# Patient Record
Sex: MALE | Race: BLACK OR AFRICAN AMERICAN | Employment: OTHER | URBAN - METROPOLITAN AREA
[De-identification: names, ages, dates, MRNs, and addresses within clinical notes are randomized per-mention and may not be internally consistent; named-entity substitution may affect disease eponyms.]

---

## 2017-01-05 ENCOUNTER — APPOINTMENT (OUTPATIENT)
Dept: GENERAL RADIOLOGY | Age: 72
DRG: 853 | End: 2017-01-05
Attending: INTERNAL MEDICINE
Payer: MEDICARE

## 2017-01-05 ENCOUNTER — HOSPITAL ENCOUNTER (INPATIENT)
Age: 72
LOS: 28 days | Discharge: NURSING, REHAB OR LONG-TERM CARE | DRG: 853 | End: 2017-02-02
Attending: EMERGENCY MEDICINE | Admitting: INTERNAL MEDICINE
Payer: MEDICARE

## 2017-01-05 DIAGNOSIS — L08.9 DIABETIC FOOT INFECTION (HCC): Primary | ICD-10-CM

## 2017-01-05 DIAGNOSIS — E11.628 DIABETIC FOOT INFECTION (HCC): Primary | ICD-10-CM

## 2017-01-05 DIAGNOSIS — A41.9 SEPSIS, DUE TO UNSPECIFIED ORGANISM: ICD-10-CM

## 2017-01-05 PROBLEM — E11.621 DIABETIC ULCER OF RIGHT FOOT (HCC): Status: ACTIVE | Noted: 2017-01-05

## 2017-01-05 PROBLEM — L97.519 DIABETIC ULCER OF RIGHT FOOT (HCC): Status: ACTIVE | Noted: 2017-01-05

## 2017-01-05 LAB
ALBUMIN SERPL BCP-MCNC: 2.1 G/DL (ref 3.2–4.6)
ALBUMIN/GLOB SERPL: 0.4 {RATIO} (ref 1.2–3.5)
ALP SERPL-CCNC: 100 U/L (ref 50–136)
ALT SERPL-CCNC: 16 U/L (ref 12–65)
ANION GAP BLD CALC-SCNC: 11 MMOL/L (ref 7–16)
AST SERPL W P-5'-P-CCNC: 24 U/L (ref 15–37)
ATRIAL RATE: 98 BPM
BASOPHILS # BLD AUTO: 0 K/UL (ref 0–0.2)
BASOPHILS # BLD: 0 % (ref 0–2)
BILIRUB SERPL-MCNC: 0.5 MG/DL (ref 0.2–1.1)
BUN SERPL-MCNC: 29 MG/DL (ref 8–23)
CALCIUM SERPL-MCNC: 8.1 MG/DL (ref 8.3–10.4)
CALCULATED P AXIS, ECG09: 62 DEGREES
CALCULATED R AXIS, ECG10: 46 DEGREES
CALCULATED T AXIS, ECG11: -74 DEGREES
CHLORIDE SERPL-SCNC: 105 MMOL/L (ref 98–107)
CO2 SERPL-SCNC: 24 MMOL/L (ref 21–32)
CREAT SERPL-MCNC: 2.99 MG/DL (ref 0.8–1.5)
CRP SERPL-MCNC: 25.4 MG/DL (ref 0–0.9)
D DIMER PPP FEU-MCNC: 1.57 UG/ML(FEU)
DIAGNOSIS, 93000: NORMAL
DIASTOLIC BP, ECG02: NORMAL MMHG
DIFFERENTIAL METHOD BLD: ABNORMAL
EOSINOPHIL # BLD: 0.1 K/UL (ref 0–0.8)
EOSINOPHIL NFR BLD: 0 % (ref 0.5–7.8)
ERYTHROCYTE [DISTWIDTH] IN BLOOD BY AUTOMATED COUNT: 13.9 % (ref 11.9–14.6)
GLOBULIN SER CALC-MCNC: 5.3 G/DL (ref 2.3–3.5)
GLUCOSE BLD STRIP.AUTO-MCNC: 171 MG/DL (ref 65–100)
GLUCOSE SERPL-MCNC: 157 MG/DL (ref 65–100)
HCT VFR BLD AUTO: 25.8 % (ref 41.1–50.3)
HGB BLD-MCNC: 8.1 G/DL (ref 13.6–17.2)
IMM GRANULOCYTES # BLD: 0.1 K/UL (ref 0–0.5)
IMM GRANULOCYTES NFR BLD AUTO: 0.3 % (ref 0–5)
LACTATE BLD-SCNC: 2.2 MMOL/L (ref 0.5–1.9)
LYMPHOCYTES # BLD AUTO: 5 % (ref 13–44)
LYMPHOCYTES # BLD: 0.9 K/UL (ref 0.5–4.6)
MCH RBC QN AUTO: 26.9 PG (ref 26.1–32.9)
MCHC RBC AUTO-ENTMCNC: 31.4 G/DL (ref 31.4–35)
MCV RBC AUTO: 85.7 FL (ref 79.6–97.8)
MONOCYTES # BLD: 1.4 K/UL (ref 0.1–1.3)
MONOCYTES NFR BLD AUTO: 7 % (ref 4–12)
NEUTS SEG # BLD: 16.3 K/UL (ref 1.7–8.2)
NEUTS SEG NFR BLD AUTO: 88 % (ref 43–78)
P-R INTERVAL, ECG05: 166 MS
PLATELET # BLD AUTO: 331 K/UL (ref 150–450)
PMV BLD AUTO: 10.1 FL (ref 10.8–14.1)
POTASSIUM SERPL-SCNC: 3.8 MMOL/L (ref 3.5–5.1)
PROCALCITONIN SERPL-MCNC: 0.2 NG/ML
PROT SERPL-MCNC: 7.4 G/DL (ref 6.3–8.2)
Q-T INTERVAL, ECG07: 352 MS
QRS DURATION, ECG06: 74 MS
QTC CALCULATION (BEZET), ECG08: 449 MS
RBC # BLD AUTO: 3.01 M/UL (ref 4.23–5.67)
SODIUM SERPL-SCNC: 140 MMOL/L (ref 136–145)
SYSTOLIC BP, ECG01: NORMAL MMHG
TROPONIN I SERPL-MCNC: 0.56 NG/ML (ref 0.02–0.05)
VENTRICULAR RATE, ECG03: 98 BPM
WBC # BLD AUTO: 18.7 K/UL (ref 4.3–11.1)

## 2017-01-05 PROCEDURE — 84484 ASSAY OF TROPONIN QUANT: CPT | Performed by: PHYSICIAN ASSISTANT

## 2017-01-05 PROCEDURE — 82962 GLUCOSE BLOOD TEST: CPT

## 2017-01-05 PROCEDURE — 85025 COMPLETE CBC W/AUTO DIFF WBC: CPT | Performed by: PHYSICIAN ASSISTANT

## 2017-01-05 PROCEDURE — 65660000000 HC RM CCU STEPDOWN

## 2017-01-05 PROCEDURE — 74011000258 HC RX REV CODE- 258: Performed by: PHYSICIAN ASSISTANT

## 2017-01-05 PROCEDURE — 85379 FIBRIN DEGRADATION QUANT: CPT | Performed by: PHYSICIAN ASSISTANT

## 2017-01-05 PROCEDURE — 99285 EMERGENCY DEPT VISIT HI MDM: CPT | Performed by: PHYSICIAN ASSISTANT

## 2017-01-05 PROCEDURE — 86140 C-REACTIVE PROTEIN: CPT | Performed by: PHYSICIAN ASSISTANT

## 2017-01-05 PROCEDURE — 74011250636 HC RX REV CODE- 250/636: Performed by: EMERGENCY MEDICINE

## 2017-01-05 PROCEDURE — 93005 ELECTROCARDIOGRAM TRACING: CPT | Performed by: PHYSICIAN ASSISTANT

## 2017-01-05 PROCEDURE — 96375 TX/PRO/DX INJ NEW DRUG ADDON: CPT | Performed by: PHYSICIAN ASSISTANT

## 2017-01-05 PROCEDURE — 96365 THER/PROPH/DIAG IV INF INIT: CPT | Performed by: PHYSICIAN ASSISTANT

## 2017-01-05 PROCEDURE — 74011250636 HC RX REV CODE- 250/636: Performed by: INTERNAL MEDICINE

## 2017-01-05 PROCEDURE — 74011250636 HC RX REV CODE- 250/636: Performed by: PHYSICIAN ASSISTANT

## 2017-01-05 PROCEDURE — 71020 XR CHEST PA LAT: CPT

## 2017-01-05 PROCEDURE — 87040 BLOOD CULTURE FOR BACTERIA: CPT | Performed by: PHYSICIAN ASSISTANT

## 2017-01-05 PROCEDURE — 80053 COMPREHEN METABOLIC PANEL: CPT | Performed by: PHYSICIAN ASSISTANT

## 2017-01-05 PROCEDURE — 84145 PROCALCITONIN (PCT): CPT | Performed by: PHYSICIAN ASSISTANT

## 2017-01-05 PROCEDURE — 73630 X-RAY EXAM OF FOOT: CPT

## 2017-01-05 PROCEDURE — 83605 ASSAY OF LACTIC ACID: CPT

## 2017-01-05 RX ORDER — METOPROLOL SUCCINATE 100 MG/1
100 TABLET, EXTENDED RELEASE ORAL
Status: DISCONTINUED | OUTPATIENT
Start: 2017-01-06 | End: 2017-02-02 | Stop reason: HOSPADM

## 2017-01-05 RX ORDER — HYDROCODONE BITARTRATE AND ACETAMINOPHEN 5; 325 MG/1; MG/1
1 TABLET ORAL
Status: DISCONTINUED | OUTPATIENT
Start: 2017-01-05 | End: 2017-01-06

## 2017-01-05 RX ORDER — SPIRONOLACTONE 25 MG/1
25 TABLET ORAL DAILY
Status: CANCELLED | OUTPATIENT
Start: 2017-01-06

## 2017-01-05 RX ORDER — HYDROMORPHONE HYDROCHLORIDE 1 MG/ML
0.5 INJECTION, SOLUTION INTRAMUSCULAR; INTRAVENOUS; SUBCUTANEOUS
Status: DISCONTINUED | OUTPATIENT
Start: 2017-01-05 | End: 2017-01-12 | Stop reason: SDUPTHER

## 2017-01-05 RX ORDER — SODIUM CHLORIDE 0.9 % (FLUSH) 0.9 %
5-10 SYRINGE (ML) INJECTION EVERY 8 HOURS
Status: DISCONTINUED | OUTPATIENT
Start: 2017-01-05 | End: 2017-02-02 | Stop reason: HOSPADM

## 2017-01-05 RX ORDER — VANCOMYCIN 2 GRAM/500 ML IN 0.9 % SODIUM CHLORIDE INTRAVENOUS
2000 ONCE
Status: COMPLETED | OUTPATIENT
Start: 2017-01-05 | End: 2017-01-28

## 2017-01-05 RX ORDER — HYDROMORPHONE HYDROCHLORIDE 1 MG/ML
0.5 INJECTION, SOLUTION INTRAMUSCULAR; INTRAVENOUS; SUBCUTANEOUS
Status: COMPLETED | OUTPATIENT
Start: 2017-01-05 | End: 2017-01-05

## 2017-01-05 RX ORDER — ONDANSETRON 2 MG/ML
4 INJECTION INTRAMUSCULAR; INTRAVENOUS
Status: COMPLETED | OUTPATIENT
Start: 2017-01-05 | End: 2017-01-05

## 2017-01-05 RX ORDER — ATORVASTATIN CALCIUM 40 MG/1
80 TABLET, FILM COATED ORAL DAILY
Status: DISCONTINUED | OUTPATIENT
Start: 2017-01-06 | End: 2017-02-02 | Stop reason: HOSPADM

## 2017-01-05 RX ORDER — SODIUM CHLORIDE 0.9 % (FLUSH) 0.9 %
5-10 SYRINGE (ML) INJECTION AS NEEDED
Status: DISCONTINUED | OUTPATIENT
Start: 2017-01-05 | End: 2017-02-02 | Stop reason: HOSPADM

## 2017-01-05 RX ORDER — SODIUM CHLORIDE 0.9 % (FLUSH) 0.9 %
5-10 SYRINGE (ML) INJECTION AS NEEDED
Status: DISCONTINUED | OUTPATIENT
Start: 2017-01-05 | End: 2017-01-05

## 2017-01-05 RX ORDER — HEPARIN SODIUM 5000 [USP'U]/ML
5000 INJECTION, SOLUTION INTRAVENOUS; SUBCUTANEOUS EVERY 8 HOURS
Status: DISCONTINUED | OUTPATIENT
Start: 2017-01-05 | End: 2017-01-08

## 2017-01-05 RX ORDER — ACETAMINOPHEN 325 MG/1
650 TABLET ORAL
Status: DISCONTINUED | OUTPATIENT
Start: 2017-01-05 | End: 2017-02-02 | Stop reason: HOSPADM

## 2017-01-05 RX ORDER — AMLODIPINE BESYLATE 5 MG/1
5 TABLET ORAL DAILY
Status: DISCONTINUED | OUTPATIENT
Start: 2017-01-06 | End: 2017-01-09

## 2017-01-05 RX ORDER — INSULIN LISPRO 100 [IU]/ML
INJECTION, SOLUTION INTRAVENOUS; SUBCUTANEOUS
Status: DISCONTINUED | OUTPATIENT
Start: 2017-01-06 | End: 2017-01-09

## 2017-01-05 RX ORDER — RAMIPRIL 5 MG/1
10 CAPSULE ORAL DAILY
Status: DISCONTINUED | OUTPATIENT
Start: 2017-01-06 | End: 2017-01-06

## 2017-01-05 RX ORDER — DOCUSATE SODIUM 100 MG/1
100 CAPSULE, LIQUID FILLED ORAL
Status: DISCONTINUED | OUTPATIENT
Start: 2017-01-05 | End: 2017-02-02 | Stop reason: HOSPADM

## 2017-01-05 RX ORDER — GUAIFENESIN 100 MG/5ML
81 LIQUID (ML) ORAL
Status: COMPLETED | OUTPATIENT
Start: 2017-01-06 | End: 2017-01-10

## 2017-01-05 RX ORDER — ONDANSETRON 2 MG/ML
4 INJECTION INTRAMUSCULAR; INTRAVENOUS
Status: DISCONTINUED | OUTPATIENT
Start: 2017-01-05 | End: 2017-02-02 | Stop reason: HOSPADM

## 2017-01-05 RX ORDER — BUMETANIDE 1 MG/1
1 TABLET ORAL DAILY
Status: CANCELLED | OUTPATIENT
Start: 2017-01-06

## 2017-01-05 RX ORDER — VANCOMYCIN/0.9 % SOD CHLORIDE 1.5G/250ML
1500 PLASTIC BAG, INJECTION (ML) INTRAVENOUS EVERY 24 HOURS
Status: DISCONTINUED | OUTPATIENT
Start: 2017-01-06 | End: 2017-01-06

## 2017-01-05 RX ADMIN — PIPERACILLIN SODIUM,TAZOBACTAM SODIUM 4.5 G: 4; .5 INJECTION, POWDER, FOR SOLUTION INTRAVENOUS at 18:42

## 2017-01-05 RX ADMIN — HYDROMORPHONE HYDROCHLORIDE 0.5 MG: 1 INJECTION, SOLUTION INTRAMUSCULAR; INTRAVENOUS; SUBCUTANEOUS at 18:41

## 2017-01-05 RX ADMIN — HYDROMORPHONE HYDROCHLORIDE 0.5 MG: 1 INJECTION, SOLUTION INTRAMUSCULAR; INTRAVENOUS; SUBCUTANEOUS at 22:30

## 2017-01-05 RX ADMIN — VANCOMYCIN HYDROCHLORIDE 1932 MG: 10 INJECTION, POWDER, LYOPHILIZED, FOR SOLUTION INTRAVENOUS at 17:32

## 2017-01-05 RX ADMIN — HEPARIN SODIUM 5000 UNITS: 5000 INJECTION, SOLUTION INTRAVENOUS; SUBCUTANEOUS at 21:52

## 2017-01-05 RX ADMIN — ONDANSETRON 4 MG: 2 INJECTION, SOLUTION INTRAMUSCULAR; INTRAVENOUS at 18:42

## 2017-01-05 RX ADMIN — SODIUM CHLORIDE 1000 ML: 900 INJECTION, SOLUTION INTRAVENOUS at 21:30

## 2017-01-05 RX ADMIN — ONDANSETRON 4 MG: 2 INJECTION INTRAMUSCULAR; INTRAVENOUS at 22:30

## 2017-01-05 RX ADMIN — VANCOMYCIN HYDROCHLORIDE 2000 MG: 10 INJECTION, POWDER, LYOPHILIZED, FOR SOLUTION INTRAVENOUS at 17:52

## 2017-01-05 RX ADMIN — SODIUM CHLORIDE 2898 ML: 900 INJECTION, SOLUTION INTRAVENOUS at 18:42

## 2017-01-05 NOTE — IP AVS SNAPSHOT
Current Discharge Medication List  
  
Take these medications at their scheduled times Dose & Instructions Dispensing Information Comments Morning Noon Evening Bedtime  
 amLODIPine 5 mg tablet Commonly known as:  [place] Your next dose is: Today, Tomorrow Other:  ____________ Dose:  5 mg Take 1 Tab by mouth daily. Quantity:  30 Tab Refills:  11  
     
   
   
   
  
 aspirin 81 mg chewable tablet Your next dose is: Today, Tomorrow Other:  ____________ Dose:  81 mg Take 1 Tab by mouth daily (after breakfast). Quantity:  30 Tab Refills:  11  
     
   
   
   
  
 atorvastatin 80 mg tablet Commonly known as:  LIPITOR Your next dose is: Today, Tomorrow Other:  ____________ Dose:  80 mg Take 1 Tab by mouth daily. Quantity:  30 Tab Refills:  6  
     
   
   
   
  
 bumetanide 1 mg tablet Commonly known as:  [employer] Your next dose is: Today, Tomorrow Other:  ____________ Dose:  1 mg Take 1 Tab by mouth daily. Quantity:  30 Tab Refills:  3  
     
   
   
   
  
 glipiZIDE SR 2.5 mg CR tablet Commonly known as:  GLUCOTROL XL Your next dose is: Today, Tomorrow Other:  ____________ Take  by mouth daily. Refills:  0  
     
   
   
   
  
 isosorbide dinitrate 10 mg tablet Commonly known as:  ISORDIL Your next dose is: Today, Tomorrow Other:  ____________ Dose:  10 mg Take 1 Tab by mouth three (3) times daily. Quantity:  30 Tab Refills:  0  
     
   
   
   
  
 magnesium oxide 400 mg tablet Commonly known as:  MAG-OX Your next dose is: Today, Tomorrow Other:  ____________ Dose:  400 mg Take 1 Tab by mouth two (2) times a day. Quantity:  30 Tab Refills:  0  
     
   
   
   
  
 metoclopramide HCl 10 mg tablet Commonly known as:  REGLAN Your next dose is: Today, Tomorrow Other:  ____________ Dose:  10 mg Take 1 Tab by mouth Before breakfast, lunch, dinner and at bedtime for 10 days. Quantity:  40 Tab Refills:  0  
     
   
   
   
  
 metoprolol succinate 100 mg tablet Commonly known as:  TOPROL-XL Your next dose is: Today, Tomorrow Other:  ____________ Dose:  100 mg Take 1 Tab by mouth daily. Quantity:  30 Tab Refills:  11  
     
   
   
   
  
 multivitamin, stress formula tablet Commonly known as:  STRESS TAB Your next dose is: Today, Tomorrow Other:  ____________ Dose:  1 Tab Take 1 Tab by mouth daily for 10 days. Quantity:  10 Tab Refills:  0  
     
   
   
   
  
 pantoprazole 40 mg tablet Commonly known as:  PROTONIX Your next dose is: Today, Tomorrow Other:  ____________ Dose:  40 mg Take 1 Tab by mouth Before breakfast and dinner. Indications: HEARTBURN Quantity:  20 Tab Refills:  0  
     
   
   
   
  
 potassium chloride 20 mEq tablet Commonly known as:  K-DUR, KLOR-CON Your next dose is: Today, Tomorrow Other:  ____________ Dose:  40 mEq Take 2 Tabs by mouth daily. Quantity:  10 Tab Refills:  0  
     
   
   
   
  
 ramipril 10 mg capsule Commonly known as:  ALTACE Your next dose is: Today, Tomorrow Other:  ____________ Dose:  10 mg Take 1 Cap by mouth daily. Quantity:  30 Cap Refills:  11  
     
   
   
   
  
 spironolactone 25 mg tablet Commonly known as:  ALDACTONE Your next dose is: Today, Tomorrow Other:  ____________ Dose:  25 mg Take 25 mg by mouth daily. Refills:  1 Take these medications as needed Dose & Instructions Dispensing Information Comments Morning Noon Evening Bedtime  
 alum-mag hydroxide-simeth 200-200-20 mg/5 mL Susp Commonly known as:  MYLANTA Your next dose is: Today, Tomorrow Other:  ____________ Dose:  30 mL Take 30 mL by mouth every four (4) hours as needed. Quantity:  150 mL Refills:  0  
     
   
   
   
  
 oxyCODONE IR 20 mg immediate release tablet Commonly known as:  Haley Ramal Your next dose is: Today, Tomorrow Other:  ____________ Dose:  20 mg Take 1 Tab by mouth every four (4) hours as needed. Max Daily Amount: 120 mg.  
 Quantity:  10 Tab Refills:  0  
     
   
   
   
  
 traMADol 50 mg tablet Commonly known as:  ULTRAM  
   
Your next dose is: Today, Tomorrow Other:  ____________ Dose:  100 mg Take 2 Tabs by mouth every six (6) hours as needed. Max Daily Amount: 400 mg. Quantity:  10 Tab Refills:  0 Where to Get Your Medications Information about where to get these medications is not yet available ! Ask your nurse or doctor about these medications  
  alum-mag hydroxide-simeth 200-200-20 mg/5 mL Susp  
 isosorbide dinitrate 10 mg tablet  
 magnesium oxide 400 mg tablet  
 metoclopramide HCl 10 mg tablet  
 multivitamin, stress formula tablet  
 oxyCODONE IR 20 mg immediate release tablet  
 pantoprazole 40 mg tablet  
 potassium chloride 20 mEq tablet  
 traMADol 50 mg tablet

## 2017-01-05 NOTE — ED PROVIDER NOTES
HPI Comments: The patient is here with right foot pain, redness and an ulcer that has been there for a while. He states the pain in his foot is traveling all the way up his leg into his chest and into his head. He is a diabetic and is a patient at the [place] [facility] through [facility].  He has had swelling of that right leg and states that the pain today has been unbearable. He normally has pain medication at home and states once he takes that he can get some relief however today he has gotten no relief. He has felt like he has had some fever and chills as well. Patient is joking with me in the room and his family. He states he is unable to bear weight on the leg due to pain. The leg has not been cold. He has no history of blood clots that he is aware of. Patient states that he smokes \"every chance I get. \"  The patient has no redness of the leg, the redness is just in his foot. Patient has had no nasal congestion, cough or other symptoms. Patient is a 73 y.o. male presenting with leg pain. The history is provided by the patient.    Leg Pain           Past Medical History:   Diagnosis Date    Abnormal CT scan, chest 12/27/2015    Acute CHF ([address] 85/63/1932    Acute systolic congestive heart failure ([address] 12/30/2015    MO (acute kidney injury) ([address] 7/29/2012    Bilateral pleural effusion 12/27/2015    Chest pain 12/26/2015    DM (diabetes mellitus), type 2 ([address] 7/29/2012    HTN (hypertension) 7/29/2012    Hypoglycemia 7/29/2012    Tobacco use 12/27/2015       Past Surgical History:   Procedure Laterality Date    Hx orthopaedic           Family History:   Problem Relation Age of Onset    Diabetes Mother     Kidney Disease Mother     Diabetes Father     Kidney Disease Father     Kidney Disease Sister        Social History     Social History    Marital status: SINGLE     Spouse name: N/A    Number of children: N/A    Years of education: N/A     Occupational History  retired brick layer      Social History Main Topics    Smoking status: Current Every Day Smoker     Packs/day: 1.00     Years: 45.00    Smokeless tobacco: Not on file    Alcohol use Yes      Comment: occasional    Drug use: No    Sexual activity: Not on file     Other Topics Concern    Not on file     Social History Narrative         ALLERGIES: Review of patient's allergies indicates no known allergies. Review of Systems   Constitutional: Negative. HENT: Negative. Eyes: Negative. Respiratory: Negative. Cardiovascular: Positive for chest pain and leg swelling. Gastrointestinal: Negative. Genitourinary: Negative. Musculoskeletal: Negative. Skin: Positive for color change and wound. Neurological: Negative. Psychiatric/Behavioral: Negative. All other systems reviewed and are negative. Vitals:    01/05/17 1704   BP: 124/56   Pulse: 93   Resp: 18   Temp: 99.3 °F (37.4 °C)   SpO2: 100%   Weight: 96.6 kg (213 lb)   Height: 6' 1\" (1.854 m)            Physical Exam   Constitutional: He is oriented to person, place, and time. He appears well-developed and well-nourished. HENT:   Head: Normocephalic and atraumatic. Right Ear: External ear normal.   Left Ear: External ear normal.   Nose: Nose normal.   Mouth/Throat: Oropharynx is clear and moist.   Eyes: Conjunctivae and EOM are normal. Pupils are equal, round, and reactive to light. Neck: Normal range of motion. Neck supple. Cardiovascular: Normal rate, regular rhythm, normal heart sounds and intact distal pulses. Pulmonary/Chest: Effort normal and breath sounds normal.   Abdominal: Soft. Bowel sounds are normal.   Musculoskeletal: Normal range of motion. Feet:    Neurological: He is alert and oriented to person, place, and time. He has normal reflexes. Skin: Skin is warm and dry. Psychiatric: He has a normal mood and affect.  His behavior is normal. Judgment and thought content normal.   Nursing note and vitals reviewed. [facility]  ED Course       Procedures  6:03 PM Spoke with Dr. Kim Alvarez regarding patient. 6:47 PM Spoke with Ciara Tang MD, hospitalist who will admit patient for gas walsh and possible sepsis in a diabetic foot. He will come and see the patient now. The CBC and other blood work is not back yet at the lactic is 2.2 and the x-ray shows subcutaneous air suggestive of gas green. The patient was observed in the ED. Results Reviewed:      Recent Results (from the past 24 hour(s))   POC LACTIC ACID    Collection Time: 01/05/17  6:24 PM   Result Value Ref Range    Lactic Acid (POC) 2.2 (H) 0.5 - 1.9 mmol/L     XR FOOT RT MIN 3 V   Final Result   Impression:   1. No strong evidence for osteomyelitis. A three phase bone scan or contrasted   MRI can be considered if there is continued concern for osteomyelitis. 2. Lucencies in the soft tissues about the proximal phalanx of the fifth digit. Soft tissue gas as can occur with a gas producing infection (i.e. gas gangrene)   cannot be excluded. XR CHEST PA LAT   Final Result   IMPRESSION:    1. No acute cardiopulmonary process evident by plain film imaging.

## 2017-01-05 NOTE — LETTER
[facility] EMERGENCY DEPT One [address] [address] 
[phone] Work/School Note Date: 1/5/2017 To Whom It May concern: Raheem Echevarria was seen and treated today in the emergency room by the following provider(s): 
Attending Provider: Connor Ritter MD 
Physician Assistant: YAIMA Faust. Ranelle Dandy may return to work on 01/06/17. Sincerely, YAIMA Faust

## 2017-01-05 NOTE — IP AVS SNAPSHOT
Ruthie Berumen 
 
 
 [address] 
[phone] Patient: Jeanine Perez MRN: [id number] RLJ:[birth year] You are allergic to the following Allergen Reactions Lortab (Hydrocodone-Acetaminophen) Itching Immunizations Administered for This Admission Name Date  
 TB Skin Test (PPD) Intradermal 1/8/2017 Recent Documentation Height Weight BMI Smoking Status 1.854 m 113.4 kg 32.98 kg/m2 Current Every Day Smoker Unresulted Labs Order Current Status CULTURE, BLOOD Preliminary result CULTURE, BLOOD Preliminary result PLEASE READ & DOCUMENT PPD TEST IN 24 HRS Preliminary result TYPE & CROSSMATCH Preliminary result Emergency Contacts Name Discharge Info Relation Home Work Mobile Shaunna Bardales  Child [2] [phone] About your hospitalization You were admitted on:  January 5, 2017 You last received care in the:  [facility] MED SURG You were discharged on:  February 2, 2017 Unit phone number:  [phone] Why you were hospitalized Your primary diagnosis was:  Type 2 Diabetes Mellitus With Right Diabetic Foot Infection (Hcc) Your diagnoses also included:  Type 2 Diabetes Mellitus With Hyperglycemia (Hcc), Kidney Disease, Chronic, Stage Iii (Moderate, Egfr 30-59 Ml/Min), Chronic Systolic Congestive Heart Failure (Hcc), Acute Renal Failure With Tubular Necrosis (Hcc), Htn (Hypertension), Encephalopathy Due To Infection, Sepsis Due To Cellulitis (Hcc), 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 
[phone] Your Appointments Monday February 06, 2017 10:15 AM EST Follow Up with Varinder Pitts MD  
[facility] ([facility]) [address] [place] [address]  
[phone] Tuesday February 21, 2017  1:00 PM EST Office Visit with Santo Riley MD  
[facility] Cardiology ([address]) 2 [facility] 
[address] [place] [address]  
[phone] Current Discharge Medication List  
  
START taking these medications Dose & Instructions Dispensing Information Comments Morning Noon Evening Bedtime  
 alum-mag hydroxide-simeth 200-200-20 mg/5 mL Susp Commonly known as:  MYLANTA Your next dose is: Today, Tomorrow Other:  _________ Dose:  30 mL Take 30 mL by mouth every four (4) hours as needed. Quantity:  150 mL Refills:  0  
     
   
   
   
  
 isosorbide dinitrate 10 mg tablet Commonly known as:  ISORDIL Your next dose is: Today, Tomorrow Other:  _________ Dose:  10 mg Take 1 Tab by mouth three (3) times daily. Quantity:  30 Tab Refills:  0  
     
   
   
   
  
 magnesium oxide 400 mg tablet Commonly known as:  MAG-OX Your next dose is: Today, Tomorrow Other:  _________ Dose:  400 mg Take 1 Tab by mouth two (2) times a day. Quantity:  30 Tab Refills:  0  
     
   
   
   
  
 metoclopramide HCl 10 mg tablet Commonly known as:  REGLAN Your next dose is: Today, Tomorrow Other:  _________ Dose:  10 mg Take 1 Tab by mouth Before breakfast, lunch, dinner and at bedtime for 10 days. Quantity:  40 Tab Refills:  0 multivitamin, stress formula tablet Commonly known as:  STRESS TAB Your next dose is: Today, Tomorrow Other:  _________ Dose:  1 Tab Take 1 Tab by mouth daily for 10 days. Quantity:  10 Tab Refills:  0  
     
   
   
   
  
 oxyCODONE IR 20 mg immediate release tablet Commonly known as:  Franceen Ly Your next dose is: Today, Tomorrow Other:  _________ Dose:  20 mg Take 1 Tab by mouth every four (4) hours as needed. Max Daily Amount: 120 mg.  
 Quantity:  10 Tab Refills:  0  
     
   
   
   
  
 pantoprazole 40 mg tablet Commonly known as:  PROTONIX Your next dose is: Today, Tomorrow Other:  _________ Dose:  40 mg Take 1 Tab by mouth Before breakfast and dinner. Indications: HEARTBURN Quantity:  20 Tab Refills:  0  
     
   
   
   
  
 potassium chloride 20 mEq tablet Commonly known as:  K-DUR, KLOR-CON Your next dose is: Today, Tomorrow Other:  _________ Dose:  40 mEq Take 2 Tabs by mouth daily. Quantity:  10 Tab Refills:  0  
     
   
   
   
  
 traMADol 50 mg tablet Commonly known as:  ULTRAM  
   
Your next dose is: Today, Tomorrow Other:  _________ Dose:  100 mg Take 2 Tabs by mouth every six (6) hours as needed. Max Daily Amount: 400 mg. Quantity:  10 Tab Refills:  0 CONTINUE these medications which have NOT CHANGED Dose & Instructions Dispensing Information Comments Morning Noon Evening Bedtime  
 amLODIPine 5 mg tablet Commonly known as:  Pat Rodríguez Your next dose is: Today, Tomorrow Other:  _________ Dose:  5 mg Take 1 Tab by mouth daily. Quantity:  30 Tab Refills:  11  
     
   
   
   
  
 aspirin 81 mg chewable tablet Your next dose is: Today, Tomorrow Other:  _________ Dose:  81 mg Take 1 Tab by mouth daily (after breakfast). Quantity:  30 Tab Refills:  11  
     
   
   
   
  
 atorvastatin 80 mg tablet Commonly known as:  LIPITOR Your next dose is: Today, Tomorrow Other:  _________ Dose:  80 mg Take 1 Tab by mouth daily. Quantity:  30 Tab Refills:  6  
     
   
   
   
  
 bumetanide 1 mg tablet Commonly known as:  Allegra Peaches Your next dose is: Today, Tomorrow Other:  _________ Dose:  1 mg Take 1 Tab by mouth daily. Quantity:  30 Tab Refills:  3  
     
   
   
   
  
 glipiZIDE SR 2.5 mg CR tablet Commonly known as:  GLUCOTROL XL Your next dose is: Today, Tomorrow Other:  _________ Take  by mouth daily. Refills:  0  
     
   
   
   
  
 metoprolol succinate 100 mg tablet Commonly known as:  TOPROL-XL Your next dose is: Today, Tomorrow Other:  _________ Dose:  100 mg Take 1 Tab by mouth daily. Quantity:  30 Tab Refills:  11  
     
   
   
   
  
 ramipril 10 mg capsule Commonly known as:  ALTACE Your next dose is: Today, Tomorrow Other:  _________ Dose:  10 mg Take 1 Cap by mouth daily. Quantity:  30 Cap Refills:  11  
     
   
   
   
  
 spironolactone 25 mg tablet Commonly known as:  ALDACTONE Your next dose is: Today, Tomorrow Other:  _________ Dose:  25 mg Take 25 mg by mouth daily. Refills:  1 Where to Get Your Medications Information on where to get these meds will be given to you by the nurse or doctor. ! Ask your nurse or doctor about these medications  
  alum-mag hydroxide-simeth 200-200-20 mg/5 mL Susp  
 isosorbide dinitrate 10 mg tablet  
 magnesium oxide 400 mg tablet  
 metoclopramide HCl 10 mg tablet  
 multivitamin, stress formula tablet  
 oxyCODONE IR 20 mg immediate release tablet  
 pantoprazole 40 mg tablet  
 potassium chloride 20 mEq tablet  
 traMADol 50 mg tablet Discharge Instructions None Discharge Orders None [facility] Announcement We are excited to announce that we are making your provider's discharge notes available to you in [employer]. You will see these notes when they are completed and signed by the physician that discharged you from your recent hospital stay. If you have any questions or concerns about any information you see in MyChart, please call the Health Information Department where you were seen or reach out to your Primary Care Provider for more information about your plan of care. General Information Please provide this summary of care documentation to your next provider. Patient Signature:  ____________________________________________________________ Date:  ____________________________________________________________  
  
[facility] Provider Signature:  ____________________________________________________________ Date:  ____________________________________________________________

## 2017-01-06 PROBLEM — N17.0 ACUTE RENAL FAILURE WITH TUBULAR NECROSIS (HCC): Status: ACTIVE | Noted: 2017-01-06

## 2017-01-06 LAB
ANION GAP BLD CALC-SCNC: 9 MMOL/L (ref 7–16)
APPEARANCE UR: ABNORMAL
BACTERIA URNS QL MICRO: ABNORMAL /HPF
BILIRUB UR QL: NEGATIVE
BNP SERPL-MCNC: 525 PG/ML
BUN SERPL-MCNC: 36 MG/DL (ref 8–23)
CALCIUM SERPL-MCNC: 7.1 MG/DL (ref 8.3–10.4)
CASTS URNS QL MICRO: ABNORMAL /LPF
CHLORIDE SERPL-SCNC: 108 MMOL/L (ref 98–107)
CK SERPL-CCNC: 327 U/L (ref 21–215)
CO2 SERPL-SCNC: 24 MMOL/L (ref 21–32)
COLOR UR: ABNORMAL
CREAT SERPL-MCNC: 3.35 MG/DL (ref 0.8–1.5)
EPI CELLS #/AREA URNS HPF: ABNORMAL /HPF
ERYTHROCYTE [DISTWIDTH] IN BLOOD BY AUTOMATED COUNT: 14.1 % (ref 11.9–14.6)
EST. AVERAGE GLUCOSE BLD GHB EST-MCNC: 128 MG/DL
GLUCOSE BLD STRIP.AUTO-MCNC: 166 MG/DL (ref 65–100)
GLUCOSE BLD STRIP.AUTO-MCNC: 178 MG/DL (ref 65–100)
GLUCOSE BLD STRIP.AUTO-MCNC: 194 MG/DL (ref 65–100)
GLUCOSE BLD STRIP.AUTO-MCNC: 203 MG/DL (ref 65–100)
GLUCOSE SERPL-MCNC: 188 MG/DL (ref 65–100)
GLUCOSE UR STRIP.AUTO-MCNC: NEGATIVE MG/DL
HBA1C MFR BLD: 6.1 % (ref 4.8–6)
HCT VFR BLD AUTO: 21 % (ref 41.1–50.3)
HGB BLD-MCNC: 6.7 G/DL (ref 13.6–17.2)
HGB UR QL STRIP: NEGATIVE
KETONES UR QL STRIP.AUTO: NEGATIVE MG/DL
LEUKOCYTE ESTERASE UR QL STRIP.AUTO: NEGATIVE
MCH RBC QN AUTO: 27.2 PG (ref 26.1–32.9)
MCHC RBC AUTO-ENTMCNC: 31.9 G/DL (ref 31.4–35)
MCV RBC AUTO: 85.4 FL (ref 79.6–97.8)
NITRITE UR QL STRIP.AUTO: NEGATIVE
PH UR STRIP: 5 [PH] (ref 5–9)
PLATELET # BLD AUTO: 279 K/UL (ref 150–450)
PMV BLD AUTO: 10.3 FL (ref 10.8–14.1)
POTASSIUM SERPL-SCNC: 4.3 MMOL/L (ref 3.5–5.1)
PROT UR STRIP-MCNC: NEGATIVE MG/DL
RBC # BLD AUTO: 2.46 M/UL (ref 4.23–5.67)
RBC #/AREA URNS HPF: ABNORMAL /HPF
SODIUM SERPL-SCNC: 141 MMOL/L (ref 136–145)
SP GR UR REFRACTOMETRY: 1.02 (ref 1–1.02)
TROPONIN I SERPL-MCNC: 0.4 NG/ML (ref 0.02–0.05)
TROPONIN I SERPL-MCNC: 0.42 NG/ML (ref 0.02–0.05)
UROBILINOGEN UR QL STRIP.AUTO: 0.2 EU/DL (ref 0.2–1)
VANCOMYCIN SERPL-MCNC: 15.1 UG/ML
WBC # BLD AUTO: 16.1 K/UL (ref 4.3–11.1)
WBC URNS QL MICRO: ABNORMAL /HPF

## 2017-01-06 PROCEDURE — 82962 GLUCOSE BLOOD TEST: CPT

## 2017-01-06 PROCEDURE — 86900 BLOOD TYPING SEROLOGIC ABO: CPT | Performed by: INTERNAL MEDICINE

## 2017-01-06 PROCEDURE — 74011000258 HC RX REV CODE- 258: Performed by: EMERGENCY MEDICINE

## 2017-01-06 PROCEDURE — 83880 ASSAY OF NATRIURETIC PEPTIDE: CPT | Performed by: NURSE PRACTITIONER

## 2017-01-06 PROCEDURE — 36415 COLL VENOUS BLD VENIPUNCTURE: CPT | Performed by: NURSE PRACTITIONER

## 2017-01-06 PROCEDURE — P9016 RBC LEUKOCYTES REDUCED: HCPCS | Performed by: INTERNAL MEDICINE

## 2017-01-06 PROCEDURE — 36430 TRANSFUSION BLD/BLD COMPNT: CPT

## 2017-01-06 PROCEDURE — 74011250637 HC RX REV CODE- 250/637: Performed by: INTERNAL MEDICINE

## 2017-01-06 PROCEDURE — 74011250636 HC RX REV CODE- 250/636: Performed by: EMERGENCY MEDICINE

## 2017-01-06 PROCEDURE — 80048 BASIC METABOLIC PNL TOTAL CA: CPT | Performed by: NURSE PRACTITIONER

## 2017-01-06 PROCEDURE — 30233N1 TRANSFUSION OF NONAUTOLOGOUS RED BLOOD CELLS INTO PERIPHERAL VEIN, PERCUTANEOUS APPROACH: ICD-10-PCS | Performed by: INTERNAL MEDICINE

## 2017-01-06 PROCEDURE — 81003 URINALYSIS AUTO W/O SCOPE: CPT | Performed by: EMERGENCY MEDICINE

## 2017-01-06 PROCEDURE — 80202 ASSAY OF VANCOMYCIN: CPT | Performed by: INTERNAL MEDICINE

## 2017-01-06 PROCEDURE — 82550 ASSAY OF CK (CPK): CPT | Performed by: NURSE PRACTITIONER

## 2017-01-06 PROCEDURE — 74011636637 HC RX REV CODE- 636/637: Performed by: INTERNAL MEDICINE

## 2017-01-06 PROCEDURE — 85027 COMPLETE CBC AUTOMATED: CPT | Performed by: EMERGENCY MEDICINE

## 2017-01-06 PROCEDURE — 84484 ASSAY OF TROPONIN QUANT: CPT | Performed by: NURSE PRACTITIONER

## 2017-01-06 PROCEDURE — 74011250636 HC RX REV CODE- 250/636: Performed by: INTERNAL MEDICINE

## 2017-01-06 PROCEDURE — 65660000000 HC RM CCU STEPDOWN

## 2017-01-06 PROCEDURE — 83036 HEMOGLOBIN GLYCOSYLATED A1C: CPT | Performed by: EMERGENCY MEDICINE

## 2017-01-06 PROCEDURE — 86923 COMPATIBILITY TEST ELECTRIC: CPT | Performed by: INTERNAL MEDICINE

## 2017-01-06 RX ORDER — SODIUM CHLORIDE 9 MG/ML
250 INJECTION, SOLUTION INTRAVENOUS AS NEEDED
Status: DISCONTINUED | OUTPATIENT
Start: 2017-01-06 | End: 2017-01-27

## 2017-01-06 RX ORDER — SODIUM CHLORIDE 9 MG/ML
100 INJECTION, SOLUTION INTRAVENOUS CONTINUOUS
Status: DISPENSED | OUTPATIENT
Start: 2017-01-06 | End: 2017-01-07

## 2017-01-06 RX ORDER — VANCOMYCIN/0.9 % SOD CHLORIDE 1.5G/250ML
1500 PLASTIC BAG, INJECTION (ML) INTRAVENOUS ONCE
Status: COMPLETED | OUTPATIENT
Start: 2017-01-06 | End: 2017-01-28

## 2017-01-06 RX ORDER — DIPHENHYDRAMINE HYDROCHLORIDE 50 MG/ML
25 INJECTION, SOLUTION INTRAMUSCULAR; INTRAVENOUS
Status: DISCONTINUED | OUTPATIENT
Start: 2017-01-06 | End: 2017-02-02 | Stop reason: HOSPADM

## 2017-01-06 RX ORDER — TRAMADOL HYDROCHLORIDE 50 MG/1
50 TABLET ORAL
Status: DISCONTINUED | OUTPATIENT
Start: 2017-01-06 | End: 2017-02-02

## 2017-01-06 RX ORDER — VANCOMYCIN/0.9 % SOD CHLORIDE 1.5G/250ML
1500 PLASTIC BAG, INJECTION (ML) INTRAVENOUS SEE ADMIN INSTRUCTIONS
Status: DISCONTINUED | OUTPATIENT
Start: 2017-01-06 | End: 2017-01-08

## 2017-01-06 RX ADMIN — INSULIN LISPRO 2 UNITS: 100 INJECTION, SOLUTION INTRAVENOUS; SUBCUTANEOUS at 08:47

## 2017-01-06 RX ADMIN — HEPARIN SODIUM 5000 UNITS: 5000 INJECTION, SOLUTION INTRAVENOUS; SUBCUTANEOUS at 12:00

## 2017-01-06 RX ADMIN — Medication 10 ML: at 14:00

## 2017-01-06 RX ADMIN — ATORVASTATIN CALCIUM 80 MG: 40 TABLET, FILM COATED ORAL at 08:47

## 2017-01-06 RX ADMIN — PIPERACILLIN AND TAZOBACTAM 4.5 G: 4; .5 INJECTION, POWDER, FOR SOLUTION INTRAVENOUS at 02:26

## 2017-01-06 RX ADMIN — METOPROLOL SUCCINATE 100 MG: 100 TABLET, EXTENDED RELEASE ORAL at 08:47

## 2017-01-06 RX ADMIN — SODIUM CHLORIDE 100 ML/HR: 900 INJECTION, SOLUTION INTRAVENOUS at 10:00

## 2017-01-06 RX ADMIN — Medication 10 ML: at 21:15

## 2017-01-06 RX ADMIN — RAMIPRIL 10 MG: 5 CAPSULE ORAL at 08:47

## 2017-01-06 RX ADMIN — AMLODIPINE BESYLATE 5 MG: 5 TABLET ORAL at 08:47

## 2017-01-06 RX ADMIN — PIPERACILLIN AND TAZOBACTAM 4.5 G: 4; .5 INJECTION, POWDER, FOR SOLUTION INTRAVENOUS at 10:00

## 2017-01-06 RX ADMIN — PIPERACILLIN AND TAZOBACTAM 4.5 G: 4; .5 INJECTION, POWDER, FOR SOLUTION INTRAVENOUS at 18:00

## 2017-01-06 RX ADMIN — INSULIN LISPRO 4 UNITS: 100 INJECTION, SOLUTION INTRAVENOUS; SUBCUTANEOUS at 11:30

## 2017-01-06 RX ADMIN — Medication 10 ML: at 05:19

## 2017-01-06 RX ADMIN — HEPARIN SODIUM 5000 UNITS: 5000 INJECTION, SOLUTION INTRAVENOUS; SUBCUTANEOUS at 04:49

## 2017-01-06 RX ADMIN — HEPARIN SODIUM 5000 UNITS: 5000 INJECTION, SOLUTION INTRAVENOUS; SUBCUTANEOUS at 21:15

## 2017-01-06 RX ADMIN — ASPIRIN 81 MG CHEWABLE TABLET 81 MG: 81 TABLET CHEWABLE at 08:47

## 2017-01-06 RX ADMIN — INSULIN LISPRO 2 UNITS: 100 INJECTION, SOLUTION INTRAVENOUS; SUBCUTANEOUS at 16:30

## 2017-01-06 NOTE — ED NOTES
TRANSFER - OUT REPORT:    Verbal report given to Catrachita SHORT on Jackie Sigala  being transferred to [address] [place] [address] for routine progression of care       Report consisted of patients Situation, Background, Assessment and   Recommendations(SBAR). Information from the following report(s) SBAR, ED Summary, Intake/Output, MAR and Cardiac Rhythm SR was reviewed with the receiving nurse. Lines:   Peripheral IV 01/05/17 Right Hand (Active)   Site Assessment Clean, dry, & intact 1/5/2017  7:14 PM   Phlebitis Assessment 0 1/5/2017  7:14 PM   Infiltration Assessment 0 1/5/2017  7:14 PM   Dressing Status Clean, dry, & intact 1/5/2017  7:14 PM   Dressing Type 4 X 4 1/5/2017  7:14 PM       Peripheral IV 01/05/17 Right Hand (Active)        Opportunity for questions and clarification was provided.       Patient transported with:   [employer]

## 2017-01-06 NOTE — PROGRESS NOTES
END OF SHIFT NOTE:  Pt confused on and off after medication. Pt grunting. C/o of being unable to urinate. Ambulated to BR. Urine not seen. Bladder scan 0. Dry, cracked feet. 2 plus edema in BLE. INTAKE/OUTPUT  01/05 0701 - 01/06 0700  In: 2161 [P.O.:240; I.V.:1921]  Out: 200 [Urine:200]  Voiding: YES  Catheter: NO  Color: kathleen  Drain:              DIET  diabetic    Flatus: Patient does have flatus present. Stool:  0 occurrences. Characteristics:       Ambulating  NO    Emesis: 1 occurrences.     Characteristics:          VITAL SIGNS  Patient Vitals for the past 12 hrs:   Temp Pulse Resp BP SpO2   01/06/17 0517 99.3 °F (37.4 °C) 89 18 137/75 96 %   01/06/17 0039 98.6 °F (37 °C) 97 18 146/69 96 %   01/05/17 2017 98.9 °F (37.2 °C) 90 20 147/77 98 %   01/05/17 1900 98.3 °F (36.8 °C) 90 22 142/67 98 %   01/05/17 1845 - 89 - 139/61 96 %   01/05/17 1843 - 94 - 144/69 95 %   01/05/17 1841 - 96 - 127/79 96 %       Pain Assessment  Pain Intensity 1: 0 (01/06/17 0110)  Pain Location 1: Foot  Pain Intervention(s) 1: Rest  Patient Stated Pain Goal: 0            Jesus Carr RN

## 2017-01-06 NOTE — PROGRESS NOTES
Pharmacokinetic Consult to Pharmacist    Rylan Annemarie is a 73 y.o. male being treated for Diabetic Foot Infection with Vancomycin and Zosyn. Height: 6' 1\" (185.4 cm)  Weight: 96.6 kg (213 lb)  Lab Results   Component Value Date/Time    BUN 29 01/05/2017 06:20 PM    Creatinine 2.99 01/05/2017 06:20 PM    WBC 18.7 01/05/2017 06:20 PM    Procalcitonin 0.2 01/05/2017 06:20 PM      Estimated Creatinine Clearance: 28.6 mL/min (based on Cr of 2.99). CULTURES:  1/5   Blood X 2   Pending      Day 1 of vancomycin. Goal trough is 15-20. Vancomycin dose initiated at 2000 mg X 1, then 1500 mg Q24H. As patient with MO currently, will plan to check vancomycin trough prior to the 3rd dose or sooner if clinically indicated. Will continue to follow patient.       Thank you,  Grazyna Layton, PharmD  Clinical Pharmacist  [phone]

## 2017-01-06 NOTE — PROGRESS NOTES
Hospitalist Progress Note     Admit Date:  2017  5:07 PM   Name:  Gildardo Denver   Age:  73 y.o.  :  1945   MRN:  [id number]   PCP:  None  Treatment Team: Attending Provider: William Paulino DO; Consulting Provider: Adelina Kramer MD    Subjective:   HPI:  Pt is a 74 yo male who presented with progressive pain and poor healing of right foot wound over 1 month. Other PMH includes DM (A1C 9.2 2016), CHF, CKD, current smoker. X Ray of right foot had low suspicion for osteomyelitis but does reveal soft tissue lucencies, possible gas producing infection. WBC 18.7, LA 2.2. He was started on Vanc/Zosyn in ER. Also noted to have MO with Cr 2.99, baseline 1.90.     - pt denies complaints this morning. Tired. Says he has no appetite and wasn't eating much at home. No CP, SOB. Denies trouble with urination. Objective:     Patient Vitals for the past 24 hrs:   Temp Pulse Resp BP SpO2   17 0739 98.8 °F (37.1 °C) 85 18 129/59 94 %   17 0517 99.3 °F (37.4 °C) 89 18 137/75 96 %   17 0039 98.6 °F (37 °C) 97 18 146/69 96 %   17 98.9 °F (37.2 °C) 90 20 147/77 98 %   17 1900 98.3 °F (36.8 °C) 90 22 142/67 98 %   17 1845 - 89 - 139/61 96 %   17 1843 - 94 - 144/69 95 %   17 1841 - 96 - 127/79 96 %   17 1704 99.3 °F (37.4 °C) 93 18 124/56 100 %     Oxygen Therapy  O2 Sat (%): 94 % (17 0739)  Pulse via Oximetry: 91 beats per minute (17 1900)  O2 Device: Room air (17 1704)    Intake/Output Summary (Last 24 hours) at 17 0901  Last data filed at 17 0517   Gross per 24 hour   Intake             2161 ml   Output              200 ml   Net             1961 ml       General:    Well nourished. Alert and oriented. CV:   RRR. No murmur, rub, or gallop. Lungs:   Clear to auscultation bilaterally. No wheezing, rhonchi, or rales. Abdomen:   Soft, nontender, nondistended. Bowel sounds normal.   Extremities: Warm and dry.   No cyanosis. LLE wound wrapped in bandage, c/d.  Skin:     No rashes or jaundice. Labs and Studies:  I have reviewed all labs, meds, telemetry events, and studies from the last 24 hours. Recent Results (from the past 24 hour(s))   PROCALCITONIN    Collection Time: 01/05/17  6:20 PM   Result Value Ref Range    Procalcitonin 0.2 ng/mL   METABOLIC PANEL, COMPREHENSIVE    Collection Time: 01/05/17  6:20 PM   Result Value Ref Range    Sodium 140 136 - 145 mmol/L    Potassium 3.8 3.5 - 5.1 mmol/L    Chloride 105 98 - 107 mmol/L    CO2 24 21 - 32 mmol/L    Anion gap 11 7 - 16 mmol/L    Glucose 157 (H) 65 - 100 mg/dL    BUN 29 (H) 8 - 23 MG/DL    Creatinine 2.99 (H) 0.8 - 1.5 MG/DL    GFR est AA 27 (L) >60 ml/min/1.73m2    GFR est non-AA 22 (L) >60 ml/min/1.73m2    Calcium 8.1 (L) 8.3 - 10.4 MG/DL    Bilirubin, total 0.5 0.2 - 1.1 MG/DL    ALT 16 12 - 65 U/L    AST 24 15 - 37 U/L    Alk. phosphatase 100 50 - 136 U/L    Protein, total 7.4 6.3 - 8.2 g/dL    Albumin 2.1 (L) 3.2 - 4.6 g/dL    Globulin 5.3 (H) 2.3 - 3.5 g/dL    A-G Ratio 0.4 (L) 1.2 - 3.5     CBC WITH AUTOMATED DIFF    Collection Time: 01/05/17  6:20 PM   Result Value Ref Range    WBC 18.7 (H) 4.3 - 11.1 K/uL    RBC 3.01 (L) 4.23 - 5.67 M/uL    HGB 8.1 (L) 13.6 - 17.2 g/dL    HCT 25.8 (L) 41.1 - 50.3 %    MCV 85.7 79.6 - 97.8 FL    MCH 26.9 26.1 - 32.9 PG    MCHC 31.4 31.4 - 35.0 g/dL    RDW 13.9 11.9 - 14.6 %    PLATELET 893 561 - 613 K/uL    MPV 10.1 (L) 10.8 - 14.1 FL    DF AUTOMATED      NEUTROPHILS 88 (H) 43 - 78 %    LYMPHOCYTES 5 (L) 13 - 44 %    MONOCYTES 7 4.0 - 12.0 %    EOSINOPHILS 0 (L) 0.5 - 7.8 %    BASOPHILS 0 0.0 - 2.0 %    IMMATURE GRANULOCYTES 0.3 0.0 - 5.0 %    ABS. NEUTROPHILS 16.3 (H) 1.7 - 8.2 K/UL    ABS. LYMPHOCYTES 0.9 0.5 - 4.6 K/UL    ABS. MONOCYTES 1.4 (H) 0.1 - 1.3 K/UL    ABS. EOSINOPHILS 0.1 0.0 - 0.8 K/UL    ABS. BASOPHILS 0.0 0.0 - 0.2 K/UL    ABS. IMM.  GRANS. 0.1 0.0 - 0.5 K/UL   D DIMER    Collection Time: 01/05/17  6:20 PM Result Value Ref Range    D DIMER 1.57 (HH) <0.55 ug/ml(FEU)   TROPONIN I    Collection Time: 01/05/17  6:20 PM   Result Value Ref Range    Troponin-I, Qt. 0.56 (HH) 0.02 - 0.05 NG/ML   C REACTIVE PROTEIN, QT    Collection Time: 01/05/17  6:20 PM   Result Value Ref Range    C-Reactive protein 25.4 (H) 0.0 - 0.9 mg/dL   POC LACTIC ACID    Collection Time: 01/05/17  6:24 PM   Result Value Ref Range    Lactic Acid (POC) 2.2 (H) 0.5 - 1.9 mmol/L   EKG, 12 LEAD, INITIAL    Collection Time: 01/05/17  6:43 PM   Result Value Ref Range    Systolic BP  mmHg    Diastolic BP  mmHg    Ventricular Rate 98 BPM    Atrial Rate 98 BPM    P-R Interval 166 ms    QRS Duration 74 ms    Q-T Interval 352 ms    QTC Calculation (Bezet) 449 ms    Calculated P Axis 62 degrees    Calculated R Axis 46 degrees    Calculated T Axis -74 degrees    Diagnosis       !! AGE AND GENDER SPECIFIC ECG ANALYSIS !!   Sinus rhythm with occasional Premature ventricular complexes  ST & T wave abnormality, consider inferolateral ischemia  Abnormal ECG  Confirmed by ST LYNNETTE JOSE MD (), JOSE BASHIR ([id number]) on 1/5/2017 7:42:32 PM     GLUCOSE, POC    Collection Time: 01/05/17  9:58 PM   Result Value Ref Range    Glucose (POC) 171 (H) 65 - 100 mg/dL   URINALYSIS W/ RFLX MICROSCOPIC    Collection Time: 01/06/17  1:40 AM   Result Value Ref Range    Color TERRY      Appearance CLOUDY      Specific gravity 1.024 (H) 1.001 - 1.023      pH (UA) 5.0 5.0 - 9.0      Protein NEGATIVE  NEG mg/dL    Glucose NEGATIVE  NEG mg/dL    Ketone NEGATIVE  NEG mg/dL    Bilirubin NEGATIVE  NEG      Blood NEGATIVE  NEG      Urobilinogen 0.2 0.2 - 1.0 EU/dL    Nitrites NEGATIVE  NEG      Leukocyte Esterase NEGATIVE  NEG      WBC 0-3 0 /hpf    RBC 0-3 0 /hpf    Epithelial cells 0-3 0 /hpf    Bacteria TRACE 0 /hpf    Casts 0-3 0 /lpf   TROPONIN I    Collection Time: 01/06/17  5:37 AM   Result Value Ref Range    Troponin-I, Qt. 0.40 (HH) 0.02 - 0.05 NG/ML   BNP    Collection Time: 01/06/17  5:37 AM   Result Value Ref Range     pg/mL   CK    Collection Time: 01/06/17  5:37 AM   Result Value Ref Range     (H) 21 - 369 U/L   METABOLIC PANEL, BASIC    Collection Time: 01/06/17  5:37 AM   Result Value Ref Range    Sodium 141 136 - 145 mmol/L    Potassium 4.3 3.5 - 5.1 mmol/L    Chloride 108 (H) 98 - 107 mmol/L    CO2 24 21 - 32 mmol/L    Anion gap 9 7 - 16 mmol/L    Glucose 188 (H) 65 - 100 mg/dL    BUN 36 (H) 8 - 23 MG/DL    Creatinine 3.35 (H) 0.8 - 1.5 MG/DL    GFR est AA 24 (L) >60 ml/min/1.73m2    GFR est non-AA 20 (L) >60 ml/min/1.73m2    Calcium 7.1 (L) 8.3 - 10.4 MG/DL   GLUCOSE, POC    Collection Time: 01/06/17  7:00 AM   Result Value Ref Range    Glucose (POC) 194 (H) 65 - 100 mg/dL        Recent Imaging:  CXR Results  (Last 48 hours)               01/05/17 1744  XR CHEST PA LAT Final result    Impression:  IMPRESSION:    1. No acute cardiopulmonary process evident by plain film imaging. Narrative:  CHEST X-RAY, 2 views 1/5/2017       History: Fever/chills/sepsis. Technique: PA and lateral views of the chest.        Comparison: Chest x-ray 12/30/2015       Findings: The cardiac silhouette is normal in respect to size. The lungs are expanded   without evidence for pneumothorax. No consolidation, or evidence of pleural   effusion is seen. The bony thorax demonstrates no acute changes. The upper abdomen is   unremarkable in appearance.                CT Results  (Last 48 hours)    None          Assessment and Plan:     Hospital Problems as of 1/6/2017  Date Reviewed: 8/11/2016          Codes Class Noted - Resolved POA    Acute renal failure with tubular necrosis (HCC) ICD-10-CM: N17.0  ICD-9-CM: 584.5  1/6/2017 - Present Yes        * (Principal)Diabetic ulcer of right foot ([facility] [address] ICD-10-CM: E11.621, L97.519  ICD-9-CM: 250.80, 707.15  1/5/2017 - Present Yes        Kidney disease, chronic, stage III (moderate, EGFR 30-59 ml/min) ICD-10-CM: N18.3  ICD-9-CM: 585.3  8/11/2016 - Present Yes        Chronic systolic congestive heart failure ([facility] [address] (Chronic) ICD-10-CM: I50.22  ICD-9-CM: 428.22, 428.0  12/30/2015 - Present Yes    Overview Signed 1/6/2017  8:59 AM by Jamel Fernandez MD     EF 35-40% on 2015 Echo             Type 2 diabetes mellitus without complication ([facility] [address] (Chronic) ICD-10-CM: E11.9  ICD-9-CM: 250.00  7/29/2012 - Present Yes              PLAN:    · Surgery consulted on admission; appreciate recs. Cont abx, follow cultures  · trops downtrending. Cont medical management  · ARF not improved. Give some IVF x1d and re-assess. Monitor volume status. Hold ramipril and diuretics and other nephrotoxic meds  · CHF stable, monitor. Holding diuretics due to ARF  · DM improved; a1c pending.   Cont current    DC planning:  Home when ready most likely    DVT ppx:  heparin  Discussed plan with:  pt    Signed:  Jamel Fernandez MD

## 2017-01-06 NOTE — PROGRESS NOTES
Dilaudid 0.5mg slow IVP given for c/o of pain and burning in legs and feet. Zofran 4mg slow IVP given for nausea. Telemetry applied per order. R pinkie toe cracking with purulent drainage.

## 2017-01-06 NOTE — WOUND CARE
Right lateral foot/ 5th toe with ulcers x2, slough base each is 1x1x1+cm, surrounding skin is calloused, concern for deep infection, foul purulent drainage. Pairing callous and surface debridement would benefit these ulcer, patient cannot tolerate bedside. Patient has diabetes and is a smoker, concern for vascular changes. Will use wound gel and dry dressing for now to soften callous. Noted GS consult, if surgical intervention is not an option will use Iodoform packing, dry dressing, daily. Will need outpatient follow up or homehealth.  Will monitor and adjust.

## 2017-01-06 NOTE — CONSULTS
[facility]  [address] [place] [address]  Diana Stevens, [address] [facility]  [phone]    Office Note/H&P/Consult Note   Jacklyn Murphy   MRN: [id number]     : 1945        General Surgery Consult ordered By: Dr. Joaquin Wong  Reason for Consult: Eval Rt foot wound for debridement    HPI: Jacklyn Murphy is a 73 y.o.  male who presented to the ED 17 for evaluation of Right foot with progressive pain and poor healing of right foot wound. He reports site started as small blister to Right 5th toe - lateral side maybe from a shoe about a month ago but has not improved and recently pain has worsened is now radiating up his leg. He also reports he is uanble to bear weight on foot in past day or so. He has not been on abx for this but states it was seen by a provider at [facility]. X Ray of right foot has low suspicion for osteomyelitis but does reveal soft tissue lucencies, possible gas producing infection. WBC 18.7, LA 2.2. He has been started on Vanc/Zosyn in ER. Also noted to have MO with Cr 2.99, baseline 1.90. Other PMH includes DM which appears uncontrolled (A1C 9.2 2016), CHF, CKD, current smoker. Pt reports compliance with current med regimen. He denies recent or current CP, SOB, abd pain, fevers.      Past Medical History   Diagnosis Date    Abnormal CT scan, chest 2015    Acute CHF ([address]     Acute systolic congestive heart failure ([address] 2015    MO (acute kidney injury) ([address] 2012    Bilateral pleural effusion 2015    Chest pain 2015    DM (diabetes mellitus), type 2 ([address] 2012    HTN (hypertension) 2012    Hypoglycemia 2012    Tobacco use 2015     Past Surgical History   Procedure Laterality Date    Hx orthopaedic       Current Facility-Administered Medications   Medication Dose Route Frequency    0.9% sodium chloride infusion  100 mL/hr IntraVENous CONTINUOUS    vancomycin (VANCOCIN) 1500 mg in  ml infusion  1,500 mg IntraVENous See Admin Instructions    piperacillin-tazobactam (ZOSYN) 4.5 g in 0.9% sodium chloride (MBP/ADV) 100 mL  4.5 g IntraVENous Q8H    amLODIPine (NORVASC) tablet 5 mg  5 mg Oral DAILY    aspirin chewable tablet 81 mg  81 mg Oral DAILY AFTER BREAKFAST    atorvastatin (LIPITOR) tablet 80 mg  80 mg Oral DAILY    metoprolol succinate (TOPROL-XL) tablet 100 mg  100 mg Oral DAILY WITH BREAKFAST    ramipril (ALTACE) capsule 10 mg  10 mg Oral DAILY    sodium chloride (NS) flush 5-10 mL  5-10 mL IntraVENous Q8H    sodium chloride (NS) flush 5-10 mL  5-10 mL IntraVENous PRN    acetaminophen (TYLENOL) tablet 650 mg  650 mg Oral Q4H PRN    HYDROcodone-acetaminophen (NORCO) 5-325 mg per tablet 1 Tab  1 Tab Oral Q4H PRN    HYDROmorphone (PF) (DILAUDID) injection 0.5 mg  0.5 mg IntraVENous Q6H PRN    ondansetron (ZOFRAN) injection 4 mg  4 mg IntraVENous Q4H PRN    docusate sodium (COLACE) capsule 100 mg  100 mg Oral DAILY PRN    heparin (porcine) injection 5,000 Units  5,000 Units SubCUTAneous Q8H    insulin lispro (HUMALOG) injection   SubCUTAneous TIDAC    sodium chloride 0.9 % bolus infusion 1,000 mL  1,000 mL IntraVENous ONCE     ALLERGIES:  Review of patient's allergies indicates no known allergies.     Social History     Social History    Marital status: SINGLE     Spouse name: N/A    Number of children: N/A    Years of education: N/A     Occupational History    retired brick layer      Social History Main Topics    Smoking status: Current Every Day Smoker     Packs/day: 1.00     Years: 45.00    Smokeless tobacco: None    Alcohol use Yes      Comment: occasional    Drug use: No    Sexual activity: Not Asked     Other Topics Concern    None     Social History Narrative     History   Smoking Status    Current Every Day Smoker    Packs/day: 1.00    Years: 45.00   Smokeless Tobacco    Not on file     Family History   Problem Relation Age of Onset    Diabetes Mother    Shante Redd Kidney Disease Mother     Diabetes Father     Kidney Disease Father     Kidney Disease Sister        ROS: The patient has no difficulty with chest pain or shortness of breath. No fever or chills. Comprehensive review of systems was otherwise unremarkable except as noted above. Physical Exam:   Constitutional: Alert, cooperative, no acute distress. Visit Vitals    /59    Pulse 85    Temp 98.8 °F (37.1 °C)    Resp 18    Ht 6' 1\" (1.854 m)    Wt 213 lb (96.6 kg)    SpO2 94%    BMI 28.1 kg/m2     Eyes:Sclera are clear. ENMT: no obvious neck masses, no ear or lip lesions  CV: RRR. Normal perfusion  Resp: No JVD. Breathing is  non-labored. GI: soft, non-tender, non-distended, BS active     Musculoskeletal: unremarkable with normal function. Extremities:No cyanosis. 1+ pitting edema right foot/ lower leg. No clubbing  Skin: Right 5th toe lateral side thickened dry skin with 2 calloused ulcers with slough present to wound be, +odor. No drainage, tender to touch, warm  Neuro:  No obvious focal deficits  Psychiatric: normal affect and mood, no memory impairment      Labs:  Lab Results   Component Value Date/Time    WBC 16.1 01/06/2017 10:11 AM    PLATELET 848 29/07/9500 10:11 AM    Sodium 141 01/06/2017 05:37 AM    Potassium 4.3 01/06/2017 05:37 AM    Chloride 108 01/06/2017 05:37 AM    CO2 24 01/06/2017 05:37 AM    BUN 36 01/06/2017 05:37 AM    Creatinine 3.35 01/06/2017 05:37 AM    Glucose 188 01/06/2017 05:37 AM    Bilirubin, total 0.5 01/05/2017 06:20 PM    AST 24 01/05/2017 06:20 PM    ALT 16 01/05/2017 06:20 PM    Alk. phosphatase 100 01/05/2017 06:20 PM    Lipase 104 12/26/2015 04:30 PM    Troponin-I, Qt. 0.40 01/06/2017 05:37 AM       No results found for this or any previous visit.       Assessment/Plan:Conor Delarosa is a 73 y.o. male who has signs and symptoms consistent with the below issues:  Problem List  Date Reviewed: 8/11/2016          Codes Class Noted    Acute renal failure with tubular necrosis ([facility] ICD-10-CM: N17.0  ICD-9-CM: 584.5  1/6/2017        * (Principal)Diabetic ulcer of right foot ([facility] ICD-10-CM: E11.621, L97.519  ICD-9-CM: 250.80, 707.15  1/5/2017        Kidney disease, chronic, stage III (moderate, EGFR 30-59 ml/min) ICD-10-CM: N18.3  ICD-9-CM: 585.3  8/11/2016        Cardiomyopathy ([facility] ICD-10-CM: I42.9  ICD-9-CM: 425.4  5/5/2016        Chronic systolic congestive heart failure (HCC) (Chronic) ICD-10-CM: E23.95  ICD-9-CM: 428.22, 428.0  12/30/2015    Overview Signed 1/6/2017  8:59 AM by Dorita Castellanos MD     EF 35-40% on 2015 Echo             Bilateral pleural effusion ICD-10-CM: J90  ICD-9-CM: 511.9  12/27/2015        Tobacco use (Chronic) ICD-10-CM: Z72.0  ICD-9-CM: 305.1  12/27/2015        Abnormal CT scan, chest ICD-10-CM: R93.8  ICD-9-CM: 793.2  12/27/2015        Chest pain ICD-10-CM: R07.9  ICD-9-CM: 786.50  12/26/2015        Type 2 diabetes mellitus without complication (HCC) (Chronic) ICD-10-CM: E11.9  ICD-9-CM: 250.00  7/29/2012        HTN (hypertension) (Chronic) ICD-10-CM: I10  ICD-9-CM: 401.9  7/29/2012            RIGHT FOOT RADIOGRAPHS, 1/5/2017.     Clinical History: Right foot wound with erythema and warmth. Diabetic patient. .     Technique: AP, oblique, and lateral views of the right foot.     Findings:     Three views of the right foot show normal alignment of the visualized osseous  structures. No acute fracture is seen. No definite bony destruction or  aggressive cortical changes are seen to strongly suggest osteomyelitis. The soft  tissues are diffusely edematous. There is the suggestion of soft tissue gas  about the proximal phalanx of the fifth digit. Dense atherosclerotic  calcification is seen in multiple arterial structures of the right foot.     IMPRESSION  Impression:  1. No strong evidence for osteomyelitis.  A three phase bone scan or contrasted  MRI can be considered if there is continued concern for osteomyelitis.     2. Lucencies in the soft tissues about the proximal phalanx of the fifth digit. Soft tissue gas as can occur with a gas producing infection (i.e. gas gangrene)  cannot be excluded.     Plan:  Care Management per Hospitalist  IVF  Diabetic Diet  Wound Care RN   Pain/ nausea control  Up ad shmuel  Cardiac monitoring  IV Abx - Vanc Zosyn  Further orders per Dr. Viviana Pérez, FNP-BC    The patient was seen in conjunction with Dr. Janine Vidales who independently evaluated the patient, reviewed the chart and agreed with the assessment and plan.

## 2017-01-06 NOTE — PROGRESS NOTES
[address]  This is the clinic that patient attends for his primary care. He sees Dr. Juliette Delarosa usually and just saw a Dr. Terell Prasad recently. They are in the same group. Care Management Interventions  PCP Verified by CM: Yes  Physical Therapy Consult: Yes  Current Support Network: Other (Reports that his daughter and her friend live with him and they are in and out of the home. )  Confirm Follow Up Transport: Self  Plan discussed with Pt/Family/Caregiver: Yes  Freedom of Choice Offered: Yes  Discharge Location  Discharge Placement: Home     Met with patient at the bedside. He is alert and oriented times four. Sleepy. He kept his eyes closed. Patient lives in his own home and he says a daughter and one of her friends lives there and are in and out of the home. Patient drives. He is ambulatory and he is indepenndent with his adl's. Maxim Jacobo

## 2017-01-06 NOTE — PROGRESS NOTES
Pt confused. Sitting in the chair at . States, 'I gotta pee. This medicine is making me crazy.'  Assisted to bathroom and back to bed.

## 2017-01-06 NOTE — PROGRESS NOTES
.. TRANSFER - IN REPORT:    Verbal report received from Sylvia (name) on Marea Slice  being received from ER(unit) for routine progression of care      Report consisted of patients Situation, Background, Assessment and   Recommendations(SBAR). Information from the following report(s) SBAR was reviewed with the receiving nurse. Opportunity for questions and clarification was provided. Assessment completed upon patients arrival to unit and care assumed.

## 2017-01-06 NOTE — DIABETES MGMT
Pt admitted yesterday with diabetic ulcer right foot (open area right small toe) is seen by RN LINH for diabetes education. Type 2 diabetic with h/o CHF. A1C from November = 9.1. Current A1C has been ordered. A1C had been down to 6.7 in May. MO on CKD with creat 3.35 today up from 2.99 yesterday. Pt says foot ulcer started with a small blister due to ill fitting shoe approximately 1 month ago. Has been started on IV antibiotics. Says he sees primary care at [address], [address] for [facility]. Blood glucose ranging 157-194 since admission and 188-194 so far this morning. Pt says appetite has been poor. Pt says he is compliant with his regimen of Glipizide daily at home. Reviewed with pt current insulin orders: Humalog per sliding scale for correction. Received 2 units Humalog this morning per sliding scale. Pt received educational material and diabetes education here 1 year ago. Agrees to participate in diabetes education at this time. Pt given educational material, \"Survival Skills for Diabetes Management\", which was reviewed with pt briefly as pt drowsy off and on. Explained causes, s/s, and treatments for hypoglycemia and hyperglycemia. Described the effects of poor glycemic control on the development of long-term complications such as renal, eye, nerve, and cardiovascular disease. Described proper diabetic foot care and the importance of checking feet qd. Pt has very dry skin, thickened/discolored toenails. Says he or his daughter cuts toenails. Recommended podiatrist for cutting toe nails etc. Also recommended discussion with PCP re: diabetic shoes. Stressed importance of not wearing ill-fitting shoes. Educated re: effects of carbohydrates on blood glucose, the \"plate method\" of healthy meal planning, basics of healthy meal plan. Pt says he has had a poor appetite. Also explained the relationship between hyperglycemia and infection. Discussed target goals for blood glucose and A1C.  Pt verbalizes basic understanding of teaching. Would benefit from continued review while in hospital and with home health nursing after discharge if available. Pt says he checks  blood glucose daily, which \"runs around 150 or 180\". Recommend checking fasting blood glucose daily with occasional 2 hr PPBS. Pt verbalizes understanding. Holding off on insulin instruction for now, as no clear indication that pt will discharge on insulin and pt is drowsy at this time. Plan is to continue diabetes education on Monday. Pt has no questions at this time.

## 2017-01-06 NOTE — PROGRESS NOTES
Patient with complaints of back pain. States lortab make him itch and refuses to take. Paged MD for orders.

## 2017-01-06 NOTE — PROGRESS NOTES
Dual skin assessment with Chanel Christy RN. Pt with cracked feet and open area to R 5th metatarsal.  Dry flaky skin. Dawood areas to B shins.

## 2017-01-06 NOTE — PROGRESS NOTES
Pharmacokinetic Consult to Pharmacist    Jennifer Les is a 73 y.o. male being treated for Diabetic Foot Infection with Vancomycin and Zosyn. Height: 6' 1\" (185.4 cm)  Weight: 96.6 kg (213 lb)  Lab Results   Component Value Date/Time    BUN 36 01/06/2017 05:37 AM    Creatinine 3.35 01/06/2017 05:37 AM    WBC 18.7 01/05/2017 06:20 PM    Procalcitonin 0.2 01/05/2017 06:20 PM      Estimated Creatinine Clearance: 25.5 mL/min (based on Cr of 3.35). CULTURES:  1/5   Blood X 2   Pending      Day 2 of vancomycin. Goal trough is 15-20. Vancomycin dose initiated at 2000 mg X 1 yesterday at 1752. As patient in MO and worsening, will plan to check a 24 hour random level today at 1800 and re-dose with 1500 mg X 1 if random level less than 20. Moving forward, will continue dosing per random levels until renal function improves. Will continue to follow patient.       Thank you,  Agnes Gabriel, PharmD  Clinical Pharmacist  [phone]

## 2017-01-07 PROBLEM — G93.49 ENCEPHALOPATHY DUE TO INFECTION: Status: ACTIVE | Noted: 2017-01-07

## 2017-01-07 PROBLEM — B99.9 ENCEPHALOPATHY DUE TO INFECTION: Status: ACTIVE | Noted: 2017-01-07

## 2017-01-07 LAB
ABO + RH BLD: NORMAL
ANION GAP BLD CALC-SCNC: 10 MMOL/L (ref 7–16)
ANION GAP BLD CALC-SCNC: 12 MMOL/L (ref 7–16)
BLD PROD TYP BPU: NORMAL
BLD PROD TYP BPU: NORMAL
BLOOD GROUP ANTIBODIES SERPL: NORMAL
BPU ID: NORMAL
BPU ID: NORMAL
BUN SERPL-MCNC: 47 MG/DL (ref 8–23)
BUN SERPL-MCNC: 49 MG/DL (ref 8–23)
CALCIUM SERPL-MCNC: 7.4 MG/DL (ref 8.3–10.4)
CALCIUM SERPL-MCNC: 7.5 MG/DL (ref 8.3–10.4)
CHLORIDE SERPL-SCNC: 108 MMOL/L (ref 98–107)
CHLORIDE SERPL-SCNC: 109 MMOL/L (ref 98–107)
CO2 SERPL-SCNC: 22 MMOL/L (ref 21–32)
CO2 SERPL-SCNC: 25 MMOL/L (ref 21–32)
CREAT SERPL-MCNC: 3.93 MG/DL (ref 0.8–1.5)
CREAT SERPL-MCNC: 3.97 MG/DL (ref 0.8–1.5)
CREAT UR-MCNC: 162 MG/DL
CROSSMATCH RESULT,%XM: NORMAL
CROSSMATCH RESULT,%XM: NORMAL
ERYTHROCYTE [DISTWIDTH] IN BLOOD BY AUTOMATED COUNT: 14.1 % (ref 11.9–14.6)
GLUCOSE BLD STRIP.AUTO-MCNC: 160 MG/DL (ref 65–100)
GLUCOSE BLD STRIP.AUTO-MCNC: 189 MG/DL (ref 65–100)
GLUCOSE BLD STRIP.AUTO-MCNC: 205 MG/DL (ref 65–100)
GLUCOSE BLD STRIP.AUTO-MCNC: 214 MG/DL (ref 65–100)
GLUCOSE SERPL-MCNC: 156 MG/DL (ref 65–100)
GLUCOSE SERPL-MCNC: 202 MG/DL (ref 65–100)
HCT VFR BLD AUTO: 26.2 % (ref 41.1–50.3)
HGB BLD-MCNC: 8.6 G/DL (ref 13.6–17.2)
MCH RBC QN AUTO: 27.9 PG (ref 26.1–32.9)
MCHC RBC AUTO-ENTMCNC: 32.8 G/DL (ref 31.4–35)
MCV RBC AUTO: 85.1 FL (ref 79.6–97.8)
PLATELET # BLD AUTO: 316 K/UL (ref 150–450)
PMV BLD AUTO: 10.3 FL (ref 10.8–14.1)
POTASSIUM SERPL-SCNC: 4 MMOL/L (ref 3.5–5.1)
POTASSIUM SERPL-SCNC: 4.1 MMOL/L (ref 3.5–5.1)
PROT UR-MCNC: 375 MG/DL
PROT/CREAT UR-RTO: 2.3
RBC # BLD AUTO: 3.08 M/UL (ref 4.23–5.67)
SODIUM SERPL-SCNC: 142 MMOL/L (ref 136–145)
SODIUM SERPL-SCNC: 144 MMOL/L (ref 136–145)
SODIUM UR-SCNC: 41 MMOL/L
SPECIMEN EXP DATE BLD: NORMAL
STATUS OF UNIT,%ST: NORMAL
STATUS OF UNIT,%ST: NORMAL
TROPONIN I SERPL-MCNC: 0.59 NG/ML (ref 0.02–0.05)
UNIT DIVISION, %UDIV: 0
UNIT DIVISION, %UDIV: 0
WBC # BLD AUTO: 19.4 K/UL (ref 4.3–11.1)

## 2017-01-07 PROCEDURE — 36415 COLL VENOUS BLD VENIPUNCTURE: CPT | Performed by: NURSE PRACTITIONER

## 2017-01-07 PROCEDURE — 80048 BASIC METABOLIC PNL TOTAL CA: CPT | Performed by: INTERNAL MEDICINE

## 2017-01-07 PROCEDURE — 74011250636 HC RX REV CODE- 250/636: Performed by: EMERGENCY MEDICINE

## 2017-01-07 PROCEDURE — 74011250636 HC RX REV CODE- 250/636: Performed by: INTERNAL MEDICINE

## 2017-01-07 PROCEDURE — 65270000029 HC RM PRIVATE

## 2017-01-07 PROCEDURE — 74011250637 HC RX REV CODE- 250/637: Performed by: INTERNAL MEDICINE

## 2017-01-07 PROCEDURE — 74011636637 HC RX REV CODE- 636/637: Performed by: INTERNAL MEDICINE

## 2017-01-07 PROCEDURE — 82962 GLUCOSE BLOOD TEST: CPT

## 2017-01-07 PROCEDURE — 84484 ASSAY OF TROPONIN QUANT: CPT | Performed by: NURSE PRACTITIONER

## 2017-01-07 PROCEDURE — 74011000258 HC RX REV CODE- 258: Performed by: EMERGENCY MEDICINE

## 2017-01-07 PROCEDURE — 84300 ASSAY OF URINE SODIUM: CPT | Performed by: INTERNAL MEDICINE

## 2017-01-07 PROCEDURE — 85027 COMPLETE CBC AUTOMATED: CPT | Performed by: NURSE PRACTITIONER

## 2017-01-07 PROCEDURE — 80048 BASIC METABOLIC PNL TOTAL CA: CPT | Performed by: NURSE PRACTITIONER

## 2017-01-07 PROCEDURE — 84156 ASSAY OF PROTEIN URINE: CPT | Performed by: INTERNAL MEDICINE

## 2017-01-07 RX ORDER — SODIUM CHLORIDE 9 MG/ML
100 INJECTION, SOLUTION INTRAVENOUS CONTINUOUS
Status: DISPENSED | OUTPATIENT
Start: 2017-01-07 | End: 2017-01-08

## 2017-01-07 RX ORDER — INSULIN GLARGINE 100 [IU]/ML
5 INJECTION, SOLUTION SUBCUTANEOUS DAILY
Status: DISCONTINUED | OUTPATIENT
Start: 2017-01-07 | End: 2017-01-08

## 2017-01-07 RX ORDER — HALOPERIDOL 5 MG/ML
2 INJECTION INTRAMUSCULAR
Status: DISCONTINUED | OUTPATIENT
Start: 2017-01-07 | End: 2017-01-27

## 2017-01-07 RX ADMIN — Medication 10 ML: at 22:17

## 2017-01-07 RX ADMIN — INSULIN LISPRO 4 UNITS: 100 INJECTION, SOLUTION INTRAVENOUS; SUBCUTANEOUS at 07:30

## 2017-01-07 RX ADMIN — ASPIRIN 81 MG CHEWABLE TABLET 81 MG: 81 TABLET CHEWABLE at 09:17

## 2017-01-07 RX ADMIN — PIPERACILLIN AND TAZOBACTAM 4.5 G: 4; .5 INJECTION, POWDER, FOR SOLUTION INTRAVENOUS at 03:35

## 2017-01-07 RX ADMIN — HEPARIN SODIUM 5000 UNITS: 5000 INJECTION, SOLUTION INTRAVENOUS; SUBCUTANEOUS at 12:00

## 2017-01-07 RX ADMIN — SODIUM CHLORIDE 100 ML/HR: 900 INJECTION, SOLUTION INTRAVENOUS at 16:46

## 2017-01-07 RX ADMIN — METOPROLOL SUCCINATE 100 MG: 100 TABLET, EXTENDED RELEASE ORAL at 09:16

## 2017-01-07 RX ADMIN — HEPARIN SODIUM 5000 UNITS: 5000 INJECTION, SOLUTION INTRAVENOUS; SUBCUTANEOUS at 22:17

## 2017-01-07 RX ADMIN — INSULIN LISPRO 2 UNITS: 100 INJECTION, SOLUTION INTRAVENOUS; SUBCUTANEOUS at 16:30

## 2017-01-07 RX ADMIN — INSULIN GLARGINE 5 UNITS: 100 INJECTION, SOLUTION SUBCUTANEOUS at 11:00

## 2017-01-07 RX ADMIN — INSULIN LISPRO 2 UNITS: 100 INJECTION, SOLUTION INTRAVENOUS; SUBCUTANEOUS at 11:30

## 2017-01-07 RX ADMIN — VANCOMYCIN HYDROCHLORIDE 1500 MG: 10 INJECTION, POWDER, LYOPHILIZED, FOR SOLUTION INTRAVENOUS at 00:20

## 2017-01-07 RX ADMIN — AMLODIPINE BESYLATE 5 MG: 5 TABLET ORAL at 09:16

## 2017-01-07 RX ADMIN — PIPERACILLIN AND TAZOBACTAM 4.5 G: 4; .5 INJECTION, POWDER, FOR SOLUTION INTRAVENOUS at 09:17

## 2017-01-07 RX ADMIN — ATORVASTATIN CALCIUM 80 MG: 40 TABLET, FILM COATED ORAL at 09:16

## 2017-01-07 RX ADMIN — HYDROMORPHONE HYDROCHLORIDE 0.5 MG: 1 INJECTION, SOLUTION INTRAMUSCULAR; INTRAVENOUS; SUBCUTANEOUS at 10:55

## 2017-01-07 RX ADMIN — PIPERACILLIN AND TAZOBACTAM 4.5 G: 4; .5 INJECTION, POWDER, FOR SOLUTION INTRAVENOUS at 16:46

## 2017-01-07 RX ADMIN — SODIUM CHLORIDE 100 ML/HR: 900 INJECTION, SOLUTION INTRAVENOUS at 10:00

## 2017-01-07 NOTE — CONSULTS
[address]       Name:  Mary Jo Clayton   MR#:  [id number]   :  1945   Account #:  [de-identified]   Date of Adm:  2017       REASON FOR CONSULTATION: Acute on chronic kidney disease. HISTORY: This patient is a 81-year-old male with history of   chronic kidney disease, history of type 2 diabetes mellitus,   congestive heart failure with ejection fraction between 30-40%,   history of hypertension, was admitted on 2017 because of   the right foot infection. Apparently, the patient has been on   antibiotic prior to this admission, not known what kind. He was   placed on vancomycin here and also on Zosyn. The patient states   that his symptoms improved, concern for pain. We are consulted   because of the high creatinine which was up to 3.97 today which   was up from 2.99 on his admission on 2017, but it has been   high in the past since at least 2012 ranging between 2 to   1.32. The patient stated that he is taking some pain medication   but I am not sure if it is nonsteroidal anti-inflammatory drugs,   prior to this admission are not. The urinalysis done 2017 showed no protein. Specific   gravity high at 1.024, no protein, no blood, no WBC. His BNP is   525. Hemoglobin is 8.6, yesterday it was down to 6.7. No   eosinophilia. The vancomycin level is 15. 1. X-ray of the foot   showed likely osteomyelitis. Chest x-ray: No infiltrate. PREVIOUS MEDICAL HISTORY: History of chronic kidney disease as   explained above, history of type 2 diabetes mellitus,   hypertension, tobacco abuse, CHF. ALLERGIES: NO KNOWN DRUG ALLERGIES. SOCIAL HISTORY: Smoker 45 years, 1 pack a day. Alcohol use   occasionally. FAMILY HISTORY: Diabetes, kidney disease in his mother, kidney   disease also in his father. MEDICATIONS: Are   1. Norvasc 5 mg a day. 2. Aspirin 81 mg a day. 3. Lipitor 80 mg a day.    4. Heparin 5000 units subcutaneous q.8.   5. Lantus 5 units subcutaneous a day. .   7. Zosyn 4.5 grams q.8.   8. He was started on IV fluid with normal saline at 75 mL per   hour today and he is getting vancomycin IV. PHYSICAL EXAMINATION   VITAL SIGNS: Temperature is 98.3, heart rate 90, blood pressure   is 116/76. GENERAL: The patient is slightly lethargic. He is not in acute   respiratory distress. NECK: No lymph nodes or thyromegaly. LUNGS: Seems to be clear. HEART: Regular rhythm, systolic murmur 2/6 at left sternal   border. No gallop, no rub. ABDOMEN: Soft, nontender, no organomegaly, no mass. EXTREMITIES: There is 1 to trace edema and the right foot,   infection is rapid. IMPRESSION:   1. Acute on chronic kidney disease. Acute component is not   completely clear at this time. The cause may be related on   antibiotic received recently as outpatient. The urinalysis is   completely benign, but the specific gravity is high, which may   be indicative of volume depletion. 2. Infected foot. 3. Type 2 diabetes mellitus. 4. Hypertension. 5. Cardiomyopathy. RECOMMENDATIONS: I agree with hydration with normal saline. We   will check the urine lytes, urine protein and renal ultrasound.         MD JESSICA Kc / [place]   D:  01/07/2017   14:13   T:  01/07/2017   14:48   Job #:  [id number]

## 2017-01-07 NOTE — PROGRESS NOTES
Hospitalist Progress Note     Admit Date:  2017  5:07 PM   Name:  Juan Stevens   Age:  73 y.o.  :  1945   MRN:  [id number]   PCP:  None  Treatment Team: Attending Provider: Carlos Enrique Mullen DO; Consulting Provider: Natalie Hernandez MD; Utilization Review: Cuong Oscar    Subjective:   HPI:  Pt is a 72 yo male who presented with progressive pain and poor healing of right foot wound over 1 month. Other PMH includes DM (A1C 9.2 2016), CHF, CKD, current smoker. X Ray of right foot had low suspicion for osteomyelitis but does reveal soft tissue lucencies, possible gas producing infection. WBC 18.7, LA 2.2. He was started on Vanc/Zosyn in ER. Also noted to have MO with Cr 2.99, baseline 1.90.   - pt denies complaints this morning. Tired. Says he has no appetite and wasn't eating much at home. No CP, SOB. Denies trouble with urination.  - pt sleepy this morning but arousable and denies complaints. No CP, SOB. Had debridement yesterday and some agitation later, which seems to be resolved.     Objective:     Patient Vitals for the past 24 hrs:   Temp Pulse Resp BP SpO2   17 0740 98.3 °F (36.8 °C) 90 16 116/76 95 %   17 2337 99 °F (37.2 °C) 93 18 151/75 95 %   17 2216 99.3 °F (37.4 °C) 93 17 136/70 94 %   17 2116 99.7 °F (37.6 °C) 86 18 144/72 96 %   17 2058 99.1 °F (37.3 °C) 88 18 142/62 94 %   17 2020 98.9 °F (37.2 °C) 92 18 142/84 94 %   17 1849 98 °F (36.7 °C) 85 18 145/62 98 %   17 1846 98 °F (36.7 °C) 87 18 144/66 98 %   17 1728 98.1 °F (36.7 °C) 78 18 133/63 98 %   17 1628 98 °F (36.7 °C) 81 18 127/71 98 %   17 1609 98.1 °F (36.7 °C) 81 20 132/67 98 %   17 1409 98 °F (36.7 °C) 85 20 106/48 98 %   17 1200 98.2 °F (36.8 °C) 81 22 128/55 94 %     Oxygen Therapy  O2 Sat (%): 95 % (17 0740)  Pulse via Oximetry: 91 beats per minute (17 1900)  O2 Device: Room air (17 0740)    Intake/Output Summary (Last 24 hours) at 01/07/17 1004  Last data filed at 01/06/17 1849   Gross per 24 hour   Intake              759 ml   Output              200 ml   Net              559 ml       General:    Well nourished. Alert and oriented. CV:   RRR. No murmur, rub, or gallop. Lungs:   Clear to auscultation bilaterally. No wheezing, rhonchi, or rales. Abdomen:   Soft, nontender, nondistended. Bowel sounds normal.   Extremities: Warm and dry. No cyanosis. LLE wound wrapped in bandage, c/d.  Skin:     No rashes or jaundice. Labs and Studies:  I have reviewed all labs, meds, telemetry events, and studies from the last 24 hours.   Recent Results (from the past 24 hour(s))   CBC W/O DIFF    Collection Time: 01/06/17 10:11 AM   Result Value Ref Range    WBC 16.1 (H) 4.3 - 11.1 K/uL    RBC 2.46 (L) 4.23 - 5.67 M/uL    HGB 6.7 (LL) 13.6 - 17.2 g/dL    HCT 21.0 (LL) 41.1 - 50.3 %    MCV 85.4 79.6 - 97.8 FL    MCH 27.2 26.1 - 32.9 PG    MCHC 31.9 31.4 - 35.0 g/dL    RDW 14.1 11.9 - 14.6 %    PLATELET 280 142 - 851 K/uL    MPV 10.3 (L) 10.8 - 14.1 FL   HEMOGLOBIN A1C    Collection Time: 01/06/17 10:11 AM   Result Value Ref Range    Hemoglobin A1c 6.1 (H) 4.8 - 6.0 %    Est. average glucose 128 mg/dL   TROPONIN I    Collection Time: 01/06/17 10:11 AM   Result Value Ref Range    Troponin-I, Qt. 0.42 (HH) 0.02 - 0.05 NG/ML   GLUCOSE, POC    Collection Time: 01/06/17 11:18 AM   Result Value Ref Range    Glucose (POC) 203 (H) 65 - 100 mg/dL   TYPE & CROSSMATCH    Collection Time: 01/06/17 12:25 PM   Result Value Ref Range    Crossmatch Expiration 01/09/2017     ABO/Rh(D) B POSITIVE     Antibody screen NEG     Unit number [id number]     Blood component type RC LR AS5     Unit division 00     Status of unit TRANSFUSED     Crossmatch result Compatible     Unit number [id number]     Blood component type RC LR AS5     Unit division 00     Status of unit TRANSFUSED     Crossmatch result Compatible    GLUCOSE, POC Collection Time: 01/06/17  4:29 PM   Result Value Ref Range    Glucose (POC) 166 (H) 65 - 100 mg/dL   VANCOMYCIN, RANDOM    Collection Time: 01/06/17  8:45 PM   Result Value Ref Range    VANCOMYCIN,RANDOM 15.1 UG/ML   GLUCOSE, POC    Collection Time: 01/06/17  9:04 PM   Result Value Ref Range    Glucose (POC) 178 (H) 65 - 100 mg/dL   GLUCOSE, POC    Collection Time: 01/07/17  7:40 AM   Result Value Ref Range    Glucose (POC) 214 (H) 65 - 475 mg/dL   METABOLIC PANEL, BASIC    Collection Time: 01/07/17  9:05 AM   Result Value Ref Range    Sodium 142 136 - 145 mmol/L    Potassium 4.1 3.5 - 5.1 mmol/L    Chloride 108 (H) 98 - 107 mmol/L    CO2 22 21 - 32 mmol/L    Anion gap 12 7 - 16 mmol/L    Glucose 202 (H) 65 - 100 mg/dL    BUN 47 (H) 8 - 23 MG/DL    Creatinine 3.97 (H) 0.8 - 1.5 MG/DL    GFR est AA 19 (L) >60 ml/min/1.73m2    GFR est non-AA 16 (L) >60 ml/min/1.73m2    Calcium 7.5 (L) 8.3 - 10.4 MG/DL   CBC W/O DIFF    Collection Time: 01/07/17  9:05 AM   Result Value Ref Range    WBC 19.4 (H) 4.3 - 11.1 K/uL    RBC 3.08 (L) 4.23 - 5.67 M/uL    HGB 8.6 (L) 13.6 - 17.2 g/dL    HCT 26.2 (L) 41.1 - 50.3 %    MCV 85.1 79.6 - 97.8 FL    MCH 27.9 26.1 - 32.9 PG    MCHC 32.8 31.4 - 35.0 g/dL    RDW 14.1 11.9 - 14.6 %    PLATELET 643 673 - 416 K/uL    MPV 10.3 (L) 10.8 - 14.1 FL        Recent Imaging:  CXR Results  (Last 48 hours)               01/05/17 1744  XR CHEST PA LAT Final result    Impression:  IMPRESSION:    1. No acute cardiopulmonary process evident by plain film imaging. Narrative:  CHEST X-RAY, 2 views 1/5/2017       History: Fever/chills/sepsis. Technique: PA and lateral views of the chest.        Comparison: Chest x-ray 12/30/2015       Findings: The cardiac silhouette is normal in respect to size. The lungs are expanded   without evidence for pneumothorax. No consolidation, or evidence of pleural   effusion is seen. The bony thorax demonstrates no acute changes.   The upper abdomen is   unremarkable in appearance. CT Results  (Last 48 hours)    None          Assessment and Plan:     Hospital Problems as of 1/7/2017  Date Reviewed: 8/11/2016          Codes Class Noted - Resolved POA    Encephalopathy due to infection ICD-10-CM: G93.49, B99.9  ICD-9-CM: 348.39, 136.9  1/7/2017 - Present Yes        Acute renal failure with tubular necrosis ([facility] ICD-10-CM: N17.0  ICD-9-CM: 584.5  1/6/2017 - Present Yes        * (Principal)Diabetic ulcer of right foot ([facility] ICD-10-CM: E11.621, L97.519  ICD-9-CM: 250.80, 707.15  1/5/2017 - Present Yes        Kidney disease, chronic, stage III (moderate, EGFR 30-59 ml/min) (Chronic) ICD-10-CM: N18.3  ICD-9-CM: 585.3  8/11/2016 - Present Yes        Chronic systolic congestive heart failure (H[facility]) (Chronic) ICD-10-CM: I50.22  ICD-9-CM: 428.22, 428.0  12/30/2015 - Present Yes    Overview Signed 1/6/2017  8:59 AM by Agustín Armenta MD     EF 35-40% on 2015 Echo             Type 2 diabetes mellitus without complication ([facility] (Chronic) ICD-10-CM: E11.9  ICD-9-CM: 250.00  7/29/2012 - Present Yes        HTN (hypertension) (Chronic) ICD-10-CM: I10  ICD-9-CM: 401.9  7/29/2012 - Present Yes              PLAN:    · Surgery consulted on admission; appreciate recs. Did debridement at bedside. Cont abx, follow cultures  · Repeat troponin today. · ARF not improved. Will consult Nephrology. Cont IVF. Monitor volume status closely. Hold ramipril and diuretics and other nephrotoxic meds  · DM better controlled; a1c 6.1%. Start lantus 5u daily. Cont ISS  · CHF stable, monitor.   Holding diuretics due to ARF    DC planning:  Home when ready most likely    DVT ppx:  heparin  Discussed plan with:  pt    Signed:  Agustín Armenta MD

## 2017-01-07 NOTE — PROGRESS NOTES
PLAN:  Care management per Hospitalist  --Surgery consulted on admission; appreciate recs. Did debridement at bedside. Cont abx, follow cultures  --Repeat troponin today. --ARF not improved. Will consult Nephrology. Cont IVF. Monitor volume status closely. Hold ramipril and diuretics and other nephrotoxic meds  --DM better controlled; a1c 6.1%. Start lantus 5u daily. Cont ISS  --CHF stable, monitor. Holding diuretics due to ARF    General Surgery  --Noninvasive vascular arterial study of Right LE  --Smoking Cessation  --Consider MRI to r/o osteomyelitis  --Iodosorb daily to wound Right lateral 5th toe/ foot    ASSESSMENT:  Admit Date: 1/5/2017   * No surgery found *  * No surgery found *    Principal Problem:    Diabetic ulcer of right foot ([facility] (1/5/2017)    Active Problems:    Type 2 diabetes mellitus without complication ([address] (2/88/0354)      HTN (hypertension) (7/29/2012)      Chronic systolic congestive heart failure ([facility] (12/30/2015)      Overview: EF 35-40% on 2015 Echo      Kidney disease, chronic, stage III (moderate, EGFR 30-59 ml/min) (8/11/2016)      Acute renal failure with tubular necrosis ([address] (1/6/2017)      Encephalopathy due to infection (1/7/2017)         SUBJECTIVE:  Pt awake in bed. No new complaints. Discussed importance of smoking cessation. Dressing changed to Right foot done by Dr. Devon Woo. AF, VSS. OBJECTIVE:  Constitutional: Alert oriented cooperative patient in no acute distress; appears stated age   Visit Vitals    /76 (BP 1 Location: Right arm, BP Patient Position: At rest)    Pulse 90    Temp 98.3 °F (36.8 °C)    Resp 16    Ht 6' 1\" (1.854 m)    Wt 213 lb (96.6 kg)    SpO2 95%    BMI 28.1 kg/m2     Eyes:Sclera are clear. ENMT: no obvious neck masses, no ear or lip lesions  CV: RRR. Normal perfusion  Resp: No JVD. Breathing is non-labored. GI: soft, non-tender, non-distended, BS active   Musculoskeletal: unremarkable with normal function.    Extremities:No cyanosis. 1+ pitting edema right foot/ lower leg. No clubbing  Skin: Right 5th toe lateral side thickened dry skin with 2 calloused ulcers with slough present to wound bed, +odor. scant drainage, tender to touch  Neuro: No obvious focal deficits  Psychiatric: normal affect and mood, no memory impairment    Patient Vitals for the past 24 hrs:   BP Temp Pulse Resp SpO2   01/07/17 0740 116/76 98.3 °F (36.8 °C) 90 16 95 %   01/06/17 2337 151/75 99 °F (37.2 °C) 93 18 95 %   01/06/17 2216 136/70 99.3 °F (37.4 °C) 93 17 94 %   01/06/17 2116 144/72 99.7 °F (37.6 °C) 86 18 96 %   01/06/17 2058 142/62 99.1 °F (37.3 °C) 88 18 94 %   01/06/17 2020 142/84 98.9 °F (37.2 °C) 92 18 94 %   01/06/17 1849 145/62 98 °F (36.7 °C) 85 18 98 %   01/06/17 1846 144/66 98 °F (36.7 °C) 87 18 98 %   01/06/17 1728 133/63 98.1 °F (36.7 °C) 78 18 98 %   01/06/17 1628 127/71 98 °F (36.7 °C) 81 18 98 %   01/06/17 1609 132/67 98.1 °F (36.7 °C) 81 20 98 %   01/06/17 1409 106/48 98 °F (36.7 °C) 85 20 98 %   01/06/17 1200 128/55 98.2 °F (36.8 °C) 81 22 94 %     Labs:  Recent Labs      01/07/17   0905   01/05/17   1820   WBC  19.4*   < >  18.7*   HGB  8.6*   < >  8.1*   PLT  316   < >  331   NA  142   < >  140   K  4.1   < >  3.8   CL  108*   < >  105   CO2  22   < >  24   BUN  47*   < >  29*   CREA  3.97*   < >  2.99*   GLU  202*   < >  157*   TBILI   --    --   0.5   SGOT   --    --   24   ALT   --    --   16   AP   --    --   100   TROIQ  0.59*   < >  0.56*    < > = values in this interval not displayed. Dana Whitaker, FNP-BC    The patient was seen in conjunction with Dr. Malathi Coyle who independently evaluated the patient, reviewed the chart and agreed with the assessment and plan. I have seen and examined the patient with the Nurse Practitioner and agree with the above assessment and plan. Junious No.  Malathi Coyle MD

## 2017-01-07 NOTE — PROGRESS NOTES
1/7/2017  Attempted to see pt for 15 minutes. Pt is very drowsy, intermittently talking with me and falling sound asleep. Could not arouse enough for functional mobility.   Will see again as soon as possible  Shy Vallejo, PT

## 2017-01-08 LAB
ANION GAP BLD CALC-SCNC: 9 MMOL/L (ref 7–16)
APTT PPP: 34 SEC (ref 23.5–31.7)
BUN SERPL-MCNC: 50 MG/DL (ref 8–23)
CALCIUM SERPL-MCNC: 7.4 MG/DL (ref 8.3–10.4)
CHLORIDE SERPL-SCNC: 110 MMOL/L (ref 98–107)
CO2 SERPL-SCNC: 24 MMOL/L (ref 21–32)
CREAT SERPL-MCNC: 3.86 MG/DL (ref 0.8–1.5)
ERYTHROCYTE [DISTWIDTH] IN BLOOD BY AUTOMATED COUNT: 14.6 % (ref 11.9–14.6)
GLUCOSE BLD STRIP.AUTO-MCNC: 199 MG/DL (ref 65–100)
GLUCOSE BLD STRIP.AUTO-MCNC: 203 MG/DL (ref 65–100)
GLUCOSE BLD STRIP.AUTO-MCNC: 241 MG/DL (ref 65–100)
GLUCOSE BLD STRIP.AUTO-MCNC: 271 MG/DL (ref 65–100)
GLUCOSE SERPL-MCNC: 192 MG/DL (ref 65–100)
HCT VFR BLD AUTO: 25.7 % (ref 41.1–50.3)
HGB BLD-MCNC: 8.5 G/DL (ref 13.6–17.2)
MCH RBC QN AUTO: 28 PG (ref 26.1–32.9)
MCHC RBC AUTO-ENTMCNC: 33.1 G/DL (ref 31.4–35)
MCV RBC AUTO: 84.5 FL (ref 79.6–97.8)
PLATELET # BLD AUTO: 304 K/UL (ref 150–450)
PMV BLD AUTO: 10.1 FL (ref 10.8–14.1)
POTASSIUM SERPL-SCNC: 4.4 MMOL/L (ref 3.5–5.1)
RBC # BLD AUTO: 3.04 M/UL (ref 4.23–5.67)
SODIUM SERPL-SCNC: 143 MMOL/L (ref 136–145)
TROPONIN I SERPL-MCNC: 0.61 NG/ML (ref 0.02–0.05)
VANCOMYCIN SERPL-MCNC: 20.9 UG/ML
WBC # BLD AUTO: 16 K/UL (ref 4.3–11.1)

## 2017-01-08 PROCEDURE — 80048 BASIC METABOLIC PNL TOTAL CA: CPT | Performed by: INTERNAL MEDICINE

## 2017-01-08 PROCEDURE — 85730 THROMBOPLASTIN TIME PARTIAL: CPT | Performed by: SURGERY

## 2017-01-08 PROCEDURE — 86335 IMMUNFIX E-PHORSIS/URINE/CSF: CPT | Performed by: INTERNAL MEDICINE

## 2017-01-08 PROCEDURE — 80202 ASSAY OF VANCOMYCIN: CPT | Performed by: INTERNAL MEDICINE

## 2017-01-08 PROCEDURE — 74011000258 HC RX REV CODE- 258: Performed by: EMERGENCY MEDICINE

## 2017-01-08 PROCEDURE — 86334 IMMUNOFIX E-PHORESIS SERUM: CPT | Performed by: INTERNAL MEDICINE

## 2017-01-08 PROCEDURE — 81050 URINALYSIS VOLUME MEASURE: CPT | Performed by: INTERNAL MEDICINE

## 2017-01-08 PROCEDURE — 74011636637 HC RX REV CODE- 636/637: Performed by: INTERNAL MEDICINE

## 2017-01-08 PROCEDURE — 84166 PROTEIN E-PHORESIS/URINE/CSF: CPT | Performed by: INTERNAL MEDICINE

## 2017-01-08 PROCEDURE — 74011250636 HC RX REV CODE- 250/636: Performed by: INTERNAL MEDICINE

## 2017-01-08 PROCEDURE — 84484 ASSAY OF TROPONIN QUANT: CPT | Performed by: INTERNAL MEDICINE

## 2017-01-08 PROCEDURE — 86580 TB INTRADERMAL TEST: CPT | Performed by: INTERNAL MEDICINE

## 2017-01-08 PROCEDURE — 85730 THROMBOPLASTIN TIME PARTIAL: CPT | Performed by: NURSE PRACTITIONER

## 2017-01-08 PROCEDURE — 85027 COMPLETE CBC AUTOMATED: CPT | Performed by: INTERNAL MEDICINE

## 2017-01-08 PROCEDURE — 84165 PROTEIN E-PHORESIS SERUM: CPT | Performed by: INTERNAL MEDICINE

## 2017-01-08 PROCEDURE — 74011250636 HC RX REV CODE- 250/636: Performed by: EMERGENCY MEDICINE

## 2017-01-08 PROCEDURE — 74011250637 HC RX REV CODE- 250/637: Performed by: INTERNAL MEDICINE

## 2017-01-08 PROCEDURE — 74011000302 HC RX REV CODE- 302: Performed by: INTERNAL MEDICINE

## 2017-01-08 PROCEDURE — 74011250636 HC RX REV CODE- 250/636: Performed by: NURSE PRACTITIONER

## 2017-01-08 PROCEDURE — 82962 GLUCOSE BLOOD TEST: CPT

## 2017-01-08 PROCEDURE — 97163 PT EVAL HIGH COMPLEX 45 MIN: CPT

## 2017-01-08 PROCEDURE — 36415 COLL VENOUS BLD VENIPUNCTURE: CPT | Performed by: INTERNAL MEDICINE

## 2017-01-08 PROCEDURE — 65270000029 HC RM PRIVATE

## 2017-01-08 RX ORDER — DOXYCYCLINE 100 MG/1
100 CAPSULE ORAL EVERY 12 HOURS
Status: DISCONTINUED | OUTPATIENT
Start: 2017-01-08 | End: 2017-01-09

## 2017-01-08 RX ORDER — HEPARIN SODIUM 5000 [USP'U]/100ML
12-25 INJECTION, SOLUTION INTRAVENOUS
Status: DISCONTINUED | OUTPATIENT
Start: 2017-01-08 | End: 2017-01-10

## 2017-01-08 RX ORDER — HEPARIN SODIUM 5000 [USP'U]/ML
5000 INJECTION, SOLUTION INTRAVENOUS; SUBCUTANEOUS ONCE
Status: COMPLETED | OUTPATIENT
Start: 2017-01-08 | End: 2017-01-08

## 2017-01-08 RX ORDER — AMOXICILLIN AND CLAVULANATE POTASSIUM 500; 125 MG/1; MG/1
1 TABLET, FILM COATED ORAL EVERY 12 HOURS
Status: DISCONTINUED | OUTPATIENT
Start: 2017-01-08 | End: 2017-01-09

## 2017-01-08 RX ORDER — INSULIN GLARGINE 100 [IU]/ML
15 INJECTION, SOLUTION SUBCUTANEOUS DAILY
Status: DISCONTINUED | OUTPATIENT
Start: 2017-01-08 | End: 2017-01-10

## 2017-01-08 RX ADMIN — DOXYCYCLINE HYCLATE 100 MG: 100 CAPSULE ORAL at 21:25

## 2017-01-08 RX ADMIN — INSULIN LISPRO 6 UNITS: 100 INJECTION, SOLUTION INTRAVENOUS; SUBCUTANEOUS at 17:01

## 2017-01-08 RX ADMIN — PIPERACILLIN AND TAZOBACTAM 4.5 G: 4; .5 INJECTION, POWDER, FOR SOLUTION INTRAVENOUS at 03:24

## 2017-01-08 RX ADMIN — INSULIN LISPRO 4 UNITS: 100 INJECTION, SOLUTION INTRAVENOUS; SUBCUTANEOUS at 12:40

## 2017-01-08 RX ADMIN — Medication 10 ML: at 22:00

## 2017-01-08 RX ADMIN — HEPARIN SODIUM 5000 UNITS: 5000 INJECTION, SOLUTION INTRAVENOUS; SUBCUTANEOUS at 12:41

## 2017-01-08 RX ADMIN — AMLODIPINE BESYLATE 5 MG: 5 TABLET ORAL at 07:31

## 2017-01-08 RX ADMIN — METOPROLOL SUCCINATE 100 MG: 100 TABLET, EXTENDED RELEASE ORAL at 07:30

## 2017-01-08 RX ADMIN — AMOXICILLIN AND CLAVULANATE POTASSIUM 1 TABLET: 500; 125 TABLET, FILM COATED ORAL at 21:25

## 2017-01-08 RX ADMIN — INSULIN GLARGINE 5 UNITS: 100 INJECTION, SOLUTION SUBCUTANEOUS at 08:00

## 2017-01-08 RX ADMIN — HEPARIN SODIUM 5000 UNITS: 5000 INJECTION, SOLUTION INTRAVENOUS; SUBCUTANEOUS at 17:14

## 2017-01-08 RX ADMIN — INSULIN GLARGINE 10 UNITS: 100 INJECTION, SOLUTION SUBCUTANEOUS at 09:00

## 2017-01-08 RX ADMIN — INSULIN LISPRO 2 UNITS: 100 INJECTION, SOLUTION INTRAVENOUS; SUBCUTANEOUS at 07:32

## 2017-01-08 RX ADMIN — DOXYCYCLINE HYCLATE 100 MG: 100 CAPSULE ORAL at 08:28

## 2017-01-08 RX ADMIN — HEPARIN SODIUM AND DEXTROSE 12 UNITS/KG/HR: 5000; 5 INJECTION INTRAVENOUS at 17:23

## 2017-01-08 RX ADMIN — TUBERCULIN PURIFIED PROTEIN DERIVATIVE 5 UNITS: 5 INJECTION, SOLUTION INTRADERMAL at 08:29

## 2017-01-08 RX ADMIN — ATORVASTATIN CALCIUM 80 MG: 40 TABLET, FILM COATED ORAL at 07:30

## 2017-01-08 RX ADMIN — AMOXICILLIN AND CLAVULANATE POTASSIUM 1 TABLET: 500; 125 TABLET, FILM COATED ORAL at 08:28

## 2017-01-08 RX ADMIN — ASPIRIN 81 MG CHEWABLE TABLET 81 MG: 81 TABLET CHEWABLE at 07:30

## 2017-01-08 RX ADMIN — Medication 5 ML: at 14:00

## 2017-01-08 RX ADMIN — TRAMADOL HYDROCHLORIDE 50 MG: 50 TABLET, FILM COATED ORAL at 07:36

## 2017-01-08 RX ADMIN — ACETAMINOPHEN 650 MG: 325 TABLET, FILM COATED ORAL at 21:25

## 2017-01-08 NOTE — ROUTINE PROCESS
Dressing removed from R lateral foot, both surgical MD and hospitalist at bedside and observed. Iodoform placed over wound and covered with 2x2 and gauze, secured with tape. Pt previously medicated with ultram PO. Wheezing heard in bilateral lungs, IVF turned off, Dr. Fernando Murray aware.

## 2017-01-08 NOTE — PROGRESS NOTES
Spoke with Enzo Seaman, CHARLEEN, with surgery and orders to give heparin bolus and start heparin drip. 1725- Heparin drip started. PTT ordered for 2330.

## 2017-01-08 NOTE — PROGRESS NOTES
Problem: Mobility Impaired (Adult and Pediatric)  Goal: *Acute Goals and Plan of Care (Insert Text)  ST - 3 days  1. Pt will roll and get to EOB with minimal assist.  2. Pt will sit edge of bed without falling over x 10 minutes for functional activity and exercise. 3. Pt will go sit to stand and transfer to chair with heel touch WB with minimal assist.  4. Pt will ambulate 10 with heel touch WB and rolling walker and minimal assist.  5. Pt will perform LE exercises with minimal assist.    LTG: 3- 7 days  1. Pt will roll and get to EOB independent. 3. Pt will go sit to stand and transfer to chair with heel touch WB with supervision and rolling walker. 4. Pt will ambulate 20 with heel touch WB and rolling walker and minimal assist.  5. Pt will perform LE exercises with verbal cues. PHYSICAL THERAPY: INITIAL ASSESSMENT, TREATMENT DAY: DAY OF ASSESSMENT, AM 2017  INPATIENT: Hospital Day: 4  Payor: CARE IMPROVEMENT PLUS / [place] / Product Type: [employer] Medicare /      NAME/AGE/GENDER: Jackie Sigala is a 73 y.o. male            PRIMARY DIAGNOSIS: Diabetic ulcer of right foot ([address] Diabetic ulcer of right foot ([address] Diabetic ulcer of right foot ([address]        ICD-10: Treatment Diagnosis: Generalized Muscle Weakness (M62.81)  Other lack of cordination (R27.8)  Difficulty in walking, Not elsewhere classified (R26.2)  Precautions/Allergies:   Lortab [hydrocodone-acetaminophen]       ASSESSMENT:      Mr. Zaira Fonseca is doing extremely poorly mentally and mobility wise. PT tried to assess and get him out of bed yesterday but he could not stay awake. Today PT was able to get him to the edge of the bed and barely stand 3 times to the walker with much coaxing and extra time and overall moderate assist. Pt sits abruptly and is not safe to get to the chair - will need 2 person assist.  Pt is complaining bitterly about pain in his foot and groin and multiple other complaints.   Pt is oriented self and that is about all. Pt knew he was not home but needed multiple verbal cues to realize he was in a hospital.  Pt is barely following instructions. Pt is sitting on the edge of the bed and falling over backward and to the side. Pt barely stood to the walker 3 times. Pt lives alone and could not fully tell me who could help him at discharge if needed although he states he has lots of family and friends who could. Pt is not safe or mobile enough to go home. Pt will benefit from physical therapy to maximize mobility and I recommend STR placement prior to discharge home. This section established at most recent assessment   PROBLEM LIST (Impairments causing functional limitations):  1. Decreased Strength  2. Decreased ADL/Functional Activities  3. Decreased Transfer Abilities  4. Decreased Ambulation Ability/Technique  5. Decreased Balance  6. Increased Pain  7. Decreased Activity Tolerance  8. Decreased [place] with Home Exercise Program  9. Decreased Cognition    INTERVENTIONS PLANNED: (Benefits and precautions of physical therapy have been discussed with the patient.)  1. Balance Exercise  2. Bed Mobility  3. Family Education  4. Gait Training  5. Home Exercise Program (HEP)  6. Range of Motion (ROM)  7. Therapeutic Activites  8. Therapeutic Exercise/Strengthening  9. Transfer Training      TREATMENT PLAN: Frequency/Duration: daily for duration of hospital stay  Rehabilitation Potential For Stated Goals: [facility] REHABILITATION/EQUIPMENT: (at time of discharge pending progress): Continue Skilled Therapy and Rehab. HISTORY:   History of Present Injury/Illness (Reason for Referral):  Pt is a 74 yo male who presents to ER this evening with progressive pain and poor healing of right foot wound. He reports site started as small blister maybe from a shoe about a month ago but has not improved and recently pain has worsened is now radiating up his leg.  He also reports uanble to bear weight on foot in past day or so. He has not been on abx for this but states it was seen by a provider at [address] Other PMH includes DM which appears uncontrolled (A1C 9.2 11/2016), CHF, CKD, current smoker. X Ray of right foot has low suspicion for osteomyelitis but does reveal soft tissue lucencies, possible gas producing infection. WBC 18.7, LA 2.2. He has been started on Vanc/Zosyn in ER. Also noted to have MO with Cr 2.99, baseline 1.90. Pt denies any med changes and reports compliance with current med regimen. He denies recent or current CP, SOB, abd pain, fevers  Past Medical History/Comorbidities:   Mr. Dean Charlton  has a past medical history of Abnormal CT scan, chest (12/27/2015); Acute CHF ([address] (12/26/2015); Acute systolic congestive heart failure ([address] (12/30/2015); MO (acute kidney injury) ([address] (7/29/2012); Bilateral pleural effusion (12/27/2015); Chest pain (12/26/2015); DM (diabetes mellitus), type 2 ([address] (7/29/2012); Encephalopathy due to infection (1/7/2017); HTN (hypertension) (7/29/2012); Hypoglycemia (7/29/2012); and Tobacco use (12/27/2015). He also has no past medical history of Arthritis; Asthma; Autoimmune disease ([address] CAD (coronary artery disease); Cancer ([address] COPD; DEMENTIA; Gastrointestinal disorder; Liver disease; Other ill-defined conditions(799.89); Psychiatric disorder; PUD (peptic ulcer disease); Seizures ([address] Sleep disorder; Stroke [facility]); or Thromboembolus ([address] Mr. Dean Charlton  has a past surgical history that includes orthopaedic. Social History/Living Environment:   Home Environment: Private residence  # Steps to Enter: 4  One/Two Story Residence: One story  Living Alone: No  Support Systems: Child(elpidio), Friends \ neighbors  Patient Expects to be Discharged to[de-identified] Private residence  Current DME Used/Available at Home: None  Prior Level of Function/Work/Activity:  Pt states he was independent PTA. Would sit on his front porch everyday and \"entertain\".   Current Medications:           Current Facility-Administered Medications:     amoxicillin-clavulanate (AUGMENTIN) 500-125 mg per tablet 1 Tab, 1 Tab, Oral, Q12H, Jamel Fenrandez MD, 1 Tab at 01/08/17 4875    doxycycline (VIBRAMYCIN) capsule 100 mg, 100 mg, Oral, Q12H, Jamel Fernandez MD, 100 mg at 01/08/17 1156    insulin glargine (LANTUS) injection 15 Units, 15 Units, SubCUTAneous, DAILY, Jamel Fernandez MD, 10 Units at 01/08/17 0900    haloperidol lactate (HALDOL) injection 2 mg, 2 mg, IntraVENous, Q4H PRN, Jamel Fernandez MD    0.9% sodium chloride infusion 250 mL, 250 mL, IntraVENous, PRN, Jamel Fernandez MD    diphenhydrAMINE (BENADRYL) injection 25 mg, 25 mg, IntraVENous, Q4H PRN, Jamel Fernandez MD    traMADol Madolyn Lauth) tablet 50 mg, 50 mg, Oral, Q6H PRN, Jamel Fernandez MD, 50 mg at 01/08/17 0736    amLODIPine (NORVASC) tablet 5 mg, 5 mg, Oral, DAILY, Ethan Steele DO, 5 mg at 01/08/17 4569    aspirin chewable tablet 81 mg, 81 mg, Oral, DAILY AFTER Rina Tadeo, , 81 mg at 01/08/17 0730    atorvastatin (LIPITOR) tablet 80 mg, 80 mg, Oral, DAILY, Ethan Steele DO, 80 mg at 01/08/17 0730    metoprolol succinate (TOPROL-XL) tablet 100 mg, 100 mg, Oral, DAILY WITH BREAKFAST, Ethan Steele DO, 100 mg at 01/08/17 0730    sodium chloride (NS) flush 5-10 mL, 5-10 mL, IntraVENous, Q8H, Ethan Steele DO, 10 mL at 01/07/17 2217    sodium chloride (NS) flush 5-10 mL, 5-10 mL, IntraVENous, PRN, Ethan Steele DO    acetaminophen (TYLENOL) tablet 650 mg, 650 mg, Oral, Q4H PRN, Ethan Steele,     HYDROmorphone (PF) (DILAUDID) injection 0.5 mg, 0.5 mg, IntraVENous, Q6H PRN, Ethan Steele DO, 0.5 mg at 01/07/17 1055    ondansetron (ZOFRAN) injection 4 mg, 4 mg, IntraVENous, Q4H PRN, Ethan Steele DO, 4 mg at 01/05/17 2230    docusate sodium (COLACE) capsule 100 mg, 100 mg, Oral, DAILY PRN, Ethan Steele DO    heparin (porcine) injection 5,000 Units, 5,000 Units, SubCUTAneous, Q8H, Ekta Flaming, DO, 5,000 Units at 17 2217    insulin lispro (HUMALOG) injection, , SubCUTAneous, TIDAC, Ekta Flaming, DO, 2 Units at 17 0732   Date Last Reviewed:  2017      Number of Personal Factors/Comorbidities that affect the Plan of Care: 3+: HIGH COMPLEXITY   EXAMINATION:   Most Recent Physical Functioning:   Gross Assessment:  UE:  Range of motion WFL  Strength: At least 3/5  LEs:  Stiff end ranges. Overall:  3- to 3/5                  Posture:  Posture     Balance:  Poor    Bed Mobility:  Moderate assist mostly 2* to poor command following. Pt can perform with [place] or less. Wheelchair Mobility:  NA     Transfers: Moderate to max assist to get to perform     Gait:  Unable today             Body Structures Involved:  1. Lungs  2. Bones  3. Joints  4. Muscles  5. Ligaments Body Functions Affected:  1. Mental  2. Sensory/Pain  3. Respiratory  4. Neuromusculoskeletal  5. Movement Related  6. Skin Related Activities and Participation Affected:  1. Learning and Applying Knowledge  2. General Tasks and Demands  3. Communication  4. Mobility  5. Self Care  6. Domestic Life  7. Interpersonal Interactions and Relationships  8. Community, Social and [place]   Number of elements that affect the Plan of Care: 4+: HIGH COMPLEXITY   CLINICAL PRESENTATION:   Presentation: Evolving clinical presentation with unstable and unpredictable characteristics: HIGH COMPLEXITY   CLINICAL DECISION MAKIN [facility] Inpatient Short Form  How much difficulty does the patient currently have. .. Unable A Lot A Little None   1. Turning over in bed (including adjusting bedclothes, sheets and blankets)? [ ] 1   [X] 2   [ ] 3   [ ] 4   2. Sitting down on and standing up from a chair with arms ( e.g., wheelchair, bedside commode, etc.)   [ ] 1   [X] 2   [ ] 3   [ ] 4   3. Moving from lying on back to sitting on the side of the bed? [ ] 1   [X] 2   [ ] 3   [ ] 4   How much help from another person does the patient currently need. .. Total A Lot A Little None   4. Moving to and from a bed to a chair (including a wheelchair)? [ ] 1   [X] 2   [ ] 3   [ ] 4   5. Need to walk in hospital room? [X] 1   [ ] 2   [ ] 3   [ ] 4   6. Climbing 3-5 steps with a railing? [X] 1   [ ] 2   [ ] 3   [ ] 4   © 2007, Trustees of [address] [place] [address] [address] [id number], under license to [employer]. All rights reserved          Score:  Initial: 10 Most Recent: X (Date: -- )   Interpretation of Tool:  Represents activities that are increasingly more difficult (i.e. Bed mobility, Transfers, Gait). Score 24 23 22-20 19-15 14-10 9-7 6       Modifier CH CI CJ CK CL CM CN         · Mobility - Walking and Moving Around:               - CURRENT STATUS:    CL - 60%-79% impaired, limited or restricted               - GOAL STATUS:           CK - 40%-59% impaired, limited or restricted               - D/C STATUS:                       ---------------To be determined---------------  Payor: CARE IMPROVEMENT PLUS [place] / Product Type: [employer] Medicare /       Medical Necessity:     · Skilled intervention continues to be required due to debiltity. Reason for Services/Other Comments:  · Patient continues to require skilled intervention due to debility. Use of outcome tool(s) and clinical judgement create a POC that gives a: Difficult prediction of patient's progress: HIGH COMPLEXITY                 TREATMENT:   (In addition to Assessment/Re-Assessment sessions the following treatments were rendered)  Pre-treatment Symptoms/Complaints:  Pain and fatigue  Pain: Initial:     5/10 Post Session:  No better      ASSESSMENT ONLY     Treatment/Session Assessment: See initial assessment above. Patients response to todays treatment session was tolerated well with no medical complications.      · Response to Treatment:  POOR - UNABLE TO GET OUT OF BED.  · Interdisciplinary Collaboration:  · Registered Nurse, Pito Buckner  · After treatment position/precautions:  · Bed/Chair-wheels locked  · Bed in low position  · Call light within reach  · RN notified  · Side rails x 3  · Compliance with Program/Exercises: Will assess as treatment progresses. · Recommendations/Intent for next treatment session: \"Next visit will focus on reduction in assistance provided\".   Total Treatment Duration:  PT Patient Time In/Time Out  Time In: 0915  Time Out: 0945     Kimmy Kendrick, PT

## 2017-01-08 NOTE — PROGRESS NOTES
Hospitalist Progress Note     Admit Date:  2017  5:07 PM   Name:  Boy Sanchez   Age:  73 y.o.  :  1945   MRN:  [id number]   PCP:  None  Treatment Team: Attending Provider: Ann Marie Villalpando DO; Consulting Provider: Angela Martinez MD; Utilization Review: [place]; Consulting Provider: Santa Magana MD; Charge Nurse: Elijah Mart RN    Subjective:   HPI:  Pt is a 74 yo male who presented with progressive pain and poor healing of right foot wound over 1 month. Other PMH includes DM (A1C 9.2 2016), CHF, CKD, current smoker. X Ray of right foot had low suspicion for osteomyelitis but does reveal soft tissue lucencies, possible gas producing infection. WBC 18.7, LA 2.2. He was started on Vanc/Zosyn in ER. Also noted to have MO with Cr 2.99, baseline 1.90. He also had elevated trops without any typical symptoms.  - pt denies CP, SOB. Saying he feels \"alright\". Nursing thinks he gets a little winded when turning or moving patient. Objective:     Patient Vitals for the past 24 hrs:   Temp Pulse Resp BP SpO2   17 0709 97.6 °F (36.4 °C) 82 20 (!) 140/93 97 %   17 0331 98.3 °F (36.8 °C) 84 20 141/75 100 %   17 0028 98.6 °F (37 °C) 85 20 148/72 96 %   17 1926 98.4 °F (36.9 °C) 85 17 139/73 95 %   17 1527 98.5 °F (36.9 °C) 80 16 119/67 97 %   17 1135 98.8 °F (37.1 °C) 80 16 119/65 95 %     Oxygen Therapy  O2 Sat (%): 97 % (17 0709)  Pulse via Oximetry: 91 beats per minute (17 1900)  O2 Device: Room air (17 1527)    Intake/Output Summary (Last 24 hours) at 17 0810  Last data filed at 17 0331   Gross per 24 hour   Intake             3083 ml   Output              750 ml   Net             2333 ml       General:    Well nourished. Alert and oriented. No distress  CV:   RRR. No murmur, rub, or gallop. Lungs:   Clear to auscultation bilaterally. No wheezing, rhonchi, or rales. Abdomen:   Soft, nontender, nondistended. Bowel sounds normal.   Extremities: Warm and dry. No cyanosis or edema  Skin:     No rashes or jaundice. Labs and Studies:  I have reviewed all labs, meds, telemetry events, and studies from the last 24 hours. Recent Results (from the past 24 hour(s))   METABOLIC PANEL, BASIC    Collection Time: 01/07/17  9:05 AM   Result Value Ref Range    Sodium 142 136 - 145 mmol/L    Potassium 4.1 3.5 - 5.1 mmol/L    Chloride 108 (H) 98 - 107 mmol/L    CO2 22 21 - 32 mmol/L    Anion gap 12 7 - 16 mmol/L    Glucose 202 (H) 65 - 100 mg/dL    BUN 47 (H) 8 - 23 MG/DL    Creatinine 3.97 (H) 0.8 - 1.5 MG/DL    GFR est AA 19 (L) >60 ml/min/1.73m2    GFR est non-AA 16 (L) >60 ml/min/1.73m2    Calcium 7.5 (L) 8.3 - 10.4 MG/DL   CBC W/O DIFF    Collection Time: 01/07/17  9:05 AM   Result Value Ref Range    WBC 19.4 (H) 4.3 - 11.1 K/uL    RBC 3.08 (L) 4.23 - 5.67 M/uL    HGB 8.6 (L) 13.6 - 17.2 g/dL    HCT 26.2 (L) 41.1 - 50.3 %    MCV 85.1 79.6 - 97.8 FL    MCH 27.9 26.1 - 32.9 PG    MCHC 32.8 31.4 - 35.0 g/dL    RDW 14.1 11.9 - 14.6 %    PLATELET 496 201 - 503 K/uL    MPV 10.3 (L) 10.8 - 14.1 FL   TROPONIN I    Collection Time: 01/07/17  9:05 AM   Result Value Ref Range    Troponin-I, Qt. 0.59 (HH) 0.02 - 0.05 NG/ML   GLUCOSE, POC    Collection Time: 01/07/17 11:34 AM   Result Value Ref Range    Glucose (POC) 189 (H) 65 - 100 mg/dL   PROTEIN/CREATININE RATIO, URINE    Collection Time: 01/07/17  2:30 PM   Result Value Ref Range    Protein, urine random 375 (H) <11.9 mg/dL    Creatinine, urine 162.00 mg/dL    Protein/Creat.  urine Ratio 2.3     SODIUM, UR, RANDOM    Collection Time: 01/07/17  2:30 PM   Result Value Ref Range    Sodium urine, random 41 MMOL/L   GLUCOSE, POC    Collection Time: 01/07/17  4:16 PM   Result Value Ref Range    Glucose (POC) 160 (H) 65 - 860 mg/dL   METABOLIC PANEL, BASIC    Collection Time: 01/07/17  7:23 PM   Result Value Ref Range    Sodium 144 136 - 145 mmol/L    Potassium 4.0 3.5 - 5.1 mmol/L Chloride 109 (H) 98 - 107 mmol/L    CO2 25 21 - 32 mmol/L    Anion gap 10 7 - 16 mmol/L    Glucose 156 (H) 65 - 100 mg/dL    BUN 49 (H) 8 - 23 MG/DL    Creatinine 3.93 (H) 0.8 - 1.5 MG/DL    GFR est AA 20 (L) >60 ml/min/1.73m2    GFR est non-AA 16 (L) >60 ml/min/1.73m2    Calcium 7.4 (L) 8.3 - 10.4 MG/DL   GLUCOSE, POC    Collection Time: 01/07/17  8:51 PM   Result Value Ref Range    Glucose (POC) 205 (H) 65 - 100 mg/dL   CBC W/O DIFF    Collection Time: 01/08/17  3:45 AM   Result Value Ref Range    WBC 16.0 (H) 4.3 - 11.1 K/uL    RBC 3.04 (L) 4.23 - 5.67 M/uL    HGB 8.5 (L) 13.6 - 17.2 g/dL    HCT 25.7 (L) 41.1 - 50.3 %    MCV 84.5 79.6 - 97.8 FL    MCH 28.0 26.1 - 32.9 PG    MCHC 33.1 31.4 - 35.0 g/dL    RDW 14.6 11.9 - 14.6 %    PLATELET 418 492 - 932 K/uL    MPV 10.1 (L) 10.8 - 44.8 FL   METABOLIC PANEL, BASIC    Collection Time: 01/08/17  3:45 AM   Result Value Ref Range    Sodium 143 136 - 145 mmol/L    Potassium 4.4 3.5 - 5.1 mmol/L    Chloride 110 (H) 98 - 107 mmol/L    CO2 24 21 - 32 mmol/L    Anion gap 9 7 - 16 mmol/L    Glucose 192 (H) 65 - 100 mg/dL    BUN 50 (H) 8 - 23 MG/DL    Creatinine 3.86 (H) 0.8 - 1.5 MG/DL    GFR est AA 20 (L) >60 ml/min/1.73m2    GFR est non-AA 17 (L) >60 ml/min/1.73m2    Calcium 7.4 (L) 8.3 - 10.4 MG/DL   VANCOMYCIN, RANDOM    Collection Time: 01/08/17  3:45 AM   Result Value Ref Range    VANCOMYCIN,RANDOM 20.9 UG/ML   GLUCOSE, POC    Collection Time: 01/08/17  7:11 AM   Result Value Ref Range    Glucose (POC) 199 (H) 65 - 100 mg/dL        Recent Imaging:  CXR Results  (Last 48 hours)    None        CT Results  (Last 48 hours)    None          Assessment and Plan:     Hospital Problems as of 1/8/2017  Date Reviewed: 8/11/2016          Codes Class Noted - Resolved POA    Encephalopathy due to infection ICD-10-CM: G93.49, B99.9  ICD-9-CM: 348.39, 136.9  1/7/2017 - Present Yes        Acute renal failure with tubular necrosis (HCC) ICD-10-CM: N17.0  ICD-9-CM: 584.5  1/6/2017 - Present Yes        * (Principal)Diabetic ulcer of right foot ([facility] ICD-10-CM: E11.621, L97.519  ICD-9-CM: 250.80, 707.15  1/5/2017 - Present Yes        Kidney disease, chronic, stage III (moderate, EGFR 30-59 ml/min) (Chronic) ICD-10-CM: N18.3  ICD-9-CM: 585.3  8/11/2016 - Present Yes        Chronic systolic congestive heart failure (HCC) (Chronic) ICD-10-CM: I50.22  ICD-9-CM: 428.22, 428.0  12/30/2015 - Present Yes    Overview Signed 1/6/2017  8:59 AM by Kyra Mesa MD     EF 35-40% on 2015 Echo             Type 2 diabetes mellitus without complication ([facility] (Chronic) ICD-10-CM: E11.9  ICD-9-CM: 250.00  7/29/2012 - Present Yes        HTN (hypertension) (Chronic) ICD-10-CM: I10  ICD-9-CM: 401.9  7/29/2012 - Present Yes              PLAN:    · Diabetic foot - Surgery following. MRI and arterial study pending. Did debridement at bedside 1/6. Cultures NGTD. Try switching to doxy and augmentin in anticipation of outpt therapy, and see if he continues to remain stable/improved. · Elevated Trop - Repeat troponin today. Elevated but denied CP, SOB, other symptoms. ARF could be contributing to elevation. Continue medical management for now. · ARF - possibly at plateau. Cont IVF, monitor. Renal US pending. Nephrology following. · DM -   Not really any responsive to lantus 5 yesterday. Increase to 15u today. Cont ISS  · CHF - stable, monitor. Holding diuretics due to ARF  · Appreciate specialists' help    DC planning:  Home when ready most likely. Ppd ordered in case of placement.    DVT ppx:  heparin  Discussed plan with:  pt    Signed:  Kyra Mesa MD

## 2017-01-08 NOTE — PROGRESS NOTES
PLAN:  Care management per Hospitalist  --Diabetic foot - Surgery following. Did debridement at bedside 1/6. Cultures NGTD. Try switching to doxy and augmentin in anticipation of outpt therapy, and see if he continues to remain stable/improved. --Elevated Trop - Repeat troponin today. Elevated but denied CP, SOB, other symptoms. ARF could be contributing to elevation. Continue medical management for now. --ARF - possibly at plateau. Cont IVF, monitor. Renal US pending. Nephrology following.  --DM - Not really any responsive to lantus 5 yesterday. Increase to 15u today. Cont ISS  --CHF - stable, monitor. Holding diuretics due to ARF  --Appreciate specialists' help     General Surgery  --Noninvasive vascular arterial study of Right LE, triphasic bone scan to Rt foot rather than MRI (due to elevated creatinine) to rule out osteomyelitis. --Smoking Cessation  --Iodosorb daily to wound Right lateral 5th toe/ foot    ASSESSMENT:  Admit Date: 1/5/2017   * No surgery found *  * No surgery found *    Principal Problem:    Diabetic ulcer of right foot ([address] (1/5/2017)    Active Problems:    Type 2 diabetes mellitus without complication ([address] (1/68/1721)      HTN (hypertension) (7/29/2012)      Chronic systolic congestive heart failure ([address] (12/30/2015)      Overview: EF 35-40% on 2015 Echo      Kidney disease, chronic, stage III (moderate, EGFR 30-59 ml/min) (8/11/2016)      Acute renal failure with tubular necrosis ([address] (1/6/2017)      Encephalopathy due to infection (1/7/2017)         SUBJECTIVE:  HPI: Pt is a 72 yo male who presented with progressive pain and poor healing of right foot wound over 1 month. Other PMH includes DM (A1C 9.2 11/2016), CHF, CKD, current smoker. X Ray of right foot had low suspicion for osteomyelitis but does reveal soft tissue lucencies, possible gas producing infection. WBC 18.7, LA 2.2. He was started on Vanc/Zosyn in ER. Also noted to have MO with Cr 2.99, baseline 1.90.  He also had elevated trops without any typical symptoms.    1/8 - Pt awake in bed. Right lateral 5th toe/ lateral foot more painful today. AF, NAD. OBJECTIVE:  Constitutional: Alert oriented cooperative patient in no acute distress; appears stated age   Visit Vitals    BP (!) 140/93    Pulse 82    Temp 97.6 °F (36.4 °C)    Resp 20    Ht 6' 1\" (1.854 m)    Wt 213 lb (96.6 kg)    SpO2 97%    BMI 28.1 kg/m2     Eyes:Sclera are clear. ENMT: no obvious neck masses, no ear or lip lesions  CV: RRR. Normal perfusion  Resp: No JVD. Breathing is non-labored. GI: soft, non-tender, non-distended, BS active   Musculoskeletal: unremarkable with normal function. Extremities:No cyanosis. 1+ pitting edema right foot/ lower leg. No clubbing  Skin: Right 5th toe lateral side thickened dry skin with 2 calloused ulcers with slough present to wound bed, +odor.  scant drainage, increased tenderness to touch, 5th toe with more purple discoloration  Neuro: No obvious focal deficits  Psychiatric: normal affect and mood, no memory impairment    Patient Vitals for the past 24 hrs:   BP Temp Pulse Resp SpO2   01/08/17 0709 (!) 140/93 97.6 °F (36.4 °C) 82 20 97 %   01/08/17 0331 141/75 98.3 °F (36.8 °C) 84 20 100 %   01/08/17 0028 148/72 98.6 °F (37 °C) 85 20 96 %   01/07/17 1926 139/73 98.4 °F (36.9 °C) 85 17 95 %   01/07/17 1527 119/67 98.5 °F (36.9 °C) 80 16 97 %   01/07/17 1135 119/65 98.8 °F (37.1 °C) 80 16 95 %     Labs:  Recent Labs      01/08/17   0345   01/07/17   0905   01/05/17   1820   WBC  16.0*   --   19.4*   < >  18.7*   HGB  8.5*   --   8.6*   < >  8.1*   PLT  304   --   316   < >  331   NA  143   < >  142   < >  140   K  4.4   < >  4.1   < >  3.8   CL  110*   < >  108*   < >  105   CO2  24   < >  22   < >  24   BUN  50*   < >  47*   < >  29*   CREA  3.86*   < >  3.97*   < >  2.99*   GLU  192*   < >  202*   < >  157*   TBILI   --    --    --    --   0.5   SGOT   --    --    --    --   24   ALT   --    --    --    --   16 AP   --    --    --    --   100   TROIQ   --    --   0.59*   < >  0.56*    < > = values in this interval not displayed. Josue Tai, FNP-BC    The patient was seen in conjunction with Dr. Monik Botello who independently evaluated the patient, reviewed the chart and agreed with the assessment and plan. I have seen and examined the patient with the Nurse Practitioner and agree with the above assessment and plan. I am concerned about underlying vascular disease. Rosanna Ortega.  Monik Botello MD

## 2017-01-08 NOTE — PROGRESS NOTES
Admit Date: 1/5/2017      Subjective:          Review of Systems  Cardio-vascular: no chest pain, no SOB  GI: no N/V/D  : no dysuria, no hematuria    Objective:     Patient Vitals for the past 8 hrs:   BP Temp Pulse Resp SpO2   01/08/17 0709 (!) 140/93 97.6 °F (36.4 °C) 82 20 97 %     01/08 0701 - 01/08 1900  In: 360 [P.O.:360]  Out: -       Physical Exam:   Lungs: clear  CV: RR,  Abdomen: soft, not tender, no rebound. Ext:trace edema          Data Review   Recent Results (from the past 8 hour(s))   GLUCOSE, POC    Collection Time: 01/08/17  7:11 AM   Result Value Ref Range    Glucose (POC) 199 (H) 65 - 100 mg/dL   GLUCOSE, POC    Collection Time: 01/08/17 10:41 AM   Result Value Ref Range    Glucose (POC) 241 (H) 65 - 100 mg/dL           Assessment:     Principal Problem:    Diabetic ulcer of right foot ([address] (1/5/2017)    Active Problems:    Type 2 diabetes mellitus without complication ([address] (9/51/1520)      HTN (hypertension) (7/29/2012)      Chronic systolic congestive heart failure ([address] (12/30/2015)      Overview: EF 35-40% on 2015 Echo      Kidney disease, chronic, stage III (moderate, EGFR 30-59 ml/min) (8/11/2016)      Acute renal failure with tubular necrosis ([address] (1/6/2017)      Encephalopathy due to infection (1/7/2017)        Plan:     Creat same  Urine protein/creat ratio at 2.3 with no protein on UA. Check protein immunofixation.   Renal US pending    Jaylon Martinez MD

## 2017-01-09 ENCOUNTER — APPOINTMENT (OUTPATIENT)
Dept: ULTRASOUND IMAGING | Age: 72
DRG: 853 | End: 2017-01-09
Attending: INTERNAL MEDICINE
Payer: MEDICARE

## 2017-01-09 ENCOUNTER — APPOINTMENT (OUTPATIENT)
Dept: NUCLEAR MEDICINE | Age: 72
DRG: 853 | End: 2017-01-09
Attending: NURSE PRACTITIONER
Payer: MEDICARE

## 2017-01-09 ENCOUNTER — APPOINTMENT (OUTPATIENT)
Dept: ULTRASOUND IMAGING | Age: 72
DRG: 853 | End: 2017-01-09
Attending: NURSE PRACTITIONER
Payer: MEDICARE

## 2017-01-09 PROBLEM — G93.49 ENCEPHALOPATHY DUE TO INFECTION: Status: RESOLVED | Noted: 2017-01-07 | Resolved: 2017-01-09

## 2017-01-09 PROBLEM — B99.9 ENCEPHALOPATHY DUE TO INFECTION: Status: RESOLVED | Noted: 2017-01-07 | Resolved: 2017-01-09

## 2017-01-09 PROBLEM — I21.4 NSTEMI (NON-ST ELEVATED MYOCARDIAL INFARCTION) (HCC): Status: ACTIVE | Noted: 2017-01-05

## 2017-01-09 PROBLEM — L03.90 SEPSIS DUE TO CELLULITIS (HCC): Status: ACTIVE | Noted: 2017-01-09

## 2017-01-09 PROBLEM — A41.9 SEPSIS DUE TO CELLULITIS (HCC): Status: ACTIVE | Noted: 2017-01-09

## 2017-01-09 LAB
ALBUMIN SERPL ELPH-MCNC: 1.86 G/DL (ref 3.2–5.6)
ALBUMIN/GLOB SERPL: 0.4 {RATIO}
ALPHA1 GLOB SERPL ELPH-MCNC: 0.57 G/DL (ref 0.1–0.4)
ALPHA2 GLOB SERPL ELPH-MCNC: 1.26 G/DL (ref 0.4–1.2)
ANION GAP BLD CALC-SCNC: 12 MMOL/L (ref 7–16)
APTT PPP: 50.7 SEC (ref 23.5–31.7)
APTT PPP: 63.6 SEC (ref 23.5–31.7)
APTT PPP: 91.2 SEC (ref 23.5–31.7)
B-GLOBULIN SERPL QL ELPH: 1.38 G/DL (ref 0.6–1.3)
BASOPHILS # BLD AUTO: 0 K/UL (ref 0–0.2)
BASOPHILS # BLD: 0 % (ref 0–2)
BUN SERPL-MCNC: 50 MG/DL (ref 8–23)
CALCIUM SERPL-MCNC: 7.8 MG/DL (ref 8.3–10.4)
CHLORIDE SERPL-SCNC: 108 MMOL/L (ref 98–107)
CO2 SERPL-SCNC: 22 MMOL/L (ref 21–32)
CREAT SERPL-MCNC: 3.66 MG/DL (ref 0.8–1.5)
DIFFERENTIAL METHOD BLD: ABNORMAL
EOSINOPHIL # BLD: 0.2 K/UL (ref 0–0.8)
EOSINOPHIL NFR BLD: 2 % (ref 0.5–7.8)
ERYTHROCYTE [DISTWIDTH] IN BLOOD BY AUTOMATED COUNT: 14.8 % (ref 11.9–14.6)
GAMMA GLOB MFR SERPL ELPH: 1.33 G/DL (ref 0.5–1.6)
GLUCOSE BLD STRIP.AUTO-MCNC: 198 MG/DL (ref 65–100)
GLUCOSE BLD STRIP.AUTO-MCNC: 206 MG/DL (ref 65–100)
GLUCOSE BLD STRIP.AUTO-MCNC: 230 MG/DL (ref 65–100)
GLUCOSE BLD STRIP.AUTO-MCNC: 240 MG/DL (ref 65–100)
GLUCOSE SERPL-MCNC: 194 MG/DL (ref 65–100)
HCT VFR BLD AUTO: 27.8 % (ref 41.1–50.3)
HGB BLD-MCNC: 8.9 G/DL (ref 13.6–17.2)
IGA SERPL-MCNC: 636 MG/DL (ref 85–499)
IGG SERPL-MCNC: 1303 MG/DL (ref 610–1616)
IGM SERPL-MCNC: 47 MG/DL (ref 35–242)
IMM GRANULOCYTES # BLD: 0.1 K/UL (ref 0–0.5)
IMM GRANULOCYTES NFR BLD AUTO: 0.5 % (ref 0–5)
LYMPHOCYTES # BLD AUTO: 7 % (ref 13–44)
LYMPHOCYTES # BLD: 0.9 K/UL (ref 0.5–4.6)
M PROTEIN SERPL ELPH-MCNC: ABNORMAL G/DL
MCH RBC QN AUTO: 27.5 PG (ref 26.1–32.9)
MCHC RBC AUTO-ENTMCNC: 32 G/DL (ref 31.4–35)
MCV RBC AUTO: 85.8 FL (ref 79.6–97.8)
MM INDURATION POC: NORMAL MM (ref 0–5)
MONOCYTES # BLD: 0.9 K/UL (ref 0.1–1.3)
MONOCYTES NFR BLD AUTO: 7 % (ref 4–12)
NEUTS SEG # BLD: 11.3 K/UL (ref 1.7–8.2)
NEUTS SEG NFR BLD AUTO: 84 % (ref 43–78)
PLATELET # BLD AUTO: 327 K/UL (ref 150–450)
PMV BLD AUTO: 10.4 FL (ref 10.8–14.1)
POTASSIUM SERPL-SCNC: 4 MMOL/L (ref 3.5–5.1)
PPD POC: NORMAL NEGATIVE
PROT PATTERN SERPL ELPH-IMP: ABNORMAL
PROT PATTERN SPEC IFE-IMP: ABNORMAL
PROT SERPL-MCNC: 6.4 G/DL (ref 6.3–8.2)
RBC # BLD AUTO: 3.24 M/UL (ref 4.23–5.67)
SODIUM SERPL-SCNC: 142 MMOL/L (ref 136–145)
TROPONIN I SERPL-MCNC: 0.23 NG/ML (ref 0.02–0.05)
WBC # BLD AUTO: 13.3 K/UL (ref 4.3–11.1)

## 2017-01-09 PROCEDURE — 84484 ASSAY OF TROPONIN QUANT: CPT | Performed by: INTERNAL MEDICINE

## 2017-01-09 PROCEDURE — A9561 TC99M OXIDRONATE: HCPCS

## 2017-01-09 PROCEDURE — 74011250637 HC RX REV CODE- 250/637: Performed by: INTERNAL MEDICINE

## 2017-01-09 PROCEDURE — 74011250636 HC RX REV CODE- 250/636: Performed by: NURSE PRACTITIONER

## 2017-01-09 PROCEDURE — 36415 COLL VENOUS BLD VENIPUNCTURE: CPT | Performed by: INTERNAL MEDICINE

## 2017-01-09 PROCEDURE — 85025 COMPLETE CBC W/AUTO DIFF WBC: CPT | Performed by: INTERNAL MEDICINE

## 2017-01-09 PROCEDURE — 74011000258 HC RX REV CODE- 258: Performed by: INTERNAL MEDICINE

## 2017-01-09 PROCEDURE — 65270000029 HC RM PRIVATE

## 2017-01-09 PROCEDURE — 74011636637 HC RX REV CODE- 636/637: Performed by: INTERNAL MEDICINE

## 2017-01-09 PROCEDURE — 74011250636 HC RX REV CODE- 250/636: Performed by: INTERNAL MEDICINE

## 2017-01-09 PROCEDURE — 82962 GLUCOSE BLOOD TEST: CPT

## 2017-01-09 PROCEDURE — 80048 BASIC METABOLIC PNL TOTAL CA: CPT | Performed by: INTERNAL MEDICINE

## 2017-01-09 PROCEDURE — 76770 US EXAM ABDO BACK WALL COMP: CPT

## 2017-01-09 PROCEDURE — 85730 THROMBOPLASTIN TIME PARTIAL: CPT | Performed by: INTERNAL MEDICINE

## 2017-01-09 PROCEDURE — 93926 LOWER EXTREMITY STUDY: CPT

## 2017-01-09 RX ORDER — VANCOMYCIN/0.9 % SOD CHLORIDE 1.5G/250ML
1500 PLASTIC BAG, INJECTION (ML) INTRAVENOUS ONCE
Status: DISCONTINUED | OUTPATIENT
Start: 2017-01-09 | End: 2017-01-10

## 2017-01-09 RX ORDER — SODIUM CHLORIDE 9 MG/ML
100 INJECTION, SOLUTION INTRAVENOUS CONTINUOUS
Status: DISPENSED | OUTPATIENT
Start: 2017-01-09 | End: 2017-01-10

## 2017-01-09 RX ORDER — SODIUM CHLORIDE 0.9 % (FLUSH) 0.9 %
10 SYRINGE (ML) INJECTION
Status: COMPLETED | OUTPATIENT
Start: 2017-01-09 | End: 2017-01-09

## 2017-01-09 RX ORDER — HYDRALAZINE HYDROCHLORIDE 20 MG/ML
20 INJECTION INTRAMUSCULAR; INTRAVENOUS
Status: DISCONTINUED | OUTPATIENT
Start: 2017-01-09 | End: 2017-02-02 | Stop reason: HOSPADM

## 2017-01-09 RX ORDER — AMLODIPINE BESYLATE 10 MG/1
10 TABLET ORAL DAILY
Status: DISCONTINUED | OUTPATIENT
Start: 2017-01-10 | End: 2017-01-23

## 2017-01-09 RX ORDER — VANCOMYCIN/0.9 % SOD CHLORIDE 1.5G/250ML
1500 PLASTIC BAG, INJECTION (ML) INTRAVENOUS SEE ADMIN INSTRUCTIONS
Status: DISCONTINUED | OUTPATIENT
Start: 2017-01-09 | End: 2017-01-13

## 2017-01-09 RX ADMIN — Medication 10 ML: at 10:17

## 2017-01-09 RX ADMIN — PIPERACILLIN AND TAZOBACTAM 4.5 G: 4; .5 INJECTION, POWDER, FOR SOLUTION INTRAVENOUS at 22:19

## 2017-01-09 RX ADMIN — HUMAN INSULIN 5 UNITS: 100 INJECTION, SOLUTION SUBCUTANEOUS at 11:30

## 2017-01-09 RX ADMIN — HUMAN INSULIN 4 UNITS: 100 INJECTION, SOLUTION SUBCUTANEOUS at 11:30

## 2017-01-09 RX ADMIN — SODIUM CHLORIDE 100 ML/HR: 900 INJECTION, SOLUTION INTRAVENOUS at 11:00

## 2017-01-09 RX ADMIN — ATORVASTATIN CALCIUM 80 MG: 40 TABLET, FILM COATED ORAL at 08:42

## 2017-01-09 RX ADMIN — Medication 10 ML: at 14:00

## 2017-01-09 RX ADMIN — INSULIN GLARGINE 15 UNITS: 100 INJECTION, SOLUTION SUBCUTANEOUS at 22:37

## 2017-01-09 RX ADMIN — TRAMADOL HYDROCHLORIDE 50 MG: 50 TABLET, FILM COATED ORAL at 22:20

## 2017-01-09 RX ADMIN — Medication 10 ML: at 06:00

## 2017-01-09 RX ADMIN — HUMAN INSULIN 4 UNITS: 100 INJECTION, SOLUTION SUBCUTANEOUS at 17:41

## 2017-01-09 RX ADMIN — METOPROLOL SUCCINATE 100 MG: 100 TABLET, EXTENDED RELEASE ORAL at 08:41

## 2017-01-09 RX ADMIN — Medication 5 ML: at 22:00

## 2017-01-09 RX ADMIN — AMLODIPINE BESYLATE 5 MG: 5 TABLET ORAL at 08:41

## 2017-01-09 RX ADMIN — DOXYCYCLINE HYCLATE 100 MG: 100 CAPSULE ORAL at 08:41

## 2017-01-09 RX ADMIN — INSULIN GLARGINE 15 UNITS: 100 INJECTION, SOLUTION SUBCUTANEOUS at 09:00

## 2017-01-09 RX ADMIN — HUMAN INSULIN 4 UNITS: 100 INJECTION, SOLUTION SUBCUTANEOUS at 22:36

## 2017-01-09 RX ADMIN — HEPARIN SODIUM AND DEXTROSE 10 UNITS/KG/HR: 5000; 5 INJECTION INTRAVENOUS at 00:51

## 2017-01-09 RX ADMIN — HUMAN INSULIN 8 UNITS: 100 INJECTION, SOLUTION SUBCUTANEOUS at 16:30

## 2017-01-09 RX ADMIN — AMOXICILLIN AND CLAVULANATE POTASSIUM 1 TABLET: 500; 125 TABLET, FILM COATED ORAL at 08:41

## 2017-01-09 RX ADMIN — ASPIRIN 81 MG CHEWABLE TABLET 81 MG: 81 TABLET CHEWABLE at 08:41

## 2017-01-09 RX ADMIN — INSULIN LISPRO 2 UNITS: 100 INJECTION, SOLUTION INTRAVENOUS; SUBCUTANEOUS at 08:18

## 2017-01-09 NOTE — PROGRESS NOTES
Care Management Interventions  PCP Verified by CM: Yes  Physical Therapy Consult: Yes  Current Support Network: Other (Reports that his daughter and her friend live with him and they are in and out of the home. )  Confirm Follow Up Transport: Self  Plan discussed with Pt/Family/Caregiver: Yes  Freedom of Choice Offered: Yes  Discharge Location  Discharge Placement: Home   Met with patient last week. He is reporting he is very independent. PPD placed just in case. PT and OT. Patient off floor all day today. Attempted to see patient twice today. Will see what vascular decides. ? [facility] with wound care. Zane Redd

## 2017-01-09 NOTE — PROGRESS NOTES
Pharmacokinetic Consult to Pharmacist    Narayan Cayetano is a 73 y.o. male being treated for Diabetic Foot Infection with Vancomycin and Zosyn. Height: 6' 1\" (185.4 cm)  Weight: 96.6 kg (213 lb)  Lab Results   Component Value Date/Time    BUN 50 01/09/2017 05:00 AM    Creatinine 3.66 01/09/2017 05:00 AM    WBC 13.3 01/09/2017 05:00 AM    Procalcitonin 0.2 01/05/2017 06:20 PM      Estimated Creatinine Clearance: 23.3 mL/min (based on Cr of 3.66). CULTURES:  1/5   Blood X 2   NG x 4 days, prelim    Lab Results   Component Value Date/Time    VANCOMYCIN,RANDOM 20.9 01/08/2017 03:45 AM       Day 5 of vancomycin - restarted 1/9/17. Last dose was vancomycin 1500 mg IV x 1 on 1/7/17 @ 0020. Will give another 1500 mg x 1 today since random level yesterday morning was within goal. Goal trough 18-22 while osteomyelitis is being ruled out. Bone scan today. Plan to continue to dose by random levels due to MO. Pharmacy will continue to follow. Please call with any questions.       Thank you,  Gal Melchor, PharmD  Clinical Pharmacist  [phone]

## 2017-01-09 NOTE — DIABETES MGMT
Attempted to see pt admitted 1/5/17 with diabetic ulcer right foot (open area right small toe) for continued diabetes education, but pt is off unit for bone scan. Type 2 diabetic with h/o CHF. A1C was 6.7 in May. A1C from November = 9.1. A1C  1/6/17 = 6.1. MO on CKD with creat 3.66 today. Nephrology is seeing. Pt says foot ulcer started with a small blister due to ill fitting shoe approximately 1 month ago. Receiving IV antibiotics. Says he sees primary care at [address], [address] for [facility]. Current insulin orders: Lantus 15 units daily and Humalog per sliding scale qid ac and hs (normalsensitivity). Blood glucose ranging 192-271 yesterday with total of 27 units given (15 units Lantus, 12 units Humalog) and   this morning. Discussed pt's blood glucose readings with Dr. Marika Andrade. Vascular surgery consult has been ordered. Will attempt to see pt later today to continue diabetes education.

## 2017-01-09 NOTE — DIABETES MGMT
Attempted to see pt for continued diabetes education with review of current insulin regimen : Lantus 15 units daily, Humalog sliding scale discontinued, regular insulin prandial dosing of 4 units ac tid added, with additional regular insulin per sliding added. Pre-lunch blood glucose up to 240. Pt is very drowsy and is falling asleep during explanation. Discussed with primary nurse. Recommend that nursing staff encourage pt to practice insulin self-injection when insulin doses due. Will attempt to see pt for continued diabetes education, as well as insulin instruction tomorrow as pt is able to participate.

## 2017-01-09 NOTE — PROGRESS NOTES
PLAN:  Cont management per Hospitalist  Needs good BS and BP control  Will consult Vasc Surgery  Cont Heparin gtt  Noninvasive vascular arterial study of Right LE -- pending  Triphasic bone scan to Rt foot rather than MRI (due to elevated creatinine) to rule out osteomyelitis -- pending  Smoking Cessation imperative  Wound care -- Iodosorb daily to wound Right lateral 5th toe/ foot -- per orders      ASSESSMENT:  Admit Date: 1/5/2017   * No surgery found *  * No surgery found *    Principal Problem:    Diabetic ulcer of right foot ([facility] (1/5/2017)    Active Problems:    Type 2 diabetes mellitus without complication ([facility] [address] (7/79/7456)      HTN (hypertension) (7/29/2012)      Chronic systolic congestive heart failure ([facility] (12/30/2015)      Overview: EF 35-40% on 2015 Echo      Kidney disease, chronic, stage III (moderate, EGFR 30-59 ml/min) (8/11/2016)      Acute renal failure with tubular necrosis ([facility] (1/6/2017)      Encephalopathy due to infection (1/7/2017)       HPI: Pt is a 74 yo male who presented with progressive pain and poor healing of right foot wound over 1 month. Other PMH includes DM (A1C 9.2 11/2016), CHF, CKD, current smoker. X Ray of right foot had low suspicion for osteomyelitis but does reveal soft tissue lucencies, possible gas producing infection. WBC 18.7, LA 2.2. He was started on Vanc/Zosyn in ER. Also noted to have MO with Cr 2.99, baseline 1.90. He also had elevated trops without any typical symptoms. SUBJECTIVE:  Pt awake in bed. Right lateral 5th toe/ lateral foot remains painful - about the same as yesterday. Continues on Heparin gtt. Vasc studies not yet complete. Seems to have some AMS. AF, NAD, VSS with some intermittent HTN.      OBJECTIVE:  Constitutional: Alert, fairly, cooperative patient in no acute distress; appears stated age   Visit Vitals    /83    Pulse 85    Temp 98.4 °F (36.9 °C)    Resp 17    Ht 6' 1\" (1.854 m)    Wt 96.6 kg (213 lb)    SpO2 95%    BMI 28.1 kg/m2     Eyes: Sclera are clear. ENMT: No obvious neck masses, no ear or lip lesions  CV: Impaired perfusion - but pulses weakly palpable  Resp: No JVD. Breathing is non-labored. On RA  GI: Soft, non-tender, non-distended, BS active   Musculoskeletal: Unremarkable with normal function. R thumb amputation  Extremities: +cyanosis - R 5th toe. 1+ edema right foot/lower leg. No clubbing. Skin: Right 5th toe lateral side thickened dry skin with 2 calloused ulcers with slough present to wound bed, +odor. Small amt drainage, increased tenderness to touch, 5th toe with more purple discoloration  Neuro: Follows commands and answers questions, but mentation is generally slowed  Psychiatric: Calm    Patient Vitals for the past 24 hrs:   BP Temp Pulse Resp SpO2   01/09/17 0734 140/83 98.4 °F (36.9 °C) 85 17 95 %   01/09/17 0345 (!) 152/100 98.8 °F (37.1 °C) 80 20 94 %   01/08/17 2355 152/88 99.9 °F (37.7 °C) 92 18 96 %   01/08/17 2006 166/84 - - - -   01/08/17 1949 (!) 133/102 100 °F (37.8 °C) 92 18 95 %   01/08/17 1454 141/87 97.7 °F (36.5 °C) 84 20 94 %       Labs:    Recent Labs      01/09/17   0500  01/08/17   2330   01/08/17   0345   WBC  13.3*   --    --   16.0*   HGB  8.9*   --    --   8.5*   PLT  327   --    --   304   NA  142   --    --   143   K  4.0   --    --   4.4   CL  108*   --    --   110*   CO2  22   --    --   24   BUN  50*   --    --   50*   CREA  3.66*   --    --   3.86*   GLU  194*   --    --   192*   APTT   --   91.2*   < >   --    TROIQ   --    --    --   0.61*    < > = values in this interval not displayed. MISHA Horton    I have seen and examined the patient with the Nurse Practitioner and agree with the above assessment and plan. Sonia Stock.  Ashli Norton MD

## 2017-01-09 NOTE — PROGRESS NOTES
[facility]        Subjective: No new complaints    Review of Systems -   General ROS: negative for - chills, fatigue or fever  Respiratory ROS: no cough, shortness of breath, or wheezing  Cardiovascular ROS: no chest pain or dyspnea on exertion  Gastrointestinal ROS: no abdominal pain, change in bowel habits, or black or bloody stools  Genito-Urinary ROS: no dysuria, trouble voiding, or hematuria  Neurological ROS: no TIA or stroke symptoms        Objective:    Vitals:    01/08/17 2355 01/09/17 0345 01/09/17 0734 01/09/17 1218   BP: 152/88 (!) 152/100 140/83 (!) 143/96   Pulse: 92 80 85 93   Resp: 18 20 17 19   Temp: 99.9 °F (37.7 °C) 98.8 °F (37.1 °C) 98.4 °F (36.9 °C) 98.3 °F (36.8 °C)   SpO2: 96% 94% 95% 92%   Weight:       Height:           PE  Gen: in no acute distress  CV:reg rate  Chest: clear  Abd: soft  Ext/Access: no edema. Peggyann Gang LAB  Recent Labs      01/09/17   0500  01/08/17   0345  01/07/17   0905   WBC  13.3*  16.0*  19.4*   HGB  8.9*  8.5*  8.6*   HCT  27.8*  25.7*  26.2*   PLT  327  304  316     Recent Labs      01/09/17   0500  01/08/17   0345  01/07/17   1923  01/07/17   0905   NA  142  143  144  142   K  4.0  4.4  4.0  4.1   CL  108*  110*  109*  108*   CO2  22  24  25  22   GLU  194*  192*  156*  202*   BUN  50*  50*  49*  47*   CREA  3.66*  3.86*  3.93*  3.97*   CA  7.8*  7.4*  7.4*  7.5*   TROIQ   --   0.61*   --   0.59*           Radiology    A/P:   Patient Active Problem List   Diagnosis Code    Type 2 diabetes mellitus with hyperglycemia (HCC) E11.65    HTN (hypertension) I10    Chest pain R07.9    Bilateral pleural effusion J90    Tobacco use Z72.0    Abnormal CT scan, chest R93.8    Chronic systolic congestive heart failure (HCC) I50.22    Cardiomyopathy (HCC) I42.9    Kidney disease, chronic, stage III (moderate, EGFR 30-59 ml/min) N18.3    Diabetic ulcer of right foot (HCC) A21.024, L97.519    Acute renal failure with tubular necrosis (HCC) N17.0    NSTEMI (non-ST elevated myocardial infarction) ([facility] I21.4    Sepsis due to cellulitis (HCC) L03.90, A41.9       Renal function is stable. Renal US: normal sizes, increased echogenicity s/w chronicity, non-obstructing stone. Awaiting results of  SPEP, UPEP.         Amber Jefferson MD

## 2017-01-09 NOTE — INTERDISCIPLINARY ROUNDS
Interdisciplinary team rounds were held 1/9/2017 with the following team members:Care Management, Nursing, Pastoral Care, Pharmacy and Physician. Plan of care discussed. See clinical pathway and/or care plan for interventions and desired outcomes.

## 2017-01-09 NOTE — CONSULTS
[facility]   [address], [address] [place] [address]. . [address] FAX: [phone]    Vascular Surgery Consult    Subjective:      Per HPI: Pt is a 72 yo male who presented with progressive pain and poor healing of right foot wound over 1 month, admitted with infected DM foot. PMH includes DM (A1C 9.2 11/2016), CHF, CKD, current smoker. X Ray of right foot had low suspicion for osteomyelitis but does reveal soft tissue lucencies, possible gas producing infection. Surgery and Vascular were consulted. He had bedside debridement on 1/6. He was started on Vanc/Zosyn in ER. Also noted to have MO with Cr 2.99, baseline 1.90. He also had elevated trops without any typical symptoms.     Past Medical History   Diagnosis Date    Abnormal CT scan, chest 12/27/2015    Acute CHF ([address] 84/63/0586    Acute systolic congestive heart failure ([address] 12/30/2015    MO (acute kidney injury) ([address] 7/29/2012    Bilateral pleural effusion 12/27/2015    Chest pain 12/26/2015    DM (diabetes mellitus), type 2 ([address] 7/29/2012    Encephalopathy due to infection 1/7/2017    HTN (hypertension) 7/29/2012    Hypoglycemia 7/29/2012    NSTEMI (non-ST elevated myocardial infarction) ([address] 1/5/2017    Tobacco use 12/27/2015     Past Surgical History   Procedure Laterality Date    Hx orthopaedic        Family History   Problem Relation Age of Onset    Diabetes Mother     Kidney Disease Mother     Diabetes Father     Kidney Disease Father     Kidney Disease Sister      Social History     Social History    Marital status: SINGLE     Spouse name: N/A    Number of children: N/A    Years of education: N/A     Occupational History    retired brick layer      Social History Main Topics    Smoking status: Current Every Day Smoker     Packs/day: 1.00     Years: 45.00    Smokeless tobacco: None    Alcohol use Yes      Comment: occasional    Drug use: No    Sexual activity: Not Asked     Other Topics Concern    None Social History Narrative      Current Facility-Administered Medications   Medication Dose Route Frequency    insulin regular (NOVOLIN R, HUMULIN R) injection   SubCUTAneous AC&HS    insulin regular (NOVOLIN R, HUMULIN R) injection 4 Units  4 Units SubCUTAneous TIDAC    [START ON 1/10/2017] amLODIPine (NORVASC) tablet 10 mg  10 mg Oral DAILY    hydrALAZINE (APRESOLINE) 20 mg/mL injection 20 mg  20 mg IntraVENous Q6H PRN    0.9% sodium chloride infusion  100 mL/hr IntraVENous CONTINUOUS    piperacillin-tazobactam (ZOSYN) 4.5 g in 0.9% sodium chloride (MBP/ADV) 100 mL ADV  4.5 g IntraVENous Q8H    vancomycin (VANCOCIN) 1500 mg in  ml infusion  1,500 mg IntraVENous ONCE    vancomycin (VANCOCIN) 1500 mg in  ml infusion  1,500 mg IntraVENous See Admin Instructions    insulin glargine (LANTUS) injection 15 Units  15 Units SubCUTAneous DAILY    heparin 25,000 units in dextrose 500 mL infusion  12-25 Units/kg/hr IntraVENous TITRATE    haloperidol lactate (HALDOL) injection 2 mg  2 mg IntraVENous Q4H PRN    0.9% sodium chloride infusion 250 mL  250 mL IntraVENous PRN    diphenhydrAMINE (BENADRYL) injection 25 mg  25 mg IntraVENous Q4H PRN    traMADol (ULTRAM) tablet 50 mg  50 mg Oral Q6H PRN    aspirin chewable tablet 81 mg  81 mg Oral DAILY AFTER BREAKFAST    atorvastatin (LIPITOR) tablet 80 mg  80 mg Oral DAILY    metoprolol succinate (TOPROL-XL) tablet 100 mg  100 mg Oral DAILY WITH BREAKFAST    sodium chloride (NS) flush 5-10 mL  5-10 mL IntraVENous Q8H    sodium chloride (NS) flush 5-10 mL  5-10 mL IntraVENous PRN    acetaminophen (TYLENOL) tablet 650 mg  650 mg Oral Q4H PRN    HYDROmorphone (PF) (DILAUDID) injection 0.5 mg  0.5 mg IntraVENous Q6H PRN    ondansetron (ZOFRAN) injection 4 mg  4 mg IntraVENous Q4H PRN    docusate sodium (COLACE) capsule 100 mg  100 mg Oral DAILY PRN      Allergies   Allergen Reactions    Lortab [Hydrocodone-Acetaminophen] Itching       Review of Systems:  A comprehensive review of systems was negative except for that written in the History of Present Illness. Objective:     Patient Vitals for the past 8 hrs:   BP Temp Pulse Resp SpO2   17 0734 140/83 98.4 °F (36.9 °C) 85 17 95 %   17 0345 (!) 152/100 98.8 °F (37.1 °C) 80 20 94 %     Temp (24hrs), Av °F (37.2 °C), Min:97.7 °F (36.5 °C), Max:100 °F (37.8 °C)      Physical Exam:  GENERAL: cooperative, no distress, drowsy, LUNG: clear to auscultation bilaterally, HEART: regular rate and rhythm, S1, S2 normal, no murmur, click, rub or gallop, EXTREMITIES: Right foot- edematous, 1-2+ pitting edema, warmth, calloused area to the lateral aspect of the 5th toe. Weak palpable DP pulse. Assessment/Plan:     -Awaiting vascular studies  -Continue present therapy.  -Dr. Nevaeh Brooke will review the imaging and decide on any further treatment interventions/diagnostic testing. Signed By: Eda Cross NP     2017       Elements of this note have been dictated using speech recognition software. As a result, errors of speech recognition may have occurred.

## 2017-01-09 NOTE — PROGRESS NOTES
Hospitalist Progress Note     Admit Date:  2017  5:07 PM   Name:  Juan Stevens   Age:  73 y.o.  :  1945   MRN:  [id number]   PCP:  None  Treatment Team: Attending Provider: Carlos Enrique Mullen DO; Utilization Review: Cuong Oscar; Consulting Provider: Lupe Jung MD; Consulting Provider: Jennifer Kim MD; Consulting Provider: Emmett Quiroz MD    Subjective:   HPI:  Pt is a 72 yo male who presented with progressive pain and poor healing of right foot wound over 1 month, admitted with infected DM foot. PMH includes DM (A1C 9.2 2016), CHF, CKD, current smoker. X Ray of right foot had low suspicion for osteomyelitis but does reveal soft tissue lucencies, possible gas producing infection. Surgery and Vascular were consulted. He had bedside debridement on . He was started on Vanc/Zosyn in ER. Also noted to have MO with Cr 2.99, baseline 1.90. He also had elevated trops without any typical symptoms.  - pt says he feels \"alright\". No CP, SOB. No cough. Says the antibiotics are giving him loose stools. Objective:     Patient Vitals for the past 24 hrs:   Temp Pulse Resp BP SpO2   17 0734 98.4 °F (36.9 °C) 85 17 140/83 95 %   17 0345 98.8 °F (37.1 °C) 80 20 (!) 152/100 94 %   17 2355 99.9 °F (37.7 °C) 92 18 152/88 96 %   17 - - - 166/84 -   17 1949 100 °F (37.8 °C) 92 18 (!) 133/102 95 %   17 1454 97.7 °F (36.5 °C) 84 20 141/87 94 %     Oxygen Therapy  O2 Sat (%): 95 % (17 0734)  Pulse via Oximetry: 91 beats per minute (17 1900)  O2 Device: Room air (17 1527)    Intake/Output Summary (Last 24 hours) at 17 1011  Last data filed at 17 1303   Gross per 24 hour   Intake              480 ml   Output              400 ml   Net               80 ml       General:    Well nourished. Alert and oriented. No distress  CV:   RRR. No murmur, rub, or gallop. Lungs:   Clear to auscultation bilaterally.   No wheezing, rhonchi, or rales. Abdomen:   Soft, nontender, nondistended. Bowel sounds normal.   Extremities: Warm and dry. No cyanosis or edema. R 5th toe gangrenous  Skin:     No rashes or jaundice. Labs and Studies:  I have reviewed all labs, meds, telemetry events, and studies from the last 24 hours.   Recent Results (from the past 24 hour(s))   GLUCOSE, POC    Collection Time: 01/08/17 10:41 AM   Result Value Ref Range    Glucose (POC) 241 (H) 65 - 100 mg/dL   GLUCOSE, POC    Collection Time: 01/08/17  4:04 PM   Result Value Ref Range    Glucose (POC) 271 (H) 65 - 100 mg/dL   PROTEIN ELEC WITH ADIS, SERUM    Collection Time: 01/08/17  4:35 PM   Result Value Ref Range    Protein, total 6.4 6.3 - 8.2 g/dL    A-G Ratio PENDING      ALBUMIN PENDING g/dL    ALPHA 1 PENDING g/dL    ALPHA 2 PENDING g/dL    BETA PENDING g/dL    GAMMA PENDING 10 - 12 g/dL    M-Ashok PENDING 0 g/dL    Immunoglobulin G 1303 610 - 1616 mg/dL    Immunoglobulin A 636 (H) 85 - 499 mg/dL    Immunoglobulin M 47 35 - 242 mg/dL    PEP Interpretation PENDING     ADIS Interpretation PENDING    PTT    Collection Time: 01/08/17  4:35 PM   Result Value Ref Range    aPTT 34.0 (H) 23.5 - 31.7 SEC   GLUCOSE, POC    Collection Time: 01/08/17  9:09 PM   Result Value Ref Range    Glucose (POC) 203 (H) 65 - 100 mg/dL   PTT    Collection Time: 01/08/17 11:30 PM   Result Value Ref Range    aPTT 91.2 (HH) 23.5 - 78.4 SEC   METABOLIC PANEL, BASIC    Collection Time: 01/09/17  5:00 AM   Result Value Ref Range    Sodium 142 136 - 145 mmol/L    Potassium 4.0 3.5 - 5.1 mmol/L    Chloride 108 (H) 98 - 107 mmol/L    CO2 22 21 - 32 mmol/L    Anion gap 12 7 - 16 mmol/L    Glucose 194 (H) 65 - 100 mg/dL    BUN 50 (H) 8 - 23 MG/DL    Creatinine 3.66 (H) 0.8 - 1.5 MG/DL    GFR est AA 21 (L) >60 ml/min/1.73m2    GFR est non-AA 18 (L) >60 ml/min/1.73m2    Calcium 7.8 (L) 8.3 - 10.4 MG/DL   CBC WITH AUTOMATED DIFF    Collection Time: 01/09/17  5:00 AM   Result Value Ref Range    WBC 13.3 (H) 4.3 - 11.1 K/uL    RBC 3.24 (L) 4.23 - 5.67 M/uL    HGB 8.9 (L) 13.6 - 17.2 g/dL    HCT 27.8 (L) 41.1 - 50.3 %    MCV 85.8 79.6 - 97.8 FL    MCH 27.5 26.1 - 32.9 PG    MCHC 32.0 31.4 - 35.0 g/dL    RDW 14.8 (H) 11.9 - 14.6 %    PLATELET 032 761 - 616 K/uL    MPV 10.4 (L) 10.8 - 14.1 FL    DF AUTOMATED      NEUTROPHILS 84 (H) 43 - 78 %    LYMPHOCYTES 7 (L) 13 - 44 %    MONOCYTES 7 4.0 - 12.0 %    EOSINOPHILS 2 0.5 - 7.8 %    BASOPHILS 0 0.0 - 2.0 %    IMMATURE GRANULOCYTES 0.5 0.0 - 5.0 %    ABS. NEUTROPHILS 11.3 (H) 1.7 - 8.2 K/UL    ABS. LYMPHOCYTES 0.9 0.5 - 4.6 K/UL    ABS. MONOCYTES 0.9 0.1 - 1.3 K/UL    ABS. EOSINOPHILS 0.2 0.0 - 0.8 K/UL    ABS. BASOPHILS 0.0 0.0 - 0.2 K/UL    ABS. IMM.  GRANS. 0.1 0.0 - 0.5 K/UL   GLUCOSE, POC    Collection Time: 01/09/17  7:08 AM   Result Value Ref Range    Glucose (POC) 198 (H) 65 - 100 mg/dL   PTT    Collection Time: 01/09/17  7:58 AM   Result Value Ref Range    aPTT 63.6 (H) 23.5 - 31.7 SEC   PLEASE READ & DOCUMENT PPD TEST IN 24 HRS    Collection Time: 01/09/17 10:07 AM   Result Value Ref Range    PPD  Negative    mm Induration  mm        Recent Imaging:  CXR Results  (Last 48 hours)    None        CT Results  (Last 48 hours)    None          Assessment and Plan:     Hospital Problems as of 1/9/2017  Date Reviewed: 8/11/2016          Codes Class Noted - Resolved POA    Sepsis due to cellulitis [facility]) ICD-10-CM: L03.90, A41.9  ICD-9-CM: 682.9, 995.91  1/9/2017 - Present Yes        Acute renal failure with tubular necrosis (HCC) ICD-10-CM: N17.0  ICD-9-CM: 584.5  1/6/2017 - Present Yes        * (Principal)Diabetic ulcer of right foot ([facility] ICD-10-CM: E11.621, L97.519  ICD-9-CM: 250.80, 707.15  1/5/2017 - Present Yes        NSTEMI (non-ST elevated myocardial infarction) ([facility] ICD-10-CM: I21.4  ICD-9-CM: 410.70  1/5/2017 - Present Yes        Kidney disease, chronic, stage III (moderate, EGFR 30-59 ml/min) (Chronic) ICD-10-CM: N18.3  ICD-9-CM: 585.3  8/11/2016 - Present Yes        Chronic systolic congestive heart failure (HCC) (Chronic) ICD-10-CM: I50.22  ICD-9-CM: 428.22, 428.0  12/30/2015 - Present Yes    Overview Signed 1/6/2017  8:59 AM by Umair Singh MD     EF 35-40% on 2015 Echo             Type 2 diabetes mellitus with hyperglycemia ([facility] ICD-10-CM: E11.65  ICD-9-CM: 250.00  7/29/2012 - Present Yes        HTN (hypertension) (Chronic) ICD-10-CM: I10  ICD-9-CM: 401.9  7/29/2012 - Present Yes        RESOLVED: Encephalopathy due to infection ICD-10-CM: G93.49, B99.9  ICD-9-CM: 348.39, 136.9  1/7/2017 - 1/9/2017 Yes              PLAN:    · Diabetic foot infection - Surgery and vascular following. Bone scan and arterial study pending. Since we are checking for osteo will switch back to IV vanc and zosyn. If no osteo can switch back to PO augmentin and doxy. Did debridement at bedside 1/6. Cultures NGTD. Avoid nephrotoxic meds. Monitor. · NSTEMI vs demand mismatch- repeat trops again. Some elevation probably from ARF. If still going up will get cardiology to see. Already on medical management. On heparin, BB, ASA, statin. Holding ACE, diuretics due to ARF. Optimize HTN control  · Loose stools - send cdiff if it meets testing criteria. · ARF -  Improved slightly. Continue IVF. Monitor volume status closely due to CHF. Nephrology following. Renal US pending. · HTN - increase amlodipine today. · DM -   Increase lantus to 20u daily and prandial 4 units. Cont ISS  · CHF - stable, monitor closely. Holding diuretics due to ARF  · Appreciate specialists' help    DC planning:  Ppd ordered in case of placement. SW consulted.   DVT ppx:  heparin  Discussed plan with:  Pt  Risk:  high    Signed:  Umair Singh MD

## 2017-01-10 ENCOUNTER — ANESTHESIA EVENT (OUTPATIENT)
Dept: SURGERY | Age: 72
DRG: 853 | End: 2017-01-10
Payer: MEDICARE

## 2017-01-10 PROBLEM — E11.628 TYPE 2 DIABETES MELLITUS WITH RIGHT DIABETIC FOOT INFECTION (HCC): Status: ACTIVE | Noted: 2017-01-05

## 2017-01-10 PROBLEM — L08.9 TYPE 2 DIABETES MELLITUS WITH RIGHT DIABETIC FOOT INFECTION (HCC): Status: ACTIVE | Noted: 2017-01-05

## 2017-01-10 LAB
ANION GAP BLD CALC-SCNC: 13 MMOL/L (ref 7–16)
APTT PPP: 59 SEC (ref 23.5–31.7)
BACTERIA SPEC CULT: NORMAL
BACTERIA SPEC CULT: NORMAL
BASOPHILS # BLD AUTO: 0 K/UL (ref 0–0.2)
BASOPHILS # BLD: 0 % (ref 0–2)
BUN SERPL-MCNC: 42 MG/DL (ref 8–23)
CALCIUM SERPL-MCNC: 8 MG/DL (ref 8.3–10.4)
CHLORIDE SERPL-SCNC: 109 MMOL/L (ref 98–107)
CO2 SERPL-SCNC: 21 MMOL/L (ref 21–32)
CREAT SERPL-MCNC: 2.97 MG/DL (ref 0.8–1.5)
DIFFERENTIAL METHOD BLD: ABNORMAL
EOSINOPHIL # BLD: 0.2 K/UL (ref 0–0.8)
EOSINOPHIL NFR BLD: 2 % (ref 0.5–7.8)
ERYTHROCYTE [DISTWIDTH] IN BLOOD BY AUTOMATED COUNT: 15 % (ref 11.9–14.6)
GLUCOSE BLD STRIP.AUTO-MCNC: 125 MG/DL (ref 65–100)
GLUCOSE BLD STRIP.AUTO-MCNC: 186 MG/DL (ref 65–100)
GLUCOSE BLD STRIP.AUTO-MCNC: 244 MG/DL (ref 65–100)
GLUCOSE BLD STRIP.AUTO-MCNC: 86 MG/DL (ref 65–100)
GLUCOSE SERPL-MCNC: 67 MG/DL (ref 65–100)
HCT VFR BLD AUTO: 28.4 % (ref 41.1–50.3)
HGB BLD-MCNC: 9.1 G/DL (ref 13.6–17.2)
IMM GRANULOCYTES # BLD: 0.1 K/UL (ref 0–0.5)
IMM GRANULOCYTES NFR BLD AUTO: 0.3 % (ref 0–5)
LYMPHOCYTES # BLD AUTO: 12 % (ref 13–44)
LYMPHOCYTES # BLD: 1.7 K/UL (ref 0.5–4.6)
MCH RBC QN AUTO: 27.5 PG (ref 26.1–32.9)
MCHC RBC AUTO-ENTMCNC: 32 G/DL (ref 31.4–35)
MCV RBC AUTO: 85.8 FL (ref 79.6–97.8)
MM INDURATION POC: NORMAL MM (ref 0–5)
MONOCYTES # BLD: 0.3 K/UL (ref 0.1–1.3)
MONOCYTES NFR BLD AUTO: 2 % (ref 4–12)
NEUTS SEG # BLD: 12 K/UL (ref 1.7–8.2)
NEUTS SEG NFR BLD AUTO: 84 % (ref 43–78)
PLATELET # BLD AUTO: 358 K/UL (ref 150–450)
PMV BLD AUTO: 9.9 FL (ref 10.8–14.1)
POTASSIUM SERPL-SCNC: 3.9 MMOL/L (ref 3.5–5.1)
PPD POC: NORMAL NEGATIVE
RBC # BLD AUTO: 3.31 M/UL (ref 4.23–5.67)
SERVICE CMNT-IMP: NORMAL
SERVICE CMNT-IMP: NORMAL
SODIUM SERPL-SCNC: 143 MMOL/L (ref 136–145)
VANCOMYCIN SERPL-MCNC: 19.5 UG/ML
WBC # BLD AUTO: 14.3 K/UL (ref 4.3–11.1)

## 2017-01-10 PROCEDURE — 74011250636 HC RX REV CODE- 250/636: Performed by: INTERNAL MEDICINE

## 2017-01-10 PROCEDURE — 74011636637 HC RX REV CODE- 636/637: Performed by: INTERNAL MEDICINE

## 2017-01-10 PROCEDURE — 65270000029 HC RM PRIVATE

## 2017-01-10 PROCEDURE — 85025 COMPLETE CBC W/AUTO DIFF WBC: CPT | Performed by: INTERNAL MEDICINE

## 2017-01-10 PROCEDURE — 74011000258 HC RX REV CODE- 258: Performed by: INTERNAL MEDICINE

## 2017-01-10 PROCEDURE — 74011250637 HC RX REV CODE- 250/637: Performed by: NURSE PRACTITIONER

## 2017-01-10 PROCEDURE — 74011250637 HC RX REV CODE- 250/637: Performed by: INTERNAL MEDICINE

## 2017-01-10 PROCEDURE — 85730 THROMBOPLASTIN TIME PARTIAL: CPT | Performed by: INTERNAL MEDICINE

## 2017-01-10 PROCEDURE — 36415 COLL VENOUS BLD VENIPUNCTURE: CPT | Performed by: INTERNAL MEDICINE

## 2017-01-10 PROCEDURE — 80048 BASIC METABOLIC PNL TOTAL CA: CPT | Performed by: INTERNAL MEDICINE

## 2017-01-10 PROCEDURE — 82962 GLUCOSE BLOOD TEST: CPT

## 2017-01-10 PROCEDURE — 80202 ASSAY OF VANCOMYCIN: CPT | Performed by: INTERNAL MEDICINE

## 2017-01-10 RX ORDER — INSULIN GLARGINE 100 [IU]/ML
20 INJECTION, SOLUTION SUBCUTANEOUS DAILY
Status: DISCONTINUED | OUTPATIENT
Start: 2017-01-11 | End: 2017-01-12

## 2017-01-10 RX ORDER — HEPARIN SODIUM 5000 [USP'U]/ML
35 INJECTION, SOLUTION INTRAVENOUS; SUBCUTANEOUS ONCE
Status: COMPLETED | OUTPATIENT
Start: 2017-01-10 | End: 2017-01-10

## 2017-01-10 RX ORDER — VANCOMYCIN/0.9 % SOD CHLORIDE 1.5G/250ML
1500 PLASTIC BAG, INJECTION (ML) INTRAVENOUS ONCE
Status: COMPLETED | OUTPATIENT
Start: 2017-01-10 | End: 2017-01-28

## 2017-01-10 RX ORDER — SODIUM CHLORIDE 9 MG/ML
75 INJECTION, SOLUTION INTRAVENOUS CONTINUOUS
Status: DISCONTINUED | OUTPATIENT
Start: 2017-01-10 | End: 2017-01-10

## 2017-01-10 RX ORDER — SODIUM CHLORIDE 9 MG/ML
75 INJECTION, SOLUTION INTRAVENOUS CONTINUOUS
Status: DISCONTINUED | OUTPATIENT
Start: 2017-01-10 | End: 2017-01-13

## 2017-01-10 RX ADMIN — HUMAN INSULIN 3 UNITS: 100 INJECTION, SOLUTION SUBCUTANEOUS at 11:30

## 2017-01-10 RX ADMIN — HUMAN INSULIN 4 UNITS: 100 INJECTION, SOLUTION SUBCUTANEOUS at 07:30

## 2017-01-10 RX ADMIN — ASPIRIN 81 MG CHEWABLE TABLET 81 MG: 81 TABLET CHEWABLE at 08:52

## 2017-01-10 RX ADMIN — HUMAN INSULIN 10 UNITS: 100 INJECTION, SOLUTION SUBCUTANEOUS at 16:30

## 2017-01-10 RX ADMIN — TRAMADOL HYDROCHLORIDE 50 MG: 50 TABLET, FILM COATED ORAL at 21:29

## 2017-01-10 RX ADMIN — AMLODIPINE BESYLATE 10 MG: 10 TABLET ORAL at 08:52

## 2017-01-10 RX ADMIN — Medication 10 ML: at 14:00

## 2017-01-10 RX ADMIN — HEPARIN SODIUM 3400 UNITS: 5000 INJECTION INTRAVENOUS; SUBCUTANEOUS at 01:00

## 2017-01-10 RX ADMIN — SODIUM CHLORIDE 75 ML/HR: 900 INJECTION, SOLUTION INTRAVENOUS at 12:00

## 2017-01-10 RX ADMIN — HUMAN INSULIN 5 UNITS: 100 INJECTION, SOLUTION SUBCUTANEOUS at 16:30

## 2017-01-10 RX ADMIN — PIPERACILLIN AND TAZOBACTAM 4.5 G: 4; .5 INJECTION, POWDER, FOR SOLUTION INTRAVENOUS at 14:12

## 2017-01-10 RX ADMIN — PIPERACILLIN AND TAZOBACTAM 4.5 G: 4; .5 INJECTION, POWDER, FOR SOLUTION INTRAVENOUS at 05:30

## 2017-01-10 RX ADMIN — PIPERACILLIN AND TAZOBACTAM 4.5 G: 4; .5 INJECTION, POWDER, FOR SOLUTION INTRAVENOUS at 21:30

## 2017-01-10 RX ADMIN — SODIUM CHLORIDE 100 ML/HR: 900 INJECTION, SOLUTION INTRAVENOUS at 05:29

## 2017-01-10 RX ADMIN — ATORVASTATIN CALCIUM 80 MG: 40 TABLET, FILM COATED ORAL at 08:52

## 2017-01-10 RX ADMIN — INSULIN GLARGINE 15 UNITS: 100 INJECTION, SOLUTION SUBCUTANEOUS at 09:00

## 2017-01-10 RX ADMIN — VANCOMYCIN HYDROCHLORIDE 1500 MG: 10 INJECTION, POWDER, LYOPHILIZED, FOR SOLUTION INTRAVENOUS at 14:44

## 2017-01-10 RX ADMIN — METOPROLOL SUCCINATE 100 MG: 100 TABLET, EXTENDED RELEASE ORAL at 08:52

## 2017-01-10 RX ADMIN — Medication 5 ML: at 21:34

## 2017-01-10 NOTE — DIABETES MGMT
Pt admitted 1/5/17 with diabetic ulcer right foot (open area right small toe) is seen by RN LINH for continued diabetes education. Type 2 diabetic with h/o CHF. A1C was 6.7 in May. A1C from November = 9.1. A1C 1/6/17 = 6.1. MO on CKD with creat 2.97 today down from 3.66 yesterday. Pt is alert and oriented today, and much less drowsy than yesterday. Pt says foot ulcer started with a small blister due to ill fitting shoe approximately 1 month ago. Has been started on IV antibiotics. Says he sees primary care at [address], [address] for [facility]. Small toe right foot amputation planned for tomorrow. Pt says he is compliant with his regimen of Glipizide daily at home. Reviewed with pt current insulin orders: regular insulin 4 units ac tid with additional regular insulin per sliding scale for correction started yesterday along with Lantus 15 units daily. Per [facility] pt inadvertently was given an additional dose of Lantus 15 units (not ordered) at hs yesterday. Blood glucose ranged 194-240 yesterday with total of 57 units given and ranging 67-80 this morning. Primary nurse, nursing supervisor, and hospitalist made aware of addititonal Lantus dose given). Plan is for nursing to monitor blood glucose closely and encourage pt to eat appropriate amounts of carbohydrate. Pt received educational material and diabetes education here 1 year ago. Pt has been given educational material, \"Survival Skills for Diabetes Management\", which was reviewed again today with pt's goddaughter (who lives with pt) at bedside. Explained causes, s/s, and treatments for hypoglycemia and hyperglycemia. Described the effects of poor glycemic control on the development of long-term complications such as renal, eye, nerve, and cardiovascular disease. Described proper diabetic foot care and the importance of checking feet qd. Pt has very dry skin, thickened/discolored toenails. Says he or his daughter cuts toenails.  Recommended podiatrist for cutting toe nails etc. Also recommended discussion with PCP re: diabetic shoes. Stressed importance of not wearing ill-fitting shoes. Educated re: effects of carbohydrates on blood glucose, the \"plate method\" of healthy meal planning, basics of healthy meal plan. Pt says he has had a poor appetite. Also explained the relationship between hyperglycemia and infection. Discussed target goals for blood glucose and A1C. Pt verbalizes basic understanding of teaching. Pt's goddaughter verbalizes good understanding. Would benefit from continued review while in hospital and with home health nursing after discharge if available. Pt says he checks blood glucose daily, which \"runs around 150 or 180\". Recommend checking fasting blood glucose daily with occasional 2 hr PPBS. Pt verbalizes understanding.         Pt was also instructed in proper use of insulin pens. Pt was able to return demonstrate proper use of insulin pen using injection model with some assistance and verbal cuing. Pt is missing right thumb, and is right handed, making insulin self-injection more difficult. Pt says he has used insulin pens at home in the past (last one year ago per pt) and will need additional practice prior to discharge. Plan is to have pt practice insulin self-injection with syringes when insulin dose is due.    Pt would likely benefit from home health nursing after discharge for continued diabetes education and monitoring of diabetes care regimen.

## 2017-01-10 NOTE — PROGRESS NOTES
Vascular will sign off. Surgery to amputate right 5th toe tomorrow by Dr. Eric Draper. Please re-consult us needed.  Thanks

## 2017-01-10 NOTE — PROGRESS NOTES
PLAN:  Cont management per Hospitalist  Needs good BS and BP control  Vasc Surgery following - no imaging now due to renal function  D/c Heparin gtt - toe is non-salvageable  Smoking Cessation imperative  Wound care -- Iodosorb daily to wound Right lateral 5th toe/ foot -- per orders    To OR tomorrow for amputation  Obtain consent  Hibiclens scrub  Hold anticoagulation  NPO xmeds at [place]      ASSESSMENT:  Admit Date: 1/5/2017   * No surgery found *  Procedure(s):  AMPUTATION RIGHT 5TH TOE/604/ CHOICE    Principal Problem:    Diabetic ulcer of right foot ([address] (1/5/2017)    Active Problems:    Type 2 diabetes mellitus with hyperglycemia ([address] (7/29/2012)      HTN (hypertension) (7/29/2012)      Chronic systolic congestive heart failure ([address] (12/30/2015)      Overview: EF 35-40% on 2015 Echo      Kidney disease, chronic, stage III (moderate, EGFR 30-59 ml/min) (8/11/2016)      Acute renal failure with tubular necrosis ([address] (1/6/2017)      NSTEMI (non-ST elevated myocardial infarction) ([address] (1/5/2017)      Sepsis due to cellulitis ([address] (1/9/2017)       HPI: Pt is a 74 yo male who presented with progressive pain and poor healing of right foot wound over 1 month. Other PMH includes DM (A1C 9.2 11/2016), CHF, CKD, current smoker. X Ray of right foot had low suspicion for osteomyelitis but does reveal soft tissue lucencies, possible gas producing infection. WBC 18.7, LA 2.2. He was started on Vanc/Zosyn in ER. Also noted to have MO with Cr 2.99, baseline 1.90. He also had elevated trops without any typical symptoms. SUBJECTIVE:  Resting in bed. Right lateral 5th toe/ lateral foot remains painful - unchanged. Continues on Heparin gtt -- PTTs have been labile. Bone scan yesterday suggested osteomyelitis of the 5th metatarsal bone. Vasc studies showing severe arterial disease. Remains on abx. AF, NAD, VSS with some intermittent HTN. BS remain >200.        OBJECTIVE:  Constitutional: Alert, cooperative patient in no acute distress; appears stated age   Visit Vitals    /75    Pulse 89    Temp 98.1 °F (36.7 °C)    Resp 18    Ht 6' 1\" (1.854 m)    Wt 96.6 kg (213 lb)    SpO2 94%    BMI 28.1 kg/m2     Eyes: Sclera are clear. ENMT: No obvious neck masses, no ear or lip lesions  CV: Impaired perfusion - but pulses weakly palpable  Resp: No JVD. Breathing is non-labored. On RA  GI: Soft, non-tender, non-distended, BS active   Musculoskeletal: Unremarkable with normal function. R thumb amputation  Extremities: +cyanosis - R 5th toe. 1+ edema right foot/lower leg. No clubbing. Skin: Right 5th toe lateral side thickened dry skin with 2 calloused ulcers with slough present to wound bed, +odor. Small amt drainage, increased tenderness to touch, 5th toe with increased purple discoloration today  Neuro: Follows commands and answers questions  Psychiatric: Calm    Patient Vitals for the past 24 hrs:   BP Temp Pulse Resp SpO2   01/10/17 0426 154/75 98.1 °F (36.7 °C) 89 18 94 %   01/09/17 2028 137/85 98 °F (36.7 °C) 90 18 92 %   01/09/17 1450 149/76 97.7 °F (36.5 °C) 87 18 93 %   01/09/17 1218 (!) 143/96 98.3 °F (36.8 °C) 93 19 92 %   01/09/17 0734 140/83 98.4 °F (36.9 °C) 85 17 95 %       Labs:    Recent Labs      01/09/17   1915  01/09/17   1710   01/09/17   0500   WBC   --    --    --   13.3*   HGB   --    --    --   8.9*   PLT   --    --    --   327   NA   --    --    --   142   K   --    --    --   4.0   CL   --    --    --   108*   CO2   --    --    --   22   BUN   --    --    --   50*   CREA   --    --    --   3.66*   GLU   --    --    --   194*   APTT  50.7*   --    < >   --    TROIQ   --   0.23*   --    --     < > = values in this interval not displayed. EXAM: Three-phase bone scan  History: Right lateral fifth metatarsal wound, one-month duration status post  debridement. FINDINGS: 25.8 mCi technetium 99m HDP is administered.   On the flow images, there is generalized increased radiotracer activity within  the right lower extremity than the left. There is persistent abnormal  radiotracer activity within the right foot compared to the left on the blood  pool images. On the delayed images, there is localization of abnormal  radiotracer activity involving the fifth metatarsal bone. IMPRESSION: Increased radiotracer activity on all 3 phases within the right foot  compared to the left. This localizes on the delayed image to the fifth  metatarsal bone, suggesting osteomyelitis at this site      MISHA Daly        I have seen and examined the patient with the Nurse Practitioner and agree with the above assessment and plan. I discussed the patient's condition and treatment options with the patient. I discussed risks of surgery in language the patient could understand including bleeding, infection, need for further surgery, nonhealing, abscess, fistula, DVT, PE, heart attack, stroke, renal failure, respiratory failure, ventilatory dependence, and death. The patient voiced understanding of all this and all questions were answered. Alternatives to surgery were discussed also and risks of the alternatives. The patient requested that we proceed with surgery. Informed consent was obtained. Meggan Soliman.  Kristie Cole MD

## 2017-01-10 NOTE — PROGRESS NOTES
Patient wants Jose Enrique Barfield [phone] called after surgery tomorrow to be updated as well as next of kin on demographics

## 2017-01-10 NOTE — ANESTHESIA PREPROCEDURE EVALUATION
Anesthetic History               Review of Systems / Medical History  Patient summary reviewed, nursing notes reviewed and pertinent labs reviewed    Pulmonary                   Neuro/Psych              Cardiovascular    Hypertension: well controlled      CHF: NYHA Classification II    CAD    Exercise tolerance: >4 METS  Comments: EF 35% with min CAD   GI/Hepatic/Renal                Endo/Other    Diabetes: well controlled, type 2         Other Findings            Physical Exam    Airway  Mallampati: III  TM Distance: 4 - 6 cm  Neck ROM: normal range of motion   Mouth opening: Normal     Cardiovascular  Regular rate and rhythm,  S1 and S2 normal,  no murmur, click, rub, or gallop             Dental  No notable dental hx  Dentition: Full upper dentures     Pulmonary  Breath sounds clear to auscultation               Abdominal         Other Findings            Anesthetic Plan    ASA: 3  Anesthesia type: regional - ankle block and popliteal fossa block          Induction: Intravenous  Anesthetic plan and risks discussed with: Patient

## 2017-01-10 NOTE — PROGRESS NOTES
Dressing reapplied as ordered per Dr Christie Prescott. iodosorb and kerlix. Patient pulling at iv and dressings. Alert, but only oriented times two.

## 2017-01-10 NOTE — PROGRESS NOTES
Hospitalist Progress Note     Admit Date:  2017  5:07 PM   Name:  Jeanine Perez   Age:  73 y.o.  :  1945   MRN:  [id number]   PCP:  None  Treatment Team: Attending Provider: Lynda Nichols DO; Utilization Review: Dmitry Coronado; Consulting Provider: Carmen Castaneda MD; Consulting Provider: Ranjit Frye MD; Care Manager: IRENE Auguste; Utilization Review: Larry Baig RN    Subjective:   HPI:  Pt is a 74 yo male who presented with progressive pain and poor healing of right foot wound over 1 month, admitted with infected DM foot. PMH includes DM (A1C 9.2 2016), CHF, CKD, current smoker. X Ray of right foot had low suspicion for osteomyelitis but does reveal soft tissue lucencies, possible gas producing infection. Surgery and Vascular were consulted. He had bedside debridement on . He was started on Vanc/Zosyn in ER. Also noted to have MO with Cr 2.99, baseline 1.90. He also had elevated trops without any typical symptoms.  - pt says he feels \"alright\". No CP, SOB. No cough. Says the antibiotics are giving him loose stools. 1/10 - no diarrhea today; stools formed. No CP, SOB, or cough. Says he feels all right. Is a little sleepy during interview today. He received too much insulin last night via med error (got 15u lantus both AM and HS) so BG lower than usual this morning.     Objective:     Patient Vitals for the past 24 hrs:   Temp Pulse Resp BP SpO2   01/10/17 0742 98.5 °F (36.9 °C) 97 21 158/77 98 %   01/10/17 0426 98.1 °F (36.7 °C) 89 18 154/75 94 %   17 2028 98 °F (36.7 °C) 90 18 137/85 92 %   17 1450 97.7 °F (36.5 °C) 87 18 149/76 93 %   17 1218 98.3 °F (36.8 °C) 93 19 (!) 143/96 92 %     Oxygen Therapy  O2 Sat (%): 98 % (01/10/17 0742)  Pulse via Oximetry: 94 beats per minute (01/10/17 0426)  O2 Device: Room air (17 1527)    Intake/Output Summary (Last 24 hours) at 01/10/17 1157  Last data filed at 01/10/17 0934   Gross per 24 hour Intake              480 ml   Output             1200 ml   Net             -720 ml       General:    Well nourished. Alert and oriented. No distress  CV:   RRR. No murmur, rub, or gallop. Lungs:   Clear to auscultation bilaterally. No wheezing, rhonchi, or rales. Abdomen:   Soft, nontender, nondistended. Bowel sounds normal.   Extremities: Warm and dry. No cyanosis or edema. R 5th toe gangrenous  Skin:     No rashes or jaundice. Labs and Studies:  I have reviewed all labs, meds, telemetry events, and studies from the last 24 hours.   Recent Results (from the past 24 hour(s))   GLUCOSE, POC    Collection Time: 01/09/17 12:14 PM   Result Value Ref Range    Glucose (POC) 240 (H) 65 - 100 mg/dL   GLUCOSE, POC    Collection Time: 01/09/17  4:37 PM   Result Value Ref Range    Glucose (POC) 230 (H) 65 - 100 mg/dL   TROPONIN I    Collection Time: 01/09/17  5:10 PM   Result Value Ref Range    Troponin-I, Qt. 0.23 (HH) 0.02 - 0.05 NG/ML   PTT    Collection Time: 01/09/17  7:15 PM   Result Value Ref Range    aPTT 50.7 (H) 23.5 - 31.7 SEC   GLUCOSE, POC    Collection Time: 01/09/17  8:35 PM   Result Value Ref Range    Glucose (POC) 206 (H) 65 - 540 mg/dL   METABOLIC PANEL, BASIC    Collection Time: 01/10/17  6:00 AM   Result Value Ref Range    Sodium 143 136 - 145 mmol/L    Potassium 3.9 3.5 - 5.1 mmol/L    Chloride 109 (H) 98 - 107 mmol/L    CO2 21 21 - 32 mmol/L    Anion gap 13 7 - 16 mmol/L    Glucose 67 65 - 100 mg/dL    BUN 42 (H) 8 - 23 MG/DL    Creatinine 2.97 (H) 0.8 - 1.5 MG/DL    GFR est AA 27 (L) >60 ml/min/1.73m2    GFR est non-AA 22 (L) >60 ml/min/1.73m2    Calcium 8.0 (L) 8.3 - 10.4 MG/DL   GLUCOSE, POC    Collection Time: 01/10/17  7:14 AM   Result Value Ref Range    Glucose (POC) 86 65 - 100 mg/dL   CBC WITH AUTOMATED DIFF    Collection Time: 01/10/17  7:45 AM   Result Value Ref Range    WBC 14.3 (H) 4.3 - 11.1 K/uL    RBC 3.31 (L) 4.23 - 5.67 M/uL    HGB 9.1 (L) 13.6 - 17.2 g/dL    HCT 28.4 (L) 41.1 - 50.3 %    MCV 85.8 79.6 - 97.8 FL    MCH 27.5 26.1 - 32.9 PG    MCHC 32.0 31.4 - 35.0 g/dL    RDW 15.0 (H) 11.9 - 14.6 %    PLATELET 518 426 - 390 K/uL    MPV 9.9 (L) 10.8 - 14.1 FL    DF AUTOMATED      NEUTROPHILS 84 (H) 43 - 78 %    LYMPHOCYTES 12 (L) 13 - 44 %    MONOCYTES 2 (L) 4.0 - 12.0 %    EOSINOPHILS 2 0.5 - 7.8 %    BASOPHILS 0 0.0 - 2.0 %    IMMATURE GRANULOCYTES 0.3 0.0 - 5.0 %    ABS. NEUTROPHILS 12.0 (H) 1.7 - 8.2 K/UL    ABS. LYMPHOCYTES 1.7 0.5 - 4.6 K/UL    ABS. MONOCYTES 0.3 0.1 - 1.3 K/UL    ABS. EOSINOPHILS 0.2 0.0 - 0.8 K/UL    ABS. BASOPHILS 0.0 0.0 - 0.2 K/UL    ABS. IMM.  GRANS. 0.1 0.0 - 0.5 K/UL   PTT    Collection Time: 01/10/17  7:45 AM   Result Value Ref Range    aPTT 59.0 (H) 23.5 - 31.7 SEC   PLEASE READ & DOCUMENT PPD TEST IN 72 HRS    Collection Time: 01/10/17  9:49 AM   Result Value Ref Range    PPD  Negative    mm Induration  0 mm        Recent Imaging:  CXR Results  (Last 48 hours)    None        CT Results  (Last 48 hours)    None          Assessment and Plan:     Hospital Problems as of 1/10/2017  Date Reviewed: 8/11/2016          Codes Class Noted - Resolved POA    Sepsis due to cellulitis [facility]) ICD-10-CM: L03.90, A41.9  ICD-9-CM: 682.9, 995.91  1/9/2017 - Present Yes        Acute renal failure with tubular necrosis ([facility] ICD-10-CM: N17.0  ICD-9-CM: 584.5  1/6/2017 - Present Yes        * (Principal)Type 2 diabetes mellitus with right diabetic foot infection ([facility] ICD-10-CM: E11.69, L08.9  ICD-9-CM: 250.80, 686.9  1/5/2017 - Present Yes        NSTEMI (non-ST elevated myocardial infarction) ([facility] ICD-10-CM: I21.4  ICD-9-CM: 410.70  1/5/2017 - Present Yes        Kidney disease, chronic, stage III (moderate, EGFR 30-59 ml/min) (Chronic) ICD-10-CM: N18.3  ICD-9-CM: 585.3  8/11/2016 - Present Yes        Chronic systolic congestive heart failure (HCC) (Chronic) ICD-10-CM: I50.22  ICD-9-CM: 428.22, 428.0  12/30/2015 - Present Yes    Overview Signed 1/6/2017  8:59 AM by Josh Jeffries Mehrdad Gaxiola MD     EF 35-40% on 2015 Echo             Type 2 diabetes mellitus with hyperglycemia [facility]) ICD-10-CM: E11.65  ICD-9-CM: 250.00  7/29/2012 - Present Yes        HTN (hypertension) (Chronic) ICD-10-CM: I10  ICD-9-CM: 401.9  7/29/2012 - Present Yes        RESOLVED: Encephalopathy due to infection ICD-10-CM: G93.49, B99.9  ICD-9-CM: 348.39, 136.9  1/7/2017 - 1/9/2017 Yes              PLAN:    · Diabetic foot infection - Surgery following. Did debridement at bedside 1/6. Amputation R 5th toe scheduled for tomorrow. Cont vanc and zosyn. Switch to PO meds after procedure. Cultures NGTD. · NSTEMI vs demand mismatch- peaked at 0.61. No typical symptoms. Some elevation probably from ARF. Already on medical management, BB, ASA, statin. Holding ACE, diuretics due to ARF. Optimize HTN control. Continue cardiology follow up at [facility]  · Loose stools - does not meet cdiff criteria. Monitor. · ARF -  Improved slightly. Continue IVF. Monitor volume status closely due to CHF. Nephrology following. SPEP/UPEP pending. · HTN - better controlled. cont current. Holding ACE, diuretics  · DM -   Increase lantus to 20u daily and prandial 10 units. Cont ISS  · CHF - stable, monitor closely. Holding diuretics due to ARF  · Appreciate specialists' help    DC planning:  Ppd ordered in case of placement. SW consulted.   DVT ppx:  heparin  Discussed plan with:  Pt    Signed:  Wade Marion MD

## 2017-01-10 NOTE — PROGRESS NOTES
Subjective:   Daily Progress Note: 1/10/2017 12:42 PM    No complaints    Current Facility-Administered Medications   Medication Dose Route Frequency    insulin regular (NOVOLIN R, HUMULIN R) injection 10 Units  10 Units SubCUTAneous TIDAC    0.9% sodium chloride infusion  75 mL/hr IntraVENous CONTINUOUS    [START ON 2017] insulin glargine (LANTUS) injection 20 Units  20 Units SubCUTAneous DAILY    insulin regular (NOVOLIN R, HUMULIN R) injection   SubCUTAneous AC&HS    amLODIPine (NORVASC) tablet 10 mg  10 mg Oral DAILY    hydrALAZINE (APRESOLINE) 20 mg/mL injection 20 mg  20 mg IntraVENous Q6H PRN    piperacillin-tazobactam (ZOSYN) 4.5 g in 0.9% sodium chloride (MBP/ADV) 100 mL ADV  4.5 g IntraVENous Q8H    vancomycin (VANCOCIN) 1500 mg in  ml infusion  1,500 mg IntraVENous See Admin Instructions    haloperidol lactate (HALDOL) injection 2 mg  2 mg IntraVENous Q4H PRN    0.9% sodium chloride infusion 250 mL  250 mL IntraVENous PRN    diphenhydrAMINE (BENADRYL) injection 25 mg  25 mg IntraVENous Q4H PRN    traMADol (ULTRAM) tablet 50 mg  50 mg Oral Q6H PRN    atorvastatin (LIPITOR) tablet 80 mg  80 mg Oral DAILY    metoprolol succinate (TOPROL-XL) tablet 100 mg  100 mg Oral DAILY WITH BREAKFAST    sodium chloride (NS) flush 5-10 mL  5-10 mL IntraVENous Q8H    sodium chloride (NS) flush 5-10 mL  5-10 mL IntraVENous PRN    acetaminophen (TYLENOL) tablet 650 mg  650 mg Oral Q4H PRN    HYDROmorphone (PF) (DILAUDID) injection 0.5 mg  0.5 mg IntraVENous Q6H PRN    ondansetron (ZOFRAN) injection 4 mg  4 mg IntraVENous Q4H PRN    docusate sodium (COLACE) capsule 100 mg  100 mg Oral DAILY PRN               Objective:     Visit Vitals    /82    Pulse 89    Temp 99.2 °F (37.3 °C)    Resp 18    Ht 6' 1\" (1.854 m)    Wt 96.6 kg (213 lb)    SpO2 96%    BMI 28.1 kg/m2      O2 Device: Room air    Temp (24hrs), Av.3 °F (36.8 °C), Min:97.7 °F (36.5 °C), Max:99.2 °F (37.3 °C)      01/10 0701 - 01/10 1900  In: 240 [P.O.:240]  Out: -   01/08 1901 - 01/10 0700  In: 480 [P.O.:480]  Out: 1200 [Urine:1200]      Visit Vitals    /82    Pulse 89    Temp 99.2 °F (37.3 °C)    Resp 18    Ht 6' 1\" (1.854 m)    Wt 96.6 kg (213 lb)    SpO2 96%    BMI 28.1 kg/m2     Head: Normocephalic, without obvious abnormality  Neck: no JVD  Lungs: clear to auscultation bilaterally  Heart: regular rate and rhythm  Abdomen: soft, non-tender. Extremities: 1+ edema          Data Review    Recent Results (from the past 48 hour(s))   GLUCOSE, POC    Collection Time: 01/08/17  4:04 PM   Result Value Ref Range    Glucose (POC) 271 (H) 65 - 100 mg/dL   PROTEIN ELEC WITH ADIS, SERUM    Collection Time: 01/08/17  4:35 PM   Result Value Ref Range    Protein, total 6.4 6.3 - 8.2 g/dL    A-G Ratio 0.4      ALBUMIN 1.86 (L) 3.20 - 5.60 g/dL    ALPHA 1 0.57 (H) 0.10 - 0.40 g/dL    ALPHA 2 1.26 (H) 0.40 - 1.20 g/dL    BETA 1.38 (H) 0.60 - 1.30 g/dL    GAMMA 1.33 0.50 - 1.60 g/dL    M-Ashok Not Observed 0 g/dL    Immunoglobulin G 1303 610 - 1616 mg/dL    Immunoglobulin A 636 (H) 85 - 499 mg/dL    Immunoglobulin M 47 35 - 242 mg/dL    PEP Interpretation (NOTE)     ADIS Interpretation (NOTE)    PTT    Collection Time: 01/08/17  4:35 PM   Result Value Ref Range    aPTT 34.0 (H) 23.5 - 31.7 SEC   PROTEIN ELECT. URINE 24 HR, W RFLX IMMUNOFIX.     Collection Time: 01/08/17  4:50 PM   Result Value Ref Range    Period of collection 24 hr    Volume 999 mL    Protein, urine random 469 (H) <11.9 mg/dL    Protein,urine 24 hr 4685 mg/24hr    A-G Ratio PENDING      Albumin, urine PENDING mg/dL    ALPHA 1 PENDING mg/dL    ALPHA 2 PENDING mg/dL    BETA PENDING mg/dL    GAMMA PENDING mg/dL    M-Ashok, mg/dL PENDING 0 mg/dL    M-Ashok, mg/24 hr PENDING 0 mg/24hr    PEP Interpretation PENDING    GLUCOSE, POC    Collection Time: 01/08/17  9:09 PM   Result Value Ref Range    Glucose (POC) 203 (H) 65 - 100 mg/dL   PTT    Collection Time: 01/08/17 11:30 PM   Result Value Ref Range    aPTT 91.2 (HH) 23.5 - 09.9 SEC   METABOLIC PANEL, BASIC    Collection Time: 01/09/17  5:00 AM   Result Value Ref Range    Sodium 142 136 - 145 mmol/L    Potassium 4.0 3.5 - 5.1 mmol/L    Chloride 108 (H) 98 - 107 mmol/L    CO2 22 21 - 32 mmol/L    Anion gap 12 7 - 16 mmol/L    Glucose 194 (H) 65 - 100 mg/dL    BUN 50 (H) 8 - 23 MG/DL    Creatinine 3.66 (H) 0.8 - 1.5 MG/DL    GFR est AA 21 (L) >60 ml/min/1.73m2    GFR est non-AA 18 (L) >60 ml/min/1.73m2    Calcium 7.8 (L) 8.3 - 10.4 MG/DL   CBC WITH AUTOMATED DIFF    Collection Time: 01/09/17  5:00 AM   Result Value Ref Range    WBC 13.3 (H) 4.3 - 11.1 K/uL    RBC 3.24 (L) 4.23 - 5.67 M/uL    HGB 8.9 (L) 13.6 - 17.2 g/dL    HCT 27.8 (L) 41.1 - 50.3 %    MCV 85.8 79.6 - 97.8 FL    MCH 27.5 26.1 - 32.9 PG    MCHC 32.0 31.4 - 35.0 g/dL    RDW 14.8 (H) 11.9 - 14.6 %    PLATELET 221 253 - 783 K/uL    MPV 10.4 (L) 10.8 - 14.1 FL    DF AUTOMATED      NEUTROPHILS 84 (H) 43 - 78 %    LYMPHOCYTES 7 (L) 13 - 44 %    MONOCYTES 7 4.0 - 12.0 %    EOSINOPHILS 2 0.5 - 7.8 %    BASOPHILS 0 0.0 - 2.0 %    IMMATURE GRANULOCYTES 0.5 0.0 - 5.0 %    ABS. NEUTROPHILS 11.3 (H) 1.7 - 8.2 K/UL    ABS. LYMPHOCYTES 0.9 0.5 - 4.6 K/UL    ABS. MONOCYTES 0.9 0.1 - 1.3 K/UL    ABS. EOSINOPHILS 0.2 0.0 - 0.8 K/UL    ABS. BASOPHILS 0.0 0.0 - 0.2 K/UL    ABS. IMM.  GRANS. 0.1 0.0 - 0.5 K/UL   GLUCOSE, POC    Collection Time: 01/09/17  7:08 AM   Result Value Ref Range    Glucose (POC) 198 (H) 65 - 100 mg/dL   PTT    Collection Time: 01/09/17  7:58 AM   Result Value Ref Range    aPTT 63.6 (H) 23.5 - 31.7 SEC   PLEASE READ & DOCUMENT PPD TEST IN 24 HRS    Collection Time: 01/09/17 10:07 AM   Result Value Ref Range    PPD  Negative    mm Induration  mm   GLUCOSE, POC    Collection Time: 01/09/17 12:14 PM   Result Value Ref Range    Glucose (POC) 240 (H) 65 - 100 mg/dL   GLUCOSE, POC    Collection Time: 01/09/17  4:37 PM   Result Value Ref Range    Glucose (POC) 230 (H) 65 - 100 mg/dL   TROPONIN I    Collection Time: 01/09/17  5:10 PM   Result Value Ref Range    Troponin-I, Qt. 0.23 (HH) 0.02 - 0.05 NG/ML   PTT    Collection Time: 01/09/17  7:15 PM   Result Value Ref Range    aPTT 50.7 (H) 23.5 - 31.7 SEC   GLUCOSE, POC    Collection Time: 01/09/17  8:35 PM   Result Value Ref Range    Glucose (POC) 206 (H) 65 - 313 mg/dL   METABOLIC PANEL, BASIC    Collection Time: 01/10/17  6:00 AM   Result Value Ref Range    Sodium 143 136 - 145 mmol/L    Potassium 3.9 3.5 - 5.1 mmol/L    Chloride 109 (H) 98 - 107 mmol/L    CO2 21 21 - 32 mmol/L    Anion gap 13 7 - 16 mmol/L    Glucose 67 65 - 100 mg/dL    BUN 42 (H) 8 - 23 MG/DL    Creatinine 2.97 (H) 0.8 - 1.5 MG/DL    GFR est AA 27 (L) >60 ml/min/1.73m2    GFR est non-AA 22 (L) >60 ml/min/1.73m2    Calcium 8.0 (L) 8.3 - 10.4 MG/DL   GLUCOSE, POC    Collection Time: 01/10/17  7:14 AM   Result Value Ref Range    Glucose (POC) 86 65 - 100 mg/dL   CBC WITH AUTOMATED DIFF    Collection Time: 01/10/17  7:45 AM   Result Value Ref Range    WBC 14.3 (H) 4.3 - 11.1 K/uL    RBC 3.31 (L) 4.23 - 5.67 M/uL    HGB 9.1 (L) 13.6 - 17.2 g/dL    HCT 28.4 (L) 41.1 - 50.3 %    MCV 85.8 79.6 - 97.8 FL    MCH 27.5 26.1 - 32.9 PG    MCHC 32.0 31.4 - 35.0 g/dL    RDW 15.0 (H) 11.9 - 14.6 %    PLATELET 295 449 - 761 K/uL    MPV 9.9 (L) 10.8 - 14.1 FL    DF AUTOMATED      NEUTROPHILS 84 (H) 43 - 78 %    LYMPHOCYTES 12 (L) 13 - 44 %    MONOCYTES 2 (L) 4.0 - 12.0 %    EOSINOPHILS 2 0.5 - 7.8 %    BASOPHILS 0 0.0 - 2.0 %    IMMATURE GRANULOCYTES 0.3 0.0 - 5.0 %    ABS. NEUTROPHILS 12.0 (H) 1.7 - 8.2 K/UL    ABS. LYMPHOCYTES 1.7 0.5 - 4.6 K/UL    ABS. MONOCYTES 0.3 0.1 - 1.3 K/UL    ABS. EOSINOPHILS 0.2 0.0 - 0.8 K/UL    ABS. BASOPHILS 0.0 0.0 - 0.2 K/UL    ABS. IMM.  GRANS. 0.1 0.0 - 0.5 K/UL   PTT    Collection Time: 01/10/17  7:45 AM   Result Value Ref Range    aPTT 59.0 (H) 23.5 - 31.7 SEC   PLEASE READ & DOCUMENT PPD TEST IN 72 HRS    Collection Time: 01/10/17 9:49 AM   Result Value Ref Range    PPD  Negative    mm Induration  0 mm   GLUCOSE, POC    Collection Time: 01/10/17 11:45 AM   Result Value Ref Range    Glucose (POC) 186 (H) 65 - 100 mg/dL       Assessment     Patient Active Problem List    Diagnosis Date Noted    Sepsis due to cellulitis ([facility] 01/09/2017    [facility] 01/06/2017    Type 2 diabetes mellitus with right diabetic foot infection ([facility] 01/05/2017    NSTEMI (non-ST elevated myocardial infarction) ([facility] 01/05/2017    Kidney disease, chronic, stage III (moderate, EGFR 30-59 ml/min) 08/11/2016    Cardiomyopathy ([facility] 05/05/2016    Chronic systolic congestive heart failure ([facility] 12/30/2015    Bilateral pleural effusion 12/27/2015    Tobacco use 12/27/2015    Abnormal CT scan, chest 12/27/2015    Chest pain 12/26/2015    Type 2 diabetes mellitus with hyperglycemia ([facility] 07/29/2012    HTN (hypertension) 07/29/2012           Problems Addressed by Nephrology     MO  Underlying CKD    Plan     Renal function improving. SPEP unremarkable. PO intake not much better.

## 2017-01-10 NOTE — PROGRESS NOTES
Pharmacokinetic Consult to Pharmacist    Dalia Isaac is a 73 y.o. male being treated for Diabetic Foot Infection with Vancomycin and Zosyn. Plans to go to OR for amputation of toe on 1/11/17. Height: 6' 1\" (185.4 cm)  Weight: 96.6 kg (213 lb)  Lab Results   Component Value Date/Time    BUN 42 01/10/2017 06:00 AM    Creatinine 2.97 01/10/2017 06:00 AM    WBC 14.3 01/10/2017 07:45 AM    Procalcitonin 0.2 01/05/2017 06:20 PM      Estimated Creatinine Clearance: 28.8 mL/min (based on Cr of 2.97). CULTURES:  1/5   Blood X 2   NG x 5 days, Final    Lab Results   Component Value Date/Time    VANCOMYCIN,RANDOM 20.9 01/08/2017 03:45 AM       Day 6 of vancomycin. Goal trough 18-22 for osteomyelitis. Attempted to give vancomycin 1500 mg X 1 yesterday on 1/9/17, but patient did not have viable IV access. As patient has IV access now, will give 1500 mg X 1 today. Plan to continue to dose by random levels due to MO, although renal function is now starting to improve. Hospitalist plans to switch to PO antibiotics tomorrow after amputation, will follow up tomorrow after procedure. Pharmacy will continue to follow. Please call with any questions.     Thank you,  Kimberly Brooke, PharmD  Clinical Pharmacist  [phone]

## 2017-01-10 NOTE — PROGRESS NOTES
Called daughter for consent for amputation. States she is on her way to hospital to speak with patient about surgery. Will sign when she gets here.

## 2017-01-11 ENCOUNTER — ANESTHESIA (OUTPATIENT)
Dept: SURGERY | Age: 72
DRG: 853 | End: 2017-01-11
Payer: MEDICARE

## 2017-01-11 LAB
ANION GAP BLD CALC-SCNC: 12 MMOL/L (ref 7–16)
BASOPHILS # BLD AUTO: 0 K/UL (ref 0–0.2)
BASOPHILS # BLD: 0 % (ref 0–2)
BUN SERPL-MCNC: 35 MG/DL (ref 8–23)
CALCIUM SERPL-MCNC: 7.9 MG/DL (ref 8.3–10.4)
CHLORIDE SERPL-SCNC: 110 MMOL/L (ref 98–107)
CO2 SERPL-SCNC: 22 MMOL/L (ref 21–32)
CREAT SERPL-MCNC: 2.86 MG/DL (ref 0.8–1.5)
DIFFERENTIAL METHOD BLD: ABNORMAL
EOSINOPHIL # BLD: 0.4 K/UL (ref 0–0.8)
EOSINOPHIL NFR BLD: 3 % (ref 0.5–7.8)
ERYTHROCYTE [DISTWIDTH] IN BLOOD BY AUTOMATED COUNT: 15 % (ref 11.9–14.6)
GLUCOSE BLD STRIP.AUTO-MCNC: 102 MG/DL (ref 65–100)
GLUCOSE BLD STRIP.AUTO-MCNC: 103 MG/DL (ref 65–100)
GLUCOSE BLD STRIP.AUTO-MCNC: 103 MG/DL (ref 65–100)
GLUCOSE BLD STRIP.AUTO-MCNC: 105 MG/DL (ref 65–100)
GLUCOSE SERPL-MCNC: 87 MG/DL (ref 65–100)
HCT VFR BLD AUTO: 27.8 % (ref 41.1–50.3)
HGB BLD-MCNC: 9 G/DL (ref 13.6–17.2)
IMM GRANULOCYTES # BLD: 0.1 K/UL (ref 0–0.5)
IMM GRANULOCYTES NFR BLD AUTO: 0.4 % (ref 0–5)
LYMPHOCYTES # BLD AUTO: 6 % (ref 13–44)
LYMPHOCYTES # BLD: 0.8 K/UL (ref 0.5–4.6)
MCH RBC QN AUTO: 27.9 PG (ref 26.1–32.9)
MCHC RBC AUTO-ENTMCNC: 32.4 G/DL (ref 31.4–35)
MCV RBC AUTO: 86.1 FL (ref 79.6–97.8)
MONOCYTES # BLD: 1.2 K/UL (ref 0.1–1.3)
MONOCYTES NFR BLD AUTO: 8 % (ref 4–12)
NEUTS SEG # BLD: 11.3 K/UL (ref 1.7–8.2)
NEUTS SEG NFR BLD AUTO: 83 % (ref 43–78)
PLATELET # BLD AUTO: 305 K/UL (ref 150–450)
PMV BLD AUTO: 9.7 FL (ref 10.8–14.1)
POTASSIUM SERPL-SCNC: 3.7 MMOL/L (ref 3.5–5.1)
RBC # BLD AUTO: 3.23 M/UL (ref 4.23–5.67)
SODIUM SERPL-SCNC: 144 MMOL/L (ref 136–145)
VANCOMYCIN SERPL-MCNC: 15.7 UG/ML
WBC # BLD AUTO: 13.7 K/UL (ref 4.3–11.1)

## 2017-01-11 PROCEDURE — 77030002916 HC SUT ETHLN J&J -A: Performed by: SURGERY

## 2017-01-11 PROCEDURE — 88305 TISSUE EXAM BY PATHOLOGIST: CPT | Performed by: SURGERY

## 2017-01-11 PROCEDURE — 77030003028 HC SUT VCRL J&J -A: Performed by: SURGERY

## 2017-01-11 PROCEDURE — 87075 CULTR BACTERIA EXCEPT BLOOD: CPT | Performed by: SURGERY

## 2017-01-11 PROCEDURE — 0Y6M0ZF DETACHMENT AT RIGHT FOOT, PARTIAL 5TH RAY, OPEN APPROACH: ICD-10-PCS | Performed by: SURGERY

## 2017-01-11 PROCEDURE — 74011000258 HC RX REV CODE- 258: Performed by: INTERNAL MEDICINE

## 2017-01-11 PROCEDURE — 65270000029 HC RM PRIVATE

## 2017-01-11 PROCEDURE — 87077 CULTURE AEROBIC IDENTIFY: CPT | Performed by: SURGERY

## 2017-01-11 PROCEDURE — 77030018836 HC SOL IRR NACL ICUM -A: Performed by: SURGERY

## 2017-01-11 PROCEDURE — 74011000258 HC RX REV CODE- 258

## 2017-01-11 PROCEDURE — 74011250636 HC RX REV CODE- 250/636: Performed by: INTERNAL MEDICINE

## 2017-01-11 PROCEDURE — 77030003602 HC NDL NRV BLK BBMI -B: Performed by: ANESTHESIOLOGY

## 2017-01-11 PROCEDURE — 74011250636 HC RX REV CODE- 250/636: Performed by: ANESTHESIOLOGY

## 2017-01-11 PROCEDURE — 76942 ECHO GUIDE FOR BIOPSY: CPT | Performed by: SURGERY

## 2017-01-11 PROCEDURE — 80202 ASSAY OF VANCOMYCIN: CPT | Performed by: INTERNAL MEDICINE

## 2017-01-11 PROCEDURE — 74011250636 HC RX REV CODE- 250/636

## 2017-01-11 PROCEDURE — 87205 SMEAR GRAM STAIN: CPT | Performed by: SURGERY

## 2017-01-11 PROCEDURE — 74011250637 HC RX REV CODE- 250/637: Performed by: INTERNAL MEDICINE

## 2017-01-11 PROCEDURE — 76010010054 HC POST OP PAIN BLOCK: Performed by: SURGERY

## 2017-01-11 PROCEDURE — 82962 GLUCOSE BLOOD TEST: CPT

## 2017-01-11 PROCEDURE — 74011250637 HC RX REV CODE- 250/637: Performed by: ANESTHESIOLOGY

## 2017-01-11 PROCEDURE — 77030011640 HC PAD GRND REM COVD -A: Performed by: SURGERY

## 2017-01-11 PROCEDURE — 36415 COLL VENOUS BLD VENIPUNCTURE: CPT | Performed by: INTERNAL MEDICINE

## 2017-01-11 PROCEDURE — 77010033678 HC OXYGEN DAILY

## 2017-01-11 PROCEDURE — 77030006788 HC BLD SAW OSC STRY -B: Performed by: SURGERY

## 2017-01-11 PROCEDURE — 85025 COMPLETE CBC W/AUTO DIFF WBC: CPT | Performed by: INTERNAL MEDICINE

## 2017-01-11 PROCEDURE — 76060000034 HC ANESTHESIA 1.5 TO 2 HR: Performed by: SURGERY

## 2017-01-11 PROCEDURE — 80048 BASIC METABOLIC PNL TOTAL CA: CPT | Performed by: INTERNAL MEDICINE

## 2017-01-11 PROCEDURE — 77030032490 HC SLV COMPR SCD KNE COVD -B: Performed by: SURGERY

## 2017-01-11 PROCEDURE — 87186 SC STD MICRODIL/AGAR DIL: CPT | Performed by: SURGERY

## 2017-01-11 PROCEDURE — 76010000161 HC OR TIME 1 TO 1.5 HR INTENSV-TIER 1: Performed by: SURGERY

## 2017-01-11 PROCEDURE — 77030020782 HC GWN BAIR PAWS FLX 3M -B: Performed by: ANESTHESIOLOGY

## 2017-01-11 PROCEDURE — 76210000006 HC OR PH I REC 0.5 TO 1 HR: Performed by: SURGERY

## 2017-01-11 RX ORDER — VANCOMYCIN/0.9 % SOD CHLORIDE 1.5G/250ML
1500 PLASTIC BAG, INJECTION (ML) INTRAVENOUS ONCE
Status: COMPLETED | OUTPATIENT
Start: 2017-01-11 | End: 2017-01-28

## 2017-01-11 RX ORDER — SODIUM CHLORIDE 0.9 % (FLUSH) 0.9 %
5-10 SYRINGE (ML) INJECTION AS NEEDED
Status: DISCONTINUED | OUTPATIENT
Start: 2017-01-11 | End: 2017-01-11 | Stop reason: HOSPADM

## 2017-01-11 RX ORDER — MIDAZOLAM HYDROCHLORIDE 1 MG/ML
2 INJECTION, SOLUTION INTRAMUSCULAR; INTRAVENOUS
Status: DISCONTINUED | OUTPATIENT
Start: 2017-01-11 | End: 2017-01-11 | Stop reason: HOSPADM

## 2017-01-11 RX ORDER — PROPOFOL 10 MG/ML
INJECTION, EMULSION INTRAVENOUS
Status: DISCONTINUED | OUTPATIENT
Start: 2017-01-11 | End: 2017-01-11 | Stop reason: HOSPADM

## 2017-01-11 RX ORDER — NALOXONE HYDROCHLORIDE 0.4 MG/ML
0.2 INJECTION, SOLUTION INTRAMUSCULAR; INTRAVENOUS; SUBCUTANEOUS AS NEEDED
Status: DISCONTINUED | OUTPATIENT
Start: 2017-01-11 | End: 2017-01-11 | Stop reason: HOSPADM

## 2017-01-11 RX ORDER — FAMOTIDINE 20 MG/1
20 TABLET, FILM COATED ORAL ONCE
Status: COMPLETED | OUTPATIENT
Start: 2017-01-11 | End: 2017-01-11

## 2017-01-11 RX ORDER — SODIUM CHLORIDE, SODIUM LACTATE, POTASSIUM CHLORIDE, CALCIUM CHLORIDE 600; 310; 30; 20 MG/100ML; MG/100ML; MG/100ML; MG/100ML
75 INJECTION, SOLUTION INTRAVENOUS CONTINUOUS
Status: DISCONTINUED | OUTPATIENT
Start: 2017-01-11 | End: 2017-01-11 | Stop reason: HOSPADM

## 2017-01-11 RX ORDER — LIDOCAINE HYDROCHLORIDE 10 MG/ML
0.1 INJECTION INFILTRATION; PERINEURAL AS NEEDED
Status: DISCONTINUED | OUTPATIENT
Start: 2017-01-11 | End: 2017-01-11 | Stop reason: HOSPADM

## 2017-01-11 RX ADMIN — PIPERACILLIN AND TAZOBACTAM 4.5 G: 4; .5 INJECTION, POWDER, FOR SOLUTION INTRAVENOUS at 06:00

## 2017-01-11 RX ADMIN — FAMOTIDINE 20 MG: 20 TABLET ORAL at 11:23

## 2017-01-11 RX ADMIN — SODIUM CHLORIDE, SODIUM LACTATE, POTASSIUM CHLORIDE, AND CALCIUM CHLORIDE 75 ML/HR: 600; 310; 30; 20 INJECTION, SOLUTION INTRAVENOUS at 11:24

## 2017-01-11 RX ADMIN — AMLODIPINE BESYLATE 10 MG: 10 TABLET ORAL at 09:57

## 2017-01-11 RX ADMIN — PROPOFOL 160 MCG/KG/MIN: 10 INJECTION, EMULSION INTRAVENOUS at 13:37

## 2017-01-11 RX ADMIN — PIPERACILLIN AND TAZOBACTAM 4.5 G: 4; .5 INJECTION, POWDER, FOR SOLUTION INTRAVENOUS at 13:49

## 2017-01-11 RX ADMIN — Medication 10 ML: at 05:14

## 2017-01-11 RX ADMIN — TRAMADOL HYDROCHLORIDE 50 MG: 50 TABLET, FILM COATED ORAL at 22:22

## 2017-01-11 RX ADMIN — PIPERACILLIN AND TAZOBACTAM 4.5 G: 4; .5 INJECTION, POWDER, FOR SOLUTION INTRAVENOUS at 22:22

## 2017-01-11 RX ADMIN — VANCOMYCIN HYDROCHLORIDE 1500 MG: 10 INJECTION, POWDER, LYOPHILIZED, FOR SOLUTION INTRAVENOUS at 22:22

## 2017-01-11 RX ADMIN — Medication 10 ML: at 22:00

## 2017-01-11 RX ADMIN — METOPROLOL SUCCINATE 100 MG: 100 TABLET, EXTENDED RELEASE ORAL at 09:57

## 2017-01-11 RX ADMIN — ATORVASTATIN CALCIUM 80 MG: 40 TABLET, FILM COATED ORAL at 09:57

## 2017-01-11 NOTE — ANESTHESIA POSTPROCEDURE EVALUATION
Post-Anesthesia Evaluation and Assessment    Patient: Giselle Novoa MRN: [id number]  SSN: [id number]    YOB: 1945  Age: 73 y.o. Sex: male       Cardiovascular Function/Vital Signs  Visit Vitals    /63 (BP 1 Location: Right arm, BP Patient Position: At rest)    Pulse 79    Temp 36.3 °C (97.4 °F)    Resp 12    Ht 6' 1\" (1.854 m)    Wt 96.6 kg (213 lb)    SpO2 96%    BMI 28.1 kg/m2       Patient is status post regional anesthesia for Procedure(s):  AMPUTATION RIGHT 5TH TOE/ ROOM 604. Nausea/Vomiting: None    Postoperative hydration reviewed and adequate. Pain:  Pain Scale 1: FLACC (01/11/17 1502)  Pain Intensity 1: 0 (01/11/17 1502)   Managed    Neurological Status:   Neuro (WDL): Exceptions to WDL (01/11/17 1502)  Neuro  Neurologic State: Anesthetized;Sleeping (01/11/17 1502)   At baseline    Mental Status and Level of Consciousness: Arousable    Pulmonary Status:   O2 Device: Nasal cannula;Nasal airway (01/11/17 1502)   Adequate oxygenation and airway patent    Complications related to anesthesia: None    Post-anesthesia assessment completed.  No concerns    Signed By: Nroi Keating MD     January 11, 2017

## 2017-01-11 NOTE — DIABETES MGMT
Pt seen by RN LINH for continued diabetes education. NPO since midnight for right foot surgery this morning. FBS 87 and most recent POC blood glucose 102. Lantus 15 units daily dose increased to 20 per yesterday's orders. Discussed with primary nurse. Plan per primary nurse is to hold on Lantus dosing this morning until notifies MD re: blood glucose. Pt denies any symptoms of hypoglycemia this morning.

## 2017-01-11 NOTE — PROGRESS NOTES
PT note: Patient currently off floor in pre-op for scheduled amputation per chart review. Will await post op weight bearing orders and check back with pt as schedule allows to continue with PT services. Thank you.      EDEL RauschT

## 2017-01-11 NOTE — PROGRESS NOTES
TRANSFER - IN REPORT:    Verbal report received from Ashutosh Lees RN on Marchia Hams  being received from PACU for routine progression of care      Report consisted of patients Situation, Background, Assessment and   Recommendations(SBAR). Information from the following report(s) SBAR, OR Summary and Recent Results was reviewed with the receiving nurse. Opportunity for questions and clarification was provided. Assessment completed upon patients arrival to unit and care assumed.

## 2017-01-11 NOTE — ANESTHESIA PROCEDURE NOTES
Peripheral Block    Start time: 1/11/2017 12:37 PM  End time: 1/11/2017 12:42 PM  Performed by: Zoltan Monaco by: Braulio Joaquin       Pre-procedure:    Indications: at surgeon's request and post-op pain management    Preanesthetic Checklist: patient identified, risks and benefits discussed, site marked, timeout performed, anesthesia consent given and patient being monitored    Timeout Time: 12:37          Block Type:   Block Type:  Popliteal  Laterality:  Right  Monitoring:  Standard ASA monitoring, continuous pulse ox, frequent vital sign checks, heart rate, responsive to questions and oxygen  Injection Technique:  Single shot  Procedures: ultrasound guided and nerve stimulator    Prep: chlorhexidine    Location:  Lower thigh  Needle Type:  Stimuplex  Needle Gauge:  21 G  Needle Localization:  Nerve stimulator and ultrasound guidance  Motor Response: minimal motor response >0.4 mA    Medication Injected:  0.5%  ropivacaine  Volume (mL):  20  Add'l Medication Injected:  1.5%  mepivacaine  Volume (mL):  10    Assessment:  Number of attempts:  1  Injection Assessment:  Incremental injection every 5 mL, no paresthesia, ultrasound image on chart, local visualized surrounding nerve on ultrasound, negative aspiration for blood, no intravascular symptoms and negative aspiration for CSF  Patient tolerance:  Patient tolerated the procedure well with no immediate complications

## 2017-01-11 NOTE — BRIEF OP NOTE
BRIEF OPERATIVE NOTE    Date of Procedure: 1/11/2017   Preoperative Diagnosis: Gangrene of toe ([facility] [I96]  Postoperative Diagnosis: Gangrene of toe ([facility] Abhinav Soto, plantar abscess, necrotizing diabetic foot infection, acute osteomyelitis of 5th metatarsal    Procedure(s):  I&D OF DEEP SPACE R FOOT INFECTION WITH DEBRIDEMENT OF SKIN, SOFT TISSUE AND BONE INCLUDING RIGHT 5TH TOE AND METATARSAL, BONE BIOPSY FOR CULTURE  Surgeon(s) and Role:     * Rachelle Frias MD - Primary            Surgical Staff:  Circ-1: Agnes Charlton RN  Circ-2: Nisreen Langley RN  Scrub Tech-1: Ibrahima Carrillo  Scrub Tech-2: Anni Connelly  Event Time In   Incision Start 1357   Incision Close 1451     Anesthesia: Regional   Estimated Blood Loss: <30 cc  Specimens:   ID Type Source Tests Collected by Time Destination   1 : right fifth toe and partial debridement of right foot Preservative Foot, Right  Rachelle Frias MD 1/11/2017 1418 Pathology   2 : distal 5th metatarsal Preservative Foot, Right  Rachelle Frias MD 1/11/2017 1421 Pathology   3 : proximal 5th metatarsal margin Preservative Foot, Right  Rachelle Frias MD 1/11/2017 1421 Pathology   1 : right foot deep wound Wound Foot, Right CULTURE, ANAEROBIC, CULTURE, WOUND W GRAM STAIN Rachelle Frias MD 1/11/2017 1400 Microbiology   2 : right fifth metatarsal head for culture Tissue  CULTURE, ANAEROBIC, CULTURE, TISSUE W Alyssa Conn MD 1/11/2017 1409 Microbiology      Findings: Progressive gangrene of R 5th toe with lateral ulcers and tissue necrosis, 5th toe easily disarticulated and abscess encountered at level of interspace and metatarsal head that was sent for culture, abscess and necrosis extended into deep space of foot, metatarsal debrided back to very proximal transection using TPS power saw, proximal segment sent to path as margin, biopsy from metatarsal head sent for culture, after irrigation the tissues appeared viable but blood flow was not great, 5th metatarsal transection covered with soft tissue using 2-0 vicryl suture and overlying skin closed with 2 x 2-0 nylon simple sutures, no exposed bone, moist Kerlex used to pack across deep plantar space and rest of open lateral foot wound. Foot wrapped with Kerlex and Coban.     Complications: none apparent  Implants: * No implants in log *    Baron Antonella MD

## 2017-01-11 NOTE — PERIOP NOTES
TRANSFER - OUT REPORT:    Verbal report given to DEJA Peguero on Brent Ibarra  being transferred to [address] [place] [address] for routine post - op       Report consisted of patients Situation, Background, Assessment and   Recommendations(SBAR). Information from the following report(s) SBAR, Procedure Summary, Intake/Output, MAR and Cardiac Rhythm NSR with PVCs was reviewed with the receiving nurse. Lines:   Peripheral IV 01/09/17 Right Antecubital (Active)   Site Assessment Clean, dry, & intact 1/11/2017  3:02 PM   Phlebitis Assessment 0 1/11/2017  3:02 PM   Infiltration Assessment 0 1/11/2017  3:02 PM   Dressing Status Clean, dry, & intact 1/11/2017  3:02 PM   Dressing Type Tape;Transparent 1/11/2017  3:02 PM   Hub Color/Line Status Blue;Capped 1/11/2017  3:02 PM   Action Taken Open ports on tubing capped 1/11/2017  7:18 AM   Alcohol Cap Used No 1/11/2017  3:02 PM       Peripheral IV 01/09/17 Right Wrist (Active)   Site Assessment Clean, dry, & intact 1/11/2017  3:02 PM   Phlebitis Assessment 0 1/11/2017  3:02 PM   Infiltration Assessment 0 1/11/2017  3:02 PM   Dressing Status Clean, dry, & intact 1/11/2017  3:02 PM   Dressing Type Tape;Transparent 1/11/2017  3:02 PM   Hub Color/Line Status Yellow;Capped 1/11/2017  3:02 PM   Action Taken Open ports on tubing capped 1/11/2017  7:18 AM   Alcohol Cap Used No 1/11/2017  3:02 PM       Peripheral IV 01/11/17 Left Hand (Active)   Site Assessment Clean, dry, & intact 1/11/2017  3:02 PM   Phlebitis Assessment 0 1/11/2017  3:02 PM   Infiltration Assessment 0 1/11/2017  3:02 PM   Dressing Status Clean, dry, & intact 1/11/2017  3:02 PM   Dressing Type Tape;Transparent 1/11/2017  3:02 PM   Hub Color/Line Status Pink; Infusing 1/11/2017  3:02 PM   Alcohol Cap Used No 1/11/2017  3:02 PM        Opportunity for questions and clarification was provided. Patient transported with:   O2 @ 2 liters    VTE prophylaxis orders have been written for Brent Ibarra.     Patient given room number and nurses name. Family updated re: pt status after security code verified.

## 2017-01-11 NOTE — PROGRESS NOTES
Hospitalist Progress Note     Admit Date:  2017  5:07 PM   Name:  Jb Staley   Age:  73 y.o.  :  1945   MRN:  [id number]   PCP:  None  Treatment Team: Attending Provider: Pj Toussaint DO; Utilization Review: Eben Moore; Consulting Provider: Shaina Sharpe MD; Consulting Provider: Maikel Marina MD; Care Manager: IRENE Ferraro; Utilization Review: Vera Dean RN    Subjective:   HPI:  Pt is a 74 yo male who presented with progressive pain and poor healing of right foot wound over 1 month, admitted with infected DM foot. PMH includes DM (A1C 9.2 2016), CHF, CKD, current smoker. X Ray of right foot had low suspicion for osteomyelitis but does reveal soft tissue lucencies, possible gas producing infection. Surgery and Vascular were consulted. He had bedside debridement on . He was started on Vanc/Zosyn in ER. Also noted to have MO with Cr 3.99, baseline 1.90. He also had elevated trops without any typical symptoms. : complaining of pain that starts in his foot and radiates up his leg. A little nausea this AM because he did not eat breakfast (npo for surgery). Denies CP, SOB. Is a little sleepy this AM.     Objective:     Patient Vitals for the past 24 hrs:   Temp Pulse Resp BP SpO2   17 0712 98.1 °F (36.7 °C) 93 18 144/72 94 %   17 0340 98 °F (36.7 °C) 90 18 150/81 96 %   17 0049 98 °F (36.7 °C) 83 18 147/76 96 %   01/10/17 1507 99 °F (37.2 °C) 84 18 155/80 97 %   01/10/17 1218 99.2 °F (37.3 °C) 89 18 154/82 96 %     Oxygen Therapy  O2 Sat (%): 94 % (17 0712)  Pulse via Oximetry: 96 beats per minute (17 0340)  O2 Device: Room air (17 1527)    Intake/Output Summary (Last 24 hours) at 17 1015  Last data filed at 17 0500   Gross per 24 hour   Intake                0 ml   Output             1050 ml   Net            -1050 ml       General:    Well nourished. Alert and oriented. No distress  CV:   RRR.  No BLE edema  Lungs:   Clear to auscultation bilaterally. Normal resp effort  Abdomen:   Soft, NTTP  Extremities:  R 5th toe gangrenous. Foot wrapped  Skin:     No rashes or jaundice. Labs and Studies:  I have reviewed all labs, meds, telemetry events, and studies from the last 24 hours. Recent Results (from the past 24 hour(s))   GLUCOSE, POC    Collection Time: 01/10/17 11:45 AM   Result Value Ref Range    Glucose (POC) 186 (H) 65 - 100 mg/dL   GLUCOSE, POC    Collection Time: 01/10/17  3:53 PM   Result Value Ref Range    Glucose (POC) 244 (H) 65 - 100 mg/dL   VANCOMYCIN, RANDOM    Collection Time: 01/10/17  6:04 PM   Result Value Ref Range    VANCOMYCIN,RANDOM 19.5 UG/ML   GLUCOSE, POC    Collection Time: 01/10/17  8:36 PM   Result Value Ref Range    Glucose (POC) 125 (H) 65 - 889 mg/dL   METABOLIC PANEL, BASIC    Collection Time: 01/11/17  5:02 AM   Result Value Ref Range    Sodium 144 136 - 145 mmol/L    Potassium 3.7 3.5 - 5.1 mmol/L    Chloride 110 (H) 98 - 107 mmol/L    CO2 22 21 - 32 mmol/L    Anion gap 12 7 - 16 mmol/L    Glucose 87 65 - 100 mg/dL    BUN 35 (H) 8 - 23 MG/DL    Creatinine 2.86 (H) 0.8 - 1.5 MG/DL    GFR est AA 28 (L) >60 ml/min/1.73m2    GFR est non-AA 23 (L) >60 ml/min/1.73m2    Calcium 7.9 (L) 8.3 - 10.4 MG/DL   CBC WITH AUTOMATED DIFF    Collection Time: 01/11/17  5:02 AM   Result Value Ref Range    WBC 13.7 (H) 4.3 - 11.1 K/uL    RBC 3.23 (L) 4.23 - 5.67 M/uL    HGB 9.0 (L) 13.6 - 17.2 g/dL    HCT 27.8 (L) 41.1 - 50.3 %    MCV 86.1 79.6 - 97.8 FL    MCH 27.9 26.1 - 32.9 PG    MCHC 32.4 31.4 - 35.0 g/dL    RDW 15.0 (H) 11.9 - 14.6 %    PLATELET 237 861 - 678 K/uL    MPV 9.7 (L) 10.8 - 14.1 FL    DF AUTOMATED      NEUTROPHILS 83 (H) 43 - 78 %    LYMPHOCYTES 6 (L) 13 - 44 %    MONOCYTES 8 4.0 - 12.0 %    EOSINOPHILS 3 0.5 - 7.8 %    BASOPHILS 0 0.0 - 2.0 %    IMMATURE GRANULOCYTES 0.4 0.0 - 5.0 %    ABS. NEUTROPHILS 11.3 (H) 1.7 - 8.2 K/UL    ABS. LYMPHOCYTES 0.8 0.5 - 4.6 K/UL    ABS. MONOCYTES 1.2 0.1 - 1.3 K/UL    ABS. EOSINOPHILS 0.4 0.0 - 0.8 K/UL    ABS. BASOPHILS 0.0 0.0 - 0.2 K/UL    ABS. IMM. GRANS. 0.1 0.0 - 0.5 K/UL   GLUCOSE, POC    Collection Time: 01/11/17  8:23 AM   Result Value Ref Range    Glucose (POC) 102 (H) 65 - 100 mg/dL        Recent Imaging:  CXR Results  (Last 48 hours)    None        CT Results  (Last 48 hours)    None          Assessment and Plan:     Hospital Problems as of 1/11/2017  Date Reviewed: 8/11/2016          Codes Class Noted - Resolved POA    Sepsis due to cellulitis [facility]) ICD-10-CM: L03.90, A41.9  ICD-9-CM: 682.9, 995.91  1/9/2017 - Present Yes        Acute renal failure with tubular necrosis ([facility] ICD-10-CM: N17.0  ICD-9-CM: 584.5  1/6/2017 - Present Yes        * (Principal)Type 2 diabetes mellitus with right diabetic foot infection ([facility] ICD-10-CM: E11.69, L08.9  ICD-9-CM: 250.80, 686.9  1/5/2017 - Present Yes        NSTEMI (non-ST elevated myocardial infarction) ([facility] ICD-10-CM: I21.4  ICD-9-CM: 410.70  1/5/2017 - Present Yes        Kidney disease, chronic, stage III (moderate, EGFR 30-59 ml/min) (Chronic) ICD-10-CM: N18.3  ICD-9-CM: 585.3  8/11/2016 - Present Yes        Chronic systolic congestive heart failure (HCC) (Chronic) ICD-10-CM: I50.22  ICD-9-CM: 428.22, 428.0  12/30/2015 - Present Yes    Overview Signed 1/6/2017  8:59 AM by Glenna Amaya MD     EF 35-40% on 2015 Echo             Type 2 diabetes mellitus with hyperglycemia ([facility] ICD-10-CM: E11.65  ICD-9-CM: 250.00  7/29/2012 - Present Yes        HTN (hypertension) (Chronic) ICD-10-CM: I10  ICD-9-CM: 401.9  7/29/2012 - Present Yes        RESOLVED: Encephalopathy due to infection ICD-10-CM: G93.49, B99.9  ICD-9-CM: 348.39, 136.9  1/7/2017 - 1/9/2017 Yes              PLAN:    · Diabetic foot infection - Surgery following. Did debridement at bedside 1/6. Amputation R 5th toe scheduled for today. Cont vanc and zosyn, day 7. Plan to switch to PO meds after procedure. Cultures NGTD. · NSTEMI vs demand mismatch- peaked at 0.61. No typical symptoms. Some elevation probably from ARF. Already on medical management, BB, ASA, statin. Continue to hold ACE, diuretics due to ARF. Cardiology follow up at [facility] on DC  · Loose stools - does not meet cdiff criteria. Improving. · ARF -  Improving slowly. Continue IVF. Monitor volume status closely due to CHF. Nephrology following. SPEP/UPEP pending. · HTN - better controlled. cont current regimen. Holding ACE, diuretics  · DM -   Holding lantus this AM prior to surgery. Will restart tomorrow AM and continue lispro 10u WM and SSI. · CHF - stable, monitor closely. Holding diuretics due to ARF  · Appreciate specialists' help    DC planning:  PPD placed in case of placement. SW consulted.   DVT ppx:  heparin    Signed:  Hilario Chung MD

## 2017-01-11 NOTE — PROGRESS NOTES
[facility]        Subjective: Pt seen in rec room. Still lethargic from anesthesia     Review of Systems -   Can not be obtained due to pt's condition. Objective:    Vitals:    01/11/17 1315 01/11/17 1502 01/11/17 1518 01/11/17 1523   BP: 136/67 147/63 129/58 125/62   Pulse: 79 79 75 74   Resp: 18 12 13 16   Temp:  97.4 °F (36.3 °C)     SpO2: 100% 96% 98% 99%   Weight:       Height:           PE  Gen: sedated  CV:reg rate  Chest: clear  Abd: soft  Ext/Access: no edema. Doretha Shaikh LAB  Recent Labs      01/11/17   0502  01/10/17   0745  01/09/17   0500   WBC  13.7*  14.3*  13.3*   HGB  9.0*  9.1*  8.9*   HCT  27.8*  28.4*  27.8*   PLT  305  358  327     Recent Labs      01/11/17   0502  01/10/17   0600  01/09/17   1710  01/09/17   0500   NA  144  143   --   142   K  3.7  3.9   --   4.0   CL  110*  109*   --   108*   CO2  22  21   --   22   GLU  87  67   --   194*   BUN  35*  42*   --   50*   CREA  2.86*  2.97*   --   3.66*   CA  7.9*  8.0*   --   7.8*   TROIQ   --    --   0.23*   --            Radiology    A/P:   Patient Active Problem List   Diagnosis Code    Type 2 diabetes mellitus with hyperglycemia (HCC) E11.65    HTN (hypertension) I10    Chest pain R07.9    Bilateral pleural effusion J90    Tobacco use Z72.0    Abnormal CT scan, chest R93.8    Chronic systolic congestive heart failure (HCC) I50.22    Cardiomyopathy (HCC) I42.9    Kidney disease, chronic, stage III (moderate, EGFR 30-59 ml/min) N18.3    Type 2 diabetes mellitus with right diabetic foot infection (HCC) E11.69, L08.9    Acute renal failure with tubular necrosis (HCC) N17.0    NSTEMI (non-ST elevated myocardial infarction) ([facility] I21.4    Sepsis due to cellulitis (HCC) L03.90, A41.9       Renal function is stable.   Following   Check labs in am.     Nadya Kruse MD

## 2017-01-11 NOTE — PROGRESS NOTES
Pt resting in bed, no distress noted. Dressing to right foot, C/D/I. Hibiclens and new gown at bedside for A. M.surgery.

## 2017-01-11 NOTE — PROGRESS NOTES
TRANSFER - OUT REPORT:    Verbal report given to DEJA Wooten(name) on Dalia Gray  being transferred to Pre-Op(unit) for ordered procedure       Report consisted of patients Situation, Background, Assessment and   Recommendations(SBAR). Information from the following report(s) SBAR, Kardex, Intake/Output, MAR and Recent Results was reviewed with the receiving nurse. Lines:   Peripheral IV 01/09/17 Right Antecubital (Active)   Site Assessment Clean, dry, & intact 1/10/2017  8:42 PM   Phlebitis Assessment 0 1/10/2017  8:42 PM   Infiltration Assessment 0 1/10/2017  8:42 PM   Dressing Status Clean, dry, & intact 1/10/2017  8:42 PM   Dressing Type Transparent 1/10/2017  8:42 PM   Hub Color/Line Status Infusing 1/10/2017  8:42 PM       Peripheral IV 01/09/17 Right Wrist (Active)   Site Assessment Clean, dry, & intact 1/10/2017  8:42 PM   Phlebitis Assessment 0 1/10/2017  8:42 PM   Infiltration Assessment 0 1/10/2017  8:42 PM   Dressing Status Clean, dry, & intact 1/10/2017  8:42 PM   Dressing Type Tape;Transparent 1/10/2017  8:42 PM   Hub Color/Line Status Capped 1/10/2017  8:42 PM   Alcohol Cap Used No 1/10/2017  8:42 PM        Opportunity for questions and clarification was provided.       Patient transported with:   Scayl

## 2017-01-12 LAB
ANION GAP BLD CALC-SCNC: 13 MMOL/L (ref 7–16)
BASOPHILS # BLD AUTO: 0 K/UL (ref 0–0.2)
BASOPHILS # BLD: 0 % (ref 0–2)
BUN SERPL-MCNC: 38 MG/DL (ref 8–23)
CALCIUM SERPL-MCNC: 8.2 MG/DL (ref 8.3–10.4)
CHLORIDE SERPL-SCNC: 113 MMOL/L (ref 98–107)
CO2 SERPL-SCNC: 19 MMOL/L (ref 21–32)
CREAT SERPL-MCNC: 2.72 MG/DL (ref 0.8–1.5)
DIFFERENTIAL METHOD BLD: ABNORMAL
EOSINOPHIL # BLD: 0.4 K/UL (ref 0–0.8)
EOSINOPHIL NFR BLD: 2 % (ref 0.5–7.8)
ERYTHROCYTE [DISTWIDTH] IN BLOOD BY AUTOMATED COUNT: 15.1 % (ref 11.9–14.6)
GLUCOSE BLD STRIP.AUTO-MCNC: 112 MG/DL (ref 65–100)
GLUCOSE BLD STRIP.AUTO-MCNC: 188 MG/DL (ref 65–100)
GLUCOSE BLD STRIP.AUTO-MCNC: 260 MG/DL (ref 65–100)
GLUCOSE BLD STRIP.AUTO-MCNC: 261 MG/DL (ref 65–100)
GLUCOSE SERPL-MCNC: 97 MG/DL (ref 65–100)
HCT VFR BLD AUTO: 25.8 % (ref 41.1–50.3)
HGB BLD-MCNC: 8.2 G/DL (ref 13.6–17.2)
IMM GRANULOCYTES # BLD: 0.1 K/UL (ref 0–0.5)
IMM GRANULOCYTES NFR BLD AUTO: 0.7 % (ref 0–5)
LYMPHOCYTES # BLD AUTO: 7 % (ref 13–44)
LYMPHOCYTES # BLD: 1.1 K/UL (ref 0.5–4.6)
MCH RBC QN AUTO: 27.3 PG (ref 26.1–32.9)
MCHC RBC AUTO-ENTMCNC: 31.8 G/DL (ref 31.4–35)
MCV RBC AUTO: 86 FL (ref 79.6–97.8)
MM INDURATION POC: NORMAL MM (ref 0–5)
MONOCYTES # BLD: 0.9 K/UL (ref 0.1–1.3)
MONOCYTES NFR BLD AUTO: 6 % (ref 4–12)
NEUTS SEG # BLD: 13.6 K/UL (ref 1.7–8.2)
NEUTS SEG NFR BLD AUTO: 84 % (ref 43–78)
PLATELET # BLD AUTO: 311 K/UL (ref 150–450)
PMV BLD AUTO: 10.3 FL (ref 10.8–14.1)
POTASSIUM SERPL-SCNC: 3.8 MMOL/L (ref 3.5–5.1)
PPD POC: NORMAL NEGATIVE
RBC # BLD AUTO: 3 M/UL (ref 4.23–5.67)
SODIUM SERPL-SCNC: 145 MMOL/L (ref 136–145)
VANCOMYCIN SERPL-MCNC: 23 UG/ML
WBC # BLD AUTO: 15.6 K/UL (ref 4.3–11.1)

## 2017-01-12 PROCEDURE — 74011636637 HC RX REV CODE- 636/637: Performed by: INTERNAL MEDICINE

## 2017-01-12 PROCEDURE — 74011250637 HC RX REV CODE- 250/637: Performed by: INTERNAL MEDICINE

## 2017-01-12 PROCEDURE — 74011250636 HC RX REV CODE- 250/636: Performed by: INTERNAL MEDICINE

## 2017-01-12 PROCEDURE — 36415 COLL VENOUS BLD VENIPUNCTURE: CPT | Performed by: INTERNAL MEDICINE

## 2017-01-12 PROCEDURE — 77010033678 HC OXYGEN DAILY

## 2017-01-12 PROCEDURE — 74011250636 HC RX REV CODE- 250/636: Performed by: SURGERY

## 2017-01-12 PROCEDURE — 74011000258 HC RX REV CODE- 258: Performed by: INTERNAL MEDICINE

## 2017-01-12 PROCEDURE — 65270000029 HC RM PRIVATE

## 2017-01-12 PROCEDURE — 85027 COMPLETE CBC AUTOMATED: CPT | Performed by: INTERNAL MEDICINE

## 2017-01-12 PROCEDURE — 82962 GLUCOSE BLOOD TEST: CPT

## 2017-01-12 PROCEDURE — 94760 N-INVAS EAR/PLS OXIMETRY 1: CPT

## 2017-01-12 PROCEDURE — 80048 BASIC METABOLIC PNL TOTAL CA: CPT | Performed by: INTERNAL MEDICINE

## 2017-01-12 PROCEDURE — 80202 ASSAY OF VANCOMYCIN: CPT | Performed by: INTERNAL MEDICINE

## 2017-01-12 RX ORDER — OXYCODONE HYDROCHLORIDE 5 MG/1
5 TABLET ORAL
Status: DISCONTINUED | OUTPATIENT
Start: 2017-01-12 | End: 2017-02-01

## 2017-01-12 RX ORDER — HYDROMORPHONE HYDROCHLORIDE 1 MG/ML
.5-1 INJECTION, SOLUTION INTRAMUSCULAR; INTRAVENOUS; SUBCUTANEOUS
Status: DISCONTINUED | OUTPATIENT
Start: 2017-01-12 | End: 2017-01-27

## 2017-01-12 RX ORDER — INSULIN GLARGINE 100 [IU]/ML
10 INJECTION, SOLUTION SUBCUTANEOUS DAILY
Status: DISCONTINUED | OUTPATIENT
Start: 2017-01-12 | End: 2017-01-18

## 2017-01-12 RX ADMIN — SODIUM CHLORIDE 75 ML/HR: 900 INJECTION, SOLUTION INTRAVENOUS at 16:45

## 2017-01-12 RX ADMIN — ATORVASTATIN CALCIUM 80 MG: 40 TABLET, FILM COATED ORAL at 08:22

## 2017-01-12 RX ADMIN — METOPROLOL SUCCINATE 100 MG: 100 TABLET, EXTENDED RELEASE ORAL at 08:22

## 2017-01-12 RX ADMIN — Medication 10 ML: at 14:00

## 2017-01-12 RX ADMIN — HUMAN INSULIN 5 UNITS: 100 INJECTION, SOLUTION SUBCUTANEOUS at 22:00

## 2017-01-12 RX ADMIN — HUMAN INSULIN 5 UNITS: 100 INJECTION, SOLUTION SUBCUTANEOUS at 16:42

## 2017-01-12 RX ADMIN — HYDROMORPHONE HYDROCHLORIDE 1 MG: 1 INJECTION, SOLUTION INTRAMUSCULAR; INTRAVENOUS; SUBCUTANEOUS at 10:37

## 2017-01-12 RX ADMIN — TRAMADOL HYDROCHLORIDE 50 MG: 50 TABLET, FILM COATED ORAL at 08:21

## 2017-01-12 RX ADMIN — Medication 10 ML: at 06:29

## 2017-01-12 RX ADMIN — Medication 10 ML: at 22:07

## 2017-01-12 RX ADMIN — PIPERACILLIN AND TAZOBACTAM 4.5 G: 4; .5 INJECTION, POWDER, FOR SOLUTION INTRAVENOUS at 14:00

## 2017-01-12 RX ADMIN — AMLODIPINE BESYLATE 10 MG: 10 TABLET ORAL at 08:22

## 2017-01-12 RX ADMIN — Medication 10 ML: at 23:25

## 2017-01-12 RX ADMIN — PIPERACILLIN AND TAZOBACTAM 4.5 G: 4; .5 INJECTION, POWDER, FOR SOLUTION INTRAVENOUS at 22:00

## 2017-01-12 RX ADMIN — PIPERACILLIN AND TAZOBACTAM 4.5 G: 4; .5 INJECTION, POWDER, FOR SOLUTION INTRAVENOUS at 05:40

## 2017-01-12 RX ADMIN — HUMAN INSULIN 3 UNITS: 100 INJECTION, SOLUTION SUBCUTANEOUS at 12:20

## 2017-01-12 RX ADMIN — INSULIN GLARGINE 10 UNITS: 100 INJECTION, SOLUTION SUBCUTANEOUS at 09:26

## 2017-01-12 NOTE — PROGRESS NOTES
Problem: Nutrition Deficit  Goal: *Optimize nutritional status  Nutrition  Reason for assessment: LOS Day 7  Assessment:   [facility]  Food/Nutrition Patient History:  Pt reported that he could not recall how he had been eating. He stated that he had been eating poorly for 2-3 weeks PTA. Pt stated that it would take him \"all damn day to eat a TV dinner\" when he was at home. He recalled drinking Boost at some point in his life, stating that he \"lived off those things\" but they also upset his stomach. When asked if he thought he had lost weight due to his decreased appetite, pt said yes but was not able to report how much. He said \"I know I've lost a lot but I sure ain't put any on\". Pt also stated that he weight 120#. Throughout conversation, pt continuously dozed off and had to be reawakened. Unsure how accurate of a historian pt was at time of interview due to somnolence. Anthropometrics:Height: 6' 1\" (185.4 cm),  Weight: 96.6 kg (213 lb), Weight source: not specified, Body mass index is 28.1 kg/(m^2). BMI class of overweight for age >71. Macronutrient needs:  EER:  4609-1831 kcal /day (18-22 kcal/kg listed BW)  EPR:   grams protein/day (1-1.2 grams/kg IBW; GFR 30 ml/min-MO on CKD)  Intake/Comparative Standards:  Average intake for past 5 day(s)/6 recorded meal(s): 88%. This potentially meets ~100% of kcal and ~100% of protein needs. Nutrition Diagnosis: No nutrition diagnosis at this time. Intervention:  Meals and snacks: Continue current diet.       Janiec Farah, Dietetic Intern

## 2017-01-12 NOTE — PROGRESS NOTES
Admit Date: 1/5/2017      Subjective:          Review of Systems  Cardio-vascular: no chest pain, no SOB  GI: no N/V/D  : no dysuria, no hematuria    Objective:     Patient Vitals for the past 8 hrs:   BP Temp Pulse Resp SpO2   01/12/17 1134 148/74 100.3 °F (37.9 °C) 87 15 93 %   01/12/17 0720 144/71 98.9 °F (37.2 °C) 88 15 95 %   01/12/17 0522 146/68 98.1 °F (36.7 °C) 86 18 95 %            Physical Exam:   Lungs: clear  CV: RR,  Abdomen: soft,  no rebound. Ext: trace to 1 + edema          Data Review   Recent Results (from the past 8 hour(s))   CBC W/O DIFF    Collection Time: 01/12/17  5:32 AM   Result Value Ref Range    WBC 15.6 (H) 4.3 - 11.1 K/uL    RBC 3.00 (L) 4.23 - 5.67 M/uL    HGB 8.2 (L) 13.6 - 17.2 g/dL    HCT 25.8 (L) 41.1 - 50.3 %    MCV 86.0 79.6 - 97.8 FL    MCH 27.3 26.1 - 32.9 PG    MCHC 31.8 31.4 - 35.0 g/dL    RDW 15.1 (H) 11.9 - 14.6 %    PLATELET 491 032 - 794 K/uL    MPV 10.3 (L) 10.8 - 01.4 FL   METABOLIC PANEL, BASIC    Collection Time: 01/12/17  5:32 AM   Result Value Ref Range    Sodium 145 136 - 145 mmol/L    Potassium 3.8 3.5 - 5.1 mmol/L    Chloride 113 (H) 98 - 107 mmol/L    CO2 19 (L) 21 - 32 mmol/L    Anion gap 13 7 - 16 mmol/L    Glucose 97 65 - 100 mg/dL    BUN 38 (H) 8 - 23 MG/DL    Creatinine 2.72 (H) 0.8 - 1.5 MG/DL    GFR est AA 30 (L) >60 ml/min/1.73m2    GFR est non-AA 25 (L) >60 ml/min/1.73m2    Calcium 8.2 (L) 8.3 - 10.4 MG/DL   DIFFERENTIAL, AUTO    Collection Time: 01/12/17  5:32 AM   Result Value Ref Range    NEUTROPHILS 84 (H) 43 - 78 %    LYMPHOCYTES 7 (L) 13 - 44 %    MONOCYTES 6 4.0 - 12.0 %    EOSINOPHILS 2 0.5 - 7.8 %    BASOPHILS 0 0.0 - 2.0 %    ABS. NEUTROPHILS 13.6 (H) 1.7 - 8.2 K/UL    ABS. LYMPHOCYTES 1.1 0.5 - 4.6 K/UL    ABS. MONOCYTES 0.9 0.1 - 1.3 K/UL    ABS. EOSINOPHILS 0.4 0.0 - 0.8 K/UL    ABS. BASOPHILS 0.0 0.0 - 0.2 K/UL    DF AUTOMATED      IMMATURE GRANULOCYTES 0.7 0.0 - 5.0 %    ABS. IMM.  GRANS. 0.1 0.0 - 0.5 K/UL   GLUCOSE, POC Collection Time: 01/12/17  8:04 AM   Result Value Ref Range    Glucose (POC) 112 (H) 65 - 100 mg/dL   PLEASE READ & DOCUMENT PPD TEST IN 48 HRS    Collection Time: 01/12/17  9:56 AM   Result Value Ref Range    PPD  Negative    mm Induration  mm   GLUCOSE, POC    Collection Time: 01/12/17 11:58 AM   Result Value Ref Range    Glucose (POC) 188 (H) 65 - 100 mg/dL           Assessment:     Principal Problem:    Type 2 diabetes mellitus with right diabetic foot infection ([address] (1/5/2017)    Active Problems:    Type 2 diabetes mellitus with hyperglycemia ([address] (7/29/2012)      HTN (hypertension) (7/29/2012)      Chronic systolic congestive heart failure ([address] (12/30/2015)      Overview: EF 35-40% on 2015 Echo      Kidney disease, chronic, stage III (moderate, EGFR 30-59 ml/min) (8/11/2016)      Acute renal failure with tubular necrosis ([address] (1/6/2017)      NSTEMI (non-ST elevated myocardial infarction) ([address] (1/5/2017)      Sepsis due to cellulitis ([address] (1/9/2017)        Plan:     Creat slightly better  UOP may not be accurate  Protein IF pending    Erik Haynes MD

## 2017-01-12 NOTE — PROGRESS NOTES
PLAN:  Cont management per Hospitalist  Abx -- per Hosp  Needs good BS and BP control  Vasc Surgery has seen - no imaging now due to renal function  Smoking Cessation imperative  Wound care -- plan to place Vac tomorrow -- leave dressing in place today (Wound RN and Surgeon will remove tomorrow)      ASSESSMENT:  Admit Date: 1/5/2017   * No surgery found *  Procedure(s):  AMPUTATION RIGHT 5TH TOE/ ROOM 604    Principal Problem:    Type 2 diabetes mellitus with right diabetic foot infection ([address] (1/5/2017)    Active Problems:    Type 2 diabetes mellitus with hyperglycemia ([address] (7/29/2012)      HTN (hypertension) (7/29/2012)      Chronic systolic congestive heart failure ([address] (12/30/2015)      Overview: EF 35-40% on 2015 Echo      Kidney disease, chronic, stage III (moderate, EGFR 30-59 ml/min) (8/11/2016)      Acute renal failure with tubular necrosis ([address] (1/6/2017)      NSTEMI (non-ST elevated myocardial infarction) ([address] (1/5/2017)      Sepsis due to cellulitis ([address] (1/9/2017)       HPI: Pt is a 72 yo male who presented with progressive pain and poor healing of right foot wound over 1 month. Other PMH includes DM (A1C 9.2 11/2016), CHF, CKD, current smoker. X Ray of right foot had low suspicion for osteomyelitis but does reveal soft tissue lucencies, possible gas producing infection. WBC 18.7, LA 2.2. He was started on Vanc/Zosyn in ER. Also noted to have MO with Cr 2.99, baseline 1.90. He also had elevated trops without any typical symptoms. SUBJECTIVE:  Seen by Dr. Merari Dos Santos. Resting in bed. Reports R foot pain - fair control with meds. . S/p R Vasc studies showing severe arterial disease. Remains on abx. AF, NAD, VSS. BS better.        OBJECTIVE:  Constitutional: Alert, cooperative patient in no acute distress; appears stated age   Visit Vitals    /71    Pulse 88    Temp 98.9 °F (37.2 °C)    Resp 15    Ht 6' 1\" (1.854 m)    Wt 96.6 kg (213 lb)    SpO2 95%    BMI 28.1 kg/m2     Eyes: Sclera are clear. ENMT: No obvious neck masses, no ear or lip lesions  CV: Impaired perfusion - but pulses weakly palpable  Resp: No JVD. Breathing is non-labored. No wheezing. GI: Soft, non-tender, non-distended, BS active   Musculoskeletal: Normal function. R thumb amputation  Extremities: R foot dressing intact  Neuro: Oriented, follows commands and answers questions  Psychiatric: Calm    Patient Vitals for the past 24 hrs:   BP Temp Pulse Resp SpO2 Height Weight   01/12/17 0720 144/71 98.9 °F (37.2 °C) 88 15 95 % - -   01/12/17 0522 146/68 98.1 °F (36.7 °C) 86 18 95 % - -   01/11/17 2140 151/86 98.3 °F (36.8 °C) 93 18 95 % - -   01/11/17 1649 118/75 98.4 °F (36.9 °C) 79 16 96 % - -   01/11/17 1553 137/65 98 °F (36.7 °C) 76 14 100 % - -   01/11/17 1543 130/67 - 77 22 99 % - -   01/11/17 1528 127/61 - 73 15 100 % - -   01/11/17 1523 125/62 - 74 16 99 % - -   01/11/17 1518 129/58 - 75 13 98 % - -   01/11/17 1513 137/63 - 75 18 93 % - -   01/11/17 1502 147/63 97.4 °F (36.3 °C) 79 12 96 % - -   01/11/17 1315 136/67 - 79 18 100 % - -   01/11/17 1300 133/64 - 78 18 99 % - -   01/11/17 1252 130/64 - 77 18 100 % - -   01/11/17 1247 116/56 - 79 18 99 % - -   01/11/17 1242 131/63 - 80 18 100 % - -   01/11/17 1109 155/76 99.4 °F (37.4 °C) 90 18 98 % 6' 1\" (1.854 m) 96.6 kg (213 lb)       Labs:    Recent Labs      01/12/17   0532   01/10/17   0745   01/09/17   1710   WBC  15.6*   < >  14.3*   --    --    HGB  8.2*   < >  9.1*   --    --    PLT  311   < >  358   --    --    NA  145   < >   --    < >   --    K  3.8   < >   --    < >   --    CL  113*   < >   --    < >   --    CO2  19*   < >   --    < >   --    BUN  38*   < >   --    < >   --    CREA  2.72*   < >   --    < >   --    GLU  97   < >   --    < >   --    APTT   --    --   59.0*   < >   --    TROIQ   --    --    --    --   0.23*    < > = values in this interval not displayed.      EXAM: Three-phase bone scan  History: Right lateral fifth metatarsal wound, one-month duration status post  debridement. FINDINGS: 25.8 mCi technetium 99m HDP is administered. On the flow images, there is generalized increased radiotracer activity within  the right lower extremity than the left. There is persistent abnormal  radiotracer activity within the right foot compared to the left on the blood  pool images. On the delayed images, there is localization of abnormal  radiotracer activity involving the fifth metatarsal bone. IMPRESSION: Increased radiotracer activity on all 3 phases within the right foot  compared to the left. This localizes on the delayed image to the fifth  metatarsal bone, suggesting osteomyelitis at this site      MISHA Meza      I have seen and examined the patient with the Nurse Practitioner and agree with the above assessment and plan. Lilliana Renteria.  Kim Alva MD

## 2017-01-12 NOTE — DIABETES MGMT
Pt s/p I&D right foot infection with debridement yesterday is seen by RN ANNAMARIEE for continued diabetes education. Pt is drowsy this morning. Pt did not receive any insulin yesterday, with blood glucose ranging . Prandial dosing of regular insulin held by nursing yesterday per [facility] Hospitalist notified by nursing re: FBS 97 this morning and most recent blood glucose 112. Orders written for Lantus dose decreased from 15 to 10 units daily and prandial regular insulin orders discontinued. Regular insulin sliding scale orders continued. Pt practiced using insulin demo pen earlier this week. Plan is to continue diabetes education tomorrow.

## 2017-01-12 NOTE — PROGRESS NOTES
Hospitalist Progress Note     Admit Date:  2017  5:07 PM   Name:  Loc Kimble   Age:  73 y.o.  :  1945   MRN:  [id number]   PCP:  None  Treatment Team: Attending Provider: Jayshree Reed DO; Utilization Review: Ely Chandler; Consulting Provider: Huy Benavides MD; Consulting Provider: Doron Muñoz MD; Care Manager: IRENE Luciano; Utilization Review: Deanna Hernandez RN    Subjective:   HPI:  Pt is a 72 yo male who presented with progressive pain and poor healing of right foot wound over 1 month, admitted with infected DM foot. PMH includes DM (A1C 9.2 2016), CHF, CKD, current smoker. X Ray of right foot had low suspicion for osteomyelitis but does reveal soft tissue lucencies, possible gas producing infection. Surgery and Vascular were consulted. He had bedside debridement on . He was started on Vanc/Zosyn in ER. Also noted to have MO with Cr 3.99, baseline 1.90. He also had elevated trops without any typical symptoms. : complaining of throbbing foot pain after his surgery yesterday. Some decrease in appetite, but did tolerate breakfast well today. Denies CP, SOB.     Objective:     Patient Vitals for the past 24 hrs:   Temp Pulse Resp BP SpO2   17 0720 98.9 °F (37.2 °C) 88 15 144/71 95 %   17 0522 98.1 °F (36.7 °C) 86 18 146/68 95 %   17 2140 98.3 °F (36.8 °C) 93 18 151/86 95 %   17 1649 98.4 °F (36.9 °C) 79 16 118/75 96 %   17 1553 98 °F (36.7 °C) 76 14 137/65 100 %   17 1543 - 77 22 130/67 99 %   17 1528 - 73 15 127/61 100 %   17 1523 - 74 16 125/62 99 %   17 1518 - 75 13 129/58 98 %   17 1513 - 75 18 137/63 93 %   17 1502 97.4 °F (36.3 °C) 79 12 147/63 96 %   17 1315 - 79 18 136/67 100 %   17 1300 - 78 18 133/64 99 %   17 1252 - 77 18 130/64 100 %   17 1247 - 79 18 116/56 99 %   17 1242 - 80 18 131/63 100 %   17 1109 99.4 °F (37.4 °C) 90 18 155/76 98 %     Oxygen Therapy  O2 Sat (%): 95 % (01/12/17 0720)  Pulse via Oximetry: 95 beats per minute (01/12/17 0522)  O2 Device: Nasal cannula (01/11/17 1553)  O2 Flow Rate (L/min): 2 l/min (01/11/17 1553)    Intake/Output Summary (Last 24 hours) at 01/12/17 0916  Last data filed at 01/11/17 1440   Gross per 24 hour   Intake              600 ml   Output              225 ml   Net              375 ml       General:    Well nourished. Alert and oriented. No distress  CV:   RRR. No BLE edema  Lungs:   Clear to auscultation bilaterally. Normal resp effort  Abdomen:   Soft, NTTP  Extremities:  R Foot wrapped  Skin:     No rashes or jaundice. Labs and Studies:  I have reviewed all labs, meds, telemetry events, and studies from the last 24 hours.   Recent Results (from the past 24 hour(s))   GLUCOSE, POC    Collection Time: 01/11/17 11:21 AM   Result Value Ref Range    Glucose (POC) 103 (H) 65 - 100 mg/dL   GLUCOSE, POC    Collection Time: 01/11/17  4:12 PM   Result Value Ref Range    Glucose (POC) 103 (H) 65 - 100 mg/dL   VANCOMYCIN, RANDOM    Collection Time: 01/11/17  6:08 PM   Result Value Ref Range    VANCOMYCIN,RANDOM 15.7 UG/ML   GLUCOSE, POC    Collection Time: 01/11/17  9:44 PM   Result Value Ref Range    Glucose (POC) 105 (H) 65 - 100 mg/dL   CBC W/O DIFF    Collection Time: 01/12/17  5:32 AM   Result Value Ref Range    WBC 15.6 (H) 4.3 - 11.1 K/uL    RBC 3.00 (L) 4.23 - 5.67 M/uL    HGB 8.2 (L) 13.6 - 17.2 g/dL    HCT 25.8 (L) 41.1 - 50.3 %    MCV 86.0 79.6 - 97.8 FL    MCH 27.3 26.1 - 32.9 PG    MCHC 31.8 31.4 - 35.0 g/dL    RDW 15.1 (H) 11.9 - 14.6 %    PLATELET 293 523 - 612 K/uL    MPV 10.3 (L) 10.8 - 29.1 FL   METABOLIC PANEL, BASIC    Collection Time: 01/12/17  5:32 AM   Result Value Ref Range    Sodium 145 136 - 145 mmol/L    Potassium 3.8 3.5 - 5.1 mmol/L    Chloride 113 (H) 98 - 107 mmol/L    CO2 19 (L) 21 - 32 mmol/L    Anion gap 13 7 - 16 mmol/L    Glucose 97 65 - 100 mg/dL    BUN 38 (H) 8 - 23 MG/DL Creatinine 2.72 (H) 0.8 - 1.5 MG/DL    GFR est AA 30 (L) >60 ml/min/1.73m2    GFR est non-AA 25 (L) >60 ml/min/1.73m2    Calcium 8.2 (L) 8.3 - 10.4 MG/DL   DIFFERENTIAL, AUTO    Collection Time: 01/12/17  5:32 AM   Result Value Ref Range    NEUTROPHILS 84 (H) 43 - 78 %    LYMPHOCYTES 7 (L) 13 - 44 %    MONOCYTES 6 4.0 - 12.0 %    EOSINOPHILS 2 0.5 - 7.8 %    BASOPHILS 0 0.0 - 2.0 %    ABS. NEUTROPHILS 13.6 (H) 1.7 - 8.2 K/UL    ABS. LYMPHOCYTES 1.1 0.5 - 4.6 K/UL    ABS. MONOCYTES 0.9 0.1 - 1.3 K/UL    ABS. EOSINOPHILS 0.4 0.0 - 0.8 K/UL    ABS. BASOPHILS 0.0 0.0 - 0.2 K/UL    DF AUTOMATED      IMMATURE GRANULOCYTES 0.7 0.0 - 5.0 %    ABS. IMM.  GRANS. 0.1 0.0 - 0.5 K/UL   GLUCOSE, POC    Collection Time: 01/12/17  8:04 AM   Result Value Ref Range    Glucose (POC) 112 (H) 65 - 100 mg/dL        Recent Imaging:  CXR Results  (Last 48 hours)    None        CT Results  (Last 48 hours)    None          Assessment and Plan:     Hospital Problems as of 1/12/2017  Date Reviewed: 8/11/2016          Codes Class Noted - Resolved POA    Sepsis due to cellulitis [facility]) ICD-10-CM: L03.90, A41.9  ICD-9-CM: 682.9, 995.91  1/9/2017 - Present Yes        Acute renal failure with tubular necrosis ([facility] ICD-10-CM: N17.0  ICD-9-CM: 584.5  1/6/2017 - Present Yes        * (Principal)Type 2 diabetes mellitus with right diabetic foot infection ([facility] ICD-10-CM: E11.69, L08.9  ICD-9-CM: 250.80, 686.9  1/5/2017 - Present Yes        NSTEMI (non-ST elevated myocardial infarction) ([facility] ICD-10-CM: I21.4  ICD-9-CM: 410.70  1/5/2017 - Present Yes        Kidney disease, chronic, stage III (moderate, EGFR 30-59 ml/min) (Chronic) ICD-10-CM: N18.3  ICD-9-CM: 585.3  8/11/2016 - Present Yes        Chronic systolic congestive heart failure (HCC) (Chronic) ICD-10-CM: I50.22  ICD-9-CM: 428.22, 428.0  12/30/2015 - Present Yes    Overview Signed 1/6/2017  8:59 AM by Hyacinth Yoon MD     EF 35-40% on 2015 Echo             Type 2 diabetes mellitus with hyperglycemia ([facility] ICD-10-CM: E11.65  ICD-9-CM: 250.00  7/29/2012 - Present Yes        HTN (hypertension) (Chronic) ICD-10-CM: I10  ICD-9-CM: 401.9  7/29/2012 - Present Yes        RESOLVED: Encephalopathy due to infection ICD-10-CM: G93.49, B99.9  ICD-9-CM: 348.39, 136.9  1/7/2017 - 1/9/2017 Yes              PLAN:    · Diabetic foot infection - Surgery following. Did debridement at bedside 1/6. Amputation R 5th toe yesterday. Cont vanc and zosyn, day 8. Plan to switch to PO meds after procedure, intra-op cx pending. Cultures NGTD. · NSTEMI vs demand mismatch- peaked at 0.61. No typical symptoms. Some elevation probably from ARF. Already on medical management, BB, ASA, statin. Continue to hold ACE, diuretics due to ARF. Cardiology follow up at [facility] on DC  · Loose stools - does not meet cdiff criteria. Improving. · ARF -  Improving slowly. Continue IVF. Monitor volume status closely due to CHF. Nephrology following. SPEP/UPEP pending. · HTN - better controlled. cont current regimen. Holding ACE, diuretics  · DM -   BS still low this AM, decreased Lantus to 10u, continue SSI  · CHF - stable, monitor closely. Holding diuretics due to ARF  · Appreciate specialists' help    DC planning:  PPD placed in case of placement. SW consulted.   DVT ppx:  heparin    Signed:  Anneliese Campbell MD

## 2017-01-12 NOTE — PROGRESS NOTES
Referred to [facility]. And to [facility] for short term rehab. Patient agreeable and did not have a preference for facility. Has never been to rehab before. I called and left a message with his daughter April. ? Wound vac. Will discuss with MD. Moisés Glover. [facility]      Patient has a bed offer for [place] rehab. Kirti Hill

## 2017-01-13 LAB
ALBUMIN UR ELPH-MCNC: 230.1 MG/DL
ALBUMIN/GLOB SERPL: 1 {RATIO}
ALPHA1 GLOB 24H UR ELPH-MCNC: 13.4 MG/DL
ALPHA2 GLOB SERPL ELPH-MCNC: 62.9 MG/DL
ANION GAP BLD CALC-SCNC: 11 MMOL/L (ref 7–16)
B-GLOBULIN UR QL ELPH: 70.3 MG/DL
BASOPHILS # BLD AUTO: 0 K/UL (ref 0–0.2)
BASOPHILS # BLD: 0 % (ref 0–2)
BUN SERPL-MCNC: 39 MG/DL (ref 8–23)
CALCIUM SERPL-MCNC: 7.7 MG/DL (ref 8.3–10.4)
CHLORIDE SERPL-SCNC: 111 MMOL/L (ref 98–107)
CO2 SERPL-SCNC: 20 MMOL/L (ref 21–32)
COLLECT DURATION TIME UR: 24 HR
CREAT SERPL-MCNC: 2.95 MG/DL (ref 0.8–1.5)
DIFFERENTIAL METHOD BLD: ABNORMAL
EOSINOPHIL # BLD: 0.4 K/UL (ref 0–0.8)
EOSINOPHIL NFR BLD: 3 % (ref 0.5–7.8)
ERYTHROCYTE [DISTWIDTH] IN BLOOD BY AUTOMATED COUNT: 15.2 % (ref 11.9–14.6)
GAMMA GLOB MFR UR ELPH: 92.2 MG/DL
GLUCOSE BLD STRIP.AUTO-MCNC: 133 MG/DL (ref 65–100)
GLUCOSE BLD STRIP.AUTO-MCNC: 147 MG/DL (ref 65–100)
GLUCOSE BLD STRIP.AUTO-MCNC: 183 MG/DL (ref 65–100)
GLUCOSE BLD STRIP.AUTO-MCNC: 201 MG/DL (ref 65–100)
GLUCOSE SERPL-MCNC: 198 MG/DL (ref 65–100)
HCT VFR BLD AUTO: 24.4 % (ref 41.1–50.3)
HGB BLD-MCNC: 7.8 G/DL (ref 13.6–17.2)
IMM GRANULOCYTES # BLD: 0.1 K/UL (ref 0–0.5)
IMM GRANULOCYTES NFR BLD AUTO: 0.6 % (ref 0–5)
LYMPHOCYTES # BLD AUTO: 8 % (ref 13–44)
LYMPHOCYTES # BLD: 1.1 K/UL (ref 0.5–4.6)
M PROTEIN UR-MCNC: ABNORMAL MG/DL
MCH RBC QN AUTO: 27.2 PG (ref 26.1–32.9)
MCHC RBC AUTO-ENTMCNC: 32 G/DL (ref 31.4–35)
MCV RBC AUTO: 85 FL (ref 79.6–97.8)
MONOCYTES # BLD: 0.8 K/UL (ref 0.1–1.3)
MONOCYTES NFR BLD AUTO: 5 % (ref 4–12)
NEUTS SEG # BLD: 12.1 K/UL (ref 1.7–8.2)
NEUTS SEG NFR BLD AUTO: 83 % (ref 43–78)
PLATELET # BLD AUTO: 314 K/UL (ref 150–450)
PMV BLD AUTO: 10.6 FL (ref 10.8–14.1)
POTASSIUM SERPL-SCNC: 3.9 MMOL/L (ref 3.5–5.1)
PROT 24H UR-MRATE: 4685 MG/24HR
PROT PATTERN SPEC IFE-IMP: NORMAL
PROT PATTERN UR ELPH-IMP: ABNORMAL
PROT UR-MCNC: 469 MG/DL
RBC # BLD AUTO: 2.87 M/UL (ref 4.23–5.67)
SODIUM SERPL-SCNC: 142 MMOL/L (ref 136–145)
SPECIMEN VOL ?TM UR: 999 ML
VANCOMYCIN SERPL-MCNC: 18 UG/ML
WBC # BLD AUTO: 14.4 K/UL (ref 4.3–11.1)

## 2017-01-13 PROCEDURE — 77030019952 HC CANSTR VAC ASST KCON -B

## 2017-01-13 PROCEDURE — 65270000029 HC RM PRIVATE

## 2017-01-13 PROCEDURE — 85025 COMPLETE CBC W/AUTO DIFF WBC: CPT | Performed by: SURGERY

## 2017-01-13 PROCEDURE — 74011250636 HC RX REV CODE- 250/636: Performed by: INTERNAL MEDICINE

## 2017-01-13 PROCEDURE — 74011250636 HC RX REV CODE- 250/636: Performed by: NURSE PRACTITIONER

## 2017-01-13 PROCEDURE — 74011000258 HC RX REV CODE- 258: Performed by: NURSE PRACTITIONER

## 2017-01-13 PROCEDURE — 74011250636 HC RX REV CODE- 250/636: Performed by: SURGERY

## 2017-01-13 PROCEDURE — 36415 COLL VENOUS BLD VENIPUNCTURE: CPT | Performed by: SURGERY

## 2017-01-13 PROCEDURE — 77030019934 HC DRSG VAC ASST KCON -B

## 2017-01-13 PROCEDURE — 80202 ASSAY OF VANCOMYCIN: CPT | Performed by: INTERNAL MEDICINE

## 2017-01-13 PROCEDURE — 82962 GLUCOSE BLOOD TEST: CPT

## 2017-01-13 PROCEDURE — 74011250637 HC RX REV CODE- 250/637: Performed by: INTERNAL MEDICINE

## 2017-01-13 PROCEDURE — 74011250637 HC RX REV CODE- 250/637: Performed by: NURSE PRACTITIONER

## 2017-01-13 PROCEDURE — 74011636637 HC RX REV CODE- 636/637: Performed by: INTERNAL MEDICINE

## 2017-01-13 PROCEDURE — 80048 BASIC METABOLIC PNL TOTAL CA: CPT | Performed by: SURGERY

## 2017-01-13 PROCEDURE — 74011000258 HC RX REV CODE- 258: Performed by: INTERNAL MEDICINE

## 2017-01-13 PROCEDURE — 74750000023 HC WOUND THERAPY

## 2017-01-13 RX ORDER — VANCOMYCIN/0.9 % SOD CHLORIDE 1.5G/250ML
1500 PLASTIC BAG, INJECTION (ML) INTRAVENOUS ONCE
Status: DISCONTINUED | OUTPATIENT
Start: 2017-01-13 | End: 2017-01-28

## 2017-01-13 RX ORDER — HYDROMORPHONE HYDROCHLORIDE 1 MG/ML
2 INJECTION, SOLUTION INTRAMUSCULAR; INTRAVENOUS; SUBCUTANEOUS AS NEEDED
Status: DISCONTINUED | OUTPATIENT
Start: 2017-01-13 | End: 2017-01-27

## 2017-01-13 RX ORDER — METRONIDAZOLE 500 MG/1
500 TABLET ORAL 3 TIMES DAILY
Status: DISPENSED | OUTPATIENT
Start: 2017-01-13 | End: 2017-02-01

## 2017-01-13 RX ADMIN — METRONIDAZOLE 500 MG: 500 TABLET ORAL at 18:00

## 2017-01-13 RX ADMIN — INSULIN GLARGINE 10 UNITS: 100 INJECTION, SOLUTION SUBCUTANEOUS at 09:00

## 2017-01-13 RX ADMIN — Medication 10 ML: at 21:18

## 2017-01-13 RX ADMIN — HUMAN INSULIN 3 UNITS: 100 INJECTION, SOLUTION SUBCUTANEOUS at 08:53

## 2017-01-13 RX ADMIN — METRONIDAZOLE 500 MG: 500 TABLET ORAL at 22:00

## 2017-01-13 RX ADMIN — PIPERACILLIN AND TAZOBACTAM 4.5 G: 4; .5 INJECTION, POWDER, FOR SOLUTION INTRAVENOUS at 05:57

## 2017-01-13 RX ADMIN — Medication 5 ML: at 14:00

## 2017-01-13 RX ADMIN — CEFTRIAXONE 2 G: 2 INJECTION, POWDER, FOR SOLUTION INTRAMUSCULAR; INTRAVENOUS at 17:00

## 2017-01-13 RX ADMIN — ACETAMINOPHEN 650 MG: 325 TABLET, FILM COATED ORAL at 03:49

## 2017-01-13 RX ADMIN — HUMAN INSULIN 3 UNITS: 100 INJECTION, SOLUTION SUBCUTANEOUS at 11:30

## 2017-01-13 RX ADMIN — AMLODIPINE BESYLATE 10 MG: 10 TABLET ORAL at 08:53

## 2017-01-13 RX ADMIN — VANCOMYCIN HYDROCHLORIDE 1500 MG: 10 INJECTION, POWDER, LYOPHILIZED, FOR SOLUTION INTRAVENOUS at 15:41

## 2017-01-13 RX ADMIN — HYDROMORPHONE HYDROCHLORIDE 1 MG: 1 INJECTION, SOLUTION INTRAMUSCULAR; INTRAVENOUS; SUBCUTANEOUS at 11:49

## 2017-01-13 RX ADMIN — HYDROMORPHONE HYDROCHLORIDE 2 MG: 1 INJECTION, SOLUTION INTRAMUSCULAR; INTRAVENOUS; SUBCUTANEOUS at 13:59

## 2017-01-13 RX ADMIN — METOPROLOL SUCCINATE 100 MG: 100 TABLET, EXTENDED RELEASE ORAL at 08:53

## 2017-01-13 RX ADMIN — ATORVASTATIN CALCIUM 80 MG: 40 TABLET, FILM COATED ORAL at 08:53

## 2017-01-13 RX ADMIN — PIPERACILLIN AND TAZOBACTAM 4.5 G: 4; .5 INJECTION, POWDER, FOR SOLUTION INTRAVENOUS at 14:00

## 2017-01-13 NOTE — PROGRESS NOTES
OT Note: Patient just received dilaudid and had his wound vac put on. Plan to check back latera as schedule permits.

## 2017-01-13 NOTE — WOUND CARE
Right 5th toe amputation site dressing change, wound vac applied, wound base pale pink, macerated edges, wound is [id number] with 7cm tract under incision towards ankle/ plantar surface,  sutures at proximal portion of incision approximated. Will monitor. Short discussion with patient and family on importance of controlling blood sugar by taking medications including insulin as prescribed, patient verbalized understanding.

## 2017-01-13 NOTE — PROGRESS NOTES
PLAN:  Cont management per Hospitalist  Abx -- per [facility] and ID  Needs good BS and BP control  Vasc Surgery has seen - abnormal vasc studies to RLE, no imaging now due to renal function  Smoking Cessation imperative  Wound care -- VAC placed. Change MWF  Follow OR cultures  Follow wound as may declare arterial insufficiency that may dictate need for imaging/intervention to improve arterial blood flow      ASSESSMENT:  Admit Date: 1/5/2017   POD# 2  Procedure(s):  I&D OF DEEP SPACE R FOOT INFECTION WITH DEBRIDEMENT OF SKIN, SOFT TISSUE AND BONE INCLUDING RIGHT 5TH TOE AND METATARSAL, BONE BIOPSY FOR CULTURE    Principal Problem:    Type 2 diabetes mellitus with right diabetic foot infection ([address] (1/5/2017)    ----necrotizing infection with acute osteomyelitis and abscess in face of arterial disease with insufficiency. Foot debrided of infection including 5th toe and 5th metatarsal.    Active Problems:    Type 2 diabetes mellitus with hyperglycemia ([address] (7/29/2012)      HTN (hypertension) (7/29/2012)      Chronic systolic congestive heart failure ([address] (12/30/2015)      Overview: EF 35-40% on 2015 Echo      Kidney disease, chronic, stage III (moderate, EGFR 30-59 ml/min) (8/11/2016)      Acute renal failure with tubular necrosis ([address] (1/6/2017)      NSTEMI (non-ST elevated myocardial infarction) ([address] (1/5/2017)      Sepsis due to cellulitis ([address] (1/9/2017)       HPI: Pt is a 72 yo male who presented with progressive pain and poor healing of right foot wound over 1 month. Other PMH includes DM (A1C 9.2 11/2016), CHF, CKD, current smoker. X Ray of right foot had low suspicion for osteomyelitis but does reveal soft tissue lucencies, possible gas producing infection. WBC 18.7, LA 2.2. He was started on Vanc/Zosyn in ER. Also noted to have MO with Cr 2.99, baseline 1.90. He also had elevated trops without any typical symptoms.     R Vasc studies showing severe arterial disease, but RF poor and dye would likely push patient to dialysis    SUBJECTIVE:  Seen by me with Zacarias Wilkinson, wound care nurse. Resting in bed. Reports R foot pain - fair control with meds. . Remains on abx. AF, NAD, VSS. Received 2 mg Dilaudid for dressing change with good effect. Daughter present. OBJECTIVE:  Constitutional: Alert, cooperative patient in no acute distress; appears stated age   Visit Vitals    /78 (BP 1 Location: Left arm, BP Patient Position: At rest)    Pulse 85    Temp 98.9 °F (37.2 °C)    Resp 17    Ht 6' 1\" (1.854 m)    Wt 213 lb (96.6 kg)    SpO2 95%    BMI 28.1 kg/m2     Eyes: Sclera are clear. ENMT: No obvious neck masses, no ear or lip lesions  CV: Impaired perfusion - but pulses weakly palpable  Resp: No JVD. Breathing is non-labored. No wheezing. GI: Soft, non-tender, non-distended, BS active   Musculoskeletal: Normal function. R thumb amputation  Extremities: R foot dressing removed. No purulence seen, some erythema to plantar area but suspect more bruising than active abscess which appears drained. Proximal wound is pink and viable, distal wound with some gray tissue, periwound skin with moisture and some maceration. Wound 9 x 4 x 2 cm (with 7 cm tract to plantar space). VAC placed.   Neuro: Oriented, follows commands and answers questions  Psychiatric: Calm though delivers expletive phrase or two in response to pain    Patient Vitals for the past 24 hrs:   BP Temp Pulse Resp SpO2   01/13/17 1138 135/78 98.9 °F (37.2 °C) 85 17 95 %   01/13/17 0708 132/75 98.6 °F (37 °C) 78 17 93 %   01/13/17 0332 (!) 139/100 (!) 100.6 °F (38.1 °C) 90 20 95 %   01/12/17 2312 159/84 99.4 °F (37.4 °C) 88 20 95 %   01/12/17 1919 136/79 98.8 °F (37.1 °C) 85 20 94 %   01/12/17 1609 121/76 98.1 °F (36.7 °C) 87 15 95 %       Labs:    Recent Labs      01/13/17   0522   WBC  14.4*   HGB  7.8*   PLT  314   NA  142   K  3.9   CL  111*   CO2  20*   BUN  39*   CREA  2.95*   GLU  198*     EXAM: Three-phase bone scan  History: Right lateral fifth metatarsal wound, one-month duration status post  debridement. FINDINGS: 25.8 mCi technetium 99m HDP is administered. On the flow images, there is generalized increased radiotracer activity within  the right lower extremity than the left. There is persistent abnormal  radiotracer activity within the right foot compared to the left on the blood  pool images. On the delayed images, there is localization of abnormal  radiotracer activity involving the fifth metatarsal bone. IMPRESSION: Increased radiotracer activity on all 3 phases within the right foot  compared to the left. This localizes on the delayed image to the fifth  metatarsal bone, suggesting osteomyelitis at this site      Pedro Reece.  Cecile Duque MD

## 2017-01-13 NOTE — DIABETES MGMT
Pt s/p I&D right foot infection with debridement 1/11/16 is seen by RN LINH for continued diabetes education. Pt is alert and oriented. Pt says he is not going to eat any of his breakfast this morning, because, \"I just don't want it\". Reviewed with pt Lantus dose decreased from 15 to 10 units daily and prandial regular insulin orders discontinued yesterday, as pt was eating very little. Regular insulin sliding scale orders continued. Pt did eat some yesterday with blood glucose ranging  yesterday and 23 units insulin given ( 10 units Lantus;13 units Humalog). Blood glucose ranging 198-201 this morning. Pt practiced using insulin demo pen earlier this week. Pt refuses to practice insulin self-injection today stating, \"I'm not going to take insulin at home\". Discussed with pt importance of compliance with diabetes medication regimen for wound healing. Reviewed target goals for blood glucose. Pt not engaging in discussion at this time. Plan is to continue diabetes education next week as pt is willing to participate. Case management is working on short term rehab placement.   Pt has no questions at this time.

## 2017-01-13 NOTE — INTERDISCIPLINARY ROUNDS
Interdisciplinary team rounds were held 1/13/2017 with the following team members:Care Management, Nursing, Pastoral Care, Pharmacy and Physician. Plan of care discussed. See clinical pathway and/or care plan for interventions and desired outcomes.

## 2017-01-13 NOTE — DIABETES MGMT
RN LINH reviewed once again proper use of insulin pens with pt's goddaughter at her request. She was then able to return demonstrate use of insulin pen using injection model. Pt verbalize no interest in insulin pen demonstration at this time, but he was able to demonstrate use of insulin pen with some verbal cuing earlier this week. Stressed once again importance of compliance with insulin regimen and diet, as well as  the importance of blood glucose monitoring. Recommended frequency of qid ac, hs, and to record in log book to take to PCP appointment. Pt's goddaughter verbalizes understanding; while pt continues not participating in discussions related to diabetes care. Case management is working on transfer to rehab. Pt and his goddaughter have no further questions at this time.

## 2017-01-13 NOTE — PROGRESS NOTES
PT Note:  Pt's chart reviewed and treatment attempted. Per RN, pt is having wound-vac placed right now and should try again later. Will re-attempt as schedule and pt status allow.   Thanks,  William North, PT, DPT

## 2017-01-13 NOTE — PROGRESS NOTES
Hospitalist Progress Note    2017  Admit Date: 2017  5:07 PM   NAME: Miak Lawson   :  1945   MRN:  [id number]   Attending: Jarrell Higgins DO  PCP:  None    SUBJECTIVE:   HPI: Pt is a 74 yo male who presented with progressive pain and poor healing of right foot wound over 1 month, admitted with infected DM foot. PMH includes DM (A1C 9.2 2016), CHF, CKD, current smoker. X Ray of right foot had low suspicion for osteomyelitis but does reveal soft tissue lucencies, possible gas producing infection. Surgery and Vascular were consulted. He had bedside debridement on  and surgical I&D . He was started on Vanc/Zosyn in ER. Also noted to have MO with Cr 3.99, baseline 1.90. He also had elevated trops without any typical symptoms.  - reporting burning pain in both feet. No other complaints. Daughter at bedside. Review of Systems negative with exception of pertinent positives noted above  PHYSICAL EXAM     Visit Vitals    /78 (BP 1 Location: Left arm, BP Patient Position: At rest)    Pulse 85    Temp 98.9 °F (37.2 °C)    Resp 17    Ht 6' 1\" (1.854 m)    Wt 96.6 kg (213 lb)    SpO2 95%    BMI 28.1 kg/m2      Temp (24hrs), Av.1 °F (37.3 °C), Min:98.1 °F (36.7 °C), Max:100.6 °F (38.1 °C)    Oxygen Therapy  O2 Sat (%): 95 % (17 1138)  Pulse via Oximetry: 95 beats per minute (17 0522)  O2 Device: Nasal cannula (17 1553)  O2 Flow Rate (L/min): 2 l/min (17 1553)    Intake/Output Summary (Last 24 hours) at 17 1446  Last data filed at 17 0332   Gross per 24 hour   Intake              240 ml   Output              800 ml   Net             -560 ml      General: No acute distress    Lungs:  CTA Bilaterally.    Heart:  Regular rate and rhythm,  No murmur, rub, or gallop  Abdomen: Soft, Non distended, Non tender, Positive bowel sounds  Extremities: No cyanosis, clubbing or edema  Neurologic:  No focal deficits    [address] Problems    Diagnosis Date Noted    Sepsis due to cellulitis ([facility] 01/09/2017    Acute renal failure with tubular necrosis ([facility] 01/06/2017    Type 2 diabetes mellitus with right diabetic foot infection ([facility] 01/05/2017    NSTEMI (non-ST elevated myocardial infarction) ([facility] 01/05/2017    Kidney disease, chronic, stage III (moderate, EGFR 30-59 ml/min) 08/11/2016    Chronic systolic congestive heart failure ([facility] 12/30/2015     EF 35-40% on 2015 Echo      Type 2 diabetes mellitus with hyperglycemia ([facility] 07/29/2012    HTN (hypertension) 07/29/2012     A/P    - Diabetic foot infection - Surgery following. Did debridement at bedside 1/6 and surgical debridement 1/12. Cont vanc and zosyn, day 9. To date cultures with scant strep growth only -final pending. Will request ID to eval for antbx course.    - NSTEMI vs demand mismatch- peaked at 0.61. No typical symptoms. Some elevation probably from ARF. Already on medical management, BB, ASA, statin. Continue to hold ACE, diuretics due to ARF. Cardiology follow up at [facility] on DC  - ARF -  Minimal improvement, nephrology following.   - HTN - better controlled. cont current regimen. Holding ACE, diuretics  - DM -fair control, continue current. Hypoglycemia improved on lower lantus  - CHF - stable, monitor closely.  Holding diuretics due to ARF    DVT Prophylaxis: scds    Signed By: Kenneth Smith DO     January 13, 2017

## 2017-01-13 NOTE — CONSULTS
Infectious Disease Consult    Today's Date: 1/13/2017   Admit Date: 1/5/2017    Impression:   · R foot acute osteomyelitis of 5th toe and MT, with deep space abscess: s/p (1/11) I&D, 5th toe/MT amputation: Cx beta strep, pending. Path pending  · Uncontrolled DM: hgb A1c 9.1  · PAD: abnormal RLE MAGGIE with occlusive disease, unable to undergo eval/intervention presently sec to MO  · Acute on CKD  · Medical non-compliance    Plan:   · Blood flow issues will limit antbx therapy of his R foot. Re-vascularized may be needed for successful wound healing and osteo treatment  · Discontinue Zosyn and Vancomycin, hopefully this will help renal issues  · Start Ceftriaxone IV and Flagyl PO: he will need 6 weeks of treatment given deep abscess. · He will need placement, he is not an ideal candidate for outpt infusion  · Needs aggressive DM management; pt has already indicated he will continue to be non-compliant outpt    Anti-infectives:   Ceftriaxone and Flagyl (1/13-  Vancomycin and Zosyn (1/5-1/13)    Subjective:   Date of Consultation:  January 13, 2017  Referring Physician: Dr. Juan Willis    Patient is a 73 y.o. male 82-year-old -American male who presented with a one-month history off slow to heal right foot wound, with acute worsening in pain radiating up to the leg. Patient is a history of poorly controlled diabetes hemoglobin A1c 9.1, CHF, CKD as well as peripheral vascular disease. Upon admission the patient was noted to have leukocytosis WBCs 18.7 and low grade fevers, lactic acid 2.2, creatinine up to 2.99 up from baseline area Of his right foot was completed noting lucency concerning for gas gangrene. He was seen by general surgery undergoing a bedside debridement on 1/6/2016, no cultures were sent. Admission blood cultures done are negative. He was started on vancomycin and Zosyn and evaluated by vascular surgery and nephrology.  Arterial duplex was abnormal noting reduced MAGGIE to the right lower extremity with right peroneal artery occlusion and posterior tibial artery with slow flow. A bone scan was completed on 1/9/2017 noting positive osteomyelitis findings at the fifth metatarsal bone. His fifth toe was found to be necrotic and on 1/11/2017 he underwent an I&D and amputation of the fifth toe at the fifth metatarsal level, operative note indicating an abscess encountered at the level of the interspace and metatarsal head, extending deep into the space of the foot. Cultures positive with beta strep, pending. Metatarsal head biopsy sent for pathology also pending. Patient remains on vancomycin and Zosyn, infectious disease consulted to make further recommendations. Data obtained from chart review as patient is minimally interactive; per nursing he has periods of compliance but for the most part is belligerent/inappropriate.      Patient Active Problem List   Diagnosis Code    Type 2 diabetes mellitus with hyperglycemia ([facility] E11.65    HTN (hypertension) I10    Chest pain R07.9    Bilateral pleural effusion J90    Tobacco use Z72.0    Abnormal CT scan, chest R93.8    Chronic systolic congestive heart failure (HCC) I50.22    Cardiomyopathy (HCC) I42.9    Kidney disease, chronic, stage III (moderate, EGFR 30-59 ml/min) N18.3    Type 2 diabetes mellitus with right diabetic foot infection (HCC) E11.69, L08.9    Acute renal failure with tubular necrosis ([facility]) N17.0    NSTEMI (non-ST elevated myocardial infarction) ([address] I21.4    Sepsis due to cellulitis ([facility]) L03.90, A41.9     Past Medical History   Diagnosis Date    Abnormal CT scan, chest 12/27/2015    Acute CHF ([address] 60/50/1803    Acute systolic congestive heart failure ([address] 12/30/2015    MO (acute kidney injury) ([address] 7/29/2012    Bilateral pleural effusion 12/27/2015    Chest pain 12/26/2015    DM (diabetes mellitus), type 2 ([address] 7/29/2012    Encephalopathy due to infection 1/7/2017    HTN (hypertension) 7/29/2012    Hypoglycemia 7/29/2012    NSTEMI (non-ST elevated myocardial infarction) ([facility] [address] 2017    Tobacco use 2015      Family History   Problem Relation Age of Onset    Diabetes Mother     Kidney Disease Mother     Diabetes Father     Kidney Disease Father     Kidney Disease Sister       Social History   Substance Use Topics    Smoking status: Current Every Day Smoker     Packs/day: 1.00     Years: 45.00    Smokeless tobacco: Not on file    Alcohol use Yes      Comment: occasional     Past Surgical History   Procedure Laterality Date    Hx orthopaedic        Prior to Admission medications    Medication Sig Start Date End Date Taking? Authorizing Provider   ramipril (ALTACE) 10 mg capsule Take 1 Cap by mouth daily. 16  Yes Narayan Mo MD   atorvastatin (LIPITOR) 80 mg tablet Take 1 Tab by mouth daily. 16  Yes Narayan Mo MD   amLODIPine (NORVASC) 5 mg tablet Take 1 Tab by mouth daily. 16  Yes Narayan Mo MD   metoprolol succinate (TOPROL-XL) 100 mg tablet Take 1 Tab by mouth daily. 16  Yes Narayan Mo MD   spironolactone (ALDACTONE) 25 mg tablet Take 25 mg by mouth daily. 3/6/16  Yes Historical Provider   glipiZIDE SR (GLUCOTROL) 2.5 mg CR tablet Take  by mouth daily. Yes Historical Provider   bumetanide (BUMEX) 1 mg tablet Take 1 Tab by mouth daily. 3/10/16  Yes Narayan Mo MD   aspirin 81 mg chewable tablet Take 1 Tab by mouth daily (after breakfast). 12/30/15  Yes Tim Merritt NP       Allergies   Allergen Reactions   Heddy Sara [Hydrocodone-Acetaminophen] Itching        Review of Systems:  Review of systems not obtained due to patient factors.     Objective:     Visit Vitals    /59 (BP 1 Location: Right arm, BP Patient Position: At rest)    Pulse 84    Temp 98.6 °F (37 °C)    Resp 18    Ht 6' 1\" (1.854 m)    Wt 96.6 kg (213 lb)    SpO2 96%    BMI 28.1 kg/m2     Temp (24hrs), Av °F (37.2 °C), Min:98.1 °F (36.7 °C), Max:100.6 °F (38.1 °C)       Lines:  Peripheral IV:  Angel UE intact Physical Exam:    General:  Alert, , drowsy, well noursished, well developed, appears stated age   Eyes:  Sclera anicteric. Pupils equally round and reactive to light. Mouth/Throat: Mucous membranes normal, oral pharynx clear   Neck: Supple   Lungs:   Clear to auscultation bilaterally, good effort   CV:  Regular rate and rhythm,no murmur, click, rub or gallop   Abdomen:   Soft, obese, non-tender. bowel sounds normal. non-distended   Extremities: No cyanosis or edema   Skin: Skin color, texture, turgor normal. no acute rash or lesions. R foot: wound vac and ace: wound not examined   Lymph nodes: Cervical and supraclavicular normal   Musculoskeletal: No swelling or deformity   Lines/Devices:  Intact, no erythema, drainage or tenderness   Psych: Alert but only selectively interactive, flat affect       Data Review:     CBC:  Recent Labs      01/13/17   0522  01/12/17   0532  01/11/17   0502   WBC  14.4*  15.6*  13.7*   GRANS  83*  84*  83*   MONOS  5  6  8   EOS  3  2  3   ANEU  12.1*  13.6*  11.3*   ABL  1.1  1.1  0.8   HGB  7.8*  8.2*  9.0*   HCT  24.4*  25.8*  27.8*   PLT  314  311  305       BMP:  Recent Labs      01/13/17   0522  01/12/17   0532  01/11/17   0502   CREA  2.95*  2.72*  2.86*   BUN  39*  38*  35*   NA  142  145  144   K  3.9  3.8  3.7   CL  111*  113*  110*   CO2  20*  19*  22   AGAP  11  13  12   GLU  198*  97  87       LFTS:  No results for input(s): TBILI, ALT, SGOT, AP, TP, ALB in the last 72 hours.     Microbiology:     All Micro Results     Procedure Component Value Units Date/Time    CULTURE, [place] STAIN [[id number]] Collected:  01/11/17 1400    Order Status:  Completed Specimen:  Foot Updated:  01/13/17 1004     Special Requests: RIGHT FOOT DEEP WOUND      GRAM STAIN 0 TO 5 WBC'S/OIF       RARE  GRAM POSITIVE COCCI        Culture result: SCANT  GRAM POSITIVE COCCI  IDENTIFICATION AND SUSCEPTIBILITY TO FOLLOW       CULTURE, SAMEER [place] [[id number]] Collected:  01/11/17 1416 Order Status:  Completed Specimen:  Bone Updated:  17 0919     Special Requests: 5TH METATARSAL HEAD      GRAM STAIN NO  WBCS SEEN         NO DEFINITE ORGANISM SEEN        Culture result: SCANT  STREPTOCOCCI, BETA HEMOLYTIC                 IDENTIFICATION AND SUSCEPTIBILITY TO FOLLOW              CULTURE IN PROGRESS,FURTHER UPDATES TO FOLLOW    CULTURE, ANAEROBIC [[id number]] Collected:  17 1400    Order Status:  Completed Specimen:  Foot Updated:  17 0858     Special Requests: RIGHT FOOT DEEP WOUND      Culture result:         SUBCULTURE IS NECESSARY TO DETERMINE PRESENCE OR ABSENCE OF ANAEROBIC BACTERIA IN THIS CULTURE. FURTHER REPORT TO FOLLOW AFTER INCUBATION OF SUBCULTURE. CULTURE, ANAEROBIC [[id number]] Collected:  17 1416    Order Status:  Completed Specimen:  Bone Updated:  17 0857     Special Requests: 5TH METATARSAL HEAD      Culture result:         SUBCULTURE IS NECESSARY TO DETERMINE PRESENCE OR ABSENCE OF ANAEROBIC BACTERIA IN THIS CULTURE. FURTHER REPORT TO FOLLOW AFTER INCUBATION OF SUBCULTURE. CULTURE, BLOOD [[id number]] Collected:  17 1820    Order Status:  Completed Specimen:  Blood from Blood Updated:  01/10/17 0833     Special Requests: RIGHT ANTECUBITAL        Culture result: NO GROWTH 5 DAYS       CULTURE, BLOOD [[id number]] Collected:  17 1800    Order Status:  Completed Specimen:  Blood from Blood Updated:  01/10/17 0833     Special Requests: LEFT ANTECUBITAL        Culture result: NO GROWTH 5 DAYS       C. DIFFICILE/EPI PCR [[id number]] Collected:  17 1300    Order Status:  Canceled Specimen:  Stool           Imagin/8/17: Bone scan  IMPRESSION: Increased radiotracer activity on all 3 phases within the right foot compared to the left. This localizes on the delayed image to the fifth  metatarsal bone, suggesting osteomyelitis at this site.     17 RLE arterial duplex  IMPRESSION: Abnormal right MAGGIE consistent with severe arterial disease. Noncompressible left lower extremity tibial arteries. Diminished right great toe  pressure. 1/5/17 R foot Xray  Impression:  1. No strong evidence for osteomyelitis. A three phase bone scan or contrasted MRI can be considered if there is continued concern for osteomyelitis.   2. Lucencies in the soft tissues about the proximal phalanx of the fifth digit. Soft tissue gas as can occur with a gas producing infection (i.e. gas gangrene)  cannot be excluded.     Signed By: Melissa No NP     January 13, 2017

## 2017-01-13 NOTE — PROGRESS NOTES
Pharmacokinetic Consult to Pharmacist    Jennifer Les is a 73 y.o. male being treated for Diabetic Foot Infection/osteomyelitis with Vancomycin and Zosyn. Went to OR for amputation of toe on 1/11/17. Height: 6' 1\" (185.4 cm)  Weight: 96.6 kg (213 lb)  Lab Results   Component Value Date/Time    BUN 39 01/13/2017 05:22 AM    Creatinine 2.95 01/13/2017 05:22 AM    WBC 14.4 01/13/2017 05:22 AM    Procalcitonin 0.2 01/05/2017 06:20 PM      Estimated Creatinine Clearance: 28.9 mL/min (based on Cr of 2.95). CULTURES:  1/5   Blood X 2   NG x 5 days, Final    Lab Results   Component Value Date/Time    VANCOMYCIN,RANDOM 18.0 01/13/2017 11:43 AM       Day 9 of vancomycin. Goal trough 18-22 for osteomyelitis. Patient still with MO, so will continue to dose per random levels. Random level drawn today at 1143 resulted at 18.0, so will give 1500 mg X 1. Next random level as clinically indicated, likely 24-36 hours post-dose would be appropriate given current renal function. Now that amputation completed, might be able to switch to oral therapy. Pharmacy will continue to follow. Please call with any questions.     Thank you,  Agnes Gabriel, PharmD  Clinical Pharmacist  [phone]

## 2017-01-13 NOTE — PROGRESS NOTES
Admit Date: 1/5/2017      Subjective:      Says pain is controlled. Denies other complaints. Review of Systems  Cardio-vascular: no chest pain, no SOB  GI: no N/V/D  : no dysuria, no hematuria    Objective:     Patient Vitals for the past 8 hrs:   BP Temp Pulse Resp SpO2   01/13/17 1513 133/59 98.6 °F (37 °C) 84 18 96 %   01/13/17 1138 135/78 98.9 °F (37.2 °C) 85 17 95 %            Physical Exam:   Lungs: coarse  CV: RR,  Abdomen: soft,  no rebound. Ext: 2-3+ BLE edema. Dressing and wound vac to RLE. Psych: flat affect          Data Review   Recent Results (from the past 8 hour(s))   GLUCOSE, POC    Collection Time: 01/13/17 11:33 AM   Result Value Ref Range    Glucose (POC) 183 (H) 65 - 100 mg/dL   VANCOMYCIN, RANDOM    Collection Time: 01/13/17 11:43 AM   Result Value Ref Range    VANCOMYCIN,RANDOM 18.0 UG/ML           Assessment:     Principal Problem:    Type 2 diabetes mellitus with right diabetic foot infection ([address] (1/5/2017)    Active Problems:    Type 2 diabetes mellitus with hyperglycemia ([address] (7/29/2012)      HTN (hypertension) (7/29/2012)      Chronic systolic congestive heart failure ([facility] (12/30/2015)      Overview: EF 35-40% on 2015 Echo      Kidney disease, chronic, stage III (moderate, EGFR 30-59 ml/min) (8/11/2016)      Acute renal failure with tubular necrosis ([address] (1/6/2017)      NSTEMI (non-ST elevated myocardial infarction) ([facility] [address] (1/5/2017)      Sepsis due to cellulitis ([facility] [address] (1/9/2017)        Plan:     -- MO on CKD: Cr stable. (peaked at 3.97, Cr=1.87 in 2/2016) Non-oliguric. Protein elevated on urine IF-- await final result. Stop IVFs. Will change Zosyn dose for renal dosing. -- Diabetic foot infection  -- HTN: ACEi and diuretics on hold. BP acceptable.   -- CHF      Samantha Solorzano NP

## 2017-01-14 LAB
ANION GAP BLD CALC-SCNC: 14 MMOL/L (ref 7–16)
BUN SERPL-MCNC: 38 MG/DL (ref 8–23)
CALCIUM SERPL-MCNC: 8 MG/DL (ref 8.3–10.4)
CHLORIDE SERPL-SCNC: 113 MMOL/L (ref 98–107)
CO2 SERPL-SCNC: 18 MMOL/L (ref 21–32)
CREAT SERPL-MCNC: 2.65 MG/DL (ref 0.8–1.5)
CRP SERPL-MCNC: 17.6 MG/DL (ref 0–0.9)
ERYTHROCYTE [DISTWIDTH] IN BLOOD BY AUTOMATED COUNT: 15.2 % (ref 11.9–14.6)
ERYTHROCYTE [SEDIMENTATION RATE] IN BLOOD: 120 MM/HR (ref 0–30)
GLUCOSE BLD STRIP.AUTO-MCNC: 132 MG/DL (ref 65–100)
GLUCOSE BLD STRIP.AUTO-MCNC: 188 MG/DL (ref 65–100)
GLUCOSE BLD STRIP.AUTO-MCNC: 204 MG/DL (ref 65–100)
GLUCOSE BLD STRIP.AUTO-MCNC: 217 MG/DL (ref 65–100)
GLUCOSE SERPL-MCNC: 127 MG/DL (ref 65–100)
HCT VFR BLD AUTO: 23.8 % (ref 41.1–50.3)
HGB BLD-MCNC: 7.5 G/DL (ref 13.6–17.2)
HIV1 P24 AG SERPL QL IA: NONREACTIVE
HIV1+2 AB SERPL QL IA: NONREACTIVE
MCH RBC QN AUTO: 27.3 PG (ref 26.1–32.9)
MCHC RBC AUTO-ENTMCNC: 31.5 G/DL (ref 31.4–35)
MCV RBC AUTO: 86.5 FL (ref 79.6–97.8)
PLATELET # BLD AUTO: 316 K/UL (ref 150–450)
PMV BLD AUTO: 10.3 FL (ref 10.8–14.1)
POTASSIUM SERPL-SCNC: 4 MMOL/L (ref 3.5–5.1)
RBC # BLD AUTO: 2.75 M/UL (ref 4.23–5.67)
SODIUM SERPL-SCNC: 145 MMOL/L (ref 136–145)
WBC # BLD AUTO: 13.3 K/UL (ref 4.3–11.1)

## 2017-01-14 PROCEDURE — 74011250637 HC RX REV CODE- 250/637: Performed by: NURSE PRACTITIONER

## 2017-01-14 PROCEDURE — 97166 OT EVAL MOD COMPLEX 45 MIN: CPT

## 2017-01-14 PROCEDURE — 82962 GLUCOSE BLOOD TEST: CPT

## 2017-01-14 PROCEDURE — 85027 COMPLETE CBC AUTOMATED: CPT | Performed by: INTERNAL MEDICINE

## 2017-01-14 PROCEDURE — 97530 THERAPEUTIC ACTIVITIES: CPT

## 2017-01-14 PROCEDURE — 65270000029 HC RM PRIVATE

## 2017-01-14 PROCEDURE — 74750000023 HC WOUND THERAPY

## 2017-01-14 PROCEDURE — 74011250636 HC RX REV CODE- 250/636: Performed by: NURSE PRACTITIONER

## 2017-01-14 PROCEDURE — 74011250637 HC RX REV CODE- 250/637: Performed by: INTERNAL MEDICINE

## 2017-01-14 PROCEDURE — 86140 C-REACTIVE PROTEIN: CPT | Performed by: INTERNAL MEDICINE

## 2017-01-14 PROCEDURE — 85652 RBC SED RATE AUTOMATED: CPT | Performed by: INTERNAL MEDICINE

## 2017-01-14 PROCEDURE — 74011000258 HC RX REV CODE- 258: Performed by: NURSE PRACTITIONER

## 2017-01-14 PROCEDURE — 74011636637 HC RX REV CODE- 636/637: Performed by: INTERNAL MEDICINE

## 2017-01-14 PROCEDURE — 87389 HIV-1 AG W/HIV-1&-2 AB AG IA: CPT | Performed by: INTERNAL MEDICINE

## 2017-01-14 PROCEDURE — 36415 COLL VENOUS BLD VENIPUNCTURE: CPT | Performed by: INTERNAL MEDICINE

## 2017-01-14 PROCEDURE — 74011250636 HC RX REV CODE- 250/636: Performed by: SURGERY

## 2017-01-14 PROCEDURE — 80048 BASIC METABOLIC PNL TOTAL CA: CPT | Performed by: INTERNAL MEDICINE

## 2017-01-14 PROCEDURE — 74011250637 HC RX REV CODE- 250/637: Performed by: FAMILY MEDICINE

## 2017-01-14 RX ORDER — PANTOPRAZOLE SODIUM 40 MG/1
40 TABLET, DELAYED RELEASE ORAL
Status: DISCONTINUED | OUTPATIENT
Start: 2017-01-15 | End: 2017-02-01

## 2017-01-14 RX ADMIN — Medication 10 ML: at 22:00

## 2017-01-14 RX ADMIN — Medication 5 ML: at 14:00

## 2017-01-14 RX ADMIN — METOPROLOL SUCCINATE 100 MG: 100 TABLET, EXTENDED RELEASE ORAL at 09:00

## 2017-01-14 RX ADMIN — METRONIDAZOLE 500 MG: 500 TABLET ORAL at 21:12

## 2017-01-14 RX ADMIN — Medication 30 ML: at 23:53

## 2017-01-14 RX ADMIN — HUMAN INSULIN 3 UNITS: 100 INJECTION, SOLUTION SUBCUTANEOUS at 22:00

## 2017-01-14 RX ADMIN — AMLODIPINE BESYLATE 10 MG: 10 TABLET ORAL at 10:41

## 2017-01-14 RX ADMIN — HYDROMORPHONE HYDROCHLORIDE 1 MG: 1 INJECTION, SOLUTION INTRAMUSCULAR; INTRAVENOUS; SUBCUTANEOUS at 16:51

## 2017-01-14 RX ADMIN — INSULIN GLARGINE 10 UNITS: 100 INJECTION, SOLUTION SUBCUTANEOUS at 09:44

## 2017-01-14 RX ADMIN — METRONIDAZOLE 500 MG: 500 TABLET ORAL at 16:00

## 2017-01-14 RX ADMIN — ATORVASTATIN CALCIUM 80 MG: 40 TABLET, FILM COATED ORAL at 10:41

## 2017-01-14 RX ADMIN — METRONIDAZOLE 500 MG: 500 TABLET ORAL at 10:41

## 2017-01-14 RX ADMIN — Medication 10 ML: at 06:00

## 2017-01-14 RX ADMIN — HYDROMORPHONE HYDROCHLORIDE 1 MG: 1 INJECTION, SOLUTION INTRAMUSCULAR; INTRAVENOUS; SUBCUTANEOUS at 02:50

## 2017-01-14 RX ADMIN — HUMAN INSULIN 3 UNITS: 100 INJECTION, SOLUTION SUBCUTANEOUS at 11:30

## 2017-01-14 RX ADMIN — CEFTRIAXONE 2 G: 2 INJECTION, POWDER, FOR SOLUTION INTRAMUSCULAR; INTRAVENOUS at 17:00

## 2017-01-14 RX ADMIN — OXYCODONE HYDROCHLORIDE 5 MG: 5 TABLET ORAL at 14:43

## 2017-01-14 RX ADMIN — HUMAN INSULIN 3 UNITS: 100 INJECTION, SOLUTION SUBCUTANEOUS at 16:30

## 2017-01-14 NOTE — PROGRESS NOTES
Pt rested throughout the night with c/o pain. Medicated per MAR. Wound vac with no drainage this shift. Jacob c,d,i. All needs met at this time.

## 2017-01-14 NOTE — PROGRESS NOTES
Spoke with primary rn about pt hx ckd 3 with creatinine 2.65 pt not a candidate for picc line without renal approval primary rn will speak with MD

## 2017-01-14 NOTE — PROGRESS NOTES
Admit Date: 1/5/2017      Subjective:     Complains about limited mobility. Reports that the last time he was this swollen, it started to affect his lungs/ breathing. Review of Systems  Cardio-vascular: no chest pain, no SOB  GI: no N/V/D  : no dysuria, no hematuria    Objective:     Patient Vitals for the past 8 hrs:   BP Temp Pulse Resp SpO2   01/14/17 0815 153/75 99 °F (37.2 °C) 89 18 92 %   01/14/17 0437 164/84 98.5 °F (36.9 °C) 89 20 92 %            Physical Exam:   Lungs: coarse  CV: RR  Abdomen: soft,  no rebound. Ext: 2-3+ BLE edema. Dressing and wound vac to RLE.    Psych: alert, some what agitated          Data Review   Recent Results (from the past 8 hour(s))   CBC W/O DIFF    Collection Time: 01/14/17  5:46 AM   Result Value Ref Range    WBC 13.3 (H) 4.3 - 11.1 K/uL    RBC 2.75 (L) 4.23 - 5.67 M/uL    HGB 7.5 (L) 13.6 - 17.2 g/dL    HCT 23.8 (L) 41.1 - 50.3 %    MCV 86.5 79.6 - 97.8 FL    MCH 27.3 26.1 - 32.9 PG    MCHC 31.5 31.4 - 35.0 g/dL    RDW 15.2 (H) 11.9 - 14.6 %    PLATELET 893 166 - 469 K/uL    MPV 10.3 (L) 10.8 - 46.1 FL   METABOLIC PANEL, BASIC    Collection Time: 01/14/17  5:46 AM   Result Value Ref Range    Sodium 145 136 - 145 mmol/L    Potassium 4.0 3.5 - 5.1 mmol/L    Chloride 113 (H) 98 - 107 mmol/L    CO2 18 (L) 21 - 32 mmol/L    Anion gap 14 7 - 16 mmol/L    Glucose 127 (H) 65 - 100 mg/dL    BUN 38 (H) 8 - 23 MG/DL    Creatinine 2.65 (H) 0.8 - 1.5 MG/DL    GFR est AA 31 (L) >60 ml/min/1.73m2    GFR est non-AA 26 (L) >60 ml/min/1.73m2    Calcium 8.0 (L) 8.3 - 10.4 MG/DL   C REACTIVE PROTEIN, QT    Collection Time: 01/14/17  5:46 AM   Result Value Ref Range    C-Reactive protein 17.6 (H) 0.0 - 0.9 mg/dL   GLUCOSE, POC    Collection Time: 01/14/17  7:41 AM   Result Value Ref Range    Glucose (POC) 132 (H) 65 - 100 mg/dL           Assessment:     Principal Problem:    Type 2 diabetes mellitus with right diabetic foot infection ([facility] (1/5/2017)    Active Problems:    Type 2 diabetes mellitus with hyperglycemia ([facility] [address] (7/29/2012)      HTN (hypertension) (7/29/2012)      Chronic systolic congestive heart failure ([facility] [address] (12/30/2015)      Overview: EF 35-40% on 2015 Echo      Kidney disease, chronic, stage III (moderate, EGFR 30-59 ml/min) (8/11/2016)      Acute renal failure with tubular necrosis ([facility] [address] (1/6/2017)      NSTEMI (non-ST elevated myocardial infarction) ([facility] [address] (1/5/2017)      Sepsis due to cellulitis ([facility] [address] (1/9/2017)        Plan:     -- MO on CKD: Cr improving.  (peaked at 3.97, Cr=1.87 in 2/2016) Non-oliguric. Protein elevated on urine IF-- await final result. Stop IVFs. Abx adjusted per ID.    -- Diabetic foot infection: Notes indicated that patient may need imaging/ intervention to improve blood flow for healing. This was briefly discussed with the patient, for which he expresses he is OK with dialysis if it would save his foot. Would recommend minimizing contrast as much as possible in hopes of preventing SKIP/ exacerbating renal dysfunction. -- HTN: ACEi and diuretics on hold. BP acceptable. -- CHF: watch and consider dose of lasix if showing signs of pulmonary edema.        Stephanie Lr NP

## 2017-01-14 NOTE — PROGRESS NOTES
Problem: Mobility Impaired (Adult and Pediatric)  Goal: *Acute Goals and Plan of Care (Insert Text)  Goals reviewed and updated 17  ST - 3 days  1. Pt will roll and get to EOB with minimal assist.  2.  Pt will sit edge of bed without falling over x 10 minutes for functional activity and exercise. 3.  Pt will go sit to stand and transfer to chair with heel touch WB with moderate assist.  4.  Pt will ambulate 5' with heel touch WB and rolling walker and moderate assist.  5.  Pt will perform LE exercises with minimal assist.    LTG: 3- 7 days  1. Pt will roll and get to EOB independent. 3.  Pt will go sit to stand and transfer to chair with heel touch WB with min assist and rolling walker. 4.  Pt will ambulate 10' with heel touch WB and rolling walker and minimal assist.  5.  Pt will perform LE exercises with verbal cues. PHYSICAL THERAPY: Re-evaluation and AM 2017  INPATIENT: Hospital Day: 10  Payor: CARE IMPROVEMENT PLUS / [place] / Product Type: Managed Care Medicare /      NAME/AGE/GENDER: Jacklyn Murphy is a 73 y.o. male            PRIMARY DIAGNOSIS: Gangrene of toe ([address] Marlynn Nailer Type 2 diabetes mellitus with right diabetic foot infection ([address] Type 2 diabetes mellitus with right diabetic foot infection ([address]  Procedure(s) (LRB):  AMPUTATION RIGHT 5TH TOE/ ROOM 604 (Right)  3 Days Post-Op  ICD-10: Treatment Diagnosis: Generalized Muscle Weakness (M62.81)  Other lack of cordination (R27.8)  Difficulty in walking, Not elsewhere classified (R26.2)  Precautions/Allergies:   Lortab [hydrocodone-acetaminophen]       ASSESSMENT:      Mr. Grazyna Whaley is seen for therapy reassessment following right 5th toe amputation. He presents in supine without complaints or pain at rest (pre or post treatment). Has wound VAC on R foot. He is alert but appears apathetic today; he needs max cueing and encouragement to follow commands and does not appear motivated.  He requires max assist with bed mobility, supine<->sit transfers, and repositioning once back in supine. While sitting at edge of bed, balance initially poor and progresses to fair/fair- with positioning and cueing for weight shifts/center of gravity. LE assessment reveals decreased AROM and strength bilaterally. Unsafe to attempt standing due to impaired sitting balance and strength and pt lack of motivation. He is unable to scoot to either side so assisted with scooting to right using hercules bed and needs max assist to return to supine/reposition. Mr. Khadijah Israel appears to be doing about the same as pre-op; goals were reviewed and slightly modified but are still appropriate. Mr. Khadijah Israel is still doing fairly poorly with mobility and cognition and lacks initiative to participate, however when NP was at bedside he complaints of being weak and states \"I was walking and driving two weeks ago\". He will need to continue with therapy services in the hopes of regaining strength and mobility. Returning home at this time would not be safe and pt will need rehab at discharge. This section established at most recent assessment   PROBLEM LIST (Impairments causing functional limitations):  1. Decreased Strength  2. Decreased ADL/Functional Activities  3. Decreased Transfer Abilities  4. Decreased Ambulation Ability/Technique  5. Decreased Balance  6. Increased Pain  7. Decreased Activity Tolerance  8. Decreased [place] with Home Exercise Program  9. Decreased Cognition    INTERVENTIONS PLANNED: (Benefits and precautions of physical therapy have been discussed with the patient.)  1. Balance Exercise  2. Bed Mobility  3. Family Education  4. Gait Training  5. Home Exercise Program (HEP)  6. Range of Motion (ROM)  7. Therapeutic Activites  8. Therapeutic Exercise/Strengthening  9.  Transfer Training      TREATMENT PLAN: Frequency/Duration: 3-4 times per weel for duration of hospital stay  Rehabilitation Potential For Stated Goals: Jacqueline Coyle REHABILITATION/EQUIPMENT: (at time of discharge pending progress): Continue Skilled Therapy and Rehab. HISTORY:   History of Present Injury/Illness (Reason for Referral):  Pt is a 72 yo male who presents to ER this evening with progressive pain and poor healing of right foot wound. He reports site started as small blister maybe from a shoe about a month ago but has not improved and recently pain has worsened is now radiating up his leg. He also reports uanble to bear weight on foot in past day or so. He has not been on abx for this but states it was seen by a provider at [facility]. Other PMH includes DM which appears uncontrolled (A1C 9.2 11/2016), CHF, CKD, current smoker. X Ray of right foot has low suspicion for osteomyelitis but does reveal soft tissue lucencies, possible gas producing infection. WBC 18.7, LA 2.2. He has been started on Vanc/Zosyn in ER. Also noted to have MO with Cr 2.99, baseline 1.90. Pt denies any med changes and reports compliance with current med regimen. He denies recent or current CP, SOB, abd pain, fevers  Past Medical History/Comorbidities:   Mr. Lavinia Kehr  has a past medical history of Abnormal CT scan, chest (12/27/2015); Acute CHF ([address] (12/26/2015); Acute systolic congestive heart failure ([address] (12/30/2015); MO (acute kidney injury) ([address] (7/29/2012); Bilateral pleural effusion (12/27/2015); Chest pain (12/26/2015); DM (diabetes mellitus), type 2 ([address] (7/29/2012); Encephalopathy due to infection (1/7/2017); HTN (hypertension) (7/29/2012); Hypoglycemia (7/29/2012); NSTEMI (non-ST elevated myocardial infarction) ([address] (1/5/2017); and Tobacco use (12/27/2015). He also has no past medical history of Arthritis; Asthma; Autoimmune disease ([address] Cancer ([address] COPD; DEMENTIA; Gastrointestinal disorder; Liver disease; Other ill-defined conditions(799.89); Psychiatric disorder; PUD (peptic ulcer disease); Seizures ([facility] Sleep disorder; Stroke [facility]); or Thromboembolus ([facility] Mr. Rex García  has a past surgical history that includes orthopaedic. Social History/Living Environment:   Home Environment: Private residence  # Steps to Enter: 4  One/Two Story Residence: One story  Living Alone: No  Support Systems: Child(elpidio), Family member(s)  Patient Expects to be Discharged to[de-identified] Rehabilitation facility  Current DME Used/Available at Home: None  Prior Level of Function/Work/Activity:  Pt states he was independent PTA. Would sit on his front porch everyday and \"entertain\".   Current Medications:           Current Facility-Administered Medications:     HYDROmorphone (PF) (DILAUDID) injection 2 mg, 2 mg, IntraVENous, PRN, Rochelle Raygoza MD, 2 mg at 01/13/17 1359    cefTRIAXone (ROCEPHIN) 2 g in 0.9% sodium chloride (MBP/ADV) 50 mL, 2 g, IntraVENous, Q24H, Antonio Cardenas NP, Last Rate: 100 mL/hr at 01/13/17 1700, 2 g at 01/13/17 1700    metroNIDAZOLE (FLAGYL) tablet 500 mg, 500 mg, Oral, TID, Antonio Cardenas NP, 500 mg at 01/14/17 1041    HYDROmorphone (PF) (DILAUDID) injection 0.5-1 mg, 0.5-1 mg, IntraVENous, Q3H PRN, Rochelle Raygoza MD, 1 mg at 01/14/17 0250    insulin glargine (LANTUS) injection 10 Units, 10 Units, SubCUTAneous, DAILY, Bob Garzon MD, 10 Units at 01/13/17 0900    oxyCODONE IR (ROXICODONE) tablet 5 mg, 5 mg, Oral, Q4H PRN, Anneliese Campbell MD    insulin regular ([place] R, HUMULIN R) injection, , SubCUTAneous, AC&HS, Rena Mtz MD, Stopped at 01/13/17 1630    amLODIPine (NORVASC) tablet 10 mg, 10 mg, Oral, DAILY, Rena Mtz MD, 10 mg at 01/14/17 1041    hydrALAZINE (APRESOLINE) 20 mg/mL injection 20 mg, 20 mg, IntraVENous, Q6H PRN, Rena Mtz MD    haloperidol lactate (HALDOL) injection 2 mg, 2 mg, IntraVENous, Q4H PRN, Rena Mtz MD    0.9% sodium chloride infusion 250 mL, 250 mL, IntraVENous, PRN, Rena Mtz MD    diphenhydrAMINE (BENADRYL) injection 25 mg, 25 mg, IntraVENous, Q4H PRN, MD Sheron Rolon [facility] traMADol (ULTRAM) tablet 50 mg, 50 mg, Oral, Q6H PRN, Kimmy Khan MD, 50 mg at 01/12/17 9192    atorvastatin (LIPITOR) tablet 80 mg, 80 mg, Oral, DAILY, [facility], DO, 80 mg at 01/14/17 1041    metoprolol succinate (TOPROL-XL) tablet 100 mg, 100 mg, Oral, DAILY WITH BREAKFAST, Jarrell Higgins, DO, 100 mg at 01/13/17 0853    sodium chloride (NS) flush 5-10 mL, 5-10 mL, IntraVENous, Q8H, [facility], DO, 10 mL at 01/14/17 0600    sodium chloride (NS) flush 5-10 mL, 5-10 mL, IntraVENous, PRN, [facility], DO    acetaminophen (TYLENOL) tablet 650 mg, 650 mg, Oral, Q4H PRN, [facility], DO, 650 mg at 01/13/17 0349    ondansetron (ZOFRAN) injection 4 mg, 4 mg, IntraVENous, Q4H PRN, [facility], DO, 4 mg at 01/05/17 2230    docusate sodium (COLACE) capsule 100 mg, 100 mg, Oral, DAILY PRN, Jarrell Higgins, DO   Date Last Reviewed:  1/14/2017      Number of Personal Factors/Comorbidities that affect the Plan of Care: 3+: HIGH COMPLEXITY   EXAMINATION:   Most Recent Physical Functioning:   Gross Assessment:    Strength:   LEs:    AROM: Generally decreased, functional  Strength: Generally decreased, functional  Coordination: Generally decreased, functional               Posture:  Posture  Posture (WDL): Exceptions to WDL  Posture Assessment: Forward head, Rounded shoulders  Balance:  Poor  Sitting: Impaired  Sitting - Static: Fair (occasional)  Sitting - Dynamic: Fair (occasional) (-)  Standing:  (not assessed) Bed Mobility:    Rolling: Maximum assistance  Supine to Sit: Maximum assistance  Sit to Supine: Moderate assistance  Scooting: Maximum assistance; Total assistance  Wheelchair Mobility:  NA     Transfers: Moderate to max assist to get to perform  Sit to Stand:  (not assessed)  Gait:  Unable today             Body Structures Involved:  1. Lungs  2. Bones  3. Joints  4. Muscles  5.  Ligaments Body Functions Affected:  1. Mental  2. Sensory/Pain  3. Respiratory  4. Neuromusculoskeletal  5. Movement Related  6. Skin Related Activities and Participation Affected:  1. Learning and Applying Knowledge  2. General Tasks and Demands  3. Communication  4. Mobility  5. Self Care  6. Domestic Life  7. Interpersonal Interactions and Relationships  8. Community, Social and [place]   Number of elements that affect the Plan of Care: 4+: HIGH COMPLEXITY   CLINICAL PRESENTATION:   Presentation: Evolving clinical presentation with unstable and unpredictable characteristics: HIGH COMPLEXITY   CLINICAL DECISION MAKIN [facility] Mobility Inpatient Short Form  How much difficulty does the patient currently have. .. Unable A Lot A Little None   1. Turning over in bed (including adjusting bedclothes, sheets and blankets)? [ ] 1   [X] 2   [ ] 3   [ ] 4   2. Sitting down on and standing up from a chair with arms ( e.g., wheelchair, bedside commode, etc.)   [ ] 1   [X] 2   [ ] 3   [ ] 4   3. Moving from lying on back to sitting on the side of the bed? [ ] 1   [X] 2   [ ] 3   [ ] 4   How much help from another person does the patient currently need. .. Total A Lot A Little None   4. Moving to and from a bed to a chair (including a wheelchair)? [X] 1   [ ] 2   [ ] 3   [ ] 4   5. Need to walk in hospital room? [X] 1   [ ] 2   [ ] 3   [ ] 4   6. Climbing 3-5 steps with a railing? [X] 1   [ ] 2   [ ] 3   [ ] 4   © , Trustees of [address] [place] [address] [address] [address], under license to [employer]. All rights reserved          Score:  Initial: 10 Most Recent: 9 (Date: 17 )   Interpretation of Tool:  Represents activities that are increasingly more difficult (i.e. Bed mobility, Transfers, Gait).        Score 24 23 22-20 19-15 14-10 9-7 6       Modifier CH CI CJ CK CL CM CN         · Mobility - Walking and Moving Around:               - CURRENT STATUS:    CM - 80%-99% impaired, limited or restricted  - GOAL STATUS:           CL - 60%-79% impaired, limited or restricted               - D/C STATUS:                       ---------------To be determined---------------  Payor: CARE IMPROVEMENT PLUS [place] / Product Type: Managed Care Medicare /       Medical Necessity:     · Skilled intervention continues to be required due to debiltity. Reason for Services/Other Comments:  · Patient continues to require skilled intervention due to debility. Use of outcome tool(s) and clinical judgement create a POC that gives a: Difficult prediction of patient's progress: HIGH COMPLEXITY                 TREATMENT:   (In addition to Assessment/Re-Assessment sessions the following treatments were rendered)  Pre-treatment Symptoms/Complaints:  Pain and fatigue  Pain: Initial:   Pain Intensity 1: 0 (at rest pre and post assessment)  Post Session:        Therapeutic Activity: (    25 Minutes): Therapeutic activities including Bed mobility (rolling, scooting), supine<->sit transfers, and seated balance/posture and activity tolerance during functional tasks to improve mobility, strength, balance, coordination and activity tolerance. Required moderate to maximal assistance and increased   to promote static and dynamic balance in sitting and promote motor control of bilateral, upper extremity(s), lower extremity(s). Treatment/Session Assessment: See initial assessment above. Patients response to todays treatment session was tolerated well with no medical complications. · Response to Treatment:  Tolerated well without complications  · Interdisciplinary Collaboration:  · Registered Nurse  · After treatment position/precautions:  · Bed/Chair-wheels locked  · Bed in low position  · Call light within reach  · RN notified  · Side rails x 3  · Compliance with Program/Exercises: Will assess as treatment progresses. · Recommendations/Intent for next treatment session:   \"Next visit will focus on reduction in assistance provided\".   Total Treatment Duration:  PT Patient Time In/Time Out  Time In: 0930  Time Out: Sangita Aiken 11, DPT

## 2017-01-14 NOTE — PROGRESS NOTES
Hospitalist Progress Note    2017  Admit Date: 2017  5:07 PM   NAME: Rin Salamanca   :  1945   MRN:  [id number]   Attending: Nita Singh DO  PCP:  None    SUBJECTIVE:   HPI: Pt is a 74 yo male who presented with progressive pain and poor healing of right foot wound over 1 month, admitted with infected DM foot. PMH includes DM (A1C 9.2 2016), CHF, CKD, current smoker. X Ray of right foot had low suspicion for osteomyelitis but does reveal soft tissue lucencies, possible gas producing infection. Surgery and Vascular were consulted. He had bedside debridement on  and surgical I&D/5th toe amputation . He was started on Vanc/Zosyn in ER. Also noted to have MO with Cr 3.99, baseline 1.90. He also had elevated trops without any typical symptoms. Id has seen - changed atbx  to IV Rocephin/PO Flagyl. Wound cx pos Beta Hem Strep. Will need 6 weeks antbx     - pain improved, no acute complaints. Review of Systems negative with exception of pertinent positives noted above  PHYSICAL EXAM     Visit Vitals    /72    Pulse 93    Temp 98.8 °F (37.1 °C)    Resp 18    Ht 6' 1\" (1.854 m)    Wt 96.6 kg (213 lb)    SpO2 94%    BMI 28.1 kg/m2      Temp (24hrs), Av.6 °F (37 °C), Min:98.3 °F (36.8 °C), Max:99 °F (37.2 °C)    Oxygen Therapy  O2 Sat (%): 94 % (17 1207)  Pulse via Oximetry: 95 beats per minute (17 0522)  O2 Device: Room air (17 0969)  O2 Flow Rate (L/min): 2 l/min (17 1553)    Intake/Output Summary (Last 24 hours) at 17 1442  Last data filed at 17 0815   Gross per 24 hour   Intake              240 ml   Output             1000 ml   Net             -760 ml      General: No acute distress    Lungs:  CTA Bilaterally.    Heart:  Regular rate and rhythm,  No murmur, rub, or gallop  Abdomen: Soft, Non distended, Non tender, Positive bowel sounds  Extremities: No cyanosis, clubbing or edema  Neurologic:  No focal deficits    ASSESSMENT Active Hospital Problems    Diagnosis Date Noted    Sepsis due to cellulitis ([facility] 01/09/2017    Acute renal failure with tubular necrosis ([facility] 01/06/2017    Type 2 diabetes mellitus with right diabetic foot infection ([facility] 01/05/2017    NSTEMI (non-ST elevated myocardial infarction) ([facility] 01/05/2017    Kidney disease, chronic, stage III (moderate, EGFR 30-59 ml/min) 08/11/2016    Chronic systolic congestive heart failure ([facility] 12/30/2015     EF 35-40% on 2015 Echo      Type 2 diabetes mellitus with hyperglycemia ([facility] 07/29/2012    HTN (hypertension) 07/29/2012     A/P    - Diabetic foot infection - Surgery following. Did debridement at bedside 1/6 and amputation of right 5th toe on 1/12. Cont Rocephin/Flagyl,(ATBx day 10). Wound cx Strep pos (sens pending). Likely to need PICC on monday   - NSTEMI vs demand mismatch- peaked at 0.61. No typical symptoms. Some elevation probably from ARF. Already on medical management, BB, ASA, statin. Continue to hold ACE, diuretics due to ARF. Cardiology follow up at [facility] on DC  - ARF -  Minimal improvement, nephrology following.   - HTN - better controlled. cont current regimen. Holding ACE, diuretics  - DM -fair control, continue current. Hypoglycemia improved on lower lantus  - CHF - stable, monitor closely.  Holding diuretics due to ARF    DVT Prophylaxis: scds    Signed By: Paul Bay DO     January 14, 2017

## 2017-01-14 NOTE — PROGRESS NOTES
Problem: Self Care Deficits Care Plan (Adult)  Goal: *Acute Goals and Plan of Care (Insert Text)  1. Patient will complete lower body bathing and dressing with minimal assistance and adaptive equipment as needed. 2. Patient will complete toileting with minimal assistance. 3. Patient will tolerate 20 minutes of OT treatment with 2 rest breaks to increase activity tolerance for ADLs. 4. Patient will complete functional transfers with moderate assistance and adaptive equipment as needed. Timeframe: 7 visits       OCCUPATIONAL THERAPY: Initial Assessment, Daily Note and Treatment Day: 1st 1/14/2017  INPATIENT: Hospital Day: 10  Payor: CARE IMPROVEMENT PLUS / [place] / Product Type: [employer] /      NAME/AGE/GENDER: Zan Thomas is a 73 y.o. male        PRIMARY DIAGNOSIS:  Gangrene of toe ([facility] Rehana Lash Type 2 diabetes mellitus with right diabetic foot infection ([facility] Type 2 diabetes mellitus with right diabetic foot infection ([facility]  Procedure(s) (LRB):  AMPUTATION RIGHT 5TH TOE/ ROOM 604 (Right)  3 Days Post-Op  ICD-10: Treatment Diagnosis:        · Generalized Muscle Weakness (M62.81)  · Other lack of cordination (R27.8)   Precautions/Allergies:         Lortab [hydrocodone-acetaminophen]       ASSESSMENT:      Mr. Katarina Montiel presents supine in bed upon arrival, agreeable to therapy. At baseline, pt states he lives alone, is independent with self care and drives. Currently, pt presents with decreased activity tolerance, and decreased independence for self care, functional mobility, and ADL's. He is oriented with intermittent confusion, exhibits delayed processing and requires verbal cues to remain on task. His daughter, present for the second half of the session, states she assists her father as needed and his behavior is typical though his independence has declined. Pt requires total assist x 2 sit to stand (2 attempts) with heavy verbal cueing to weight bear on LLE.   Pt exhibits impulsivity with difficulty following commands at times. He is able to sit edge of bed x 10 minutes, occasionally holding hand rail. Pt's BUE strength and ROM are functional with mild tremor noted with extensive activities. Requires skilled OT to maximize independence with self care tasks and functional transfers. Pt returned to supine with all needs in reach, pt instructed to call for assistance; RN aware. Recommend continued rehab. This section established at most recent assessment   PROBLEM LIST (Impairments causing functional limitations):  1. Decreased Strength  2. Decreased ADL/Functional Activities  3. Decreased Transfer Abilities  4. Decreased Ambulation Ability/Technique  5. Decreased Balance  6. Decreased Activity Tolerance  7. Increased Fatigue  8. Decreased Flexibility/Joint Mobility  9. Edema/Girth  10. Decreased Knowledge of Precautions  11. Decreased Kleberg with Home Exercise Program  12. Decreased Cognition    INTERVENTIONS PLANNED: (Benefits and precautions of occupational therapy have been discussed with the patient.)  1. Activities of daily living training  2. Adaptive equipment training  3. Balance training  4. Cognitive training  5. Donning&doffing training  6. Group therapy  7. Hygiene training  8. Sensory reintergration training  9. Theraputic activity  10. Theraputic exercise      TREATMENT PLAN: Frequency/Duration: Follow patient 3x/week to address above goals. Rehabilitation Potential For Stated Goals: Good      RECOMMENDED REHABILITATION/EQUIPMENT: (at time of discharge pending progress): Continue Skilled Therapy. OCCUPATIONAL PROFILE AND HISTORY:   History of Present Injury/Illness (Reason for Referral):  Pt is a 74 yo male who presents to ER this evening with progressive pain and poor healing of right foot wound.  He reports site started as small blister maybe from a shoe about a month ago but has not improved and recently pain has worsened is now radiating up his leg. He also reports uanble to bear weight on foot in past day or so. He has not been on abx for this but states it was seen by a provider at [facility]. Other PMH includes DM which appears uncontrolled (A1C 9.2 11/2016), CHF, CKD, current smoker. X Ray of right foot has low suspicion for osteomyelitis but does reveal soft tissue lucencies, possible gas producing infection. WBC 18.7, LA 2.2. He has been started on Vanc/Zosyn in ER. Also noted to have MO with Cr 2.99, baseline 1.90. Pt denies any med changes and reports compliance with current med regimen. He denies recent or current CP, SOB, abd pain, fevers. Past Medical History/Comorbidities:   Mr. Shyann Angel  has a past medical history of Abnormal CT scan, chest (12/27/2015); Acute CHF ([address] (12/26/2015); Acute systolic congestive heart failure ([address] (12/30/2015); MO (acute kidney injury) ([address] (7/29/2012); Bilateral pleural effusion (12/27/2015); Chest pain (12/26/2015); DM (diabetes mellitus), type 2 ([address] (7/29/2012); Encephalopathy due to infection (1/7/2017); HTN (hypertension) (7/29/2012); Hypoglycemia (7/29/2012); NSTEMI (non-ST elevated myocardial infarction) ([address] (1/5/2017); and Tobacco use (12/27/2015). He also has no past medical history of Arthritis; Asthma; Autoimmune disease ([address] Cancer ([address] COPD; DEMENTIA; Gastrointestinal disorder; Liver disease; Other ill-defined conditions(799.89); Psychiatric disorder; PUD (peptic ulcer disease); Seizures ([address] Sleep disorder; Stroke [facility]); or Thromboembolus ([address] Mr. Shyann Angel  has a past surgical history that includes orthopaedic.   Social History/Living Environment:   Home Environment: Private residence  # Steps to Enter: 4  One/Two Story Residence: One story  Living Alone: No  Support Systems: Child(elpidio), Family member(s)  Patient Expects to be Discharged to[de-identified] Rehabilitation facility  Current DME Used/Available at Home: None  Tub or Shower Type: Shower  Prior Level of Function/Work/Activity:  Modified indepedent  Dominant Side:         RIGHT   Number of Personal Factors/Comorbidities that affect the Plan of Care: Expanded review of therapy/medical records (1-2):  MODERATE COMPLEXITY   ASSESSMENT OF OCCUPATIONAL PERFORMANCE[de-identified]   Activities of Daily Living:         Requires moderate to maximal assistance  Basic ADLs (From Assessment) Complex ADLs (From Assessment)   Basic ADL  Feeding: Modified independent  Oral Facial Hygiene/Grooming: Setup  Bathing: Moderate assistance  Upper Body Dressing: Setup  Lower Body Dressing: Moderate assistance  Toileting: Moderate assistance     Grooming/Bathing/Dressing Activities of Daily Living     Cognitive Retraining  Orientation Retraining: Situation  Problem Solving: General alternative solution; Identifying the problem; Identifying the task  Executive Functions: Executing cognitive plans;Regulating behavior  Organizing/Sequencing: Breaking task down;Prioritizing; Two step sequence  Attention to Task: Distractibility  Safety/Judgement: Decreased awareness of need for assistance;Decreased awareness of need for safety;Decreased insight into deficits; Fall prevention  Cues: Tactile cues provided;Verbal cues provided;Visual cues provided                       Bed/Mat Mobility  Rolling: Moderate assistance  Supine to Sit: Moderate assistance  Sit to Supine: Moderate assistance  Sit to Stand: Total assistance  Scooting: Minimum assistance          Most Recent Physical Functioning:   Gross Assessment:  AROM: Generally decreased, functional  Strength: Generally decreased, functional  Coordination: Generally decreased, functional               Posture:  Posture (WDL): Exceptions to WDL  Posture Assessment: Forward head, Rounded shoulders  Balance:  Sitting: Impaired  Sitting - Static: Fair (occasional)  Sitting - Dynamic: Fair (occasional)  Standing: Impaired  Standing - Static: Poor  Standing - Dynamic : Poor Bed Mobility:  Rolling:  Moderate assistance  Supine to Sit: Moderate assistance  Sit to Supine: Moderate assistance  Scooting: Minimum assistance  Wheelchair Mobility:     Transfers:  Sit to Stand: Total assistance      ROM:          Decreased; functional  Strength:          Decreased; functional  Mental Status:          Intermittent confusion  Activities of Daily Living:         Requires assistance  Basic ADLs (From Assessment) Complex ADLs (From Assessment)   Basic ADL  Feeding: Modified independent  Oral Facial Hygiene/Grooming: Setup  Bathing: Moderate assistance  Upper Body Dressing: Setup  Lower Body Dressing: Moderate assistance  Toileting: Moderate assistance     Grooming/Bathing/Dressing Activities of Daily Living     Cognitive Retraining  Orientation Retraining: Situation  Problem Solving: General alternative solution; Identifying the problem; Identifying the task  Executive Functions: Executing cognitive plans;Regulating behavior  Organizing/Sequencing: Breaking task down;Prioritizing; Two step sequence  Attention to Task: Distractibility  Safety/Judgement: Decreased awareness of need for assistance;Decreased awareness of need for safety;Decreased insight into deficits; Fall prevention  Cues: Tactile cues provided;Verbal cues provided;Visual cues provided                       Bed/Mat Mobility  Rolling: Moderate assistance  Supine to Sit: Moderate assistance  Sit to Supine: Moderate assistance  Sit to Stand:  Total assistance  Scooting: Minimum assistance                Patient Vitals for the past 6 hrs:       BP SpO2 Pulse   01/14/17 1207 153/72 94 % 93   01/14/17 1452 164/80 94 % 99        Mental Status  Neurologic State: Alert  Orientation Level: Oriented to person, Oriented to place  Cognition: Decreased attention/concentration, Decreased command following  Perception: Appears intact  Perseveration: No perseveration noted  Safety/Judgement: Decreased awareness of need for assistance, Decreased awareness of need for safety, Decreased insight into deficits, Fall prevention                               Physical Skills Involved:  1. Range of Motion  2. Balance  3. Mobility  4. Strength  5. Endurance Cognitive Skills Affected (resulting in the inability to perform in a timely and safe manner):  1. Attending  2. Perceiving  3. Thinking  4. Understanding  5. Problem Solving  6. Mental Sequencing  7. Learning  8. Remembering Psychosocial Skills Affected:  1. Routines and Behaviors  2. Active Use of Coping Strategies  3. Environmental Adaptations   Number of elements that affect the Plan of Care: 5+:  HIGH COMPLEXITY   CLINICAL DECISION MAKIN [facility] Mobility Inpatient Short Form  How much help from another person does the patient currently need. .. Total A Lot A Little None   1. Putting on and taking off regular lower body clothing?   [ ] 1   [x ] 2   [ ] 3   [ ] 4   2. Bathing (including washing, rinsing, drying)? [ ] 1   [x ] 2   [ ] 3   [ ] 4   3. Toileting, which includes using toilet, bedpan or urinal?   [ ] 1   [x ] 2   [ ] 3   [ ] 4   4. Putting on and taking off regular upper body clothing?   [ ] 1   [ ] 2   [x ] 3   [ ] 4   5. Taking care of personal grooming such as brushing teeth? [ ] 1   [ ] 2   [x ] 3   [ ] 4   6. Eating meals? [ ] 1   [ ] 2   [ ] 3   [x ] 4   © , Trustees of [address] [place] [address] [address] [id number], under license to [employer]. All rights reserved    Score:  Initial: 16 Most Recent: X (Date: -- )     Interpretation of Tool:  Represents activities that are increasingly more difficult (i.e. Bed mobility, Transfers, Gait). Score 24 23 22-20 19-15 14-10 9-7 6       Modifier CH CI CJ CK CL CM CN        ?  Self Care:     - CURRENT STATUS: CK - 40%-59% impaired, limited or restricted    - GOAL STATUS: CJ - 20%-39% impaired, limited or restricted    - D/C STATUS:  ---------------To be determined---------------  Payor: CARE IMPROVEMENT PLUS [place] / Product Type: [employer] Medicare /       Medical Necessity:     · Patient demonstrates good rehab potential due to higher previous functional level. Reason for Services/Other Comments:  · Patient continues to require skilled intervention due to decreased ability to perform self care. Use of outcome tool(s) and clinical judgement create a POC that gives a: MODERATE COMPLEXITY             TREATMENT:   (In addition to Assessment/Re-Assessment sessions the following treatments were rendered)      Pre-treatment Symptoms/Complaints:  Decreased ability to perform ADLs, self care, and functional mobility; decreased tolerance for activities  Pain: Initial:   Pain Intensity 1: 0 at rest, increases with movement Post Session:  0 at rest      Therapeutic Activity: (    10 minutes): Therapeutic activities including Bed transfers to improve mobility, strength, balance, coordination and dynamic movement of arm - bilateral and leg - bilateral to improve functional reaching. Required maximal   verbal and tactile cueing to promote dynamic balance in sitting. Assessment     Treatment/Session Assessment:         Response to Treatment:  In agreement to cont OT. Interdisciplinary Collaboration:   · Physical Therapist  · Occupational Therapist  · Registered Nurse     After treatment position/precautions:   · Supine in bed  · Bed/Chair-wheels locked  · Call light within reach  · RN notified  · Family at bedside     Compliance with Program/Exercises: Will assess as treatment progresses. Recommendations/Intent for next treatment session:   \"Next visit will focus on advancements to more challenging activities and reduction in assistance provided\"       Total Treatment Duration:  OT Patient Time In/Time Out  Time In: 1051  Time Out: [facility] , OTR/L

## 2017-01-15 LAB
ANION GAP BLD CALC-SCNC: 13 MMOL/L (ref 7–16)
BACTERIA SPEC CULT: NORMAL
BUN SERPL-MCNC: 37 MG/DL (ref 8–23)
CALCIUM SERPL-MCNC: 7.9 MG/DL (ref 8.3–10.4)
CHLORIDE SERPL-SCNC: 111 MMOL/L (ref 98–107)
CO2 SERPL-SCNC: 19 MMOL/L (ref 21–32)
CREAT SERPL-MCNC: 2.66 MG/DL (ref 0.8–1.5)
ERYTHROCYTE [DISTWIDTH] IN BLOOD BY AUTOMATED COUNT: 15.2 % (ref 11.9–14.6)
GLUCOSE BLD STRIP.AUTO-MCNC: 124 MG/DL (ref 65–100)
GLUCOSE BLD STRIP.AUTO-MCNC: 132 MG/DL (ref 65–100)
GLUCOSE BLD STRIP.AUTO-MCNC: 157 MG/DL (ref 65–100)
GLUCOSE BLD STRIP.AUTO-MCNC: 221 MG/DL (ref 65–100)
GLUCOSE SERPL-MCNC: 154 MG/DL (ref 65–100)
GRAM STN SPEC: NORMAL
HCT VFR BLD AUTO: 26.4 % (ref 41.1–50.3)
HGB BLD-MCNC: 8.4 G/DL (ref 13.6–17.2)
MCH RBC QN AUTO: 27.3 PG (ref 26.1–32.9)
MCHC RBC AUTO-ENTMCNC: 31.8 G/DL (ref 31.4–35)
MCV RBC AUTO: 85.7 FL (ref 79.6–97.8)
PLATELET # BLD AUTO: 350 K/UL (ref 150–450)
PMV BLD AUTO: 10.3 FL (ref 10.8–14.1)
POTASSIUM SERPL-SCNC: 3.9 MMOL/L (ref 3.5–5.1)
RBC # BLD AUTO: 3.08 M/UL (ref 4.23–5.67)
SERVICE CMNT-IMP: NORMAL
SERVICE CMNT-IMP: NORMAL
SODIUM SERPL-SCNC: 143 MMOL/L (ref 136–145)
WBC # BLD AUTO: 14.4 K/UL (ref 4.3–11.1)

## 2017-01-15 PROCEDURE — 74011000258 HC RX REV CODE- 258: Performed by: NURSE PRACTITIONER

## 2017-01-15 PROCEDURE — 65270000029 HC RM PRIVATE

## 2017-01-15 PROCEDURE — 74011250636 HC RX REV CODE- 250/636: Performed by: NURSE PRACTITIONER

## 2017-01-15 PROCEDURE — 74011250637 HC RX REV CODE- 250/637: Performed by: INTERNAL MEDICINE

## 2017-01-15 PROCEDURE — 80048 BASIC METABOLIC PNL TOTAL CA: CPT | Performed by: INTERNAL MEDICINE

## 2017-01-15 PROCEDURE — 74011636637 HC RX REV CODE- 636/637: Performed by: INTERNAL MEDICINE

## 2017-01-15 PROCEDURE — 82962 GLUCOSE BLOOD TEST: CPT

## 2017-01-15 PROCEDURE — 74011250637 HC RX REV CODE- 250/637: Performed by: NURSE PRACTITIONER

## 2017-01-15 PROCEDURE — 74011250636 HC RX REV CODE- 250/636: Performed by: SURGERY

## 2017-01-15 PROCEDURE — 74750000023 HC WOUND THERAPY

## 2017-01-15 PROCEDURE — 85027 COMPLETE CBC AUTOMATED: CPT | Performed by: INTERNAL MEDICINE

## 2017-01-15 PROCEDURE — 36415 COLL VENOUS BLD VENIPUNCTURE: CPT | Performed by: INTERNAL MEDICINE

## 2017-01-15 RX ADMIN — HUMAN INSULIN 2 UNITS: 100 INJECTION, SOLUTION SUBCUTANEOUS at 07:30

## 2017-01-15 RX ADMIN — ATORVASTATIN CALCIUM 80 MG: 40 TABLET, FILM COATED ORAL at 09:51

## 2017-01-15 RX ADMIN — INSULIN GLARGINE 10 UNITS: 100 INJECTION, SOLUTION SUBCUTANEOUS at 09:00

## 2017-01-15 RX ADMIN — AMLODIPINE BESYLATE 10 MG: 10 TABLET ORAL at 09:51

## 2017-01-15 RX ADMIN — METRONIDAZOLE 500 MG: 500 TABLET ORAL at 09:51

## 2017-01-15 RX ADMIN — Medication 10 ML: at 04:55

## 2017-01-15 RX ADMIN — CEFTRIAXONE 2 G: 2 INJECTION, POWDER, FOR SOLUTION INTRAMUSCULAR; INTRAVENOUS at 16:36

## 2017-01-15 RX ADMIN — HUMAN INSULIN 4 UNITS: 100 INJECTION, SOLUTION SUBCUTANEOUS at 22:00

## 2017-01-15 RX ADMIN — METOPROLOL SUCCINATE 100 MG: 100 TABLET, EXTENDED RELEASE ORAL at 09:51

## 2017-01-15 RX ADMIN — METRONIDAZOLE 500 MG: 500 TABLET ORAL at 22:00

## 2017-01-15 RX ADMIN — HYDROMORPHONE HYDROCHLORIDE 1 MG: 1 INJECTION, SOLUTION INTRAMUSCULAR; INTRAVENOUS; SUBCUTANEOUS at 14:07

## 2017-01-15 RX ADMIN — Medication 5 ML: at 14:00

## 2017-01-15 RX ADMIN — METRONIDAZOLE 500 MG: 500 TABLET ORAL at 16:35

## 2017-01-15 RX ADMIN — Medication 10 ML: at 22:00

## 2017-01-15 NOTE — PROGRESS NOTES
Pt rested throughout the night. Wound vac in place, dressing c,d,i. Le[place] draining yellow urine. All needs met at this time.

## 2017-01-15 NOTE — PROGRESS NOTES
Pt's family concerned that pt is not walking more and eating more. Feels like he is \"just giving up. \" will continue to monitor.

## 2017-01-15 NOTE — PROGRESS NOTES
Admit Date: 1/5/2017      Subjective:     He says he is doing the same as every day; denies specific complaints. No SOB or CP. Review of Systems  Cardio-vascular: no chest pain, no SOB  GI: no N/V/D  : no dysuria, no hematuria    Objective:     Patient Vitals for the past 8 hrs:   BP Temp Pulse Resp SpO2   01/15/17 0802 147/74 98.8 °F (37.1 °C) 90 14 94 %   01/15/17 0412 134/75 98.3 °F (36.8 °C) 87 20 97 %            Physical Exam:     General: A&O in NAD  Lungs: clear  CV: RR  Abdomen: soft,  no rebound. Ext: 2-3+ BLE edema. Dressing and wound vac to RLE.    Psych: appropriate           Data Review   Recent Results (from the past 8 hour(s))   CBC W/O DIFF    Collection Time: 01/15/17  5:21 AM   Result Value Ref Range    WBC 14.4 (H) 4.3 - 11.1 K/uL    RBC 3.08 (L) 4.23 - 5.67 M/uL    HGB 8.4 (L) 13.6 - 17.2 g/dL    HCT 26.4 (L) 41.1 - 50.3 %    MCV 85.7 79.6 - 97.8 FL    MCH 27.3 26.1 - 32.9 PG    MCHC 31.8 31.4 - 35.0 g/dL    RDW 15.2 (H) 11.9 - 14.6 %    PLATELET 983 801 - 863 K/uL    MPV 10.3 (L) 10.8 - 73.2 FL   METABOLIC PANEL, BASIC    Collection Time: 01/15/17  5:21 AM   Result Value Ref Range    Sodium 143 136 - 145 mmol/L    Potassium 3.9 3.5 - 5.1 mmol/L    Chloride 111 (H) 98 - 107 mmol/L    CO2 19 (L) 21 - 32 mmol/L    Anion gap 13 7 - 16 mmol/L    Glucose 154 (H) 65 - 100 mg/dL    BUN 37 (H) 8 - 23 MG/DL    Creatinine 2.66 (H) 0.8 - 1.5 MG/DL    GFR est AA 31 (L) >60 ml/min/1.73m2    GFR est non-AA 25 (L) >60 ml/min/1.73m2    Calcium 7.9 (L) 8.3 - 10.4 MG/DL   GLUCOSE, POC    Collection Time: 01/15/17  8:01 AM   Result Value Ref Range    Glucose (POC) 157 (H) 65 - 100 mg/dL           Assessment:     Principal Problem:    Type 2 diabetes mellitus with right diabetic foot infection ([facility] (1/5/2017)    Active Problems:    Type 2 diabetes mellitus with hyperglycemia (HCC) (7/29/2012)      HTN (hypertension) (7/29/2012)      Chronic systolic congestive heart failure ([facility] (12/30/2015)      Overview: EF 35-40% on 2015 Echo      Kidney disease, chronic, stage III (moderate, EGFR 30-59 ml/min) (8/11/2016)      Acute renal failure with tubular necrosis ([facility] (1/6/2017)      NSTEMI (non-ST elevated myocardial infarction) ([facility] [facility] (1/5/2017)      Sepsis due to cellulitis ([facility] (1/9/2017)        Plan:     -- MO on CKD: Cr stable.  (peaked at 3.97, Cr=1.87 in 2/2016) Non-oliguric. Protein elevated on urine IF-- await final result. Abx adjusted per ID.    -- Diabetic foot infection: Notes indicated that patient may need imaging/ intervention to improve blood flow for healing. This was briefly discussed with the patient, for which he expresses he is OK with dialysis if it would save his foot. Would recommend minimizing contrast as much as possible in hopes of preventing SKIP/ exacerbating renal dysfunction. ++Please avoid PICC line given renal function. -- HTN: ACEi and diuretics on hold. BP acceptable. -- CHF: watch and consider dose of lasix if showing signs of pulmonary edema.        Danuta Tucker NP

## 2017-01-15 NOTE — PROGRESS NOTES
Hospitalist Progress Note    1/15/2017  Admit Date: 2017  5:07 PM   NAME: Micha Paz   :  1945   MRN:  [id number]   Attending: Augustus Amato DO  PCP:  None    SUBJECTIVE:   HPI: Pt is a 74 yo male who presented with progressive pain and poor healing of right foot wound over 1 month, admitted with infected DM foot. PMH includes DM (A1C 9.2 2016), CHF, CKD, current smoker. X Ray of right foot had low suspicion for osteomyelitis but does reveal soft tissue lucencies, possible gas producing infection. Surgery and Vascular were consulted. He had bedside debridement on  and surgical I&D/5th toe amputation . He was started on Vanc/Zosyn in ER. Also noted to have MO with Cr 3.99, baseline 1.90. He also had elevated trops without any typical symptoms. ID has seen - changed atbx  to IV Rocephin/PO Flagyl. Wound cx pos Beta Hemolytic Strep. Will need 6 weeks antbx per ID recs     - no complains, no o/n events    Review of Systems negative with exception of pertinent positives noted above  PHYSICAL EXAM     Visit Vitals    /67 (BP 1 Location: Right arm, BP Patient Position: At rest)    Pulse 93    Temp 98.7 °F (37.1 °C)    Resp 16    Ht 6' 1\" (1.854 m)    Wt 96.6 kg (213 lb)    SpO2 94%    BMI 28.1 kg/m2      Temp (24hrs), Av.5 °F (36.9 °C), Min:98 °F (36.7 °C), Max:98.8 °F (37.1 °C)    Oxygen Therapy  O2 Sat (%): 94 % (01/15/17 1406)  Pulse via Oximetry: 95 beats per minute (17 0522)  O2 Device: Room air (17 0974)  O2 Flow Rate (L/min): 2 l/min (17 1553)    Intake/Output Summary (Last 24 hours) at 01/15/17 1530  Last data filed at 01/15/17 0408   Gross per 24 hour   Intake                0 ml   Output              500 ml   Net             -500 ml      General: No acute distress    Lungs:  CTA Bilaterally.    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 Did debridement at bedside 1/6 and amputation of right 5th toe on 1/12. Cont Rocephin/Flagyl,(ATBx day 11). Wound cx Strep pos (sens pending). - NSTEMI vs demand mismatch- peaked at 0.61. No typical symptoms. Some elevation probably from ARF. Already on medical management, BB, ASA, statin. Continue to hold ACE, diuretics due to ARF. Cardiology follow up at [facility] [place]  - ARF -  Minimal improvement, nephrology following. Likely new baseline?  - HTN - better controlled. cont current regimen. Holding ACE, diuretics  - DM -fair control, continue current. Hypoglycemia improved on lower lantus  - CHF - stable, monitor closely.  Holding diuretics due to ARF    DVT Prophylaxis: scds    Signed By: Joyce Gruber DO     January 15, 2017

## 2017-01-16 LAB
ALBUMIN SERPL BCP-MCNC: 1.2 G/DL (ref 3.2–4.6)
ALBUMIN/GLOB SERPL: 0.2 {RATIO} (ref 1.2–3.5)
ALP SERPL-CCNC: 76 U/L (ref 50–136)
ALT SERPL-CCNC: 21 U/L (ref 12–65)
ANION GAP BLD CALC-SCNC: 12 MMOL/L (ref 7–16)
AST SERPL W P-5'-P-CCNC: 26 U/L (ref 15–37)
BASOPHILS # BLD AUTO: 0 K/UL (ref 0–0.2)
BASOPHILS # BLD: 0 % (ref 0–2)
BILIRUB DIRECT SERPL-MCNC: 0.1 MG/DL
BILIRUB SERPL-MCNC: 0.3 MG/DL (ref 0.2–1.1)
BUN SERPL-MCNC: 34 MG/DL (ref 8–23)
CALCIUM SERPL-MCNC: 7.8 MG/DL (ref 8.3–10.4)
CHLORIDE SERPL-SCNC: 111 MMOL/L (ref 98–107)
CO2 SERPL-SCNC: 20 MMOL/L (ref 21–32)
CREAT SERPL-MCNC: 2.38 MG/DL (ref 0.8–1.5)
CRP SERPL-MCNC: 17.2 MG/DL (ref 0–0.9)
DIFFERENTIAL METHOD BLD: ABNORMAL
EOSINOPHIL # BLD: 0.2 K/UL (ref 0–0.8)
EOSINOPHIL NFR BLD: 1 % (ref 0.5–7.8)
ERYTHROCYTE [DISTWIDTH] IN BLOOD BY AUTOMATED COUNT: 15.1 % (ref 11.9–14.6)
ERYTHROCYTE [SEDIMENTATION RATE] IN BLOOD: >120 MM/HR (ref 0–30)
FERRITIN SERPL-MCNC: 949 NG/ML (ref 8–388)
FOLATE SERPL-MCNC: 5.4 NG/ML (ref 3.1–17.5)
GLOBULIN SER CALC-MCNC: 5.4 G/DL (ref 2.3–3.5)
GLUCOSE BLD STRIP.AUTO-MCNC: 156 MG/DL (ref 65–100)
GLUCOSE BLD STRIP.AUTO-MCNC: 161 MG/DL (ref 65–100)
GLUCOSE BLD STRIP.AUTO-MCNC: 181 MG/DL (ref 65–100)
GLUCOSE BLD STRIP.AUTO-MCNC: 213 MG/DL (ref 65–100)
GLUCOSE SERPL-MCNC: 151 MG/DL (ref 65–100)
HCT VFR BLD AUTO: 23.4 % (ref 41.1–50.3)
HGB BLD-MCNC: 7.5 G/DL (ref 13.6–17.2)
IMM GRANULOCYTES # BLD: 0 K/UL (ref 0–0.5)
IMM GRANULOCYTES NFR BLD AUTO: 0.3 % (ref 0–5)
IRON SERPL-MCNC: 29 UG/DL (ref 35–150)
LYMPHOCYTES # BLD AUTO: 10 % (ref 13–44)
LYMPHOCYTES # BLD: 1.4 K/UL (ref 0.5–4.6)
MCH RBC QN AUTO: 27.5 PG (ref 26.1–32.9)
MCHC RBC AUTO-ENTMCNC: 32.1 G/DL (ref 31.4–35)
MCV RBC AUTO: 85.7 FL (ref 79.6–97.8)
MONOCYTES # BLD: 0.6 K/UL (ref 0.1–1.3)
MONOCYTES NFR BLD AUTO: 4 % (ref 4–12)
NEUTS SEG # BLD: 12.3 K/UL (ref 1.7–8.2)
NEUTS SEG NFR BLD AUTO: 85 % (ref 43–78)
PLATELET # BLD AUTO: 342 K/UL (ref 150–450)
PMV BLD AUTO: 10.5 FL (ref 10.8–14.1)
POTASSIUM SERPL-SCNC: 3.8 MMOL/L (ref 3.5–5.1)
PROT SERPL-MCNC: 6.6 G/DL (ref 6.3–8.2)
RBC # BLD AUTO: 2.73 M/UL (ref 4.23–5.67)
SODIUM SERPL-SCNC: 143 MMOL/L (ref 136–145)
TRANSFERRIN SERPL-MCNC: 78 MG/DL (ref 202–364)
VIT B12 SERPL-MCNC: 1281 PG/ML (ref 193–986)
WBC # BLD AUTO: 14 K/UL (ref 4.3–11.1)

## 2017-01-16 PROCEDURE — 74011250636 HC RX REV CODE- 250/636: Performed by: NURSE PRACTITIONER

## 2017-01-16 PROCEDURE — 86140 C-REACTIVE PROTEIN: CPT | Performed by: INTERNAL MEDICINE

## 2017-01-16 PROCEDURE — 74011250637 HC RX REV CODE- 250/637: Performed by: INTERNAL MEDICINE

## 2017-01-16 PROCEDURE — 85027 COMPLETE CBC AUTOMATED: CPT | Performed by: INTERNAL MEDICINE

## 2017-01-16 PROCEDURE — 82607 VITAMIN B-12: CPT | Performed by: INTERNAL MEDICINE

## 2017-01-16 PROCEDURE — 83540 ASSAY OF IRON: CPT | Performed by: INTERNAL MEDICINE

## 2017-01-16 PROCEDURE — 65660000000 HC RM CCU STEPDOWN

## 2017-01-16 PROCEDURE — 80076 HEPATIC FUNCTION PANEL: CPT | Performed by: INTERNAL MEDICINE

## 2017-01-16 PROCEDURE — 97605 NEG PRS WND THER DME<=50SQCM: CPT

## 2017-01-16 PROCEDURE — 82962 GLUCOSE BLOOD TEST: CPT

## 2017-01-16 PROCEDURE — 36415 COLL VENOUS BLD VENIPUNCTURE: CPT | Performed by: INTERNAL MEDICINE

## 2017-01-16 PROCEDURE — 80048 BASIC METABOLIC PNL TOTAL CA: CPT | Performed by: INTERNAL MEDICINE

## 2017-01-16 PROCEDURE — 97530 THERAPEUTIC ACTIVITIES: CPT

## 2017-01-16 PROCEDURE — 74750000023 HC WOUND THERAPY

## 2017-01-16 PROCEDURE — 74011636637 HC RX REV CODE- 636/637: Performed by: INTERNAL MEDICINE

## 2017-01-16 PROCEDURE — 85652 RBC SED RATE AUTOMATED: CPT | Performed by: INTERNAL MEDICINE

## 2017-01-16 PROCEDURE — 74011250637 HC RX REV CODE- 250/637: Performed by: NURSE PRACTITIONER

## 2017-01-16 PROCEDURE — 74011250637 HC RX REV CODE- 250/637: Performed by: FAMILY MEDICINE

## 2017-01-16 PROCEDURE — 82728 ASSAY OF FERRITIN: CPT | Performed by: INTERNAL MEDICINE

## 2017-01-16 PROCEDURE — 84466 ASSAY OF TRANSFERRIN: CPT | Performed by: INTERNAL MEDICINE

## 2017-01-16 PROCEDURE — 74011000258 HC RX REV CODE- 258: Performed by: NURSE PRACTITIONER

## 2017-01-16 PROCEDURE — 77030019934 HC DRSG VAC ASST KCON -B

## 2017-01-16 PROCEDURE — 82746 ASSAY OF FOLIC ACID SERUM: CPT | Performed by: INTERNAL MEDICINE

## 2017-01-16 RX ADMIN — METRONIDAZOLE 500 MG: 500 TABLET ORAL at 15:45

## 2017-01-16 RX ADMIN — METRONIDAZOLE 500 MG: 500 TABLET ORAL at 21:33

## 2017-01-16 RX ADMIN — METRONIDAZOLE 500 MG: 500 TABLET ORAL at 09:21

## 2017-01-16 RX ADMIN — ATORVASTATIN CALCIUM 80 MG: 40 TABLET, FILM COATED ORAL at 09:20

## 2017-01-16 RX ADMIN — HUMAN INSULIN 4 UNITS: 100 INJECTION, SOLUTION SUBCUTANEOUS at 22:00

## 2017-01-16 RX ADMIN — METOPROLOL SUCCINATE 100 MG: 100 TABLET, EXTENDED RELEASE ORAL at 09:21

## 2017-01-16 RX ADMIN — CEFTRIAXONE 2 G: 2 INJECTION, POWDER, FOR SOLUTION INTRAMUSCULAR; INTRAVENOUS at 17:32

## 2017-01-16 RX ADMIN — AMLODIPINE BESYLATE 10 MG: 10 TABLET ORAL at 09:21

## 2017-01-16 RX ADMIN — HUMAN INSULIN 2 UNITS: 100 INJECTION, SOLUTION SUBCUTANEOUS at 17:39

## 2017-01-16 RX ADMIN — Medication 10 ML: at 15:46

## 2017-01-16 RX ADMIN — Medication 10 ML: at 22:00

## 2017-01-16 RX ADMIN — PANTOPRAZOLE SODIUM 40 MG: 40 TABLET, DELAYED RELEASE ORAL at 05:41

## 2017-01-16 RX ADMIN — INSULIN GLARGINE 10 UNITS: 100 INJECTION, SOLUTION SUBCUTANEOUS at 09:19

## 2017-01-16 RX ADMIN — HUMAN INSULIN 2 UNITS: 100 INJECTION, SOLUTION SUBCUTANEOUS at 09:19

## 2017-01-16 RX ADMIN — HUMAN INSULIN 3 UNITS: 100 INJECTION, SOLUTION SUBCUTANEOUS at 12:47

## 2017-01-16 RX ADMIN — TRAMADOL HYDROCHLORIDE 50 MG: 50 TABLET, FILM COATED ORAL at 21:33

## 2017-01-16 NOTE — PROGRESS NOTES
Problem: Mobility Impaired (Adult and Pediatric)  Goal: *Acute Goals and Plan of Care (Insert Text)  Goals reviewed and updated 17  ST - 3 days  1. Pt will roll and get to EOB with minimal assist.  2.  Pt will sit edge of bed without falling over x 10 minutes for functional activity and exercise. 3.  Pt will go sit to stand and transfer to chair with heel touch WB with moderate assist.  4.  Pt will ambulate 5' with heel touch WB and rolling walker and moderate assist.  5.  Pt will perform LE exercises with minimal assist.    LTG: 3- 7 days  1. Pt will roll and get to EOB independent. 3.  Pt will go sit to stand and transfer to chair with heel touch WB with min assist and rolling walker. 4.  Pt will ambulate 10' with heel touch WB and rolling walker and minimal assist.  5.  Pt will perform LE exercises with verbal cues. PHYSICAL THERAPY: Daily Note, Treatment Day:  and PM 2017  INPATIENT: Hospital Day: 12  Payor: CARE IMPROVEMENT PLUS / [place] / Product Type: Managed Care Medicare /      NAME/AGE/GENDER: Zan Thomas is a 73 y.o. male            PRIMARY DIAGNOSIS: Gangrene of toe ([facility] Rehana Lash Type 2 diabetes mellitus with right diabetic foot infection ([address] Type 2 diabetes mellitus with right diabetic foot infection ([facility]  Procedure(s) (LRB):  AMPUTATION RIGHT 5TH TOE/ ROOM 604 (Right)  5 Days Post-Op  ICD-10: Treatment Diagnosis: Generalized Muscle Weakness (M62.81)  Other lack of cordination (R27.8)  Difficulty in walking, Not elsewhere classified (R26.2)  Precautions/Allergies:   Lortab [hydrocodone-acetaminophen]       ASSESSMENT:      Mr. Katarina Montiel is seen for therapy reassessment following right 5th toe amputation. He presents in supine without complaints or pain at rest (pre or post treatment). Has wound VAC on R foot. He is alert and willing to try and get up. He is extremely slow and delays moving at all costs.   He sat up on the EOB with moderate assist.  He stood into the walker and an elevated bed 4 times with success. He did not stand long despite my encouragement and insistence. I wanted him to try and take some steps along the EOB which I am sure he could have done but he didn't on our last time up and then delayed getting up one more time beyond my patients tolerance. Complaints of left foot pain when standing. Limited progress. This section established at most recent assessment   PROBLEM LIST (Impairments causing functional limitations):  1. Decreased Strength  2. Decreased ADL/Functional Activities  3. Decreased Transfer Abilities  4. Decreased Ambulation Ability/Technique  5. Decreased Balance  6. Increased Pain  7. Decreased Activity Tolerance  8. Decreased [place] with Home Exercise Program  9. Decreased Cognition    INTERVENTIONS PLANNED: (Benefits and precautions of physical therapy have been discussed with the patient.)  1. Balance Exercise  2. Bed Mobility  3. Family Education  4. Gait Training  5. Home Exercise Program (HEP)  6. Range of Motion (ROM)  7. Therapeutic Activites  8. Therapeutic Exercise/Strengthening  9. Transfer Training      TREATMENT PLAN: Frequency/Duration: 3-4 times per weel for duration of hospital stay  Rehabilitation Potential For Stated Goals: [facility] REHABILITATION/EQUIPMENT: (at time of discharge pending progress): Continue Skilled Therapy and Rehab. HISTORY:   History of Present Injury/Illness (Reason for Referral):  Pt is a 72 yo male who presents to ER this evening with progressive pain and poor healing of right foot wound. He reports site started as small blister maybe from a shoe about a month ago but has not improved and recently pain has worsened is now radiating up his leg. He also reports uanble to bear weight on foot in past day or so. He has not been on abx for this but states it was seen by a provider at [facility].  Other PMH includes DM which appears uncontrolled (A1C 9.2 11/2016), CHF, CKD, current smoker. X Ray of right foot has low suspicion for osteomyelitis but does reveal soft tissue lucencies, possible gas producing infection. WBC 18.7, LA 2.2. He has been started on Vanc/Zosyn in ER. Also noted to have MO with Cr 2.99, baseline 1.90. Pt denies any med changes and reports compliance with current med regimen. He denies recent or current CP, SOB, abd pain, fevers  Past Medical History/Comorbidities:   Mr. Lavinia Kehr  has a past medical history of Abnormal CT scan, chest (12/27/2015); Acute CHF ([address] (12/26/2015); Acute systolic congestive heart failure ([address] (12/30/2015); MO (acute kidney injury) ([address] (7/29/2012); Bilateral pleural effusion (12/27/2015); Chest pain (12/26/2015); DM (diabetes mellitus), type 2 ([address] (7/29/2012); Encephalopathy due to infection (1/7/2017); HTN (hypertension) (7/29/2012); Hypoglycemia (7/29/2012); NSTEMI (non-ST elevated myocardial infarction) ([address] (1/5/2017); and Tobacco use (12/27/2015). He also has no past medical history of Arthritis; Asthma; Autoimmune disease ([address] Cancer ([address] COPD; DEMENTIA; Gastrointestinal disorder; Liver disease; Other ill-defined conditions(799.89); Psychiatric disorder; PUD (peptic ulcer disease); Seizures ([address] Sleep disorder; Stroke [facility]); or Thromboembolus ([address] Mr. Lavinia Kehr  has a past surgical history that includes orthopaedic. Social History/Living Environment:   Home Environment: Private residence  # Steps to Enter: 4  One/Two Story Residence: One story  Living Alone: No  Support Systems: Child(elpidio), Family member(s)  Patient Expects to be Discharged to[de-identified] Rehabilitation facility  Current DME Used/Available at Home: None  Tub or Shower Type: Shower  Prior Level of Function/Work/Activity:  Pt states he was independent PTA. Would sit on his front porch everyday and \"entertain\".   Current Medications:           Current Facility-Administered Medications:     pantoprazole (PROTONIX) tablet 40 mg, 40 mg, Oral, Cora Watson MD, 40 mg at 01/16/17 0541    HYDROmorphone (PF) (DILAUDID) injection 2 mg, 2 mg, IntraVENous, PRN, Jennifer Urbano MD, 2 mg at 01/13/17 1359    cefTRIAXone (ROCEPHIN) 2 g in 0.9% sodium chloride (MBP/ADV) 50 mL, 2 g, IntraVENous, Q24H, Drea Can NP, Last Rate: 100 mL/hr at 01/15/17 1636, 2 g at 01/15/17 1636    metroNIDAZOLE (FLAGYL) tablet 500 mg, 500 mg, Oral, TID, Drea Can, NP, 500 mg at 01/16/17 0921    HYDROmorphone (PF) (DILAUDID) injection 0.5-1 mg, 0.5-1 mg, IntraVENous, Q3H PRN, Jennifer Urbano MD, 1 mg at 01/15/17 1407    insulin glargine (LANTUS) injection 10 Units, 10 Units, SubCUTAneous, DAILY, Thor Jay MD, 10 Units at 01/16/17 0919    oxyCODONE IR (ROXICODONE) tablet 5 mg, 5 mg, Oral, Q4H PRN, Thor Jay MD, 5 mg at 01/14/17 1443    insulin regular (Nikki Nicolás R, HUMULIN R) injection, , SubCUTAneous, AC&HS, Kapil Reyna MD, 3 Units at 01/16/17 1247    amLODIPine (NORVASC) tablet 10 mg, 10 mg, Oral, DAILY, Kapil Reyna MD, 10 mg at 01/16/17 0896    hydrALAZINE (APRESOLINE) 20 mg/mL injection 20 mg, 20 mg, IntraVENous, Q6H PRN, Kapil Reyna MD    haloperidol lactate (HALDOL) injection 2 mg, 2 mg, IntraVENous, Q4H PRN, Kapil Reyna MD    0.9% sodium chloride infusion 250 mL, 250 mL, IntraVENous, PRN, Kapil Reyna MD    diphenhydrAMINE (BENADRYL) injection 25 mg, 25 mg, IntraVENous, Q4H PRN, Kapil Reyna MD    traMADol Andie Yoanna) tablet 50 mg, 50 mg, Oral, Q6H PRN, Kapil Reyna MD, 50 mg at 01/12/17 6057    atorvastatin (LIPITOR) tablet 80 mg, 80 mg, Oral, DAILY, Dai Betancur DO, 80 mg at 01/16/17 0920    metoprolol succinate (TOPROL-XL) tablet 100 mg, 100 mg, Oral, DAILY WITH BREAKFAST, Dai Betancur DO, 100 mg at 01/16/17 0329    sodium chloride (NS) flush 5-10 mL, 5-10 mL, IntraVENous, Q8H, Dai Betancur DO, 10 mL at 01/15/17 2200    sodium chloride (NS) flush 5-10 mL, 5-10 mL, IntraVENous, PRN, Poppy Gurabo, DO    acetaminophen (TYLENOL) tablet 650 mg, 650 mg, Oral, Q4H PRN, Poppy Sj, DO, 650 mg at 17 0349    ondansetron [facility]) injection 4 mg, 4 mg, IntraVENous, Q4H PRN, Poppy Sj, DO, 4 mg at 17 2230    docusate sodium (COLACE) capsule 100 mg, 100 mg, Oral, DAILY PRN, Poppyrojas RuffinGurabo, DO   Date Last Reviewed:  2017      Number of Personal Factors/Comorbidities that affect the Plan of Care: 3+: HIGH COMPLEXITY   EXAMINATION:   Most Recent Physical Functioning:   Gross Assessment:    Strength:   LEs:                    Posture:  Posture     Balance:  Poor    Bed Mobility:    Supine to Sit: Moderate assistance  Scooting: Modified independent  Wheelchair Mobility:  NA     Transfers: Moderate to max assist to get to perform  Sit to Stand: Moderate assistance (4x)  Gait:  Unable today             Body Structures Involved:  1. Lungs  2. Bones  3. Joints  4. Muscles  5. Ligaments Body Functions Affected:  1. Mental  2. Sensory/Pain  3. Respiratory  4. Neuromusculoskeletal  5. Movement Related  6. Skin Related Activities and Participation Affected:  1. Learning and Applying Knowledge  2. General Tasks and Demands  3. Communication  4. Mobility  5. Self Care  6. Domestic Life  7. Interpersonal Interactions and Relationships  8. Community, Social and [place]   Number of elements that affect the Plan of Care: 4+: HIGH COMPLEXITY   CLINICAL PRESENTATION:   Presentation: Evolving clinical presentation with unstable and unpredictable characteristics: HIGH COMPLEXITY   CLINICAL DECISION MAKIN [facility] Inpatient Short Form  How much difficulty does the patient currently have. .. Unable A Lot A Little None   1. Turning over in bed (including adjusting bedclothes, sheets and blankets)? [ ] 1   [X] 2   [ ] 3   [ ] 4   2.   Sitting down on and standing up from a chair with arms ( e.g., wheelchair, bedside commode, etc.) [ ] 1   [X] 2   [ ] 3   [ ] 4   3. Moving from lying on back to sitting on the side of the bed? [ ] 1   [X] 2   [ ] 3   [ ] 4   How much help from another person does the patient currently need. .. Total A Lot A Little None   4. Moving to and from a bed to a chair (including a wheelchair)? [X] 1   [ ] 2   [ ] 3   [ ] 4   5. Need to walk in hospital room? [X] 1   [ ] 2   [ ] 3   [ ] 4   6. Climbing 3-5 steps with a railing? [X] 1   [ ] 2   [ ] 3   [ ] 4   © 2007, Trustees of [address] [place] [address] [address] [id number], under license to [employer]. All rights reserved          Score:  Initial: 10 Most Recent: 9 (Date: 1/14/17 )   Interpretation of Tool:  Represents activities that are increasingly more difficult (i.e. Bed mobility, Transfers, Gait). Score 24 23 22-20 19-15 14-10 9-7 6       Modifier CH CI CJ CK CL CM CN         · Mobility - Walking and Moving Around:               - CURRENT STATUS:    CM - 80%-99% impaired, limited or restricted               - GOAL STATUS:           CL - 60%-79% impaired, limited or restricted               - D/C STATUS:                       ---------------To be determined---------------  Payor: CARE IMPROVEMENT PLUS [place] / Product Type: Managed Care Medicare /       Medical Necessity:     · Skilled intervention continues to be required due to debiltity. Reason for Services/Other Comments:  · Patient continues to require skilled intervention due to debility.    Use of outcome tool(s) and clinical judgement create a POC that gives a: Difficult prediction of patient's progress: HIGH COMPLEXITY                 TREATMENT:   (In addition to Assessment/Re-Assessment sessions the following treatments were rendered)  Pre-treatment Symptoms/Complaints:  Pain and fatigue  Pain: Initial:   Pain Intensity 1: 3  Pain Location 1: Foot  Pain Orientation 1: Left  Pain Intervention(s) 1: Position  Post Session:        Therapeutic Activity: (    25 minutes): Therapeutic activities including Bed transfers and sit to stand 4 times to improve mobility, strength and balance. Required moderate   to promote static balance in standing as well as to get to standing. Treatment/Session Assessment: See initial assessment above. Patients response to todays treatment session was tolerated well with no medical complications. · Response to Treatment:  Tolerated well without complications  · Interdisciplinary Collaboration:  · Registered Nurse  · After treatment position/precautions:  · Bed/Chair-wheels locked  · Bed in low position  · Call light within reach  · RN notified  · Side rails x 3  · Compliance with Program/Exercises: Will assess as treatment progresses. · Recommendations/Intent for next treatment session: \"Next visit will focus on reduction in assistance provided\".   Total Treatment Duration:  PT Patient Time In/Time Out  Time In: 1330  Time Out: 1355     Tigre Tolliver, PTA

## 2017-01-16 NOTE — PROGRESS NOTES
Pt resting quietly in bed. Denies needs or concerns at this time. Alert and oriented to person and situation. Disoriented to placed and time. Expiratory wheezing noted in upper lobes of lungs bilaterally. Lower lobes diminished bilaterally. Bowel sounds active in all quadrants. Palpable pulses bilaterally in upper and lower extremities. Wound vac on right foot continuous at 125mm Hg. Instructed to call for assistance. Call light in reach. Voiced understanding and identified call light.

## 2017-01-16 NOTE — PROGRESS NOTES
OT note: Attempted to see pt for treatment this afternoon. Pt was working with PTA upon first attempt. PATRIZIA spoke with PTA following her treatment and she stated pt really wasn't able to participate. Will re-attempt at a later date/time.   Vandana Siddiqui

## 2017-01-16 NOTE — PROGRESS NOTES
Hospitalist Progress Note    2017  Admit Date: 2017  5:07 PM   NAME: Teresa Hayes   :  1945   MRN:  [id number]   Attending: Selena Phalen, DO  PCP:  None    SUBJECTIVE:   HPI: Pt is a 72 yo male who presented with progressive pain and poor healing of right foot wound over 1 month, admitted with infected DM foot. PMH includes DM (A1C 9.2 2016), CHF, CKD, current smoker. X Ray of right foot had low suspicion for osteomyelitis but does reveal soft tissue lucencies, possible gas producing infection. Surgery and Vascular were consulted. He had bedside debridement on  and surgical I&D/5th toe amputation . He was started on Vanc/Zosyn in ER. Also noted to have MO with Cr 3.99, baseline 1.90. He also had elevated trops without any typical symptoms. ID has seen - changed atbx  to IV Rocephin/PO Flagyl. 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 Will need 6 weeks antbx per ID recs     - no new complaints except he doesn't like the food    Review of Systems negative with exception of pertinent positives noted above  PHYSICAL EXAM     Visit Vitals    /59    Pulse 68    Temp 98.6 °F (37 °C)    Resp 15    Ht 6' 1\" (1.854 m)    Wt 96.6 kg (213 lb)    SpO2 97%    BMI 28.1 kg/m2      Temp (24hrs), Av.7 °F (37.1 °C), Min:98.2 °F (36.8 °C), Max:99.4 °F (37.4 °C)    Oxygen Therapy  O2 Sat (%): 97 % (17 1449)  Pulse via Oximetry: 95 beats per minute (17 0522)  O2 Device: Room air (17 4151)  O2 Flow Rate (L/min): 2 l/min (17 1553)  No intake or output data in the 24 hours ending 17 1711   General: No acute distress    Lungs:  CTA Bilaterally.    Heart:  Regular rate and rhythm,  No murmur, rub, or gallop  Abdomen: Soft, Non distended, Non tender, Positive bowel sounds  Extremities: No cyanosis, clubbing or edema  Neurologic:  No focal deficits    ASSESSMENT      Active Hospital Problems    Diagnosis Date Noted    [facility]eps[facility] to [facility]itis [facility] 01/09/2017    Acute [facility] failure with tubular necrosis [facility] 01/06/2017    Type 2 diabetes mellitus with right diabetic foot infection ([facility] 01/05/2017    NSTEMI (non-ST elevated myocardial infarction) ([facility] 01/05/2017    Kidney disease, chronic, stage III (moderate, EGFR 30-59 ml/min) 08/11/2016    Chronic systolic congestive heart failure ([facility] 12/30/2015     EF 35-40% on 2015 Echo      Type 2 diabetes mellitus with hyperglycemia ([facility] 07/29/2012    HTN (hypertension) 07/29/2012     A/P    - Diabetic foot infection - Surgery following. Did debridement at bedside 1/6 and amputation of right 5th toe on 1/12. Cont Rocephin/Flagyl,(ATBx day 12). Wound cx Beta Strep/Staph Ludgenesis  - NSTEMI vs demand mismatch- peaked at 0.61. No typical symptoms. Some elevation probably from ARF. Already on medical management, BB, ASA, statin. Continue to hold ACE, diuretics due to ARF. Cardiology follow up at [facility] on [address]  - ARF -  Minimal improvement, nephrology following. Likely new baseline? - Anemia - likely of chronic disease. Ck lab panel  - HTN - better controlled. cont current regimen. Holding ACE, diuretics  - DM -fair control, continue current.   - CHF - stable, monitor closely. Holding diuretics due to ARF    Dispo - pending ortho recs. Good candidate for [facility] (wound vac/iv atbx).  CM following    DVT Prophylaxis: scds    Signed By: Sean Kyle DO     January 16, 2017

## 2017-01-16 NOTE — WOUND CARE
Right lateral foot wound seen with Dr Teresa Still for [facility] dressing change. Wound base has some pink and granulation. Yellow-gray areas as well. Noted two new purple areas. One on dorsal foot and smaller one on plantar foot below 3rd digit. VAC dressing without ACE wrap now. ACE removed was loose but do not want to add to risk with patient circulation as discussed with surgeon. Will continue to follow while in acute care.

## 2017-01-16 NOTE — DIABETES MGMT
Pt s/p I&D right foot infection with debridement 1/11/16 is seen by RN LINH for attempted continued diabetes education. Pt now with wound vac to right foot and remains on antibiotics. Attempted to reviewed with pt current insulin regimen: Lantus dose 10 units daily and  Regular insulin per sliding scale. No prandial insulin orders at this time. Pt closing eyes and not participating at this time in diabetes education. Blood glucose ranged 124-221 yesterday with total of 16 units insulin given (Lantus 10 units; regular 6 units) and 151-166 so far today. Pt practiced using insulin demo pen earlier last week. Pt's goddaughter (who lives with him) was educated re: proper use of insulin pen and able to return demonstrate insulin pen use last week as well. Plan is to continue diabetes education next only as pt is willing to part  Pt has no questions at this time. Nursing will continue to monitor blood glucose.

## 2017-01-16 NOTE — PROGRESS NOTES
[address] Nephrology    Follow-Up on:1/16/17    HPI: Doing okay,  Nothing new    ROS:  Denies CP, SOB.     Current Facility-Administered Medications   Medication Dose Route Frequency    pantoprazole (PROTONIX) tablet 40 mg  40 mg Oral ACB    HYDROmorphone (PF) (DILAUDID) injection 2 mg  2 mg IntraVENous PRN    cefTRIAXone (ROCEPHIN) 2 g in 0.9% sodium chloride (MBP/ADV) 50 mL  2 g IntraVENous Q24H    metroNIDAZOLE (FLAGYL) tablet 500 mg  500 mg Oral TID    HYDROmorphone (PF) (DILAUDID) injection 0.5-1 mg  0.5-1 mg IntraVENous Q3H PRN    insulin glargine (LANTUS) injection 10 Units  10 Units SubCUTAneous DAILY    oxyCODONE IR (ROXICODONE) tablet 5 mg  5 mg Oral Q4H PRN    insulin regular (NOVOLIN R, HUMULIN R) injection   SubCUTAneous AC&HS    amLODIPine (NORVASC) tablet 10 mg  10 mg Oral DAILY    hydrALAZINE (APRESOLINE) 20 mg/mL injection 20 mg  20 mg IntraVENous Q6H PRN    haloperidol lactate (HALDOL) injection 2 mg  2 mg IntraVENous Q4H PRN    0.9% sodium chloride infusion 250 mL  250 mL IntraVENous PRN    diphenhydrAMINE (BENADRYL) injection 25 mg  25 mg IntraVENous Q4H PRN    traMADol (ULTRAM) tablet 50 mg  50 mg Oral Q6H PRN    atorvastatin (LIPITOR) tablet 80 mg  80 mg Oral DAILY    metoprolol succinate (TOPROL-XL) tablet 100 mg  100 mg Oral DAILY WITH BREAKFAST    sodium chloride (NS) flush 5-10 mL  5-10 mL IntraVENous Q8H    sodium chloride (NS) flush 5-10 mL  5-10 mL IntraVENous PRN    acetaminophen (TYLENOL) tablet 650 mg  650 mg Oral Q4H PRN    ondansetron (ZOFRAN) injection 4 mg  4 mg IntraVENous Q4H PRN    docusate sodium (COLACE) capsule 100 mg  100 mg Oral DAILY PRN       Exam:  Vitals:    01/16/17 0409 01/16/17 0909 01/16/17 1302 01/16/17 1449   BP: (!) 127/96 145/76 157/80 145/59   Pulse: 93 88 86 68   Resp: 16 15 15 15   Temp: 98.2 °F (36.8 °C) 99.4 °F (37.4 °C) 99.1 °F (37.3 °C) 98.6 °F (37 °C)   SpO2: 90% 95% 94% 97%   Weight:       Height:             Intake/Output Summary (Last 24 hours) at 01/16/17 1614  Last data filed at 01/15/17 1636   Gross per 24 hour   Intake              240 ml   Output                0 ml   Net              240 ml     PE:  GEN - in no distress  CV - regular, no murmur, no rub  Lung - clear bilaterally  Abd - soft, nontender  Ext - no edema    Labs  Recent Labs      01/16/17   0747  01/15/17   0521  01/14/17   0546   WBC  14.0*  14.4*  13.3*   HGB  7.5*  8.4*  7.5*   HCT  23.4*  26.4*  23.8*   PLT  342  350  316     Recent Labs      01/16/17   0747  01/15/17   0521  01/14/17   0546   NA  143  143  145   K  3.8  3.9  4.0   CL  111*  111*  113*   CO2  20*  19*  18*   BUN  34*  37*  38*   CREA  2.38*  2.66*  2.65*   GLU  151*  154*  127*   CA  7.8*  7.9*  8.0*     No results for input(s): PH, PCO2, PO2, PCO2 in the last 72 hours. 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 ([facility] 01/05/2017    NSTEMI (non-ST elevated myocardial infarction) ([facility] 01/05/2017    Kidney disease, chronic, stage III (moderate, EGFR 30-59 ml/min) 08/11/2016    Cardiomyopathy ([address] 05/05/2016    Chronic systolic congestive heart failure ([address] 12/30/2015    Bilateral pleural effusion 12/27/2015    Tobacco use 12/27/2015    Abnormal CT scan, chest 12/27/2015    Chest pain 12/26/2015    Type 2 diabetes mellitus with hyperglycemia ([address] 07/29/2012    HTN (hypertension) 07/29/2012       Issues Addressed By Nephrology:    Plan:  1. Osteomyelitis  2. MO on CKD slowly improving  3. Anemia  4.  Diabetes

## 2017-01-17 ENCOUNTER — ANESTHESIA EVENT (OUTPATIENT)
Dept: SURGERY | Age: 72
DRG: 853 | End: 2017-01-17
Payer: MEDICARE

## 2017-01-17 ENCOUNTER — APPOINTMENT (OUTPATIENT)
Dept: INTERVENTIONAL RADIOLOGY/VASCULAR | Age: 72
DRG: 853 | End: 2017-01-17
Attending: INTERNAL MEDICINE
Payer: MEDICARE

## 2017-01-17 PROBLEM — I70.261 ATHEROSCLEROSIS OF NATIVE ARTERY OF RIGHT LOWER EXTREMITY WITH GANGRENE (HCC): Status: ACTIVE | Noted: 2017-01-17

## 2017-01-17 LAB
ANION GAP BLD CALC-SCNC: 10 MMOL/L (ref 7–16)
BUN SERPL-MCNC: 34 MG/DL (ref 8–23)
CALCIUM SERPL-MCNC: 7.9 MG/DL (ref 8.3–10.4)
CHLORIDE SERPL-SCNC: 111 MMOL/L (ref 98–107)
CO2 SERPL-SCNC: 23 MMOL/L (ref 21–32)
CREAT SERPL-MCNC: 2.34 MG/DL (ref 0.8–1.5)
GLUCOSE BLD STRIP.AUTO-MCNC: 157 MG/DL (ref 65–100)
GLUCOSE BLD STRIP.AUTO-MCNC: 165 MG/DL (ref 65–100)
GLUCOSE BLD STRIP.AUTO-MCNC: 168 MG/DL (ref 65–100)
GLUCOSE BLD STRIP.AUTO-MCNC: 170 MG/DL (ref 65–100)
GLUCOSE SERPL-MCNC: 149 MG/DL (ref 65–100)
POTASSIUM SERPL-SCNC: 3.7 MMOL/L (ref 3.5–5.1)
SODIUM SERPL-SCNC: 144 MMOL/L (ref 136–145)

## 2017-01-17 PROCEDURE — 76937 US GUIDE VASCULAR ACCESS: CPT

## 2017-01-17 PROCEDURE — 77030018719 HC DRSG PTCH ANTIMIC J&J -A

## 2017-01-17 PROCEDURE — C1751 CATH, INF, PER/CENT/MIDLINE: HCPCS

## 2017-01-17 PROCEDURE — 74011250636 HC RX REV CODE- 250/636: Performed by: NURSE PRACTITIONER

## 2017-01-17 PROCEDURE — 74011250636 HC RX REV CODE- 250/636: Performed by: SURGERY

## 2017-01-17 PROCEDURE — 74011250637 HC RX REV CODE- 250/637: Performed by: INTERNAL MEDICINE

## 2017-01-17 PROCEDURE — 80048 BASIC METABOLIC PNL TOTAL CA: CPT | Performed by: INTERNAL MEDICINE

## 2017-01-17 PROCEDURE — 74011250637 HC RX REV CODE- 250/637: Performed by: FAMILY MEDICINE

## 2017-01-17 PROCEDURE — 99231 SBSQ HOSP IP/OBS SF/LOW 25: CPT | Performed by: SURGERY

## 2017-01-17 PROCEDURE — 74750000023 HC WOUND THERAPY

## 2017-01-17 PROCEDURE — 74011000250 HC RX REV CODE- 250: Performed by: PHYSICIAN ASSISTANT

## 2017-01-17 PROCEDURE — 36415 COLL VENOUS BLD VENIPUNCTURE: CPT | Performed by: INTERNAL MEDICINE

## 2017-01-17 PROCEDURE — 74011636637 HC RX REV CODE- 636/637: Performed by: INTERNAL MEDICINE

## 2017-01-17 PROCEDURE — 85730 THROMBOPLASTIN TIME PARTIAL: CPT | Performed by: INTERNAL MEDICINE

## 2017-01-17 PROCEDURE — 02H633Z INSERTION OF INFUSION DEVICE INTO RIGHT ATRIUM, PERCUTANEOUS APPROACH: ICD-10-PCS | Performed by: RADIOLOGY

## 2017-01-17 PROCEDURE — 82962 GLUCOSE BLOOD TEST: CPT

## 2017-01-17 PROCEDURE — 74011250637 HC RX REV CODE- 250/637: Performed by: NURSE PRACTITIONER

## 2017-01-17 PROCEDURE — 65660000000 HC RM CCU STEPDOWN

## 2017-01-17 PROCEDURE — 77030018836 HC SOL IRR NACL ICUM -A

## 2017-01-17 PROCEDURE — 77030002916 HC SUT ETHLN J&J -A

## 2017-01-17 PROCEDURE — 77030031131 HC SUT MXN P COVD -B

## 2017-01-17 PROCEDURE — 74011250636 HC RX REV CODE- 250/636: Performed by: PHYSICIAN ASSISTANT

## 2017-01-17 PROCEDURE — B244ZZZ ULTRASONOGRAPHY OF RIGHT HEART: ICD-10-PCS | Performed by: RADIOLOGY

## 2017-01-17 PROCEDURE — 74011000258 HC RX REV CODE- 258: Performed by: NURSE PRACTITIONER

## 2017-01-17 PROCEDURE — 77030010507 HC ADH SKN DERMBND J&J -B

## 2017-01-17 RX ORDER — LIDOCAINE HYDROCHLORIDE 20 MG/ML
100-200 INJECTION, SOLUTION INFILTRATION; PERINEURAL ONCE
Status: COMPLETED | OUTPATIENT
Start: 2017-01-17 | End: 2017-01-17

## 2017-01-17 RX ORDER — HEPARIN SODIUM 5000 [USP'U]/100ML
12-25 INJECTION, SOLUTION INTRAVENOUS
Status: DISPENSED | OUTPATIENT
Start: 2017-01-17 | End: 2017-01-18

## 2017-01-17 RX ORDER — HEPARIN SODIUM 200 [USP'U]/100ML
1000 INJECTION, SOLUTION INTRAVENOUS
Status: DISCONTINUED | OUTPATIENT
Start: 2017-01-17 | End: 2017-01-19

## 2017-01-17 RX ORDER — SODIUM CHLORIDE 0.9 % (FLUSH) 0.9 %
5 SYRINGE (ML) INJECTION AS NEEDED
Status: DISCONTINUED | OUTPATIENT
Start: 2017-01-17 | End: 2017-02-02 | Stop reason: HOSPADM

## 2017-01-17 RX ORDER — CEFAZOLIN SODIUM IN 0.9 % NACL 2 G/50 ML
2 INTRAVENOUS SOLUTION, PIGGYBACK (ML) INTRAVENOUS ONCE
Status: COMPLETED | OUTPATIENT
Start: 2017-01-18 | End: 2017-01-28

## 2017-01-17 RX ORDER — SODIUM CHLORIDE 9 MG/ML
125 INJECTION, SOLUTION INTRAVENOUS CONTINUOUS
Status: DISCONTINUED | OUTPATIENT
Start: 2017-01-17 | End: 2017-01-26

## 2017-01-17 RX ORDER — LIDOCAINE HYDROCHLORIDE AND EPINEPHRINE 20; 10 MG/ML; UG/ML
200-400 INJECTION, SOLUTION INFILTRATION; PERINEURAL ONCE
Status: COMPLETED | OUTPATIENT
Start: 2017-01-17 | End: 2017-01-17

## 2017-01-17 RX ADMIN — HUMAN INSULIN 2 UNITS: 100 INJECTION, SOLUTION SUBCUTANEOUS at 07:30

## 2017-01-17 RX ADMIN — METRONIDAZOLE 500 MG: 500 TABLET ORAL at 17:15

## 2017-01-17 RX ADMIN — INSULIN GLARGINE 10 UNITS: 100 INJECTION, SOLUTION SUBCUTANEOUS at 09:00

## 2017-01-17 RX ADMIN — METOPROLOL SUCCINATE 100 MG: 100 TABLET, EXTENDED RELEASE ORAL at 07:52

## 2017-01-17 RX ADMIN — SODIUM BICARBONATE 2 ML: 0.2 INJECTION, SOLUTION INTRAVENOUS at 16:14

## 2017-01-17 RX ADMIN — LIDOCAINE HYDROCHLORIDE 50 MG: 20 INJECTION, SOLUTION INFILTRATION; PERINEURAL at 16:14

## 2017-01-17 RX ADMIN — HEPARIN SODIUM 2000 UNITS: 200 INJECTION, SOLUTION INTRAVENOUS at 16:23

## 2017-01-17 RX ADMIN — SODIUM CHLORIDE 125 ML/HR: 900 INJECTION, SOLUTION INTRAVENOUS at 18:00

## 2017-01-17 RX ADMIN — METRONIDAZOLE 500 MG: 500 TABLET ORAL at 07:52

## 2017-01-17 RX ADMIN — PANTOPRAZOLE SODIUM 40 MG: 40 TABLET, DELAYED RELEASE ORAL at 07:52

## 2017-01-17 RX ADMIN — METRONIDAZOLE 500 MG: 500 TABLET ORAL at 21:19

## 2017-01-17 RX ADMIN — HUMAN INSULIN 2 UNITS: 100 INJECTION, SOLUTION SUBCUTANEOUS at 16:30

## 2017-01-17 RX ADMIN — CEFTRIAXONE 2 G: 2 INJECTION, POWDER, FOR SOLUTION INTRAMUSCULAR; INTRAVENOUS at 17:00

## 2017-01-17 RX ADMIN — ATORVASTATIN CALCIUM 80 MG: 40 TABLET, FILM COATED ORAL at 07:52

## 2017-01-17 RX ADMIN — HUMAN INSULIN 2 UNITS: 100 INJECTION, SOLUTION SUBCUTANEOUS at 21:20

## 2017-01-17 RX ADMIN — AMLODIPINE BESYLATE 10 MG: 10 TABLET ORAL at 07:52

## 2017-01-17 RX ADMIN — LIDOCAINE HYDROCHLORIDE,EPINEPHRINE BITARTRATE 300 MG: 20; .01 INJECTION, SOLUTION INFILTRATION; PERINEURAL at 16:17

## 2017-01-17 RX ADMIN — HEPARIN SODIUM AND DEXTROSE 12 UNITS/KG/HR: 5000; 5 INJECTION INTRAVENOUS at 17:19

## 2017-01-17 RX ADMIN — HYDROMORPHONE HYDROCHLORIDE 1 MG: 1 INJECTION, SOLUTION INTRAMUSCULAR; INTRAVENOUS; SUBCUTANEOUS at 07:53

## 2017-01-17 NOTE — PROGRESS NOTES
PLAN:  Cont management per Hospitalist  Abx -- per [facility] and ID  Needs good BS and BP control  Vasc Surgery has seen - abnormal vasc studies to RLE, no imaging now due to renal function  Smoking Cessation imperative  Wound care -- VAC in place. Change MWF  Follow OR cultures  Follow wound as may declare arterial insufficiency that may dictate need for imaging/intervention to improve arterial blood flow  Not a candidate for reduction of care. Still may need A-gram with intervention   Could consider [facility] consult      ASSESSMENT:  Admit Date: 1/5/2017   POD# 5  Procedure(s):  I&D OF DEEP SPACE R FOOT INFECTION WITH DEBRIDEMENT OF SKIN, SOFT TISSUE AND BONE INCLUDING RIGHT 5TH TOE AND METATARSAL, BONE BIOPSY FOR CULTURE    Principal Problem:    Type 2 diabetes mellitus with right diabetic foot infection ([address] (1/5/2017)    ----necrotizing infection with acute osteomyelitis and abscess in face of arterial disease with insufficiency. Foot debrided of infection including 5th toe and 5th metatarsal.  ----borderline areas of viability    Active Problems:    Type 2 diabetes mellitus with hyperglycemia ([address] (7/29/2012)      HTN (hypertension) (7/29/2012)      Chronic systolic congestive heart failure ([address] (12/30/2015)      Overview: EF 35-40% on 2015 Echo      Kidney disease, chronic, stage III (moderate, EGFR 30-59 ml/min) (8/11/2016)      Acute renal failure with tubular necrosis ([address] (1/6/2017)      NSTEMI (non-ST elevated myocardial infarction) ([address] (1/5/2017)      Sepsis due to cellulitis ([address] (1/9/2017)       HPI: Pt is a 74 yo male who presented with progressive pain and poor healing of right foot wound over 1 month. Other PMH includes DM (A1C 9.2 11/2016), CHF, CKD, current smoker. X Ray of right foot had low suspicion for osteomyelitis but does reveal soft tissue lucencies, possible gas producing infection. WBC 18.7, LA 2.2. He was started on Vanc/Zosyn in ER.  Also noted to have MO with Cr 2.99, baseline 1.90. He also had elevated trops without any typical symptoms. R Vasc studies showing severe arterial disease, but RF poor and dye would likely push patient to dialysis    SUBJECTIVE:  Seen by me with Mohinder Hallman, wound care nurse. Resting in bed. Reports R foot pain - fair control with meds. . Remains on abx. AF, NAD, VSS. Received 2 mg Dilaudid for dressing change with good effect. OBJECTIVE:  Constitutional: Alert, cooperative patient in no acute distress; appears stated age   Visit Vitals    /68    Pulse 86    Temp 98.6 °F (37 °C)    Resp 18    Ht 6' 1\" (1.854 m)    Wt 213 lb (96.6 kg)    SpO2 98%    BMI 28.1 kg/m2     Eyes: Sclera are clear. ENMT: No obvious neck masses, no ear or lip lesions  CV: Impaired perfusion - but pulses weakly palpable  Resp: No JVD. Breathing is non-labored. No wheezing. GI: Soft, non-tender, non-distended, BS active   Musculoskeletal: Normal function. R thumb amputation  Extremities: R foot VAC dressing removed. No purulence seen, some erythema to plantar area but suspect more bruising than active abscess which appears drained. Proximal wound is pink and viable, distal wound with some gray tissue, 1x1 cm area at 2nd metatarsal area appears ischemic, 2 x 0.5 cm area on dorsum near tubing track that may be pressure related ischemic bruising, periwound skin with moisture and some maceration. Wound 9 x 4 x 2 cm (with 7 cm tract to plantar space). VAC in place and replaced. Neuro: Oriented, follows commands and answers questions  Psychiatric: Calm though delivers expletive phrase or two in response to pain    Patient Vitals for the past 24 hrs:  01/16/17 0909 145/76 99.4 °F (37.4 °C) 88 15 95 %       Labs:    Recent Labs      01/16/17   0747   WBC  14.0*   HGB  7.5*   PLT  342   NA  143   K  3.8   CL  111*   CO2  20*   BUN  34*   CREA  2.38*   GLU  151*   TBILI  0.3   CBIL  0.1   SGOT  26   ALT  21   AP  76         Malvin Padilla MD

## 2017-01-17 NOTE — WOUND CARE
Patient seen with Dr Teresa Still and Red Rabago. Wound VAC removed as panchito wound status has worsened. Vascular to be consulted.  Saline soak placed for now per surgeon request.

## 2017-01-17 NOTE — PROGRESS NOTES
TRANSFER - IN REPORT:    Verbal report received from sukhwinder rn(name) on Dalia Minium  being received from IR(unit) for routine progression of care      Report consisted of patients Situation, Background, Assessment and   Recommendations(SBAR). Information from the following report(s) SBAR was reviewed with the receiving nurse. Opportunity for questions and clarification was provided. Assessment completed upon patients arrival to unit and care assumed.

## 2017-01-17 NOTE — PROGRESS NOTES
Patient central line without bleeding or hematoma. Ok to use per IR. Patient alert and oriented. Family at bedside. No needs at this time.

## 2017-01-17 NOTE — PROGRESS NOTES
PLAN:  Cont management per Hospitalist  Restart heparin gtt today  Needs good BS and BP control  Vasc Surgery has seen - abnormal vasc studies to RLE, no imaging due to renal function -- re-consult today  Smoking Cessation imperative  Wound care -- VAC placed. Change MWF -- per Wound RN  Follow OR cultures -- Abx -- per [facility] and ID  Follow wound as may declare arterial insufficiency that may dictate need for imaging/intervention to improve arterial blood flow  CM -- needs placement for abx, wound care and PT (NWB to RLE)      ASSESSMENT:  Admit Date: 1/5/2017   Procedure(s): 1/11/17 POD#5  I&D OF DEEP SPACE R FOOT INFECTION WITH DEBRIDEMENT OF SKIN, SOFT TISSUE AND BONE INCLUDING RIGHT 5TH TOE AND METATARSAL, BONE BIOPSY FOR CULTURE    Principal Problem:    Type 2 diabetes mellitus with right diabetic foot infection ([address] (1/5/2017)    ----necrotizing infection with acute osteomyelitis and abscess in face of arterial disease with insufficiency. Foot debrided of infection including 5th toe and 5th metatarsal.    Active Problems:    Type 2 diabetes mellitus with hyperglycemia ([address] (7/29/2012)      HTN (hypertension) (7/29/2012)      Chronic systolic congestive heart failure ([address] (12/30/2015)      Overview: EF 35-40% on 2015 Echo      Kidney disease, chronic, stage III (moderate, EGFR 30-59 ml/min) (8/11/2016)      Acute renal failure with tubular necrosis ([address] (1/6/2017)      NSTEMI (non-ST elevated myocardial infarction) ([address] (1/5/2017)      Sepsis due to cellulitis ([address] (1/9/2017)       HPI: Pt is a 72 yo male who presented with progressive pain and poor healing of right foot wound over 1 month. Other PMH includes DM (A1C 9.2 11/2016), CHF, CKD, current smoker. X Ray of right foot had low suspicion for osteomyelitis but does reveal soft tissue lucencies, possible gas producing infection. WBC 18.7, LA 2.2. He was started on Vanc/Zosyn in ER. Also noted to have MO with Cr 2.99, baseline 1.90.  He also had elevated trops without any typical symptoms. R Vasc studies showing severe arterial disease, but RF poor and dye would likely push patient to dialysis      SUBJECTIVE:  Resting in bed. Reports R foot pain - fair control with meds. Foot very tender to light palpation or light touch. Remains on abx - ID following. Last WBCs on 1/16 was still 14.0. AF, NAD, VSS. OBJECTIVE:  Constitutional: Alert, cooperative patient in no acute distress; appears stated age   Visit Vitals    /62    Pulse 87    Temp 97.9 °F (36.6 °C)    Resp 20    Ht 6' 1\" (1.854 m)    Wt 96.6 kg (213 lb)    SpO2 97%    BMI 28.1 kg/m2     Eyes: Sclera are clear. ENMT: No obvious neck masses, no ear or lip lesions  CV: Impaired perfusion - pulses PT/DP via doppler, marked  Resp: No JVD. Breathing is non-labored. No wheezing. On RA  GI: Soft, non-tender, non-distended, BS active   Musculoskeletal: Normal function. R thumb amputation  Extremities: R foot VAC dressing removed. DTI on dorsal foot unchanged. Small purple area with indentation on plantar aspect just beneath 2nd toe. Increased erythema to plantar surface and wrapping around in-step. Toes 2-4 remain erythematous and dusky, sluggish cap refill. Wound with some gray tissue, periwound skin with increased moisture, bogginess and maceration.    Neuro: Oriented, follows commands and answers questions, sensation to bilat feet intact  Psychiatric: Calm and cooperative    Patient Vitals for the past 24 hrs:   BP Temp Pulse Resp SpO2   01/17/17 0855 139/62 97.9 °F (36.6 °C) 87 20 97 %   01/17/17 0423 126/68 98.6 °F (37 °C) 86 18 98 %   01/17/17 0049 168/70 98.6 °F (37 °C) 74 18 96 %   01/16/17 2052 146/78 98.8 °F (37.1 °C) 81 18 96 %   01/16/17 1449 145/59 98.6 °F (37 °C) 68 15 97 %   01/16/17 1302 157/80 99.1 °F (37.3 °C) 86 15 94 %   01/16/17 0909 145/76 99.4 °F (37.4 °C) 88 15 95 %       Labs:    Recent Labs      01/16/17   0747   WBC  14.0*   HGB  7.5*   PLT  342   NA  143 K  3.8   CL  111*   CO2  20*   BUN  34*   CREA  2.38*   GLU  151*   TBILI  0.3   CBIL  0.1   SGOT  26   ALT  21   AP  76       Brandon Louis NP-C    I have seen and examined the patient with the Nurse Practitioner and agree with the above assessment and plan. Pedro Reece.  Cecile Duque MD

## 2017-01-17 NOTE — PROGRESS NOTES
Referred to [facility] and [facility] if [facility] meets criteria and insurance will pre-cert. Traditional Medicare. [place]

## 2017-01-17 NOTE — PROGRESS NOTES
Hospitalist Progress Note    2017  Admit Date: 2017  5:07 PM   NAME: Zan Thomas   :  1945   MRN:  [id number]   Attending: Abe Weir DO  PCP:  None    SUBJECTIVE:   HPI: Pt is a 72 yo male who presented with progressive pain and poor healing of right foot wound over 1 month, admitted with infected DM foot. PMH includes DM (A1C 9.2 2016), CHF, CKD, current smoker. X Ray of right foot had low suspicion for osteomyelitis but does reveal soft tissue lucencies, possible gas producing infection. Surgery and Vascular were consulted. He had bedside debridement on  and surgical I&D/5th toe amputation . He was started on Vanc/Zosyn in ER. ID has seen - changed atbx  to IV Rocephin/PO Flagyl. 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 Will need 6 weeks antbx per ID recs. Also seen by vascular surgery due to abnormal right MAGGIE and poor postoperative wound healing. Plans for arteriogram .  - flat affect, no complaints, no o/n events    Review of Systems negative with exception of pertinent positives noted above  PHYSICAL EXAM     Visit Vitals    /74 (BP 1 Location: Left arm, BP Patient Position: At rest)    Pulse 81    Temp 99.1 °F (37.3 °C)    Resp 20    Ht 6' 1\" (1.854 m)    Wt 96.6 kg (213 lb)    SpO2 94%    BMI 28.1 kg/m2      Temp (24hrs), Av.6 °F (37 °C), Min:97.9 °F (36.6 °C), Max:99.1 °F (37.3 °C)    Oxygen Therapy  O2 Sat (%): 94 % (17)  Pulse via Oximetry: 96 beats per minute (17 0423)  O2 Device: Room air (17)  O2 Flow Rate (L/min): 2 l/min (17 1553)    Intake/Output Summary (Last 24 hours) at 17 1552  Last data filed at 17 0931   Gross per 24 hour   Intake              480 ml   Output                0 ml   Net              480 ml      General: No acute distress    Lungs:  CTA Bilaterally.    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 ([address] 01/17/2017    Sepsis due to cellulitis ([address] 01/09/2017    Acute renal failure with tubular necrosis ([address] 01/06/2017    Type 2 diabetes mellitus with right diabetic foot infection ([address] 01/05/2017    NSTEMI (non-ST elevated myocardial infarction) ([address] 01/05/2017    Kidney disease, chronic, stage III (moderate, EGFR 30-59 ml/min) 08/11/2016    Chronic systolic congestive heart failure ([address] 12/30/2015     EF 35-40% on 2015 Echo      Type 2 diabetes mellitus with hyperglycemia ([address] 07/29/2012    HTN (hypertension) 07/29/2012     A/P    - Diabetic foot infection - Surgery following. Did debridement at bedside 1/6 and amputation of right 5th toe on 1/12. Cont Rocephin/Flagyl - will need 6weeks atbx per ID. Wound cx Beta Strep/Staph Ludgenesis. Plan for PICC if renal function improves vs Tunneled Cath by Vasc  - PAD - abnormal RLE MAGGIE. Vasc following - arteriogram 1/18   - NSTEMI vs demand mismatch- peaked at 0.61. No typical symptoms. Some elevation probably from ARF. Already on medical management, BB, ASA, statin. Continue to hold ACE, diuretics due to ARF. Cardiology follow up at [facility] on DC  - ARF -  Slow improvement. Nephrology following  - Anemia - of chronic disease, stable  - HTN - cont current regimen. Holding ACE, diuretics  - DM -fair control, continue current.   - CHF - stable, monitor closely.  Holding diuretics due to ARF      DVT Prophylaxis: scds    Signed By: Lorna Hatfield DO     January 17, 2017

## 2017-01-17 NOTE — PROGRESS NOTES
Infectious Disease Progress Note    Today's Date: 2017   Admit Date: 2017    Impression:   · R foot acute osteomyelitis of 5th toe and MT, with deep space abscess: s/p () I&D, 5th toe/MT amputation: Cx Staph caprae, Strep agalactiae, and Staph lugdunensis; pathology showed acute and chronic inflammation  · Uncontrolled DM: hgb A1c 9.1  · PAD: abnormal RLE MAGGIE with occlusive disease, unable to undergo eval/intervention presently sec to MO  · Acute on CKD  · Medical non-compliance    Plan:   · Continue Ceftriaxone IV and Flagyl PO X 6 weeks; EOT 17    · [facility] transfer is planned  · Needs aggressive DM management  · Place PICC today  Anti-infectives:   Ceftriaxone and Flagyl (-  Vancomycin and Zosyn (-)    Subjective:   Date of Consultation:  2017  Referring Physician: Dr. Gretta Díaz    No N/V/D. No fever or chills. Right foot pain with ambulation. Allergies   Allergen Reactions    Lortab [Hydrocodone-Acetaminophen] Itching        Review of Systems:  Review of systems not obtained due to patient factors. Objective:     Visit Vitals    /62    Pulse 87    Temp 97.9 °F (36.6 °C)    Resp 20    Ht 6' 1\" (1.854 m)    Wt 96.6 kg (213 lb)    SpO2 97%    BMI 28.1 kg/m2     Temp (24hrs), Av.6 °F (37 °C), Min:97.9 °F (36.6 °C), Max:99.1 °F (37.3 °C)       Lines:  Peripheral IV:  RUE          Physical Exam:    General:  Alert, , well noursished, well developed, appears stated age   Eyes:  Sclera anicteric. Pupils equally round and reactive to light. Mouth/Throat: Mucous membranes normal, oral pharynx clear   Neck: Supple   Lungs:   Clear to auscultation bilaterally, good effort   CV:  Regular rate and rhythm,no murmur, click, rub or gallop   Abdomen:   Soft, obese, non-tender. bowel sounds normal. non-distended   Extremities: No cyanosis or edema   Skin: Skin color, texture, turgor normal. no acute rash or lesions.  R foot:  Wound dressing dry and intact   Lymph nodes: Cervical and supraclavicular normal   Musculoskeletal: No swelling or deformity   Lines/Devices:  Intact, no erythema, drainage or tenderness   Psych: Alert and oriented       Data Review:     CBC:  Recent Labs      01/16/17   0747  01/15/17   0521   WBC  14.0*  14.4*   GRANS  85*   --    MONOS  4   --    EOS  1   --    ANEU  12.3*   --    ABL  1.4   --    HGB  7.5*  8.4*   HCT  23.4*  26.4*   PLT  342  350       BMP:  Recent Labs      01/16/17 0747  01/15/17   0521   CREA  2.38*  2.66*   BUN  34*  37*   NA  143  143   K  3.8  3.9   CL  111*  111*   CO2  20*  19*   AGAP  12  13   GLU  151*  154*       LFTS:  Recent Labs      01/16/17 0747   TBILI  0.3   ALT  21   SGOT  26   AP  76   TP  6.6   ALB  1.2*       Microbiology:     All Micro Results     Procedure Component Value Units Date/Time    CULTURE, ANAEROBIC [[id number]] Collected:  01/11/17 1416    Order Status:  Completed Specimen:  Bone Updated:  01/16/17 0806     Special Requests: 5TH METATARSAL HEAD      Culture result:         NO ANAEROBIC GROWTH IN 5 DAYS    CULTURE, ANAEROBIC [[id number]] Collected:  01/11/17 1400    Order Status:  Completed Specimen:  Foot Updated:  01/16/17 0806     Special Requests: RIGHT FOOT DEEP WOUND      Culture result:         NO ANAEROBIC GROWTH IN 5 DAYS    CULTURE, WOUND [place]uncion Bracket STAIN [[id number]]  (Susceptibility) Collected:  01/11/17 1400    Order Status:  Completed Specimen:  Foot Updated:  01/15/17 0927     Special Requests: RIGHT FOOT DEEP WOUND      GRAM STAIN 0 TO 5 WBC'S/OIF       RARE  GRAM POSITIVE COCCI        Culture result: SCANT  STREPTOCOCCUS AGALACTIAE SERO GROUP B         LIGHT  STAPHYLOCOCCUS LUGDUNENSIS       CULTURE, TISSUE W [place] [[id number]]  (Susceptibility) Collected:  01/11/17 1416    Order Status:  Completed Specimen:  Bone Updated:  01/15/17 0924     Special Requests: 5TH METATARSAL HEAD      GRAM STAIN NO  WBCS SEEN         NO DEFINITE ORGANISM SEEN        Culture result: SCANT  STAPHYLOCOCCUS CAPRAE         CORRECTED RESULT CALLED TO AND CORRECTLY REPEATED BY:  Pradeep Puente TO CP ON 17 AT 9791       CULTURE, BLOOD [[id number]] Collected:  17 1820    Order Status:  Completed Specimen:  Blood from Blood Updated:  01/10/17 0833     Special Requests: RIGHT ANTECUBITAL        Culture result: NO GROWTH 5 DAYS       CULTURE, BLOOD [[id number]] Collected:  17 1800    Order Status:  Completed Specimen:  Blood from Blood Updated:  01/10/17 0833     Special Requests: LEFT ANTECUBITAL        Culture result: NO GROWTH 5 DAYS       C. DIFFICILE/EPI PCR [[id number]] Collected:  17 1300    Order Status:  Canceled Specimen:  Stool           Imagin/8/17: Bone scan  IMPRESSION: Increased radiotracer activity on all 3 phases within the right foot compared to the left. This localizes on the delayed image to the fifth  metatarsal bone, suggesting osteomyelitis at this site. 17 RLE arterial duplex  IMPRESSION: Abnormal right MAGGIE consistent with severe arterial disease. Noncompressible left lower extremity tibial arteries. Diminished right great toe  pressure. 17 R foot Xray  Impression:  1. No strong evidence for osteomyelitis. A three phase bone scan or contrasted MRI can be considered if there is continued concern for osteomyelitis.   2. Lucencies in the soft tissues about the proximal phalanx of the fifth digit. Soft tissue gas as can occur with a gas producing infection (i.e. gas gangrene)  cannot be excluded.     Signed By: Lali Mckenzie NP     2017

## 2017-01-17 NOTE — PROGRESS NOTES
\   IV heparin rate has been adjusted based on the most recent PTT results.     Lab Results   Component Value Date/Time    aPTT 59.0 01/10/2017 07:45 AM     heparindrip started at lowest rate 12 units/kg/hr  ptt in 6 hours

## 2017-01-17 NOTE — PROGRESS NOTES
[address] Nephrology    Follow-Up on: MO on CkD    HPI: Doing okay,  Having a lot of difficulty with IV access. ROS:  Denies CP, SOB.     Current Facility-Administered Medications   Medication Dose Route Frequency    heparin 25,000 units in dextrose 500 mL infusion  12-25 Units/kg/hr IntraVENous TITRATE    pantoprazole (PROTONIX) tablet 40 mg  40 mg Oral ACB    HYDROmorphone (PF) (DILAUDID) injection 2 mg  2 mg IntraVENous PRN    cefTRIAXone (ROCEPHIN) 2 g in 0.9% sodium chloride (MBP/ADV) 50 mL  2 g IntraVENous Q24H    metroNIDAZOLE (FLAGYL) tablet 500 mg  500 mg Oral TID    HYDROmorphone (PF) (DILAUDID) injection 0.5-1 mg  0.5-1 mg IntraVENous Q3H PRN    insulin glargine (LANTUS) injection 10 Units  10 Units SubCUTAneous DAILY    oxyCODONE IR (ROXICODONE) tablet 5 mg  5 mg Oral Q4H PRN    insulin regular (NOVOLIN R, HUMULIN R) injection   SubCUTAneous AC&HS    amLODIPine (NORVASC) tablet 10 mg  10 mg Oral DAILY    hydrALAZINE (APRESOLINE) 20 mg/mL injection 20 mg  20 mg IntraVENous Q6H PRN    haloperidol lactate (HALDOL) injection 2 mg  2 mg IntraVENous Q4H PRN    0.9% sodium chloride infusion 250 mL  250 mL IntraVENous PRN    diphenhydrAMINE (BENADRYL) injection 25 mg  25 mg IntraVENous Q4H PRN    traMADol (ULTRAM) tablet 50 mg  50 mg Oral Q6H PRN    atorvastatin (LIPITOR) tablet 80 mg  80 mg Oral DAILY    metoprolol succinate (TOPROL-XL) tablet 100 mg  100 mg Oral DAILY WITH BREAKFAST    sodium chloride (NS) flush 5-10 mL  5-10 mL IntraVENous Q8H    sodium chloride (NS) flush 5-10 mL  5-10 mL IntraVENous PRN    acetaminophen (TYLENOL) tablet 650 mg  650 mg Oral Q4H PRN    ondansetron (ZOFRAN) injection 4 mg  4 mg IntraVENous Q4H PRN    docusate sodium (COLACE) capsule 100 mg  100 mg Oral DAILY PRN       Exam:  Vitals:    01/16/17 2052 01/17/17 0049 01/17/17 0423 01/17/17 0855   BP: 146/78 168/70 126/68 139/62   Pulse: 81 74 86 87   Resp: 18 18 18 20   Temp: 98.8 °F (37.1 °C) 98.6 °F (37 °C) 98.6 °F (37 °C) 97.9 °F (36.6 °C)   SpO2: 96% 96% 98% 97%   Weight:       Height:             Intake/Output Summary (Last 24 hours) at 01/17/17 1056  Last data filed at 01/17/17 0931   Gross per 24 hour   Intake              480 ml   Output                0 ml   Net              480 ml     PE:  GEN - in no distress  CV - regular, no murmur, no rub  Lung - clear bilaterally  Abd - soft, nontender  Ext - 1-2+ edema    Labs  Recent Labs      01/16/17   0747  01/15/17   0521   WBC  14.0*  14.4*   HGB  7.5*  8.4*   HCT  23.4*  26.4*   PLT  342  350     Recent Labs      01/16/17   0747  01/15/17   0521   NA  143  143   K  3.8  3.9   CL  111*  111*   CO2  20*  19*   BUN  34*  37*   CREA  2.38*  2.66*   GLU  151*  154*   CA  7.8*  7.9*     No results for input(s): PH, PCO2, PO2, PCO2 in the last 72 hours. Problem List:  Patient Active Problem List    Diagnosis Date Noted    Sepsis due to cellulitis ([facility] 01/09/2017    Acute renal failure with tubular necrosis ([facility] 01/06/2017    Type 2 diabetes mellitus with right diabetic foot infection ([facility] 01/05/2017    NSTEMI (non-ST elevated myocardial infarction) ([facility] 01/05/2017    Kidney disease, chronic, stage III (moderate, EGFR 30-59 ml/min) 08/11/2016    Cardiomyopathy ([address] 05/05/2016    Chronic systolic congestive heart failure ([address] 12/30/2015    Bilateral pleural effusion 12/27/2015    Tobacco use 12/27/2015    Abnormal CT scan, chest 12/27/2015    Chest pain 12/26/2015    Type 2 diabetes mellitus with hyperglycemia ([facility] 07/29/2012    HTN (hypertension) 07/29/2012       Issues Addressed By Nephrology:  1. MO on CKD 3: Slowly improving. Non-oliguric. 2. Osteomyelitis. 3. Anemia  4. PVD    Plan:  -Watching renal function  -Check labs in the AM  -If renal function is better again today, then I am OK for him getting a PICC to facilitate blood draws and antibiotics. If renal function is no better, consider tunneled catheter for IV access.

## 2017-01-17 NOTE — PROGRESS NOTES
[address] FAX: [phone]          VASCULAR SURGERY FLOOR PROGRESS NOTE    Admit Date: 2017  POD: 6 Days Post-Op    Procedure:  Procedure(s):  AMPUTATION RIGHT 5TH TOE/ ROOM 604    Subjective:     Patient has no new complaints. ROS - unchanged except as noted above. Objective:     Vitals:  Blood pressure 139/62, pulse 87, temperature 97.9 °F (36.6 °C), resp. rate 20, height 6' 1\" (1.854 m), weight 213 lb (96.6 kg), SpO2 97 %. Temp (24hrs), Av.5 °F (36.9 °C), Min:97.9 °F (36.6 °C), Max:98.8 °F (37.1 °C)      Intake / Output:    Intake/Output Summary (Last 24 hours) at 17 1335  Last data filed at 17 0931   Gross per 24 hour   Intake              480 ml   Output                0 ml   Net              480 ml       Physical Exam:    GEN: alert, cooperative, no distress, appears stated age    Labs:   Recent Labs      17   0747  01/15/17   0521   HGB  7.5*  8.4*   WBC  14.0*  14.4*   K  3.8  3.9   GLU  151*  154*       Data Review reviewed  Consultants documentation and Nursing documentation    Assessment:     Patient Active Problem List    Diagnosis Date Noted    Sepsis due to cellulitis ([address] 2017    Acute renal failure with tubular necrosis ([address] 2017    Type 2 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 Niya Cortes.   Given nonhealing of right foot wound as well as tibial PAD, a right leg arteriogram and possible intervention will be performed tomorrow afternoon. I discussed the need for the procedure, its risks, and alternatives with the patient. All questions were answered. He understands and agrees to proceed. Elements of this note have been dictated using speech recognition software. As a result, errors of speech recognition may have occurred.

## 2017-01-17 NOTE — PROCEDURES
Department of Interventional Radiology  [phone]        Interventional Radiology Brief Procedure Note    Patient: Becca Valladares MRN: [id number]  SSN: [id number]    YOB: 1945  Age: 73 y.o.   Sex: male      Date of Procedure: 1/17/2017    Pre-Procedure Diagnosis: ARF, gangrene LE    Post-Procedure Diagnosis: SAME    Procedure(s): Tunneled Central Venous Catheter    Brief Description of Procedure: US, fluoro guided right IJ tunneled CVC placed    Performed By: Haseeb Taylor PA-C     Assistants: None    Anesthesia:None    Estimated Blood Loss: Less than 10ml    Specimens: None    Implants: Tunneled Central Venous Catheter    Findings: right IJ patent    Complications: None    Recommendations: ok to use catheter     Follow Up: referring MD    Signed By: Haseeb Taylor PA-C     January 17, 2017

## 2017-01-17 NOTE — PROGRESS NOTES
TRANSFER - OUT REPORT:    Verbal report given to DEJA Boston (name) on Arlyn Carrillo  being transferred to  (unit) for routine progression of care       Report consisted of patients Situation, Background, Assessment and   Recommendations(SBAR). Information from the following report(s) Procedure Summary and MAR was reviewed with the receiving nurse. Lines:   Double Lumen 01/17/17 Right Internal jugular (Active)   Central Line Being Utilized Yes 1/17/2017  4:19 PM   Criteria for Appropriate Use Limited/no vessel suitable for conventional peripheral access 1/17/2017  4:19 PM   Site Assessment Clean, dry, & intact 1/17/2017  4:19 PM   Infiltration Assessment 0 1/17/2017  4:19 PM   Affected Extremity/Extremities Color distal to insertion site pink (or appropriate for race) 1/17/2017  4:19 PM   Dressing Status Clean, dry, & intact; New 1/17/2017  4:19 PM   Dressing Type Disk with Chlorhexadine Gluconate (CHG); Transparent 1/17/2017  4:19 PM   Proximal Hub Color/Line Status Purple;Capped;Flushed;Patent 1/17/2017  4:19 PM   Positive Blood Return (Medial Site) Yes 1/17/2017  4:19 PM   Distal Hub Color/Line Status Red;Capped;Flushed;Patent 1/17/2017  4:19 PM   Positive Blood Return (Lateral Site) Yes 1/17/2017  4:19 PM        Opportunity for questions and clarification was provided.       Patient transported with:   [employer]

## 2017-01-18 ENCOUNTER — APPOINTMENT (OUTPATIENT)
Dept: INTERVENTIONAL RADIOLOGY/VASCULAR | Age: 72
DRG: 853 | End: 2017-01-18
Attending: SURGERY
Payer: MEDICARE

## 2017-01-18 ENCOUNTER — ANESTHESIA (OUTPATIENT)
Dept: SURGERY | Age: 72
DRG: 853 | End: 2017-01-18
Payer: MEDICARE

## 2017-01-18 LAB
ANION GAP BLD CALC-SCNC: 11 MMOL/L (ref 7–16)
APTT PPP: >110 SEC (ref 23.5–31.7)
APTT PPP: >110 SEC (ref 23.5–31.7)
BACTERIA SPEC CULT: NORMAL
BACTERIA SPEC CULT: NORMAL
BUN SERPL-MCNC: 33 MG/DL (ref 8–23)
CALCIUM SERPL-MCNC: 7.5 MG/DL (ref 8.3–10.4)
CHLORIDE SERPL-SCNC: 112 MMOL/L (ref 98–107)
CO2 SERPL-SCNC: 20 MMOL/L (ref 21–32)
CREAT SERPL-MCNC: 2.27 MG/DL (ref 0.8–1.5)
GLUCOSE BLD STRIP.AUTO-MCNC: 138 MG/DL (ref 65–100)
GLUCOSE BLD STRIP.AUTO-MCNC: 159 MG/DL (ref 65–100)
GLUCOSE BLD STRIP.AUTO-MCNC: 160 MG/DL (ref 65–100)
GLUCOSE BLD STRIP.AUTO-MCNC: 174 MG/DL (ref 65–100)
GLUCOSE SERPL-MCNC: 187 MG/DL (ref 65–100)
POTASSIUM SERPL-SCNC: 3.7 MMOL/L (ref 3.5–5.1)
SERVICE CMNT-IMP: NORMAL
SERVICE CMNT-IMP: NORMAL
SODIUM SERPL-SCNC: 143 MMOL/L (ref 136–145)

## 2017-01-18 PROCEDURE — 74011250637 HC RX REV CODE- 250/637: Performed by: INTERNAL MEDICINE

## 2017-01-18 PROCEDURE — B41F1ZZ FLUOROSCOPY OF RIGHT LOWER EXTREMITY ARTERIES USING LOW OSMOLAR CONTRAST: ICD-10-PCS | Performed by: SURGERY

## 2017-01-18 PROCEDURE — 74011250636 HC RX REV CODE- 250/636: Performed by: ANESTHESIOLOGY

## 2017-01-18 PROCEDURE — 74011000258 HC RX REV CODE- 258

## 2017-01-18 PROCEDURE — C1887 CATHETER, GUIDING: HCPCS

## 2017-01-18 PROCEDURE — C1769 GUIDE WIRE: HCPCS

## 2017-01-18 PROCEDURE — 74011250636 HC RX REV CODE- 250/636

## 2017-01-18 PROCEDURE — 94760 N-INVAS EAR/PLS OXIMETRY 1: CPT

## 2017-01-18 PROCEDURE — 77030004565 HC CATH ANGI DX TMPO CARD -B

## 2017-01-18 PROCEDURE — C1894 INTRO/SHEATH, NON-LASER: HCPCS

## 2017-01-18 PROCEDURE — 82962 GLUCOSE BLOOD TEST: CPT

## 2017-01-18 PROCEDURE — 76060000033 HC ANESTHESIA 1 TO 1.5 HR: Performed by: SURGERY

## 2017-01-18 PROCEDURE — 74011250637 HC RX REV CODE- 250/637: Performed by: NURSE PRACTITIONER

## 2017-01-18 PROCEDURE — 74011250636 HC RX REV CODE- 250/636: Performed by: SURGERY

## 2017-01-18 PROCEDURE — 74011636637 HC RX REV CODE- 636/637: Performed by: HOSPITALIST

## 2017-01-18 PROCEDURE — 65660000000 HC RM CCU STEPDOWN

## 2017-01-18 PROCEDURE — 80048 BASIC METABOLIC PNL TOTAL CA: CPT | Performed by: INTERNAL MEDICINE

## 2017-01-18 PROCEDURE — 36415 COLL VENOUS BLD VENIPUNCTURE: CPT | Performed by: INTERNAL MEDICINE

## 2017-01-18 PROCEDURE — 76937 US GUIDE VASCULAR ACCESS: CPT

## 2017-01-18 PROCEDURE — 74011000250 HC RX REV CODE- 250: Performed by: NURSE PRACTITIONER

## 2017-01-18 PROCEDURE — 77030004631 HC CATH ANGI GUID ACCVU ANGI -B

## 2017-01-18 PROCEDURE — 85730 THROMBOPLASTIN TIME PARTIAL: CPT | Performed by: INTERNAL MEDICINE

## 2017-01-18 PROCEDURE — 74011000250 HC RX REV CODE- 250: Performed by: SURGERY

## 2017-01-18 PROCEDURE — 74011636320 HC RX REV CODE- 636/320: Performed by: SURGERY

## 2017-01-18 RX ORDER — FENTANYL CITRATE 50 UG/ML
100 INJECTION, SOLUTION INTRAMUSCULAR; INTRAVENOUS ONCE
Status: ACTIVE | OUTPATIENT
Start: 2017-01-18 | End: 2017-01-19

## 2017-01-18 RX ORDER — SODIUM CHLORIDE 9 MG/ML
50 INJECTION, SOLUTION INTRAVENOUS CONTINUOUS
Status: DISCONTINUED | OUTPATIENT
Start: 2017-01-18 | End: 2017-01-19

## 2017-01-18 RX ORDER — CEFAZOLIN SODIUM 1 G/3ML
INJECTION, POWDER, FOR SOLUTION INTRAMUSCULAR; INTRAVENOUS AS NEEDED
Status: DISCONTINUED | OUTPATIENT
Start: 2017-01-18 | End: 2017-01-18 | Stop reason: HOSPADM

## 2017-01-18 RX ORDER — SODIUM CHLORIDE 0.9 % (FLUSH) 0.9 %
5-10 SYRINGE (ML) INJECTION AS NEEDED
Status: DISCONTINUED | OUTPATIENT
Start: 2017-01-18 | End: 2017-01-19

## 2017-01-18 RX ORDER — IODIXANOL 320 MG/ML
5-300 INJECTION, SOLUTION INTRAVASCULAR
Status: COMPLETED | OUTPATIENT
Start: 2017-01-18 | End: 2017-01-18

## 2017-01-18 RX ORDER — FAMOTIDINE 20 MG/1
20 TABLET, FILM COATED ORAL ONCE
Status: ACTIVE | OUTPATIENT
Start: 2017-01-18 | End: 2017-01-19

## 2017-01-18 RX ORDER — HEPARIN SODIUM 5000 [USP'U]/100ML
12-25 INJECTION, SOLUTION INTRAVENOUS CONTINUOUS
Status: DISCONTINUED | OUTPATIENT
Start: 2017-01-18 | End: 2017-01-18

## 2017-01-18 RX ORDER — FAMOTIDINE 10 MG/ML
20 INJECTION INTRAVENOUS ONCE
Status: ACTIVE | OUTPATIENT
Start: 2017-01-18 | End: 2017-01-19

## 2017-01-18 RX ORDER — INSULIN GLARGINE 100 [IU]/ML
12 INJECTION, SOLUTION SUBCUTANEOUS DAILY
Status: DISCONTINUED | OUTPATIENT
Start: 2017-01-18 | End: 2017-01-21

## 2017-01-18 RX ORDER — SODIUM CHLORIDE, SODIUM LACTATE, POTASSIUM CHLORIDE, CALCIUM CHLORIDE 600; 310; 30; 20 MG/100ML; MG/100ML; MG/100ML; MG/100ML
150 INJECTION, SOLUTION INTRAVENOUS CONTINUOUS
Status: DISCONTINUED | OUTPATIENT
Start: 2017-01-18 | End: 2017-01-19

## 2017-01-18 RX ORDER — SODIUM CHLORIDE, SODIUM LACTATE, POTASSIUM CHLORIDE, CALCIUM CHLORIDE 600; 310; 30; 20 MG/100ML; MG/100ML; MG/100ML; MG/100ML
INJECTION, SOLUTION INTRAVENOUS
Status: DISCONTINUED | OUTPATIENT
Start: 2017-01-18 | End: 2017-01-18

## 2017-01-18 RX ORDER — HYDROMORPHONE HYDROCHLORIDE 2 MG/ML
0.5 INJECTION, SOLUTION INTRAMUSCULAR; INTRAVENOUS; SUBCUTANEOUS
Status: DISCONTINUED | OUTPATIENT
Start: 2017-01-18 | End: 2017-01-19

## 2017-01-18 RX ORDER — HEPARIN SODIUM 200 [USP'U]/100ML
1000 INJECTION, SOLUTION INTRAVENOUS
Status: DISCONTINUED | OUTPATIENT
Start: 2017-01-18 | End: 2017-01-19

## 2017-01-18 RX ORDER — FENTANYL CITRATE 50 UG/ML
INJECTION, SOLUTION INTRAMUSCULAR; INTRAVENOUS AS NEEDED
Status: DISCONTINUED | OUTPATIENT
Start: 2017-01-18 | End: 2017-01-18 | Stop reason: HOSPADM

## 2017-01-18 RX ORDER — HEPARIN SODIUM 5000 [USP'U]/ML
5000 INJECTION, SOLUTION INTRAVENOUS; SUBCUTANEOUS EVERY 8 HOURS
Status: DISPENSED | OUTPATIENT
Start: 2017-01-19 | End: 2017-01-19

## 2017-01-18 RX ORDER — SODIUM CHLORIDE 0.9 % (FLUSH) 0.9 %
5-10 SYRINGE (ML) INJECTION EVERY 8 HOURS
Status: DISCONTINUED | OUTPATIENT
Start: 2017-01-18 | End: 2017-01-19

## 2017-01-18 RX ORDER — MIDAZOLAM HYDROCHLORIDE 1 MG/ML
5 INJECTION, SOLUTION INTRAMUSCULAR; INTRAVENOUS ONCE
Status: ACTIVE | OUTPATIENT
Start: 2017-01-18 | End: 2017-01-19

## 2017-01-18 RX ORDER — MIDAZOLAM HYDROCHLORIDE 1 MG/ML
2 INJECTION, SOLUTION INTRAMUSCULAR; INTRAVENOUS
Status: DISCONTINUED | OUTPATIENT
Start: 2017-01-18 | End: 2017-01-19

## 2017-01-18 RX ORDER — PROPOFOL 10 MG/ML
INJECTION, EMULSION INTRAVENOUS AS NEEDED
Status: DISCONTINUED | OUTPATIENT
Start: 2017-01-18 | End: 2017-01-18 | Stop reason: HOSPADM

## 2017-01-18 RX ORDER — ACETAMINOPHEN 500 MG
1000 TABLET ORAL
Status: DISCONTINUED | OUTPATIENT
Start: 2017-01-18 | End: 2017-01-19

## 2017-01-18 RX ORDER — SODIUM CHLORIDE 9 MG/ML
INJECTION, SOLUTION INTRAVENOUS
Status: DISCONTINUED | OUTPATIENT
Start: 2017-01-18 | End: 2017-01-18 | Stop reason: HOSPADM

## 2017-01-18 RX ORDER — LIDOCAINE HYDROCHLORIDE 10 MG/ML
0.1 INJECTION INFILTRATION; PERINEURAL AS NEEDED
Status: DISCONTINUED | OUTPATIENT
Start: 2017-01-18 | End: 2017-01-19

## 2017-01-18 RX ORDER — LIDOCAINE HYDROCHLORIDE 20 MG/ML
100-200 INJECTION, SOLUTION INFILTRATION; PERINEURAL ONCE
Status: COMPLETED | OUTPATIENT
Start: 2017-01-18 | End: 2017-01-18

## 2017-01-18 RX ORDER — PROPOFOL 10 MG/ML
INJECTION, EMULSION INTRAVENOUS
Status: DISCONTINUED | OUTPATIENT
Start: 2017-01-18 | End: 2017-01-18 | Stop reason: HOSPADM

## 2017-01-18 RX ADMIN — DAKIN'S SOLUTION 0.125% (QUARTER STRENGTH): 0.12 SOLUTION at 10:47

## 2017-01-18 RX ADMIN — METOPROLOL SUCCINATE 100 MG: 100 TABLET, EXTENDED RELEASE ORAL at 09:16

## 2017-01-18 RX ADMIN — CEFAZOLIN 2 G: 1 INJECTION, POWDER, FOR SOLUTION INTRAMUSCULAR; INTRAVENOUS; PARENTERAL at 13:25

## 2017-01-18 RX ADMIN — METRONIDAZOLE 500 MG: 500 TABLET ORAL at 09:16

## 2017-01-18 RX ADMIN — SODIUM CHLORIDE 125 ML/HR: 900 INJECTION, SOLUTION INTRAVENOUS at 02:00

## 2017-01-18 RX ADMIN — CEFAZOLIN SODIUM 1 G: 1 INJECTION, POWDER, FOR SOLUTION INTRAMUSCULAR; INTRAVENOUS at 16:04

## 2017-01-18 RX ADMIN — Medication 10 ML: at 05:23

## 2017-01-18 RX ADMIN — PROPOFOL 160 MCG/KG/MIN: 10 INJECTION, EMULSION INTRAVENOUS at 15:47

## 2017-01-18 RX ADMIN — INSULIN GLARGINE 12 UNITS: 100 INJECTION, SOLUTION SUBCUTANEOUS at 09:00

## 2017-01-18 RX ADMIN — HEPARIN SODIUM 2000 UNITS: 200 INJECTION, SOLUTION INTRAVENOUS at 16:07

## 2017-01-18 RX ADMIN — HYDROMORPHONE HYDROCHLORIDE 1 MG: 1 INJECTION, SOLUTION INTRAMUSCULAR; INTRAVENOUS; SUBCUTANEOUS at 10:45

## 2017-01-18 RX ADMIN — SODIUM CHLORIDE: 9 INJECTION, SOLUTION INTRAVENOUS at 15:38

## 2017-01-18 RX ADMIN — Medication 10 ML: at 13:25

## 2017-01-18 RX ADMIN — METRONIDAZOLE 500 MG: 500 TABLET ORAL at 18:39

## 2017-01-18 RX ADMIN — AMLODIPINE BESYLATE 10 MG: 10 TABLET ORAL at 09:16

## 2017-01-18 RX ADMIN — ATORVASTATIN CALCIUM 80 MG: 40 TABLET, FILM COATED ORAL at 09:16

## 2017-01-18 RX ADMIN — Medication 10 ML: at 22:00

## 2017-01-18 RX ADMIN — CEFTRIAXONE 2 G: 2 INJECTION, POWDER, FOR SOLUTION INTRAMUSCULAR; INTRAVENOUS at 16:39

## 2017-01-18 RX ADMIN — Medication 10 ML: at 21:31

## 2017-01-18 RX ADMIN — IODIXANOL 35 ML: 320 INJECTION, SOLUTION INTRAVASCULAR at 16:32

## 2017-01-18 RX ADMIN — FENTANYL CITRATE 25 MCG: 50 INJECTION, SOLUTION INTRAMUSCULAR; INTRAVENOUS at 16:15

## 2017-01-18 RX ADMIN — METRONIDAZOLE 500 MG: 500 TABLET ORAL at 21:30

## 2017-01-18 RX ADMIN — HEPARIN SODIUM 2000 UNITS: 200 INJECTION, SOLUTION INTRAVENOUS at 16:15

## 2017-01-18 RX ADMIN — LIDOCAINE HYDROCHLORIDE 100 MG: 20 INJECTION, SOLUTION INFILTRATION; PERINEURAL at 16:06

## 2017-01-18 RX ADMIN — PROPOFOL 30 MG: 10 INJECTION, EMULSION INTRAVENOUS at 15:47

## 2017-01-18 RX ADMIN — FENTANYL CITRATE 25 MCG: 50 INJECTION, SOLUTION INTRAMUSCULAR; INTRAVENOUS at 16:00

## 2017-01-18 RX ADMIN — SODIUM CHLORIDE 125 ML/HR: 900 INJECTION, SOLUTION INTRAVENOUS at 08:33

## 2017-01-18 RX ADMIN — HYDROMORPHONE HYDROCHLORIDE 1 MG: 1 INJECTION, SOLUTION INTRAMUSCULAR; INTRAVENOUS; SUBCUTANEOUS at 21:30

## 2017-01-18 NOTE — PROGRESS NOTES
PT note: Patient off floor this afternoon when therapy attempted. Will check back as schedule allows.      Fbaian Carranza DPT

## 2017-01-18 NOTE — BRIEF OP NOTE
BRIEF OPERATIVE NOTE    Date of Procedure: 1/18/2017   Preoperative Diagnosis: Diabetic foot ulcer associated with type 1 diabetes mellitus, unspecified laterality ([facility] [E10.621, L97.509]  Postoperative Diagnosis: * No post-op diagnosis entered *    Procedure(s):  IR ANESTHESIA- RIGHT LEG ARTERIOGRAM  3rd order select a-gram/cath  Surgeon(s) and Role:     * Sue Redd MD - Primary        Surgical Staff:  * No surgical staff found *  No case tracking events are documented in the log. Anesthesia: MAC   Estimated Blood Loss: < 10 mL  Specimens: * No specimens in log *   Findings: Severe distal tibial disease of right leg, not amenable to revascularization. Complications: none  Implants: * No implants in log *  Access: L CFA, 6F Ansell, US-guided, manual compression.   Fluoro time: 2 min, 56 sec  Contrast: 35 mL visipaque

## 2017-01-18 NOTE — PROGRESS NOTES
OT Note:    OT attempted to see patient today for therapy. Pt was being transported down for procedure. This writer assisted transport with placing patient on stretcher. Will re-attempt to see patient at a later date/time.     Thanks,  Carisa Figueredo Roles

## 2017-01-18 NOTE — PROGRESS NOTES
TRANSFER - OUT REPORT:    Verbal report given to DEJA Boston (name) on Antonella Green  being transferred to [address] [place] [address] (unit) for routine progression of care       Report consisted of patients Situation, Background, Assessment and   Recommendations(SBAR). Information from the following report(s) Procedure Summary and MAR was reviewed with the receiving nurse. Lines:   Double Lumen 01/17/17 Right Internal jugular (Active)   Central Line Being Utilized Yes 1/18/2017  2:15 PM   Criteria for Appropriate Use Limited/no vessel suitable for conventional peripheral access 1/18/2017  2:15 PM   Site Assessment Clean, dry, & intact 1/18/2017  2:15 PM   Infiltration Assessment 0 1/18/2017  9:49 AM   Affected Extremity/Extremities Color distal to insertion site pink (or appropriate for race) 1/18/2017  2:15 PM   Date of Last Dressing Change 01/17/17 1/18/2017  4:28 AM   Dressing Status Clean, dry, & intact 1/18/2017  2:15 PM   Dressing Type Disk with Chlorhexadine Gluconate (CHG) 1/18/2017  2:15 PM   Proximal Hub Color/Line Status Infusing 1/18/2017  2:15 PM   Positive Blood Return (Medial Site) Yes 1/18/2017  4:28 AM   Distal Hub Color/Line Status Infusing 1/18/2017  4:28 AM   Positive Blood Return (Lateral Site) Yes 1/18/2017  4:28 AM       Peripheral IV 01/18/17 Right Wrist (Active)   Site Assessment Clean, dry, & intact 1/18/2017  3:28 PM   Phlebitis Assessment 0 1/18/2017  3:28 PM   Infiltration Assessment 0 1/18/2017  3:28 PM   Dressing Status Clean, dry, & intact 1/18/2017  3:28 PM   Dressing Type Transparent 1/18/2017  3:28 PM   Hub Color/Line Status Blue 1/18/2017  3:28 PM        Opportunity for questions and clarification was provided.       Patient transported with:   [employer]

## 2017-01-18 NOTE — PROGRESS NOTES
6 FR sheath removed from left groin at 1634 by Petar Aguilar. Manual pressure held for 20 minutes until hemostasis achieved. No hematoma or bleeding noted. Sterile dressing applied as ordered. Patient instructed to keep left straight and to remain flat until bedrest is completed. Patient verbalized understanding of post procedure instructions. Continuing to monitor patient.

## 2017-01-18 NOTE — PROGRESS NOTES
Called surgery office to clarify if heparin gtt to be restarted. Spoke with Michelle Whitney. Ok to restart heparin drip at previous rate per surgery. After order obtained now order from Dr Laurita Armenta for SQ heparin injections every 8 hours.  Heparin drip held to prevent duplicate therapy

## 2017-01-18 NOTE — PROGRESS NOTES
PTT >110. Heparin gtt stopped at 0120, decreased to 9units/kg at 0126. Next PTT due at 0730. Will notify day shift RN.

## 2017-01-18 NOTE — PROGRESS NOTES
PLAN:  Cont management per Hospitalist  Cont Heparin gtt -- stop at 0900 for procedure later today  Needs good BS and BP control  Vasc Surgery has seen - abnormal vasc studies to RLE -- plans for A-gram/poss intervention today  Smoking Cessation imperative  Wound care -- VAC off -- new dressing orders written  Follow OR cultures -- Abx -- per [facility] and ID  CM -- needs placement for abx, wound care and PT (NWB to RLE)      ASSESSMENT:  Admit Date: 1/5/2017   Procedure(s): 1/11/17 POD#7  I&D OF DEEP SPACE R FOOT INFECTION WITH DEBRIDEMENT OF SKIN, SOFT TISSUE AND BONE INCLUDING RIGHT 5TH TOE AND METATARSAL, BONE BIOPSY FOR CULTURE    Principal Problem:    Type 2 diabetes mellitus with right diabetic foot infection ([address] (1/5/2017)    ----necrotizing infection with acute osteomyelitis and abscess in face of arterial disease with insufficiency. Foot debrided of infection including 5th toe and 5th metatarsal.    Active Problems:    Type 2 diabetes mellitus with hyperglycemia ([address] (7/29/2012)      HTN (hypertension) (7/29/2012)      Chronic systolic congestive heart failure ([address] (12/30/2015)      Overview: EF 35-40% on 2015 Echo      Kidney disease, chronic, stage III (moderate, EGFR 30-59 ml/min) (8/11/2016)      Acute renal failure with tubular necrosis ([address] (1/6/2017)      NSTEMI (non-ST elevated myocardial infarction) ([address] (1/5/2017)      Sepsis due to cellulitis ([address] (1/9/2017)       HPI: Pt is a 74 yo male who presented with progressive pain and poor healing of right foot wound over 1 month. Other PMH includes DM (A1C 9.2 11/2016), CHF, CKD, current smoker. X Ray of right foot had low suspicion for osteomyelitis but does reveal soft tissue lucencies, possible gas producing infection. WBC 18.7, LA 2.2. He was started on Vanc/Zosyn in ER. Also noted to have MO with Cr 2.99, baseline 1.90. He also had elevated trops without any typical symptoms.     R Vasc studies showing severe arterial disease, but RF poor and dye would likely push patient to dialysis      SUBJECTIVE:  Resting in bed. Reports R foot pain persists. Remains on abx - ID following. Heparin gtt infusing. AF, NAD, VSS. OBJECTIVE:  Constitutional: Alert, cooperative patient in no acute distress; appears stated age   Visit Vitals    /70    Pulse 85    Temp 99.5 °F (37.5 °C)    Resp 18    Ht 6' 1\" (1.854 m)    Wt 96.6 kg (212 lb 15.4 oz)    SpO2 94%    BMI 28.1 kg/m2     Eyes: Sclera are clear. ENMT: No obvious neck masses, no ear or lip lesions, R IJ tunneled cath c/d/i  CV: Impaired perfusion - pulses PT/DP via doppler, marked  Resp: No JVD. Breathing is non-labored. No wheezing. On RA  GI: Soft, non-tender, non-distended, BS active   Musculoskeletal: Normal function. R thumb amputation  Extremities: DTI on dorsal foot unchanged. Small purple area with indentation on plantar aspect just beneath 2nd toe. No change or slight improvement in erythema to plantar surface and wrapping around in-step. Toes 2-4 remain erythematous and dusky, sluggish cap refill.    Wound with some gray tissue, periwound skin with moisture, bogginess and maceration -- unchanged, not worsening  Neuro: Oriented, follows commands and answers questions, sensation to bilat feet intact  Psychiatric: Calm and cooperative    Patient Vitals for the past 24 hrs:   BP Temp Pulse Resp SpO2 Weight   01/18/17 0518 129/70 99.5 °F (37.5 °C) 85 18 94 % -   01/18/17 0041 148/77 98.1 °F (36.7 °C) 94 18 96 % -   01/17/17 2054 132/84 98.2 °F (36.8 °C) 91 18 94 % -   01/17/17 1727 - - - - - 96.6 kg (212 lb 15.4 oz)   01/17/17 1715 137/71 - - 20 94 % -   01/17/17 1517 139/74 99.1 °F (37.3 °C) 81 20 94 % -   01/17/17 1235 129/78 98.6 °F (37 °C) 77 20 95 % -   01/17/17 0855 139/62 97.9 °F (36.6 °C) 87 20 97 % -       Labs:    Recent Labs      01/18/17   0540  01/17/17   2332   01/16/17   0747   WBC   --    --    --   14.0*   HGB   --    --    --   7.5*   PLT   --    --    --   342   NA  143 --    < >  143   K  3.7   --    < >  3.8   CL  112*   --    < >  111*   CO2  20*   --    < >  20*   BUN  33*   --    < >  34*   CREA  2.27*   --    < >  2.38*   GLU  187*   --    < >  151*   APTT   --   >110.0*   --    --    TBILI   --    --    --   0.3   CBIL   --    --    --   0.1   SGOT   --    --    --   26   ALT   --    --    --   21   AP   --    --    --   76    < > = values in this interval not displayed. MISHA Cortez      I have seen and examined the patient with the Nurse Practitioner and agree with the above assessment and plan. Iveth Gould.  Devon Phillips MD

## 2017-01-18 NOTE — PROGRESS NOTES
Patient premedicated with 1 mg iv dilaudid for dressing change. Cleansed with dermal wound cleanser. 0.45 dakins solution soaked gauze, 4x4, kerlix and stockinette with taped end as ordered. Patient tolerated well.

## 2017-01-18 NOTE — PROGRESS NOTES
Admit Date: 1/5/2017      Subjective:          Review of Systems  Cardio-vascular: no chest pain, no SOB  GI: no N/V/D  : no dysuria, no hematuria    Objective:     Patient Vitals for the past 8 hrs:   BP Temp Pulse Resp SpO2   01/18/17 1150 128/65 98.4 °F (36.9 °C) 79 18 92 %   01/18/17 0750 136/74 99.4 °F (37.4 °C) 92 18 95 %   01/18/17 0518 129/70 99.5 °F (37.5 °C) 85 18 94 %            Physical Exam:   Lungs: clear  CV: RR,   Abdomen: soft, not tender, no rebound.   Ext: + to++  edema          Data Review   Recent Results (from the past 8 hour(s))   METABOLIC PANEL, BASIC    Collection Time: 01/18/17  5:40 AM   Result Value Ref Range    Sodium 143 136 - 145 mmol/L    Potassium 3.7 3.5 - 5.1 mmol/L    Chloride 112 (H) 98 - 107 mmol/L    CO2 20 (L) 21 - 32 mmol/L    Anion gap 11 7 - 16 mmol/L    Glucose 187 (H) 65 - 100 mg/dL    BUN 33 (H) 8 - 23 MG/DL    Creatinine 2.27 (H) 0.8 - 1.5 MG/DL    GFR est AA 37 (L) >60 ml/min/1.73m2    GFR est non-AA 31 (L) >60 ml/min/1.73m2    Calcium 7.5 (L) 8.3 - 10.4 MG/DL   GLUCOSE, POC    Collection Time: 01/18/17  7:04 AM   Result Value Ref Range    Glucose (POC) 174 (H) 65 - 100 mg/dL   PTT    Collection Time: 01/18/17  7:42 AM   Result Value Ref Range    aPTT >110.0 (HH) 23.5 - 31.7 SEC   GLUCOSE, POC    Collection Time: 01/18/17 11:10 AM   Result Value Ref Range    Glucose (POC) 160 (H) 65 - 100 mg/dL           Assessment:     Principal Problem:    Type 2 diabetes mellitus with right diabetic foot infection ([facility] (1/5/2017)    Active Problems:    Type 2 diabetes mellitus with hyperglycemia (HCC) (7/29/2012)      HTN (hypertension) (7/29/2012)      Chronic systolic congestive heart failure ([facility] (12/30/2015)      Overview: EF 35-40% on 2015 Echo      Kidney disease, chronic, stage III (moderate, EGFR 30-59 ml/min) (8/11/2016)      Acute renal failure with tubular necrosis (HCC) (1/6/2017)      NSTEMI (non-ST elevated myocardial infarction) ([facility] (1/5/2017)      Sepsis due to cellulitis 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 Slightly better  F/u    Philip Kayser, MD

## 2017-01-18 NOTE — PROGRESS NOTES
Patient returned to floor. No s/sx of bleeding or hematoma. Denies pain or nausea. Patient on bedrest until 0100. Patient aware.

## 2017-01-18 NOTE — ANESTHESIA POSTPROCEDURE EVALUATION
Post-Anesthesia Evaluation and Assessment    Patient: Wilson Barrera MRN: [id number]  SSN: [id number]    YOB: 1945  Age: 73 y.o. Sex: male       Cardiovascular Function/Vital Signs  Visit Vitals    /75    Pulse 75    Temp 36.4 °C (97.6 °F)    Resp 21    Ht 6' 1\" (1.854 m)    Wt 96.6 kg (212 lb 15.4 oz)    SpO2 90%    BMI 28.1 kg/m2       Patient is status post total IV anesthesia anesthesia for Procedure(s):  IR ANESTHESIA- RIGHT LEG ARTERIOGRAM/ 604. Nausea/Vomiting: None    Postoperative hydration reviewed and adequate. Pain:  Pain Scale 1: Visual (01/18/17 1713)  Pain Intensity 1: 0 (01/18/17 1723)   Managed    Neurological Status:   Neuro (WDL): Exceptions to WDL (01/18/17 1713)  Neuro  Neurologic State: Confused (01/18/17 1713)  Orientation Level: Disoriented X4 (01/18/17 1713)  Cognition: Decreased attention/concentration;Decreased command following; Impulsive;Poor safety awareness (01/18/17 1713)  LUE Motor Response: Purposeful (01/16/17 1918)  LLE Motor Response: Purposeful;Weak (01/16/17 1918)  RUE Motor Response: Purposeful (01/16/17 1918)  RLE Motor Response: Weak (01/16/17 1918)   At baseline    Mental Status and Level of Consciousness: Arousable    Pulmonary Status:   O2 Device: Room air (01/18/17 1723)   Adequate oxygenation and airway patent    Complications related to anesthesia: None    Post-anesthesia assessment completed.  No concerns    Signed By: Vicky Rincon MD     January 18, 2017

## 2017-01-18 NOTE — OP NOTES
[facility] [address] [facility]  [phone]    [facility] REPORT    Name: Jeanine Perez     Date of Surgery: 1/11/2017  Med Record Number: [id number]   Age: 73 y.o.    Sex: male   Preoperative Diagnosis: Gangrene of toe (HCC) [I96]  Postoperative Diagnosis: Gangrene of toe ([address] Azucena Venecia, plantar abscess, necrotizing diabetic foot infection, acute osteomyelitis of 5th metatarsal   Procedure(s):  I&D OF DEEP SPACE R FOOT INFECTION WITH DEBRIDEMENT OF SKIN, SOFT TISSUE AND BONE INCLUDING RIGHT 5TH TOE AND METATARSAL, BONE BIOPSY FOR CULTURE  Surgeon(s) and Role:  * Ranjit Frye MD - Primary         Surgical Staff:  Circ-1: Laurita Diaz RN  Circ-2: Liza Nunez RN  Scrub Tech-1: Jose M Cronin  Scrub Tech-2: Marisol Mariano  Event Time In   Incision Start 1357   Incision Close 1451      Anesthesia: Regional   Estimated Blood Loss: <30 cc  Specimens:   ID Type Source Tests Collected by Time Destination   1 : right fifth toe and partial debridement of right foot Preservative Foot, Right   Ranjit Frye MD 1/11/2017 1418 Pathology   2 : distal 5th metatarsal Preservative Foot, Right   Ranjit Frye MD 1/11/2017 1421 Pathology   3 : proximal 5th metatarsal margin Preservative Foot, Right   Ranjit Frye MD 1/11/2017 1421 Pathology   1 : right foot deep wound Wound Foot, Right CULTURE, ANAEROBIC, CULTURE, WOUND W GRAM STAIN Ranjit Frye MD 1/11/2017 1400 Microbiology   2 : right fifth metatarsal head for culture Tissue   CULTURE, ANAEROBIC, CULTURE, TISSUE W Magnus Jiménez MD 1/11/2017 1409 Microbiology       Findings: Progressive gangrene of R 5th toe with lateral ulcers and tissue necrosis, 5th toe easily disarticulated and abscess encountered at level of interspace and metatarsal head that was sent for culture, abscess and necrosis extended into deep space of foot, metatarsal debrided back to very proximal transection using TPS power saw, proximal segment sent to path as margin, biopsy from metatarsal head sent for culture, after irrigation the tissues appeared viable but blood flow was not great, 5th metatarsal transection covered with soft tissue using 2-0 vicryl suture and overlying skin closed with 2 x 2-0 nylon simple sutures, no exposed bone, moist Kerlex used to pack across deep plantar space and rest of open lateral foot wound. Foot wrapped with Kerlex and Coban. Wound dimensions of 13 (L) x 6 (W) x 11 (D) cm = 217 sq cm  Complications: none apparent  Implants: * No implants in log *      Procedure Description:   The risks, benefits, potential complications, treatment options, and expected outcomes were discussed with the patient pre-operatively. The patient voiced understanding and gave informed consent preoperatively. The patient was taken to the Operating Room, and the [facility] time-out protocol checklist was followed. The patient received popliteal and ankle regional anesthesia in preop by the anesthesia department. The correct area of the right foot was marked. The patient received additional IV anesthesia and the right foot and lower leg were prepped with Betadine and draped in a sterile fashion. The right fifth toe was now gangrenous. Ulcers were present a along the lateral foot at the level of the metatarsal.  A racquet shaped incision was made between the fourth and fifth toes with the handle of the racquet carried laterally but dorsal to the specialized plantar tissue. The pocket of purulent material was encountered between the fourth and fifth metatarsal heads. A wound culture was taken of this area. The tissues were not healthy and the toe was easily disarticulated. This was set aside. Some purulent material was present along the distal plantar aspect of the foot. This was drained. Dissection was carried along to debride the fifth metatarsal bone which by bone scan showed osteomyelitis.   Blood supply was not brisk and this foot. The metatarsal was  from the surrounding fascia and was transected at the very proximal point that removed greater than 80% of that metatarsal.  The transection was performed using the Kii power saw. A biopsy was taken of the metatarsal head and sent to microbiology for culture. The transected end of the metatarsal appeared healthy. There was no pus present within the marrow. A small segment of this proximal margin of the metatarsal was sent to pathology to show a clear margin away from the osteomyelitis. The debrided toe, soft tissue and metatarsal were sent to pathology. Further expiration of the foot revealed a pocket along the plantar aspect superficial to the tendons that carried along to the medial side of the foot. The Yankauer sucker was able to be passed almost to the medial ankle. His was suctioned free of any purulent material and irrigated clear. A counterincision was not made as appeared to be adequately drained at this point. Again the blood supply within the foot was marginal.  The tissues that were debrided were clearly devitalized. The remaining tissues were pale but appeared viable. Wound dimensions of 13 (L) x 6 (W) x 11 (D) cm. The transected metatarsal was made smooth using a rasp. 2-0 Vicryl suture was used to cover the transection of the metatarsal.  Additional 2-0 nylon sutures ×2 were placed to close the most proximal 1.5 cm of the incision again with the purpose to cover the transected bone. After adequate irrigation and inspection for hemostasis, the open wound was packed with moistened Kerlix that was packed across the plantar aspect of the foot to the medial side of the foot and then the remainder of the wound. This area was covered with a piece of Xeroform. Additional gauze was placed and the foot was wrapped with Kerlix and Coban. All sponge, instruments, and needle counts were correct.   The patient was taken to recovery in good condition.     Dorys Peralta MD, FACS

## 2017-01-18 NOTE — PROGRESS NOTES
[address] FAX: [phone]        Vascular Surgery Progress Note    Admit Date: 2017  POD 7 Days Post-Op    Procedure:  Procedure(s):  AMPUTATION RIGHT 5TH TOE/ ROOM 604      Subjective:     Patient has no new complaints. Objective:     Blood pressure 136/74, pulse 92, temperature 99.4 °F (37.4 °C), resp. rate 18, height 6' 1\" (1.854 m), weight 212 lb 15.4 oz (96.6 kg), SpO2 95 %. Temp (24hrs), Av.8 °F (37.1 °C), Min:98.1 °F (36.7 °C), Max:99.5 °F (37.5 °C)      Physical Exam:  GENERAL: alert, cooperative, no distress, LUNG:  clear to auscultation bilaterally, HEART:  regular rate and rhythm, S1, S2 normal, no murmur, click, rub or gallop and EXTREMITIES:  Right Lower Extremity:  dry dressing in place, edema 2+, non-palpable pulses    Labs:   Recent Labs      17   0540   17   0747   HGB   --    --   7.5*   WBC   --    --   14.0*   K  3.7   < >  3.8   GLU  187*   < >  151*    < > = values in this interval not displayed.        Data Review: images and reports reviewed  Current Facility-Administered Medications   Medication Dose Route Frequency    sodium hypochlorite (QUARTER STRENGTH DAKIN'S) 0.125% irrigation (bottle)   Topical DAILY    insulin glargine (LANTUS) injection 12 Units  12 Units SubCUTAneous DAILY    ceFAZolin in 0.9% NS (ANCEF) IVPB soln 2 g  2 g IntraVENous ONCE    0.9% sodium chloride infusion  125 mL/hr IntraVENous CONTINUOUS    heparin (PF) 2 units/ml in NS infusion 2,000 Units  1,000 mL Irrigation Multiple    sodium chloride (NS) flush 5 mL  5 mL InterCATHeter PRN    pantoprazole (PROTONIX) tablet 40 mg  40 mg Oral ACB    HYDROmorphone (PF) (DILAUDID) injection 2 mg  2 mg IntraVENous PRN    cefTRIAXone (ROCEPHIN) 2 g in 0.9% sodium chloride (MBP/ADV) 50 mL  2 g IntraVENous Q24H    metroNIDAZOLE (FLAGYL) tablet 500 mg  500 mg Oral TID    HYDROmorphone (PF) (DILAUDID) injection 0.5-1 mg  0.5-1 mg IntraVENous Q3H PRN    oxyCODONE IR (ROXICODONE) tablet 5 mg  5 mg Oral Q4H PRN    insulin regular (NOVOLIN R, HUMULIN R) injection   SubCUTAneous AC&HS    amLODIPine (NORVASC) tablet 10 mg  10 mg Oral DAILY    hydrALAZINE (APRESOLINE) 20 mg/mL injection 20 mg  20 mg IntraVENous Q6H PRN    haloperidol lactate (HALDOL) injection 2 mg  2 mg IntraVENous Q4H PRN    0.9% sodium chloride infusion 250 mL  250 mL IntraVENous PRN    diphenhydrAMINE (BENADRYL) injection 25 mg  25 mg IntraVENous Q4H PRN    traMADol (ULTRAM) tablet 50 mg  50 mg Oral Q6H PRN    atorvastatin (LIPITOR) tablet 80 mg  80 mg Oral DAILY    metoprolol succinate (TOPROL-XL) tablet 100 mg  100 mg Oral DAILY WITH BREAKFAST    sodium chloride (NS) flush 5-10 mL  5-10 mL IntraVENous Q8H    sodium chloride (NS) flush 5-10 mL  5-10 mL IntraVENous PRN    acetaminophen (TYLENOL) tablet 650 mg  650 mg Oral Q4H PRN    ondansetron (ZOFRAN) injection 4 mg  4 mg IntraVENous Q4H PRN    docusate sodium (COLACE) capsule 100 mg  100 mg Oral DAILY PRN     Assessment:     Principal Problem:    Type 2 diabetes mellitus with right diabetic foot infection (HCC) (1/5/2017)    Active Problems:    Type 2 diabetes mellitus with hyperglycemia (HCC) (7/29/2012)      HTN (hypertension) (7/29/2012)      Chronic systolic congestive heart failure ([address] (12/30/2015)      Overview: EF 35-40% on 2015 Echo      Kidney disease, chronic, stage III (moderate, EGFR 30-59 ml/min) (8/11/2016)      Acute renal failure with tubular necrosis ([address] (1/6/2017)      NSTEMI (non-ST elevated myocardial infarction) ([address] (1/5/2017)      Sepsis due to cellulitis ([address] (1/9/2017)      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 Elements of this note have been dictated using speech recognition software.  As a result, errors of speech recognition may have occurred.

## 2017-01-18 NOTE — DIABETES MGMT
Attempted to see patient for diabetes education. Pt is currently off the floor in IR. Will continue to follow for diabetes education.

## 2017-01-18 NOTE — PROGRESS NOTES
TRANSFER - OUT REPORT:    Verbal report given to sukhwinder  (name) on Rylan Sharpe  being transferred to IR(unit) for ordered procedure       Report consisted of patients Situation, Background, Assessment and   Recommendations(SBAR). Information from the following report(s) SBAR was reviewed with the receiving nurse. Lines:   Double Lumen 01/17/17 Right Internal jugular (Active)   Central Line Being Utilized Yes 1/18/2017  9:49 AM   Criteria for Appropriate Use Limited/no vessel suitable for conventional peripheral access 1/18/2017  9:49 AM   Site Assessment Clean, dry, & intact 1/18/2017  9:49 AM   Infiltration Assessment 0 1/18/2017  9:49 AM   Affected Extremity/Extremities Color distal to insertion site pink (or appropriate for race) 1/18/2017  9:49 AM   Date of Last Dressing Change 01/17/17 1/18/2017  4:28 AM   Dressing Status Clean, dry, & intact 1/18/2017  9:49 AM   Dressing Type Disk with Chlorhexadine Gluconate (CHG) 1/18/2017  9:49 AM   Proximal Hub Color/Line Status Infusing 1/18/2017  9:49 AM   Positive Blood Return (Medial Site) Yes 1/18/2017  4:28 AM   Distal Hub Color/Line Status Infusing 1/18/2017  4:28 AM   Positive Blood Return (Lateral Site) Yes 1/18/2017  4:28 AM        Opportunity for questions and clarification was provided.       Patient transported with:

## 2017-01-18 NOTE — PROGRESS NOTES
Infectious Disease Progress Note    Today's Date: 2017   Admit Date: 2017    Impression:   · R foot acute osteomyelitis of 5th toe and MT, with deep space abscess: s/p () I&D, 5th toe/MT amputation: Cx Staph caprae, Strep agalactiae, and Staph lugdunensis; pathology showed acute and chronic inflammation with uninvolved margins  · Uncontrolled DM: hgb A1c 9.1  · PAD: abnormal RLE MAGGIE with occlusive disease, unable to undergo eval/intervention presently sec to [place]  · Acute on CKD  · Medical non-compliance    Plan:   · Continue Ceftriaxone IV and Flagyl PO. The margins are clear of inflammation. Skip lesions can occur but this was not evident on bone scan. He needs antibiotics at least through 17 with reevaluation for ongoing need past that point. · [facility] transfer is planned  · Needs aggressive DM management  · Remove peripheral IV    Anti-infectives:   Ceftriaxone and Flagyl (-  Vancomycin and Zosyn (-)    Subjective:   Date of Consultation:  2017  Referring Physician: Dr. Martinez Lot    No N/V/D. No fever or chills. Right foot pain with ambulation or manipulation. Allergies   Allergen Reactions    Lortab [Hydrocodone-Acetaminophen] Itching        Review of Systems:  Review of systems not obtained due to patient factors. Objective:     Visit Vitals    /65    Pulse 79    Temp 98.4 °F (36.9 °C)    Resp 18    Ht 6' 1\" (1.854 m)    Wt 96.6 kg (212 lb 15.4 oz)    SpO2 92%    BMI 28.1 kg/m2     Temp (24hrs), Av.8 °F (37.1 °C), Min:98.1 °F (36.7 °C), Max:99.5 °F (37.5 °C)       Lines:  Peripheral IV:  RUE          Physical Exam:    General:  Alert, , well noursished, well developed, appears stated age   Eyes:  Sclera anicteric. Pupils equally round and reactive to light.    Mouth/Throat: Mucous membranes normal, oral pharynx clear   Neck: Supple   Lungs:   Clear to auscultation bilaterally, good effort   CV:  Regular rate and rhythm,no murmur, click, rub or gallop   Abdomen:   Soft, obese, non-tender. bowel sounds normal. non-distended   Extremities: No cyanosis or edema   Skin: Skin color, texture, turgor normal. no acute rash or lesions.  R foot:  Wound dressing dry and intact   Lymph nodes: Cervical and supraclavicular normal   Musculoskeletal: No swelling or deformity   Lines/Devices:  Intact, no erythema, drainage or tenderness   Psych: Alert and oriented       Data Review:     CBC:  Recent Labs      01/16/17   0747   WBC  14.0*   GRANS  85*   MONOS  4   EOS  1   ANEU  12.3*   ABL  1.4   HGB  7.5*   HCT  23.4*   PLT  342       BMP:  Recent Labs      01/18/17   0540  01/17/17   1246  01/16/17   0747   CREA  2.27*  2.34*  2.38*   BUN  33*  34*  34*   NA  143  144  143   K  3.7  3.7  3.8   CL  112*  111*  111*   CO2  20*  23  20*   AGAP  11  10  12   GLU  187*  149*  151*       LFTS:  Recent Labs      01/16/17   0747   TBILI  0.3   ALT  21   SGOT  26   AP  76   TP  6.6   ALB  1.2*       Microbiology:     All Micro Results     Procedure Component Value Units Date/Time    CULTURE, ANAEROBIC [[id number]] Collected:  01/11/17 1416    Order Status:  Completed Specimen:  Bone Updated:  01/18/17 0659     Special Requests: 5TH METATARSAL HEAD      Culture result:         NO ANAEROBES ISOLATED 7 DAYS    CULTURE, ANAEROBIC [[id number]] Collected:  01/11/17 1400    Order Status:  Completed Specimen:  Foot Updated:  01/18/17 0659     Special Requests: RIGHT FOOT DEEP WOUND      Culture result:         NO ANAEROBES ISOLATED 7 DAYS    CULTURE, WOUND Estefani Burris STAIN [[id number]]  (Susceptibility) Collected:  01/11/17 1400    Order Status:  Completed Specimen:  Foot Updated:  01/15/17 0927     Special Requests: RIGHT FOOT DEEP WOUND      GRAM STAIN 0 TO 5 WBC'S/OIF       RARE  GRAM POSITIVE COCCI        Culture result: SCANT  STREPTOCOCCUS AGALACTIAE SERO GROUP B         LIGHT  STAPHYLOCOCCUS LUGDUNENSIS       CULTURE, TISSUE Estefani Burris STAIN [[id number]]  (Susceptibility) Collected:  01/11/17 1416    Order Status:  Completed Specimen:  Bone Updated:  01/15/17 0924     Special Requests: 5TH METATARSAL HEAD      GRAM STAIN NO  WBCS SEEN         NO DEFINITE ORGANISM SEEN        Culture result: SCANT  STAPHYLOCOCCUS CAPRAE         CORRECTED RESULT CALLED TO AND CORRECTLY REPEATED BY:  Layton Farmer TO  ON 17 AT 8525       CULTURE, BLOOD [[id number]] Collected:  17 1820    Order Status:  Completed Specimen:  Blood from Blood Updated:  01/10/17 0833     Special Requests: RIGHT ANTECUBITAL        Culture result: NO GROWTH 5 DAYS       CULTURE, BLOOD [[id number]] Collected:  17 1800    Order Status:  Completed Specimen:  Blood from Blood Updated:  01/10/17 0833     Special Requests: LEFT ANTECUBITAL        Culture result: NO GROWTH 5 DAYS       C. DIFFICILE/EPI PCR [[id number]] Collected:  17 1300    Order Status:  Canceled Specimen:  Stool           Imagin/8/17: Bone scan  IMPRESSION: Increased radiotracer activity on all 3 phases within the right foot compared to the left. This localizes on the delayed image to the fifth  metatarsal bone, suggesting osteomyelitis at this site. 17 RLE arterial duplex  IMPRESSION: Abnormal right MAGGIE consistent with severe arterial disease. Noncompressible left lower extremity tibial arteries. Diminished right great toe  pressure. 17 R foot Xray  Impression:  1. No strong evidence for osteomyelitis. A three phase bone scan or contrasted MRI can be considered if there is continued concern for osteomyelitis.   2. Lucencies in the soft tissues about the proximal phalanx of the fifth digit. Soft tissue gas as can occur with a gas producing infection (i.e. gas gangrene)  cannot be excluded.     Signed By: Sury Sepulveda MD     2017

## 2017-01-18 NOTE — ANESTHESIA PREPROCEDURE EVALUATION
Anesthetic History               Review of Systems / Medical History  Patient summary reviewed, nursing notes reviewed and pertinent labs reviewed    Pulmonary                   Neuro/Psych              Cardiovascular    Hypertension: well controlled      CHF: NYHA Classification II    CAD    Exercise tolerance: >4 METS  Comments: EF 35% with min CAD   GI/Hepatic/Renal         Renal disease: CRI       Endo/Other    Diabetes: well controlled, type 2         Other Findings            Physical Exam    Airway  Mallampati: III  TM Distance: 4 - 6 cm  Neck ROM: normal range of motion   Mouth opening: Normal     Cardiovascular  Regular rate and rhythm,  S1 and S2 normal,  no murmur, click, rub, or gallop             Dental    Dentition: Full upper dentures     Pulmonary  Breath sounds clear to auscultation               Abdominal         Other Findings            Anesthetic Plan    ASA: 3  Anesthesia type: total IV anesthesia          Induction: Intravenous  Anesthetic plan and risks discussed with: Patient

## 2017-01-18 NOTE — PROGRESS NOTES
Infectious Disease Progress Note    Today's Date: 2017   Admit Date: 2017    Impression:   · R foot acute osteomyelitis of 5th toe and MT, with deep space abscess: s/p () I&D, 5th toe/MT amputation: Cx Staph caprae, Strep agalactiae, and Staph lugdunensis; pathology showed acute and chronic inflammation  · Uncontrolled DM: hgb A1c 9.1  · PAD: abnormal RLE MAGGIE with occlusive disease, unable to undergo eval/intervention presently sec to [place]  · Acute on CKD  · Medical non-compliance    Plan:   · Continue Ceftriaxone IV and Flagyl PO X 6 weeks; EOT 17    · [facility] transfer is planned  · Needs aggressive DM management and smoking cessation  · Abnormal vasc studies to RLE -- plans for A-gram/poss intervention today  Anti-infectives:   Ceftriaxone and Flagyl (-  Vancomycin and Zosyn (-)    Subjective:   Date of Consultation:  2017  Referring Physician: Dr. Austyn French    No N/V/D. No fever or chills. Right foot pain with ambulation. Allergies   Allergen Reactions    Lortab [Hydrocodone-Acetaminophen] Itching        Review of Systems:  Review of systems not obtained due to patient factors. Objective:     Visit Vitals    /74    Pulse 92    Temp 99.4 °F (37.4 °C)    Resp 18    Ht 6' 1\" (1.854 m)    Wt 96.6 kg (212 lb 15.4 oz)    SpO2 95%    BMI 28.1 kg/m2     Temp (24hrs), Av.8 °F (37.1 °C), Min:98.1 °F (36.7 °C), Max:99.5 °F (37.5 °C)       Lines:  Peripheral IV:  RUE          Physical Exam:    General:  Alert, , well noursished, well developed, appears stated age   Eyes:  Sclera anicteric. Pupils equally round and reactive to light. Mouth/Throat: Mucous membranes normal, oral pharynx clear   Neck: Supple   Lungs:   Clear to auscultation bilaterally, good effort   CV:  Regular rate and rhythm,no murmur, click, rub or gallop   Abdomen:   Soft, obese, non-tender.  bowel sounds normal. non-distended   Extremities: No cyanosis or edema   Skin: Skin color, texture, turgor normal. no acute rash or lesions.  R foot:  Wound dressing dry and intact   Lymph nodes: Cervical and supraclavicular normal   Musculoskeletal: No swelling or deformity   Lines/Devices:  Intact, no erythema, drainage or tenderness   Psych: Alert and oriented       Data Review:     CBC:  Recent Labs      01/16/17   0747   WBC  14.0*   GRANS  85*   MONOS  4   EOS  1   ANEU  12.3*   ABL  1.4   HGB  7.5*   HCT  23.4*   PLT  342       BMP:  Recent Labs      01/18/17   0540  01/17/17   1246  01/16/17   0747   CREA  2.27*  2.34*  2.38*   BUN  33*  34*  34*   NA  143  144  143   K  3.7  3.7  3.8   CL  112*  111*  111*   CO2  20*  23  20*   AGAP  11  10  12   GLU  187*  149*  151*       LFTS:  Recent Labs      01/16/17   0747   TBILI  0.3   ALT  21   SGOT  26   AP  76   TP  6.6   ALB  1.2*       Microbiology:     All Micro Results     Procedure Component Value Units Date/Time    CULTURE, ANAEROBIC [[id number]] Collected:  01/11/17 1416    Order Status:  Completed Specimen:  Bone Updated:  01/18/17 0659     Special Requests: 5TH METATARSAL HEAD      Culture result:         NO ANAEROBES ISOLATED 7 DAYS    CULTURE, ANAEROBIC [[id number]] Collected:  01/11/17 1400    Order Status:  Completed Specimen:  Foot Updated:  01/18/17 0659     Special Requests: RIGHT FOOT DEEP WOUND      Culture result:         NO ANAEROBES ISOLATED 7 DAYS    CULTURE, WOUND Hermelinda Cordia STAIN [[id number]]  (Susceptibility) Collected:  01/11/17 1400    Order Status:  Completed Specimen:  Foot Updated:  01/15/17 0927     Special Requests: RIGHT FOOT DEEP WOUND      GRAM STAIN 0 TO 5 WBC'S/OIF       RARE  GRAM POSITIVE COCCI        Culture result: SCANT  STREPTOCOCCUS AGALACTIAE SERO GROUP B         LIGHT  STAPHYLOCOCCUS LUGDUNENSIS       CULTURE, TISSUE W Chuck Montez [[id number]]  (Susceptibility) Collected:  01/11/17 1416    Order Status:  Completed Specimen:  Bone Updated:  01/15/17 0924     Special Requests: 5TH METATARSAL HEAD      GRAM STAIN NO  WBCS SEEN         NO DEFINITE ORGANISM SEEN        Culture result: SCANT  STAPHYLOCOCCUS CAPRAE         CORRECTED RESULT CALLED TO AND CORRECTLY REPEATED BY:  Ashwini Gunter TO TUNG ON 17 AT 7395       CULTURE, BLOOD [[id number]] Collected:  17 1820    Order Status:  Completed Specimen:  Blood from Blood Updated:  01/10/17 0833     Special Requests: RIGHT ANTECUBITAL        Culture result: NO GROWTH 5 DAYS       CULTURE, BLOOD [[id number]] Collected:  17 1800    Order Status:  Completed Specimen:  Blood from Blood Updated:  01/10/17 0833     Special Requests: LEFT ANTECUBITAL        Culture result: NO GROWTH 5 DAYS       C. DIFFICILE/EPI PCR [[id number]] Collected:  17 1300    Order Status:  Canceled Specimen:  Stool           Imagin/8/17: Bone scan  IMPRESSION: Increased radiotracer activity on all 3 phases within the right foot compared to the left. This localizes on the delayed image to the fifth  metatarsal bone, suggesting osteomyelitis at this site. 17 RLE arterial duplex  IMPRESSION: Abnormal right MAGGIE consistent with severe arterial disease. Noncompressible left lower extremity tibial arteries. Diminished right great toe  pressure. 17 R foot Xray  Impression:  1. No strong evidence for osteomyelitis. A three phase bone scan or contrasted MRI can be considered if there is continued concern for osteomyelitis.   2. Lucencies in the soft tissues about the proximal phalanx of the fifth digit. Soft tissue gas as can occur with a gas producing infection (i.e. gas gangrene)  cannot be excluded.     Signed By: Irma Wiley NP     2017

## 2017-01-18 NOTE — PROGRESS NOTES
TRANSFER - IN REPORT:    Verbal report received from jessy rn(name) on Dalia Minium  being received from pacu(unit) for routine post - op      Report consisted of patients Situation, Background, Assessment and   Recommendations(SBAR). Information from the following report(s) SBAR was reviewed with the receiving nurse. Opportunity for questions and clarification was provided. Assessment completed upon patients arrival to unit and care assumed.

## 2017-01-18 NOTE — PROGRESS NOTES
Hospitalist Progress Note    Subjective:   Daily Progress Note: 1/18/2017 10:26 AM    HPI: Pt is a 74 yo male who presented with progressive pain and poor healing of right foot wound over 1 month, admitted with infected DM foot. PMH includes DM (A1C 9.2 11/2016), CHF, CKD, current smoker. X Ray of right foot had low suspicion for osteomyelitis but does reveal soft tissue lucencies, possible gas producing infection. Surgery and Vascular were consulted. He had bedside debridement on 1/6 and surgical I&D/5th toe amputation 1/12. He was started on Vanc/Zosyn in ER. ID has seen - changed atbx 1/13 to IV Rocephin/PO Flagyl. 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 Will need 6 weeks antbx per ID recs. Also seen by vascular surgery due to abnormal right MAGGIE and poor postoperative wound healing. Plans for arteriogram 1/18.     1/18: Alert, no distress. No new issues overnight. No complaints.     Current Facility-Administered Medications   Medication Dose Route Frequency    sodium hypochlorite (QUARTER STRENGTH DAKIN'S) 0.125% irrigation (bottle)   Topical DAILY    insulin glargine (LANTUS) injection 12 Units  12 Units SubCUTAneous DAILY    ceFAZolin in 0.9% NS (ANCEF) IVPB soln 2 g  2 g IntraVENous ONCE    0.9% sodium chloride infusion  125 mL/hr IntraVENous CONTINUOUS    heparin (PF) 2 units/ml in NS infusion 2,000 Units  1,000 mL Irrigation Multiple    sodium chloride (NS) flush 5 mL  5 mL InterCATHeter PRN    pantoprazole (PROTONIX) tablet 40 mg  40 mg Oral ACB    HYDROmorphone (PF) (DILAUDID) injection 2 mg  2 mg IntraVENous PRN    cefTRIAXone (ROCEPHIN) 2 g in 0.9% sodium chloride (MBP/ADV) 50 mL  2 g IntraVENous Q24H    metroNIDAZOLE (FLAGYL) tablet 500 mg  500 mg Oral TID    HYDROmorphone (PF) (DILAUDID) injection 0.5-1 mg  0.5-1 mg IntraVENous Q3H PRN    oxyCODONE IR (ROXICODONE) tablet 5 mg  5 mg Oral Q4H PRN    insulin regular (NOVOLIN R, HUMULIN R) injection   SubCUTAneous AC&HS    amLODIPine (NORVASC) tablet 10 mg  10 mg Oral DAILY    hydrALAZINE (APRESOLINE) 20 mg/mL injection 20 mg  20 mg IntraVENous Q6H PRN    haloperidol lactate (HALDOL) injection 2 mg  2 mg IntraVENous Q4H PRN    0.9% sodium chloride infusion 250 mL  250 mL IntraVENous PRN    diphenhydrAMINE (BENADRYL) injection 25 mg  25 mg IntraVENous Q4H PRN    traMADol (ULTRAM) tablet 50 mg  50 mg Oral Q6H PRN    atorvastatin (LIPITOR) tablet 80 mg  80 mg Oral DAILY    metoprolol succinate (TOPROL-XL) tablet 100 mg  100 mg Oral DAILY WITH BREAKFAST    sodium chloride (NS) flush 5-10 mL  5-10 mL IntraVENous Q8H    sodium chloride (NS) flush 5-10 mL  5-10 mL IntraVENous PRN    acetaminophen (TYLENOL) tablet 650 mg  650 mg Oral Q4H PRN    ondansetron (ZOFRAN) injection 4 mg  4 mg IntraVENous Q4H PRN    docusate sodium (COLACE) capsule 100 mg  100 mg Oral DAILY PRN        Review of Systems  Pertinent items are noted in HPI. Objective:     Visit Vitals    /74    Pulse 92    Temp 99.4 °F (37.4 °C)    Resp 18    Ht 6' 1\" (1.854 m)    Wt 96.6 kg (212 lb 15.4 oz)    SpO2 95%    BMI 28.1 kg/m2    O2 Flow Rate (L/min): 2 l/min O2 Device: Room air    Temp (24hrs), Av.8 °F (37.1 °C), Min:98.1 °F (36.7 °C), Max:99.5 °F (37.5 °C)          190 -  0700  In: 240 [P.O.:240]  Out: -     Visit Vitals    /74    Pulse 92    Temp 99.4 °F (37.4 °C)    Resp 18    Ht 6' 1\" (1.854 m)    Wt 96.6 kg (212 lb 15.4 oz)    SpO2 95%    BMI 28.1 kg/m2     General appearance: alert, cooperative, no distress, appears stated age  Lungs: clear to auscultation bilaterally  Heart: regular rate and rhythm  Abdomen: soft, non-tender.  Bowel sounds normal. No masses,  no organomegaly  Extremities: No cyanosis, clubbing or edema, right foot wrapped with dry dressing  Neurologic: Grossly normal    Additional comments:None    Data Review    Recent Results (from the past 24 hour(s))   GLUCOSE, POC    Collection Time: 17 12:37 PM   Result Value Ref Range    Glucose (POC) 157 (H) 65 - 776 mg/dL   METABOLIC PANEL, BASIC    Collection Time: 01/17/17 12:46 PM   Result Value Ref Range    Sodium 144 136 - 145 mmol/L    Potassium 3.7 3.5 - 5.1 mmol/L    Chloride 111 (H) 98 - 107 mmol/L    CO2 23 21 - 32 mmol/L    Anion gap 10 7 - 16 mmol/L    Glucose 149 (H) 65 - 100 mg/dL    BUN 34 (H) 8 - 23 MG/DL    Creatinine 2.34 (H) 0.8 - 1.5 MG/DL    GFR est AA 36 (L) >60 ml/min/1.73m2    GFR est non-AA 30 (L) >60 ml/min/1.73m2    Calcium 7.9 (L) 8.3 - 10.4 MG/DL   GLUCOSE, POC    Collection Time: 01/17/17  5:11 PM   Result Value Ref Range    Glucose (POC) 168 (H) 65 - 100 mg/dL   GLUCOSE, POC    Collection Time: 01/17/17  9:00 PM   Result Value Ref Range    Glucose (POC) 170 (H) 65 - 100 mg/dL   PTT    Collection Time: 01/17/17 11:32 PM   Result Value Ref Range    aPTT >110.0 (HH) 23.5 - 88.3 SEC   METABOLIC PANEL, BASIC    Collection Time: 01/18/17  5:40 AM   Result Value Ref Range    Sodium 143 136 - 145 mmol/L    Potassium 3.7 3.5 - 5.1 mmol/L    Chloride 112 (H) 98 - 107 mmol/L    CO2 20 (L) 21 - 32 mmol/L    Anion gap 11 7 - 16 mmol/L    Glucose 187 (H) 65 - 100 mg/dL    BUN 33 (H) 8 - 23 MG/DL    Creatinine 2.27 (H) 0.8 - 1.5 MG/DL    GFR est AA 37 (L) >60 ml/min/1.73m2    GFR est non-AA 31 (L) >60 ml/min/1.73m2    Calcium 7.5 (L) 8.3 - 10.4 MG/DL   GLUCOSE, POC    Collection Time: 01/18/17  7:04 AM   Result Value Ref Range    Glucose (POC) 174 (H) 65 - 100 mg/dL   PTT    Collection Time: 01/18/17  7:42 AM   Result Value Ref Range    aPTT >110.0 (HH) 23.5 - 31.7 SEC         Assessment/Plan:     Principal Problem:    Type 2 diabetes mellitus with right diabetic foot infection ([facility] (1/5/2017)    Active Problems:    Type 2 diabetes mellitus with hyperglycemia ([facility] (7/29/2012)      HTN (hypertension) (7/29/2012)      Chronic systolic congestive heart failure ([facility] (12/30/2015)      Overview: EF 35-40% on 2015 Echo      Kidney disease, chronic, stage III (moderate, EGFR 30-59 ml/min) (8/11/2016)      Acute renal failure with tubular necrosis ([facility] (1/6/2017)      NSTEMI (non-ST elevated myocardial infarction) ([facility] (1/5/2017)      Sepsis due to cellulitis ([facility] (1/9/2017)      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 Did debridement at bedside 1/6 and amputation of right 5th toe on 1/12. Cont Rocephin/Flagyl - will need 6weeks atbx per ID (EOT 2/22). Wound cx Beta Strep/Staph Ludgenesis. R IJ Tunneled Cath placed 1/17.  - PAD - abnormal RLE MAGGIE. Vasc following - arteriogram 1/18   - NSTEMI vs demand mismatch- peaked at 0.61. No typical symptoms. Some elevation probably from ARF. Already on medical management, BB, ASA, statin. Continue to hold ACE, diuretics due to ARF. Cardiology follow up at [facility] on DC  - ARF -  Slow improvement. Nephrology following  - Anemia - of chronic disease, stable  - HTN - cont current regimen. Holding ACE, diuretics  - DM -titrating lantus for tighter control  - CHF - stable, monitor closely. Holding diuretics due to ARF     Dispo: [facility] transfer is planned    Care Plan discussed with: Patient/Family    Total time spent with patient: 25 minutes.     Signed By: Chel Jennings MD     January 18, 2017

## 2017-01-19 ENCOUNTER — ANESTHESIA EVENT (OUTPATIENT)
Dept: SURGERY | Age: 72
DRG: 853 | End: 2017-01-19
Payer: MEDICARE

## 2017-01-19 LAB
ANION GAP BLD CALC-SCNC: 10 MMOL/L (ref 7–16)
BASOPHILS # BLD AUTO: 0 K/UL (ref 0–0.2)
BASOPHILS # BLD: 0 % (ref 0–2)
BUN SERPL-MCNC: 28 MG/DL (ref 8–23)
CALCIUM SERPL-MCNC: 7.3 MG/DL (ref 8.3–10.4)
CHLORIDE SERPL-SCNC: 114 MMOL/L (ref 98–107)
CO2 SERPL-SCNC: 20 MMOL/L (ref 21–32)
CREAT SERPL-MCNC: 2.27 MG/DL (ref 0.8–1.5)
DIFFERENTIAL METHOD BLD: ABNORMAL
EOSINOPHIL # BLD: 0.3 K/UL (ref 0–0.8)
EOSINOPHIL NFR BLD: 3 % (ref 0.5–7.8)
ERYTHROCYTE [DISTWIDTH] IN BLOOD BY AUTOMATED COUNT: 15.2 % (ref 11.9–14.6)
GLUCOSE BLD STRIP.AUTO-MCNC: 120 MG/DL (ref 65–100)
GLUCOSE BLD STRIP.AUTO-MCNC: 127 MG/DL (ref 65–100)
GLUCOSE BLD STRIP.AUTO-MCNC: 137 MG/DL (ref 65–100)
GLUCOSE BLD STRIP.AUTO-MCNC: 163 MG/DL (ref 65–100)
GLUCOSE SERPL-MCNC: 120 MG/DL (ref 65–100)
HCT VFR BLD AUTO: 21.1 % (ref 41.1–50.3)
HGB BLD-MCNC: 6.6 G/DL (ref 13.6–17.2)
IMM GRANULOCYTES # BLD: 0 K/UL (ref 0–0.5)
IMM GRANULOCYTES NFR BLD AUTO: 0.2 % (ref 0–5)
LYMPHOCYTES # BLD AUTO: 8 % (ref 13–44)
LYMPHOCYTES # BLD: 0.8 K/UL (ref 0.5–4.6)
MCH RBC QN AUTO: 26.9 PG (ref 26.1–32.9)
MCHC RBC AUTO-ENTMCNC: 31.3 G/DL (ref 31.4–35)
MCV RBC AUTO: 86.1 FL (ref 79.6–97.8)
MONOCYTES # BLD: 0.6 K/UL (ref 0.1–1.3)
MONOCYTES NFR BLD AUTO: 7 % (ref 4–12)
NEUTS SEG # BLD: 7.3 K/UL (ref 1.7–8.2)
NEUTS SEG NFR BLD AUTO: 82 % (ref 43–78)
PLATELET # BLD AUTO: 354 K/UL (ref 150–450)
PMV BLD AUTO: 10.1 FL (ref 10.8–14.1)
POTASSIUM SERPL-SCNC: 3.8 MMOL/L (ref 3.5–5.1)
RBC # BLD AUTO: 2.45 M/UL (ref 4.23–5.67)
SODIUM SERPL-SCNC: 144 MMOL/L (ref 136–145)
WBC # BLD AUTO: 9 K/UL (ref 4.3–11.1)

## 2017-01-19 PROCEDURE — 74011636637 HC RX REV CODE- 636/637: Performed by: HOSPITALIST

## 2017-01-19 PROCEDURE — 74011000258 HC RX REV CODE- 258: Performed by: NURSE PRACTITIONER

## 2017-01-19 PROCEDURE — 97530 THERAPEUTIC ACTIVITIES: CPT

## 2017-01-19 PROCEDURE — 80048 BASIC METABOLIC PNL TOTAL CA: CPT | Performed by: SURGERY

## 2017-01-19 PROCEDURE — 86923 COMPATIBILITY TEST ELECTRIC: CPT | Performed by: HOSPITALIST

## 2017-01-19 PROCEDURE — 36591 DRAW BLOOD OFF VENOUS DEVICE: CPT

## 2017-01-19 PROCEDURE — 97150 GROUP THERAPEUTIC PROCEDURES: CPT

## 2017-01-19 PROCEDURE — 77030013131 HC IV BLD ST ICUM -A

## 2017-01-19 PROCEDURE — 74011250637 HC RX REV CODE- 250/637: Performed by: NURSE PRACTITIONER

## 2017-01-19 PROCEDURE — 74011250637 HC RX REV CODE- 250/637: Performed by: INTERNAL MEDICINE

## 2017-01-19 PROCEDURE — 36592 COLLECT BLOOD FROM PICC: CPT

## 2017-01-19 PROCEDURE — 94760 N-INVAS EAR/PLS OXIMETRY 1: CPT

## 2017-01-19 PROCEDURE — 74011250636 HC RX REV CODE- 250/636: Performed by: NURSE PRACTITIONER

## 2017-01-19 PROCEDURE — 86900 BLOOD TYPING SEROLOGIC ABO: CPT | Performed by: HOSPITALIST

## 2017-01-19 PROCEDURE — P9016 RBC LEUKOCYTES REDUCED: HCPCS | Performed by: HOSPITALIST

## 2017-01-19 PROCEDURE — 36430 TRANSFUSION BLD/BLD COMPNT: CPT

## 2017-01-19 PROCEDURE — 36415 COLL VENOUS BLD VENIPUNCTURE: CPT | Performed by: HOSPITALIST

## 2017-01-19 PROCEDURE — 85025 COMPLETE CBC W/AUTO DIFF WBC: CPT | Performed by: HOSPITALIST

## 2017-01-19 PROCEDURE — 65270000029 HC RM PRIVATE

## 2017-01-19 PROCEDURE — 82962 GLUCOSE BLOOD TEST: CPT

## 2017-01-19 PROCEDURE — 74011250637 HC RX REV CODE- 250/637: Performed by: FAMILY MEDICINE

## 2017-01-19 PROCEDURE — 65660000000 HC RM CCU STEPDOWN

## 2017-01-19 RX ORDER — SODIUM CHLORIDE 9 MG/ML
250 INJECTION, SOLUTION INTRAVENOUS AS NEEDED
Status: DISCONTINUED | OUTPATIENT
Start: 2017-01-19 | End: 2017-01-19

## 2017-01-19 RX ORDER — TORSEMIDE 20 MG/1
40 TABLET ORAL DAILY
Status: DISCONTINUED | OUTPATIENT
Start: 2017-01-19 | End: 2017-01-30

## 2017-01-19 RX ADMIN — Medication 10 ML: at 14:00

## 2017-01-19 RX ADMIN — Medication 10 ML: at 06:00

## 2017-01-19 RX ADMIN — METRONIDAZOLE 500 MG: 500 TABLET ORAL at 18:26

## 2017-01-19 RX ADMIN — PANTOPRAZOLE SODIUM 40 MG: 40 TABLET, DELAYED RELEASE ORAL at 05:36

## 2017-01-19 RX ADMIN — CEFTRIAXONE 2 G: 2 INJECTION, POWDER, FOR SOLUTION INTRAMUSCULAR; INTRAVENOUS at 18:26

## 2017-01-19 RX ADMIN — INSULIN GLARGINE 12 UNITS: 100 INJECTION, SOLUTION SUBCUTANEOUS at 08:34

## 2017-01-19 RX ADMIN — Medication 10 ML: at 22:00

## 2017-01-19 RX ADMIN — TORSEMIDE 40 MG: 20 TABLET ORAL at 18:25

## 2017-01-19 RX ADMIN — METRONIDAZOLE 500 MG: 500 TABLET ORAL at 21:28

## 2017-01-19 NOTE — PROGRESS NOTES
PLAN:  Cont management per Hospitalist  Needs good BS and BP control  Vasc Surgery -- had A-gram -- no revasc options  Smoking Cessation imperative  Wound care -- keep VAC off -- cont dressing orders   Follow OR cultures -- Abx -- per [facility] and ID  CM -- needs placement for abx, wound care and PT (NWB to RLE), currently situation is too acute and trying aggressive local wound care, but may come to [phone]    Will plan to return to OR tomorrow to debride dead tissue and possibly place a skin substitute graft. Will plan to monitor his renal func (remained 2.27 today). Discussed that even with debridement tomorrow, it is felt that his wounds will likely not heal due to poor perfusion. He is highly likely to need BKA. He requests that we \"do everything possible to save my foot. \"    Obtain consent  Hibiclens scrub in AM  NPO at MN    ASSESSMENT:  Admit Date: 1/5/2017   Procedure(s): 1/11/17 POD#8  I&D OF DEEP SPACE R FOOT INFECTION WITH DEBRIDEMENT OF SKIN, SOFT TISSUE AND BONE INCLUDING RIGHT 5TH TOE AND METATARSAL, BONE BIOPSY FOR CULTURE    Principal Problem:    Type 2 diabetes mellitus with right diabetic foot infection ([address] (1/5/2017)    ----necrotizing infection with acute osteomyelitis and abscess in face of arterial disease with insufficiency. Foot debrided of infection including 5th toe and 5th metatarsal.    Active Problems:    Type 2 diabetes mellitus with hyperglycemia ([address] (7/29/2012)      HTN (hypertension) (7/29/2012)      Chronic systolic congestive heart failure ([address] (12/30/2015)      Overview: EF 35-40% on 2015 Echo      Kidney disease, chronic, stage III (moderate, EGFR 30-59 ml/min) (8/11/2016)      Acute renal failure with tubular necrosis ([address] (1/6/2017)      NSTEMI (non-ST elevated myocardial infarction) ([address] (1/5/2017)      Sepsis due to cellulitis ([address] (1/9/2017)       HPI: Pt is a 74 yo male who presented with progressive pain and poor healing of right foot wound over 1 month.  Other PMH includes DM (A1C 9.2 11/2016), CHF, CKD, current smoker. X Ray of right foot had low suspicion for osteomyelitis but does reveal soft tissue lucencies, possible gas producing infection. WBC 18.7, LA 2.2. He was started on Vanc/Zosyn in ER. Also noted to have MO with Cr 2.99, baseline 1.90. He also had elevated trops without any typical symptoms. R Vasc studies showing severe arterial disease, but RF poor and dye would likely push patient to dialysis      SUBJECTIVE:  Resting in bed. Reports R foot pain persists. Remains on abx - ID following. A-gram yesterday with severe PAD and no revasc possible. AF, NAD, VSS. OBJECTIVE:  Constitutional: Alert, cooperative patient in no acute distress; appears stated age   Visit Vitals    /73    Pulse 76    Temp 98.1 °F (36.7 °C)    Resp 18    Ht 6' 1\" (1.854 m)    Wt 96.6 kg (212 lb 15.4 oz)    SpO2 94%    BMI 28.1 kg/m2     Eyes: Sclera are clear. ENMT: No obvious neck masses, no ear or lip lesions, R IJ tunneled cath c/d/i  CV: Impaired perfusion - pulses PT/DP via doppler, marked  Resp: No JVD. Breathing is non-labored. No wheezing. On RA  GI: Soft, non-tender, non-distended, BS active   Musculoskeletal: Normal function. R thumb amputation  Extremities: DTI on dorsal foot unchanged. Small purple area with indentation on plantar aspect just beneath 2nd toe - larger. No change or slight improvement in erythema to plantar surface and wrapping around in-step. Toes 2-4 remain erythematous and dusky, sluggish cap refill, 4th toe with increased duskiness/purple discoloration. Wound with some dead tissue, periwound skin with bogginess and maceration.   Neuro: Oriented, follows commands and answers questions, sensation to bilat feet intact  Psychiatric: Calm and cooperative    Patient Vitals for the past 24 hrs:   BP Temp Pulse Resp SpO2   01/19/17 0517 128/73 98.1 °F (36.7 °C) 76 18 94 %   01/19/17 0011 117/69 98.6 °F (37 °C) 67 18 95 %   01/18/17 2000 117/74 98.1 °F (36.7 °C) 91 18 93 %   01/18/17 1904 - - - - 95 %   01/18/17 1743 128/70 - 75 20 100 %   01/18/17 1738 124/73 - 77 19 100 %   01/18/17 1733 123/71 - 77 20 100 %   01/18/17 1728 122/63 - 74 15 95 %   01/18/17 1723 121/75 - 75 21 90 %   01/18/17 1718 115/66 - 71 - 90 %   01/18/17 1713 125/70 - 86 17 92 %   01/18/17 1706 126/71 97.6 °F (36.4 °C) 71 17 99 %   01/18/17 1526 139/64 98.3 °F (36.8 °C) 77 16 96 %   01/18/17 1414 138/69 97.7 °F (36.5 °C) 92 20 95 %   01/18/17 1150 128/65 98.4 °F (36.9 °C) 79 18 92 %   01/18/17 0750 136/74 99.4 °F (37.4 °C) 92 18 95 %       Labs:    Recent Labs      01/19/17   0550  01/18/17   0742   01/16/17   0747   WBC   --    --    --   14.0*   HGB   --    --    --   7.5*   PLT   --    --    --   342   NA  144   --    < >  143   K  3.8   --    < >  3.8   CL  114*   --    < >  111*   CO2  20*   --    < >  20*   BUN  28*   --    < >  34*   CREA  2.27*   --    < >  2.38*   GLU  120*   --    < >  151*   APTT   --   >110.0*   < >   --    TBILI   --    --    --   0.3   CBIL   --    --    --   0.1   SGOT   --    --    --   26   ALT   --    --    --   21   AP   --    --    --   76    < > = values in this interval not displayed. MISHA Crespo      I have seen and examined the patient with the Nurse Practitioner and agree with the above assessment and plan. I discussed the patient's condition and treatment options with the patient. I discussed risks of surgery in language the patient could understand including bleeding, infection, need for further surgery, abscess, proximal infection and amputation, nonhealing, DVT, PE, heart attack, stroke, renal failure, respiratory failure, ventilatory dependence, and death. The patient voiced understanding of all this and all questions were answered. Alternatives to surgery were discussed also and risks of the alternatives. The patient requested that we proceed with surgery. Informed consent was obtained. Evert Horner.  Eusebio Padilla MD

## 2017-01-19 NOTE — PROGRESS NOTES
Problem: Self Care Deficits Care Plan (Adult)  Goal: *Acute Goals and Plan of Care (Insert Text)  1. Patient will complete lower body bathing and dressing with minimal assistance and adaptive equipment as needed. 2. Patient will complete toileting with minimal assistance. 3. Patient will tolerate 20 minutes of OT treatment with 2 rest breaks to increase activity tolerance for ADLs. 4. Patient will complete functional transfers with moderate assistance and adaptive equipment as needed. Timeframe: 7 visits       OCCUPATIONAL THERAPY: Daily Note, Treatment Day: 2nd and AM 1/19/2017  INPATIENT: Hospital Day: 15  Payor: CARE IMPROVEMENT PLUS / [place] / Product Type: Managed Care Medicare /      NAME/AGE/GENDER: Micah Paz is a 73 y.o. male        PRIMARY DIAGNOSIS:  Gangrene of toe ([facility] [address] Filemon Bardales  Diabetic foot ulcer associated with type 1 diabetes mellitus, unspecified laterality ([facility] [address] [K14.442, L97.509]  Ulcer of right foot, with unspecified severity ([facility] [address] [L97.519] Type 2 diabetes mellitus with right diabetic foot infection ([facility] [address] Type 2 diabetes mellitus with right diabetic foot infection ([facility] [address]  Procedure(s) (LRB):  IR ANESTHESIA- RIGHT LEG ARTERIOGRAM/ 604 (N/A)  1 Day Post-Op  ICD-10: Treatment Diagnosis:        · Generalized Muscle Weakness (M62.81)  · Other lack of cordination (R27.8)   Precautions/Allergies:         Lortab [hydrocodone-acetaminophen]       ASSESSMENT:      Mr. Rex García was admitted for the above diagnosis. Pt presents supine upon arrival. Required min assist for LEs during supine to sit transfer. Pt was able to perform stand pivot transfer with mod/max assist. Gave really good effort during transfer. Pt was brought to therapy gym to participate in group exercises (in grid below) to increase UE strength and activity tolerance to perform mobility and ADLs. Pt required visual, verbal, and tactile cueing to complete exercises. Pt tolerated session well.  Returned to room by recliner. Pt will continue to benefit from skilled OT during stay. This section established at most recent assessment   PROBLEM LIST (Impairments causing functional limitations):  1. Decreased Strength  2. Decreased ADL/Functional Activities  3. Decreased Transfer Abilities  4. Decreased Ambulation Ability/Technique  5. Decreased Balance  6. Decreased Activity Tolerance  7. Increased Fatigue  8. Decreased Flexibility/Joint Mobility  9. Edema/Girth  10. Decreased Knowledge of Precautions  11. Decreased [place] with Home Exercise Program  12. Decreased Cognition    INTERVENTIONS PLANNED: (Benefits and precautions of occupational therapy have been discussed with the patient.)  1. Activities of daily living training  2. Adaptive equipment training  3. Balance training  4. Cognitive training  5. Donning&doffing training  6. Group therapy  7. Hygiene training  8. Sensory reintergration training  9. Theraputic activity  10. Theraputic exercise      TREATMENT PLAN: Frequency/Duration: Follow patient 3x/week to address above goals. Rehabilitation Potential For Stated Goals: Good      RECOMMENDED REHABILITATION/EQUIPMENT: (at time of discharge pending progress): Continue Skilled Therapy. OCCUPATIONAL PROFILE AND HISTORY:   History of Present Injury/Illness (Reason for Referral):  Pt is a 74 yo male who presents to ER this evening with progressive pain and poor healing of right foot wound. He reports site started as small blister maybe from a shoe about a month ago but has not improved and recently pain has worsened is now radiating up his leg. He also reports uanble to bear weight on foot in past day or so. He has not been on abx for this but states it was seen by a provider at [facility]. Other PMH includes DM which appears uncontrolled (A1C 9.2 11/2016), CHF, CKD, current smoker.  X Ray of right foot has low suspicion for osteomyelitis but does reveal soft tissue lucencies, possible gas producing infection. WBC 18.7, LA 2.2. He has been started on Vanc/Zosyn in ER. Also noted to have MO with Cr 2.99, baseline 1.90. Pt denies any med changes and reports compliance with current med regimen. He denies recent or current CP, SOB, abd pain, fevers. Past Medical History/Comorbidities:   Mr. Mat Starks  has a past medical history of Abnormal CT scan, chest (12/27/2015); Acute CHF ([facility] [address] (12/26/2015); Acute systolic congestive heart failure ([address] (12/30/2015); MO (acute kidney injury) ([facility] [address] (7/29/2012); Bilateral pleural effusion (12/27/2015); Chest pain (12/26/2015); DM (diabetes mellitus), type 2 ([facility] [address] (7/29/2012); Encephalopathy due to infection (1/7/2017); HTN (hypertension) (7/29/2012); Hypoglycemia (7/29/2012); NSTEMI (non-ST elevated myocardial infarction) ([facility] [address] (1/5/2017); and Tobacco use (12/27/2015). He also has no past medical history of Arthritis; Asthma; Autoimmune disease ([address] Cancer ([address] COPD; DEMENTIA; Gastrointestinal disorder; Liver disease; Other ill-defined conditions(799.89); Psychiatric disorder; PUD (peptic ulcer disease); Seizures ([facility] [address] Sleep disorder; Stroke [facility]); or Thromboembolus ([facility] [address] Mr. Mat Starks  has a past surgical history that includes orthopaedic.   Social History/Living Environment:   Home Environment: Private residence  # Steps to Enter: 4  One/Two Story Residence: One story  Living Alone: No  Support Systems: Child(elpidio), Family member(s)  Patient Expects to be Discharged to[de-identified] Rehabilitation facility  Current DME Used/Available at Home: None  Tub or Shower Type: Shower  Prior Level of Function/Work/Activity:  Modified indepedent  Dominant Side:         RIGHT   Number of Personal Factors/Comorbidities that affect the Plan of Care: Expanded review of therapy/medical records (1-2):  MODERATE COMPLEXITY   ASSESSMENT OF OCCUPATIONAL PERFORMANCE[de-identified]   Activities of Daily Living:         Requires moderate to maximal assistance  Basic ADLs (From Assessment) Complex ADLs (From Assessment)   Basic ADL  Feeding: Modified independent  Oral Facial Hygiene/Grooming: Setup  Bathing: Moderate assistance  Upper Body Dressing: Setup  Lower Body Dressing: Moderate assistance  Toileting: Moderate assistance     Grooming/Bathing/Dressing Activities of Daily Living     Cognitive Retraining  Safety/Judgement: Awareness of environment                       Bed/Mat Mobility  Supine to Sit: Minimum assistance  Sit to Stand: Moderate assistance;Maximum assistance  Bed to Chair: Moderate assistance;Maximum assistance          Most Recent Physical Functioning:   Gross Assessment:                  Posture:  Posture (WDL): Exceptions to WDL  Posture Assessment: Forward head, Rounded shoulders  Balance:  Sitting: Impaired  Sitting - Static: Good (unsupported)  Sitting - Dynamic: Fair (occasional)  Standing: Impaired  Standing - Static: Poor  Standing - Dynamic : Poor Bed Mobility:  Supine to Sit: Minimum assistance  Wheelchair Mobility:     Transfers:  Sit to Stand: Moderate assistance;Maximum assistance  Bed to Chair: Moderate assistance;Maximum assistance      ROM:          Decreased; functional  Strength:          Decreased; functional  Mental Status:          Intermittent confusion  Activities of Daily Living:         Requires assistance  Basic ADLs (From Assessment) Complex ADLs (From Assessment)   Basic ADL  Feeding: Modified independent  Oral Facial Hygiene/Grooming: Setup  Bathing: Moderate assistance  Upper Body Dressing: Setup  Lower Body Dressing: Moderate assistance  Toileting: Moderate assistance     Grooming/Bathing/Dressing Activities of Daily Living     Cognitive Retraining  Safety/Judgement: Awareness of environment                       Bed/Mat Mobility  Supine to Sit: Minimum assistance  Sit to Stand:  Moderate assistance;Maximum assistance  Bed to Chair: Moderate assistance;Maximum assistance                Patient Vitals for the past 6 hrs:   BP SpO2 Pulse   01/19/17 0819 144/69 97 % 92 17 1151 154/83 98 % 88        Mental Status  Neurologic State: Alert, Appropriate for age  Orientation Level: Oriented X4  Cognition: Follows commands  Perception: Appears intact  Perseveration: No perseveration noted  Safety/Judgement: Awareness of environment                               Physical Skills Involved:  1. Range of Motion  2. Balance  3. Mobility  4. Strength  5. Endurance Cognitive Skills Affected (resulting in the inability to perform in a timely and safe manner):  1. Attending  2. Perceiving  3. Thinking  4. Understanding  5. Problem Solving  6. Mental Sequencing  7. Learning  8. Remembering Psychosocial Skills Affected:  1. Routines and Behaviors  2. Active Use of Coping Strategies  3. Environmental Adaptations   Number of elements that affect the Plan of Care: 5+:  HIGH COMPLEXITY   CLINICAL DECISION MAKIN [facility] Mobility Inpatient Short Form  How much help from another person does the patient currently need. .. Total A Lot A Little None   1. Putting on and taking off regular lower body clothing?   [ ] 1   [x ] 2   [ ] 3   [ ] 4   2. Bathing (including washing, rinsing, drying)? [ ] 1   [x ] 2   [ ] 3   [ ] 4   3. Toileting, which includes using toilet, bedpan or urinal?   [ ] 1   [x ] 2   [ ] 3   [ ] 4   4. Putting on and taking off regular upper body clothing?   [ ] 1   [ ] 2   [x ] 3   [ ] 4   5. Taking care of personal grooming such as brushing teeth? [ ] 1   [ ] 2   [x ] 3   [ ] 4   6. Eating meals? [ ] 1   [ ] 2   [ ] 3   [x ] 4   © , Trustees of [address] [place] [address] [address] [address], under license to [employer]. All rights reserved    Score:  Initial: 16 Most Recent: X (Date: -- )     Interpretation of Tool:  Represents activities that are increasingly more difficult (i.e. Bed mobility, Transfers, Gait). Score 24 23 22-20 19-15 14-10 9-7 6       Modifier CH CI CJ CK CL CM CN        ?  Self Care:     - CURRENT STATUS: CK - 40%-59% impaired, limited or restricted    - GOAL STATUS: CJ - 20%-39% impaired, limited or restricted    - D/C STATUS:  ---------------To be determined---------------  Payor: CARE IMPROVEMENT PLUS [place]ARE [place]PR[place] / Product Type: Managed Care Medicare /       Medical Necessity:     · Patient demonstrates good rehab potential due to higher previous functional level. Reason for Services/Other Comments:  · Patient continues to require skilled intervention due to decreased ability to perform self care. Use of outcome tool(s) and clinical judgement create a POC that gives a: MODERATE COMPLEXITY             TREATMENT:   (In addition to Assessment/Re-Assessment sessions the following treatments were rendered)      Pre-treatment Symptoms/Complaints:  Decreased ability to perform ADLs, self care, and functional mobility; decreased tolerance for activities  Pain: Initial:   Pain Intensity 1: 0 at rest, increases with movement Post Session:  0 at rest      Therapeutic Activity: (15 minutes): Therapeutic activities including Bed transfers and Chair transfers to improve mobility, strength and balance. Required moderate/max assist to promote static and dynamic balance in standing. Group Therapeutic Exercise: (10 minutes):  Exercises per grid below to improve mobility, strength and activity tolerance. Required minimal visual, verbal and tactile cues to promote proper body alignment and promote proper body mechanics. Progressed resistance and repetitions as indicated. UE Exercises (with theraband and dowel) Date:  1/19/2017 Date:   Date:     Activity/Exercise Parameters Parameters Parameters   Shoulder Abd/Adduction 15 reps     Shoulder Flexion 15 reps     Elbow Flexion 15 reps     Punches 15 reps                              Treatment/Session Assessment:         Response to Treatment:  In agreement to cont OT.      Interdisciplinary Collaboration:   · Certified Occupational Therapy Assistant  · Registered Nurse     After treatment position/precautions:   · Up in chair  · Bed/Chair-wheels locked  · Call light within reach  · RN notified     Compliance with Program/Exercises: Will assess as treatment progresses. Recommendations/Intent for next treatment session:   \"Next visit will focus on advancements to more challenging activities and reduction in assistance provided\"       Total Treatment Duration:  OT Patient Time In/Time Out  Time In: [phone])  Time Out: 1055 [phone])     Carisa Villa

## 2017-01-19 NOTE — PROGRESS NOTES
Dressing across lower abdomen/pubic region, removed. Site to Left groin area, noted to be approximated without any noted drainage or redness. Patient tolerated with mild complaint. Will continue to monitor.

## 2017-01-19 NOTE — PROGRESS NOTES
Infectious Disease Progress Note    Today's Date: 2017   Admit Date: 2017    Impression:   · R foot acute osteomyelitis of 5th toe and MT, with deep space abscess: s/p () I&D, 5th toe/MT amputation: Cx Staph caprae, Strep agalactiae, and Staph lugdunensis; pathology showed acute and chronic inflammation with uninvolved margins  · S/P right lower extremity arteriogram with third order select catheterization, ultrasound-guided access ()  · Uncontrolled DM: hgb A1c 9.1  · PAD: abnormal RLE MAGGIE with occlusive disease, unable to undergo eval/intervention presently sec to MO  · Acute on CKD  · Medical non-compliance    Plan:   · Continue Ceftriaxone IV and Flagyl PO at least through 17 with reevaluation for ongoing need past that point  · [facility] transfer is planned  · Needs aggressive DM management    Anti-infectives:   Ceftriaxone and Flagyl (-  Vancomycin and Zosyn (-)    Subjective:   Date of Consultation:  2017  Referring Physician: Dr. Vishnu Khan    Sitting up in chair with no  N/V/D. No fever or chills. Right foot pain with ambulation or manipulation. Worn out with PT today. Allergies   Allergen Reactions    Lortab [Hydrocodone-Acetaminophen] Itching        Review of Systems:  Review of systems not obtained due to patient factors. Objective:     Visit Vitals    /69    Pulse 92    Temp 98.4 °F (36.9 °C)    Resp 18    Ht 6' 1\" (1.854 m)    Wt 96.6 kg (212 lb 15.4 oz)    SpO2 97%    BMI 28.1 kg/m2     Temp (24hrs), Av.2 °F (36.8 °C), Min:97.6 °F (36.4 °C), Max:98.6 °F (37 °C)       Lines:  Peripheral IV:  RUE          Physical Exam:    General:  Alert, , well noursished, well developed, appears stated age   Eyes:  Sclera anicteric. Pupils equally round and reactive to light.    Mouth/Throat: Mucous membranes normal, oral pharynx clear   Neck: Supple   Lungs:   Clear to auscultation bilaterally, good effort   CV:  Regular rate and rhythm,no murmur, click, rub or gallop   Abdomen:   Soft, obese, non-tender. bowel sounds normal. non-distended   Extremities: No cyanosis or edema   Skin: Skin color, texture, turgor normal. no acute rash or lesions. R foot:  Wound dressing dry and intact   Lymph nodes: Cervical and supraclavicular normal   Musculoskeletal: No swelling or deformity   Lines/Devices:  Intact, no erythema, drainage or tenderness   Psych: Alert and oriented       Data Review:     CBC:  Recent Labs      01/19/17   1038   WBC  9.0   GRANS  82*   MONOS  7   EOS  3   ANEU  7.3   ABL  0.8   HGB  6.6*   HCT  21.1*   PLT  354       BMP:  Recent Labs      01/19/17   0550  01/18/17   0540  01/17/17   1246   CREA  2.27*  2.27*  2.34*   BUN  28*  33*  34*   NA  144  143  144   K  3.8  3.7  3.7   CL  114*  112*  111*   CO2  20*  20*  23   AGAP  10  11  10   GLU  120*  187*  149*       LFTS:  No results for input(s): TBILI, ALT, SGOT, AP, TP, ALB in the last 72 hours.     Microbiology:     All Micro Results     Procedure Component Value Units Date/Time    CULTURE, ANAEROBIC [[id number]] Collected:  01/11/17 1416    Order Status:  Completed Specimen:  Bone Updated:  01/18/17 0659     Special Requests: 5TH METATARSAL HEAD      Culture result:         NO ANAEROBES ISOLATED 7 DAYS    CULTURE, ANAEROBIC [[id number]] Collected:  01/11/17 1400    Order Status:  Completed Specimen:  Foot Updated:  01/18/17 0659     Special Requests: RIGHT FOOT DEEP WOUND      Culture result:         NO ANAEROBES ISOLATED 7 DAYS    CULTURE, WOUND Elease Hussar STAIN [[id number]]  (Susceptibility) Collected:  01/11/17 1400    Order Status:  Completed Specimen:  Foot Updated:  01/15/17 0927     Special Requests: RIGHT FOOT DEEP WOUND      GRAM STAIN 0 TO 5 WBC'S/OIF       RARE  GRAM POSITIVE COCCI        Culture result: SCANT  STREPTOCOCCUS AGALACTIAE SERO GROUP B         LIGHT  STAPHYLOCOCCUS LUGDUNENSIS       CULTURE, TISSUE Elease Hussar STAIN [[id number]]  (Susceptibility) Collected:  01/11/17 1416    Order Status:  Completed Specimen:  Bone Updated:  01/15/17 0924     Special Requests: 5TH METATARSAL HEAD      GRAM STAIN NO  WBCS SEEN         NO DEFINITE ORGANISM SEEN        Culture result: SCANT  STAPHYLOCOCCUS CAPRAE         CORRECTED RESULT CALLED TO AND CORRECTLY REPEATED BY:  Corinna Hobbs TO  ON 17 AT 3148       CULTURE, BLOOD [[id number]] Collected:  17 1820    Order Status:  Completed Specimen:  Blood from Blood Updated:  01/10/17 0833     Special Requests: RIGHT ANTECUBITAL        Culture result: NO GROWTH 5 DAYS       CULTURE, BLOOD [[id number]] Collected:  17 1800    Order Status:  Completed Specimen:  Blood from Blood Updated:  01/10/17 0833     Special Requests: LEFT ANTECUBITAL        Culture result: NO GROWTH 5 DAYS       C. DIFFICILE/EPI PCR [[id number]] Collected:  17 1300    Order Status:  Canceled Specimen:  Stool           Imagin/8/17: Bone scan  IMPRESSION: Increased radiotracer activity on all 3 phases within the right foot compared to the left. This localizes on the delayed image to the fifth  metatarsal bone, suggesting osteomyelitis at this site. 17 RLE arterial duplex  IMPRESSION: Abnormal right MAGGIE consistent with severe arterial disease. Noncompressible left lower extremity tibial arteries. Diminished right great toe  pressure. 17 R foot Xray  Impression:  1. No strong evidence for osteomyelitis. A three phase bone scan or contrasted MRI can be considered if there is continued concern for osteomyelitis.   2. Lucencies in the soft tissues about the proximal phalanx of the fifth digit. Soft tissue gas as can occur with a gas producing infection (i.e. gas gangrene)  cannot be excluded.     Signed By: Zak Coulter NP     2017

## 2017-01-19 NOTE — PROGRESS NOTES
Problem: Nutrition Deficit  Goal: *Optimize nutritional status  Nutrition F/U  Assessment:   · Diet order(s): 1-12:CCHO, 1-18: NPO, CCHO, NPO after MN. Pt had arteriogram yesterday and plan is for arteriogram tomorrow. · Pt reports that in the past few days everything he tries to eat \"just balls up\" and he can't swallow it. This is a common complaint with poor appetite. He also c/o early satiety. He is willing to try Glucerna shakes but says they make make him \"shit like a bull\". He says chocolate Yoohoo does that to him. · Last 3 Recorded Weights in this Encounter   ·  · 01/05/17 1704 · 01/11/17 1109 · 01/17/17 1727   · Weight: · 96.6 kg (213 lb) · 96.6 kg (213 lb) · 96.6 kg (212 lb 15.4 oz)   · No weight loss noted  Macronutrient needs:  EER: 3273-0491 kcal /day (18-22 kcal/kg listed BW)  EPR:  grams protein/day (1-1.2 grams/kg IBW; GFR 30 ml/min-MO on CKD)  Intake/Comparative Standards: Average intake for past 7day(s)/11 recorded meal(s): 36%. This potentially meets ~40% of kcal and ~40% of protein needs. Nutrition Diagnosis: Inadequate oral intake r/t inability it consume sufficient oral intake as evidenced by c/o poor appetite and early satiety and intake as above      Intervention:  Meals and snacks: Resume CCHO idet today, NPO after MN for OR. Discussed with Stephanie Moncada RN  Nutrition supplement therapy: Glucerna shake tid. Provided pt with one to drink now. He tasted it and like it but says he'll probably be \"shitting himself soon\". Advised that prior intolerance may have been to lactose and that lucerna shake is lactose free. Encouarged intake for wound healing.      Lyndsay Blood, [address], [address] [place] [address], [address] [place] [address]

## 2017-01-19 NOTE — PROGRESS NOTES
Problem: Mobility Impaired (Adult and Pediatric)  Goal: *Acute Goals and Plan of Care (Insert Text)  Goals reviewed and updated 17  ST - 3 days  1. Pt will roll and get to EOB with minimal assist.  2.  Pt will sit edge of bed without falling over x 10 minutes for functional activity and exercise. 3.  Pt will go sit to stand and transfer to chair with heel touch WB with moderate assist.  4.  Pt will ambulate 5' with heel touch WB and rolling walker and moderate assist.  5.  Pt will perform LE exercises with minimal assist.    LTG: 3- 7 days  1. Pt will roll and get to EOB independent. 3.  Pt will go sit to stand and transfer to chair with heel touch WB with min assist and rolling walker. 4.  Pt will ambulate 10' with heel touch WB and rolling walker and minimal assist.  5.  Pt will perform LE exercises with verbal cues. PHYSICAL THERAPY: Daily Note, Treatment Day: 2nd and AM 2017  INPATIENT: Hospital Day: 15  Payor: CARE IMPROVEMENT PLUS / [place] / Product Type: Managed Care Medicare /      NAME/AGE/GENDER: Rylan Sharpe is a 73 y.o. male            PRIMARY DIAGNOSIS: Gangrene of toe ([facility] [address] Mk Stanley  Diabetic foot ulcer associated with type 1 diabetes mellitus, unspecified laterality ([facility] [address] [X41.295, L97.509]  Ulcer of right foot, with unspecified severity ([facility] [address] [L97.519] Type 2 diabetes mellitus with right diabetic foot infection ([facility] [address] Type 2 diabetes mellitus with right diabetic foot infection ([facility] [address]  Procedure(s) (LRB):  IR ANESTHESIA- RIGHT LEG ARTERIOGRAM/ 604 (N/A)  1 Day Post-Op  ICD-10: Treatment Diagnosis: Generalized Muscle Weakness (M62.81)  Other lack of cordination (R27.8)  Difficulty in walking, Not elsewhere classified (R26.2)  Precautions/Allergies:   Lortab [hydrocodone-acetaminophen]       ASSESSMENT:      Mr. Uziel Kennedy is seen for therapy after being brought to the 7th floor gym for group ex following right 5th toe amputation.   He is alert and willing to try.  He is extremely slow and delays moving at all costs. He participated in group ex with encouragement and min assist. Returned to room after treatment. Multiple excuses = limited progress. This section established at most recent assessment   PROBLEM LIST (Impairments causing functional limitations):  1. Decreased Strength  2. Decreased ADL/Functional Activities  3. Decreased Transfer Abilities  4. Decreased Ambulation Ability/Technique  5. Decreased Balance  6. Increased Pain  7. Decreased Activity Tolerance  8. Decreased Richardson with Home Exercise Program  9. Decreased Cognition    INTERVENTIONS PLANNED: (Benefits and precautions of physical therapy have been discussed with the patient.)  1. Balance Exercise  2. Bed Mobility  3. Family Education  4. Gait Training  5. Home Exercise Program (HEP)  6. Range of Motion (ROM)  7. Therapeutic Activites  8. Therapeutic Exercise/Strengthening  9. Transfer Training      TREATMENT PLAN: Frequency/Duration: 3-4 times per weel for duration of hospital stay  Rehabilitation Potential For Stated Goals: [facility] REHABILITATION/EQUIPMENT: (at time of discharge pending progress): Continue Skilled Therapy and Rehab. HISTORY:   History of Present Injury/Illness (Reason for Referral):  Pt is a 74 yo male who presents to ER this evening with progressive pain and poor healing of right foot wound. He reports site started as small blister maybe from a shoe about a month ago but has not improved and recently pain has worsened is now radiating up his leg. He also reports uanble to bear weight on foot in past day or so. He has not been on abx for this but states it was seen by a provider at [facility]. Other PMH includes DM which appears uncontrolled (A1C 9.2 11/2016), CHF, CKD, current smoker. X Ray of right foot has low suspicion for osteomyelitis but does reveal soft tissue lucencies, possible gas producing infection. WBC 18.7, LA 2.2.  He has been started on Vanc/Zosyn in ER. Also noted to have MO with Cr 2.99, baseline 1.90. Pt denies any med changes and reports compliance with current med regimen. He denies recent or current CP, SOB, abd pain, fevers  Past Medical History/Comorbidities:   Mr. Kael Renee  has a past medical history of Abnormal CT scan, chest (12/27/2015); Acute CHF ([facility] [address] (12/26/2015); Acute systolic congestive heart failure ([address] (12/30/2015); MO (acute kidney injury) ([address] (7/29/2012); Bilateral pleural effusion (12/27/2015); Chest pain (12/26/2015); DM (diabetes mellitus), type 2 ([address] (7/29/2012); Encephalopathy due to infection (1/7/2017); HTN (hypertension) (7/29/2012); Hypoglycemia (7/29/2012); NSTEMI (non-ST elevated myocardial infarction) ([facility] [address] (1/5/2017); and Tobacco use (12/27/2015). He also has no past medical history of Arthritis; Asthma; Autoimmune disease ([address] Cancer ([address] COPD; DEMENTIA; Gastrointestinal disorder; Liver disease; Other ill-defined conditions(799.89); Psychiatric disorder; PUD (peptic ulcer disease); Seizures ([facility] [address] Sleep disorder; Stroke [facility]); or Thromboembolus ([facility] [address] Mr. Kael Renee  has a past surgical history that includes orthopaedic. Social History/Living Environment:   Home Environment: Private residence  # Steps to Enter: 4  One/Two Story Residence: One story  Living Alone: No  Support Systems: Child(elpidio), Family member(s)  Patient Expects to be Discharged to[de-identified] Rehabilitation facility  Current DME Used/Available at Home: None  Tub or Shower Type: Shower  Prior Level of Function/Work/Activity:  Pt states he was independent PTA. Would sit on his front porch everyday and \"entertain\".   Current Medications:           Current Facility-Administered Medications:     torsemide (DEMADEX) tablet 40 mg, 40 mg, Oral, DAILY, Yung Burnett MD    sodium hypochlorite (QUARTER STRENGTH DAKIN'S) 0.125% irrigation (bottle), , Topical, DAILY, Zaria Barger NP    insulin glargine (LANTUS) injection 12 Units, 12 Units, SubCUTAneous, DAILY, Rosemary Levine MD, 12 Units at 01/19/17 0834    heparin (porcine) injection 5,000 Units, 5,000 Units, SubCUTAneous, Q8H, Faviola Riggins MD, 5,000 Units at 01/19/17 0900    0.9% sodium chloride infusion, 125 mL/hr, IntraVENous, CONTINUOUS, Faviola Riggins MD, Last Rate: 125 mL/hr at 01/18/17 0833, 125 mL/hr at 01/18/17 0833    sodium chloride (NS) flush 5 mL, 5 mL, InterCATHeter, PRN, Marlane Hashimoto, PA-C    pantoprazole (PROTONIX) tablet 40 mg, 40 mg, Oral, ACB, Lorena Samayoa MD, 40 mg at 01/19/17 0536    HYDROmorphone (PF) (DILAUDID) injection 2 mg, 2 mg, IntraVENous, PRN, Homa Montalvo MD, 2 mg at 01/13/17 1359    cefTRIAXone (ROCEPHIN) 2 g in 0.9% sodium chloride (MBP/ADV) 50 mL, 2 g, IntraVENous, Q24H, Vilma Pizarro NP, Last Rate: 100 mL/hr at 01/17/17 1700, 2 g at 01/18/17 1639    metroNIDAZOLE (FLAGYL) tablet 500 mg, 500 mg, Oral, TID, Vilma Pizarro NP, 500 mg at 01/18/17 2130    HYDROmorphone (PF) (DILAUDID) injection 0.5-1 mg, 0.5-1 mg, IntraVENous, Q3H PRN, Homa Montalvo MD, 1 mg at 01/18/17 2130    oxyCODONE IR (ROXICODONE) tablet 5 mg, 5 mg, Oral, Q4H PRN, Sussy Curran MD, 5 mg at 01/14/17 1443    insulin regular (Dickie Raddle R, HUMULIN R) injection, , SubCUTAneous, AC&HS, Cherri Kumar MD, Stopped at 01/18/17 0730    amLODIPine (NORVASC) tablet 10 mg, 10 mg, Oral, DAILY, Cherri Kumar MD, 10 mg at 01/18/17 0916    hydrALAZINE (APRESOLINE) 20 mg/mL injection 20 mg, 20 mg, IntraVENous, Q6H PRN, Cherri Kumar MD    haloperidol lactate (HALDOL) injection 2 mg, 2 mg, IntraVENous, Q4H PRN, Cherri Kumar MD    0.9% sodium chloride infusion 250 mL, 250 mL, IntraVENous, PRN, Cherri Kumar MD    diphenhydrAMINE (BENADRYL) injection 25 mg, 25 mg, IntraVENous, Q4H PRN, Cherri Kumar MD    traMADol Vanita Or) tablet 50 mg, 50 mg, Oral, Q6H PRN, Cherri Kumar MD, 50 mg at 01/16/17 3204    atorvastatin (LIPITOR) tablet 80 mg, 80 mg, Oral, DAILY, Thaddeus Blount DO, 80 mg at 17 9617    metoprolol succinate (TOPROL-XL) tablet 100 mg, 100 mg, Oral, DAILY WITH BREAKFAST, Thaddeus Blount DO, 100 mg at 17 0916    sodium chloride (NS) flush 5-10 mL, 5-10 mL, IntraVENous, Q8H, Thaddeus Blount DO, 10 mL at 17 0600    sodium chloride (NS) flush 5-10 mL, 5-10 mL, IntraVENous, PRN, Thaddeus Blount DO    acetaminophen (TYLENOL) tablet 650 mg, 650 mg, Oral, Q4H PRN, Thaddeus Blount DO, 650 mg at 17 0349    ondansetron [facility]) injection 4 mg, 4 mg, IntraVENous, Q4H PRN, Thaddeus Blount DO, 4 mg at 17 2230    docusate sodium (COLACE) capsule 100 mg, 100 mg, Oral, DAILY PRN, Thaddeus Blount DO   Date Last Reviewed:  2017      Number of Personal Factors/Comorbidities that affect the Plan of Care: 3+: HIGH COMPLEXITY   EXAMINATION:   Most Recent Physical Functioning:   Gross Assessment:    Strength:   LEs:                    Posture:  Posture     Balance:  Poor  Sitting: Impaired  Sitting - Static: Good (unsupported)  Sitting - Dynamic: Fair (occasional) Bed Mobility:       Wheelchair Mobility:  NA     Transfers: Moderate to max assist to get to perform     Gait:  Unable today             Body Structures Involved:  1. Lungs  2. Bones  3. Joints  4. Muscles  5. Ligaments Body Functions Affected:  1. Mental  2. Sensory/Pain  3. Respiratory  4. Neuromusculoskeletal  5. Movement Related  6. Skin Related Activities and Participation Affected:  1. Learning and Applying Knowledge  2. General Tasks and Demands  3. Communication  4. Mobility  5. Self Care  6. Domestic Life  7. Interpersonal Interactions and Relationships  8.  Community, Social and [place]   Number of elements that affect the Plan of Care: 4+: HIGH COMPLEXITY   CLINICAL PRESENTATION:   Presentation: Evolving clinical presentation with unstable and unpredictable characteristics: HIGH COMPLEXITY   CLINICAL DECISION MAKIN [facility] [address] [id number] AM-PAC 6 Clicks   Basic Mobility Inpatient Short Form  How much difficulty does the patient currently have. .. Unable A Lot A Little None   1. Turning over in bed (including adjusting bedclothes, sheets and blankets)? [ ] 1   [X] 2   [ ] 3   [ ] 4   2. Sitting down on and standing up from a chair with arms ( e.g., wheelchair, bedside commode, etc.)   [ ] 1   [X] 2   [ ] 3   [ ] 4   3. Moving from lying on back to sitting on the side of the bed? [ ] 1   [X] 2   [ ] 3   [ ] 4   How much help from another person does the patient currently need. .. Total A Lot A Little None   4. Moving to and from a bed to a chair (including a wheelchair)? [X] 1   [ ] 2   [ ] 3   [ ] 4   5. Need to walk in hospital room? [X] 1   [ ] 2   [ ] 3   [ ] 4   6. Climbing 3-5 steps with a railing? [X] 1   [ ] 2   [ ] 3   [ ] 4   © 2007, Trustees of [address] [place] [address] [address] [id number], under license to [employer]. All rights reserved          Score:  Initial: 10 Most Recent: 9 (Date: 1/14/17 )   Interpretation of Tool:  Represents activities that are increasingly more difficult (i.e. Bed mobility, Transfers, Gait). Score 24 23 22-20 19-15 14-10 9-7 6       Modifier CH CI CJ CK CL CM CN         · Mobility - Walking and Moving Around:               - CURRENT STATUS:    CM - 80%-99% impaired, limited or restricted               - GOAL STATUS:           CL - 60%-79% impaired, limited or restricted               - D/C STATUS:                       ---------------To be determined---------------  Payor: CARE IMPROVEMENT PLUS [place] / Product Type: Managed Care Medicare /       Medical Necessity:     · Skilled intervention continues to be required due to debiltity. Reason for Services/Other Comments:  · Patient continues to require skilled intervention due to debility.    Use of outcome tool(s) and clinical judgement create a POC that gives a: Difficult prediction of patient's progress: HIGH COMPLEXITY TREATMENT:   (In addition to Assessment/Re-Assessment sessions the following treatments were rendered)  Pre-treatment Symptoms/Complaints:  Pain and fatigue  Pain: Initial:      Post Session:      Therapeutic group Exercise: ( 10 min):  Exercises per grid below to improve mobility, strength and balance. Requiredvisual, verbal and manual cues to promote proper body alignment. Progressed resistance and repetitions as indicated. Date:  1/19/17 Date:   Date:     Activity/Exercise Parameters Parameters Parameters   AP's x20 B    AA to R     MArching x10 B     LAQ's X 20 B  AA  To R     Hip ABD/ADD X 20 B AA to R                              Therapeutic Activity: (    minutes): Therapeutic activities including Bed transfers and sit to stand 4 times to improve mobility, strength and balance. Required moderate   to promote static balance in standing as well as to get to standing. Treatment/Session Assessment: See initial assessment above. Patients response to todays treatment session was tolerated well with no medical complications. · Response to Treatment:  Tolerated well without complications  · Interdisciplinary Collaboration:  · Registered Nurse  · After treatment position/precautions:  · Up in chair, Bed/Chair-wheels locked, Bed in low position, Call light within reach and RN notified  · Compliance with Program/Exercises: Will assess as treatment progresses. · Recommendations/Intent for next treatment session: \"Next visit will focus on reduction in assistance provided\".   Total Treatment Duration:  PT Patient Time In/Time Out  Time In: 1105  Time Out: 380 [facility]

## 2017-01-19 NOTE — PROGRESS NOTES
Unit 1 of 2 LPRBC started to right subclavian line @ 30mL/hr without any difficulty. Nurse stayed in room at bedside x10 minutes. No signs of reaction noted at this time. Rate increased to 125mL/hr. Will continue to monitor and follow up as needed.

## 2017-01-19 NOTE — INTERDISCIPLINARY ROUNDS
Interdisciplinary team rounds were held 1/19/2017 with the following team members:Care Management, Nursing, Pastoral Care, Pharmacy and Physician. Plan of care discussed. See clinical pathway and/or care plan for interventions and desired outcomes.

## 2017-01-19 NOTE — PROGRESS NOTES
[facility]    Follow-Up on: MO on CkD    HPI: Doing okay,  Angiogram yesterday. Sitting up in a chair and tired. ROS:  Denies CP, SOB.     Current Facility-Administered Medications   Medication Dose Route Frequency    sodium hypochlorite (QUARTER STRENGTH DAKIN'S) 0.125% irrigation (bottle)   Topical DAILY    insulin glargine (LANTUS) injection 12 Units  12 Units SubCUTAneous DAILY    heparin (porcine) injection 5,000 Units  5,000 Units SubCUTAneous Q8H    0.9% sodium chloride infusion  125 mL/hr IntraVENous CONTINUOUS    sodium chloride (NS) flush 5 mL  5 mL InterCATHeter PRN    pantoprazole (PROTONIX) tablet 40 mg  40 mg Oral ACB    HYDROmorphone (PF) (DILAUDID) injection 2 mg  2 mg IntraVENous PRN    cefTRIAXone (ROCEPHIN) 2 g in 0.9% sodium chloride (MBP/ADV) 50 mL  2 g IntraVENous Q24H    metroNIDAZOLE (FLAGYL) tablet 500 mg  500 mg Oral TID    HYDROmorphone (PF) (DILAUDID) injection 0.5-1 mg  0.5-1 mg IntraVENous Q3H PRN    oxyCODONE IR (ROXICODONE) tablet 5 mg  5 mg Oral Q4H PRN    insulin regular (NOVOLIN R, HUMULIN R) injection   SubCUTAneous AC&HS    amLODIPine (NORVASC) tablet 10 mg  10 mg Oral DAILY    hydrALAZINE (APRESOLINE) 20 mg/mL injection 20 mg  20 mg IntraVENous Q6H PRN    haloperidol lactate (HALDOL) injection 2 mg  2 mg IntraVENous Q4H PRN    0.9% sodium chloride infusion 250 mL  250 mL IntraVENous PRN    diphenhydrAMINE (BENADRYL) injection 25 mg  25 mg IntraVENous Q4H PRN    traMADol (ULTRAM) tablet 50 mg  50 mg Oral Q6H PRN    atorvastatin (LIPITOR) tablet 80 mg  80 mg Oral DAILY    metoprolol succinate (TOPROL-XL) tablet 100 mg  100 mg Oral DAILY WITH BREAKFAST    sodium chloride (NS) flush 5-10 mL  5-10 mL IntraVENous Q8H    sodium chloride (NS) flush 5-10 mL  5-10 mL IntraVENous PRN    acetaminophen (TYLENOL) tablet 650 mg  650 mg Oral Q4H PRN    ondansetron (ZOFRAN) injection 4 mg  4 mg IntraVENous Q4H PRN    docusate sodium (COLACE) capsule 100 mg 100 mg Oral DAILY PRN       Exam:  Vitals:    01/19/17 0011 01/19/17 0517 01/19/17 0819 01/19/17 1151   BP: 117/69 128/73 144/69 154/83   Pulse: 67 76 92 88   Resp: 18 18 18 18   Temp: 98.6 °F (37 °C) 98.1 °F (36.7 °C) 98.4 °F (36.9 °C) 98 °F (36.7 °C)   SpO2: 95% 94% 97% 98%   Weight:       Height:             Intake/Output Summary (Last 24 hours) at 01/19/17 1301  Last data filed at 01/19/17 0910   Gross per 24 hour   Intake              400 ml   Output               20 ml   Net              380 ml     PE:  GEN - in no distress  CV - regular, no murmur, no rub  Lung - clear bilaterally  Abd - soft, nontender  Ext - 2+ edema    Labs  Recent Labs      01/19/17   1038   WBC  9.0   HGB  6.6*   HCT  21.1*   PLT  354     Recent Labs      01/19/17   0550  01/18/17   0540  01/17/17   1246   NA  144  143  144   K  3.8  3.7  3.7   CL  114*  112*  111*   CO2  20*  20*  23   BUN  28*  33*  34*   CREA  2.27*  2.27*  2.34*   GLU  120*  187*  149*   CA  7.3*  7.5*  7.9*     No results for input(s): PH, PCO2, PO2, PCO2 in the last 72 hours. 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 ([facility] [address] 01/17/2017    Sepsis due to cellulitis ([facility] [address] 01/09/2017    Acute renal failure with tubular necrosis ([facility] [address] 01/06/2017    Type 2 diabetes mellitus with right diabetic foot infection ([facility] [address] 01/05/2017    NSTEMI (non-ST elevated myocardial infarction) ([facility] [address] 01/05/2017    Kidney disease, chronic, stage III (moderate, EGFR 30-59 ml/min) 08/11/2016    Cardiomyopathy ([address] 05/05/2016    Chronic systolic congestive heart failure ([facility] [address] 12/30/2015    Bilateral pleural effusion 12/27/2015    Tobacco use 12/27/2015    Abnormal CT scan, chest 12/27/2015    Chest pain 12/26/2015    Type 2 diabetes mellitus with hyperglycemia [facility] 07/29/2012    HTN (hypertension) 07/29/2012       Issues Addressed By Nephrology:  1. MO on CKD 3: Slowly improving. Non-oliguric. 2. Osteomyelitis. 3. Anemia  4. PVD  5.  Edema: Worsening    Plan:  -Watching renal function  -Check labs in the AM  -Add diuretics and see how his kidney function responds

## 2017-01-19 NOTE — PROGRESS NOTES
Hospitalist Progress Note    Subjective:   Daily Progress Note: 1/19/2017 12:04 PM    Pt is a 72 yo male who presented with progressive pain and poor healing of right foot wound over 1 month, admitted with infected DM foot. PMH includes DM (A1C 9.2 11/2016), CHF, CKD, current smoker. X Ray of right foot had low suspicion for osteomyelitis but does reveal soft tissue lucencies, possible gas producing infection. Surgery and Vascular were consulted. He had bedside debridement on 1/6 and surgical I&D/5th toe amputation 1/12. He was started on Vanc/Zosyn in ER. ID has seen - changed atbx 1/13 to IV Rocephin/PO Flagyl. 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 Will need 6 weeks antbx per ID recs. Also seen by vascular surgery due to abnormal right MAGGIE and poor postoperative wound healing. Plans for arteriogram 1/18.     1/19: Alert, no distress. Pt reports poor appetite and poor po intake. Hb 6.6 this morning-denies GI bleed.     Current Facility-Administered Medications   Medication Dose Route Frequency    0.9% sodium chloride infusion 250 mL  250 mL IntraVENous PRN    lidocaine (XYLOCAINE) 10 mg/mL (1 %) injection 0.1 mL  0.1 mL SubCUTAneous PRN    lactated ringers infusion  150 mL/hr IntraVENous CONTINUOUS    sodium chloride (NS) flush 5-10 mL  5-10 mL IntraVENous Q8H    sodium chloride (NS) flush 5-10 mL  5-10 mL IntraVENous PRN    midazolam (VERSED) injection 2 mg  2 mg IntraVENous ONCE PRN    lactated ringers infusion  150 mL/hr IntraVENous CONTINUOUS    0.9% sodium chloride infusion  50 mL/hr IntraVENous CONTINUOUS    sodium chloride (NS) flush 5-10 mL  5-10 mL IntraVENous PRN    acetaminophen (TYLENOL) tablet 1,000 mg  1,000 mg Oral Q6H PRN    HYDROmorphone (PF) (DILAUDID) injection 0.5 mg  0.5 mg IntraVENous Multiple    promethazine (PHENERGAN) with saline injection 3.25 mg  3.25 mg IntraVENous Q15MIN PRN    sodium hypochlorite (QUARTER STRENGTH DAKIN'S) 0.125% irrigation (bottle) Topical DAILY    insulin glargine (LANTUS) injection 12 Units  12 Units SubCUTAneous DAILY    heparin (PF) 2 units/ml in NS infusion 2,000 Units  1,000 mL Irrigation Multiple    heparin (porcine) injection 5,000 Units  5,000 Units SubCUTAneous Q8H    0.9% sodium chloride infusion  125 mL/hr IntraVENous CONTINUOUS    heparin (PF) 2 units/ml in NS infusion 2,000 Units  1,000 mL Irrigation Multiple    sodium chloride (NS) flush 5 mL  5 mL InterCATHeter PRN    pantoprazole (PROTONIX) tablet 40 mg  40 mg Oral ACB    HYDROmorphone (PF) (DILAUDID) injection 2 mg  2 mg IntraVENous PRN    cefTRIAXone (ROCEPHIN) 2 g in 0.9% sodium chloride (MBP/ADV) 50 mL  2 g IntraVENous Q24H    metroNIDAZOLE (FLAGYL) tablet 500 mg  500 mg Oral TID    HYDROmorphone (PF) (DILAUDID) injection 0.5-1 mg  0.5-1 mg IntraVENous Q3H PRN    oxyCODONE IR (ROXICODONE) tablet 5 mg  5 mg Oral Q4H PRN    insulin regular (NOVOLIN R, HUMULIN R) injection   SubCUTAneous AC&HS    amLODIPine (NORVASC) tablet 10 mg  10 mg Oral DAILY    hydrALAZINE (APRESOLINE) 20 mg/mL injection 20 mg  20 mg IntraVENous Q6H PRN    haloperidol lactate (HALDOL) injection 2 mg  2 mg IntraVENous Q4H PRN    0.9% sodium chloride infusion 250 mL  250 mL IntraVENous PRN    diphenhydrAMINE (BENADRYL) injection 25 mg  25 mg IntraVENous Q4H PRN    traMADol (ULTRAM) tablet 50 mg  50 mg Oral Q6H PRN    atorvastatin (LIPITOR) tablet 80 mg  80 mg Oral DAILY    metoprolol succinate (TOPROL-XL) tablet 100 mg  100 mg Oral DAILY WITH BREAKFAST    sodium chloride (NS) flush 5-10 mL  5-10 mL IntraVENous Q8H    sodium chloride (NS) flush 5-10 mL  5-10 mL IntraVENous PRN    acetaminophen (TYLENOL) tablet 650 mg  650 mg Oral Q4H PRN    ondansetron (ZOFRAN) injection 4 mg  4 mg IntraVENous Q4H PRN    docusate sodium (COLACE) capsule 100 mg  100 mg Oral DAILY PRN        Review of Systems  Pertinent items are noted in HPI.     Objective:     Visit Vitals    /83    Pulse 88    Temp 98 °F (36.7 °C)    Resp 18    Ht 6' 1\" (1.854 m)    Wt 96.6 kg (212 lb 15.4 oz)    SpO2 98%    BMI 28.1 kg/m2    O2 Flow Rate (L/min): 4 l/min O2 Device: Room air    Temp (24hrs), Av.1 °F (36.7 °C), Min:97.6 °F (36.4 °C), Max:98.6 °F (37 °C)          190 -  0700  In: 400 [I.V.:400]  Out: 20     Visit Vitals    /83    Pulse 88    Temp 98 °F (36.7 °C)    Resp 18    Ht 6' 1\" (1.854 m)    Wt 96.6 kg (212 lb 15.4 oz)    SpO2 98%    BMI 28.1 kg/m2     General appearance: alert, cooperative, no distress  Lungs: clear to auscultation bilaterally  Heart: regular rate and rhythm  Abdomen: soft, non-tender.  Bowel sounds normal. No masses,  no organomegaly  Extremities: No cyanosis, clubbing or edema, right foot wrapped with dry dressing  Neurologic: Grossly normal    Additional comments:None    Data Review    Recent Results (from the past 24 hour(s))   GLUCOSE, POC    Collection Time: 17  5:09 PM   Result Value Ref Range    Glucose (POC) 138 (H) 65 - 100 mg/dL   GLUCOSE, POC    Collection Time: 17  8:03 PM   Result Value Ref Range    Glucose (POC) 159 (H) 65 - 632 mg/dL   METABOLIC PANEL, BASIC    Collection Time: 17  5:50 AM   Result Value Ref Range    Sodium 144 136 - 145 mmol/L    Potassium 3.8 3.5 - 5.1 mmol/L    Chloride 114 (H) 98 - 107 mmol/L    CO2 20 (L) 21 - 32 mmol/L    Anion gap 10 7 - 16 mmol/L    Glucose 120 (H) 65 - 100 mg/dL    BUN 28 (H) 8 - 23 MG/DL    Creatinine 2.27 (H) 0.8 - 1.5 MG/DL    GFR est AA 37 (L) >60 ml/min/1.73m2    GFR est non-AA 31 (L) >60 ml/min/1.73m2    Calcium 7.3 (L) 8.3 - 10.4 MG/DL   GLUCOSE, POC    Collection Time: 17  7:06 AM   Result Value Ref Range    Glucose (POC) 127 (H) 65 - 100 mg/dL   CBC WITH AUTOMATED DIFF    Collection Time: 17 10:38 AM   Result Value Ref Range    WBC 9.0 4.3 - 11.1 K/uL    RBC 2.45 (L) 4.23 - 5.67 M/uL    HGB 6.6 (LL) 13.6 - 17.2 g/dL    HCT 21.1 (L) 41.1 - 50.3 %    MCV 86.1 79.6 - 97.8 FL    MCH 26.9 26.1 - 32.9 PG    MCHC 31.3 (L) 31.4 - 35.0 g/dL    RDW 15.2 (H) 11.9 - 14.6 %    PLATELET 812 901 - 288 K/uL    MPV 10.1 (L) 10.8 - 14.1 FL    DF AUTOMATED      NEUTROPHILS 82 (H) 43 - 78 %    LYMPHOCYTES 8 (L) 13 - 44 %    MONOCYTES 7 4.0 - 12.0 %    EOSINOPHILS 3 0.5 - 7.8 %    BASOPHILS 0 0.0 - 2.0 %    IMMATURE GRANULOCYTES 0.2 0.0 - 5.0 %    ABS. NEUTROPHILS 7.3 1.7 - 8.2 K/UL    ABS. LYMPHOCYTES 0.8 0.5 - 4.6 K/UL    ABS. MONOCYTES 0.6 0.1 - 1.3 K/UL    ABS. EOSINOPHILS 0.3 0.0 - 0.8 K/UL    ABS. BASOPHILS 0.0 0.0 - 0.2 K/UL    ABS. IMM. GRANS. 0.0 0.0 - 0.5 K/UL   GLUCOSE, POC    Collection Time: 01/19/17 11:28 AM   Result Value Ref Range    Glucose (POC) 120 (H) 65 - 100 mg/dL         Assessment/Plan:     Principal Problem:    Type 2 diabetes mellitus with right diabetic foot infection ([facility] (1/5/2017)    Active Problems:    Type 2 diabetes mellitus with hyperglycemia ([facility] (7/29/2012)      HTN (hypertension) (7/29/2012)      Chronic systolic congestive heart failure ([facility] (12/30/2015)      Overview: EF 35-40% on 2015 Echo      Kidney disease, chronic, stage III (moderate, EGFR 30-59 ml/min) (8/11/2016)      Acute renal failure with tubular necrosis ([facility] (1/6/2017)      NSTEMI (non-ST elevated myocardial infarction) ([facility] (1/5/2017)      Sepsis due to cellulitis ([facility] (1/9/2017)      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 Did debridement at bedside 1/6 and amputation of right 5th toe on 1/12. Cont Rocephin/Flagyl - will need 6weeks atbx per ID (EOT 2/22). Wound cx Beta Strep/Staph Ludgenesis. R IJ Tunneled Cath placed 1/17. OR debridement planned for 1/20.  - PAD - abnormal RLE MAGGIE. Vasc following - arteriogram 1/18   - NSTEMI vs demand mismatch- peaked at 0.61. No typical symptoms. Some elevation probably from ARF. Already on medical management, BB, ASA, statin.  Continue to hold ACE, diuretics due to ARF. Cardiology follow up at [facility] on DC  - ARF -  Slow improvement. Nephrology following  - Anemia - of chronic disease. 1/19 Hb 6.6-transfuse 2U and check stool hemoccult. - HTN - cont current regimen. Holding ACE, diuretics  - DM -titrating lantus for tighter control  - CHF - stable, monitor closely. Holding diuretics due to ARF      Dispo: [facility] transfer is planned      Care Plan discussed with: Patient/Family and Nurse    Total time spent with patient: 25 minutes.     Signed By: Rian Sheppard MD     January 19, 2017

## 2017-01-19 NOTE — DIABETES MGMT
Pt s/p I&D right foot infection with debridement 1/11/16 is seen by RN LINH for continued diabetes education. Pt now with wound vac to right foot and remains on antibiotics. Right foot debridement planned for tomorrow. Will be NPO after midnight. Reviewed with pt current insulin regimen: Lantus dose 12 units daily and Regular insulin per sliding scale. Verbalizes basic understanding. Blood glucose ranged 138-187 yesterday with only Lantus 12 units given (per MAR regular insulin sliding scale was held/refused by pt yesterday). Blood glucose 120-127 so far today. Attempted to explain to pt importance of good glycemic control for wound healing, but pt not engaging further in conversation. Pt does say he is not eating. Primary nurse says pt did not eat breakfast. Discussed pt not eating and NPO after MN with hospitalist. Per hospitalist plan will be to hold morning Lantus tomorrow. Awaiting orders written. Discussed with primary nurse and hospitalist consult for dietitian to see re: poor po intake. May benefit from Glucerna supplement. Pt practiced using insulin demo pen earlier last week. Pt's goddaughter (who lives with him) was educated re: proper use of insulin pen and able to return demonstrate insulin pen use last week as well. Plan is to continue diabetes education next only as pt is willing to part  Pt has no questions at this time.  Nursing will continue to monitor blood glucose.

## 2017-01-20 ENCOUNTER — ANESTHESIA (OUTPATIENT)
Dept: SURGERY | Age: 72
DRG: 853 | End: 2017-01-20
Payer: MEDICARE

## 2017-01-20 LAB
ABO + RH BLD: NORMAL
ANION GAP BLD CALC-SCNC: 11 MMOL/L (ref 7–16)
BASOPHILS # BLD AUTO: 0 K/UL (ref 0–0.2)
BASOPHILS # BLD: 0 % (ref 0–2)
BLD PROD TYP BPU: NORMAL
BLD PROD TYP BPU: NORMAL
BLOOD GROUP ANTIBODIES SERPL: NORMAL
BPU ID: NORMAL
BPU ID: NORMAL
BUN SERPL-MCNC: 24 MG/DL (ref 8–23)
CALCIUM SERPL-MCNC: 7.4 MG/DL (ref 8.3–10.4)
CHLORIDE SERPL-SCNC: 113 MMOL/L (ref 98–107)
CO2 SERPL-SCNC: 20 MMOL/L (ref 21–32)
CREAT SERPL-MCNC: 2.23 MG/DL (ref 0.8–1.5)
CROSSMATCH RESULT,%XM: NORMAL
CROSSMATCH RESULT,%XM: NORMAL
DIFFERENTIAL METHOD BLD: ABNORMAL
EOSINOPHIL # BLD: 0.2 K/UL (ref 0–0.8)
EOSINOPHIL NFR BLD: 2 % (ref 0.5–7.8)
ERYTHROCYTE [DISTWIDTH] IN BLOOD BY AUTOMATED COUNT: 14.9 % (ref 11.9–14.6)
GLUCOSE BLD STRIP.AUTO-MCNC: 141 MG/DL (ref 65–100)
GLUCOSE BLD STRIP.AUTO-MCNC: 143 MG/DL (ref 65–100)
GLUCOSE BLD STRIP.AUTO-MCNC: 154 MG/DL (ref 65–100)
GLUCOSE BLD STRIP.AUTO-MCNC: 209 MG/DL (ref 65–100)
GLUCOSE SERPL-MCNC: 205 MG/DL (ref 65–100)
HCT VFR BLD AUTO: 28.4 % (ref 41.1–50.3)
HGB BLD-MCNC: 8.8 G/DL (ref 13.6–17.2)
HGB BLD-MCNC: 9.3 G/DL (ref 13.6–17.2)
IMM GRANULOCYTES # BLD: 0 K/UL (ref 0–0.5)
IMM GRANULOCYTES NFR BLD AUTO: 0.3 % (ref 0–5)
LYMPHOCYTES # BLD AUTO: 11 % (ref 13–44)
LYMPHOCYTES # BLD: 1 K/UL (ref 0.5–4.6)
MCH RBC QN AUTO: 28.3 PG (ref 26.1–32.9)
MCHC RBC AUTO-ENTMCNC: 32.7 G/DL (ref 31.4–35)
MCV RBC AUTO: 86.3 FL (ref 79.6–97.8)
MONOCYTES # BLD: 0.5 K/UL (ref 0.1–1.3)
MONOCYTES NFR BLD AUTO: 6 % (ref 4–12)
NEUTS SEG # BLD: 7.2 K/UL (ref 1.7–8.2)
NEUTS SEG NFR BLD AUTO: 81 % (ref 43–78)
PLATELET # BLD AUTO: 385 K/UL (ref 150–450)
PMV BLD AUTO: 10.5 FL (ref 10.8–14.1)
POTASSIUM SERPL-SCNC: 3.6 MMOL/L (ref 3.5–5.1)
RBC # BLD AUTO: 3.29 M/UL (ref 4.23–5.67)
SODIUM SERPL-SCNC: 144 MMOL/L (ref 136–145)
SPECIMEN EXP DATE BLD: NORMAL
STATUS OF UNIT,%ST: NORMAL
STATUS OF UNIT,%ST: NORMAL
UNIT DIVISION, %UDIV: 0
UNIT DIVISION, %UDIV: 0
WBC # BLD AUTO: 8.9 K/UL (ref 4.3–11.1)

## 2017-01-20 PROCEDURE — 77030011256 HC DRSG MEPILEX <16IN NO BORD MOLN -A: Performed by: SURGERY

## 2017-01-20 PROCEDURE — 76942 ECHO GUIDE FOR BIOPSY: CPT | Performed by: SURGERY

## 2017-01-20 PROCEDURE — 77030018836 HC SOL IRR NACL ICUM -A: Performed by: SURGERY

## 2017-01-20 PROCEDURE — 74011000258 HC RX REV CODE- 258: Performed by: NURSE PRACTITIONER

## 2017-01-20 PROCEDURE — 76210000006 HC OR PH I REC 0.5 TO 1 HR: Performed by: SURGERY

## 2017-01-20 PROCEDURE — 65270000029 HC RM PRIVATE

## 2017-01-20 PROCEDURE — 74011250636 HC RX REV CODE- 250/636: Performed by: INTERNAL MEDICINE

## 2017-01-20 PROCEDURE — 77030011640 HC PAD GRND REM COVD -A: Performed by: SURGERY

## 2017-01-20 PROCEDURE — 74011250636 HC RX REV CODE- 250/636: Performed by: NURSE PRACTITIONER

## 2017-01-20 PROCEDURE — 74011250637 HC RX REV CODE- 250/637: Performed by: INTERNAL MEDICINE

## 2017-01-20 PROCEDURE — 85018 HEMOGLOBIN: CPT | Performed by: INTERNAL MEDICINE

## 2017-01-20 PROCEDURE — 74011636637 HC RX REV CODE- 636/637: Performed by: INTERNAL MEDICINE

## 2017-01-20 PROCEDURE — 74011250636 HC RX REV CODE- 250/636: Performed by: SURGERY

## 2017-01-20 PROCEDURE — 76060000032 HC ANESTHESIA 0.5 TO 1 HR: Performed by: SURGERY

## 2017-01-20 PROCEDURE — 74011250637 HC RX REV CODE- 250/637: Performed by: NURSE PRACTITIONER

## 2017-01-20 PROCEDURE — 85025 COMPLETE CBC W/AUTO DIFF WBC: CPT | Performed by: HOSPITALIST

## 2017-01-20 PROCEDURE — 0JBQ0ZZ EXCISION OF RIGHT FOOT SUBCUTANEOUS TISSUE AND FASCIA, OPEN APPROACH: ICD-10-PCS | Performed by: SURGERY

## 2017-01-20 PROCEDURE — 82962 GLUCOSE BLOOD TEST: CPT

## 2017-01-20 PROCEDURE — 76010010054 HC POST OP PAIN BLOCK: Performed by: SURGERY

## 2017-01-20 PROCEDURE — 0JUQ0JZ SUPPLEMENT OF RIGHT FOOT SUBCUTANEOUS TISSUE AND FASCIA WITH SYNTHETIC SUBSTITUTE, OPEN APPROACH: ICD-10-PCS | Performed by: SURGERY

## 2017-01-20 PROCEDURE — 80048 BASIC METABOLIC PNL TOTAL CA: CPT | Performed by: HOSPITALIST

## 2017-01-20 PROCEDURE — 77030008467 HC STPLR SKN COVD -B: Performed by: SURGERY

## 2017-01-20 PROCEDURE — 76010000138 HC OR TIME 0.5 TO 1 HR: Performed by: SURGERY

## 2017-01-20 PROCEDURE — 74011250637 HC RX REV CODE- 250/637: Performed by: FAMILY MEDICINE

## 2017-01-20 PROCEDURE — 74011250636 HC RX REV CODE- 250/636: Performed by: ANESTHESIOLOGY

## 2017-01-20 PROCEDURE — 74011250636 HC RX REV CODE- 250/636

## 2017-01-20 DEVICE — MATRIX WND CLLGN 6X9CM W/PHMB -- PURAPLY ANTIMICROBIAL: Type: IMPLANTABLE DEVICE | Site: FOOT | Status: FUNCTIONAL

## 2017-01-20 RX ORDER — OXYCODONE HYDROCHLORIDE 5 MG/1
5 TABLET ORAL
Status: DISCONTINUED | OUTPATIENT
Start: 2017-01-20 | End: 2017-01-20 | Stop reason: HOSPADM

## 2017-01-20 RX ORDER — NALOXONE HYDROCHLORIDE 0.4 MG/ML
0.1 INJECTION, SOLUTION INTRAMUSCULAR; INTRAVENOUS; SUBCUTANEOUS
Status: DISCONTINUED | OUTPATIENT
Start: 2017-01-20 | End: 2017-01-20 | Stop reason: HOSPADM

## 2017-01-20 RX ORDER — HYDROMORPHONE HYDROCHLORIDE 2 MG/ML
0.5 INJECTION, SOLUTION INTRAMUSCULAR; INTRAVENOUS; SUBCUTANEOUS
Status: DISCONTINUED | OUTPATIENT
Start: 2017-01-20 | End: 2017-01-20 | Stop reason: HOSPADM

## 2017-01-20 RX ORDER — OXYCODONE HYDROCHLORIDE 5 MG/1
10 TABLET ORAL
Status: DISCONTINUED | OUTPATIENT
Start: 2017-01-20 | End: 2017-01-20 | Stop reason: HOSPADM

## 2017-01-20 RX ORDER — SODIUM CHLORIDE, SODIUM LACTATE, POTASSIUM CHLORIDE, CALCIUM CHLORIDE 600; 310; 30; 20 MG/100ML; MG/100ML; MG/100ML; MG/100ML
75 INJECTION, SOLUTION INTRAVENOUS CONTINUOUS
Status: DISCONTINUED | OUTPATIENT
Start: 2017-01-20 | End: 2017-01-20 | Stop reason: HOSPADM

## 2017-01-20 RX ORDER — MIDAZOLAM HYDROCHLORIDE 1 MG/ML
2 INJECTION, SOLUTION INTRAMUSCULAR; INTRAVENOUS ONCE
Status: COMPLETED | OUTPATIENT
Start: 2017-01-20 | End: 2017-01-20

## 2017-01-20 RX ORDER — LIDOCAINE HYDROCHLORIDE 10 MG/ML
0.1 INJECTION INFILTRATION; PERINEURAL AS NEEDED
Status: DISCONTINUED | OUTPATIENT
Start: 2017-01-20 | End: 2017-01-20 | Stop reason: HOSPADM

## 2017-01-20 RX ORDER — MIDAZOLAM HYDROCHLORIDE 1 MG/ML
2 INJECTION, SOLUTION INTRAMUSCULAR; INTRAVENOUS
Status: DISCONTINUED | OUTPATIENT
Start: 2017-01-20 | End: 2017-01-20 | Stop reason: HOSPADM

## 2017-01-20 RX ORDER — FENTANYL CITRATE 50 UG/ML
100 INJECTION, SOLUTION INTRAMUSCULAR; INTRAVENOUS ONCE
Status: COMPLETED | OUTPATIENT
Start: 2017-01-20 | End: 2017-01-20

## 2017-01-20 RX ORDER — DIPHENHYDRAMINE HYDROCHLORIDE 50 MG/ML
12.5 INJECTION, SOLUTION INTRAMUSCULAR; INTRAVENOUS
Status: DISCONTINUED | OUTPATIENT
Start: 2017-01-20 | End: 2017-01-20 | Stop reason: HOSPADM

## 2017-01-20 RX ORDER — FLUMAZENIL 0.1 MG/ML
0.2 INJECTION INTRAVENOUS AS NEEDED
Status: DISCONTINUED | OUTPATIENT
Start: 2017-01-20 | End: 2017-01-20 | Stop reason: HOSPADM

## 2017-01-20 RX ORDER — PROPOFOL 10 MG/ML
INJECTION, EMULSION INTRAVENOUS
Status: DISCONTINUED | OUTPATIENT
Start: 2017-01-20 | End: 2017-01-20 | Stop reason: HOSPADM

## 2017-01-20 RX ADMIN — HALOPERIDOL LACTATE 2 MG: 5 INJECTION, SOLUTION INTRAMUSCULAR at 21:04

## 2017-01-20 RX ADMIN — PANTOPRAZOLE SODIUM 40 MG: 40 TABLET, DELAYED RELEASE ORAL at 08:19

## 2017-01-20 RX ADMIN — FENTANYL CITRATE 50 MCG: 50 INJECTION, SOLUTION INTRAMUSCULAR; INTRAVENOUS at 11:12

## 2017-01-20 RX ADMIN — AMLODIPINE BESYLATE 10 MG: 10 TABLET ORAL at 08:19

## 2017-01-20 RX ADMIN — SODIUM CHLORIDE, SODIUM LACTATE, POTASSIUM CHLORIDE, AND CALCIUM CHLORIDE 75 ML/HR: 600; 310; 30; 20 INJECTION, SOLUTION INTRAVENOUS at 11:00

## 2017-01-20 RX ADMIN — METOPROLOL SUCCINATE 100 MG: 100 TABLET, EXTENDED RELEASE ORAL at 08:19

## 2017-01-20 RX ADMIN — METRONIDAZOLE 500 MG: 500 TABLET ORAL at 08:19

## 2017-01-20 RX ADMIN — PROPOFOL 160 MCG/KG/MIN: 10 INJECTION, EMULSION INTRAVENOUS at 13:05

## 2017-01-20 RX ADMIN — Medication 10 ML: at 05:13

## 2017-01-20 RX ADMIN — CEFTRIAXONE 2 G: 2 INJECTION, POWDER, FOR SOLUTION INTRAMUSCULAR; INTRAVENOUS at 16:59

## 2017-01-20 RX ADMIN — METRONIDAZOLE 500 MG: 500 TABLET ORAL at 16:00

## 2017-01-20 RX ADMIN — SODIUM CHLORIDE 125 ML/HR: 900 INJECTION, SOLUTION INTRAVENOUS at 08:18

## 2017-01-20 RX ADMIN — METRONIDAZOLE 500 MG: 500 TABLET ORAL at 20:55

## 2017-01-20 RX ADMIN — HUMAN INSULIN 4 UNITS: 100 INJECTION, SOLUTION SUBCUTANEOUS at 07:30

## 2017-01-20 RX ADMIN — HYDROMORPHONE HYDROCHLORIDE 1 MG: 1 INJECTION, SOLUTION INTRAMUSCULAR; INTRAVENOUS; SUBCUTANEOUS at 17:02

## 2017-01-20 RX ADMIN — Medication 10 ML: at 20:58

## 2017-01-20 RX ADMIN — MIDAZOLAM HYDROCHLORIDE 1 MG: 1 INJECTION, SOLUTION INTRAMUSCULAR; INTRAVENOUS at 11:12

## 2017-01-20 RX ADMIN — Medication 10 ML: at 14:00

## 2017-01-20 NOTE — PROGRESS NOTES
Problem: Mobility Impaired (Adult and Pediatric)  Goal: *Acute Goals and Plan of Care (Insert Text)  Goals reviewed and updated 17  ST - 3 days  1. Pt will roll and get to EOB with minimal assist.  2. Pt will sit edge of bed without falling over x 10 minutes for functional activity and exercise. 3. Pt will go sit to stand and transfer to chair with heel touch WB with moderate assist.  4. Pt will ambulate 5 with heel touch WB and rolling walker and moderate assist.  5. Pt will perform LE exercises with minimal assist.    LTG: 3- 7 days  1. Pt will roll and get to EOB independent. 3. Pt will go sit to stand and transfer to chair with heel touch WB with min assist and rolling walker. 4. Pt will ambulate 10 with heel touch WB and rolling walker and minimal assist.  5. Pt will perform LE exercises with verbal cues. Patient off the floor for an debridement. Will check back when able.      Desire Merrill PTA

## 2017-01-20 NOTE — PROGRESS NOTES
Pt rested throughout the night with no complaints. Foot dressing not changed as pt became belligerent towards staff. All needs met at this time.

## 2017-01-20 NOTE — ANESTHESIA PROCEDURE NOTES
Peripheral Block    Start time: 1/20/2017 11:18 AM  End time: 1/20/2017 11:20 AM  Performed by: Manda Moseley  Authorized by: Manda Moseley       Pre-procedure: Indications: at surgeon's request and post-op pain management    Preanesthetic Checklist: patient identified, risks and benefits discussed, site marked, timeout performed, anesthesia consent given and patient being monitored    Timeout Time: 11:18          Block Type:   Block Type:   Adductor canal  Laterality:  Right  Monitoring:  Standard ASA monitoring, continuous pulse ox, frequent vital sign checks, heart rate, responsive to questions and oxygen  Injection Technique:  Single shot  Procedures: ultrasound guided    Patient Position: supine  Prep: chlorhexidine    Location:  Mid thigh  Needle Type:  Stimuplex  Needle Gauge:  22 G  Needle Localization:  Ultrasound guidance  Medication Injected:  0.5%  Adds:  Epi 1:200K  Volume (mL):  15    Assessment:    Injection Assessment:  Incremental injection every 5 mL, no paresthesia, ultrasound image on chart, local visualized surrounding nerve on ultrasound, negative aspiration for blood and no intravascular symptoms  Patient tolerance:  Patient tolerated the procedure well with no immediate complications

## 2017-01-20 NOTE — PROGRESS NOTES
Hospitalist Progress Note    Subjective:   Daily Progress Note: 1/20/2017 4:11 PM    Pt is a 72 yo male who presented with progressive pain and poor healing of right foot wound over 1 month, admitted with infected DM foot. PMH includes DM (A1C 9.2 11/2016), CHF, CKD, current smoker. X Ray of right foot had low suspicion for osteomyelitis but does reveal soft tissue lucencies, possible gas producing infection. Surgery and Vascular were consulted. He had bedside debridement on 1/6 and surgical I&D/5th toe amputation 1/12. He was started on Vanc/Zosyn in ER. ID has seen - changed atbx 1/13 to IV Rocephin/PO Flagyl. 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 Will need 6 weeks antbx per ID recs. Also seen by vascular surgery due to abnormal right MAGGIE and poor postoperative wound healing. Plans for arteriogram 1/18.      1/20: Saw patient early this morning. He was alert, no distress.       Current Facility-Administered Medications   Medication Dose Route Frequency    torsemide (DEMADEX) tablet 40 mg  40 mg Oral DAILY    sodium hypochlorite (QUARTER STRENGTH DAKIN'S) 0.125% irrigation (bottle)   Topical DAILY    insulin glargine (LANTUS) injection 12 Units  12 Units SubCUTAneous DAILY    0.9% sodium chloride infusion  125 mL/hr IntraVENous CONTINUOUS    sodium chloride (NS) flush 5 mL  5 mL InterCATHeter PRN    pantoprazole (PROTONIX) tablet 40 mg  40 mg Oral ACB    HYDROmorphone (PF) (DILAUDID) injection 2 mg  2 mg IntraVENous PRN    cefTRIAXone (ROCEPHIN) 2 g in 0.9% sodium chloride (MBP/ADV) 50 mL  2 g IntraVENous Q24H    metroNIDAZOLE (FLAGYL) tablet 500 mg  500 mg Oral TID    HYDROmorphone (PF) (DILAUDID) injection 0.5-1 mg  0.5-1 mg IntraVENous Q3H PRN    oxyCODONE IR (ROXICODONE) tablet 5 mg  5 mg Oral Q4H PRN    insulin regular (NOVOLIN R, HUMULIN R) injection   SubCUTAneous AC&HS    amLODIPine (NORVASC) tablet 10 mg  10 mg Oral DAILY    hydrALAZINE (APRESOLINE) 20 mg/mL injection 20 mg  20 mg IntraVENous Q6H PRN    haloperidol lactate (HALDOL) injection 2 mg  2 mg IntraVENous Q4H PRN    0.9% sodium chloride infusion 250 mL  250 mL IntraVENous PRN    diphenhydrAMINE (BENADRYL) injection 25 mg  25 mg IntraVENous Q4H PRN    traMADol (ULTRAM) tablet 50 mg  50 mg Oral Q6H PRN    atorvastatin (LIPITOR) tablet 80 mg  80 mg Oral DAILY    metoprolol succinate (TOPROL-XL) tablet 100 mg  100 mg Oral DAILY WITH BREAKFAST    sodium chloride (NS) flush 5-10 mL  5-10 mL IntraVENous Q8H    sodium chloride (NS) flush 5-10 mL  5-10 mL IntraVENous PRN    acetaminophen (TYLENOL) tablet 650 mg  650 mg Oral Q4H PRN    ondansetron (ZOFRAN) injection 4 mg  4 mg IntraVENous Q4H PRN    docusate sodium (COLACE) capsule 100 mg  100 mg Oral DAILY PRN        Review of Systems  Pertinent items are noted in HPI. Objective:     Visit Vitals    /67    Pulse 92    Temp 98 °F (36.7 °C)    Resp 18    Ht 6' 1\" (1.854 m)    Wt 96.6 kg (212 lb 15.4 oz)    SpO2 98%    BMI 28.1 kg/m2    O2 Flow Rate (L/min): 2 l/min O2 Device: Nasal cannula    Temp (24hrs), Av.7 °F (37.1 °C), Min:98 °F (36.7 °C), Max:99.5 °F (37.5 °C)       07 - 1900  In: 500 [I.V.:500]  Out: 252 [Urine:250]  1901 -  0700  In: 6469.4 [I.V.:6178]  Out: -     Visit Vitals    /67    Pulse 92    Temp 98 °F (36.7 °C)    Resp 18    Ht 6' 1\" (1.854 m)    Wt 96.6 kg (212 lb 15.4 oz)    SpO2 98%    BMI 28.1 kg/m2     General appearance: alert, cooperative, no distress  Lungs: clear to auscultation bilaterally  Heart: regular rate and rhythm  Abdomen: soft, non-tender.  Bowel sounds normal. No masses,  no organomegaly  Extremities: No cyanosis, clubbing or edema, right foot wrapped with dry dressing  Neurologic: Grossly normal    Additional comments:None    Data Review    Recent Results (from the past 24 hour(s))   GLUCOSE, POC    Collection Time: 17  8:44 PM   Result Value Ref Range    Glucose (POC) 163 (H) 65 - 100 mg/dL   HEMOGLOBIN    Collection Time: 01/20/17 12:20 AM   Result Value Ref Range    HGB 8.8 (L) 13.6 - 17.2 g/dL   CBC WITH AUTOMATED DIFF    Collection Time: 01/20/17  5:45 AM   Result Value Ref Range    WBC 8.9 4.3 - 11.1 K/uL    RBC 3.29 (L) 4.23 - 5.67 M/uL    HGB 9.3 (L) 13.6 - 17.2 g/dL    HCT 28.4 (L) 41.1 - 50.3 %    MCV 86.3 79.6 - 97.8 FL    MCH 28.3 26.1 - 32.9 PG    MCHC 32.7 31.4 - 35.0 g/dL    RDW 14.9 (H) 11.9 - 14.6 %    PLATELET 172 890 - 354 K/uL    MPV 10.5 (L) 10.8 - 14.1 FL    DF AUTOMATED      NEUTROPHILS 81 (H) 43 - 78 %    LYMPHOCYTES 11 (L) 13 - 44 %    MONOCYTES 6 4.0 - 12.0 %    EOSINOPHILS 2 0.5 - 7.8 %    BASOPHILS 0 0.0 - 2.0 %    IMMATURE GRANULOCYTES 0.3 0.0 - 5.0 %    ABS. NEUTROPHILS 7.2 1.7 - 8.2 K/UL    ABS. LYMPHOCYTES 1.0 0.5 - 4.6 K/UL    ABS. MONOCYTES 0.5 0.1 - 1.3 K/UL    ABS. EOSINOPHILS 0.2 0.0 - 0.8 K/UL    ABS. BASOPHILS 0.0 0.0 - 0.2 K/UL    ABS. IMM.  GRANS. 0.0 0.0 - 0.5 K/UL   METABOLIC PANEL, BASIC    Collection Time: 01/20/17  5:45 AM   Result Value Ref Range    Sodium 144 136 - 145 mmol/L    Potassium 3.6 3.5 - 5.1 mmol/L    Chloride 113 (H) 98 - 107 mmol/L    CO2 20 (L) 21 - 32 mmol/L    Anion gap 11 7 - 16 mmol/L    Glucose 205 (H) 65 - 100 mg/dL    BUN 24 (H) 8 - 23 MG/DL    Creatinine 2.23 (H) 0.8 - 1.5 MG/DL    GFR est AA 38 (L) >60 ml/min/1.73m2    GFR est non-AA 31 (L) >60 ml/min/1.73m2    Calcium 7.4 (L) 8.3 - 10.4 MG/DL   GLUCOSE, POC    Collection Time: 01/20/17  6:47 AM   Result Value Ref Range    Glucose (POC) 209 (H) 65 - 100 mg/dL   GLUCOSE, POC    Collection Time: 01/20/17 10:15 AM   Result Value Ref Range    Glucose (POC) 154 (H) 65 - 100 mg/dL         Assessment/Plan:     Principal Problem:    Type 2 diabetes mellitus with right diabetic foot infection ([facility] (1/5/2017)    Active Problems:    Type 2 diabetes mellitus with hyperglycemia ([facility]) (7/29/2012)      HTN (hypertension) (7/29/2012)      Chronic systolic congestive heart failure ([facility] (12/30/2015)      Overview: EF 35-40% on 2015 Echo      Kidney disease, chronic, stage III (moderate, EGFR 30-59 ml/min) (8/11/2016)      Acute renal failure with tubular necrosis ([facility] (1/6/2017)      NSTEMI (non-ST elevated myocardial infarction) ([facility] (1/5/2017)      Sepsis due to cellulitis ([facility] (1/9/2017)      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 Did debridement at bedside 1/6 and amputation of right 5th toe on 1/12. Cont Rocephin/Flagyl - will need 6weeks atbx per ID (EOT 2/22). Wound cx Beta Strep/Staph Ludgenesis. R IJ Tunneled Cath placed 1/17. OR debridement 1/20.  - PAD - abnormal RLE MAGGIE. Vasc following - arteriogram 1/18   - NSTEMI vs demand mismatch- peaked at 0.61. No typical symptoms. Some elevation probably from ARF. Already on medical management, BB, ASA, statin. Continue to hold ACE, diuretics due to ARF. Cardiology follow up at [facility] on DC  - ARF -  Slow improvement. Nephrology following  - Anemia - of chronic disease. 1/19 Hb 6.6-transfuse 2U, no signs of bleeding. 1/20 Hb 9.3.    - HTN - cont current regimen. Holding ACE, diuretics  - DM -titrating lantus for tighter control  - CHF - stable, monitor closely. Holding diuretics due to ARF    Dispo: [facility] transfer is planned    Care Plan discussed with: Patient/Family    Total time spent with patient: 25 minutes.     Signed By: Emma Street MD     January 20, 2017

## 2017-01-20 NOTE — ANESTHESIA POSTPROCEDURE EVALUATION
Post-Anesthesia Evaluation and Assessment    Patient: Jacklyn Murphy MRN: [id number]  SSN: [id number]    YOB: 1945  Age: 73 y.o. Sex: male       Cardiovascular Function/Vital Signs  Visit Vitals    /79    Pulse 90    Temp 36.9 °C (98.4 °F)    Resp 18    Ht 6' 1\" (1.854 m)    Wt 96.6 kg (212 lb 15.4 oz)    SpO2 97%    BMI 28.1 kg/m2       Patient is status post total IV anesthesia anesthesia for Procedure(s):  RIGHT FOOT DEBRIDEMENT  ,PREP FOR GRAFTING AND SKIN SUBSTITUTE GRAFT. Nausea/Vomiting: None    Postoperative hydration reviewed and adequate. Pain:  Pain Scale 1: Numeric (0 - 10) (01/20/17 1440)  Pain Intensity 1: 0 (01/20/17 1440)   Managed    Neurological Status:   Neuro (WDL): Exceptions to WDL (01/20/17 1440)  Neuro  Neurologic State: Alert (01/20/17 1440)  Orientation Level: Oriented X4 (01/20/17 1440)  Cognition: Follows commands (01/20/17 1440)  LUE Motor Response: Purposeful (01/20/17 1358)  LLE Motor Response: Purposeful (01/20/17 1358)  RUE Motor Response: Purposeful (01/20/17 1358)  RLE Motor Response: Numbness (01/20/17 1358)   At baseline    Mental Status and Level of Consciousness: Arousable    Pulmonary Status:   O2 Device: Nasal cannula (01/20/17 1440)   Adequate oxygenation and airway patent    Complications related to anesthesia: None    Post-anesthesia assessment completed.  No concerns    Signed By: Alonso Canchola MD     January 20, 2017

## 2017-01-20 NOTE — PERIOP NOTES
TRANSFER - OUT REPORT:    Verbal report given to Ludy SHORT on Teresa Hayes  being transferred to [address] [place] [address] for routine post - op       Report consisted of patients Situation, Background, Assessment and   Recommendations(SBAR). Information from the following report(s) SBAR, OR Summary, Procedure Summary, Intake/Output and MAR was reviewed with the receiving nurse. Lines:   Double Lumen 01/17/17 Right Internal jugular (Active)   Central Line Being Utilized Yes 1/20/2017  2:40 PM   Criteria for Appropriate Use Long term IV/antibiotic administration 1/20/2017  2:40 PM   Site Assessment Clean, dry, & intact 1/20/2017  2:40 PM   Infiltration Assessment 0 1/20/2017  2:40 PM   Affected Extremity/Extremities Pulses palpable;Color distal to insertion site pink (or appropriate for race) 1/20/2017  9:00 AM   Date of Last Dressing Change 01/17/17 1/20/2017  9:00 AM   Dressing Status Clean, dry, & intact 1/20/2017  9:00 AM   Dressing Type Tape;Transparent 1/20/2017  9:00 AM   Proximal Hub Color/Line Status Infusing 1/20/2017  1:10 AM   Positive Blood Return (Medial Site) Yes 1/20/2017  1:10 AM   Distal Hub Color/Line Status Red 1/20/2017  1:10 AM   Positive Blood Return (Lateral Site) Yes 1/20/2017  1:10 AM       Peripheral IV 01/18/17 Right Wrist (Active)   Site Assessment Clean, dry, & intact 1/20/2017  2:40 PM   Phlebitis Assessment 0 1/20/2017  2:40 PM   Infiltration Assessment 0 1/20/2017  2:40 PM   Dressing Status Clean, dry, & intact; Occlusive 1/20/2017  2:40 PM   Dressing Type Transparent;Tape 1/20/2017  2:40 PM   Hub Color/Line Status Blue;Capped 1/20/2017  2:40 PM   Alcohol Cap Used No 1/20/2017  2:40 PM        Opportunity for questions and clarification was provided. Patient transported with:   O2 @ 2 liters    VTE prophylaxis orders have not been written for Teresa Hayes. Patient given room number and nurses name.   No family present for update

## 2017-01-20 NOTE — ANESTHESIA PROCEDURE NOTES
Peripheral Block    Start time: 1/20/2017 11:12 AM  End time: 1/20/2017 11:17 AM  Performed by: Tameka Reilly  Authorized by: Tameka Reilly       Pre-procedure:    Indications: at surgeon's request and post-op pain management    Preanesthetic Checklist: patient identified, risks and benefits discussed, site marked, timeout performed, anesthesia consent given and patient being monitored    Timeout Time: 11:11          Block Type:   Block Type:  Popliteal  Laterality:  Right  Monitoring:  Standard ASA monitoring, continuous pulse ox, frequent vital sign checks, heart rate, oxygen and responsive to questions  Injection Technique:  Single shot  Procedures: ultrasound guided and nerve stimulator    Patient Position: supine  Prep: chlorhexidine    Location:  Lower thigh  Needle Type:  Stimuplex  Needle Gauge:  22 G  Needle Localization:  Nerve stimulator and ultrasound guidance  Motor Response: minimal motor response >0.4 mA    Medication Injected:  0.5%  ropivacaine  Adds:  Epi 1:200K  Volume (mL):  35    Assessment:  Number of attempts:  1  Injection Assessment:  Incremental injection every 5 mL, no paresthesia, ultrasound image on chart, local visualized surrounding nerve on ultrasound, negative aspiration for blood and no intravascular symptoms  Patient tolerance:  Patient tolerated the procedure well with no immediate complications

## 2017-01-20 NOTE — PROGRESS NOTES
Patient returned to room. Alert and oriented. Denies pain at this time. Dressing to right foot c/d/i. No needs at this time.

## 2017-01-20 NOTE — PROGRESS NOTES
TRANSFER - OUT REPORT:    Verbal report given to jacob lee(name) on Micah Paz  being transferred to preop(unit) for ordered procedure       Report consisted of patients Situation, Background, Assessment and   Recommendations(SBAR). Information from the following report(s) SBAR was reviewed with the receiving nurse. Lines:   Double Lumen 01/17/17 Right Internal jugular (Active)   Central Line Being Utilized Yes 1/20/2017  1:10 AM   Criteria for Appropriate Use Long term IV/antibiotic administration 1/20/2017  1:10 AM   Site Assessment Clean, dry, & intact 1/20/2017  1:10 AM   Infiltration Assessment 0 1/20/2017  1:10 AM   Affected Extremity/Extremities Color distal to insertion site pink (or appropriate for race) 1/20/2017  1:10 AM   Date of Last Dressing Change 01/17/17 1/20/2017  1:10 AM   Dressing Status Clean, dry, & intact 1/20/2017  1:10 AM   Dressing Type Disk with Chlorhexadine Gluconate (CHG) 1/20/2017  1:10 AM   Proximal Hub Color/Line Status Infusing 1/20/2017  1:10 AM   Positive Blood Return (Medial Site) Yes 1/20/2017  1:10 AM   Distal Hub Color/Line Status Red 1/20/2017  1:10 AM   Positive Blood Return (Lateral Site) Yes 1/20/2017  1:10 AM       Peripheral IV 01/18/17 Right Wrist (Active)   Site Assessment Clean, dry, & intact 1/20/2017  1:10 AM   Phlebitis Assessment 0 1/20/2017  1:10 AM   Infiltration Assessment 0 1/20/2017  1:10 AM   Dressing Status Clean, dry, & intact 1/20/2017  1:10 AM   Dressing Type Tape;Transparent 1/20/2017  1:10 AM   Hub Color/Line Status Capped 1/20/2017  1:10 AM        Opportunity for questions and clarification was provided.       Patient transported with:   2 liters oxygen

## 2017-01-20 NOTE — DIABETES MGMT
Pt s/p I&D right foot infection with debridement 1/11/16 is seen by RN LINH for continued diabetes education. Pt now with wound vac to right foot and remains on antibiotics. Right foot debridement planned for today. NPO since midnight. Reviewed with pt current insulin regimen: Lantus dose 12 units daily and Regular insulin per sliding scale. Verbalizes basic understanding. Blood glucose ranged 120-163 yesterday with only Lantus 12 units given (per MAR regular insulin sliding scale was refused by pt yesterday). Blood glucose 205-209 so far today. Attempted to explain to pt importance of good glycemic control for wound healing, but pt not engaging further in conversation. Received 4 units regular insulin this morning per sliding scale. Discussed morning blood glucose readings/NPO for OR  with Dr. Jarrell Pickard. Per Dr. Jarrell Pickard, this morning's dose of Lantus is to be held. Dietitian saw pt yesterday re: poor p.o. Intake. Glucerna has been ordered. Pt practiced using insulin demo pen earlier last week. Pt's goddaughter (who lives with him) was educated re: proper use of insulin pen and able to return demonstrate insulin pen use last week as well. Plan is to continue diabetes education next only as pt is willing to part  Pt has no questions at this time. Nursing will continue to monitor blood glucose.

## 2017-01-20 NOTE — ROUTINE PROCESS
TRANSFER - IN REPORT:    Verbal report received from DEJA Boston  on Leandro Boyle  being received from 6th floor    for ordered procedure      Report consisted of patients Situation, Background, Assessment and   Recommendations(SBAR). Information from the following report(s) SBAR, Kardex, Intake/Output, MAR, Recent Results and Med Rec Status was reviewed with the receiving nurse. Screening Assessment for C Diff:     1. Three (3) or more diarrheal (liquid unformed) stools in less than 24 hours  no     2. If yes, has patient off laxatives for more than 24 hours? NOT APPLICABLE     3. Was a stool specimen sent for C. Difficile toxin A and B?  NOT APPLICABLE     4. Was the patient placed on contact isolation? NOT APPLICABLE    Opportunity for questions and clarification was provided. Assessment completed upon patients arrival to unit and care assumed.

## 2017-01-20 NOTE — BRIEF OP NOTE
BRIEF OPERATIVE NOTE    Date of Procedure: 1/20/2017   Preoperative Diagnosis: Open wound right foot, with PVD, gangrene, necrotizing foot infection and abscess s/p debridement  Postoperative Diagnosis: same with no active abscess  Procedure(s):  RIGHT FOOT DEBRIDEMENT, PREP FOR GRAFTING AND SKIN SUBSTITUTE GRAFT  Surgeon(s) and Role:     * Terry Mobley MD - Primary            Surgical Staff:  Circ-1: Marjan Carrero RN  Scrub Tech-1: Sara Ibarra  Scrub Tech-2: Nato Khan  Event Time In   Incision Start 1315   Incision Close 1342     Anesthesia: General   Estimated Blood Loss: <20 cc  Specimens: * No specimens in log *   Findings: open wound in Patient with non-reconstructable PVD and Type 2 DM s/p debridement of R foot with removal of 5th toe and metatarsal, necrotic tissue debrided including skin, soft tissue and fascia, no further tendon or bone removal.  Transected 5th metatarsal well covered. No tissues that were viable or borderline viable were removed. Excised tissue was frankly necrotic and nonviable. Wound dimensions of 9 (L) x 5 (W) x 7 (D) cm = 108 sq cm. After wound prepped for grafting 6 x 9 cm PurAplyAM placed over entire wound covering exposed area completely. Graft moistened with saline then covered with fenestrated Mepilex transfer and foot dressed with guaze, ABD, Kerlex and light wrap of Coban. Complications: none apparent  Implants:   Implant Name Type Inv. Item Serial No.  Lot No. LRB No. Used Action   MATRIX WND CLLGN 6X9CM W/PHMB -- PURAPLY ANTIMICROBIAL - [id number]   MATRIX WND CLLGN 6X9CM W/PHMB -- PURAPLY ANTIMICROBIAL   [employer] [id number]. 1.1C Right 1 Implanted       Terry Mobley MD

## 2017-01-20 NOTE — ANESTHESIA PREPROCEDURE EVALUATION
Anesthetic History   No history of anesthetic complications            Review of Systems / Medical History  Patient summary reviewed and pertinent labs reviewed    Pulmonary          Smoker (45 pk years)         Neuro/Psych   Within defined limits           Cardiovascular    Hypertension: well controlled      CHF: NYHA Classification II    CAD    Exercise tolerance: >4 METS  Comments: EF 35%   [facility] 12/2015: Nml LM, LAD with \"irregularities\", LCx mod dz  NSTEMI in hx but not mentioned in notes   GI/Hepatic/Renal         Renal disease: CRI and ARF       Endo/Other    Diabetes: well controlled, type 2    Anemia     Other Findings   Comments: Encephalopathy but answered questions appropiately          Physical Exam    Airway  Mallampati: II  TM Distance: 4 - 6 cm  Neck ROM: normal range of motion   Mouth opening: Normal     Cardiovascular  Regular rate and rhythm,  S1 and S2 normal,  no murmur, click, rub, or gallop  Rhythm: regular  Rate: normal         Dental    Dentition: Full upper dentures     Pulmonary      Decreased breath sounds: bilateral           Abdominal  GI exam deferred       Other Findings            Anesthetic Plan    ASA: 3  Anesthesia type: total IV anesthesia      Post-op pain plan if not by surgeon: peripheral nerve block single    Induction: Intravenous  Anesthetic plan and risks discussed with: Patient

## 2017-01-20 NOTE — PROGRESS NOTES
OT Note:    OT attempted to see patient this afternoon for therapy. Pt off the floor for procedure at this time. OT will re-attempt to see patient at a later date/time.     Thanks,  Carisa Villa

## 2017-01-21 LAB
GLUCOSE BLD STRIP.AUTO-MCNC: 159 MG/DL (ref 65–100)
GLUCOSE BLD STRIP.AUTO-MCNC: 180 MG/DL (ref 65–100)
GLUCOSE BLD STRIP.AUTO-MCNC: 210 MG/DL (ref 65–100)

## 2017-01-21 PROCEDURE — 74011250637 HC RX REV CODE- 250/637: Performed by: FAMILY MEDICINE

## 2017-01-21 PROCEDURE — 74011000258 HC RX REV CODE- 258: Performed by: NURSE PRACTITIONER

## 2017-01-21 PROCEDURE — 74011250637 HC RX REV CODE- 250/637: Performed by: INTERNAL MEDICINE

## 2017-01-21 PROCEDURE — 74011250636 HC RX REV CODE- 250/636: Performed by: NURSE PRACTITIONER

## 2017-01-21 PROCEDURE — 82962 GLUCOSE BLOOD TEST: CPT

## 2017-01-21 PROCEDURE — 74011250636 HC RX REV CODE- 250/636: Performed by: SURGERY

## 2017-01-21 PROCEDURE — 74011636637 HC RX REV CODE- 636/637: Performed by: HOSPITALIST

## 2017-01-21 PROCEDURE — 74011636637 HC RX REV CODE- 636/637: Performed by: INTERNAL MEDICINE

## 2017-01-21 PROCEDURE — 65270000029 HC RM PRIVATE

## 2017-01-21 PROCEDURE — 74011250637 HC RX REV CODE- 250/637: Performed by: NURSE PRACTITIONER

## 2017-01-21 RX ORDER — INSULIN GLARGINE 100 [IU]/ML
14 INJECTION, SOLUTION SUBCUTANEOUS DAILY
Status: DISCONTINUED | OUTPATIENT
Start: 2017-01-22 | End: 2017-01-25

## 2017-01-21 RX ADMIN — INSULIN GLARGINE 12 UNITS: 100 INJECTION, SOLUTION SUBCUTANEOUS at 08:07

## 2017-01-21 RX ADMIN — METRONIDAZOLE 500 MG: 500 TABLET ORAL at 22:42

## 2017-01-21 RX ADMIN — HUMAN INSULIN 2 UNITS: 100 INJECTION, SOLUTION SUBCUTANEOUS at 08:07

## 2017-01-21 RX ADMIN — SODIUM CHLORIDE 125 ML/HR: 900 INJECTION, SOLUTION INTRAVENOUS at 12:06

## 2017-01-21 RX ADMIN — METOPROLOL SUCCINATE 100 MG: 100 TABLET, EXTENDED RELEASE ORAL at 08:06

## 2017-01-21 RX ADMIN — Medication 10 ML: at 22:42

## 2017-01-21 RX ADMIN — PANTOPRAZOLE SODIUM 40 MG: 40 TABLET, DELAYED RELEASE ORAL at 07:30

## 2017-01-21 RX ADMIN — HUMAN INSULIN 3 UNITS: 100 INJECTION, SOLUTION SUBCUTANEOUS at 11:30

## 2017-01-21 RX ADMIN — METRONIDAZOLE 500 MG: 500 TABLET ORAL at 18:01

## 2017-01-21 RX ADMIN — TORSEMIDE 40 MG: 20 TABLET ORAL at 08:05

## 2017-01-21 RX ADMIN — HUMAN INSULIN 4 UNITS: 100 INJECTION, SOLUTION SUBCUTANEOUS at 22:41

## 2017-01-21 RX ADMIN — METRONIDAZOLE 500 MG: 500 TABLET ORAL at 08:06

## 2017-01-21 RX ADMIN — ATORVASTATIN CALCIUM 80 MG: 40 TABLET, FILM COATED ORAL at 08:06

## 2017-01-21 RX ADMIN — CEFTRIAXONE 2 G: 2 INJECTION, POWDER, FOR SOLUTION INTRAMUSCULAR; INTRAVENOUS at 18:01

## 2017-01-21 RX ADMIN — AMLODIPINE BESYLATE 10 MG: 10 TABLET ORAL at 08:05

## 2017-01-21 RX ADMIN — SODIUM CHLORIDE 125 ML/HR: 900 INJECTION, SOLUTION INTRAVENOUS at 22:41

## 2017-01-21 RX ADMIN — HUMAN INSULIN 4 UNITS: 100 INJECTION, SOLUTION SUBCUTANEOUS at 18:01

## 2017-01-21 NOTE — PROGRESS NOTES
END OF SHIFT NOTE:  Pt slept most of the night. Using urinal tonight, incont at times. O2 @ 2l NC.  NS @ 125cc/hr via central line. R foot dressing is CDI. INTAKE/OUTPUT  01/20 0701 - 01/21 0700  In: 500 [I.V.:500]  Out: 252 [Urine:250]  Voiding: YES  Catheter: NO  Color: yellow  Drain:   Vacuum Assisted Closure Right Foot (Active)   Vac Status Suction, continuous 1/15/2017  1:04 AM   Suction (mmHg) 50 1/15/2017  1:04 AM   Site Assessment Clean, dry, & intact 1/15/2017  1:04 AM   Dressing Status Clean, dry, & intact 1/15/2017  1:04 AM   Output (ml) 0 ml 1/14/2017  1:41 AM               DIET  diabetic    Flatus: Patient does have flatus present. Stool:  0 occurrences. Characteristics:       Ambulating  NO    Emesis: 0 occurrences.     Characteristics:          VITAL SIGNS  Patient Vitals for the past 12 hrs:   Temp Pulse Resp BP SpO2   01/21/17 0453 98.5 °F (36.9 °C) 98 20 145/74 97 %   01/20/17 1859 98.1 °F (36.7 °C) 100 17 144/86 96 %       Pain Assessment  Pain Intensity 1: 0 (01/20/17 2300)  Pain Location 1: Foot  Pain Intervention(s) 1: Medication (see MAR)  Patient Stated Pain Goal: 0            Melisa Mendoza RN

## 2017-01-21 NOTE — PROGRESS NOTES
Surgery On Call (for Dr. Devon Phillips)  Right foot dressing dry without evidence of strikethrough. The foot pain is unchanged. Plan: Leave dressing in place. Will reevaluate on 1/23/17.   Davidson Benson MD.

## 2017-01-21 NOTE — PROGRESS NOTES
[facility] PROGRESS NOTE    Admit Date:  1/5/2017    Today's Date:  1/21/2017    CC: CKD    Subjective:     Patient went to OR yesterday for debridement. UOP poorly documented. He is sleeping; hardees at bedside. Review of Systems:  ROS was obtained, with pertinent positives as listed above. No chest pain or SOB.       Objective:   Vitals:  Visit Vitals    /81    Pulse (!) 101    Temp 98.5 °F (36.9 °C)    Resp 15    Ht 6' 1\" (1.854 m)    Wt 96.6 kg (212 lb 15.4 oz)    SpO2 95%    BMI 28.1 kg/m2       Intake/Output:     01/19 1901 - 01/21 0700  In: 5831 [I.V.:6678]  Out: 252 [Urine:250]    Medications:   Current Facility-Administered Medications   Medication Dose Route Frequency    torsemide (DEMADEX) tablet 40 mg  40 mg Oral DAILY    sodium hypochlorite (QUARTER STRENGTH DAKIN'S) 0.125% irrigation (bottle)   Topical DAILY    insulin glargine (LANTUS) injection 12 Units  12 Units SubCUTAneous DAILY    0.9% sodium chloride infusion  125 mL/hr IntraVENous CONTINUOUS    sodium chloride (NS) flush 5 mL  5 mL InterCATHeter PRN    pantoprazole (PROTONIX) tablet 40 mg  40 mg Oral ACB    HYDROmorphone (PF) (DILAUDID) injection 2 mg  2 mg IntraVENous PRN    cefTRIAXone (ROCEPHIN) 2 g in 0.9% sodium chloride (MBP/ADV) 50 mL  2 g IntraVENous Q24H    metroNIDAZOLE (FLAGYL) tablet 500 mg  500 mg Oral TID    HYDROmorphone (PF) (DILAUDID) injection 0.5-1 mg  0.5-1 mg IntraVENous Q3H PRN    oxyCODONE IR (ROXICODONE) tablet 5 mg  5 mg Oral Q4H PRN    insulin regular (NOVOLIN R, HUMULIN R) injection   SubCUTAneous AC&HS    amLODIPine (NORVASC) tablet 10 mg  10 mg Oral DAILY    hydrALAZINE (APRESOLINE) 20 mg/mL injection 20 mg  20 mg IntraVENous Q6H PRN    haloperidol lactate (HALDOL) injection 2 mg  2 mg IntraVENous Q4H PRN    0.9% sodium chloride infusion 250 mL  250 mL IntraVENous PRN    diphenhydrAMINE (BENADRYL) injection 25 mg  25 mg IntraVENous Q4H PRN    traMADol (ULTRAM) tablet 50 mg  50 mg Oral Q6H PRN    atorvastatin (LIPITOR) tablet 80 mg  80 mg Oral DAILY    metoprolol succinate (TOPROL-XL) tablet 100 mg  100 mg Oral DAILY WITH BREAKFAST    sodium chloride (NS) flush 5-10 mL  5-10 mL IntraVENous Q8H    sodium chloride (NS) flush 5-10 mL  5-10 mL IntraVENous PRN    acetaminophen (TYLENOL) tablet 650 mg  650 mg Oral Q4H PRN    ondansetron (ZOFRAN) injection 4 mg  4 mg IntraVENous Q4H PRN    docusate sodium (COLACE) capsule 100 mg  100 mg Oral DAILY PRN       Exam:  General appearance: sleeping  Lungs: clear to auscultation bilaterally anteriorly  Heart: regular rate and rhythm  Abdomen: soft, non-tender.  Bowel sounds normal. No masses, no organomegaly  Extremities: 2+ lower extremity edema  Neuro:  alert and oriented    Data Review (Labs):    Recent Labs      01/20/17   0545  01/20/17   0020  01/19/17   1038  01/19/17   0550   WBC  8.9   --   9.0   --    HGB  9.3*  8.8*  6.6*   --    HCT  28.4*   --   21.1*   --    PLT  385   --   354   --    MCV  86.3   --   86.1   --    NA  144   --    --   144   K  3.6   --    --   3.8   CL  113*   --    --   114*   CO2  20*   --    --   20*   BUN  24*   --    --   28*   CREA  2.23*   --    --   2.27*   CA  7.4*   --    --   7.3*   GLU  205*   --    --   120*       Assessment:     Principal Problem:    Type 2 diabetes mellitus with right diabetic foot infection ([facility] (1/5/2017)    Active Problems:    Type 2 diabetes mellitus with hyperglycemia ([facility] (7/29/2012)      HTN (hypertension) (7/29/2012)      Chronic systolic congestive heart failure ([facility] (12/30/2015)      Overview: EF 35-40% on 2015 Echo      Kidney disease, chronic, stage III (moderate, EGFR 30-59 ml/min) (8/11/2016)      Acute renal failure with tubular necrosis (HCC) (1/6/2017)      NSTEMI (non-ST elevated myocardial infarction) ([facility] (1/5/2017)      Sepsis due to cellulitis ([facility] (1/9/2017)      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 -- osteomyelitis s/p debridement  -- anemia  -- edema: continue torsemide 40mg daily     JOE Sandyc     Patient seen and examined with Tiffanie Gibson NP. Plans as above. Will s/o. Please recall as needed. Outpatient f/u arranged.

## 2017-01-21 NOTE — PROGRESS NOTES
Hospitalist Progress Note    Subjective:   Daily Progress Note: 1/21/2017 10:54 AM    Pt is a 74 yo male who presented with progressive pain and poor healing of right foot wound over 1 month, admitted with infected DM foot. PMH includes DM (A1C 9.2 11/2016), CHF, CKD, current smoker. X Ray of right foot had low suspicion for osteomyelitis but does reveal soft tissue lucencies, possible gas producing infection. Surgery and Vascular were consulted. He had bedside debridement on 1/6 and surgical I&D/5th toe amputation 1/12. He was started on Vanc/Zosyn in ER. ID has seen - changed atbx 1/13 to IV Rocephin/PO Flagyl. 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 Will need 6 weeks antbx per ID recs. Also seen by vascular surgery due to abnormal right MAGGIE and poor postoperative wound healing.  Plans for arteriogram 1/18.     1/21: eating better this morning    Current Facility-Administered Medications   Medication Dose Route Frequency    torsemide (DEMADEX) tablet 40 mg  40 mg Oral DAILY    sodium hypochlorite (QUARTER STRENGTH DAKIN'S) 0.125% irrigation (bottle)   Topical DAILY    insulin glargine (LANTUS) injection 12 Units  12 Units SubCUTAneous DAILY    0.9% sodium chloride infusion  125 mL/hr IntraVENous CONTINUOUS    sodium chloride (NS) flush 5 mL  5 mL InterCATHeter PRN    pantoprazole (PROTONIX) tablet 40 mg  40 mg Oral ACB    HYDROmorphone (PF) (DILAUDID) injection 2 mg  2 mg IntraVENous PRN    cefTRIAXone (ROCEPHIN) 2 g in 0.9% sodium chloride (MBP/ADV) 50 mL  2 g IntraVENous Q24H    metroNIDAZOLE (FLAGYL) tablet 500 mg  500 mg Oral TID    HYDROmorphone (PF) (DILAUDID) injection 0.5-1 mg  0.5-1 mg IntraVENous Q3H PRN    oxyCODONE IR (ROXICODONE) tablet 5 mg  5 mg Oral Q4H PRN    insulin regular (NOVOLIN R, HUMULIN R) injection   SubCUTAneous AC&HS    amLODIPine (NORVASC) tablet 10 mg  10 mg Oral DAILY    hydrALAZINE (APRESOLINE) 20 mg/mL injection 20 mg  20 mg IntraVENous Q6H PRN    haloperidol lactate (HALDOL) injection 2 mg  2 mg IntraVENous Q4H PRN    0.9% sodium chloride infusion 250 mL  250 mL IntraVENous PRN    diphenhydrAMINE (BENADRYL) injection 25 mg  25 mg IntraVENous Q4H PRN    traMADol (ULTRAM) tablet 50 mg  50 mg Oral Q6H PRN    atorvastatin (LIPITOR) tablet 80 mg  80 mg Oral DAILY    metoprolol succinate (TOPROL-XL) tablet 100 mg  100 mg Oral DAILY WITH BREAKFAST    sodium chloride (NS) flush 5-10 mL  5-10 mL IntraVENous Q8H    sodium chloride (NS) flush 5-10 mL  5-10 mL IntraVENous PRN    acetaminophen (TYLENOL) tablet 650 mg  650 mg Oral Q4H PRN    ondansetron (ZOFRAN) injection 4 mg  4 mg IntraVENous Q4H PRN    docusate sodium (COLACE) capsule 100 mg  100 mg Oral DAILY PRN        Review of Systems  Pertinent items are noted in HPI. Objective:     Visit Vitals    /81    Pulse (!) 101    Temp 98.5 °F (36.9 °C)    Resp 15    Ht 6' 1\" (1.854 m)    Wt 96.6 kg (212 lb 15.4 oz)    SpO2 95%    BMI 28.1 kg/m2    O2 Flow Rate (L/min): 2 l/min O2 Device: Nasal cannula    Temp (24hrs), Av.3 °F (36.8 °C), Min:98 °F (36.7 °C), Max:98.5 °F (36.9 °C)       07 - 1900  In: 240 [P.O.:240]  Out: -   1901 -  0700  In: 1302 [I.V.:6678]  Out: 252 [Urine:250]    Visit Vitals    /81    Pulse (!) 101    Temp 98.5 °F (36.9 °C)    Resp 15    Ht 6' 1\" (1.854 m)    Wt 96.6 kg (212 lb 15.4 oz)    SpO2 95%    BMI 28.1 kg/m2     General appearance: alert, cooperative, no distress  Lungs: clear to auscultation bilaterally  Heart: regular rate and rhythm  Abdomen: soft, non-tender.  Bowel sounds normal. No masses,  no organomegaly  Extremities: No cyanosis, clubbing or edema, right foot wrapped with dry dressing  Neurologic: Grossly normal    Additional comments:None    Data Review    Recent Results (from the past 24 hour(s))   GLUCOSE, POC    Collection Time: 17  4:04 PM   Result Value Ref Range    Glucose (POC) 143 (H) 65 - 100 mg/dL GLUCOSE, POC    Collection Time: 01/20/17  8:26 PM   Result Value Ref Range    Glucose (POC) 141 (H) 65 - 100 mg/dL   GLUCOSE, POC    Collection Time: 01/21/17  7:34 AM   Result Value Ref Range    Glucose (POC) 159 (H) 65 - 100 mg/dL         Assessment/Plan:     Principal Problem:    Type 2 diabetes mellitus with right diabetic foot infection ([address] (1/5/2017)    Active Problems:    Type 2 diabetes mellitus with hyperglycemia ([address] (7/29/2012)      HTN (hypertension) (7/29/2012)      Chronic systolic congestive heart failure ([address] (12/30/2015)      Overview: EF 35-40% on 2015 Echo      Kidney disease, chronic, stage III (moderate, EGFR 30-59 ml/min) (8/11/2016)      Acute renal failure with tubular necrosis ([address] (1/6/2017)      NSTEMI (non-ST elevated myocardial infarction) ([facility] (1/5/2017)      Sepsis due to cellulitis ([facility] (1/9/2017)      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 Did debridement at bedside 1/6 and amputation of right 5th toe on 1/12. Cont Rocephin/Flagyl - will need 6weeks atbx per ID (EOT 2/22). Wound cx Beta Strep/Staph Ludgenesis. R IJ Tunneled Cath placed 1/17. OR debridement 1/20.  - PAD - abnormal RLE MAGGIE. Vasc following - arteriogram 1/18   - NSTEMI vs demand mismatch- peaked at 0.61. No typical symptoms. Some elevation probably from ARF. Already on medical management, BB, ASA, statin. Continue to hold ACE, diuretics due to ARF. Cardiology follow up at [facility] on DC  - ARF -  Slow improvement. Nephrology following  - Anemia - of chronic disease. 1/19 Hb 6.6-transfuse 2U, no signs of bleeding. 1/20 Hb 9.3.   - HTN - cont current regimen. Holding ACE, diuretics  - DM -titrating lantus for tighter control  - CHF - stable, monitor closely. Holding diuretics due to ARF    1/21: He is eating better. CM working on [place] transfer.     Care Plan discussed with: Patient/Family    Total time spent with patient: 25 minutes.     Signed By: Rian Sheppard MD     January 21, 2017

## 2017-01-22 LAB
ANION GAP BLD CALC-SCNC: 9 MMOL/L (ref 7–16)
BASOPHILS # BLD AUTO: 0 K/UL (ref 0–0.2)
BASOPHILS # BLD: 1 % (ref 0–2)
BUN SERPL-MCNC: 16 MG/DL (ref 8–23)
CALCIUM SERPL-MCNC: 7.5 MG/DL (ref 8.3–10.4)
CHLORIDE SERPL-SCNC: 112 MMOL/L (ref 98–107)
CO2 SERPL-SCNC: 24 MMOL/L (ref 21–32)
CREAT SERPL-MCNC: 1.96 MG/DL (ref 0.8–1.5)
DIFFERENTIAL METHOD BLD: ABNORMAL
EOSINOPHIL # BLD: 0.3 K/UL (ref 0–0.8)
EOSINOPHIL NFR BLD: 3 % (ref 0.5–7.8)
ERYTHROCYTE [DISTWIDTH] IN BLOOD BY AUTOMATED COUNT: 15.4 % (ref 11.9–14.6)
GLUCOSE BLD STRIP.AUTO-MCNC: 167 MG/DL (ref 65–100)
GLUCOSE BLD STRIP.AUTO-MCNC: 230 MG/DL (ref 65–100)
GLUCOSE BLD STRIP.AUTO-MCNC: 254 MG/DL (ref 65–100)
GLUCOSE BLD STRIP.AUTO-MCNC: 74 MG/DL (ref 65–100)
GLUCOSE SERPL-MCNC: 78 MG/DL (ref 65–100)
HCT VFR BLD AUTO: 26.9 % (ref 41.1–50.3)
HGB BLD-MCNC: 8.6 G/DL (ref 13.6–17.2)
IMM GRANULOCYTES # BLD: 0 K/UL (ref 0–0.5)
IMM GRANULOCYTES NFR BLD AUTO: 0.2 % (ref 0–5)
LYMPHOCYTES # BLD AUTO: 15 % (ref 13–44)
LYMPHOCYTES # BLD: 1.3 K/UL (ref 0.5–4.6)
MCH RBC QN AUTO: 27.7 PG (ref 26.1–32.9)
MCHC RBC AUTO-ENTMCNC: 32 G/DL (ref 31.4–35)
MCV RBC AUTO: 86.5 FL (ref 79.6–97.8)
MONOCYTES # BLD: 0.5 K/UL (ref 0.1–1.3)
MONOCYTES NFR BLD AUTO: 6 % (ref 4–12)
NEUTS SEG # BLD: 6.4 K/UL (ref 1.7–8.2)
NEUTS SEG NFR BLD AUTO: 75 % (ref 43–78)
PLATELET # BLD AUTO: 374 K/UL (ref 150–450)
PMV BLD AUTO: 10.7 FL (ref 10.8–14.1)
POTASSIUM SERPL-SCNC: 3.4 MMOL/L (ref 3.5–5.1)
RBC # BLD AUTO: 3.11 M/UL (ref 4.23–5.67)
SODIUM SERPL-SCNC: 145 MMOL/L (ref 136–145)
WBC # BLD AUTO: 8.5 K/UL (ref 4.3–11.1)

## 2017-01-22 PROCEDURE — 74011250637 HC RX REV CODE- 250/637: Performed by: INTERNAL MEDICINE

## 2017-01-22 PROCEDURE — 85025 COMPLETE CBC W/AUTO DIFF WBC: CPT | Performed by: INTERNAL MEDICINE

## 2017-01-22 PROCEDURE — 74011000250 HC RX REV CODE- 250: Performed by: INTERNAL MEDICINE

## 2017-01-22 PROCEDURE — 74011000258 HC RX REV CODE- 258: Performed by: NURSE PRACTITIONER

## 2017-01-22 PROCEDURE — 74011250637 HC RX REV CODE- 250/637: Performed by: FAMILY MEDICINE

## 2017-01-22 PROCEDURE — 74011250636 HC RX REV CODE- 250/636: Performed by: INTERNAL MEDICINE

## 2017-01-22 PROCEDURE — 82962 GLUCOSE BLOOD TEST: CPT

## 2017-01-22 PROCEDURE — 74011250636 HC RX REV CODE- 250/636: Performed by: NURSE PRACTITIONER

## 2017-01-22 PROCEDURE — 80048 BASIC METABOLIC PNL TOTAL CA: CPT | Performed by: INTERNAL MEDICINE

## 2017-01-22 PROCEDURE — 74011250637 HC RX REV CODE- 250/637: Performed by: NURSE PRACTITIONER

## 2017-01-22 PROCEDURE — 74011250636 HC RX REV CODE- 250/636: Performed by: SURGERY

## 2017-01-22 PROCEDURE — 65270000029 HC RM PRIVATE

## 2017-01-22 PROCEDURE — 74011636637 HC RX REV CODE- 636/637: Performed by: INTERNAL MEDICINE

## 2017-01-22 PROCEDURE — 74011636637 HC RX REV CODE- 636/637: Performed by: HOSPITALIST

## 2017-01-22 RX ADMIN — DAKIN'S SOLUTION 0.125% (QUARTER STRENGTH): 0.12 SOLUTION at 09:00

## 2017-01-22 RX ADMIN — CEFTRIAXONE 2 G: 2 INJECTION, POWDER, FOR SOLUTION INTRAMUSCULAR; INTRAVENOUS at 18:00

## 2017-01-22 RX ADMIN — HYDROMORPHONE HYDROCHLORIDE 1 MG: 1 INJECTION, SOLUTION INTRAMUSCULAR; INTRAVENOUS; SUBCUTANEOUS at 01:24

## 2017-01-22 RX ADMIN — ONDANSETRON 4 MG: 2 INJECTION INTRAMUSCULAR; INTRAVENOUS at 01:35

## 2017-01-22 RX ADMIN — HUMAN INSULIN 4 UNITS: 100 INJECTION, SOLUTION SUBCUTANEOUS at 16:30

## 2017-01-22 RX ADMIN — TORSEMIDE 40 MG: 20 TABLET ORAL at 09:32

## 2017-01-22 RX ADMIN — ATORVASTATIN CALCIUM 80 MG: 40 TABLET, FILM COATED ORAL at 09:32

## 2017-01-22 RX ADMIN — METRONIDAZOLE 500 MG: 500 TABLET ORAL at 15:48

## 2017-01-22 RX ADMIN — AMLODIPINE BESYLATE 10 MG: 10 TABLET ORAL at 09:32

## 2017-01-22 RX ADMIN — Medication 10 ML: at 17:07

## 2017-01-22 RX ADMIN — HUMAN INSULIN 2 UNITS: 100 INJECTION, SOLUTION SUBCUTANEOUS at 11:30

## 2017-01-22 RX ADMIN — INSULIN GLARGINE 14 UNITS: 100 INJECTION, SOLUTION SUBCUTANEOUS at 09:40

## 2017-01-22 RX ADMIN — OXYCODONE HYDROCHLORIDE 5 MG: 5 TABLET ORAL at 09:49

## 2017-01-22 RX ADMIN — HUMAN INSULIN 5 UNITS: 100 INJECTION, SOLUTION SUBCUTANEOUS at 22:00

## 2017-01-22 RX ADMIN — PANTOPRAZOLE SODIUM 40 MG: 40 TABLET, DELAYED RELEASE ORAL at 05:32

## 2017-01-22 RX ADMIN — METOPROLOL SUCCINATE 100 MG: 100 TABLET, EXTENDED RELEASE ORAL at 09:32

## 2017-01-22 RX ADMIN — METRONIDAZOLE 500 MG: 500 TABLET ORAL at 23:37

## 2017-01-22 RX ADMIN — METRONIDAZOLE 500 MG: 500 TABLET ORAL at 09:32

## 2017-01-22 RX ADMIN — OXYCODONE HYDROCHLORIDE 5 MG: 5 TABLET ORAL at 23:37

## 2017-01-22 RX ADMIN — SODIUM CHLORIDE: 9 INJECTION INTRAMUSCULAR; INTRAVENOUS; SUBCUTANEOUS at 18:00

## 2017-01-22 NOTE — PROGRESS NOTES
Hospitalist Progress Note    Subjective:   Daily Progress Note: 1/22/2017 10:17 AM    Pt is a 72 yo male who presented with progressive pain and poor healing of right foot wound over 1 month, admitted with infected DM foot. PMH includes DM (A1C 9.2 11/2016), CHF, CKD, current smoker. X Ray of right foot had low suspicion for osteomyelitis but does reveal soft tissue lucencies, possible gas producing infection. Surgery and Vascular were consulted. He had bedside debridement on 1/6 and surgical I&D/5th toe amputation 1/12. He was started on Vanc/Zosyn in ER. ID has seen - changed atbx 1/13 to IV Rocephin/PO Flagyl. 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 Will need 6 weeks antbx per ID recs. Also seen by vascular surgery due to abnormal right MAGGIE and poor postoperative wound healing.      1/22: improving appetite. No new problems overnight.     Current Facility-Administered Medications   Medication Dose Route Frequency    insulin glargine (LANTUS) injection 14 Units  14 Units SubCUTAneous DAILY    torsemide (DEMADEX) tablet 40 mg  40 mg Oral DAILY    sodium hypochlorite (QUARTER STRENGTH DAKIN'S) 0.125% irrigation (bottle)   Topical DAILY    0.9% sodium chloride infusion  125 mL/hr IntraVENous CONTINUOUS    sodium chloride (NS) flush 5 mL  5 mL InterCATHeter PRN    pantoprazole (PROTONIX) tablet 40 mg  40 mg Oral ACB    HYDROmorphone (PF) (DILAUDID) injection 2 mg  2 mg IntraVENous PRN    cefTRIAXone (ROCEPHIN) 2 g in 0.9% sodium chloride (MBP/ADV) 50 mL  2 g IntraVENous Q24H    metroNIDAZOLE (FLAGYL) tablet 500 mg  500 mg Oral TID    HYDROmorphone (PF) (DILAUDID) injection 0.5-1 mg  0.5-1 mg IntraVENous Q3H PRN    oxyCODONE IR (ROXICODONE) tablet 5 mg  5 mg Oral Q4H PRN    insulin regular (NOVOLIN R, HUMULIN R) injection   SubCUTAneous AC&HS    amLODIPine (NORVASC) tablet 10 mg  10 mg Oral DAILY    hydrALAZINE (APRESOLINE) 20 mg/mL injection 20 mg  20 mg IntraVENous Q6H PRN    haloperidol lactate (HALDOL) injection 2 mg  2 mg IntraVENous Q4H PRN    0.9% sodium chloride infusion 250 mL  250 mL IntraVENous PRN    diphenhydrAMINE (BENADRYL) injection 25 mg  25 mg IntraVENous Q4H PRN    traMADol (ULTRAM) tablet 50 mg  50 mg Oral Q6H PRN    atorvastatin (LIPITOR) tablet 80 mg  80 mg Oral DAILY    metoprolol succinate (TOPROL-XL) tablet 100 mg  100 mg Oral DAILY WITH BREAKFAST    sodium chloride (NS) flush 5-10 mL  5-10 mL IntraVENous Q8H    sodium chloride (NS) flush 5-10 mL  5-10 mL IntraVENous PRN    acetaminophen (TYLENOL) tablet 650 mg  650 mg Oral Q4H PRN    ondansetron (ZOFRAN) injection 4 mg  4 mg IntraVENous Q4H PRN    docusate sodium (COLACE) capsule 100 mg  100 mg Oral DAILY PRN        Review of Systems  Pertinent items are noted in HPI. Objective:     Visit Vitals    /64    Pulse 85    Temp 98.9 °F (37.2 °C)    Resp 18    Ht 6' 1\" (1.854 m)    Wt 115.9 kg (255 lb 8 oz)    SpO2 95%    BMI 33.71 kg/m2    O2 Flow Rate (L/min): 2 l/min O2 Device: Nasal cannula    Temp (24hrs), Av.3 °F (36.8 °C), Min:97.3 °F (36.3 °C), Max:98.9 °F (37.2 °C)          1901 -  0700  In: 2937 [P.O.:480; I.V.:2457]  Out: -     Visit Vitals    /64    Pulse 85    Temp 98.9 °F (37.2 °C)    Resp 18    Ht 6' 1\" (1.854 m)    Wt 115.9 kg (255 lb 8 oz)    SpO2 95%    BMI 33.71 kg/m2     General appearance: alert, cooperative, no distress  Lungs: clear to auscultation bilaterally  Heart: regular rate and rhythm  Abdomen: soft, non-tender.  Bowel sounds normal. No masses,  no organomegaly  Extremities: No cyanosis, clubbing or edema, right foot wrapped with dry dressing  Neurologic: Grossly normal    Additional comments:None    Data Review    Recent Results (from the past 24 hour(s))   GLUCOSE, POC    Collection Time: 17 11:44 AM   Result Value Ref Range    Glucose (POC) 180 (H) 65 - 100 mg/dL   GLUCOSE, POC    Collection Time: 17  5:40 PM   Result Value Ref Range Glucose (POC) 210 (H) 65 - 066 mg/dL   METABOLIC PANEL, BASIC    Collection Time: 01/22/17  5:46 AM   Result Value Ref Range    Sodium 145 136 - 145 mmol/L    Potassium 3.4 (L) 3.5 - 5.1 mmol/L    Chloride 112 (H) 98 - 107 mmol/L    CO2 24 21 - 32 mmol/L    Anion gap 9 7 - 16 mmol/L    Glucose 78 65 - 100 mg/dL    BUN 16 8 - 23 MG/DL    Creatinine 1.96 (H) 0.8 - 1.5 MG/DL    GFR est AA 44 (L) >60 ml/min/1.73m2    GFR est non-AA 36 (L) >60 ml/min/1.73m2    Calcium 7.5 (L) 8.3 - 10.4 MG/DL   CBC WITH AUTOMATED DIFF    Collection Time: 01/22/17  5:46 AM   Result Value Ref Range    WBC 8.5 4.3 - 11.1 K/uL    RBC 3.11 (L) 4.23 - 5.67 M/uL    HGB 8.6 (L) 13.6 - 17.2 g/dL    HCT 26.9 (L) 41.1 - 50.3 %    MCV 86.5 79.6 - 97.8 FL    MCH 27.7 26.1 - 32.9 PG    MCHC 32.0 31.4 - 35.0 g/dL    RDW 15.4 (H) 11.9 - 14.6 %    PLATELET 113 170 - 036 K/uL    MPV 10.7 (L) 10.8 - 14.1 FL    DF AUTOMATED      NEUTROPHILS 75 43 - 78 %    LYMPHOCYTES 15 13 - 44 %    MONOCYTES 6 4.0 - 12.0 %    EOSINOPHILS 3 0.5 - 7.8 %    BASOPHILS 1 0.0 - 2.0 %    IMMATURE GRANULOCYTES 0.2 0.0 - 5.0 %    ABS. NEUTROPHILS 6.4 1.7 - 8.2 K/UL    ABS. LYMPHOCYTES 1.3 0.5 - 4.6 K/UL    ABS. MONOCYTES 0.5 0.1 - 1.3 K/UL    ABS. EOSINOPHILS 0.3 0.0 - 0.8 K/UL    ABS. BASOPHILS 0.0 0.0 - 0.2 K/UL    ABS. IMM.  GRANS. 0.0 0.0 - 0.5 K/UL   GLUCOSE, POC    Collection Time: 01/22/17  7:25 AM   Result Value Ref Range    Glucose (POC) 74 65 - 100 mg/dL         Assessment/Plan:     Principal Problem:    Type 2 diabetes mellitus with right diabetic foot infection ([facility] (1/5/2017)    Active Problems:    Type 2 diabetes mellitus with hyperglycemia ([facility] (7/29/2012)      HTN (hypertension) (7/29/2012)      Chronic systolic congestive heart failure ([facility] (12/30/2015)      Overview: EF 35-40% on 2015 Echo      Kidney disease, chronic, stage III (moderate, EGFR 30-59 ml/min) (8/11/2016)      Acute renal failure with tubular necrosis ([facility] (1/6/2017)      NSTEMI (non-ST elevated myocardial infarction) ([facility] [address] (1/5/2017)      Sepsis due to cellulitis ([facility] (1/9/2017)      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 Did debridement at bedside 1/6 and amputation of right 5th toe on 1/12. Cont Rocephin/Flagyl - will need 6weeks atbx per ID (EOT 2/22). Wound cx Beta Strep/Staph Ludgenesis. R IJ Tunneled Cath placed 1/17. OR debridement planned for 1/20.  - PAD - abnormal RLE MAGGIE. Vasc following - arteriogram 1/18   - NSTEMI vs demand mismatch- peaked at 0.61. No typical symptoms. Some elevation probably from ARF. Already on medical management, BB, ASA, statin. Continue to hold ACE, diuretics due to ARF. Cardiology follow up at [facility] on [address] improving. Nephrology following  - Anemia - of chronic disease. 1/19 Hb 6.6-transfuse 2U and check stool hemoccult. - HTN - cont current regimen. Holding ACE, diuretics  - DM -titrating lantus for tighter control  - CHF - stable, monitor closely. Holding diuretics due to ARF      Dispo: [facility] transfer is planned      Care Plan discussed with: Patient/Family    Total time spent with patient: 25 minutes.     Signed By: Emma Street MD     January 22, 2017

## 2017-01-23 LAB
ALBUMIN SERPL BCP-MCNC: 1.2 G/DL (ref 3.2–4.6)
ALBUMIN/GLOB SERPL: 0.2 {RATIO} (ref 1.2–3.5)
ALP SERPL-CCNC: 56 U/L (ref 50–136)
ALT SERPL-CCNC: 12 U/L (ref 12–65)
ANION GAP BLD CALC-SCNC: 11 MMOL/L (ref 7–16)
AST SERPL W P-5'-P-CCNC: 21 U/L (ref 15–37)
BASOPHILS # BLD AUTO: 0.1 K/UL (ref 0–0.2)
BASOPHILS # BLD: 1 % (ref 0–2)
BILIRUB DIRECT SERPL-MCNC: <0.1 MG/DL
BILIRUB SERPL-MCNC: 0.2 MG/DL (ref 0.2–1.1)
BUN SERPL-MCNC: 18 MG/DL (ref 8–23)
CALCIUM SERPL-MCNC: 7.5 MG/DL (ref 8.3–10.4)
CHLORIDE SERPL-SCNC: 110 MMOL/L (ref 98–107)
CO2 SERPL-SCNC: 24 MMOL/L (ref 21–32)
CREAT SERPL-MCNC: 2.14 MG/DL (ref 0.8–1.5)
CREAT SERPL-MCNC: 2.2 MG/DL (ref 0.8–1.5)
CRP SERPL-MCNC: 5.5 MG/DL (ref 0–0.9)
DIFFERENTIAL METHOD BLD: ABNORMAL
EOSINOPHIL # BLD: 0.3 K/UL (ref 0–0.8)
EOSINOPHIL NFR BLD: 4 % (ref 0.5–7.8)
ERYTHROCYTE [DISTWIDTH] IN BLOOD BY AUTOMATED COUNT: 15.4 % (ref 11.9–14.6)
ERYTHROCYTE [SEDIMENTATION RATE] IN BLOOD: 120 MM/HR (ref 0–30)
GLOBULIN SER CALC-MCNC: 5 G/DL (ref 2.3–3.5)
GLUCOSE BLD STRIP.AUTO-MCNC: 126 MG/DL (ref 65–100)
GLUCOSE BLD STRIP.AUTO-MCNC: 132 MG/DL (ref 65–100)
GLUCOSE BLD STRIP.AUTO-MCNC: 149 MG/DL (ref 65–100)
GLUCOSE BLD STRIP.AUTO-MCNC: 154 MG/DL (ref 65–100)
GLUCOSE SERPL-MCNC: 111 MG/DL (ref 65–100)
HCT VFR BLD AUTO: 25.9 % (ref 41.1–50.3)
HGB BLD-MCNC: 8.2 G/DL (ref 13.6–17.2)
IMM GRANULOCYTES # BLD: 0 K/UL (ref 0–0.5)
IMM GRANULOCYTES NFR BLD AUTO: 0.3 % (ref 0–5)
LYMPHOCYTES # BLD AUTO: 14 % (ref 13–44)
LYMPHOCYTES # BLD: 1 K/UL (ref 0.5–4.6)
MCH RBC QN AUTO: 27.7 PG (ref 26.1–32.9)
MCHC RBC AUTO-ENTMCNC: 31.7 G/DL (ref 31.4–35)
MCV RBC AUTO: 87.5 FL (ref 79.6–97.8)
MONOCYTES # BLD: 0.5 K/UL (ref 0.1–1.3)
MONOCYTES NFR BLD AUTO: 8 % (ref 4–12)
NEUTS SEG # BLD: 5 K/UL (ref 1.7–8.2)
NEUTS SEG NFR BLD AUTO: 73 % (ref 43–78)
PLATELET # BLD AUTO: 342 K/UL (ref 150–450)
PMV BLD AUTO: 10.5 FL (ref 10.8–14.1)
POTASSIUM SERPL-SCNC: 3.4 MMOL/L (ref 3.5–5.1)
PROT SERPL-MCNC: 6.2 G/DL (ref 6.3–8.2)
RBC # BLD AUTO: 2.96 M/UL (ref 4.23–5.67)
SODIUM SERPL-SCNC: 145 MMOL/L (ref 136–145)
WBC # BLD AUTO: 6.8 K/UL (ref 4.3–11.1)

## 2017-01-23 PROCEDURE — 74011250636 HC RX REV CODE- 250/636: Performed by: NURSE PRACTITIONER

## 2017-01-23 PROCEDURE — 65270000029 HC RM PRIVATE

## 2017-01-23 PROCEDURE — 74011250637 HC RX REV CODE- 250/637: Performed by: FAMILY MEDICINE

## 2017-01-23 PROCEDURE — 74011250637 HC RX REV CODE- 250/637: Performed by: INTERNAL MEDICINE

## 2017-01-23 PROCEDURE — 74011250637 HC RX REV CODE- 250/637: Performed by: NURSE PRACTITIONER

## 2017-01-23 PROCEDURE — 74011636637 HC RX REV CODE- 636/637: Performed by: HOSPITALIST

## 2017-01-23 PROCEDURE — 93306 TTE W/DOPPLER COMPLETE: CPT

## 2017-01-23 PROCEDURE — 80048 BASIC METABOLIC PNL TOTAL CA: CPT | Performed by: SURGERY

## 2017-01-23 PROCEDURE — 74011000258 HC RX REV CODE- 258: Performed by: NURSE PRACTITIONER

## 2017-01-23 PROCEDURE — 86140 C-REACTIVE PROTEIN: CPT | Performed by: SURGERY

## 2017-01-23 PROCEDURE — 97530 THERAPEUTIC ACTIVITIES: CPT

## 2017-01-23 PROCEDURE — 82962 GLUCOSE BLOOD TEST: CPT

## 2017-01-23 PROCEDURE — 85652 RBC SED RATE AUTOMATED: CPT | Performed by: SURGERY

## 2017-01-23 PROCEDURE — 80076 HEPATIC FUNCTION PANEL: CPT | Performed by: SURGERY

## 2017-01-23 PROCEDURE — 36415 COLL VENOUS BLD VENIPUNCTURE: CPT | Performed by: HOSPITALIST

## 2017-01-23 PROCEDURE — 85025 COMPLETE CBC W/AUTO DIFF WBC: CPT | Performed by: SURGERY

## 2017-01-23 RX ORDER — AMLODIPINE BESYLATE 5 MG/1
5 TABLET ORAL DAILY
Status: DISCONTINUED | OUTPATIENT
Start: 2017-01-24 | End: 2017-02-02 | Stop reason: HOSPADM

## 2017-01-23 RX ORDER — HYDRALAZINE HYDROCHLORIDE 25 MG/1
25 TABLET, FILM COATED ORAL 3 TIMES DAILY
Status: DISCONTINUED | OUTPATIENT
Start: 2017-01-23 | End: 2017-02-02 | Stop reason: HOSPADM

## 2017-01-23 RX ORDER — INSULIN LISPRO 100 [IU]/ML
INJECTION, SOLUTION INTRAVENOUS; SUBCUTANEOUS
Status: DISCONTINUED | OUTPATIENT
Start: 2017-01-23 | End: 2017-01-30

## 2017-01-23 RX ORDER — ISOSORBIDE DINITRATE 10 MG/1
10 TABLET ORAL 3 TIMES DAILY
Status: DISCONTINUED | OUTPATIENT
Start: 2017-01-23 | End: 2017-02-02 | Stop reason: HOSPADM

## 2017-01-23 RX ORDER — INSULIN LISPRO 100 [IU]/ML
5 INJECTION, SOLUTION INTRAVENOUS; SUBCUTANEOUS
Status: DISCONTINUED | OUTPATIENT
Start: 2017-01-23 | End: 2017-01-30

## 2017-01-23 RX ADMIN — ISOSORBIDE DINITRATE 10 MG: 10 TABLET ORAL at 21:40

## 2017-01-23 RX ADMIN — AMLODIPINE BESYLATE 10 MG: 10 TABLET ORAL at 09:19

## 2017-01-23 RX ADMIN — METRONIDAZOLE 500 MG: 500 TABLET ORAL at 17:56

## 2017-01-23 RX ADMIN — ATORVASTATIN CALCIUM 80 MG: 40 TABLET, FILM COATED ORAL at 09:20

## 2017-01-23 RX ADMIN — TORSEMIDE 40 MG: 20 TABLET ORAL at 09:20

## 2017-01-23 RX ADMIN — OXYCODONE HYDROCHLORIDE 5 MG: 5 TABLET ORAL at 14:23

## 2017-01-23 RX ADMIN — METRONIDAZOLE 500 MG: 500 TABLET ORAL at 21:40

## 2017-01-23 RX ADMIN — Medication 5 ML: at 00:18

## 2017-01-23 RX ADMIN — CEFTRIAXONE 2 G: 2 INJECTION, POWDER, FOR SOLUTION INTRAMUSCULAR; INTRAVENOUS at 17:56

## 2017-01-23 RX ADMIN — INSULIN LISPRO 5 UNITS: 100 INJECTION, SOLUTION INTRAVENOUS; SUBCUTANEOUS at 18:02

## 2017-01-23 RX ADMIN — PANTOPRAZOLE SODIUM 40 MG: 40 TABLET, DELAYED RELEASE ORAL at 05:46

## 2017-01-23 RX ADMIN — HYDRALAZINE HYDROCHLORIDE 25 MG: 25 TABLET ORAL at 17:56

## 2017-01-23 RX ADMIN — HYDRALAZINE HYDROCHLORIDE 25 MG: 25 TABLET ORAL at 21:40

## 2017-01-23 RX ADMIN — INSULIN GLARGINE 14 UNITS: 100 INJECTION, SOLUTION SUBCUTANEOUS at 09:21

## 2017-01-23 RX ADMIN — ISOSORBIDE DINITRATE 10 MG: 10 TABLET ORAL at 17:56

## 2017-01-23 RX ADMIN — METOPROLOL SUCCINATE 100 MG: 100 TABLET, EXTENDED RELEASE ORAL at 09:20

## 2017-01-23 RX ADMIN — Medication 10 ML: at 21:43

## 2017-01-23 RX ADMIN — Medication 10 ML: at 14:28

## 2017-01-23 RX ADMIN — METRONIDAZOLE 500 MG: 500 TABLET ORAL at 09:20

## 2017-01-23 NOTE — PROGRESS NOTES
Problem: Mobility Impaired (Adult and Pediatric)  Goal: *Acute Goals and Plan of Care (Insert Text)  Goals reviewed and updated 17  ST - 3 days  1. Pt will roll and get to EOB with minimal assist.  2.  Pt will sit edge of bed without falling over x 10 minutes for functional activity and exercise. 3.  Pt will go sit to stand and transfer to chair with heel touch WB with moderate assist.  4.  Pt will ambulate 5' with heel touch WB and rolling walker and moderate assist.  5.  Pt will perform LE exercises with minimal assist.    LTG: 3- 7 days  1. Pt will roll and get to EOB independent. 3.  Pt will go sit to stand and transfer to chair with heel touch WB with min assist and rolling walker. 4.  Pt will ambulate 10' with heel touch WB and rolling walker and minimal assist.  5.  Pt will perform LE exercises with verbal cues. PHYSICAL THERAPY: Daily Note, Treatment Day: 3rd and AM 2017  INPATIENT: Hospital Day:   Payor: CARE IMPROVEMENT PLUS / [place] / Product Type: Managed Care Medicare /      NAME/AGE/GENDER: Rin Salamanca is a 73 y.o. male            PRIMARY DIAGNOSIS: Gangrene of toe ([address] Jeimy Wan  Diabetic foot ulcer associated with type 1 diabetes mellitus, unspecified laterality ([facility] [M79.689, L97.509]  Ulcer of right foot, with unspecified severity ([address] [L97.519] Type 2 diabetes mellitus with right diabetic foot infection ([facility] Type 2 diabetes mellitus with right diabetic foot infection ([address]  Procedure(s) (LRB):  RIGHT FOOT DEBRIDEMENT  ,PREP FOR GRAFTING AND SKIN SUBSTITUTE GRAFT (Right)  3 Days Post-Op  ICD-10: Treatment Diagnosis: Generalized Muscle Weakness (M62.81)  Other lack of cordination (R27.8)  Difficulty in walking, Not elsewhere classified (R26.2)  Precautions/Allergies:   Lortab [hydrocodone-acetaminophen]       ASSESSMENT:      Mr. Yusra Goss presents in supine without complaints and is agreeable to therapy treatment.  He seems much more alert and participatory and demonstrates improved mobility without delays as noted in previous treatment sessions. Transfers to sitting with CGA and min additional time. Reviewed R LE NWB status with pt and explained rationale for wound healing and protection. Provided many visual/verbal cues on gait mechanics with rolling walker. Pt performs sit-stand transfers twice with mod assist and bed height elevated. Has difficulty maintaining NWB on right despite multiple cues. Did take 2-3 steps over to chair but with increased weight bearing on R foot/heel so unsafe to attempt further mobility. Tried to encourage pt to perform a few seated exercises for strengthening however he needs max additional time and cues for this and would only do a few. Overall good progress noted with participation and mobility however pt with difficulty maintaining R NWB and will need continued therapy for safety. This section established at most recent assessment   PROBLEM LIST (Impairments causing functional limitations):  1. Decreased Strength  2. Decreased ADL/Functional Activities  3. Decreased Transfer Abilities  4. Decreased Ambulation Ability/Technique  5. Decreased Balance  6. Increased Pain  7. Decreased Activity Tolerance  8. Decreased [place] with Home Exercise Program  9. Decreased Cognition    INTERVENTIONS PLANNED: (Benefits and precautions of physical therapy have been discussed with the patient.)  1. Balance Exercise  2. Bed Mobility  3. Family Education  4. Gait Training  5. Home Exercise Program (HEP)  6. Range of Motion (ROM)  7. Therapeutic Activites  8. Therapeutic Exercise/Strengthening  9. Transfer Training      TREATMENT PLAN: Frequency/Duration: 3-4 times per weel for duration of hospital stay  Rehabilitation Potential For Stated Goals: Cherri Cruise REHABILITATION/EQUIPMENT: (at time of discharge pending progress): Continue Skilled Therapy and Rehab.                    HISTORY:   History of Present Injury/Illness (Reason for Referral):  Pt is a 72 yo male who presents to ER this evening with progressive pain and poor healing of right foot wound. He reports site started as small blister maybe from a shoe about a month ago but has not improved and recently pain has worsened is now radiating up his leg. He also reports uanble to bear weight on foot in past day or so. He has not been on abx for this but states it was seen by a provider at [address] Other PMH includes DM which appears uncontrolled (A1C 9.2 11/2016), CHF, CKD, current smoker. X Ray of right foot has low suspicion for osteomyelitis but does reveal soft tissue lucencies, possible gas producing infection. WBC 18.7, LA 2.2. He has been started on Vanc/Zosyn in ER. Also noted to have MO with Cr 2.99, baseline 1.90. Pt denies any med changes and reports compliance with current med regimen. He denies recent or current CP, SOB, abd pain, fevers  Past Medical History/Comorbidities:   Mr. Siomara Batres  has a past medical history of Abnormal CT scan, chest (12/27/2015); Acute CHF ([address] (12/26/2015); Acute systolic congestive heart failure ([address] (12/30/2015); MO (acute kidney injury) ([address] (7/29/2012); Bilateral pleural effusion (12/27/2015); Chest pain (12/26/2015); DM (diabetes mellitus), type 2 ([address] (7/29/2012); Encephalopathy due to infection (1/7/2017); HTN (hypertension) (7/29/2012); Hypoglycemia (7/29/2012); NSTEMI (non-ST elevated myocardial infarction) ([address] (1/5/2017); and Tobacco use (12/27/2015). He also has no past medical history of Arthritis; Asthma; Autoimmune disease ([address] Cancer ([address] COPD; DEMENTIA; Gastrointestinal disorder; Liver disease; Other ill-defined conditions(799.89); Psychiatric disorder; PUD (peptic ulcer disease); Seizures ([address] Sleep disorder; Stroke [facility]); or Thromboembolus ([facility] Mr. Siomara Batres  has a past surgical history that includes orthopaedic.   Social History/Living Environment:   Home Environment: Private residence  # Steps to Enter: 4  One/Two Story Residence: One story  Living Alone: No  Support Systems: Child(elpidio), Family member(s)  Patient Expects to be Discharged to[de-identified] Rehabilitation facility  Current DME Used/Available at Home: None  Tub or Shower Type: Shower  Prior Level of Function/Work/Activity:  Pt states he was independent PTA. Would sit on his front porch everyday and \"entertain\".   Current Medications:           Current Facility-Administered Medications:     [START ON 1/24/2017] amLODIPine (NORVASC) tablet 5 mg, 5 mg, Oral, DAILY, Marleni Pedroza NP    isosorbide dinitrate (ISORDIL) tablet 10 mg, 10 mg, Oral, TID, Marleni Pedroza NP    hydrALAZINE (APRESOLINE) tablet 25 mg, 25 mg, Oral, TID, Marleni Pedroza NP    insulin lispro (HUMALOG) injection 5 Units, 5 Units, SubCUTAneous, TIDAC, Belgica Lang MD    insulin lispro (HUMALOG) injection, , SubCUTAneous, TIDAC, Belgica Lang MD    insulin glargine (LANTUS) injection 14 Units, 14 Units, SubCUTAneous, DAILY, Marly Gonzalez MD, 14 Units at 01/23/17 0921    torsemide (DEMADEX) tablet 40 mg, 40 mg, Oral, DAILY, Sloan Bay MD, 40 mg at 01/23/17 0920    sodium hypochlorite (QUARTER STRENGTH DAKIN'S) 0.125% irrigation (bottle), , Topical, DAILY, Vasquez Diaz NP    0.9% sodium chloride infusion, 125 mL/hr, IntraVENous, CONTINUOUS, Seun Black MD, Last Rate: 125 mL/hr at 01/21/17 2241, 125 mL/hr at 01/21/17 2241    sodium chloride (NS) flush 5 mL, 5 mL, InterCATHeter, PRN, Orly العلي PA-C    pantoprazole (PROTONIX) tablet 40 mg, 40 mg, Oral, ACB, Debby Anderson MD, 40 mg at 01/23/17 0546    HYDROmorphone (PF) (DILAUDID) injection 2 mg, 2 mg, IntraVENous, PRN, Jennifer Urbano MD, 2 mg at 01/13/17 1359    cefTRIAXone (ROCEPHIN) 2 g in 0.9% sodium chloride (MBP/ADV) 50 mL, 2 g, IntraVENous, Q24H, Drea Can NP, Last Rate: 100 mL/hr at 01/22/17 1800, 2 g at 01/22/17 1800    metroNIDAZOLE (FLAGYL) tablet 500 mg, 500 mg, Oral, TID, Lisa Chad Taveras NP, 500 mg at 01/23/17 0920    HYDROmorphone (PF) (DILAUDID) injection 0.5-1 mg, 0.5-1 mg, IntraVENous, Q3H PRN, Thor Johnson MD, 1 mg at 01/22/17 0124    oxyCODONE IR (ROXICODONE) tablet 5 mg, 5 mg, Oral, Q4H PRN, Piotr Escobedo MD, 5 mg at 01/22/17 2337    hydrALAZINE (APRESOLINE) 20 mg/mL injection 20 mg, 20 mg, IntraVENous, Q6H PRN, Dexter Mcmullen MD    haloperidol lactate (HALDOL) injection 2 mg, 2 mg, IntraVENous, Q4H PRN, Dexter Mcmullen MD, 2 mg at 01/20/17 2104    0.9% sodium chloride infusion 250 mL, 250 mL, IntraVENous, PRN, Dexter Mcmullen MD    diphenhydrAMINE (BENADRYL) injection 25 mg, 25 mg, IntraVENous, Q4H PRN, Dexter Mcmullen MD    traMADol Lesle Jaymie) tablet 50 mg, 50 mg, Oral, Q6H PRN, Dexter Mcmullen MD, 50 mg at 01/16/17 2133    atorvastatin (LIPITOR) tablet 80 mg, 80 mg, Oral, DAILY, William Paulino DO, 80 mg at 01/23/17 0920    metoprolol succinate (TOPROL-XL) tablet 100 mg, 100 mg, Oral, DAILY WITH BREAKFAST, William Paulino DO, 100 mg at 01/23/17 0920    sodium chloride (NS) flush 5-10 mL, 5-10 mL, IntraVENous, Q8H, Jaca Beccaler, DO, 5 mL at 01/23/17 0018    sodium chloride (NS) flush 5-10 mL, 5-10 mL, IntraVENous, PRN, William Paulino DO    acetaminophen (TYLENOL) tablet 650 mg, 650 mg, Oral, Q4H PRN, William Paulino DO, 650 mg at 01/13/17 0349    ondansetron (ZOFRAN) injection 4 mg, 4 mg, IntraVENous, Q4H PRN, William Paulino DO, 4 mg at 01/22/17 0135    docusate sodium (COLACE) capsule 100 mg, 100 mg, Oral, DAILY PRN, William Paulino DO   Date Last Reviewed:  1/23/2017      Number of Personal Factors/Comorbidities that affect the Plan of Care: 3+: HIGH COMPLEXITY   EXAMINATION:   Most Recent Physical Functioning:   Gross Assessment:    Strength:   LEs:    AROM: Generally decreased, functional  Strength: Generally decreased, functional  Coordination: Generally decreased, functional               Posture:  Posture  Posture (WDL): Exceptions to WDL  Posture Assessment: Forward head, Rounded shoulders  Balance:  Poor  Sitting: Intact  Standing: Impaired  Standing - Static: Fair  Standing - Dynamic : Fair (- with constant walker use) Bed Mobility:    Rolling: Contact guard assistance  Supine to Sit: Contact guard assistance; Additional time  Scooting: Contact guard assistance  Wheelchair Mobility:  NA     Transfers: Moderate to max assist to get to perform  Sit to Stand: Moderate assistance  Stand to Sit: Minimum assistance  Bed to Chair: Moderate assistance  Interventions: Verbal cues; Visual cues; Safety awareness training; Tactile cues;Manual cues  Duration: 25 Minutes  Gait:  Unable today  Right Side Weight Bearing: Non-weight bearing  Left Side Weight Bearing: Full  Base of Support: Shift to left  Speed/Radha: Pace decreased (<100 feet/min); Shuffled  Step Length: Left shortened;Right shortened  Gait Abnormalities: Trunk sway increased;Decreased step clearance;Shuffling gait  Distance (ft): 3 Feet (ft)  Assistive Device: Walker, rolling  Ambulation - Level of Assistance: Minimal assistance; Moderate assistance  Interventions: Verbal cues; Visual/Demos; Safety awareness training; Tactile cues;Manual cues       Body Structures Involved:  1. Lungs  2. Bones  3. Joints  4. Muscles  5. Ligaments Body Functions Affected:  1. Mental  2. Sensory/Pain  3. Respiratory  4. Neuromusculoskeletal  5. Movement Related  6. Skin Related Activities and Participation Affected:  1. Learning and Applying Knowledge  2. General Tasks and Demands  3. Communication  4. Mobility  5. Self Care  6. Domestic Life  7. Interpersonal Interactions and Relationships  8.  Community, Social and [place]   Number of elements that affect the Plan of Care: 4+: HIGH COMPLEXITY   CLINICAL PRESENTATION:   Presentation: Evolving clinical presentation with unstable and unpredictable characteristics: HIGH COMPLEXITY   CLINICAL DECISION MAKING:   Marcelo Basic Mobility Inpatient Short Form  How much difficulty does the patient currently have. .. Unable A Lot A Little None   1. Turning over in bed (including adjusting bedclothes, sheets and blankets)? [ ] 1   [X] 2   [ ] 3   [ ] 4   2. Sitting down on and standing up from a chair with arms ( e.g., wheelchair, bedside commode, etc.)   [ ] 1   [X] 2   [ ] 3   [ ] 4   3. Moving from lying on back to sitting on the side of the bed? [ ] 1   [X] 2   [ ] 3   [ ] 4   How much help from another person does the patient currently need. .. Total A Lot A Little None   4. Moving to and from a bed to a chair (including a wheelchair)? [X] 1   [ ] 2   [ ] 3   [ ] 4   5. Need to walk in hospital room? [X] 1   [ ] 2   [ ] 3   [ ] 4   6. Climbing 3-5 steps with a railing? [X] 1   [ ] 2   [ ] 3   [ ] 4   © 2007, Trustees of [address] [place] [address], under license to [employer]. All rights reserved          Score:  Initial: 10 Most Recent: 9 (Date: 1/14/17 )   Interpretation of Tool:  Represents activities that are increasingly more difficult (i.e. Bed mobility, Transfers, Gait). Score 24 23 22-20 19-15 14-10 9-7 6       Modifier CH CI CJ CK CL CM CN         · Mobility - Walking and Moving Around:               - CURRENT STATUS:    CM - 80%-99% impaired, limited or restricted               - GOAL STATUS:           CL - 60%-79% impaired, limited or restricted               - D/C STATUS:                       ---------------To be determined---------------  Payor: CARE IMPROVEMENT PLUS [place] / Product Type: Managed Care Medicare /       Medical Necessity:     · Skilled intervention continues to be required due to debiltity. Reason for Services/Other Comments:  · Patient continues to require skilled intervention due to debility.    Use of outcome tool(s) and clinical judgement create a POC that gives a: Difficult prediction of patient's progress: HIGH COMPLEXITY TREATMENT:   (In addition to Assessment/Re-Assessment sessions the following treatments were rendered)  Pre-treatment Symptoms/Complaints:  Pain and fatigue  Pain: Initial:   Pain Intensity 1: 0  Post Session:       Therapeutic Activity: (  25 Minutes ):  Therapeutic activities including Bed mobility, review of R NWB status, sit-stand transfers x2, Chair transfers, Ambulation on level ground and review of a few seated LE exercises (LAQ, marching, ankle pumps) to improve mobility, strength, balance, coordination and LE strength and to decreased swelling (ankle pumps). Encouraged to keep LEs elevated while sitting up in chair. Required moderate assist and increased Verbal cues; Visual/Demos; Safety awareness training; Tactile cues;Manual cues to promote static and dynamic balance in standing and promote motor control of bilateral, upper extremity(s), lower extremity(s). Date:  1/19/17 Date:   Date:     Activity/Exercise Parameters Parameters Parameters   AP's x20 B    AA to R     MArching x10 B     LAQ's X 20 B  AA  To R     Hip ABD/ADD X 20 B AA to R                                   Treatment/Session Assessment: See initial assessment above. Patients response to todays treatment session was tolerated well with no medical complications. · Response to Treatment:  Tolerated well without complications  · Interdisciplinary Collaboration:  · Physical Therapist and Registered Nurse  · After treatment position/precautions:  · Up in chair, Bed/Chair-wheels locked, Bed in low position, Call light within reach and RN notified  · Compliance with Program/Exercises: Will assess as treatment progresses. · Recommendations/Intent for next treatment session: \"Next visit will focus on reduction in assistance provided\".   Total Treatment Duration:  PT Patient Time In/Time Out  Time In: 1020  Time Out: [address], DPT

## 2017-01-23 NOTE — PROGRESS NOTES
Infectious Disease Progress Note    Today's Date: 2017   Admit Date: 2017    Impression:   · R foot acute osteomyelitis of 5th toe and MT, with deep space abscess: s/p I&D, , 5th toe/MT amputation: Cx Staph caprae, Strep agalactiae, and Staph lugdunensis; pathology showed acute and chronic inflammation with uninvolved margins. Repeat I&D , prep for skin grafting. · S/P right lower extremity arteriogram with third order select catheterization, ultrasound-guided access ()  · Uncontrolled DM: hgb A1c 9.1  · PAD: abnormal RLE MAGGIE with occlusive disease, unable to undergo eval/intervention presently sec to MO  · Acute on CKD  · Medical non-compliance    Plan:   · Continue Ceftriaxone IV and Flagyl PO at least through 17 and likely longer, pending surgical plans. Surgery is considering wound vac/ hyperbaric treatments. · Still awaiting approval for [place] transfer. Anti-infectives:   Ceftriaxone and Flagyl (-  Vancomycin and Zosyn (-)    Subjective:   Date of Consultation:  2017  Referring Physician: Dr. Ferguson Diver    Sitting up in chair. Denies nausea, vomiting, diarrhea, fevers, chills or sweats. Says his RLE feels \"cold\" and the skin feels \"tight. \" He denies SOB or pain in his R foot now. Allergies   Allergen Reactions    Lortab [Hydrocodone-Acetaminophen] Itching        Review of Systems:  A comprehensive review of systems was negative except for that written in the History of Present Illness. Objective:     Visit Vitals    /73    Pulse 88    Temp 98.2 °F (36.8 °C)    Resp 15    Ht 6' 1\" (1.854 m)    Wt 115.9 kg (255 lb 8 oz)    SpO2 94%    BMI 33.71 kg/m2     Temp (24hrs), Av.3 °F (36.8 °C), Min:98 °F (36.7 °C), Max:99 °F (37.2 °C)       Lines:            Physical Exam:    General:  Alert, obese, well developed, appears stated age   Eyes:  Sclera anicteric. Pupils equally round and reactive to light.    Mouth/Throat: Mucous membranes normal, oral pharynx clear   Neck: Supple, trachea midline   Lungs:   Clear to auscultation bilaterally, good effort, anterior position   CV:  Regular rate and rhythm, no audible murmur, click, rub or gallop   Abdomen:   Soft, obese, non-tender.  bowel sounds active, non-distended   Extremities: Bilateral LE edema, R>L from thighs to ankles; no erythema, sluggish cap refill R foot    Skin: R foot wound is wrapped in surgical wrap with surgical instructions to leave in place; toes swollen, warm to touch   Lymph nodes: Not assessed today   Musculoskeletal: No deformity except surgical removal 5th MT   Lines/Devices:  Intact, no erythema, drainage or tenderness   Psych: Alert and oriented       Data Review:     CBC:  Recent Labs      01/23/17   0607  01/22/17   0546   WBC  6.8  8.5   GRANS  73  75   MONOS  8  6   EOS  4  3   ANEU  5.0  6.4   ABL  1.0  1.3   HGB  8.2*  8.6*   HCT  25.9*  26.9*   PLT  342  374       BMP:  Recent Labs      01/23/17   0607  01/22/17   0546   CREA  2.14*  1.96*   BUN   --   16   NA   --   145   K   --   3.4*   CL   --   112*   CO2   --   24   AGAP   --   9   GLU   --   78       LFTS:  Recent Labs      01/23/17   0607   TBILI  0.2   ALT  12   SGOT  21   AP  56   TP  6.2*   ALB  1.2*       Microbiology:     All Micro Results     Procedure Component Value Units Date/Time    CULTURE, ANAEROBIC [[id number]] Collected:  01/11/17 1416    Order Status:  Completed Specimen:  Bone Updated:  01/18/17 0659     Special Requests: 5TH METATARSAL HEAD      Culture result:         NO ANAEROBES ISOLATED 7 DAYS    CULTURE, ANAEROBIC [[id number]] Collected:  01/11/17 1400    Order Status:  Completed Specimen:  Foot Updated:  01/18/17 0659     Special Requests: RIGHT FOOT DEEP WOUND      Culture result:         NO ANAEROBES ISOLATED 7 DAYS    CULTURE, WOUND Vallery Peed STAIN [[id number]]  (Susceptibility) Collected:  01/11/17 1400    Order Status:  Completed Specimen:  Foot Updated:  01/15/17 0927     Special Requests: RIGHT FOOT DEEP WOUND      GRAM STAIN 0 TO 5 WBC'S/OIF       RARE  GRAM POSITIVE COCCI        Culture result: SCANT  STREPTOCOCCUS AGALACTIAE SERO GROUP B         LIGHT  STAPHYLOCOCCUS LUGDUNENSIS       CULTURE, TISSUE Walker Fluke STAIN [[id number]]  (Susceptibility) Collected:  17 1416    Order Status:  Completed Specimen:  Bone Updated:  01/15/17 0924     Special Requests: 5TH METATARSAL HEAD      GRAM STAIN NO  WBCS SEEN         NO DEFINITE ORGANISM SEEN        Culture result: SCANT  STAPHYLOCOCCUS CAPRAE         CORRECTED RESULT CALLED TO AND CORRECTLY REPEATED BY:  Mahnaz Weber TO TUNG ON 17 AT 1123       CULTURE, BLOOD [[id number]] Collected:  17 1820    Order Status:  Completed Specimen:  Blood from Blood Updated:  01/10/17 0833     Special Requests: RIGHT ANTECUBITAL        Culture result: NO GROWTH 5 DAYS       CULTURE, BLOOD [[id number]] Collected:  17 1800    Order Status:  Completed Specimen:  Blood from Blood Updated:  01/10/17 0833     Special Requests: LEFT ANTECUBITAL        Culture result: NO GROWTH 5 DAYS       C. DIFFICILE/EPI PCR [[id number]] Collected:  17 1300    Order Status:  Canceled Specimen:  Stool           Imagin/8/17: Bone scan  IMPRESSION: Increased radiotracer activity on all 3 phases within the right foot compared to the left. This localizes on the delayed image to the fifth  metatarsal bone, suggesting osteomyelitis at this site. 17 RLE arterial duplex  IMPRESSION: Abnormal right MAGGIE consistent with severe arterial disease. Noncompressible left lower extremity tibial arteries. Diminished right great toe  pressure. 17 R foot Xray  Impression:  1. No strong evidence for osteomyelitis. A three phase bone scan or contrasted MRI can be considered if there is continued concern for osteomyelitis.   2. Lucencies in the soft tissues about the proximal phalanx of the fifth digit.  Soft tissue gas as can occur with a gas producing infection (i.e. gas gangrene)  cannot be excluded.     Signed By: Marvel Narvaez NP     January 23, 2017

## 2017-01-23 NOTE — CONSULTS
[place] [facility] Consult    Attending Cardiologist:Dr. Thalia Granados    Primary Cardiologist:Dr. Thalia Granados    Primary Care Physician:none    Subjective:     Zan Thomas is a 73 y.o. male with long hospital course for diabetic foot ulcer, requiring extensive IV abx. He was initially admitted on 1/5/2017 with right foot pain, no chest pain but with slight troponin elevation at that time. His troponin levels remained flat and was in setting of acute infection, A/CKD and severe anemia. He has known history of cardiomyopathy with circumflex CAD but treated medically by cath in 2015. He has not been back to office in > 1 year. He has required 4 units of RBCs since admission for his anemia. He denies chest pain. Lying flat in bed. We are asked to evaluate pt with his systolic heart failure history prior to pursuing hyperbaric treatment for his nonhealing right foot ulcer. Long standing history of DM. Prior infection to right hand from trauma changing tire at 50 yr of age that became gangrene and required right thumb amputation. No recent echo--last in 12/2015 with EF 35-40 %. Pt does relate worsening lower ext edema for last few weeks with his bedrest. He is receiving IVF with noted creatinine levels since admission with baseline crt 2.0-2.2. He was on lower dose norvasc at home, altace, aldactone, bumex at home. Currently on higher dose of norvasc, no altace or aldactone and demadex 40 mg po daily. ECG on admission with NSR with nonspecific st/ t wave changes.   Lying flat in bed, no orthopnea or SOB at rest.       Past Medical History   Diagnosis Date    Abnormal CT scan, chest 12/27/2015    Acute CHF ([address] 90/50/2775    Acute systolic congestive heart failure ([address] 12/30/2015    MO (acute kidney injury) ([address] 7/29/2012    Bilateral pleural effusion 12/27/2015    Chest pain 12/26/2015    DM (diabetes mellitus), type 2 ([address] 7/29/2012    Encephalopathy due to infection 1/7/2017    HTN (hypertension) 7/29/2012    Hypoglycemia 7/29/2012         Tobacco use 12/27/2015      Past Surgical History   Procedure Laterality Date    Hx orthopaedic        Current Facility-Administered Medications   Medication Dose Route Frequency    insulin glargine (LANTUS) injection 14 Units  14 Units SubCUTAneous DAILY    torsemide (DEMADEX) tablet 40 mg  40 mg Oral DAILY    sodium hypochlorite (QUARTER STRENGTH DAKIN'S) 0.125% irrigation (bottle)   Topical DAILY    0.9% sodium chloride infusion  125 mL/hr IntraVENous CONTINUOUS    sodium chloride (NS) flush 5 mL  5 mL InterCATHeter PRN    pantoprazole (PROTONIX) tablet 40 mg  40 mg Oral ACB    HYDROmorphone (PF) (DILAUDID) injection 2 mg  2 mg IntraVENous PRN    cefTRIAXone (ROCEPHIN) 2 g in 0.9% sodium chloride (MBP/ADV) 50 mL  2 g IntraVENous Q24H    metroNIDAZOLE (FLAGYL) tablet 500 mg  500 mg Oral TID    HYDROmorphone (PF) (DILAUDID) injection 0.5-1 mg  0.5-1 mg IntraVENous Q3H PRN    oxyCODONE IR (ROXICODONE) tablet 5 mg  5 mg Oral Q4H PRN    insulin regular (NOVOLIN R, HUMULIN R) injection   SubCUTAneous AC&HS    amLODIPine (NORVASC) tablet 10 mg  10 mg Oral DAILY    hydrALAZINE (APRESOLINE) 20 mg/mL injection 20 mg  20 mg IntraVENous Q6H PRN    haloperidol lactate (HALDOL) injection 2 mg  2 mg IntraVENous Q4H PRN    0.9% sodium chloride infusion 250 mL  250 mL IntraVENous PRN    diphenhydrAMINE (BENADRYL) injection 25 mg  25 mg IntraVENous Q4H PRN    traMADol (ULTRAM) tablet 50 mg  50 mg Oral Q6H PRN    atorvastatin (LIPITOR) tablet 80 mg  80 mg Oral DAILY    metoprolol succinate (TOPROL-XL) tablet 100 mg  100 mg Oral DAILY WITH BREAKFAST    sodium chloride (NS) flush 5-10 mL  5-10 mL IntraVENous Q8H    sodium chloride (NS) flush 5-10 mL  5-10 mL IntraVENous PRN    acetaminophen (TYLENOL) tablet 650 mg  650 mg Oral Q4H PRN    ondansetron (ZOFRAN) injection 4 mg  4 mg IntraVENous Q4H PRN    docusate sodium (COLACE) capsule 100 mg  100 mg Oral DAILY PRN Allergies   Allergen Reactions    Lortab [Hydrocodone-Acetaminophen] Itching      Social History   Substance Use Topics    Smoking status: Current Every Day Smoker     Packs/day: 1.00     Years: 45.00    Smokeless tobacco: Not on file    Alcohol use Yes      Comment: occasional      Family History   Problem Relation Age of Onset    Diabetes Mother     Kidney Disease Mother     Diabetes Father     Kidney Disease Father     Kidney Disease Sister         Review of Systems  Gen: Denies fever, chills, malaise or fatigue. Appetite good. HEENT: Denies frequent headaches, dizzyness, visual disturbances, Neck pain or swallowing difficulty  Lungs: Denies shortness of breath, hx of COPD, breathing problems  Cardiovascular: Denies chest pain, orthopnea, PND, no syncope or near syncope  GI: Denies hememesis, dark tarry stools, No prior Hx of GI bleed, Denies constipation  : Denies dysuria, no complaints of frequency, nocturia  Heme: No prior bleeding disorders, no prior Cancer  Neuro: Denies prior CVA, TIA. Endocrine: +DM  Psychiatric: Denies anxiety, or other psychiatric illnesses. Objective:     Visit Vitals    /73    Pulse 88    Temp 98.2 °F (36.8 °C)    Resp 15    Ht 6' 1\" (1.854 m)    Wt 115.9 kg (255 lb 8 oz)    SpO2 94%    BMI 33.71 kg/m2     General:Alert, cooperative, no distress, appears stated age  Head: Normocephalic, without obvious abnormality, atraumatic. Eyes: Conjunctivae/corneas clear. PERRL, EOMs intact  Nose:Nares normal. Septum midline. Mucosa normal. No drainage or sinus tenderness. Throat: Lips, mucosa, and tongue normal. Teeth and gums normal.   Neck: Supple, symmetrical, trachea midline,  no carotid bruit and no JVD. Lungs:Clear to auscultation bilaterally. Chest wall: No tenderness or deformity. Heart: Regular rate and rhythm, S1, S2 normal, no murmur, click, rub or gallop. Abdomen:Soft, non-tender. Bowel sounds normal. No masses, No organomegaly. Extremities: 2+ pitting edema, right foot wrapped with coban. Pulses: 2+ and symmetric all extremities. Skin: Skin color, texture, turgor normal. No rashes or lesions  Lymph nodes: Cervical, supraclavicular, and axillary nodes normal  Neurologic:No focal deficits identified                 ECG: NSR with nonspecific st/ twave changes. Data Review:     Recent Results (from the past 24 hour(s))   GLUCOSE, POC    Collection Time: 01/22/17 11:18 AM   Result Value Ref Range    Glucose (POC) 167 (H) 65 - 100 mg/dL   GLUCOSE, POC    Collection Time: 01/22/17  4:06 PM   Result Value Ref Range    Glucose (POC) 230 (H) 65 - 100 mg/dL   GLUCOSE, POC    Collection Time: 01/22/17  9:30 PM   Result Value Ref Range    Glucose (POC) 254 (H) 65 - 100 mg/dL   CBC WITH AUTOMATED DIFF    Collection Time: 01/23/17  6:07 AM   Result Value Ref Range    WBC 6.8 4.3 - 11.1 K/uL    RBC 2.96 (L) 4.23 - 5.67 M/uL    HGB 8.2 (L) 13.6 - 17.2 g/dL    HCT 25.9 (L) 41.1 - 50.3 %    MCV 87.5 79.6 - 97.8 FL    MCH 27.7 26.1 - 32.9 PG    MCHC 31.7 31.4 - 35.0 g/dL    RDW 15.4 (H) 11.9 - 14.6 %    PLATELET 033 303 - 558 K/uL    MPV 10.5 (L) 10.8 - 14.1 FL    DF AUTOMATED      NEUTROPHILS 73 43 - 78 %    LYMPHOCYTES 14 13 - 44 %    MONOCYTES 8 4.0 - 12.0 %    EOSINOPHILS 4 0.5 - 7.8 %    BASOPHILS 1 0.0 - 2.0 %    IMMATURE GRANULOCYTES 0.3 0.0 - 5.0 %    ABS. NEUTROPHILS 5.0 1.7 - 8.2 K/UL    ABS. LYMPHOCYTES 1.0 0.5 - 4.6 K/UL    ABS. MONOCYTES 0.5 0.1 - 1.3 K/UL    ABS. EOSINOPHILS 0.3 0.0 - 0.8 K/UL    ABS. BASOPHILS 0.1 0.0 - 0.2 K/UL    ABS. IMM.  GRANS. 0.0 0.0 - 0.5 K/UL   CREATININE    Collection Time: 01/23/17  6:07 AM   Result Value Ref Range    Creatinine 2.14 (H) 0.8 - 1.5 MG/DL   SED RATE, AUTOMATED    Collection Time: 01/23/17  6:07 AM   Result Value Ref Range    Sed rate, automated 120 (H) 0 - 30 mm/hr   C REACTIVE PROTEIN, QT    Collection Time: 01/23/17  6:07 AM   Result Value Ref Range    C-Reactive protein 5.5 (H) 0.0 - 0.9 mg/dL   HEPATIC FUNCTION PANEL    Collection Time: 01/23/17  6:07 AM   Result Value Ref Range    Protein, total 6.2 (L) 6.3 - 8.2 g/dL    Albumin 1.2 (L) 3.2 - 4.6 g/dL    Globulin 5.0 (H) 2.3 - 3.5 g/dL    A-G Ratio 0.2 (L) 1.2 - 3.5      Bilirubin, total 0.2 0.2 - 1.1 MG/DL    Bilirubin, direct <0.1 <0.4 MG/DL    Alk. phosphatase 56 50 - 136 U/L    AST 21 15 - 37 U/L    ALT 12 12 - 65 U/L   GLUCOSE, POC    Collection Time: 01/23/17  7:17 AM   Result Value Ref Range    Glucose (POC) 149 (H) 65 - 100 mg/dL         Assessment / Plan     Principal Problem:    Type 2 diabetes mellitus with right diabetic foot infection ([facility] (1/5/2017)    Active Problems:    Type 2 diabetes mellitus with hyperglycemia ([facility] (7/29/2012)      HTN (hypertension) (7/29/2012)--see below, higher dose of norvasc may be contributing to worsening anemia. NO ACE or ARB for now with CKD. Chronic systolic congestive heart failure (H[facility]) (12/30/2015)--appears mild volume overloaded at present. No recent assessment of EF-- repeat echo and decreasing norvasc to 5 mg. Will initiate isordil and hydralazine for optimal afterload reduction. Noted protein stores are low contributing to edema. Overview: EF 35-40% on 2015 Echo      Kidney disease, chronic, stage III (moderate, EGFR 30-59 ml/min) (8/11/2016)--appears his renal function has stabilized since admission.        Acute renal failure with tubular necrosis (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

## 2017-01-23 NOTE — PROGRESS NOTES
Discussed with surgical PA and wound care RN. Patient has debridement last week with graft. Thinking wound vac to be placed this week (Wednesday-ish) and asked to enquire re: hyperbaric chamber. Sent updates to [facility] AT On license of [facility] and will discuss with [facility]. [employer]  Onur Guerra

## 2017-01-23 NOTE — PROGRESS NOTES
PLAN:  Cont management per Hospitalist  Needs good BS and BP control  Consider Nutrition consult -- Albumin 1.2 (calorie count or supplement adjustment? ? ) - defer to primary team  Vasc Surgery -- had A-gram -- no revasc options  Smoking Cessation imperative  Wound care -- keep VAC off -- keep current dressing in place -- DO NOT REMOVE  Follow OR cultures -- Abx -- per [facility] and ID  CM -- needs placement for abx, wound care and PT (NWB to RLE)    Will consult Cardiology today for clearance to pursue hyperbaric oxygenation treatments. He last saw Dr. Denys Jeffrey over a year ago. There is continued concern for his ability to heal this wound with his vasc disease and poor nutritional status. Consider re-application of VAC on Wed after wound eval      ASSESSMENT:  Admit Date: 1/5/2017   Procedure(s): 1/11/17   I&D OF DEEP SPACE R FOOT INFECTION WITH DEBRIDEMENT OF SKIN, SOFT TISSUE AND BONE INCLUDING RIGHT 5TH TOE AND METATARSAL, BONE BIOPSY FOR CULTURE  Procedure(s): 1/20/17  RIGHT FOOT DEBRIDEMENT, PREP FOR GRAFTING AND SKIN SUBSTITUTE GRAFT    Principal Problem:    Type 2 diabetes mellitus with right diabetic foot infection ([address] (1/5/2017)    ----necrotizing infection with acute osteomyelitis and abscess in face of arterial disease with insufficiency. Foot debrided of infection including 5th toe and 5th metatarsal.    Active Problems:    Type 2 diabetes mellitus with hyperglycemia ([address] (7/29/2012)      HTN (hypertension) (7/29/2012)      Chronic systolic congestive heart failure ([address] (12/30/2015)      Overview: EF 35-40% on 2015 Echo      Kidney disease, chronic, stage III (moderate, EGFR 30-59 ml/min) (8/11/2016)      Acute renal failure with tubular necrosis ([address] (1/6/2017)      NSTEMI (non-ST elevated myocardial infarction) ([address] (1/5/2017)      Sepsis due to cellulitis ([address] (1/9/2017)      SUBJECTIVE:  Resting in bed. Reports R foot pain tolerable - increases with movement. Remains on abx - ID following.  Voiding, +BMs. AF, NAD, VSS. Albumin 1.2. OBJECTIVE:  Constitutional: Alert, cooperative patient in no acute distress; appears stated age   Visit Vitals    /73    Pulse 88    Temp 98.2 °F (36.8 °C)    Resp 15    Ht 6' 1\" (1.854 m)    Wt 115.9 kg (255 lb 8 oz)    SpO2 94%    BMI 33.71 kg/m2     Eyes: Sclera are clear. ENMT: No obvious neck masses, no ear or lip lesions, R IJ tunneled cath c/d/i  CV: Impaired perfusion - pulses PT/DP via doppler  Resp: No JVD. Breathing is non-labored. No wheezing. On RA  GI: Soft, non-tender, non-distended, BS active   Musculoskeletal: Normal function. R thumb amputation  Extremities: R foot wrapped with bandage -- will not remove today  Neuro: Oriented, sensation to bilat feet intact  Psychiatric: Calm and cooperative    Patient Vitals for the past 24 hrs:   BP Temp Pulse Resp SpO2   01/23/17 0715 143/73 98.2 °F (36.8 °C) 88 15 94 %   01/23/17 0420 144/74 98.3 °F (36.8 °C) 90 20 93 %   01/23/17 0001 145/71 99 °F (37.2 °C) 90 17 94 %   01/22/17 1953 141/69 98 °F (36.7 °C) 86 18 94 %   01/22/17 1512 156/84 98.2 °F (36.8 °C) 87 18 94 %   01/22/17 1047 145/80 98.8 °F (37.1 °C) 93 18 94 %       Labs:    Recent Labs      01/23/17   0607  01/22/17   0546   WBC  6.8  8.5   HGB  8.2*  8.6*   PLT  342  374   NA   --   145   K   --   3.4*   CL   --   112*   CO2   --   24   BUN   --   16   CREA  2.14*  1.96*   GLU   --   78   TBILI  0.2   --    CBIL  <0.1   --    SGOT  21   --    ALT  12   --    AP  56   --        MISHA Cortez        I have seen and examined the patient with the Nurse Practitioner and agree with the above assessment and plan. Iveth Gould.  Devon Phillips MD

## 2017-01-23 NOTE — WOUND CARE
Spoke with Dr Monik Botello this am, possible restart of VAC on Wednesday. Wound team will be available when needed.

## 2017-01-23 NOTE — DIABETES MGMT
Pt s/p I&D right foot infection with debridement 1/11/17 and also 1/20/17 is seen by RN LINH for continued diabetes education. Pt remains on antibiotics. Right foot debridement planned for today. Reviewed with pt current insulin regimen: Lantus dose 14 units daily and Regular insulin per sliding scale. Verbalizes basic understanding, but does not engage any further in conversation. When asked what he has been eating, pt says \"sometimes I eat the junk they give me\". Stressed importance of consistent carbohydrate diet. Blood glucose ranged  yesterday with total of 25 units insulin given (Lantus 14 units; Humalog 11 units). Blood glucose 149  this morning. Attempted to explain to pt importance of good glycemic control for wound healing, but pt not engaging further in conversation. Discussed current insulin orders with Dr. Katherine Babinski. Pt practiced using insulin demo pen earlier last week. Pt's goddaughter (who lives with him) was educated re: proper use of insulin pen and able to return demonstrate insulin pen use last week as well. Plan is to continue diabetes education next only as pt is willing to participate. Pt has no questions at this time.  Nursing will continue to monitor blood glucose.

## 2017-01-24 LAB
ANION GAP BLD CALC-SCNC: 7 MMOL/L (ref 7–16)
BUN SERPL-MCNC: 15 MG/DL (ref 8–23)
CALCIUM SERPL-MCNC: 7.6 MG/DL (ref 8.3–10.4)
CHLORIDE SERPL-SCNC: 110 MMOL/L (ref 98–107)
CO2 SERPL-SCNC: 28 MMOL/L (ref 21–32)
CREAT SERPL-MCNC: 1.98 MG/DL (ref 0.8–1.5)
GLUCOSE BLD STRIP.AUTO-MCNC: 139 MG/DL (ref 65–100)
GLUCOSE BLD STRIP.AUTO-MCNC: 147 MG/DL (ref 65–100)
GLUCOSE BLD STRIP.AUTO-MCNC: 74 MG/DL (ref 65–100)
GLUCOSE BLD STRIP.AUTO-MCNC: 96 MG/DL (ref 65–100)
GLUCOSE SERPL-MCNC: 99 MG/DL (ref 65–100)
POTASSIUM SERPL-SCNC: 3.1 MMOL/L (ref 3.5–5.1)
SODIUM SERPL-SCNC: 145 MMOL/L (ref 136–145)

## 2017-01-24 PROCEDURE — 74011250637 HC RX REV CODE- 250/637: Performed by: NURSE PRACTITIONER

## 2017-01-24 PROCEDURE — 74011250636 HC RX REV CODE- 250/636: Performed by: SURGERY

## 2017-01-24 PROCEDURE — 74011250637 HC RX REV CODE- 250/637: Performed by: INTERNAL MEDICINE

## 2017-01-24 PROCEDURE — 80048 BASIC METABOLIC PNL TOTAL CA: CPT | Performed by: HOSPITALIST

## 2017-01-24 PROCEDURE — 74011636637 HC RX REV CODE- 636/637: Performed by: HOSPITALIST

## 2017-01-24 PROCEDURE — 97535 SELF CARE MNGMENT TRAINING: CPT

## 2017-01-24 PROCEDURE — 77030019605

## 2017-01-24 PROCEDURE — 74011250636 HC RX REV CODE- 250/636: Performed by: NURSE PRACTITIONER

## 2017-01-24 PROCEDURE — 74011250637 HC RX REV CODE- 250/637: Performed by: FAMILY MEDICINE

## 2017-01-24 PROCEDURE — 74011000258 HC RX REV CODE- 258: Performed by: NURSE PRACTITIONER

## 2017-01-24 PROCEDURE — 82962 GLUCOSE BLOOD TEST: CPT

## 2017-01-24 PROCEDURE — 65270000029 HC RM PRIVATE

## 2017-01-24 PROCEDURE — 97530 THERAPEUTIC ACTIVITIES: CPT

## 2017-01-24 RX ORDER — POTASSIUM CHLORIDE 20 MEQ/1
40 TABLET, EXTENDED RELEASE ORAL
Status: COMPLETED | OUTPATIENT
Start: 2017-01-24 | End: 2017-01-24

## 2017-01-24 RX ADMIN — METRONIDAZOLE 500 MG: 500 TABLET ORAL at 08:55

## 2017-01-24 RX ADMIN — POTASSIUM CHLORIDE 40 MEQ: 20 TABLET, EXTENDED RELEASE ORAL at 09:39

## 2017-01-24 RX ADMIN — ISOSORBIDE DINITRATE 10 MG: 10 TABLET ORAL at 08:58

## 2017-01-24 RX ADMIN — SODIUM CHLORIDE 125 ML/HR: 900 INJECTION, SOLUTION INTRAVENOUS at 21:16

## 2017-01-24 RX ADMIN — PANTOPRAZOLE SODIUM 40 MG: 40 TABLET, DELAYED RELEASE ORAL at 06:22

## 2017-01-24 RX ADMIN — AMLODIPINE BESYLATE 5 MG: 5 TABLET ORAL at 08:56

## 2017-01-24 RX ADMIN — HYDRALAZINE HYDROCHLORIDE 25 MG: 25 TABLET ORAL at 08:57

## 2017-01-24 RX ADMIN — INSULIN LISPRO 3 UNITS: 100 INJECTION, SOLUTION INTRAVENOUS; SUBCUTANEOUS at 09:28

## 2017-01-24 RX ADMIN — TORSEMIDE 40 MG: 20 TABLET ORAL at 08:56

## 2017-01-24 RX ADMIN — SODIUM CHLORIDE 125 ML/HR: 900 INJECTION, SOLUTION INTRAVENOUS at 16:15

## 2017-01-24 RX ADMIN — Medication 10 ML: at 21:14

## 2017-01-24 RX ADMIN — HYDRALAZINE HYDROCHLORIDE 25 MG: 25 TABLET ORAL at 21:13

## 2017-01-24 RX ADMIN — INSULIN GLARGINE 14 UNITS: 100 INJECTION, SOLUTION SUBCUTANEOUS at 08:33

## 2017-01-24 RX ADMIN — DAKIN'S SOLUTION 0.125% (QUARTER STRENGTH): 0.12 SOLUTION at 09:00

## 2017-01-24 RX ADMIN — INSULIN LISPRO 5 UNITS: 100 INJECTION, SOLUTION INTRAVENOUS; SUBCUTANEOUS at 11:30

## 2017-01-24 RX ADMIN — ATORVASTATIN CALCIUM 80 MG: 40 TABLET, FILM COATED ORAL at 08:57

## 2017-01-24 RX ADMIN — INSULIN LISPRO 5 UNITS: 100 INJECTION, SOLUTION INTRAVENOUS; SUBCUTANEOUS at 16:30

## 2017-01-24 RX ADMIN — METRONIDAZOLE 500 MG: 500 TABLET ORAL at 16:27

## 2017-01-24 RX ADMIN — HYDRALAZINE HYDROCHLORIDE 25 MG: 25 TABLET ORAL at 16:27

## 2017-01-24 RX ADMIN — ISOSORBIDE DINITRATE 10 MG: 10 TABLET ORAL at 21:13

## 2017-01-24 RX ADMIN — CEFTRIAXONE 2 G: 2 INJECTION, POWDER, FOR SOLUTION INTRAMUSCULAR; INTRAVENOUS at 16:27

## 2017-01-24 RX ADMIN — Medication 10 ML: at 14:00

## 2017-01-24 RX ADMIN — ISOSORBIDE DINITRATE 10 MG: 10 TABLET ORAL at 16:27

## 2017-01-24 RX ADMIN — METOPROLOL SUCCINATE 100 MG: 100 TABLET, EXTENDED RELEASE ORAL at 08:57

## 2017-01-24 RX ADMIN — METRONIDAZOLE 500 MG: 500 TABLET ORAL at 21:13

## 2017-01-24 NOTE — PROGRESS NOTES
Problem: Mobility Impaired (Adult and Pediatric)  Goal: *Acute Goals and Plan of Care (Insert Text)  Goals reviewed and updated 17  ST - 3 days  1. Pt will roll and get to EOB with minimal assist.  2.  Pt will sit edge of bed without falling over x 10 minutes for functional activity and exercise. 3.  Pt will go sit to stand and transfer to chair with heel touch WB with moderate assist.  4.  Pt will ambulate 5' with heel touch WB and rolling walker and moderate assist.  5.  Pt will perform LE exercises with minimal assist.    LTG: 3- 7 days  1. Pt will roll and get to EOB independent. 3.  Pt will go sit to stand and transfer to chair with heel touch WB with min assist and rolling walker. 4.  Pt will ambulate 10' with heel touch WB and rolling walker and minimal assist.  5.  Pt will perform LE exercises with verbal cues. PHYSICAL THERAPY: Daily Note, Treatment Day: 4th and PM 2017  INPATIENT: Hospital Day: 20  Payor: CARE IMPROVEMENT PLUS / [place] / Product Type: [employer] Medicare /     [employer]      NAME/AGE/GENDER: Loc Kimble is a 73 y.o. male            PRIMARY DIAGNOSIS: Gangrene of toe ([address] Patlidya Kwaner  Diabetic foot ulcer associated with type 1 diabetes mellitus, unspecified laterality ([address] [R41.543, L97.509]  Ulcer of right foot, with unspecified severity ([address] [L97.519] Type 2 diabetes mellitus with right diabetic foot infection ([address] Type 2 diabetes mellitus with right diabetic foot infection ([address]  Procedure(s) (LRB):  RIGHT FOOT DEBRIDEMENT  ,PREP FOR GRAFTING AND SKIN SUBSTITUTE GRAFT (Right)  4 Days Post-Op  ICD-10: Treatment Diagnosis: Generalized Muscle Weakness (M62.81)  Other lack of cordination (R27.8)  Difficulty in walking, Not elsewhere classified (R26.2)  Precautions/Allergies:   Lortab [hydrocodone-acetaminophen]       ASSESSMENT:      Mr. Khadijah Israel presents supine with OT attending on contact.   Pt performed spine to sit with encouragement and CGA.  Pt requires additional time with all mobility and sat edge of bed several minutes. MD in and encouraged pt with increased mobility. Demonstrated and educated pt on maintaining NWB using rolling walker prior to pt standing up. Pt stood with min/mod assist x 2 to rolling walker and was able to ambulate about 3' to chair with min/mod assist x 2 and was not maintaining NWB status despite multiple cues. Pt was heel WB throughout and therefore will limit further mobility at this time. Pt sat quickly in chair. Pt was left up in chair with needs in reach and LE's elevated. Will continue with POC. This section established at most recent assessment   PROBLEM LIST (Impairments causing functional limitations):  1. Decreased Strength  2. Decreased ADL/Functional Activities  3. Decreased Transfer Abilities  4. Decreased Ambulation Ability/Technique  5. Decreased Balance  6. Increased Pain  7. Decreased Activity Tolerance  8. Decreased [place] with Home Exercise Program  9. Decreased Cognition    INTERVENTIONS PLANNED: (Benefits and precautions of physical therapy have been discussed with the patient.)  1. Balance Exercise  2. Bed Mobility  3. Family Education  4. Gait Training  5. Home Exercise Program (HEP)  6. Range of Motion (ROM)  7. Therapeutic Activites  8. Therapeutic Exercise/Strengthening  9. Transfer Training      TREATMENT PLAN: Frequency/Duration: 3-4 times per weel for duration of hospital stay  Rehabilitation Potential For Stated Goals: [facility] REHABILITATION/EQUIPMENT: (at time of discharge pending progress): Continue Skilled Therapy and Rehab. HISTORY:   History of Present Injury/Illness (Reason for Referral):  Pt is a 74 yo male who presents to ER this evening with progressive pain and poor healing of right foot wound.  He reports site started as small blister maybe from a shoe about a month ago but has not improved and recently pain has worsened is now radiating up his leg. He also reports uanble to bear weight on foot in past day or so. He has not been on abx for this but states it was seen by a provider at [address] Other PMH includes DM which appears uncontrolled (A1C 9.2 11/2016), CHF, CKD, current smoker. X Ray of right foot has low suspicion for osteomyelitis but does reveal soft tissue lucencies, possible gas producing infection. WBC 18.7, LA 2.2. He has been started on Vanc/Zosyn in ER. Also noted to have MO with Cr 2.99, baseline 1.90. Pt denies any med changes and reports compliance with current med regimen. He denies recent or current CP, SOB, abd pain, fevers  Past Medical History/Comorbidities:   Mr. Eliza Yost  has a past medical history of Abnormal CT scan, chest (12/27/2015); Acute CHF ([address] (12/26/2015); Acute systolic congestive heart failure ([address] (12/30/2015); MO (acute kidney injury) ([address] (7/29/2012); Bilateral pleural effusion (12/27/2015); Chest pain (12/26/2015); DM (diabetes mellitus), type 2 ([address] (7/29/2012); Encephalopathy due to infection (1/7/2017); HTN (hypertension) (7/29/2012); Hypoglycemia (7/29/2012); NSTEMI (non-ST elevated myocardial infarction) ([address] (1/5/2017); and Tobacco use (12/27/2015). He also has no past medical history of Arthritis; Asthma; Autoimmune disease ([address] Cancer ([address] COPD; DEMENTIA; Gastrointestinal disorder; Liver disease; Other ill-defined conditions(799.89); Psychiatric disorder; PUD (peptic ulcer disease); Seizures ([address] Sleep disorder; Stroke [facility]); or Thromboembolus ([address] Mr. Eliza Yost  has a past surgical history that includes orthopaedic.   Social History/Living Environment:   Home Environment: Private residence  # Steps to Enter: 4  One/Two Story Residence: One story  Living Alone: No  Support Systems: Child(elpidio), Family member(s)  Patient Expects to be Discharged to[de-identified] Rehabilitation facility  Current DME Used/Available at Home: None  Tub or Shower Type: Shower  Prior Level of Function/Work/Activity:  Pt states he was independent PTA. Would sit on his front porch everyday and \"entertain\".   Current Medications:           Current Facility-Administered Medications:     amLODIPine (NORVASC) tablet 5 mg, 5 mg, Oral, DAILY, Maryella Plaster, NP, 5 mg at 01/24/17 0856    isosorbide dinitrate (ISORDIL) tablet 10 mg, 10 mg, Oral, TID, Maryella Plaster, NP, 10 mg at 01/24/17 0858    hydrALAZINE (APRESOLINE) tablet 25 mg, 25 mg, Oral, TID, Maryella Plaster, NP, 25 mg at 01/24/17 0857    insulin lispro (HUMALOG) injection 5 Units, 5 Units, SubCUTAneous, TIDAC, Kathi Lopez MD, 5 Units at 01/24/17 1130    insulin lispro (HUMALOG) injection, , SubCUTAneous, TIDAC, Kathi Lopez MD, Stopped at 01/23/17 1630    insulin glargine (LANTUS) injection 14 Units, 14 Units, SubCUTAneous, DAILY, June Baig MD, 14 Units at 01/24/17 0833    torsemide (DEMADEX) tablet 40 mg, 40 mg, Oral, DAILY, Alexis Sousa MD, 40 mg at 01/24/17 0856    sodium hypochlorite (QUARTER STRENGTH DAKIN'S) 0.125% irrigation (bottle), , Topical, DAILY, Lisandro Jain NP    0.9% sodium chloride infusion, 125 mL/hr, IntraVENous, CONTINUOUS, Jay Hammond MD, Stopped at 01/23/17 1436    sodium chloride (NS) flush 5 mL, 5 mL, InterCATHeter, PRN, Balwinder Chacko PA-C    pantoprazole (PROTONIX) tablet 40 mg, 40 mg, Oral, ACB, Isaiah Palmer MD, 40 mg at 01/24/17 0622    HYDROmorphone (PF) (DILAUDID) injection 2 mg, 2 mg, IntraVENous, PRN, Ranjit Frye MD, 2 mg at 01/13/17 1359    cefTRIAXone (ROCEPHIN) 2 g in 0.9% sodium chloride (MBP/ADV) 50 mL, 2 g, IntraVENous, Q24H, Haley Quinteros NP, Last Rate: 100 mL/hr at 01/23/17 1756, 2 g at 01/23/17 1756    metroNIDAZOLE (FLAGYL) tablet 500 mg, 500 mg, Oral, TID, Haley Quinteros NP, 500 mg at 01/24/17 0855    HYDROmorphone (PF) (DILAUDID) injection 0.5-1 mg, 0.5-1 mg, IntraVENous, Q3H PRN, Ranjit Frye MD, 1 mg at 01/22/17 0124    oxyCODONE IR (ROXICODONE) tablet 5 mg, 5 mg, Oral, Q4H PRN, Reanna Mariscal MD, 5 mg at 01/23/17 1423    hydrALAZINE (APRESOLINE) 20 mg/mL injection 20 mg, 20 mg, IntraVENous, Q6H PRN, Homa Funk MD    haloperidol lactate (HALDOL) injection 2 mg, 2 mg, IntraVENous, Q4H PRN, Homa Funk MD, 2 mg at 01/20/17 2104    0.9% sodium chloride infusion 250 mL, 250 mL, IntraVENous, PRN, Homa Funk MD    diphenhydrAMINE (BENADRYL) injection 25 mg, 25 mg, IntraVENous, Q4H PRN, Homa Funk MD    traMADol Tildon Musca) tablet 50 mg, 50 mg, Oral, Q6H PRN, Homa Funk MD, 50 mg at 01/16/17 2133    atorvastatin (LIPITOR) tablet 80 mg, 80 mg, Oral, DAILY, Lysle Kussmaul, DO, 80 mg at 01/24/17 0857    metoprolol succinate (TOPROL-XL) tablet 100 mg, 100 mg, Oral, DAILY WITH BREAKFAST, Lysle Kussmaul, DO, 100 mg at 01/24/17 0857    sodium chloride (NS) flush 5-10 mL, 5-10 mL, IntraVENous, Q8H, Lysle Kussmaul, DO, 10 mL at 01/24/17 1400    sodium chloride (NS) flush 5-10 mL, 5-10 mL, IntraVENous, PRN, Lysle Kussmaul, DO    acetaminophen (TYLENOL) tablet 650 mg, 650 mg, Oral, Q4H PRN, Lysle Kussmaul, DO, 650 mg at 01/13/17 0349    ondansetron (ZOFRAN) injection 4 mg, 4 mg, IntraVENous, Q4H PRN, Lysle Kussmaul, DO, 4 mg at 01/22/17 0135    docusate sodium (COLACE) capsule 100 mg, 100 mg, Oral, DAILY PRN, Lysle Kussmaul, DO   Date Last Reviewed:  1/24/2017      Number of Personal Factors/Comorbidities that affect the Plan of Care: 3+: HIGH COMPLEXITY   EXAMINATION:   Most Recent Physical Functioning:   Gross Assessment:    Strength:   LEs:                    Posture:  Posture     Balance:  Poor    Bed Mobility:       Wheelchair Mobility:  NA     Transfers: Moderate to max assist to get to perform     Gait:  Unable today     Base of Support: Center of gravity altered; Widened  Speed/Radha: Shuffled; Slow  Step Length: Left shortened;Right shortened  Gait Abnormalities: Decreased step clearance;Trunk sway increased; Shuffling gait  Distance (ft): 3 Feet (ft)  Assistive Device: Walker, rolling  Ambulation - Level of Assistance: Minimal assistance; Moderate assistance;Assist x2  Interventions: Manual cues; Safety awareness training;Verbal cues       Body Structures Involved:  1. Lungs  2. Bones  3. Joints  4. Muscles  5. Ligaments Body Functions Affected:  1. Mental  2. Sensory/Pain  3. Respiratory  4. Neuromusculoskeletal  5. Movement Related  6. Skin Related Activities and Participation Affected:  1. Learning and Applying Knowledge  2. General Tasks and Demands  3. Communication  4. Mobility  5. Self Care  6. Domestic Life  7. Interpersonal Interactions and Relationships  8. Community, Social and [place]   Number of elements that affect the Plan of Care: 4+: HIGH COMPLEXITY   CLINICAL PRESENTATION:   Presentation: Evolving clinical presentation with unstable and unpredictable characteristics: HIGH COMPLEXITY   CLINICAL DECISION MAKIN [facility] Mobility Inpatient Short Form  How much difficulty does the patient currently have. .. Unable A Lot A Little None   1. Turning over in bed (including adjusting bedclothes, sheets and blankets)? [ ] 1   [X] 2   [ ] 3   [ ] 4   2. Sitting down on and standing up from a chair with arms ( e.g., wheelchair, bedside commode, etc.)   [ ] 1   [X] 2   [ ] 3   [ ] 4   3. Moving from lying on back to sitting on the side of the bed? [ ] 1   [X] 2   [ ] 3   [ ] 4   How much help from another person does the patient currently need. .. Total A Lot A Little None   4. Moving to and from a bed to a chair (including a wheelchair)? [X] 1   [ ] 2   [ ] 3   [ ] 4   5. Need to walk in hospital room? [X] 1   [ ] 2   [ ] 3   [ ] 4   6. Climbing 3-5 steps with a railing? [X] 1   [ ] 2   [ ] 3   [ ] 4   © , Trustees of [address] [place] [address] [address] [address], under license to [employer].  All rights reserved          Score:  Initial: 10 Most Recent: 9 (Date: 17 )   Interpretation of Tool: Represents activities that are increasingly more difficult (i.e. Bed mobility, Transfers, Gait). Score 24 23 22-20 19-15 14-10 9-7 6       Modifier CH CI CJ CK CL CM CN         · Mobility - Walking and Moving Around:               - CURRENT STATUS:    CM - 80%-99% impaired, limited or restricted               - GOAL STATUS:           CL - 60%-79% impaired, limited or restricted               - D/C STATUS:                       ---------------To be determined---------------  Payor: CARE IMPROVEMENT PLUS [place] / Product Type: Managed Care Medicare /       Medical Necessity:     · Skilled intervention continues to be required due to debiltity. Reason for Services/Other Comments:  · Patient continues to require skilled intervention due to debility. Use of outcome tool(s) and clinical judgement create a POC that gives a: Difficult prediction of patient's progress: HIGH COMPLEXITY                 TREATMENT:     Pre-treatment Symptoms/Complaints:  Pain and fatigue  Pain: Initial:      Post Session:       Therapeutic Activity: (   10 minutes): Therapeutic activities including Bed mobility, review of R NWB status, sit-stand transfers x2, Chair transfers, and Ambulation on level ground to improve mobility, strength, balance, coordination and LE strength and to decreased swelling (ankle pumps). Encouraged to keep LEs elevated while sitting up in chair. Required moderate assist and increased Manual cues; Safety awareness training;Verbal cues to promote static and dynamic balance in standing and promote motor control of bilateral, upper extremity(s), lower extremity(s). Date:  1/19/17 Date:   Date:     Activity/Exercise Parameters Parameters Parameters   AP's x20 B    AA to R     MArching x10 B     LAQ's X 20 B  AA  To R     Hip ABD/ADD X 20 B AA to R                                   Treatment/Session Assessment: See initial assessment above.   Patients response to todays treatment session was tolerated well with no medical complications. · Response to Treatment:  Tolerated well without complications  · Interdisciplinary Collaboration:  · Physical Therapist and Registered Nurse  · After treatment position/precautions:  · Up in chair, Bed/Chair-wheels locked, Bed in low position, Call light within reach and RN notified  · Compliance with Program/Exercises: Will assess as treatment progresses. · Recommendations/Intent for next treatment session: \"Next visit will focus on reduction in assistance provided\".   Total Treatment Duration:  PT Patient Time In/Time Out  Time In: 1333  Time Out: [address], PTA

## 2017-01-24 NOTE — PROGRESS NOTES
PLAN:  Cont management per Hospitalist  Needs good BS and BP control  Nutrition consult -- Albumin 1.2 (calorie count or supplement adjustment? ? ) - defer to primary team  K+ 3.1 -- supplementation per primary team  Vasc Surgery -- had A-gram -- no revasc options  Smoking Cessation imperative  Wound care -- keep current dressing in place -- DO NOT REMOVE  Abx -- per ID  CM -- needs placement for abx, wound care and PT (NWB to RLE) -- need SHARE [facility]    Appreciate Cardiology assist -- cleared for surgery or HBO therapy    Plan to place [facility] tomorrow      ASSESSMENT:  Admit Date: 1/5/2017   Procedure(s): 1/11/17   I&D OF DEEP SPACE R FOOT INFECTION WITH DEBRIDEMENT OF SKIN, SOFT TISSUE AND BONE INCLUDING RIGHT 5TH TOE AND METATARSAL, BONE BIOPSY FOR CULTURE  Procedure(s): 1/20/17  RIGHT FOOT DEBRIDEMENT, PREP FOR GRAFTING AND SKIN SUBSTITUTE GRAFT    Principal Problem:    Type 2 diabetes mellitus with right diabetic foot infection ([address] (1/5/2017)    ----necrotizing infection with acute osteomyelitis and abscess in face of arterial disease with insufficiency. Foot debrided of infection including 5th toe and 5th metatarsal.    Active Problems:    Type 2 diabetes mellitus with hyperglycemia ([address] (7/29/2012)      HTN (hypertension) (7/29/2012)      Chronic systolic congestive heart failure ([address] (12/30/2015)      Overview: EF 35-40% on 2015 Echo      Kidney disease, chronic, stage III (moderate, EGFR 30-59 ml/min) (8/11/2016)      Acute renal failure with tubular necrosis ([address] (1/6/2017)      NSTEMI (non-ST elevated myocardial infarction) ([address] (1/5/2017)      Sepsis due to cellulitis ([address] (1/9/2017)      SUBJECTIVE:  Resting in bed. Reports R foot pain tolerable. Remains on abx - ID following. AF, NAD, VSS.        OBJECTIVE:  Constitutional: Alert, cooperative patient in no acute distress; appears stated age   Visit Vitals    /80    Pulse 92    Temp 97.9 °F (36.6 °C)    Resp 18    Ht 6' 1\" (1.854 m)    Wt 115.9 kg (255 lb 8 oz)    SpO2 93%    BMI 33.71 kg/m2     Eyes: Sclera are clear. ENMT: No obvious neck masses, no ear or lip lesions, R IJ tunneled cath c/d/i  CV: Impaired perfusion - pulses PT/DP via doppler  Resp: No JVD. Breathing is non-labored. No wheezing. On RA  GI: Soft, non-tender, non-distended, BS active   Musculoskeletal: Normal function. R thumb amputation  Extremities: R foot wrapped with bandage -- will not remove today  Neuro: Oriented, sensation to bilat feet intact  Psychiatric: Calm and cooperative    Patient Vitals for the past 24 hrs:   BP Temp Pulse Resp SpO2   01/24/17 0653 159/80 97.9 °F (36.6 °C) 92 18 93 %   01/24/17 0341 164/80 97.6 °F (36.4 °C) 95 18 93 %   01/23/17 2347 125/65 98.8 °F (37.1 °C) 90 18 91 %   01/23/17 2003 141/69 98.9 °F (37.2 °C) 91 18 96 %   01/23/17 1518 146/78 98.5 °F (36.9 °C) 88 20 94 %   01/23/17 1359 - - 92 - -   01/23/17 1151 (!) 130/91 98.1 °F (36.7 °C) 90 15 98 %       Labs:    Recent Labs      01/24/17   0500   01/23/17   0607   WBC   --    --   6.8   HGB   --    --   8.2*   PLT   --    --   342   NA  145   < >   --    K  3.1*   < >   --    CL  110*   < >   --    CO2  28   < >   --    BUN  15   < >   --    CREA  1.98*   < >  2.14*   GLU  99   < >   --    TBILI   --    --   0.2   CBIL   --    --   <0.1   SGOT   --    --   21   ALT   --    --   12   AP   --    --   56    < > = values in this interval not displayed. MISHA Mercado      I have seen and examined the patient with the Nurse Practitioner and agree with the above assessment and plan. Vasyl Magallanes MD

## 2017-01-24 NOTE — PROGRESS NOTES
Problem: Self Care Deficits Care Plan (Adult)  Goal: *Acute Goals and Plan of Care (Insert Text)  1. Patient will complete lower body bathing and dressing with minimal assistance and adaptive equipment as needed. 2. Patient will complete toileting with minimal assistance. 3. Patient will tolerate 20 minutes of OT treatment with 2 rest breaks to increase activity tolerance for ADLs. 4. Patient will complete functional transfers with moderate assistance and adaptive equipment as needed. Timeframe: 7 visits       OCCUPATIONAL THERAPY: Daily Note, Treatment Day: 3rd and PM 1/24/2017  INPATIENT: Hospital Day: 20  Payor: CARE IMPROVEMENT PLUS / [place] / Product Type: Managed Care Medicare /    NWB R LE ( not maintaining)     NAME/AGE/GENDER: Maik Lawson is a 73 y.o. male        PRIMARY DIAGNOSIS:  Gangrene of toe ([address] Clance Alejandro  Diabetic foot ulcer associated with type 1 diabetes mellitus, unspecified laterality ([address] [T17.322, L97.509]  Ulcer of right foot, with unspecified severity ([address] [L97.519] Type 2 diabetes mellitus with right diabetic foot infection ([address] Type 2 diabetes mellitus with right diabetic foot infection ([address]  Procedure(s) (LRB):  RIGHT FOOT DEBRIDEMENT  ,PREP FOR GRAFTING AND SKIN SUBSTITUTE GRAFT (Right)  4 Days Post-Op  ICD-10: Treatment Diagnosis:        · Generalized Muscle Weakness (M62.81)  · Other lack of cordination (R27.8)   Precautions/Allergies:         Lortab [hydrocodone-acetaminophen]       ASSESSMENT:      Mr. Paty Holt was admitted for the above diagnosis. Pt presents supine upon arrival and agreeable to therapy with maximal encouragement today having just finished lunch tray. Ludy SHORT cleared pt to get up to recliner and MD Lopes (ID) also encouraged pt to get up. Pt asked to use urinal and setup it up himself by pulling down the adult brief. He spilled the urine all over himself and his brief. OT emptied urinal and gathered clean clothes for pt.   Pt rolled side to side with min A for brief change. Given cloth to wash his anterior panchito area and did it himself. OT cleaned posterior and applied clean brief. Required min assist for LEs and extra time during supine to sit transfer. Pt sat 10 mins at the edge of the bed and required lots of encouragement to get up to recliner. Pt was able to stand to RW moderate A x2 but was NOT maintaining R LE NWB and was actually heel bearing the whole time despite being very strong B UE (5/5) and could have used his arms for the transfer. Took steps to recliner and sat. Min A to move back in recliner. Legs elevated. Pt then brushed teeth and washed off his face and hands. Pt required constant visual, verbal, and tactile cueing to complete tasks. Pt tolerated session well. Pt seems down today and admitted \"I don't really want to live anymore. \"   Pt left with PTA for session. Pt will continue to benefit from skilled OT during stay. This section established at most recent assessment   PROBLEM LIST (Impairments causing functional limitations):  1. Decreased Strength  2. Decreased ADL/Functional Activities  3. Decreased Transfer Abilities  4. Decreased Ambulation Ability/Technique  5. Decreased Balance  6. Decreased Activity Tolerance  7. Increased Fatigue  8. Decreased Flexibility/Joint Mobility  9. Edema/Girth  10. Decreased Knowledge of Precautions  11. Decreased Routt with Home Exercise Program  12. Decreased Cognition    INTERVENTIONS PLANNED: (Benefits and precautions of occupational therapy have been discussed with the patient.)  1. Activities of daily living training  2. Adaptive equipment training  3. Balance training  4. Cognitive training  5. Donning&doffing training  6. Group therapy  7. Hygiene training  8. Sensory reintergration training  9. Theraputic activity  10. Theraputic exercise      TREATMENT PLAN: Frequency/Duration: Follow patient 3x/week to address above goals.   Rehabilitation Potential For Stated Goals: Good      RECOMMENDED REHABILITATION/EQUIPMENT: (at time of discharge pending progress): Continue Skilled Therapy. OCCUPATIONAL PROFILE AND HISTORY:   History of Present Injury/Illness (Reason for Referral):  Pt is a 74 yo male who presents to ER this evening with progressive pain and poor healing of right foot wound. He reports site started as small blister maybe from a shoe about a month ago but has not improved and recently pain has worsened is now radiating up his leg. He also reports uanble to bear weight on foot in past day or so. He has not been on abx for this but states it was seen by a provider at [address] [place] [address]. Other PMH includes DM which appears uncontrolled (A1C 9.2 11/2016), CHF, CKD, current smoker. X Ray of right foot has low suspicion for osteomyelitis but does reveal soft tissue lucencies, possible gas producing infection. WBC 18.7, LA 2.2. He has been started on Vanc/Zosyn in ER. Also noted to have MO with Cr 2.99, baseline 1.90. Pt denies any med changes and reports compliance with current med regimen. He denies recent or current CP, SOB, abd pain, fevers. Past Medical History/Comorbidities:   Mr. Elenita Solis  has a past medical history of Abnormal CT scan, chest (12/27/2015); Acute CHF ([address] (12/26/2015); Acute systolic congestive heart failure ([address] (12/30/2015); MO (acute kidney injury) ([address] (7/29/2012); Bilateral pleural effusion (12/27/2015); Chest pain (12/26/2015); DM (diabetes mellitus), type 2 ([address] (7/29/2012); Encephalopathy due to infection (1/7/2017); HTN (hypertension) (7/29/2012); Hypoglycemia (7/29/2012); NSTEMI (non-ST elevated myocardial infarction) ([address] (1/5/2017); and Tobacco use (12/27/2015). He also has no past medical history of Arthritis; Asthma; Autoimmune disease ([address] Cancer ([address] COPD; DEMENTIA; Gastrointestinal disorder; Liver disease; Other ill-defined conditions(799.89); Psychiatric disorder; PUD (peptic ulcer disease);  Seizures ([facility] Sleep disorder; Stroke [facility]); or Thromboembolus ([facility] Mr. Uziel Kennedy  has a past surgical history that includes orthopaedic. Social History/Living Environment:   Home Environment: Private residence  # Steps to Enter: 4  One/Two Story Residence: One story  Living Alone: No  Support Systems: Child(elpidio), Family member(s)  Patient Expects to be Discharged to[de-identified] Rehabilitation facility  Current DME Used/Available at Home: None  Tub or Shower Type: Shower  Prior Level of Function/Work/Activity:  Modified indepedent  Dominant Side:         RIGHT   Number of Personal Factors/Comorbidities that affect the Plan of Care: Expanded review of therapy/medical records (1-2):  MODERATE COMPLEXITY   ASSESSMENT OF OCCUPATIONAL PERFORMANCE[de-identified]   Activities of Daily Living:         Requires moderate to maximal assistance  Basic ADLs (From Assessment) Complex ADLs (From Assessment)   Basic ADL  Feeding: Modified independent  Oral Facial Hygiene/Grooming: Setup  Bathing: Moderate assistance  Upper Body Dressing: Setup  Lower Body Dressing: Moderate assistance  Toileting: Moderate assistance     Grooming/Bathing/Dressing Activities of Daily Living   Grooming  Grooming Assistance: Supervision/set up  Washing Face: Supervision/set-up  Washing Hands: Supervision/set-up  Brushing Teeth: Supervision/set-up Cognitive Retraining  Orientation Retraining: Reorienting  Problem Solving: General alternative solution; Identifying the problem; Identifying the task;Deductive reason; Awareness of environment  Executive Functions: Executing cognitive plans;Managing time;Regulating behavior  Organizing/Sequencing: Breaking task down;Prioritizing  Attention to Task: Distractibility  Maintains Attention For (Time): 30 seconds  Following Commands: Follows one step commands/directions  Safety/Judgement: Decreased awareness of environment;Decreased awareness of need for assistance;Decreased awareness of need for safety;Decreased insight into deficits; Fall prevention  Cues: Tactile cues provided;Verbal cues provided;Visual cues provided     Feeding  Feeding Assistance: Supervision/set-up  Container Management: Supervision/set-up  Cutting Food: Independent  Utensil Management: Independent  Food to Mouth: Independent  Drink to Mouth: Independent   Lower Body Bathing  Bathing Assistance: Moderate assistance  Perineal  : Supervision/set-up; Maximum assistance (pt did anterior care, OT did posterior)  Position Performed: Supine  Cues: Physical assistance pants down;Physical assistance pants up; Tactile cues provided;Verbal cues provided;Visual cues provided Toileting  Toileting Assistance: Supervision/set up (for urinal , but spilled it all over himself)  Bladder Hygiene: Supervision/set-up  Clothing Management: Total assistance (dependent)  Cues: Physical assistance for pants down;Physical assistance for pants up; Tactile cues provided;Verbal cues provided;Visual cues provided   Upper Body Dressing Assistance  Dressing Assistance: Maximum assistance  Hospital Gown: Maximum assistance     Lower Body Dressing Assistance  Protective Undergarmet: Total assistance (dependent)  Socks: Total assistance (dependent) Bed/Mat Mobility  Rolling: Contact guard assistance  Supine to Sit: Contact guard assistance  Sit to Supine:  (up to recliner after brief change in bed)  Sit to Stand: Assist x2; Moderate assistance;Minimum assistance  Bed to Chair: Assist x2; Moderate assistance  Scooting: Contact guard assistance          Most Recent Physical Functioning:   Gross Assessment:                  Posture:  Posture (WDL): Exceptions to WDL  Posture Assessment:  Forward head, Rounded shoulders  Balance:  Sitting: Intact  Sitting - Static: Good (unsupported)  Sitting - Dynamic: Fair (occasional)  Standing: Impaired  Standing - Static: Fair  Standing - Dynamic : Poor (not maintaining R LE NWB, is heel weight bearing) Bed Mobility:  Rolling: Contact guard assistance  Supine to Sit: Contact guard assistance  Sit to Supine:  (up to recliner after brief change in bed)  Scooting: Contact guard assistance  Wheelchair Mobility:     Transfers:  Sit to Stand: Assist x2; Moderate assistance;Minimum assistance  Stand to Sit: Assist x2;Minimum assistance  Bed to Chair: Assist x2; Moderate assistance      ROM:          Decreased; functional  Strength:          Decreased; functional  Mental Status:          Intermittent confusion  Activities of Daily Living:         Requires assistance  Basic ADLs (From Assessment) Complex ADLs (From Assessment)   Basic ADL  Feeding: Modified independent  Oral Facial Hygiene/Grooming: Setup  Bathing: Moderate assistance  Upper Body Dressing: Setup  Lower Body Dressing: Moderate assistance  Toileting: Moderate assistance     Grooming/Bathing/Dressing Activities of Daily Living   Grooming  Grooming Assistance: Supervision/set up  Washing Face: Supervision/set-up  Washing Hands: Supervision/set-up  Brushing Teeth: Supervision/set-up Cognitive Retraining  Orientation Retraining: Reorienting  Problem Solving: General alternative solution; Identifying the problem; Identifying the task;Deductive reason; Awareness of environment  Executive Functions: Executing cognitive plans;Managing time;Regulating behavior  Organizing/Sequencing: Breaking task down;Prioritizing  Attention to Task: Distractibility  Maintains Attention For (Time): 30 seconds  Following Commands: Follows one step commands/directions  Safety/Judgement: Decreased awareness of environment;Decreased awareness of need for assistance;Decreased awareness of need for safety;Decreased insight into deficits; Fall prevention  Cues: Tactile cues provided;Verbal cues provided;Visual cues provided     Feeding  Feeding Assistance: Supervision/set-up  Container Management: Supervision/set-up  Cutting Food: Independent  Utensil Management: Independent  Food to Mouth: Independent  Drink to Mouth:  Independent   Lower Body Bathing  Bathing Assistance: Moderate assistance  Perineal  : Supervision/set-up; Maximum assistance (pt did anterior care, OT did posterior)  Position Performed: Supine  Cues: Physical assistance pants down;Physical assistance pants up; Tactile cues provided;Verbal cues provided;Visual cues provided Toileting  Toileting Assistance: Supervision/set up (for urinal , but spilled it all over himself)  Bladder Hygiene: Supervision/set-up  Clothing Management: Total assistance (dependent)  Cues: Physical assistance for pants down;Physical assistance for pants up; Tactile cues provided;Verbal cues provided;Visual cues provided   Upper Body Dressing Assistance  Dressing Assistance: Maximum assistance  Hospital Gown: Maximum assistance     Lower Body Dressing Assistance  Protective Undergarmet: Total assistance (dependent)  Socks: Total assistance (dependent) Bed/Mat Mobility  Rolling: Contact guard assistance  Supine to Sit: Contact guard assistance  Sit to Supine:  (up to recliner after brief change in bed)  Sit to Stand: Assist x2; Moderate assistance;Minimum assistance  Bed to Chair: Assist x2; Moderate assistance  Scooting: Contact guard assistance                Patient Vitals for the past 6 hrs:   BP BP Patient Position SpO2 Pulse   01/24/17 0950 145/71 Head of bed elevated (Comment degrees); Supine 95 % 95        Mental Status  Neurologic State: Alert  Orientation Level: Oriented to person, Oriented to place, Oriented to situation  Cognition: Decreased command following, Decreased attention/concentration (not maintaining R LE NWB)  Perception: Appears intact  Perseveration: No perseveration noted  Safety/Judgement: Decreased awareness of environment, Decreased awareness of need for assistance, Decreased awareness of need for safety, Decreased insight into deficits, Fall prevention                               Physical Skills Involved:  1. Range of Motion  2. Balance  3. Mobility  4. Strength  5.  Endurance Cognitive Skills Affected (resulting in the inability to perform in a timely and safe manner):  1. Attending  2. Perceiving  3. Thinking  4. Understanding  5. Problem Solving  6. Mental Sequencing  7. Learning  8. Remembering Psychosocial Skills Affected:  1. Routines and Behaviors  2. Active Use of Coping Strategies  3. Environmental Adaptations   Number of elements that affect the Plan of Care: 5+:  HIGH COMPLEXITY   CLINICAL DECISION MAKIN [facility] Mobility Inpatient Short Form  How much help from another person does the patient currently need. .. Total A Lot A Little None   1. Putting on and taking off regular lower body clothing?   [ ] 1   [x ] 2   [ ] 3   [ ] 4   2. Bathing (including washing, rinsing, drying)? [ ] 1   [x ] 2   [ ] 3   [ ] 4   3. Toileting, which includes using toilet, bedpan or urinal?   [ ] 1   [x ] 2   [ ] 3   [ ] 4   4. Putting on and taking off regular upper body clothing?   [ ] 1   [ ] 2   [x ] 3   [ ] 4   5. Taking care of personal grooming such as brushing teeth? [ ] 1   [ ] 2   [x ] 3   [ ] 4   6. Eating meals? [ ] 1   [ ] 2   [ ] 3   [x ] 4   © , Trustees of [address] [place] [address], under license to [employer]. All rights reserved    Score:  Initial: 16 Most Recent: X (Date: -- )     Interpretation of Tool:  Represents activities that are increasingly more difficult (i.e. Bed mobility, Transfers, Gait). Score 24 23 22-20 19-15 14-10 9-7 6       Modifier CH CI CJ CK CL CM CN        ? Self Care:     - CURRENT STATUS: CK - 40%-59% impaired, limited or restricted    - GOAL STATUS: CJ - 20%-39% impaired, limited or restricted    - D/C STATUS:  ---------------To be determined---------------  Payor: CARE IMPROVEMENT PLUS [place] / Product Type: [employer] Medicare /       Medical Necessity:     · Patient demonstrates good rehab potential due to higher previous functional level.   Reason for Services/Other Comments:  · Patient continues to require skilled intervention due to decreased ability to perform self care. Use of outcome tool(s) and clinical judgement create a POC that gives a: MODERATE COMPLEXITY             TREATMENT:   (In addition to Assessment/Re-Assessment sessions the following treatments were rendered)      Pre-treatment Symptoms/Complaints:  Decreased ability to perform ADLs, self care, and functional mobility; decreased tolerance for activities, poor motivation  Pain: Initial:   Pain Intensity 1: 0 (no complaint of pain during therapy)  Pain Intervention(s) 1: Ambulation/Increased Activity, Repositioned, Rest, Nurse notified at rest, increases with movement Post Session:  0 at rest      Self Care: (23 mins): Procedure(s) (per grid) utilized to improve and/or restore self-care/home management as related to dressing, bathing, toileting, grooming and self feeding. Required moderate visual, verbal, tactile and   cueing to facilitate activities of daily living skills and compensatory activities. Got up to the recliner. UE Exercises (with theraband and dowel) Date:  1/19/2017 Date:   Date:     Activity/Exercise Parameters Parameters Parameters   Shoulder Abd/Adduction 15 reps     Shoulder Flexion 15 reps     Elbow Flexion 15 reps     Punches 15 reps                              Treatment/Session Assessment:         Response to Treatment:  In agreement to oxana CA. Interdisciplinary Collaboration:   · Physical Therapy Assistant  · Certified Occupational Therapy Assistant  · Registered Nurse  · Physician  ·   · Certified Nursing Assistant/Patient Care Technician     After treatment position/precautions:   · Up in chair  · Bed/Chair-wheels locked  · Call light within reach  · RN notified     Compliance with Program/Exercises: poorly compliant with R LE NWB and needs maximal encouragement. Recommendations/Intent for next treatment session:   \"Next visit will focus on advancements to more challenging activities and reduction in assistance provided\"       Total Treatment Duration:  23 mins  OT Patient Time In/Time Out  Time In: 1310  Time Out: [facility] [place] [address], OT  Solitario Romero MS, OTR/L

## 2017-01-24 NOTE — PROGRESS NOTES
1/24/2017 12:38 PM    Admit Date: 1/5/2017    Admit Diagnosis: Gangrene of toe ([facility] Shobha Gonzalez; Diabetic foot ulcer associated *      Subjective:   No cp or sob      Objective:      Visit Vitals    /71 (BP 1 Location: Left arm, BP Patient Position: Head of bed elevated (Comment degrees); Supine)    Pulse 95    Temp 98.5 °F (36.9 °C)    Resp 18    Ht 6' 1\" (1.854 m)    Wt 115.9 kg (255 lb 8 oz)    SpO2 95%    BMI 33.71 kg/m2       Physical Exam:  Gregory Santo, Well Nourished, No Acute Distress, Alert & Oriented x 3, appropriate mood. Neck- supple, no JVD  CV- regular rate and rhythm no MRG  Lung- clear bilaterally  Abd- soft, nontender, nondistended  Ext- no edema bilaterally. Skin- warm and dry        Data Review:   Recent Labs      01/24/17   0500   01/23/17   0607   NA  145   < >   --    K  3.1*   < >   --    BUN  15   < >   --    CREA  1.98*   < >  2.14*   WBC   --    --   6.8   HGB   --    --   8.2*   HCT   --    --   25.9*   PLT   --    --   342    < > = values in this interval not displayed. Assessment/Plan:     Principal Problem:    Type 2 diabetes mellitus with right diabetic foot infection ([facility] (1/5/2017)    Active Problems:    Type 2 diabetes mellitus with hyperglycemia ([facility] (7/29/2012)      HTN (hypertension) (7/29/2012)Stable. Continue current medical therapy. Chronic systolic congestive heart failure ([facility] (12/30/2015)Stable. Continue current medical therapy.       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

## 2017-01-24 NOTE — DIABETES MGMT
Pt s/p I&D right foot infection with debridement 1/11/17 and also 1/20/17 is seen by RN ANNAMARIEE for continued diabetes education. Pt remains on antibiotics. Reviewed with pt current insulin regimen: Lantus dose 14 units daily and Humalog 5 units ac tid with additional Humalog per sliding scale. Verbalizes basic understanding, but does not engage any further in conversation. Blood glucose ranged 111-149 yesterday with total of 19 units insulin given (Lantus 14 units; Humalog 5 units). FBS 99 this morning. Attempted to explain to pt importance of good glycemic control and good nutrition for wound healing, but pt not engaging further in conversation. Discussed with Dr. Madalyn Powell FBS 99/pt ate approximately 35 grams of carbohydrate for breakfast (only drank apple juice and 3/4 can glucerna for breakfast). Per Dr. Almanzar Leisure is to give pt 3 units Humalog rather than 5 units Humalog ordered with breakfast this morning. Pt practiced using insulin demo pen earlier last week. Pt's goddaughter (who lives with him) was educated re: proper use of insulin pen and able to return demonstrate insulin pen use last week as well. Plan is to continue diabetes education next only as pt is willing to participate. Pt has no questions at this time.  Nursing will continue to monitor blood glucose.

## 2017-01-24 NOTE — PROGRESS NOTES
Bedside shift change report given to oncoming nurse, Bina Cobb. Pt resting in bed. Heels floated off mattress. Assessment unchanged. Pt has dual port right internal jugular IV line that flushes well and has good blood returns.

## 2017-01-24 NOTE — PROGRESS NOTES
Infectious Disease Progress Note    Today's Date: 1/24/2017   Admit Date: 1/5/2017    Impression:   · R foot acute osteomyelitis of 5th toe and MT, with deep space abscess: s/p I&D, 1/11, 5th toe/MT amputation: Cx Staph caprae, Strep agalactiae, and Staph lugdunensis; pathology showed acute and chronic inflammation with uninvolved margins. Repeat I&D 1/20, prep for skin grafting. · S/P right lower extremity arteriogram with third order select catheterization, ultrasound-guided access (1/19)  · Uncontrolled DM: hgb A1c 9.1  · PAD: abnormal RLE MAGGIE with occlusive disease, unable to undergo eval/intervention presently sec to MO  · Acute on CKD: creatinine appears stable ~2  · Medical non-compliance    Plan:   · Continue Ceftriaxone IV and Flagyl PO; will plan six week course of treatment for potential residual osteomyelitis, EOT 2/22/17. · Discussed with case management. [facility] Miller has declined patient and he will go to SNF. ID Recommendations for Plan of Care: (Note that ID cannot follow patient while at SNF). Routine care of CL (R chest)  Continue ceftriaxone 2 g IV Q 24 hrs through 2/22/17  Continue metronidazole 500 mg PO Q 8 hrs through 2/22/17  Q Monday monitoring labs:   CBC with diff, serum creatinine, LFTs, ESR, non-cardiac inflammatory CRP  Follow up with ID at EOT, will be scheduled. Anti-infectives:   Ceftriaxone and Flagyl (1/13-  Vancomycin and Zosyn (1/5-1/13)    Subjective:   Date of Consultation:  January 24, 2017  Referring Physician: Dr. Gretta Menezes, awakens to voice, does not participate fully in exam today; denies nausea, vomiting. Allergies   Allergen Reactions    Lortab [Hydrocodone-Acetaminophen] Itching        Review of Systems: complete ROS not done. Review of systems not obtained due to patient factors. Objective:     Visit Vitals    /71 (BP 1 Location: Left arm, BP Patient Position: Head of bed elevated (Comment degrees); Supine)    Pulse 95    Temp 98.5 °F (36.9 °C)    Resp 18    Ht 6' 1\" (1.854 m)    Wt 115.9 kg (255 lb 8 oz)    SpO2 95%    BMI 33.71 kg/m2     Temp (24hrs), Av.3 °F (36.8 °C), Min:97.6 °F (36.4 °C), Max:98.9 °F (37.2 °C)       Lines:  R chest CL          Physical Exam:    General:  Drowsy, obese, well developed, appears stated age   Eyes:  Sclera anicteric. Pupils equally round and reactive to light. Mouth/Throat: Mucous membranes normal, oral pharynx clear   Neck: Supple, trachea midline   Lungs:   Clear to auscultation bilaterally, good effort, anterior position   CV:  Irregular rate and rhythm, no audible murmur, click, rub or gallop   Abdomen:   Soft, obese, non-tender.  bowel sounds very active, non-distended   Extremities: Bilateral LE 2-3+ edema, from thighs to ankles; no erythema, sluggish cap refill R foot    Skin: R foot wound is wrapped in surgical wrap with surgical instructions to leave in place; toes swollen, warm to touch   Lymph nodes: Not assessed today   Musculoskeletal: No deformity except surgical removal 5th MT, R thumb amputation   Lines/Devices:  Intact, no erythema, drainage or tenderness   Psych: Dozing/oriented       Data Review:     CBC:  Recent Labs      17   0607  17   0546   WBC  6.8  8.5   GRANS  73  75   MONOS  8  6   EOS  4  3   ANEU  5.0  6.4   ABL  1.0  1.3   HGB  8.2*  8.6*   HCT  25.9*  26.9*   PLT  342  374       BMP:  Recent Labs      17   0500  17   1353  17   0607  17   0546   CREA  1.98*  2.20*  2.14*  1.96*   BUN  15  18   --   16   NA  145  145   --   145   K  3.1*  3.4*   --   3.4*   CL  110*  110*   --   112*   CO2  28  24   --   24   AGAP  7  11   --   9   GLU  99  111*   --   78       LFTS:  Recent Labs      17   0607   TBILI  0.2   ALT  12   SGOT  21   AP  56   TP  6.2*   ALB  1.2*       Microbiology:     All Micro Results     Procedure Component Value Units Date/Time    CULTURE, ANAEROBIC [[id number]] Collected:  17 1416    Order Status: Completed Specimen:  Bone Updated:  1759     Special Requests: 5TH METATARSAL HEAD      Culture result:         NO ANAEROBES ISOLATED 7 DAYS    CULTURE, ANAEROBIC [[id number]] Collected:  17 1400    Order Status:  Completed Specimen:  Foot Updated:  1759     Special Requests: RIGHT FOOT DEEP WOUND      Culture result:         NO ANAEROBES ISOLATED 7 DAYS    CULTURE, WOUND Elease Hussar STAIN [[id number]]  (Susceptibility) Collected:  17 1400    Order Status:  Completed Specimen:  Foot Updated:  01/15/17 0927     Special Requests: RIGHT FOOT DEEP WOUND      GRAM STAIN 0 TO 5 WBC'S/OIF       RARE  GRAM POSITIVE COCCI        Culture result: SCANT  STREPTOCOCCUS AGALACTIAE SERO GROUP B         LIGHT  STAPHYLOCOCCUS LUGDUNENSIS       CULTURE, TISSUE Elease Hussar STAIN [[id number]]  (Susceptibility) Collected:  17 1416    Order Status:  Completed Specimen:  Bone Updated:  01/15/17 0924     Special Requests: 5TH METATARSAL HEAD      GRAM STAIN NO  WBCS SEEN         NO DEFINITE ORGANISM SEEN        Culture result: SCANT  STAPHYLOCOCCUS CAPRAE         CORRECTED RESULT CALLED TO AND CORRECTLY REPEATED BY:  Luis Alfredo PINA CP ON 17 AT 1123       CULTURE, BLOOD [[id number]] Collected:  17 1820    Order Status:  Completed Specimen:  Blood from Blood Updated:  01/10/17 0833     Special Requests: RIGHT ANTECUBITAL        Culture result: NO GROWTH 5 DAYS       CULTURE, BLOOD [[id number]] Collected:  17 1800    Order Status:  Completed Specimen:  Blood from Blood Updated:  01/10/17 0833     Special Requests: LEFT ANTECUBITAL        Culture result: NO GROWTH 5 DAYS       C. DIFFICILE/EPI PCR [[id number]] Collected:  17 1300    Order Status:  Canceled Specimen:  Stool           Imagin/8/17: Bone scan  IMPRESSION: Increased radiotracer activity on all 3 phases within the right foot compared to the left.  This localizes on the delayed image to the fifth  metatarsal bone, suggesting osteomyelitis at this site. 1/7/17 RLE arterial duplex  IMPRESSION: Abnormal right MAGGIE consistent with severe arterial disease. Noncompressible left lower extremity tibial arteries. Diminished right great toe  pressure. 1/5/17 R foot Xray  Impression:  1. No strong evidence for osteomyelitis. A three phase bone scan or contrasted MRI can be considered if there is continued concern for osteomyelitis.   2. Lucencies in the soft tissues about the proximal phalanx of the fifth digit. Soft tissue gas as can occur with a gas producing infection (i.e. gas gangrene)  cannot be excluded.     Signed By: Anna Malone NP     January 24, 2017

## 2017-01-24 NOTE — PROGRESS NOTES
Hospitalist Progress Note    2017  Admit Date: 2017  5:07 PM   NAME: Narayan Monteiro   :  1945   MRN:  [id number]   Attending: Ekta Ferguson DO  PCP:  None      Admitted for:osteomyelitis right lower extremity s/p debridement and    SUBJECTIVE:   Patient this morning has no complaints no appetite, but no vomiting no shortness of breath. Pain in foot relieved with pain meds    Hospital Course  Pt is a 74 yo male who presented with progressive pain and poor healing of right foot wound over 1 month, admitted with infected DM foot. PMH includes DM (A1C 9.2 2016), CHF, CKD, current smoker. X Ray of right foot had low suspicion for osteomyelitis but does reveal soft tissue lucencies, possible gas producing infection. Surgery and Vascular were consulted. He had bedside debridement on  and surgical I&D/5th toe amputation . He was started on Vanc/Zosyn in ER. ID has seen - changed atbx  to IV Rocephin/PO Flagyl. 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 Will need 6 weeks antbx per ID recs. Also seen by vascular surgery due to abnormal right MAGGIE and poor postoperative wound healing. Review of Systems negative with exception of pertinent positives noted above  Past medical history unchanged from H&P    PHYSICAL EXAM     Visit Vitals    /71 (BP 1 Location: Left arm, BP Patient Position: Head of bed elevated (Comment degrees); Supine)    Pulse 95    Temp 98.5 °F (36.9 °C)    Resp 18    Ht 6' 1\" (1.854 m)    Wt 115.9 kg (255 lb 8 oz)    SpO2 95%    BMI 33.71 kg/m2      Temp (24hrs), Av.4 °F (36.9 °C), Min:97.6 °F (36.4 °C), Max:98.9 °F (37.2 °C)    Oxygen Therapy  O2 Sat (%): 95 % (17 0950)  Pulse via Oximetry: 93 beats per minute (17 0341)  O2 Device: Room air (17 1810)  O2 Flow Rate (L/min): 2 l/min (17 1440)    Intake/Output Summary (Last 24 hours) at 17 1234  Last data filed at 17 0900   Gross per 24 hour   Intake 720 ml   Output              650 ml   Net               70 ml        General: No acute distress  mucous membranes pink and moist acyanotic anicteric  Neck:  Supple full range of motion  Lungs:  Air entry equal bilaterally, no crepitations rales rhonchi   Heart:  Regular rate and rhythm,  No murmur, rub, or gallop  Abdomen: Soft, Non distended, Non tender, no rebound guarding Positive bowel sounds  Extremities: No cyanosis, clubbing or edema  Neurologic:  No focal deficits  Musculoskeletal: no   Joint swelling tenderness erythema  Skin:  No erythema, rashes noted          LAB  Recent Labs      01/24/17   1114  01/24/17   0718  01/23/17 2009 01/23/17   1728  01/23/17   1151   GLUCPOC  139*  96  154*  126*  132*      Recent Labs      01/23/17   0607   WBC  6.8   HGB  8.2*   HCT  25.9*   PLT  342     Recent Labs      01/24/17   0500   01/23/17   0607   NA  145   < >   --    K  3.1*   < >   --    CL  110*   < >   --    CO2  28   < >   --    GLU  99   < >   --    BUN  15   < >   --    CREA  1.98*   < >  2.14*   CA  7.6*   < >   --    ALB   --    --   1.2*   TBILI   --    --   0.2   ALT   --    --   12   SGOT   --    --   21    < > = values in this interval not displayed. EKG and imaging reviewed personally by me  No results found. Results for orders placed or performed during the hospital encounter of 01/05/17   EKG, 12 LEAD, INITIAL   Result Value Ref Range    Systolic BP  mmHg    Diastolic BP  mmHg    Ventricular Rate 98 BPM    Atrial Rate 98 BPM    P-R Interval 166 ms    QRS Duration 74 ms    Q-T Interval 352 ms    QTC Calculation (Bezet) 449 ms    Calculated P Axis 62 degrees    Calculated R Axis 46 degrees    Calculated T Axis -74 degrees    Diagnosis       !! AGE AND GENDER SPECIFIC ECG ANALYSIS !!   Sinus rhythm with occasional Premature ventricular complexes  ST & T wave abnormality, consider inferolateral ischemia  Abnormal ECG  Confirmed by ST LYNNETTE JOSE MD (), JOSE BASHIR ([id number]) on 1/5/2017 7:42:32 PM XR Results (most recent):    Results from Hospital Encounter encounter on 01/05/17   XR CHEST PA LAT   Narrative CHEST X-RAY, 2 views 1/5/2017    History: Fever/chills/sepsis. Technique: PA and lateral views of the chest.     Comparison: Chest x-ray 12/30/2015    Findings: The cardiac silhouette is normal in respect to size. The lungs are expanded  without evidence for pneumothorax. No consolidation, or evidence of pleural  effusion is seen. The bony thorax demonstrates no acute changes. The upper abdomen is  unremarkable in appearance. Impression IMPRESSION:   1. No acute cardiopulmonary process evident by plain film imaging. 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 ([address] 01/17/2017    Sepsis due to cellulitis ([address] 01/09/2017    Acute renal failure with tubular necrosis ([address] 01/06/2017    Type 2 diabetes mellitus with right diabetic foot infection ([address] 01/05/2017    Kidney disease, chronic, stage III (moderate, EGFR 30-59 ml/min) 08/11/2016    Chronic systolic congestive heart failure ([address] 12/30/2015     EF 35-40% on 2015 Echo      Type 2 diabetes mellitus with hyperglycemia ([address] 07/29/2012    HTN (hypertension) 07/29/2012       ASSESSMENT  AND PLAN      · Diabetic foot with osteomyelitis - on rocephin and flagyl as per ID EOT 2/22 plan is for wound vac tomorrow and rehab +/- hyperbaric treatments  · DM - doing better today will continue to monitor  · Anemia of CKD - currently stable   · Demand ischemia - EF now 45-50%. Cardiology Signed off today    DVT Prophylaxis: no heparin due to anemia and risk for bleeding?   No scds due to severe PAD  dispo - denied regency, plan for rehab tomorrow,with or without hyperbaric treatment    Signed By: Donovan Howard MD     January 24, 2017

## 2017-01-25 LAB
ANION GAP BLD CALC-SCNC: 10 MMOL/L (ref 7–16)
BUN SERPL-MCNC: 14 MG/DL (ref 8–23)
CALCIUM SERPL-MCNC: 7.3 MG/DL (ref 8.3–10.4)
CHLORIDE SERPL-SCNC: 108 MMOL/L (ref 98–107)
CO2 SERPL-SCNC: 26 MMOL/L (ref 21–32)
CREAT SERPL-MCNC: 1.94 MG/DL (ref 0.8–1.5)
GLUCOSE BLD STRIP.AUTO-MCNC: 124 MG/DL (ref 65–100)
GLUCOSE BLD STRIP.AUTO-MCNC: 127 MG/DL (ref 65–100)
GLUCOSE BLD STRIP.AUTO-MCNC: 130 MG/DL (ref 65–100)
GLUCOSE BLD STRIP.AUTO-MCNC: 152 MG/DL (ref 65–100)
GLUCOSE SERPL-MCNC: 132 MG/DL (ref 65–100)
MAGNESIUM SERPL-MCNC: 1.5 MG/DL (ref 1.8–2.4)
POTASSIUM SERPL-SCNC: 3.1 MMOL/L (ref 3.5–5.1)
SODIUM SERPL-SCNC: 144 MMOL/L (ref 136–145)

## 2017-01-25 PROCEDURE — 74750000023 HC WOUND THERAPY

## 2017-01-25 PROCEDURE — 97605 NEG PRS WND THER DME<=50SQCM: CPT

## 2017-01-25 PROCEDURE — 97150 GROUP THERAPEUTIC PROCEDURES: CPT

## 2017-01-25 PROCEDURE — 74011250637 HC RX REV CODE- 250/637: Performed by: INTERNAL MEDICINE

## 2017-01-25 PROCEDURE — 74011636637 HC RX REV CODE- 636/637: Performed by: HOSPITALIST

## 2017-01-25 PROCEDURE — 83735 ASSAY OF MAGNESIUM: CPT | Performed by: INTERNAL MEDICINE

## 2017-01-25 PROCEDURE — 74011250637 HC RX REV CODE- 250/637: Performed by: NURSE PRACTITIONER

## 2017-01-25 PROCEDURE — 65270000029 HC RM PRIVATE

## 2017-01-25 PROCEDURE — 74011250636 HC RX REV CODE- 250/636: Performed by: SURGERY

## 2017-01-25 PROCEDURE — 74011250636 HC RX REV CODE- 250/636: Performed by: NURSE PRACTITIONER

## 2017-01-25 PROCEDURE — 74011250637 HC RX REV CODE- 250/637: Performed by: FAMILY MEDICINE

## 2017-01-25 PROCEDURE — 77030019934 HC DRSG VAC ASST KCON -B

## 2017-01-25 PROCEDURE — 74011000258 HC RX REV CODE- 258: Performed by: NURSE PRACTITIONER

## 2017-01-25 PROCEDURE — 82962 GLUCOSE BLOOD TEST: CPT

## 2017-01-25 PROCEDURE — 77030019952 HC CANSTR VAC ASST KCON -B

## 2017-01-25 PROCEDURE — 80048 BASIC METABOLIC PNL TOTAL CA: CPT | Performed by: INTERNAL MEDICINE

## 2017-01-25 PROCEDURE — 97530 THERAPEUTIC ACTIVITIES: CPT

## 2017-01-25 RX ORDER — METOCLOPRAMIDE 10 MG/1
5 TABLET ORAL
Status: DISCONTINUED | OUTPATIENT
Start: 2017-01-25 | End: 2017-01-26

## 2017-01-25 RX ORDER — POTASSIUM CHLORIDE 20 MEQ/1
40 TABLET, EXTENDED RELEASE ORAL DAILY
Status: DISCONTINUED | OUTPATIENT
Start: 2017-01-25 | End: 2017-02-02 | Stop reason: HOSPADM

## 2017-01-25 RX ORDER — INSULIN GLARGINE 100 [IU]/ML
12 INJECTION, SOLUTION SUBCUTANEOUS DAILY
Status: DISCONTINUED | OUTPATIENT
Start: 2017-01-26 | End: 2017-01-26

## 2017-01-25 RX ADMIN — INSULIN LISPRO 5 UNITS: 100 INJECTION, SOLUTION INTRAVENOUS; SUBCUTANEOUS at 17:10

## 2017-01-25 RX ADMIN — ISOSORBIDE DINITRATE 10 MG: 10 TABLET ORAL at 09:32

## 2017-01-25 RX ADMIN — INSULIN GLARGINE 14 UNITS: 100 INJECTION, SOLUTION SUBCUTANEOUS at 09:33

## 2017-01-25 RX ADMIN — AMLODIPINE BESYLATE 5 MG: 5 TABLET ORAL at 09:32

## 2017-01-25 RX ADMIN — METRONIDAZOLE 500 MG: 500 TABLET ORAL at 17:12

## 2017-01-25 RX ADMIN — CEFTRIAXONE 2 G: 2 INJECTION, POWDER, FOR SOLUTION INTRAMUSCULAR; INTRAVENOUS at 17:12

## 2017-01-25 RX ADMIN — HYDRALAZINE HYDROCHLORIDE 25 MG: 25 TABLET ORAL at 21:41

## 2017-01-25 RX ADMIN — ATORVASTATIN CALCIUM 80 MG: 40 TABLET, FILM COATED ORAL at 09:31

## 2017-01-25 RX ADMIN — PANTOPRAZOLE SODIUM 40 MG: 40 TABLET, DELAYED RELEASE ORAL at 06:00

## 2017-01-25 RX ADMIN — METRONIDAZOLE 500 MG: 500 TABLET ORAL at 21:41

## 2017-01-25 RX ADMIN — TORSEMIDE 40 MG: 20 TABLET ORAL at 09:32

## 2017-01-25 RX ADMIN — TRAMADOL HYDROCHLORIDE 50 MG: 50 TABLET, FILM COATED ORAL at 21:41

## 2017-01-25 RX ADMIN — METOPROLOL SUCCINATE 100 MG: 100 TABLET, EXTENDED RELEASE ORAL at 09:32

## 2017-01-25 RX ADMIN — METRONIDAZOLE 500 MG: 500 TABLET ORAL at 09:00

## 2017-01-25 RX ADMIN — INSULIN LISPRO 2 UNITS: 100 INJECTION, SOLUTION INTRAVENOUS; SUBCUTANEOUS at 17:11

## 2017-01-25 RX ADMIN — ISOSORBIDE DINITRATE 10 MG: 10 TABLET ORAL at 17:11

## 2017-01-25 RX ADMIN — Medication 10 ML: at 06:00

## 2017-01-25 RX ADMIN — POTASSIUM CHLORIDE 40 MEQ: 20 TABLET, EXTENDED RELEASE ORAL at 09:32

## 2017-01-25 RX ADMIN — ISOSORBIDE DINITRATE 10 MG: 10 TABLET ORAL at 21:40

## 2017-01-25 RX ADMIN — INSULIN LISPRO 5 UNITS: 100 INJECTION, SOLUTION INTRAVENOUS; SUBCUTANEOUS at 09:34

## 2017-01-25 RX ADMIN — HYDRALAZINE HYDROCHLORIDE 25 MG: 25 TABLET ORAL at 17:11

## 2017-01-25 RX ADMIN — HYDRALAZINE HYDROCHLORIDE 25 MG: 25 TABLET ORAL at 09:33

## 2017-01-25 RX ADMIN — Medication 10 ML: at 21:41

## 2017-01-25 RX ADMIN — METOCLOPRAMIDE 5 MG: 10 TABLET ORAL at 17:50

## 2017-01-25 RX ADMIN — SODIUM CHLORIDE 125 ML/HR: 900 INJECTION, SOLUTION INTRAVENOUS at 12:56

## 2017-01-25 RX ADMIN — INSULIN LISPRO 5 UNITS: 100 INJECTION, SOLUTION INTRAVENOUS; SUBCUTANEOUS at 12:56

## 2017-01-25 NOTE — PROGRESS NOTES
PLAN:  Cont management per Hospitalist  Needs good BS and BP control  Nutrition consult -- last Albumin 1.2 (calorie count or supplement adjustment? ? ) - defer to primary team -- will order today  K+ remains 3.1 -- supplementation per primary team -- will order supplementation today  Vas Surgery s/o -- had A-gram -- no revasc options  Smoking Cessation imperative - discussed  Wound care -- Wound RN to place [facility] today - will re-eval on Friday  Abx -- per ID  CM -- needs arrangement for HBO, abx, wound vac and PT (NWB to RLE) -- needs WC -- difficult situation -- CM aware and working on solutions/options    Appreciate Cardiology assist -- cleared for surgery or HBO therapy      ASSESSMENT:  Admit Date: 1/5/2017   Procedure(s): 1/11/17   I&D OF DEEP SPACE R FOOT INFECTION WITH DEBRIDEMENT OF SKIN, SOFT TISSUE AND BONE INCLUDING RIGHT 5TH TOE AND METATARSAL, BONE BIOPSY FOR CULTURE  Procedure(s): 1/20/17  RIGHT FOOT DEBRIDEMENT, PREP FOR GRAFTING AND SKIN SUBSTITUTE GRAFT    Principal Problem:    Type 2 diabetes mellitus with right diabetic foot infection ([address] (1/5/2017)    ----necrotizing infection with acute osteomyelitis and abscess in face of arterial disease with insufficiency. Foot debrided of infection including 5th toe and 5th metatarsal.    Active Problems:    Type 2 diabetes mellitus with hyperglycemia ([address] (7/29/2012)      HTN (hypertension) (7/29/2012)      Chronic systolic congestive heart failure ([address] (12/30/2015)      Overview: EF 35-40% on 2015 Echo      Kidney disease, chronic, stage III (moderate, EGFR 30-59 ml/min) (8/11/2016)      Acute renal failure with tubular necrosis ([address] (1/6/2017)      NSTEMI (non-ST elevated myocardial infarction) ([address] (1/5/2017)      Sepsis due to cellulitis ([address] (1/9/2017)      SUBJECTIVE:  Resting in bed. Reports R foot pain tolerable but is \"sore. \" Remains on abx - ID following. Not eating -- does not like food. Nutrition status declining.  Discussed need to eat for wound healing and Pt aware - -but again states he doesn't like the food. AF, NAD, VSS. OBJECTIVE:  Constitutional: Alert, cooperative patient in no acute distress; appears stated age   Visit Vitals    /77    Pulse 93    Temp 98.9 °F (37.2 °C)    Resp 18    Ht 6' 1\" (1.854 m)    Wt 115.9 kg (255 lb 8 oz)    SpO2 97%    BMI 33.71 kg/m2     Eyes: Sclera are clear. ENMT: No obvious neck masses, no ear or lip lesions, R IJ tunneled cath c/d/i  CV: Impaired perfusion - pulses PT/DP via doppler  Resp: No JVD. Breathing is non-labored. No wheezing. On RA  GI: Soft, non-tender, non-distended, BS active   Musculoskeletal: Normal function. R thumb amputation  Extremities: R foot wrapped with bandage -- removed. No odor, skin substitute graft in place. Scant drainage. Less erythema around foot. Remains ttp. Cap refill good. Foot and toes a little cooler than leg/calf. Mild persistent edema. Neuro: Oriented, sensation to bilat feet intact  Psychiatric: Calm and cooperative    Patient Vitals for the past 24 hrs:   BP Temp Pulse Resp SpO2   01/25/17 0700 148/77 98.9 °F (37.2 °C) 93 18 97 %   01/25/17 0443 155/68 98.6 °F (37 °C) 89 18 97 %   01/25/17 0046 142/80 98.4 °F (36.9 °C) 93 18 96 %   01/24/17 2041 106/82 99.2 °F (37.3 °C) 91 18 96 %   01/24/17 1452 142/74 97.6 °F (36.4 °C) 88 18 96 %   01/24/17 0950 145/71 98.5 °F (36.9 °C) 95 - 95 %       Labs:    Recent Labs      01/25/17   0518   01/23/17   0607   WBC   --    --   6.8   HGB   --    --   8.2*   PLT   --    --   342   NA  144   < >   --    K  3.1*   < >   --    CL  108*   < >   --    CO2  26   < >   --    BUN  14   < >   --    CREA  1.94*   < >  2.14*   GLU  132*   < >   --    TBILI   --    --   0.2   CBIL   --    --   <0.1   SGOT   --    --   21   ALT   --    --   12   AP   --    --   56    < > = values in this interval not displayed.        MISHA Guzman    I have seen and examined the patient with the Nurse Practitioner and agree with the above assessment and plan. Ramila Witt.  Teresa Still MD

## 2017-01-25 NOTE — PROGRESS NOTES
Infectious Disease Progress Note    Today's Date: 2017   Admit Date: 2017    Impression:   · R foot acute osteomyelitis of 5th toe and MT, with deep space abscess: s/p I&D, , 5th toe/MT amputation: Cx Staph caprae, Strep agalactiae, and Staph lugdunensis; pathology showed acute and chronic inflammation with uninvolved margins. Repeat I&D , prep for skin grafting. · S/P right lower extremity arteriogram with third order select catheterization, ultrasound-guided access ()  · Uncontrolled DM: hgb A1c 9.1  · PAD: abnormal RLE MAGGIE with occlusive disease, unable to undergo eval/intervention presently sec to MO  · Acute on CKD: creatinine appears stable ~2  · Medical non-compliance    Plan:   · Continue Ceftriaxone IV and Flagyl PO X six weeks, EOT 17   · SNF, Psychiatric,  is planned    ID Recommendations for Plan of Care: (Note that ID cannot follow patient while at [facility]). Routine care of CL (R chest)  Continue ceftriaxone 2 g IV Q 24 hrs through 17  Continue metronidazole 500 mg PO Q 8 hrs through 17  Q Monday monitoring labs:   CBC with diff, serum creatinine, LFTs, ESR, non-cardiac inflammatory CRP  Follow up with ID scheduled for  17 @ 1000    Anti-infectives:   Ceftriaxone and Flagyl (-  Vancomycin and Zosyn (-)    Subjective:   Date of Consultation:  2017  Referring Physician: Dr. Vishnu Khan    Sitting up in chair today, NAD. Denies nausea, vomiting, diarrhea. Denies fever or chills. Denies pain. .    Allergies   Allergen Reactions    Lortab [Hydrocodone-Acetaminophen] Itching        Review of Systems: complete ROS not done. Review of systems not obtained due to patient factors.     Objective:     Visit Vitals    /79    Pulse 95    Temp 98 °F (36.7 °C)    Resp 19    Ht 6' 1\" (1.854 m)    Wt 115.9 kg (255 lb 8 oz)    SpO2 97%    BMI 33.71 kg/m2     Temp (24hrs), Av.5 °F (36.9 °C), Min:97.6 °F (36.4 °C), Max:99.2 °F (37.3 °C) Lines:  R chest CL          Physical Exam:    General:  Drowsy, obese, well developed, appears stated age   Eyes:  Sclera anicteric. Pupils equally round and reactive to light. Mouth/Throat: Mucous membranes normal, oral pharynx clear   Neck: Supple, trachea midline   Lungs:   Clear to auscultation bilaterally, good effort, anterior position   CV:  Irregular rate and rhythm, no audible murmur, click, rub or gallop   Abdomen:   Soft, obese, non-tender.  bowel sounds very active, non-distended   Extremities: Bilateral LE 2-3+ edema, from thighs to ankles; no erythema, sluggish cap refill R foot    Skin: R foot wound with wd vac and erythema extending from wd   Lymph nodes: Not assessed today   Musculoskeletal: No deformity except surgical removal 5th MT, R thumb amputation   Lines/Devices:  Intact, no erythema, drainage or tenderness   Psych: Dozing/oriented       Data Review:     CBC:  Recent Labs      01/23/17   0607   WBC  6.8   GRANS  73   MONOS  8   EOS  4   ANEU  5.0   ABL  1.0   HGB  8.2*   HCT  25.9*   PLT  342       BMP:  Recent Labs      01/25/17   0518  01/24/17   0500  01/23/17   1353   CREA  1.94*  1.98*  2.20*   BUN  14  15  18   NA  144  145  145   K  3.1*  3.1*  3.4*   CL  108*  110*  110*   CO2  26  28  24   AGAP  10  7  11   GLU  132*  99  111*       LFTS:  Recent Labs      01/23/17   0607   TBILI  0.2   ALT  12   SGOT  21   AP  56   TP  6.2*   ALB  1.2*       Microbiology:     All Micro Results     Procedure Component Value Units Date/Time    CULTURE, ANAEROBIC [[id number]] Collected:  01/11/17 1416    Order Status:  Completed Specimen:  Bone Updated:  01/18/17 0659     Special Requests: 5TH METATARSAL HEAD      Culture result:         NO ANAEROBES ISOLATED 7 DAYS    CULTURE, ANAEROBIC [[id number]] Collected:  01/11/17 1400    Order Status:  Completed Specimen:  Foot Updated:  01/18/17 0659     Special Requests: RIGHT FOOT DEEP WOUND      Culture result:         NO ANAEROBES ISOLATED 7 DAYS CULTURE, Vyas Guard STAIN [[id number]]  (Susceptibility) Collected:  17 1400    Order Status:  Completed Specimen:  Foot Updated:  01/15/17 0927     Special Requests: RIGHT FOOT DEEP WOUND      GRAM STAIN 0 TO 5 WBC'S/OIF       RARE  GRAM POSITIVE COCCI        Culture result: SCANT  STREPTOCOCCUS AGALACTIAE SERO GROUP B         LIGHT  STAPHYLOCOCCUS LUGDUNENSIS       CULTURE, TISSUE Thao Jackson STAIN [[id number]]  (Susceptibility) Collected:  17 1416    Order Status:  Completed Specimen:  Bone Updated:  01/15/17 0924     Special Requests: 5TH METATARSAL HEAD      GRAM STAIN NO  WBCS SEEN         NO DEFINITE ORGANISM SEEN        Culture result: SCANT  STAPHYLOCOCCUS CAPRAE         CORRECTED RESULT CALLED TO AND CORRECTLY REPEATED BY:  Pradeep Puente TO  ON 17 AT 1123       CULTURE, BLOOD [[id number]] Collected:  17 1820    Order Status:  Completed Specimen:  Blood from Blood Updated:  01/10/17 0833     Special Requests: RIGHT ANTECUBITAL        Culture result: NO GROWTH 5 DAYS       CULTURE, BLOOD [[id number]] Collected:  17 1800    Order Status:  Completed Specimen:  Blood from Blood Updated:  01/10/17 0833     Special Requests: LEFT ANTECUBITAL        Culture result: NO GROWTH 5 DAYS       C. DIFFICILE/EPI PCR [[id number]] Collected:  17 1300    Order Status:  Canceled Specimen:  Stool           Imagin/8/17: Bone scan  IMPRESSION: Increased radiotracer activity on all 3 phases within the right foot compared to the left. This localizes on the delayed image to the fifth  metatarsal bone, suggesting osteomyelitis at this site. 17 RLE arterial duplex  IMPRESSION: Abnormal right MAGGIE consistent with severe arterial disease. Noncompressible left lower extremity tibial arteries. Diminished right great toe  pressure. 17 R foot Xray  Impression:  1. No strong evidence for osteomyelitis.  A three phase bone scan or contrasted MRI can be considered if there is continued concern for osteomyelitis.   2. Lucencies in the soft tissues about the proximal phalanx of the fifth digit. Soft tissue gas as can occur with a gas producing infection (i.e. gas gangrene)  cannot be excluded.     Signed By: Mitali Toussaint NP     January 25, 2017

## 2017-01-25 NOTE — PROGRESS NOTES
Problem: Mobility Impaired (Adult and Pediatric)  Goal: *Acute Goals and Plan of Care (Insert Text)  Goals reviewed and updated 17  ST - 3 days  1. Pt will roll and get to EOB with minimal assist.  2.  Pt will sit edge of bed without falling over x 10 minutes for functional activity and exercise. 3.  Pt will go sit to stand and transfer to chair with heel touch WB with moderate assist.  4.  Pt will ambulate 5' with heel touch WB and rolling walker and moderate assist.  5.  Pt will perform LE exercises with minimal assist.    LTG: 3- 7 days  1. Pt will roll and get to EOB independent. 3.  Pt will go sit to stand and transfer to chair with heel touch WB with min assist and rolling walker. 4.  Pt will ambulate 10' with heel touch WB and rolling walker and minimal assist.  5.  Pt will perform LE exercises with verbal cues. PHYSICAL THERAPY: Daily Note, Treatment Day: 5th and AM 2017  INPATIENT: Hospital Day: 21  Payor: CARE IMPROVEMENT PLUS / [place] / Product Type: [employer] Medicare /     [employer] R LE     NAME/AGE/GENDER: Narayan Monteiro is a 73 y.o. male            PRIMARY DIAGNOSIS: Gangrene of toe ([address] Charley Nettle  Diabetic foot ulcer associated with type 1 diabetes mellitus, unspecified laterality ([address] [T57.578, L97.509]  Ulcer of right foot, with unspecified severity ([address] [L97.519] Type 2 diabetes mellitus with right diabetic foot infection ([address] Type 2 diabetes mellitus with right diabetic foot infection ([address]  Procedure(s) (LRB):  RIGHT FOOT DEBRIDEMENT  ,PREP FOR GRAFTING AND SKIN SUBSTITUTE GRAFT (Right)  5 Days Post-Op  ICD-10: Treatment Diagnosis: Generalized Muscle Weakness (M62.81)  Other lack of cordination (R27.8)  Difficulty in walking, Not elsewhere classified (R26.2)  Precautions/Allergies:   Lortab [hydrocodone-acetaminophen]       ASSESSMENT:      Mr. Halle Ramirez presents supine in bed agreed to therapy, wound vac on R foot. Pt was soiled on contact.   Pt performed rolling L<>R to be cleaned and new brief and gown donned. Pt performed supine to sit with CGA/SBA and sat edge of bed several minutes. Pt educated on and demonstrated SPT NWB. Pt was able to perform SPT with min/mod assist, however still putting wt thru R LE on his heel. Pt was later taken to therapy gym where he was able to participate in group exercises as below. pt does require increased cues to complete exercises correctly and complete correct number of reps. Pt was returned to room and left up in chair with LE's elevated and needs in reach. Pt did well with group exercises. Will continue with POC. This section established at most recent assessment   PROBLEM LIST (Impairments causing functional limitations):  1. Decreased Strength  2. Decreased ADL/Functional Activities  3. Decreased Transfer Abilities  4. Decreased Ambulation Ability/Technique  5. Decreased Balance  6. Increased Pain  7. Decreased Activity Tolerance  8. Decreased [place] with Home Exercise Program  9. Decreased Cognition    INTERVENTIONS PLANNED: (Benefits and precautions of physical therapy have been discussed with the patient.)  1. Balance Exercise  2. Bed Mobility  3. Family Education  4. Gait Training  5. Home Exercise Program (HEP)  6. Range of Motion (ROM)  7. Therapeutic Activites  8. Therapeutic Exercise/Strengthening  9. Transfer Training      TREATMENT PLAN: Frequency/Duration: 3-4 times per weel for duration of hospital stay  Rehabilitation Potential For Stated Goals: Alix Turk REHABILITATION/EQUIPMENT: (at time of discharge pending progress): Continue Skilled Therapy and Rehab. HISTORY:   History of Present Injury/Illness (Reason for Referral):  Pt is a 74 yo male who presents to ER this evening with progressive pain and poor healing of right foot wound.  He reports site started as small blister maybe from a shoe about a month ago but has not improved and recently pain has worsened is now radiating up his leg. He also reports uanble to bear weight on foot in past day or so. He has not been on abx for this but states it was seen by a provider at [facility]. Other PMH includes DM which appears uncontrolled (A1C 9.2 11/2016), CHF, CKD, current smoker. X Ray of right foot has low suspicion for osteomyelitis but does reveal soft tissue lucencies, possible gas producing infection. WBC 18.7, LA 2.2. He has been started on Vanc/Zosyn in ER. Also noted to have MO with Cr 2.99, baseline 1.90. Pt denies any med changes and reports compliance with current med regimen. He denies recent or current CP, SOB, abd pain, fevers  Past Medical History/Comorbidities:   Mr. Real Hernandez  has a past medical history of Abnormal CT scan, chest (12/27/2015); Acute CHF ([address] (12/26/2015); Acute systolic congestive heart failure ([address] (12/30/2015); MO (acute kidney injury) ([address] (7/29/2012); Bilateral pleural effusion (12/27/2015); Chest pain (12/26/2015); DM (diabetes mellitus), type 2 ([address] (7/29/2012); Encephalopathy due to infection (1/7/2017); HTN (hypertension) (7/29/2012); Hypoglycemia (7/29/2012); NSTEMI (non-ST elevated myocardial infarction) ([address] (1/5/2017); and Tobacco use (12/27/2015). He also has no past medical history of Arthritis; Asthma; Autoimmune disease ([address] Cancer ([address] COPD; DEMENTIA; Gastrointestinal disorder; Liver disease; Other ill-defined conditions(799.89); Psychiatric disorder; PUD (peptic ulcer disease); Seizures ([address] Sleep disorder; Stroke [facility]); or Thromboembolus ([address] Mr. eRal Hernandez  has a past surgical history that includes orthopaedic.   Social History/Living Environment:   Home Environment: Private residence  # Steps to Enter: 4  One/Two Story Residence: One story  Living Alone: No  Support Systems: Child(elpidio), Family member(s)  Patient Expects to be Discharged to[de-identified] Rehabilitation facility  Current DME Used/Available at Home: None  Tub or Shower Type: Shower  Prior Level of Function/Work/Activity:  Pt states he was independent PTA. Would sit on his front porch everyday and \"entertain\".   Current Medications:           Current Facility-Administered Medications:     potassium chloride (K-DUR, KLOR-CON) SR tablet 40 mEq, 40 mEq, Oral, DAILY, Neil Mendez MD, 40 mEq at 01/25/17 0932    amLODIPine (NORVASC) tablet 5 mg, 5 mg, Oral, DAILY, Kendrick Escobar NP, 5 mg at 01/25/17 0932    isosorbide dinitrate (ISORDIL) tablet 10 mg, 10 mg, Oral, TID, Costella Shawn, NP, 10 mg at 01/25/17 0932    hydrALAZINE (APRESOLINE) tablet 25 mg, 25 mg, Oral, TID, Costazul Escobar NP, 25 mg at 01/25/17 0933    insulin lispro (HUMALOG) injection 5 Units, 5 Units, SubCUTAneous, TIDAC, Alea Stewart MD, 5 Units at 01/25/17 1256    insulin lispro (HUMALOG) injection, , SubCUTAneous, TIDAC, Alea Stewart MD, Stopped at 01/23/17 1630    insulin glargine (LANTUS) injection 14 Units, 14 Units, SubCUTAneous, DAILY, Franko Ramirez MD, 14 Units at 01/25/17 0933    torsemide (DEMADEX) tablet 40 mg, 40 mg, Oral, DAILY, Peace Castellano MD, 40 mg at 01/25/17 0932    sodium hypochlorite (QUARTER STRENGTH DAKIN'S) 0.125% irrigation (bottle), , Topical, DAILY, Aspen Andre NP, Stopped at 01/25/17 0900    0.9% sodium chloride infusion, 125 mL/hr, IntraVENous, CONTINUOUS, Jamel Bautista MD, Last Rate: 125 mL/hr at 01/25/17 1256, 125 mL/hr at 01/25/17 1256    sodium chloride (NS) flush 5 mL, 5 mL, InterCATHeter, PRN, Teresa Martínez PA-C    pantoprazole (PROTONIX) tablet 40 mg, 40 mg, Oral, ACB, Clay Ruelas MD, 40 mg at 01/25/17 0600    HYDROmorphone (PF) (DILAUDID) injection 2 mg, 2 mg, IntraVENous, PRN, Kb Garcia MD, 2 mg at 01/13/17 1359    cefTRIAXone (ROCEPHIN) 2 g in 0.9% sodium chloride (MBP/ADV) 50 mL, 2 g, IntraVENous, Q24H, Lb Gilman NP, Last Rate: 100 mL/hr at 01/24/17 1627, 2 g at 01/24/17 1627    metroNIDAZOLE (FLAGYL) tablet 500 mg, 500 mg, Oral, TID, Hitesh Yip, NP, 500 mg at 01/25/17 0900    HYDROmorphone (PF) (DILAUDID) injection 0.5-1 mg, 0.5-1 mg, IntraVENous, Q3H PRN, Deven Horan MD, 1 mg at 01/22/17 0124    oxyCODONE IR (ROXICODONE) tablet 5 mg, 5 mg, Oral, Q4H PRN, Hilario Chung MD, 5 mg at 01/23/17 1423    hydrALAZINE (APRESOLINE) 20 mg/mL injection 20 mg, 20 mg, IntraVENous, Q6H PRN, Martha De La Cruz MD    haloperidol lactate (HALDOL) injection 2 mg, 2 mg, IntraVENous, Q4H PRN, Martha De La Cruz MD, 2 mg at 01/20/17 2104    0.9% sodium chloride infusion 250 mL, 250 mL, IntraVENous, PRN, Martha De La Cruz MD    diphenhydrAMINE (BENADRYL) injection 25 mg, 25 mg, IntraVENous, Q4H PRN, Martha De La Cruz MD    traMADol [place]) tablet 50 mg, 50 mg, Oral, Q6H PRN, aMrtha De La Cruz MD, 50 mg at 01/16/17 2133    atorvastatin (LIPITOR) tablet 80 mg, 80 mg, Oral, DAILY, Fahad Rodriguez DO, 80 mg at 01/25/17 1013    metoprolol succinate (TOPROL-XL) tablet 100 mg, 100 mg, Oral, DAILY WITH BREAKFAST, Fahad Rodriguez DO, 100 mg at 01/25/17 0932    sodium chloride (NS) flush 5-10 mL, 5-10 mL, IntraVENous, Q8H, Fahad Romeroals DO, Stopped at 01/25/17 1400    sodium chloride (NS) flush 5-10 mL, 5-10 mL, IntraVENous, PRN, Fahad Rodriguez DO    acetaminophen (TYLENOL) tablet 650 mg, 650 mg, Oral, Q4H PRN, Fahad Rodriguez, DO, 650 mg at 01/13/17 0349    ondansetron (ZOFRAN) injection 4 mg, 4 mg, IntraVENous, Q4H PRN, Fahad Rodriguez DO, 4 mg at 01/22/17 0135    docusate sodium (COLACE) capsule 100 mg, 100 mg, Oral, DAILY PRN, Fahad Rodriguez DO   Date Last Reviewed:  1/25/2017      Number of Personal Factors/Comorbidities that affect the Plan of Care: 3+: HIGH COMPLEXITY   EXAMINATION:   Most Recent Physical Functioning:   Gross Assessment:    Strength:   LEs:                    Posture:  Posture     Balance:  Poor  Sitting - Static: Good (unsupported)  Sitting - Dynamic: Fair (occasional)  Standing - Static: Fair  Standing - Dynamic : Fair Bed Mobility:    Rolling: Contact guard assistance  Supine to Sit: Contact guard assistance;Stand-by asssistance  Wheelchair Mobility:  NA     Transfers: Moderate to max assist to get to perform  Sit to Stand: Minimum assistance  Stand to Sit: Minimum assistance  Stand Pivot Transfers:  (min/mod assist)  Gait:  Unable today             Body Structures Involved:  1. Lungs  2. Bones  3. Joints  4. Muscles  5. Ligaments Body Functions Affected:  1. Mental  2. Sensory/Pain  3. Respiratory  4. Neuromusculoskeletal  5. Movement Related  6. Skin Related Activities and Participation Affected:  1. Learning and Applying Knowledge  2. General Tasks and Demands  3. Communication  4. Mobility  5. Self Care  6. Domestic Life  7. Interpersonal Interactions and Relationships  8. Community, Social and [place]   Number of elements that affect the Plan of Care: 4+: HIGH COMPLEXITY   CLINICAL PRESENTATION:   Presentation: Evolving clinical presentation with unstable and unpredictable characteristics: HIGH COMPLEXITY   CLINICAL DECISION MAKIN [facility] Inpatient Short Form  How much difficulty does the patient currently have. .. Unable A Lot A Little None   1. Turning over in bed (including adjusting bedclothes, sheets and blankets)? [ ] 1   [X] 2   [ ] 3   [ ] 4   2. Sitting down on and standing up from a chair with arms ( e.g., wheelchair, bedside commode, etc.)   [ ] 1   [X] 2   [ ] 3   [ ] 4   3. Moving from lying on back to sitting on the side of the bed? [ ] 1   [X] 2   [ ] 3   [ ] 4   How much help from another person does the patient currently need. .. Total A Lot A Little None   4. Moving to and from a bed to a chair (including a wheelchair)? [X] 1   [ ] 2   [ ] 3   [ ] 4   5. Need to walk in hospital room? [X] 1   [ ] 2   [ ] 3   [ ] 4   6. Climbing 3-5 steps with a railing?    [X] 1   [ ] 2   [ ] 3   [ ] 4   © , Trustees of [address] [place] [address] [address] [address], under license to [employer]. All rights reserved          Score:  Initial: 10 Most Recent: 9 (Date: 1/14/17 )   Interpretation of Tool:  Represents activities that are increasingly more difficult (i.e. Bed mobility, Transfers, Gait). Score 24 23 22-20 19-15 14-10 9-7 6       Modifier CH CI CJ CK CL CM CN         · Mobility - Walking and Moving Around:               - CURRENT STATUS:    CM - 80%-99% impaired, limited or restricted               - GOAL STATUS:           CL - 60%-79% impaired, limited or restricted               - D/C STATUS:                       ---------------To be determined---------------  Payor: CARE IMPROVEMENT PLUS [place]PR[place] / Product Type: [employer] Medicare /       Medical Necessity:     · Skilled intervention continues to be required due to debiltity. Reason for Services/Other Comments:  · Patient continues to require skilled intervention due to debility. Use of outcome tool(s) and clinical judgement create a POC that gives a: Difficult prediction of patient's progress: HIGH COMPLEXITY                 TREATMENT:     Pre-treatment Symptoms/Complaints:  Pain and fatigue  Pain: Initial:      Post Session:       Therapeutic Activity: (   34 minutes): Therapeutic activities including Bed mobility, review of R NWB status, sit-stand transfers x2, Chair transfers, and Ambulation on level ground to improve mobility, strength, balance, coordination and LE strength and to decreased swelling (ankle pumps). Encouraged to keep LEs elevated while sitting up in chair. Required moderate assist and increased   to promote static and dynamic balance in standing and promote motor control of bilateral, upper extremity(s), lower extremity(s). Group Therapeutic Exercise: (  10 minutes):  Exercises per grid below to improve mobility, strength, balance and coordination.   Required minimal visual, verbal and manual cues to promote proper body alignment, promote proper body posture and promote proper body mechanics. Progressed range and repetitions as indicated. Date:  1/19/17 Date:  1/25/17 Date:     Activity/Exercise Parameters AM group exs Parameters   AP's x20 B    AA to R X 20 B    MArching x10 B X 15 B    LAQ's X 20 B  AA  To R X 15 B    Hip ABD/ADD X 20 B AA to R X 15 B                                  Treatment/Session Assessment: See initial assessment above. Patients response to todays treatment session was tolerated well with no medical complications. · Response to Treatment:  Tolerated well without complications  · Interdisciplinary Collaboration:  · Physical Therapy Assistant and Registered Nurse  · After treatment position/precautions:  · Up in chair, Bed/Chair-wheels locked, Bed in low position, Call light within reach and RN notified  · Compliance with Program/Exercises: Will assess as treatment progresses. · Recommendations/Intent for next treatment session: \"Next visit will focus on reduction in assistance provided\".   Total Treatment Duration:  PT Patient Time In/Time Out  Time In: 1020 (1125)  Time Out: 1054 (1135)     Chatnell Ped, PTA

## 2017-01-25 NOTE — PROGRESS NOTES
Hospitalist Progress Note    2017  Admit Date: 2017  5:07 PM   NAME: Benedicto Falk   :  1945   MRN:  [id number]   Attending: Yolanda Antoine DO  PCP:  None      Admitted for:osteomyelitis right lower extremity s/p debridement and    SUBJECTIVE:   Patient this morning has no complaints no appetite, but no vomiting no shortness of breath. Pain in foot relieved with pain meds    Hospital Course  Pt is a 72 yo male who presented with progressive pain and poor healing of right foot wound over 1 month, admitted with infected DM foot. PMH includes DM (A1C 9.2 2016), CHF, CKD, current smoker. X Ray of right foot had low suspicion for osteomyelitis but does reveal soft tissue lucencies, possible gas producing infection. Surgery and Vascular were consulted. He had bedside debridement on  and surgical I&D/5th toe amputation . He was started on Vanc/Zosyn in ER. ID has seen - changed atbx  to IV Rocephin/PO Flagyl. 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 Will need 6 weeks antbx per ID recs. Also seen by vascular surgery due to abnormal right MAGGIE and poor postoperative wound healing.    . Seen at bedside today. Wound VAC placed by surgery today.  Surgery has requested to keep pt for wound vac exchange on Friday     Review of Systems negative with exception of pertinent positives noted above  Past medical history unchanged from H&P    PHYSICAL EXAM     Visit Vitals    /69    Pulse 98    Temp 98.9 °F (37.2 °C)    Resp 19    Ht 6' 1\" (1.854 m)    Wt 115.9 kg (255 lb 8 oz)    SpO2 97%    BMI 33.71 kg/m2      Temp (24hrs), Av.7 °F (37.1 °C), Min:98 °F (36.7 °C), Max:99.2 °F (37.3 °C)    Oxygen Therapy  O2 Sat (%): 97 % (17 1610)  Pulse via Oximetry: 97 beats per minute (17 0443)  O2 Device: Room air (17 1810)  O2 Flow Rate (L/min): 2 l/min (17 1440)    Intake/Output Summary (Last 24 hours) at 17 1610  Last data filed at 17 4970 Gross per 24 hour   Intake             3039 ml   Output             1100 ml   Net             1939 ml        General: No acute distress  mucous membranes pink and moist acyanotic anicteric  Neck:  Supple full range of motion  Lungs:  Air entry equal bilaterally, no crepitations rales rhonchi   Heart:  Regular rate and rhythm,  No murmur, rub, or gallop  Abdomen: Soft, Non distended, Non tender, no rebound guarding Positive bowel sounds  Extremities: No cyanosis, clubbing or edema  Neurologic:  No focal deficits  Musculoskeletal: no   Joint swelling tenderness erythema  Skin:  No erythema, rashes noted          LAB  Recent Labs      01/25/17   1221  01/25/17   0824  01/24/17   2046  01/24/17   1615  01/24/17   1114   GLUCPOC  124*  127*  147*  74  139*      Recent Labs      01/23/17   0607   WBC  6.8   HGB  8.2*   HCT  25.9*   PLT  342     Recent Labs      01/25/17   0518   01/23/17   0607   NA  144   < >   --    K  3.1*   < >   --    CL  108*   < >   --    CO2  26   < >   --    GLU  132*   < >   --    BUN  14   < >   --    CREA  1.94*   < >  2.14*   MG  1.5*   --    --    CA  7.3*   < >   --    ALB   --    --   1.2*   TBILI   --    --   0.2   ALT   --    --   12   SGOT   --    --   21    < > = values in this interval not displayed. EKG and imaging reviewed personally by me  No results found. Results for orders placed or performed during the hospital encounter of 01/05/17   EKG, 12 LEAD, INITIAL   Result Value Ref Range    Systolic BP  mmHg    Diastolic BP  mmHg    Ventricular Rate 98 BPM    Atrial Rate 98 BPM    P-R Interval 166 ms    QRS Duration 74 ms    Q-T Interval 352 ms    QTC Calculation (Bezet) 449 ms    Calculated P Axis 62 degrees    Calculated R Axis 46 degrees    Calculated T Axis -74 degrees    Diagnosis       !! AGE AND GENDER SPECIFIC ECG ANALYSIS !!   Sinus rhythm with occasional Premature ventricular complexes  ST & T wave abnormality, consider inferolateral ischemia  Abnormal ECG  Confirmed by ST LYNNETTE JOSE MD (), JOSE BASHIR ([id number]) on 1/5/2017 7:42:32 PM       XR Results (most recent):    Results from [place] encounter on 01/05/17   XR CHEST PA LAT   Narrative CHEST X-RAY, 2 views 1/5/2017    History: Fever/chills/sepsis. Technique: PA and lateral views of the chest.     Comparison: Chest x-ray 12/30/2015    Findings: The cardiac silhouette is normal in respect to size. The lungs are expanded  without evidence for pneumothorax. No consolidation, or evidence of pleural  effusion is seen. The bony thorax demonstrates no acute changes. The upper abdomen is  unremarkable in appearance. Impression IMPRESSION:   1. No acute cardiopulmonary process evident by plain film imaging. 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 ([address] 01/17/2017    Sepsis due to cellulitis ([address] 01/09/2017    Acute renal failure with tubular necrosis ([address] 01/06/2017    Type 2 diabetes mellitus with right diabetic foot infection ([address] 01/05/2017    Kidney disease, chronic, stage III (moderate, EGFR 30-59 ml/min) 08/11/2016    Chronic systolic congestive heart failure ([address] 12/30/2015     EF 35-40% on 2015 Echo      Type 2 diabetes mellitus with hyperglycemia ([address] 07/29/2012    HTN (hypertension) 07/29/2012       ASSESSMENT  AND PLAN      · Diabetic foot with osteomyelitis - on rocephin and flagyl as per ID EOT 2/22 . Had wound vac placed today   · DM - stable. · Anemia of CKD - currently stable   · Demand ischemia - EF now 45-50%.  Stable     DVT Prophylaxis: no heparin due to anemia and risk for bleeding  No scds due to severe PAD      Signed By: Jessica Minor MD     January 25, 2017

## 2017-01-25 NOTE — WOUND CARE
Patient seen with surgeon and Np for foot evaluation. Placed VAC to right lateral foot as discussed. Answered all questions with patient. Encouraged PO intake, he is not wanting to eat. He agreed to try to eat a few bites of his breakfast.  Follow up on Friday.

## 2017-01-25 NOTE — PROGRESS NOTES
Problem: Nutrition Deficit  Goal: *Optimize nutritional status  Nutrition F/U   Received nutrition consult for general nutrition management and supplements, calorie count and diet education; \"Food options-doesn't like food\"  Kristofer Hebert NP)  Assessment:   · Diet order(s): 1-12:CCHO, 1-18: NPO then CCHO, 1-19: Glucerna shakes tid,1-20: NPO, then CCHO  · Pt very difficult to get valuable information from and kept falling asleep during RD visit. Rd focus was on what he would typically eat at home in an attempt to mimic that as best possible here to improve po intake for wound healing. He eats oatmeal, raisins, scrambled eggs, grits, \"weiners\" and ?tuna or salmon mixed in the the hot cereal at breakfast, for lunch and supper he eats chicken pot pie or some other type of casserole that apparently his daughter prepares, also mae beans, green beans, cornbread, potted meat, donnell sausages. He says he does not use salt at home but seasons things \"highly\" and uses pepper and Mrs He Randolph. He says the Glucerna tastes like  Soap and gives him the runs yet he drank the entire can at lunch today. He says he avoids milk, cheese whipped topping and chocolate d/t \"tears my stomach up\". He also says some foods make him have heartburn, i.e hotdogs. He also says he had surgery about 20 years ago where they \"cut my thoat\" and which decreased his saliva production and altered his tastes buds. He c/o that he can't eat most of the food here because it \"smells sour, like it was left out for 2 or 3 days\". However he has not tried to order foods he typcially likes like tuna salad. He says his family sometimes brings food in for him that he claims that he eats. · Last 3 Recorded Weights in this Encounter   ·  · 01/11/17 1109 · 01/17/17 1727 · 01/22/17 0533   · Weight: · 96.6 kg (213 lb) · 96.6 kg (212 lb 15.4 oz) · 115.9 kg (255 lb 8 oz)   · Weight gain of  43# in 5 days is unlikely. Suspect weight is inaccurate.   · No weight loss noted  Macronutrient needs:   EER: 8427-4757 kcal /day (18-22 kcal/kg BW using 96.6kg)   EPR:  grams protein/day (1-1.2 grams/kg IBW; GFR 30 ml/min-MO on CKD)   Intake/Comparative Standards: Average intake for past 7day(s)/11 recorded meal(s): 50%. This potentially meets ~73% of kcal and ~74% of protein needs. Lunch tray at bedside with <10% consumed at 1300. Intervention:   Meals and snacks: Obtaining preferences from him was a much belabored process. .Allow liberalized menu in an attempt to improve nutritional intake. Had late tray sent of tuna salad, crackers and applesauce. Pt had not started to eat it at 1500. He says that is because he ate food off his first tray. However  confrimed pt did not consume anymore than the 10% that RD observed at 1300. RD set up late lunch and instructed pt he must eat it. Calorie count in process with results to follow. Nutrition supplement therapy: Continue Glucerna shake tid. Education: Reviewed with pt importance of nutrition in wound healing and need for him to request foods he likes and eat those ordered foods. Pt with limited engagement with RD and does not voice any intention to improve po intake.       [facility], [address], [facility] [address], [address] [place] [address]

## 2017-01-25 NOTE — PROGRESS NOTES
Problem: Self Care Deficits Care Plan (Adult)  Goal: *Acute Goals and Plan of Care (Insert Text)  1. Patient will complete lower body bathing and dressing with minimal assistance and adaptive equipment as needed. 2. Patient will complete toileting with minimal assistance. 3. Patient will tolerate 20 minutes of OT treatment with 2 rest breaks to increase activity tolerance for ADLs. 4. Patient will complete functional transfers with moderate assistance and adaptive equipment as needed. Timeframe: 7 visits       OCCUPATIONAL THERAPY: Daily Note, Treatment Day: 4th and AM 1/25/2017  INPATIENT: Hospital Day: 21  Payor: CARE IMPROVEMENT PL[place] / Product Type: Managed Care Medicare /    NWB R LE ( not maintaining)     NAME/AGE/GENDER: Arlyn Carrillo is a 73 y.o. male        PRIMARY DIAGNOSIS:  Gangrene of toe ([facility] [address] Clara Dues  Diabetic foot ulcer associated with type 1 diabetes mellitus, unspecified laterality ([facility] [address] [X26.615, L97.509]  Ulcer of right foot, with unspecified severity ([facility] [address] [L97.519] Type 2 diabetes mellitus with right diabetic foot infection ([facility] [address] Type 2 diabetes mellitus with right diabetic foot infection ([facility] [address]  Procedure(s) (LRB):  RIGHT FOOT DEBRIDEMENT  ,PREP FOR GRAFTING AND SKIN SUBSTITUTE GRAFT (Right)  5 Days Post-Op  ICD-10: Treatment Diagnosis:        · Generalized Muscle Weakness (M62.81)  · Other lack of cordination (R27.8)   Precautions/Allergies:         Lortab [hydrocodone-acetaminophen]       ASSESSMENT:      Mr. Camilla Ordonez was admitted for the above diagnosis. Pt was brought to therapy gym to participate in group exercises (in grid below) to increase UE strength and activity tolerance to perform mobility and ADLs. Pt required visual, verbal, and tactile cueing to complete exercises. Pt tolerated session well. Returned to room by recliner. Pt will continue to benefit from skilled OT during stay.        This section established at most recent assessment   PROBLEM LIST (Impairments causing functional limitations):  1. Decreased Strength  2. Decreased ADL/Functional Activities  3. Decreased Transfer Abilities  4. Decreased Ambulation Ability/Technique  5. Decreased Balance  6. Decreased Activity Tolerance  7. Increased Fatigue  8. Decreased Flexibility/Joint Mobility  9. Edema/Girth  10. Decreased Knowledge of Precautions  11. Decreased [place] with Home Exercise Program  12. Decreased Cognition    INTERVENTIONS PLANNED: (Benefits and precautions of occupational therapy have been discussed with the patient.)  1. Activities of daily living training  2. Adaptive equipment training  3. Balance training  4. Cognitive training  5. Donning&doffing training  6. Group therapy  7. Hygiene training  8. Sensory reintergration training  9. Theraputic activity  10. Theraputic exercise      TREATMENT PLAN: Frequency/Duration: Follow patient 3x/week to address above goals. Rehabilitation Potential For Stated Goals: Good      RECOMMENDED REHABILITATION/EQUIPMENT: (at time of discharge pending progress): Continue Skilled Therapy. OCCUPATIONAL PROFILE AND HISTORY:   History of Present Injury/Illness (Reason for Referral):  Pt is a 72 yo male who presents to ER this evening with progressive pain and poor healing of right foot wound. He reports site started as small blister maybe from a shoe about a month ago but has not improved and recently pain has worsened is now radiating up his leg. He also reports uanble to bear weight on foot in past day or so. He has not been on abx for this but states it was seen by a provider at [facility]. Other PMH includes DM which appears uncontrolled (A1C 9.2 11/2016), CHF, CKD, current smoker. X Ray of right foot has low suspicion for osteomyelitis but does reveal soft tissue lucencies, possible gas producing infection. WBC 18.7, LA 2.2. He has been started on Vanc/Zosyn in ER. Also noted to have MO with Cr 2.99, baseline 1.90.  Pt denies any med changes and reports compliance with current med regimen. He denies recent or current CP, SOB, abd pain, fevers. Past Medical History/Comorbidities:   Mr. Rex García  has a past medical history of Abnormal CT scan, chest (12/27/2015); Acute CHF ([facility] [address] (12/26/2015); Acute systolic congestive heart failure ([address] (12/30/2015); MO (acute kidney injury) ([address] (7/29/2012); Bilateral pleural effusion (12/27/2015); Chest pain (12/26/2015); DM (diabetes mellitus), type 2 ([address] (7/29/2012); Encephalopathy due to infection (1/7/2017); HTN (hypertension) (7/29/2012); Hypoglycemia (7/29/2012); NSTEMI (non-ST elevated myocardial infarction) ([facility] [address] (1/5/2017); and Tobacco use (12/27/2015). He also has no past medical history of Arthritis; Asthma; Autoimmune disease ([address] Cancer ([address] COPD; DEMENTIA; Gastrointestinal disorder; Liver disease; Other ill-defined conditions(799.89); Psychiatric disorder; PUD (peptic ulcer disease); Seizures ([address] Sleep disorder; Stroke [facility]); or Thromboembolus ([address] Mr. Rex García  has a past surgical history that includes orthopaedic. Social History/Living Environment:   Home Environment: Private residence  # Steps to Enter: 4  One/Two Story Residence: One story  Living Alone: No  Support Systems: Child(elpidio), Family member(s)  Patient Expects to be Discharged to[de-identified] Rehabilitation facility  Current DME Used/Available at Home: None  Tub or Shower Type: Shower  Prior Level of Function/Work/Activity:  Modified indepedent  Dominant Side:         RIGHT   Number of Personal Factors/Comorbidities that affect the Plan of Care: Expanded review of therapy/medical records (1-2):  MODERATE COMPLEXITY   ASSESSMENT OF OCCUPATIONAL PERFORMANCE[de-identified]   Activities of Daily Living:         Requires moderate to maximal assistance  Basic ADLs (From Assessment) Complex ADLs (From Assessment)   Basic ADL  Feeding: Modified independent  Oral Facial Hygiene/Grooming: Setup  Bathing:  Moderate assistance  Upper Body Dressing: Setup  Lower Body Dressing: Moderate assistance  Toileting: Moderate assistance     Grooming/Bathing/Dressing Activities of Daily Living     Cognitive Retraining  Safety/Judgement: Awareness of environment                       Bed/Mat Mobility  Rolling: Contact guard assistance  Supine to Sit: Contact guard assistance  Sit to Stand: Minimum assistance          Most Recent Physical Functioning:   Gross Assessment:                  Posture:  Posture (WDL): Exceptions to WDL  Posture Assessment: Forward head, Rounded shoulders  Balance:  Sitting: Impaired  Sitting - Static: Good (unsupported)  Sitting - Dynamic: Fair (occasional)  Standing - Static: Fair  Standing - Dynamic : Fair Bed Mobility:  Rolling: Contact guard assistance  Supine to Sit: Contact guard assistance  Wheelchair Mobility:     Transfers:  Sit to Stand: Minimum assistance  Stand to Sit: Minimum assistance  Stand Pivot Transfers:  (min/mod assist)      ROM:          Decreased; functional  Strength:          Decreased; functional  Mental Status:          Intermittent confusion  Activities of Daily Living:         Requires assistance  Basic ADLs (From Assessment) Complex ADLs (From Assessment)   Basic ADL  Feeding: Modified independent  Oral Facial Hygiene/Grooming: Setup  Bathing: Moderate assistance  Upper Body Dressing: Setup  Lower Body Dressing: Moderate assistance  Toileting:  Moderate assistance     Grooming/Bathing/Dressing Activities of Daily Living     Cognitive Retraining  Safety/Judgement: Awareness of environment                       Bed/Mat Mobility  Rolling: Contact guard assistance  Supine to Sit: Contact guard assistance  Sit to Stand: Minimum assistance                Patient Vitals for the past 6 hrs:   BP SpO2 Pulse   01/25/17 1219 148/79 97 % 95        Mental Status  Neurologic State: Alert, Appropriate for age  Orientation Level: Oriented X4  Cognition: Follows commands  Perception: Appears intact  Perseveration: No perseveration noted  Safety/Judgement: Awareness of environment                               Physical Skills Involved:  1. Range of Motion  2. Balance  3. Mobility  4. Strength  5. Endurance Cognitive Skills Affected (resulting in the inability to perform in a timely and safe manner):  1. Attending  2. Perceiving  3. Thinking  4. Understanding  5. Problem Solving  6. Mental Sequencing  7. Learning  8. Remembering Psychosocial Skills Affected:  1. Routines and Behaviors  2. Active Use of Coping Strategies  3. Environmental Adaptations   Number of elements that affect the Plan of Care: 5+:  HIGH COMPLEXITY   CLINICAL DECISION MAKIN [facility] Mobility Inpatient Short Form  How much help from another person does the patient currently need. .. Total A Lot A Little None   1. Putting on and taking off regular lower body clothing?   [ ] 1   [x ] 2   [ ] 3   [ ] 4   2. Bathing (including washing, rinsing, drying)? [ ] 1   [x ] 2   [ ] 3   [ ] 4   3. Toileting, which includes using toilet, bedpan or urinal?   [ ] 1   [x ] 2   [ ] 3   [ ] 4   4. Putting on and taking off regular upper body clothing?   [ ] 1   [ ] 2   [x ] 3   [ ] 4   5. Taking care of personal grooming such as brushing teeth? [ ] 1   [ ] 2   [x ] 3   [ ] 4   6. Eating meals? [ ] 1   [ ] 2   [ ] 3   [x ] 4   © , Trustees of [address] [place] [address] [address] [address], under license to [employer]. All rights reserved    Score:  Initial: 16 Most Recent: X (Date: -- )     Interpretation of Tool:  Represents activities that are increasingly more difficult (i.e. Bed mobility, Transfers, Gait). Score 24 23 22-20 19-15 14-10 9-7 6       Modifier  CI CJ CK CL CM CN        ?  Self Care:     - CURRENT STATUS: CK - 40%-59% impaired, limited or restricted    - GOAL STATUS: CJ - 20%-39% impaired, limited or restricted    - D/C STATUS:  ---------------To be determined---------------  Payor: CARE IMPROVEMENT PLUS [place] / Product Type: Managed Care Medicare /       Medical Necessity:     · Patient demonstrates good rehab potential due to higher previous functional level. Reason for Services/Other Comments:  · Patient continues to require skilled intervention due to decreased ability to perform self care. Use of outcome tool(s) and clinical judgement create a POC that gives a: MODERATE COMPLEXITY             TREATMENT:   (In addition to Assessment/Re-Assessment sessions the following treatments were rendered)      Pre-treatment Symptoms/Complaints:  Decreased ability to perform ADLs, self care, and functional mobility; decreased tolerance for activities, poor motivation  Pain: Initial:   Pain Intensity 1: 0 at rest, increases with movement Post Session:  0 at rest      Group Therapeutic Exercise: (10 minutes):  Exercises per grid below to improve mobility, strength and activity tolerance. Required minimal visual, verbal and tactile cues to promote proper body alignment and promote proper body mechanics. Progressed resistance and repetitions as indicated. UE Exercises (with theraband and dowel) Date:  1/19/2017 Date:  1/25/2017 Date:     Activity/Exercise Parameters Parameters Parameters   Shoulder Abd/Adduction 15 reps 15 reps    Shoulder Flexion 15 reps 10 reps    Elbow Flexion 15 reps 15 reps    Punches 15 reps 15 reps                             Treatment/Session Assessment:         Response to Treatment:  In agreement to oxana OT. Interdisciplinary Collaboration:   · Certified Occupational Therapy Assistant  · Registered Nurse     After treatment position/precautions:   · Up in chair  · Bed/Chair-wheels locked  · Call light within reach     Compliance with Program/Exercises: poorly compliant with R LE NWB and needs maximal encouragement. Recommendations/Intent for next treatment session:   \"Next visit will focus on advancements to more challenging activities and reduction in assistance provided\"       Total Treatment Duration:  23 mins  OT Patient Time In/Time Out  Time In: 7554  Time Out: [address] [facility] Antonio Finch

## 2017-01-26 LAB
ANION GAP BLD CALC-SCNC: 9 MMOL/L (ref 7–16)
BUN SERPL-MCNC: 12 MG/DL (ref 8–23)
CALCIUM SERPL-MCNC: 7.2 MG/DL (ref 8.3–10.4)
CHLORIDE SERPL-SCNC: 109 MMOL/L (ref 98–107)
CO2 SERPL-SCNC: 28 MMOL/L (ref 21–32)
CREAT SERPL-MCNC: 1.98 MG/DL (ref 0.8–1.5)
ERYTHROCYTE [DISTWIDTH] IN BLOOD BY AUTOMATED COUNT: 15.7 % (ref 11.9–14.6)
GLUCOSE BLD STRIP.AUTO-MCNC: 138 MG/DL (ref 65–100)
GLUCOSE BLD STRIP.AUTO-MCNC: 144 MG/DL (ref 65–100)
GLUCOSE BLD STRIP.AUTO-MCNC: 154 MG/DL (ref 65–100)
GLUCOSE BLD STRIP.AUTO-MCNC: 94 MG/DL (ref 65–100)
GLUCOSE SERPL-MCNC: 82 MG/DL (ref 65–100)
HCT VFR BLD AUTO: 21.7 % (ref 41.1–50.3)
HCT VFR BLD AUTO: 25 % (ref 41.1–50.3)
HGB BLD-MCNC: 6.9 G/DL (ref 13.6–17.2)
HGB BLD-MCNC: 7.9 G/DL (ref 13.6–17.2)
MCH RBC QN AUTO: 27.6 PG (ref 26.1–32.9)
MCHC RBC AUTO-ENTMCNC: 31.8 G/DL (ref 31.4–35)
MCV RBC AUTO: 86.8 FL (ref 79.6–97.8)
PLATELET # BLD AUTO: 346 K/UL (ref 150–450)
PMV BLD AUTO: 10.2 FL (ref 10.8–14.1)
POTASSIUM SERPL-SCNC: 3.3 MMOL/L (ref 3.5–5.1)
RBC # BLD AUTO: 2.5 M/UL (ref 4.23–5.67)
SODIUM SERPL-SCNC: 146 MMOL/L (ref 136–145)
WBC # BLD AUTO: 7.9 K/UL (ref 4.3–11.1)

## 2017-01-26 PROCEDURE — 85014 HEMATOCRIT: CPT | Performed by: INTERNAL MEDICINE

## 2017-01-26 PROCEDURE — 74011250637 HC RX REV CODE- 250/637: Performed by: INTERNAL MEDICINE

## 2017-01-26 PROCEDURE — 74011250637 HC RX REV CODE- 250/637: Performed by: NURSE PRACTITIONER

## 2017-01-26 PROCEDURE — 80048 BASIC METABOLIC PNL TOTAL CA: CPT | Performed by: HOSPITALIST

## 2017-01-26 PROCEDURE — 74011636637 HC RX REV CODE- 636/637: Performed by: HOSPITALIST

## 2017-01-26 PROCEDURE — 74011250637 HC RX REV CODE- 250/637: Performed by: FAMILY MEDICINE

## 2017-01-26 PROCEDURE — 74011000258 HC RX REV CODE- 258: Performed by: NURSE PRACTITIONER

## 2017-01-26 PROCEDURE — 74011250636 HC RX REV CODE- 250/636: Performed by: NURSE PRACTITIONER

## 2017-01-26 PROCEDURE — 82962 GLUCOSE BLOOD TEST: CPT

## 2017-01-26 PROCEDURE — 85018 HEMOGLOBIN: CPT | Performed by: INTERNAL MEDICINE

## 2017-01-26 PROCEDURE — 86923 COMPATIBILITY TEST ELECTRIC: CPT | Performed by: INTERNAL MEDICINE

## 2017-01-26 PROCEDURE — 36430 TRANSFUSION BLD/BLD COMPNT: CPT

## 2017-01-26 PROCEDURE — 74011636637 HC RX REV CODE- 636/637: Performed by: INTERNAL MEDICINE

## 2017-01-26 PROCEDURE — P9016 RBC LEUKOCYTES REDUCED: HCPCS | Performed by: INTERNAL MEDICINE

## 2017-01-26 PROCEDURE — 97110 THERAPEUTIC EXERCISES: CPT

## 2017-01-26 PROCEDURE — 86900 BLOOD TYPING SEROLOGIC ABO: CPT | Performed by: INTERNAL MEDICINE

## 2017-01-26 PROCEDURE — 65270000029 HC RM PRIVATE

## 2017-01-26 PROCEDURE — 74750000023 HC WOUND THERAPY

## 2017-01-26 PROCEDURE — 36415 COLL VENOUS BLD VENIPUNCTURE: CPT | Performed by: INTERNAL MEDICINE

## 2017-01-26 PROCEDURE — 97530 THERAPEUTIC ACTIVITIES: CPT

## 2017-01-26 PROCEDURE — 85027 COMPLETE CBC AUTOMATED: CPT | Performed by: HOSPITALIST

## 2017-01-26 RX ORDER — SODIUM CHLORIDE 9 MG/ML
250 INJECTION, SOLUTION INTRAVENOUS AS NEEDED
Status: DISCONTINUED | OUTPATIENT
Start: 2017-01-26 | End: 2017-01-27

## 2017-01-26 RX ORDER — INSULIN GLARGINE 100 [IU]/ML
10 INJECTION, SOLUTION SUBCUTANEOUS DAILY
Status: DISCONTINUED | OUTPATIENT
Start: 2017-01-27 | End: 2017-01-30

## 2017-01-26 RX ORDER — METOCLOPRAMIDE 10 MG/1
5 TABLET ORAL
Status: COMPLETED | OUTPATIENT
Start: 2017-01-26 | End: 2017-01-27

## 2017-01-26 RX ADMIN — INSULIN GLARGINE 12 UNITS: 100 INJECTION, SOLUTION SUBCUTANEOUS at 09:00

## 2017-01-26 RX ADMIN — OXYCODONE HYDROCHLORIDE 5 MG: 5 TABLET ORAL at 23:29

## 2017-01-26 RX ADMIN — ISOSORBIDE DINITRATE 10 MG: 10 TABLET ORAL at 23:29

## 2017-01-26 RX ADMIN — METOPROLOL SUCCINATE 100 MG: 100 TABLET, EXTENDED RELEASE ORAL at 09:20

## 2017-01-26 RX ADMIN — METRONIDAZOLE 500 MG: 500 TABLET ORAL at 09:20

## 2017-01-26 RX ADMIN — POTASSIUM CHLORIDE 40 MEQ: 20 TABLET, EXTENDED RELEASE ORAL at 09:21

## 2017-01-26 RX ADMIN — METRONIDAZOLE 500 MG: 500 TABLET ORAL at 18:39

## 2017-01-26 RX ADMIN — HYDRALAZINE HYDROCHLORIDE 25 MG: 25 TABLET ORAL at 18:39

## 2017-01-26 RX ADMIN — INSULIN LISPRO 5 UNITS: 100 INJECTION, SOLUTION INTRAVENOUS; SUBCUTANEOUS at 13:03

## 2017-01-26 RX ADMIN — Medication 10 ML: at 05:07

## 2017-01-26 RX ADMIN — ATORVASTATIN CALCIUM 80 MG: 40 TABLET, FILM COATED ORAL at 09:18

## 2017-01-26 RX ADMIN — Medication 10 ML: at 18:41

## 2017-01-26 RX ADMIN — METOCLOPRAMIDE 5 MG: 10 TABLET ORAL at 11:22

## 2017-01-26 RX ADMIN — ISOSORBIDE DINITRATE 10 MG: 10 TABLET ORAL at 18:38

## 2017-01-26 RX ADMIN — ISOSORBIDE DINITRATE 10 MG: 10 TABLET ORAL at 09:19

## 2017-01-26 RX ADMIN — METOCLOPRAMIDE 5 MG: 10 TABLET ORAL at 05:08

## 2017-01-26 RX ADMIN — HYDRALAZINE HYDROCHLORIDE 25 MG: 25 TABLET ORAL at 09:18

## 2017-01-26 RX ADMIN — HYDRALAZINE HYDROCHLORIDE 25 MG: 25 TABLET ORAL at 23:29

## 2017-01-26 RX ADMIN — INSULIN LISPRO 5 UNITS: 100 INJECTION, SOLUTION INTRAVENOUS; SUBCUTANEOUS at 18:38

## 2017-01-26 RX ADMIN — INSULIN LISPRO 5 UNITS: 100 INJECTION, SOLUTION INTRAVENOUS; SUBCUTANEOUS at 09:12

## 2017-01-26 RX ADMIN — ACETAMINOPHEN 650 MG: 325 TABLET, FILM COATED ORAL at 11:26

## 2017-01-26 RX ADMIN — CEFTRIAXONE 2 G: 2 INJECTION, POWDER, FOR SOLUTION INTRAMUSCULAR; INTRAVENOUS at 18:39

## 2017-01-26 RX ADMIN — PANTOPRAZOLE SODIUM 40 MG: 40 TABLET, DELAYED RELEASE ORAL at 05:08

## 2017-01-26 RX ADMIN — AMLODIPINE BESYLATE 5 MG: 5 TABLET ORAL at 09:18

## 2017-01-26 RX ADMIN — METRONIDAZOLE 500 MG: 500 TABLET ORAL at 23:29

## 2017-01-26 RX ADMIN — Medication 10 ML: at 23:30

## 2017-01-26 RX ADMIN — METOCLOPRAMIDE 5 MG: 10 TABLET ORAL at 18:38

## 2017-01-26 RX ADMIN — TORSEMIDE 40 MG: 20 TABLET ORAL at 09:21

## 2017-01-26 NOTE — PROGRESS NOTES
Infectious Disease Progress Note    Today's Date: 1/26/2017   Admit Date: 1/5/2017    Impression:   · R foot acute osteomyelitis of 5th toe and MT, with deep space abscess: s/p I&D, 1/11, 5th toe/MT amputation: Cx Staph caprae, Strep agalactiae, and Staph lugdunensis; pathology showed acute and chronic inflammation with uninvolved margins. Repeat I&D 1/20, prep for skin grafting. · S/P right lower extremity arteriogram with third order select catheterization, ultrasound-guided access (1/19)  · Uncontrolled DM: hgb A1c 9.1  · PAD: abnormal RLE MAGGIE with occlusive disease, unable to undergo eval/intervention presently sec to MO  · Acute on CKD: creatinine appears stable ~2  · Medical non-compliance    Plan:   · Continue Ceftriaxone IV and Flagyl PO X six weeks, EOT 2/22/17   · Per surgery, hyperbaric treatments will be critical for wound healing. Treatments will only be covered if he goes home for home infusion or infusion center. Case management working on plan. · Patient is very discouraged about his overall health. Declined  visit. ID Recommendations for Plan of Care:   Routine care of CL (R chest)  Continue ceftriaxone 2 g IV Q 24 hrs through 2/22/17  Continue metronidazole 500 mg PO Q 8 hrs through 2/22/17  Q Monday monitoring labs:   CBC with diff, serum creatinine, LFTs, ESR, non-cardiac inflammatory CRP  Follow up with ID scheduled for  2/20/17 @ 1000    Anti-infectives:   Ceftriaxone and Flagyl (1/13-  Vancomycin and Zosyn (1/5-1/13)    Subjective:   Date of Consultation:  January 26, 2017  Referring Physician: Dr. Ruthie Kyle    Sitting up in chair today, NAD. Denies nausea, vomiting, diarrhea. Denies fever or chills. Denies pain. .    Allergies   Allergen Reactions    Lortab [Hydrocodone-Acetaminophen] Itching        Review of Systems: Denies nausea, vomiting, diarrhea, fevers, chills or sweats. Denies SOB. Some pain in R foot.     Review of systems not obtained due to patient factors. Objective:     Visit Vitals    /75    Pulse 98    Temp 98.6 °F (37 °C)    Resp 15    Ht 6' 1\" (1.854 m)    Wt 115.9 kg (255 lb 8 oz)    SpO2 95%    BMI 33.71 kg/m2     Temp (24hrs), Av.7 °F (37.1 °C), Min:98 °F (36.7 °C), Max:99.1 °F (37.3 °C)       Lines:  R chest CL          Physical Exam:    General:  Alert and oriented, obese, well developed, appears stated age   Eyes:  Sclera anicteric. Pupils equally round and reactive to light. Mouth/Throat: Mucous membranes normal, oral pharynx clear   Neck: Supple, trachea midline   Lungs:   Clear to auscultation bilaterally, good effort, anterior position   CV:  Regular rate and rhythm, no audible murmur, click, rub or gallop   Abdomen:   Soft, obese, non-tender. bowel sounds very active, non-distended   Extremities: Bilateral LE 2-3+ edema, from thighs to ankles; no erythema, sluggish cap refill R foot    Skin: R foot wound with wd vac and improving erythema extending from wd   Lymph nodes: Not assessed today   Musculoskeletal: No deformity except surgical removal 5th MT, R thumb amputation   Lines/Devices:  Intact, no erythema, drainage or tenderness   Psych: Depressed       Data Review:     CBC:  Recent Labs      17   0500   WBC  7.9   HGB  6.9*   HCT  21.7*   PLT  346       BMP:  Recent Labs      17   0500  17   0518  17   0500   CREA  1.98*  1.94*  1.98*   BUN  12  14  15   NA  146*  144  145   K  3.3*  3.1*  3.1*   CL  109*  108*  110*   CO2  28  26  28   AGAP  9  10  7   GLU  82  132*  99       LFTS:  No results for input(s): TBILI, ALT, SGOT, AP, TP, ALB in the last 72 hours.     Microbiology:     All Micro Results     Procedure Component Value Units Date/Time    CULTURE, ANAEROBIC [[id number]] Collected:  17 1416    Order Status:  Completed Specimen:  Bone Updated:  17 0659     Special Requests: 5TH METATARSAL HEAD      Culture result:         NO ANAEROBES ISOLATED 7 DAYS    CULTURE, ANAEROBIC [[id number]] Collected:  17 1400    Order Status:  Completed Specimen:  Foot Updated:  17 0659     Special Requests: RIGHT FOOT DEEP WOUND      Culture result:         NO ANAEROBES ISOLATED 7 DAYS    CULTURE, WOUND Berkley Bolls STAIN [[id number]]  (Susceptibility) Collected:  17 1400    Order Status:  Completed Specimen:  Foot Updated:  01/15/17 0927     Special Requests: RIGHT FOOT DEEP WOUND      GRAM STAIN 0 TO 5 WBC'S/OIF       RARE  GRAM POSITIVE COCCI        Culture result: SCANT  STREPTOCOCCUS AGALACTIAE SERO GROUP B         LIGHT  STAPHYLOCOCCUS LUGDUNENSIS       CULTURE, TISSUE Berkley Bolls STAIN [[id number]]  (Susceptibility) Collected:  17 1416    Order Status:  Completed Specimen:  Bone Updated:  01/15/17 0924     Special Requests: 5TH METATARSAL HEAD      GRAM STAIN NO  WBCS SEEN         NO DEFINITE ORGANISM SEEN        Culture result: SCANT  STAPHYLOCOCCUS CAPRAE         CORRECTED RESULT CALLED TO AND CORRECTLY REPEATED BY:  Tosha PINA  ON 17 AT 7091       CULTURE, BLOOD [[id number]] Collected:  17 1820    Order Status:  Completed Specimen:  Blood from Blood Updated:  01/10/17 0833     Special Requests: RIGHT ANTECUBITAL        Culture result: NO GROWTH 5 DAYS       CULTURE, BLOOD [[id number]] Collected:  17 1800    Order Status:  Completed Specimen:  Blood from Blood Updated:  01/10/17 0833     Special Requests: LEFT ANTECUBITAL        Culture result: NO GROWTH 5 DAYS       C. DIFFICILE/EPI PCR [[id number]] Collected:  17 1300    Order Status:  Canceled Specimen:  Stool           Imagin/8/17: Bone scan  IMPRESSION: Increased radiotracer activity on all 3 phases within the right foot compared to the left. This localizes on the delayed image to the fifth  metatarsal bone, suggesting osteomyelitis at this site. 17 RLE arterial duplex  IMPRESSION: Abnormal right MAGGIE consistent with severe arterial disease.  Noncompressible left lower extremity tibial arteries. Diminished right great toe  pressure. 1/5/17 R foot Xray  Impression:  1. No strong evidence for osteomyelitis. A three phase bone scan or contrasted MRI can be considered if there is continued concern for osteomyelitis.   2. Lucencies in the soft tissues about the proximal phalanx of the fifth digit. Soft tissue gas as can occur with a gas producing infection (i.e. gas gangrene)  cannot be excluded.     Signed By: Monet Berman NP     January 26, 2017

## 2017-01-26 NOTE — PROGRESS NOTES
Problem: Self Care Deficits Care Plan (Adult)  Goal: *Acute Goals and Plan of Care (Insert Text)  1. Patient will complete lower body bathing and dressing with minimal assistance and adaptive equipment as needed. 2. Patient will complete toileting with minimal assistance. 3. Patient will tolerate 20 minutes of OT treatment with 2 rest breaks to increase activity tolerance for ADLs. 4. Patient will complete functional transfers with moderate assistance and adaptive equipment as needed. Timeframe: 7 visits       OCCUPATIONAL THERAPY: Daily Note, Treatment Day: 4th and AM 1/26/2017  INPATIENT: Hospital Day: 22  Payor: CARE IMPROVEMENT PLUS / [place] / Product Type: Managed Care Medicare /    NWB R VEENA ( not maintaining)     NAME/AGE/GENDER: Teresa Javier is a 73 y.o. male        PRIMARY DIAGNOSIS:  Gangrene of toe ([facility] [address] Maximo Rose  Diabetic foot ulcer associated with type 1 diabetes mellitus, unspecified laterality ([facility] [address] [W41.094, L97.509]  Ulcer of right foot, with unspecified severity ([address] [L97.519] Type 2 diabetes mellitus with right diabetic foot infection ([address] Type 2 diabetes mellitus with right diabetic foot infection ([address]  Procedure(s) (LRB):  RIGHT FOOT DEBRIDEMENT  ,PREP FOR GRAFTING AND SKIN SUBSTITUTE GRAFT (Right)  6 Days Post-Op  ICD-10: Treatment Diagnosis:        · Generalized Muscle Weakness (M62.81)  · Other lack of cordination (R27.8)   Precautions/Allergies:         Lortab [hydrocodone-acetaminophen]       ASSESSMENT:      Mr. Elaine Choe was admitted for the above diagnosis. Pt presents sitting edge of bed just finishing with PT session. Pt performed UE exercises (in grid below) to increase strength and activity tolerance to perform mobility and ADLs. Pt did well with exercises only requiring brief rest breaks in between sets. Pt left sitting edge of bed with family and SW in room. CNA to lay patient back down. Pt will continue to benefit from skilled OT during stay.   This section established at most recent assessment   PROBLEM LIST (Impairments causing functional limitations):  1. Decreased Strength  2. Decreased ADL/Functional Activities  3. Decreased Transfer Abilities  4. Decreased Ambulation Ability/Technique  5. Decreased Balance  6. Decreased Activity Tolerance  7. Increased Fatigue  8. Decreased Flexibility/Joint Mobility  9. Edema/Girth  10. Decreased Knowledge of Precautions  11. Decreased Comins with Home Exercise Program  12. Decreased Cognition    INTERVENTIONS PLANNED: (Benefits and precautions of occupational therapy have been discussed with the patient.)  1. Activities of daily living training  2. Adaptive equipment training  3. Balance training  4. Cognitive training  5. Donning&doffing training  6. Group therapy  7. Hygiene training  8. Sensory reintergration training  9. Theraputic activity  10. Theraputic exercise      TREATMENT PLAN: Frequency/Duration: Follow patient 3x/week to address above goals. Rehabilitation Potential For Stated Goals: Good      RECOMMENDED REHABILITATION/EQUIPMENT: (at time of discharge pending progress): Continue Skilled Therapy. OCCUPATIONAL PROFILE AND HISTORY:   History of Present Injury/Illness (Reason for Referral):  Pt is a 72 yo male who presents to ER this evening with progressive pain and poor healing of right foot wound. He reports site started as small blister maybe from a shoe about a month ago but has not improved and recently pain has worsened is now radiating up his leg. He also reports uanble to bear weight on foot in past day or so. He has not been on abx for this but states it was seen by a provider at [facility]. Other PMH includes DM which appears uncontrolled (A1C 9.2 11/2016), CHF, CKD, current smoker. X Ray of right foot has low suspicion for osteomyelitis but does reveal soft tissue lucencies, possible gas producing infection. WBC 18.7, LA 2.2. He has been started on Vanc/Zosyn in ER.  Also noted to have MO with Cr 2.99, baseline 1.90. Pt denies any med changes and reports compliance with current med regimen. He denies recent or current CP, SOB, abd pain, fevers. Past Medical History/Comorbidities:   Mr. Shyann Angel  has a past medical history of Abnormal CT scan, chest (12/27/2015); Acute CHF ([facility] [address] (12/26/2015); Acute systolic congestive heart failure ([facility] [address] (12/30/2015); MO (acute kidney injury) ([facility] [address] (7/29/2012); Bilateral pleural effusion (12/27/2015); Chest pain (12/26/2015); DM (diabetes mellitus), type 2 ([address] (7/29/2012); Encephalopathy due to infection (1/7/2017); HTN (hypertension) (7/29/2012); Hypoglycemia (7/29/2012); NSTEMI (non-ST elevated myocardial infarction) ([facility] [address] (1/5/2017); and Tobacco use (12/27/2015). He also has no past medical history of Arthritis; Asthma; Autoimmune disease ([address] Cancer ([address] COPD; DEMENTIA; Gastrointestinal disorder; Liver disease; Other ill-defined conditions(799.89); Psychiatric disorder; PUD (peptic ulcer disease); Seizures ([address] Sleep disorder; Stroke [facility]); or Thromboembolus ([facility] [address] Mr. Shyann Angel  has a past surgical history that includes orthopaedic.   Social History/Living Environment:   Home Environment: Private residence  # Steps to Enter: 4  One/Two Story Residence: One story  Living Alone: No  Support Systems: Child(elpidio), Family member(s)  Patient Expects to be Discharged to[de-identified] Rehabilitation facility  Current DME Used/Available at Home: None  Tub or Shower Type: Shower  Prior Level of Function/Work/Activity:  Modified indepedent  Dominant Side:         RIGHT   Number of Personal Factors/Comorbidities that affect the Plan of Care: Expanded review of therapy/medical records (1-2):  MODERATE COMPLEXITY   ASSESSMENT OF OCCUPATIONAL PERFORMANCE[de-identified]   Activities of Daily Living:         Requires moderate to maximal assistance  Basic ADLs (From Assessment) Complex ADLs (From Assessment)   Basic ADL  Feeding: Modified independent  Oral Facial Hygiene/Grooming: Setup  Bathing: Moderate assistance  Upper Body Dressing: Setup  Lower Body Dressing: Moderate assistance  Toileting: Moderate assistance     Grooming/Bathing/Dressing Activities of Daily Living     Cognitive Retraining  Safety/Judgement: Awareness of environment                                  Most Recent Physical Functioning:   Gross Assessment:                  Posture:  Posture (WDL): Exceptions to WDL  Posture Assessment: Forward head, Rounded shoulders  Balance:  Sitting: Impaired  Sitting - Static: Good (unsupported)  Sitting - Dynamic: Fair (occasional) Bed Mobility:     Wheelchair Mobility:     Transfers:         ROM:          Decreased; functional  Strength:          Decreased; functional  Mental Status:          Intermittent confusion  Activities of Daily Living:         Requires assistance  Basic ADLs (From Assessment) Complex ADLs (From Assessment)   Basic ADL  Feeding: Modified independent  Oral Facial Hygiene/Grooming: Setup  Bathing: Moderate assistance  Upper Body Dressing: Setup  Lower Body Dressing: Moderate assistance  Toileting: Moderate assistance     Grooming/Bathing/Dressing Activities of Daily Living     Cognitive Retraining  Safety/Judgement: Awareness of environment                                        Patient Vitals for the past 6 hrs:   BP SpO2 Pulse   01/26/17 1122 134/75 95 % 98        Mental Status  Neurologic State: Alert, Appropriate for age  Orientation Level: Oriented to person, Oriented to place  Cognition: Follows commands  Perception: Appears intact  Perseveration: No perseveration noted  Safety/Judgement: Awareness of environment                               Physical Skills Involved:  1. Range of Motion  2. Balance  3. Mobility  4. Strength  5. Endurance Cognitive Skills Affected (resulting in the inability to perform in a timely and safe manner):  1. Attending  2. Perceiving  3. Thinking  4. Understanding  5. Problem Solving  6.  Mental Sequencing  7. Learning  8. Remembering Psychosocial Skills Affected:  1. Routines and Behaviors  2. Active Use of Coping Strategies  3. Environmental Adaptations   Number of elements that affect the Plan of Care: 5+:  HIGH COMPLEXITY   CLINICAL DECISION MAKIN [facility] Mobility Inpatient Short Form  How much help from another person does the patient currently need. .. Total A Lot A Little None   1. Putting on and taking off regular lower body clothing?   [ ] 1   [x ] 2   [ ] 3   [ ] 4   2. Bathing (including washing, rinsing, drying)? [ ] 1   [x ] 2   [ ] 3   [ ] 4   3. Toileting, which includes using toilet, bedpan or urinal?   [ ] 1   [x ] 2   [ ] 3   [ ] 4   4. Putting on and taking off regular upper body clothing?   [ ] 1   [ ] 2   [x ] 3   [ ] 4   5. Taking care of personal grooming such as brushing teeth? [ ] 1   [ ] 2   [x ] 3   [ ] 4   6. Eating meals? [ ] 1   [ ] 2   [ ] 3   [x ] 4   © , Trustees of [address] [place] [address] [address] [id number], under license to [employer]. All rights reserved    Score:  Initial: 16 Most Recent: X (Date: -- )     Interpretation of Tool:  Represents activities that are increasingly more difficult (i.e. Bed mobility, Transfers, Gait). Score 24 23 22-20 19-15 14-10 9-7 6       Modifier CH CI CJ CK CL CM CN        ? Self Care:     - CURRENT STATUS: CK - 40%-59% impaired, limited or restricted    - GOAL STATUS: CJ - 20%-39% impaired, limited or restricted    - D/C STATUS:  ---------------To be determined---------------  Payor: CARE IMPROVEMENT PLUS [place] / Product Type: Managed Care Medicare /       Medical Necessity:     · Patient demonstrates good rehab potential due to higher previous functional level. Reason for Services/Other Comments:  · Patient continues to require skilled intervention due to decreased ability to perform self care.    Use of outcome tool(s) and clinical judgement create a POC that gives a: MODERATE COMPLEXITY             TREATMENT:   (In addition to Assessment/Re-Assessment sessions the following treatments were rendered)      Pre-treatment Symptoms/Complaints:  Decreased ability to perform ADLs, self care, and functional mobility; decreased tolerance for activities, poor motivation  Pain: Initial:   Pain Intensity 1: 0 at rest, increases with movement Post Session:  0 at rest      Therapeutic Exercise: (10 minutes):  Exercises per grid below to improve mobility, strength and activity tolerance. Required minimal visual, verbal and tactile cues to promote proper body alignment and promote proper body mechanics. Progressed resistance and repetitions as indicated. UE Exercises (with theraband and dowel) Date:  1/19/2017 Date:  1/25/2017 Date:  1/26/2017   Activity/Exercise Parameters Parameters Parameters   Shoulder Abd/Adduction 15 reps 15 reps    Shoulder Flexion 15 reps 10 reps 10 reps   Elbow Flexion 15 reps 15 reps 2 sets15 reps   Punches 15 reps 15 reps 2 sets 15 reps                            Treatment/Session Assessment:         Response to Treatment:  In agreement to oxana OT. Interdisciplinary Collaboration:   · Certified Occupational Therapy Assistant  · Registered Nurse     After treatment position/precautions:   · Bed/Chair-wheels locked  · Bed in low position  · Call light within reach  · Family at bedside  · SW at bedside and patient sitting edge of bed. Compliance with Program/Exercises: poorly compliant with R LE NWB and needs maximal encouragement. Recommendations/Intent for next treatment session:   \"Next visit will focus on advancements to more challenging activities and reduction in assistance provided\"       Total Treatment Duration:  23 mins  OT Patient Time In/Time Out  Time In: 1500  Time Out: [address] Jatinder Love

## 2017-01-26 NOTE — PROGRESS NOTES
Hospitalist Progress Note    2017  Admit Date: 2017  5:07 PM   NAME: Leandro Boyle   :  1945   MRN:  [id number]   Attending: Helene Jorgensen DO  PCP:  None      Admitted for:osteomyelitis right lower extremity s/p debridement and    SUBJECTIVE:   Patient this morning has no complaints no appetite, but no vomiting no shortness of breath. Pain in foot relieved with pain meds    Hospital Course  Pt is a 74 yo male who presented with progressive pain and poor healing of right foot wound over 1 month, admitted with infected DM foot. PMH includes DM (A1C 9.2 2016), CHF, CKD, current smoker. X Ray of right foot had low suspicion for osteomyelitis but does reveal soft tissue lucencies, possible gas producing infection. Surgery and Vascular were consulted. He had bedside debridement on  and surgical I&D/5th toe amputation . He was started on Vanc/Zosyn in ER. ID has seen - changed atbx  to IV Rocephin/PO Flagyl. 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 Will need 6 weeks antbx per ID recs. Also seen by vascular surgery due to abnormal right MAGGIE and poor postoperative wound healing. . Seen at bedside today. Wound VAC placed by surgery yesterday. Surgery has requested to keep pt for wound vac exchange and re-evaluation of wound on Friday . Anemic. Unclear source, post-surgical? Refused rectal exam. Occult blood in stool ordered.          PHYSICAL EXAM     Visit Vitals    /75    Pulse 98    Temp 98.6 °F (37 °C)    Resp 15    Ht 6' 1\" (1.854 m)    Wt 115.9 kg (255 lb 8 oz)    SpO2 95%    BMI 33.71 kg/m2      Temp (24hrs), Av.8 °F (37.1 °C), Min:98.6 °F (37 °C), Max:99.1 °F (37.3 °C)    Oxygen Therapy  O2 Sat (%): 95 % (17 1122)  Pulse via Oximetry: 97 beats per minute (17 0525)  O2 Device: Room air (17 1810)  O2 Flow Rate (L/min): 2 l/min (17 1440)    Intake/Output Summary (Last 24 hours) at 17 1621  Last data filed at 17 0737   Gross per 24 hour   Intake             2333 ml   Output              460 ml   Net             1873 ml        General: No acute distress  mucous membranes pink and moist acyanotic anicteric  Neck:  Supple full range of motion  Lungs:  Air entry equal bilaterally, no crepitations rales rhonchi   Heart:  Regular rate and rhythm,  No murmur, rub, or gallop  Abdomen: Soft, Non distended, Non tender, no rebound guarding Positive bowel sounds  Extremities: No cyanosis, clubbing or edema. R foot wound covered with wound VAC   Neurologic:  No focal deficits  Musculoskeletal: no   Joint swelling tenderness erythema  Skin:  No erythema, rashes noted          LAB  Recent Labs      01/26/17   1234  01/26/17   0759  01/25/17   2019  01/25/17   1605  01/25/17   1221   GLUCPOC  138*  94  130*  152*  124*      Recent Labs      01/26/17   1139  01/26/17   0500   WBC   --   7.9   HGB  7.9*  6.9*   HCT  25.0*  21.7*   PLT   --   346     Recent Labs      01/26/17   0500  01/25/17   0518   NA  146*  144   K  3.3*  3.1*   CL  109*  108*   CO2  28  26   GLU  82  132*   BUN  12  14   CREA  1.98*  1.94*   MG   --   1.5*   CA  7.2*  7.3*       EKG and imaging reviewed personally by me  No results found. Results for orders placed or performed during the hospital encounter of 01/05/17   EKG, 12 LEAD, INITIAL   Result Value Ref Range    Systolic BP  mmHg    Diastolic BP  mmHg    Ventricular Rate 98 BPM    Atrial Rate 98 BPM    P-R Interval 166 ms    QRS Duration 74 ms    Q-T Interval 352 ms    QTC Calculation (Bezet) 449 ms    Calculated P Axis 62 degrees    Calculated R Axis 46 degrees    Calculated T Axis -74 degrees    Diagnosis       !! AGE AND GENDER SPECIFIC ECG ANALYSIS !!   Sinus rhythm with occasional Premature ventricular complexes  ST & T wave abnormality, consider inferolateral ischemia  Abnormal ECG  Confirmed by ST LYNNETTE JOSE MD (), JOSE BASHIR ([id number]) on 1/5/2017 7:42:32 PM       XR Results (most recent):    Results from Hospital Encounter encounter on 01/05/17   XR CHEST PA LAT   Narrative CHEST X-RAY, 2 views 1/5/2017    History: Fever/chills/sepsis. Technique: PA and lateral views of the chest.     Comparison: Chest x-ray 12/30/2015    Findings: The cardiac silhouette is normal in respect to size. The lungs are expanded  without evidence for pneumothorax. No consolidation, or evidence of pleural  effusion is seen. The bony thorax demonstrates no acute changes. The upper abdomen is  unremarkable in appearance. Impression IMPRESSION:   1. No acute cardiopulmonary process evident by plain film imaging. 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 ([address] 01/17/2017    Sepsis due to cellulitis ([address] 01/09/2017    Acute renal failure with tubular necrosis ([address] 01/06/2017    Type 2 diabetes mellitus with right diabetic foot infection ([address] 01/05/2017    Kidney disease, chronic, stage III (moderate, EGFR 30-59 ml/min) 08/11/2016    Chronic systolic congestive heart failure ([address] 12/30/2015     EF 35-40% on 2015 Echo      Type 2 diabetes mellitus with hyperglycemia ([address] 07/29/2012    HTN (hypertension) 07/29/2012       ASSESSMENT  AND PLAN      · Diabetic foot with osteomyelitis- on rocephin and flagyl as per ID EOT 2/22 . Had wound vac placed yesterday. Surgeon to review wound tomorrow. Surgery requested ID to consider possible oral antibiotics regimen   · DM - decreased dose of lantus today   · Anemia of CKD - worse today. Will transfuse 1 U of pRBCs. Refused rectal exam. Occult blood in stool ordered   · Demand cardiac ischemia - EF  45-50%.  Stable     DVT Prophylaxis: no heparin due to anemia and risk for bleeding  No scds due to severe PAD      Signed By: Ariel Berumen MD     January 26, 2017

## 2017-01-26 NOTE — PROGRESS NOTES
Long and frustrating meeting with patient and his Santo Vasquez. Lorena Miller is the person who lives in patient's home and has been hands on with his care. Suyapa Villaseñor discussion about what patient needs and what family obligations would need to be. Chantale works and cannot give up her income. Confirmed 12 children (not all local) and most work. Chantale willing to assist as much as possible but also frankly reporting that other family members have intermittently visited and would \"interfer\" with the routine and then back off leaving Goddaughter to  the pieces. Patient reporting that he will go home and that (he listed multiple names) will help him with transport to and from Infusion and hyperbaric chamber. Requested names and phone numbers of patient's family members and patient does not have this information. Patient minimizing his problems. Says he can care for himself. Does not maintain non weight bearing according to PT during sessions with frequent cueing. Says neighbors and friends will transport him around but when pressed says he and his family woods not \"do business\" that way and no one can tell you exactly what time they are available. Patient frustrated with SW. Does not want anyone controlling his decisions. Most realistic plan at this point is for patient to go to SNF and  forfeit the hyperbaric treatments. Cannot guarantee 24/7 care. Cannot guarantee transport and 100 percent attendance to appointments. Cannot remain in the hospital indefinitely. Medicall stabe to discharge. Confirmed with [facility] that they cannot do both rehab and hyperbaric. Long discussion with Michael Vargas at [facility] Hyperbaric chamber re: patient. OTHER; Poor PO intake and patient has not interest in eating. Daryl Hernandez[employer]

## 2017-01-26 NOTE — WOUND CARE
Patient seen for follow up on right foot. VAC dressing intact. Noted 5th digit on this foot is more purple today.  when touched to mid plantar foot. Anitra Escobedo made aware. Routine VAC change tomorrow.

## 2017-01-26 NOTE — PROGRESS NOTES
PLAN:  Cont management per Hospitalist  Cont BS and BP control  Nutrition consult -- last Albumin 1.2 -- RD following -- long discussion today with Pt re: importance of nutritional status for wound healing   K+ remains 3.3 -- supplementation per primary team -- remains low   Vasc Surgery s/o -- had A-gram -- no revasc options  Smoking Cessation imperative - discussed  Wound care -- Wound RN placed VAC - will re-eval wound on Friday during VAC change  Abx -- per ID -- discussed with NP -- any alternative regimens ? ?   CM -- needs arrangement for HBO, abx, wound vac and PT (NWB to RLE) -- needs WC -- difficult situation -- CM aware and working on solutions/options     Appreciate Cardiology assist -- cleared for surgery or HBO therapy      ASSESSMENT:  Admit Date: 1/5/2017   Procedure(s): 1/11/17   I&D OF DEEP SPACE R FOOT INFECTION WITH DEBRIDEMENT OF SKIN, SOFT TISSUE AND BONE INCLUDING RIGHT 5TH TOE AND METATARSAL, BONE BIOPSY FOR CULTURE  Procedure(s): 1/20/17  RIGHT FOOT DEBRIDEMENT, PREP FOR GRAFTING AND SKIN SUBSTITUTE GRAFT     Principal Problem:    Type 2 diabetes mellitus with right diabetic foot infection ([address] (1/5/2017)    ----necrotizing infection with acute osteomyelitis and abscess in face of arterial disease with insufficiency. Foot debrided of infection including 5th toe and 5th metatarsal.    Active Problems:    Type 2 diabetes mellitus with hyperglycemia ([address] (7/29/2012)      HTN (hypertension) (7/29/2012)      Chronic systolic congestive heart failure ([address] (12/30/2015)      Overview: EF 35-40% on 2015 Echo      Kidney disease, chronic, stage III (moderate, EGFR 30-59 ml/min) (8/11/2016)      Acute renal failure with tubular necrosis ([address] (1/6/2017)      NSTEMI (non-ST elevated myocardial infarction) ([address] (1/5/2017)      Sepsis due to cellulitis ([address] (1/9/2017)      SUBJECTIVE:  Resting in bed. Reports R foot pain tolerable. Remains on abx - ID following.  RD visited yesterday - he is not eating. AF, NAD, VSS. K+ 3.3      OBJECTIVE:  Constitutional: Alert, cooperative patient in no acute distress; appears stated age   Visit Vitals    /75    Pulse 98    Temp 98.6 °F (37 °C)    Resp 15    Ht 6' 1\" (1.854 m)    Wt 115.9 kg (255 lb 8 oz)    SpO2 95%    BMI 33.71 kg/m2     Eyes: Sclera are clear. ENMT: No obvious neck masses, no ear or lip lesions, R IJ tunneled cath c/d/i  CV: Impaired perfusion - pulses PT/DP via doppler  Resp: No JVD. Breathing is non-labored. No wheezing. On RA  GI: Soft, non-tender, non-distended, BS active   Musculoskeletal: Normal function. R thumb amputation  Extremities: R foot wound vac in place  Neuro: Oriented, sensation to bilat feet intact  Psychiatric: Calm and cooperative    Patient Vitals for the past 24 hrs:   BP Temp Pulse Resp SpO2   01/26/17 1122 134/75 98.6 °F (37 °C) 98 15 95 %   01/26/17 0721 142/74 98.6 °F (37 °C) 92 15 93 %   01/26/17 0525 144/81 99.1 °F (37.3 °C) 92 18 97 %   01/26/17 0055 138/71 98.6 °F (37 °C) 86 18 94 %   01/25/17 2014 116/60 99 °F (37.2 °C) 93 18 96 %   01/25/17 1610 144/69 98.9 °F (37.2 °C) 98 19 97 %       Labs:    Recent Labs      01/26/17   1139  01/26/17   0500   WBC   --   7.9   HGB  7.9*  6.9*   PLT   --   346   NA   --   146*   K   --   3.3*   CL   --   109*   CO2   --   28   BUN   --   12   CREA   --   1.98*   GLU   --   82       MISHA Ochoa    I have seen and examined the patient with the Nurse Practitioner and agree with the above assessment and plan. Yvan Dong.  Ting Rivera MD

## 2017-01-26 NOTE — PROGRESS NOTES
Problem: Mobility Impaired (Adult and Pediatric)  Goal: *Acute Goals and Plan of Care (Insert Text)  Goals reviewed and updated 17  ST - 3 days  1. Pt will roll and get to EOB with minimal assist.  2.  Pt will sit edge of bed without falling over x 10 minutes for functional activity and exercise. 3.  Pt will go sit to stand and transfer to chair with heel touch WB with moderate assist.  4.  Pt will ambulate 5' with heel touch WB and rolling walker and moderate assist.  5.  Pt will perform LE exercises with minimal assist.    LTG: 3- 7 days  1. Pt will roll and get to EOB independent. 3.  Pt will go sit to stand and transfer to chair with heel touch WB with min assist and rolling walker. 4.  Pt will ambulate 10' with heel touch WB and rolling walker and minimal assist.  5.  Pt will perform LE exercises with verbal cues. PHYSICAL THERAPY: Daily Note, Treatment Day: 6th and PM 2017  INPATIENT: Hospital Day: 22  Payor: CARE IMPROVEMENT PLUS / [place] / Product Type: [employer] Care Medicare /     [employer] R LE     NAME/AGE/GENDER: Juani Atkinson is a 73 y.o. male            PRIMARY DIAGNOSIS: Gangrene of toe ([address] Swati Garcia  Diabetic foot ulcer associated with type 1 diabetes mellitus, unspecified laterality ([address] [S63.616, L97.509]  Ulcer of right foot, with unspecified severity ([address] [L97.519] Type 2 diabetes mellitus with right diabetic foot infection ([address] Type 2 diabetes mellitus with right diabetic foot infection ([address]  Procedure(s) (LRB):  RIGHT FOOT DEBRIDEMENT  ,PREP FOR GRAFTING AND SKIN SUBSTITUTE GRAFT (Right)  6 Days Post-Op  ICD-10: Treatment Diagnosis: Generalized Muscle Weakness (M62.81)  Other lack of cordination (R27.8)  Difficulty in walking, Not elsewhere classified (R26.2)  Precautions/Allergies:   Lortab [hydrocodone-acetaminophen]       ASSESSMENT:      Mr. Harshil Rosales presents supine in bed agreed to therapy, wound vac on R foot.   Pt with wet brief, gown, sheet and blanket. Pt performed rolling L<>R to be cleaned and new brief and gown donned. Pt performed supine to sit with SBA and sat edge of bed several minutes. Pt educated on and demonstrated standing while maintaining NWB. Pt stood twice with min assist while working on NWB. Pt was able to maintain NWB once he got standing for 15 seconds. Pt did take a couple of side steps with min assist using rolling walker. Pt has difficulty maintaining NWB status despite multiple cues. Pt was left sitting on side of bed with GLEZ in the room. Pt making slow progress. Pt does require increased time with all mobility. Will continue with POC. This section established at most recent assessment   PROBLEM LIST (Impairments causing functional limitations):  1. Decreased Strength  2. Decreased ADL/Functional Activities  3. Decreased Transfer Abilities  4. Decreased Ambulation Ability/Technique  5. Decreased Balance  6. Increased Pain  7. Decreased Activity Tolerance  8. Decreased [place] with Home Exercise Program  9. Decreased Cognition    INTERVENTIONS PLANNED: (Benefits and precautions of physical therapy have been discussed with the patient.)  1. Balance Exercise  2. Bed Mobility  3. Family Education  4. Gait Training  5. Home Exercise Program (HEP)  6. Range of Motion (ROM)  7. Therapeutic Activites  8. Therapeutic Exercise/Strengthening  9. Transfer Training      TREATMENT PLAN: Frequency/Duration: 3-4 times per weel for duration of hospital stay  Rehabilitation Potential For Stated Goals: Isi Burch REHABILITATION/EQUIPMENT: (at time of discharge pending progress): Continue Skilled Therapy and Rehab. HISTORY:   History of Present Injury/Illness (Reason for Referral):  Pt is a 74 yo male who presents to ER this evening with progressive pain and poor healing of right foot wound.  He reports site started as small blister maybe from a shoe about a month ago but has not improved and recently pain has worsened is now radiating up his leg. He also reports uanble to bear weight on foot in past day or so. He has not been on abx for this but states it was seen by a provider at [facility]. Other PMH includes DM which appears uncontrolled (A1C 9.2 11/2016), CHF, CKD, current smoker. X Ray of right foot has low suspicion for osteomyelitis but does reveal soft tissue lucencies, possible gas producing infection. WBC 18.7, LA 2.2. He has been started on Vanc/Zosyn in ER. Also noted to have MO with Cr 2.99, baseline 1.90. Pt denies any med changes and reports compliance with current med regimen. He denies recent or current CP, SOB, abd pain, fevers  Past Medical History/Comorbidities:   Mr. Nahed Jaffe  has a past medical history of Abnormal CT scan, chest (12/27/2015); Acute CHF ([address] (12/26/2015); Acute systolic congestive heart failure ([address] (12/30/2015); MO (acute kidney injury) ([address] (7/29/2012); Bilateral pleural effusion (12/27/2015); Chest pain (12/26/2015); DM (diabetes mellitus), type 2 ([address] (7/29/2012); Encephalopathy due to infection (1/7/2017); HTN (hypertension) (7/29/2012); Hypoglycemia (7/29/2012); NSTEMI (non-ST elevated myocardial infarction) ([address] (1/5/2017); and Tobacco use (12/27/2015). He also has no past medical history of Arthritis; Asthma; Autoimmune disease ([address] Cancer ([address] COPD; DEMENTIA; Gastrointestinal disorder; Liver disease; Other ill-defined conditions(799.89); Psychiatric disorder; PUD (peptic ulcer disease); Seizures ([address] Sleep disorder; Stroke [facility]); or Thromboembolus ([address] Mr. Nahed Jaffe  has a past surgical history that includes orthopaedic.   Social History/Living Environment:   Home Environment: Private residence  # Steps to Enter: 4  One/Two Story Residence: One story  Living Alone: No  Support Systems: Child(elpidio), Family member(s)  Patient Expects to be Discharged to[de-identified] Rehabilitation facility  Current DME Used/Available at Home: None  Tub or Shower Type: Shower  Prior Level of Function/Work/Activity:  Pt states he was independent PTA. Would sit on his front porch everyday and \"entertain\".   Current Medications:           Current Facility-Administered Medications:     potassium chloride (K-DUR, KLOR-CON) SR tablet 40 mEq, 40 mEq, Oral, DAILY, Neil Shields MD, 40 mEq at 01/26/17 0921    insulin glargine (LANTUS) injection 12 Units, 12 Units, SubCUTAneous, DAILY, Neil Shields MD, 12 Units at 01/26/17 0900    metoclopramide HCl (REGLAN) tablet 5 mg, 5 mg, Oral, TIDAC, Neil Owens MD, 5 mg at 01/26/17 1122    amLODIPine (NORVASC) tablet 5 mg, 5 mg, Oral, DAILY, Elysia Jordan NP, 5 mg at 01/26/17 6734    isosorbide dinitrate (ISORDIL) tablet 10 mg, 10 mg, Oral, TID, Elysia Jordan NP, 10 mg at 01/26/17 0919    hydrALAZINE (APRESOLINE) tablet 25 mg, 25 mg, Oral, TID, Elysia Jordan NP, 25 mg at 01/26/17 0918    insulin lispro (HUMALOG) injection 5 Units, 5 Units, SubCUTAneous, TIDAC, Ceasar Kyle MD, 5 Units at 01/26/17 1303    insulin lispro (HUMALOG) injection, , SubCUTAneous, TIDAC, Ceasar Kyle MD, Stopped at 01/26/17 0730    torsemide (DEMADEX) tablet 40 mg, 40 mg, Oral, DAILY, Chanel Grove MD, 40 mg at 01/26/17 3225    sodium hypochlorite (QUARTER STRENGTH DAKIN'S) 0.125% irrigation (bottle), , Topical, DAILY, Li Valencia NP, Stopped at 01/25/17 0900    0.9% sodium chloride infusion, 125 mL/hr, IntraVENous, CONTINUOUS, Jeanna Mosquera MD, Last Rate: 125 mL/hr at 01/25/17 1256, 125 mL/hr at 01/25/17 1256    sodium chloride (NS) flush 5 mL, 5 mL, InterCATHeter, PRN, Beau Starks PA-C    pantoprazole (PROTONIX) tablet 40 mg, 40 mg, Oral, ACB, Angeli Aviles MD, 40 mg at 01/26/17 0508    HYDROmorphone (PF) (DILAUDID) injection 2 mg, 2 mg, IntraVENous, PRN, Archie Romeo MD, 2 mg at 01/13/17 1359    cefTRIAXone (ROCEPHIN) 2 g in 0.9% sodium chloride (MBP/ADV) 50 mL, 2 g, IntraVENous, Q24H, Lisa GOTTLIEB Antonio Madrigal NP, Last Rate: 100 mL/hr at 01/25/17 1712, 2 g at 01/25/17 1712    metroNIDAZOLE (FLAGYL) tablet 500 mg, 500 mg, Oral, TID, Tiffany Jackson NP, 500 mg at 01/26/17 0920    HYDROmorphone (PF) (DILAUDID) injection 0.5-1 mg, 0.5-1 mg, IntraVENous, Q3H PRN, Maikel Marina MD, 1 mg at 01/22/17 0124    oxyCODONE IR (ROXICODONE) tablet 5 mg, 5 mg, Oral, Q4H PRN, Deepthi Jung MD, 5 mg at 01/23/17 1423    hydrALAZINE (APRESOLINE) 20 mg/mL injection 20 mg, 20 mg, IntraVENous, Q6H PRN, Debbie Chacon MD    haloperidol lactate (HALDOL) injection 2 mg, 2 mg, IntraVENous, Q4H PRN, Debbie Chacon MD, 2 mg at 01/20/17 2104    0.9% sodium chloride infusion 250 mL, 250 mL, IntraVENous, PRN, Debbie Chacon MD    diphenhydrAMINE (BENADRYL) injection 25 mg, 25 mg, IntraVENous, Q4H PRN, Debbie Chacon MD    traMADol Ekta Bathe) tablet 50 mg, 50 mg, Oral, Q6H PRN, Debbie Chacon MD, 50 mg at 01/25/17 2141    atorvastatin (LIPITOR) tablet 80 mg, 80 mg, Oral, DAILY, Pj Toussaint, DO, 80 mg at 01/26/17 7096    metoprolol succinate (TOPROL-XL) tablet 100 mg, 100 mg, Oral, DAILY WITH BREAKFAST, Pj Moots, DO, 100 mg at 01/26/17 0920    sodium chloride (NS) flush 5-10 mL, 5-10 mL, IntraVENous, Q8H, Amity Moots, DO, 10 mL at 01/26/17 0507    sodium chloride (NS) flush 5-10 mL, 5-10 mL, IntraVENous, PRN, Pj Ramirezots, DO    acetaminophen (TYLENOL) tablet 650 mg, 650 mg, Oral, Q4H PRN, Pj Toussaint DO, 650 mg at 01/26/17 1126    ondansetron (ZOFRAN) injection 4 mg, 4 mg, IntraVENous, Q4H PRN, Pj Toussaint DO, 4 mg at 01/22/17 0135    docusate sodium (COLACE) capsule 100 mg, 100 mg, Oral, DAILY PRNPj DO   Date Last Reviewed:  1/26/2017      Number of Personal Factors/Comorbidities that affect the Plan of Care: 3+: HIGH COMPLEXITY   EXAMINATION:   Most Recent Physical Functioning:   Gross Assessment:    Strength:   LEs:                    Posture:  Posture Balance:  Poor    Bed Mobility:    Rolling: Stand-by asssistance  Supine to Sit: Stand-by asssistance  Wheelchair Mobility:  NA     Transfers: Moderate to max assist to get to perform  Sit to Stand: Minimum assistance  Stand to Sit: Minimum assistance;Contact guard assistance  Gait:  Unable today  Right Side Weight Bearing: Non-weight bearing          Body Structures Involved:  1. Lungs  2. Bones  3. Joints  4. Muscles  5. Ligaments Body Functions Affected:  1. Mental  2. Sensory/Pain  3. Respiratory  4. Neuromusculoskeletal  5. Movement Related  6. Skin Related Activities and Participation Affected:  1. Learning and Applying Knowledge  2. General Tasks and Demands  3. Communication  4. Mobility  5. Self Care  6. Domestic Life  7. Interpersonal Interactions and Relationships  8. Community, Social and [place]   Number of elements that affect the Plan of Care: 4+: HIGH COMPLEXITY   CLINICAL PRESENTATION:   Presentation: Evolving clinical presentation with unstable and unpredictable characteristics: HIGH COMPLEXITY   CLINICAL DECISION MAKIN [facility] Inpatient Short Form  How much difficulty does the patient currently have. .. Unable A Lot A Little None   1. Turning over in bed (including adjusting bedclothes, sheets and blankets)? [ ] 1   [X] 2   [ ] 3   [ ] 4   2. Sitting down on and standing up from a chair with arms ( e.g., wheelchair, bedside commode, etc.)   [ ] 1   [X] 2   [ ] 3   [ ] 4   3. Moving from lying on back to sitting on the side of the bed? [ ] 1   [X] 2   [ ] 3   [ ] 4   How much help from another person does the patient currently need. .. Total A Lot A Little None   4. Moving to and from a bed to a chair (including a wheelchair)? [X] 1   [ ] 2   [ ] 3   [ ] 4   5. Need to walk in hospital room? [X] 1   [ ] 2   [ ] 3   [ ] 4   6. Climbing 3-5 steps with a railing?    [X] 1   [ ] 2   [ ] 3   [ ] 4   © , Trustees of [address] [place] [address] [address] [address], under license to [employer]. All rights reserved          Score:  Initial: 10 Most Recent: 9 (Date: 1/14/17 )   Interpretation of Tool:  Represents activities that are increasingly more difficult (i.e. Bed mobility, Transfers, Gait). Score 24 23 22-20 19-15 14-10 9-7 6       Modifier CH CI CJ CK CL CM CN         · Mobility - Walking and Moving Around:               - CURRENT STATUS:    CM - 80%-99% impaired, limited or restricted               - GOAL STATUS:           CL - 60%-79% impaired, limited or restricted               - D/C STATUS:                       ---------------To be determined---------------  Payor: CARE IMPROVEMENT PLUS [place] / Product Type: [employer] Care Medicare /       Medical Necessity:     · Skilled intervention continues to be required due to debiltity. Reason for Services/Other Comments:  · Patient continues to require skilled intervention due to debility. Use of outcome tool(s) and clinical judgement create a POC that gives a: Difficult prediction of patient's progress: HIGH COMPLEXITY                 TREATMENT:     Pre-treatment Symptoms/Complaints:  Pain and fatigue  Pain: Initial:      Post Session:       Therapeutic Activity: (   25 minutes): Therapeutic activities including Bed mobility, review of R NWB status, sit-stand transfers x2, and Ambulation on level ground to improve mobility, strength, balance, coordination and LE strength and to decreased swelling (ankle pumps). Encouraged to keep LEs elevated while sitting up in chair. Required moderate assist and increased   to promote static and dynamic balance in standing and promote motor control of bilateral, upper extremity(s), lower extremity(s). Group Therapeutic Exercise: (  0 minutes):  Exercises per grid below to improve mobility, strength, balance and coordination.   Required minimal visual, verbal and manual cues to promote proper body alignment, promote proper body posture and promote proper body mechanics. Progressed range and repetitions as indicated. Date:  1/19/17 Date:  1/25/17 Date:     Activity/Exercise Parameters AM group exs Parameters   AP's x20 B    AA to R X 20 B    MArching x10 B X 15 B    LAQ's X 20 B  AA  To R X 15 B    Hip ABD/ADD X 20 B AA to R X 15 B                                  Treatment/Session Assessment: See initial assessment above. Patients response to todays treatment session was tolerated well with no medical complications. · Response to Treatment:  Tolerated well without complications  · Interdisciplinary Collaboration:  · Physical Therapy Assistant, Certified Occupational Therapy Assistant, Registered Nurse and   · After treatment position/precautions:  · Up in chair, Bed/Chair-wheels locked, Bed in low position, Call light within reach and RN notified  · Compliance with Program/Exercises: Will assess as treatment progresses. · Recommendations/Intent for next treatment session: \"Next visit will focus on reduction in assistance provided\".   Total Treatment Duration:  PT Patient Time In/Time Out  Time In: 1425  Time Out: Ishan 64, PTA

## 2017-01-26 NOTE — DIABETES MGMT
Pt s/p I&D right foot infection with debridement 1/11/17 and also 1/20/17 is seen by RN LINH for continued diabetes education. Pt remains on antibiotics. Wound VAC placed yesterday. Reviewed with pt current insulin regimen: Lantus dose 14 units  decreased to 12 units daily today and Humalog 5 units ac tid with additional Humalog per sliding scale continues. Explained to pt that his insulin needs may decrease as infection clears. Stressed importance of notifying nursing staff if symptoms of hypoglycemia develop. Also encouraged pt to eat healthy meals. Verbalizes basic understanding, but does not engage any further in conversation. Blood glucose ranged 124-152 yesterday with total of 31 units insulin given (Lantus 14 units; Humalog 17 units). FBS 82 this morning with pre-lunch blood glucose 138. Attempted to explain to pt importance of good glycemic control and good nutrition for wound healing, but pt not engaging further in conversation. Pt's lunch tray untouched at this time, but pt says he will try to eat. Primary nurse is holding on lunchtime Humalog dose to see if pt will eat any of his lunch. Pt practiced using insulin demo pen earlier last week. Pt's goddaughter (who lives with him) was educated re: proper use of insulin pen and able to return demonstrate insulin pen use last week as well. Plan is to continue diabetes education next only as pt is willing to participate. Pt has no questions at this time.  Nursing will continue to monitor blood glucose.

## 2017-01-27 ENCOUNTER — APPOINTMENT (OUTPATIENT)
Dept: GENERAL RADIOLOGY | Age: 72
DRG: 853 | End: 2017-01-27
Attending: SURGERY
Payer: MEDICARE

## 2017-01-27 LAB
ABO + RH BLD: NORMAL
ANION GAP BLD CALC-SCNC: 10 MMOL/L (ref 7–16)
BLD PROD TYP BPU: NORMAL
BLOOD GROUP ANTIBODIES SERPL: NORMAL
BPU ID: NORMAL
BUN SERPL-MCNC: 13 MG/DL (ref 8–23)
CALCIUM SERPL-MCNC: 6.7 MG/DL (ref 8.3–10.4)
CHLORIDE SERPL-SCNC: 111 MMOL/L (ref 98–107)
CO2 SERPL-SCNC: 25 MMOL/L (ref 21–32)
CREAT SERPL-MCNC: 1.84 MG/DL (ref 0.8–1.5)
CROSSMATCH RESULT,%XM: NORMAL
ERYTHROCYTE [DISTWIDTH] IN BLOOD BY AUTOMATED COUNT: 16.7 % (ref 11.9–14.6)
GLUCOSE BLD STRIP.AUTO-MCNC: 106 MG/DL (ref 65–100)
GLUCOSE BLD STRIP.AUTO-MCNC: 108 MG/DL (ref 65–100)
GLUCOSE BLD STRIP.AUTO-MCNC: 146 MG/DL (ref 65–100)
GLUCOSE BLD STRIP.AUTO-MCNC: 199 MG/DL (ref 65–100)
GLUCOSE SERPL-MCNC: 104 MG/DL (ref 65–100)
HCT VFR BLD AUTO: 26.9 % (ref 41.1–50.3)
HEMOCCULT STL QL: NEGATIVE
HGB BLD-MCNC: 8.6 G/DL (ref 13.6–17.2)
MCH RBC QN AUTO: 27.5 PG (ref 26.1–32.9)
MCHC RBC AUTO-ENTMCNC: 32 G/DL (ref 31.4–35)
MCV RBC AUTO: 85.9 FL (ref 79.6–97.8)
PLATELET # BLD AUTO: 299 K/UL (ref 150–450)
PMV BLD AUTO: 10.2 FL (ref 10.8–14.1)
POTASSIUM SERPL-SCNC: 3.2 MMOL/L (ref 3.5–5.1)
RBC # BLD AUTO: 3.13 M/UL (ref 4.23–5.67)
SODIUM SERPL-SCNC: 146 MMOL/L (ref 136–145)
SPECIMEN EXP DATE BLD: NORMAL
STATUS OF UNIT,%ST: NORMAL
UNIT DIVISION, %UDIV: 0
WBC # BLD AUTO: 7.1 K/UL (ref 4.3–11.1)

## 2017-01-27 PROCEDURE — 97164 PT RE-EVAL EST PLAN CARE: CPT

## 2017-01-27 PROCEDURE — 82962 GLUCOSE BLOOD TEST: CPT

## 2017-01-27 PROCEDURE — 74011250636 HC RX REV CODE- 250/636: Performed by: NURSE PRACTITIONER

## 2017-01-27 PROCEDURE — 74011250636 HC RX REV CODE- 250/636: Performed by: HOSPITALIST

## 2017-01-27 PROCEDURE — 74011250637 HC RX REV CODE- 250/637: Performed by: HOSPITALIST

## 2017-01-27 PROCEDURE — 97605 NEG PRS WND THER DME<=50SQCM: CPT

## 2017-01-27 PROCEDURE — 85027 COMPLETE CBC AUTOMATED: CPT | Performed by: HOSPITALIST

## 2017-01-27 PROCEDURE — 74750000023 HC WOUND THERAPY

## 2017-01-27 PROCEDURE — 74011250637 HC RX REV CODE- 250/637: Performed by: FAMILY MEDICINE

## 2017-01-27 PROCEDURE — 77030013579 HC DRSG WND CA ALG BMS -A

## 2017-01-27 PROCEDURE — 74011250637 HC RX REV CODE- 250/637: Performed by: INTERNAL MEDICINE

## 2017-01-27 PROCEDURE — 80048 BASIC METABOLIC PNL TOTAL CA: CPT | Performed by: HOSPITALIST

## 2017-01-27 PROCEDURE — 74011250636 HC RX REV CODE- 250/636: Performed by: SURGERY

## 2017-01-27 PROCEDURE — 74011636637 HC RX REV CODE- 636/637: Performed by: HOSPITALIST

## 2017-01-27 PROCEDURE — 74011636637 HC RX REV CODE- 636/637: Performed by: INTERNAL MEDICINE

## 2017-01-27 PROCEDURE — 74011250637 HC RX REV CODE- 250/637: Performed by: NURSE PRACTITIONER

## 2017-01-27 PROCEDURE — 77030019934 HC DRSG VAC ASST KCON -B

## 2017-01-27 PROCEDURE — 77030013131 HC IV BLD ST ICUM -A

## 2017-01-27 PROCEDURE — 82272 OCCULT BLD FECES 1-3 TESTS: CPT | Performed by: INTERNAL MEDICINE

## 2017-01-27 PROCEDURE — 97530 THERAPEUTIC ACTIVITIES: CPT

## 2017-01-27 PROCEDURE — 74011250637 HC RX REV CODE- 250/637: Performed by: SURGERY

## 2017-01-27 PROCEDURE — 74011000258 HC RX REV CODE- 258: Performed by: NURSE PRACTITIONER

## 2017-01-27 PROCEDURE — 65270000029 HC RM PRIVATE

## 2017-01-27 PROCEDURE — 77030012939 HC DRSG HYDRCOIL BMS -A

## 2017-01-27 RX ORDER — POTASSIUM CHLORIDE 14.9 MG/ML
20 INJECTION INTRAVENOUS
Status: COMPLETED | OUTPATIENT
Start: 2017-01-27 | End: 2017-01-28

## 2017-01-27 RX ORDER — METOCLOPRAMIDE HYDROCHLORIDE 5 MG/ML
10 INJECTION INTRAMUSCULAR; INTRAVENOUS EVERY 6 HOURS
Status: DISCONTINUED | OUTPATIENT
Start: 2017-01-27 | End: 2017-01-29

## 2017-01-27 RX ORDER — POTASSIUM CHLORIDE 20MEQ/15ML
40 LIQUID (ML) ORAL
Status: COMPLETED | OUTPATIENT
Start: 2017-01-27 | End: 2017-01-27

## 2017-01-27 RX ADMIN — POTASSIUM CHLORIDE 40 MEQ: 20 TABLET, EXTENDED RELEASE ORAL at 09:00

## 2017-01-27 RX ADMIN — ISOSORBIDE DINITRATE 10 MG: 10 TABLET ORAL at 16:15

## 2017-01-27 RX ADMIN — INSULIN LISPRO 5 UNITS: 100 INJECTION, SOLUTION INTRAVENOUS; SUBCUTANEOUS at 07:30

## 2017-01-27 RX ADMIN — HYDRALAZINE HYDROCHLORIDE 25 MG: 25 TABLET ORAL at 09:00

## 2017-01-27 RX ADMIN — ISOSORBIDE DINITRATE 10 MG: 10 TABLET ORAL at 09:00

## 2017-01-27 RX ADMIN — HYDRALAZINE HYDROCHLORIDE 25 MG: 25 TABLET ORAL at 16:15

## 2017-01-27 RX ADMIN — INSULIN LISPRO 5 UNITS: 100 INJECTION, SOLUTION INTRAVENOUS; SUBCUTANEOUS at 11:30

## 2017-01-27 RX ADMIN — METOCLOPRAMIDE 5 MG: 10 TABLET ORAL at 06:10

## 2017-01-27 RX ADMIN — CEFTRIAXONE 2 G: 2 INJECTION, POWDER, FOR SOLUTION INTRAMUSCULAR; INTRAVENOUS at 16:15

## 2017-01-27 RX ADMIN — POTASSIUM CHLORIDE 20 MEQ: 14.9 INJECTION, SOLUTION INTRAVENOUS at 16:16

## 2017-01-27 RX ADMIN — INSULIN LISPRO 2 UNITS: 100 INJECTION, SOLUTION INTRAVENOUS; SUBCUTANEOUS at 21:58

## 2017-01-27 RX ADMIN — METRONIDAZOLE 500 MG: 500 TABLET ORAL at 16:15

## 2017-01-27 RX ADMIN — HYDROMORPHONE HYDROCHLORIDE 1 MG: 1 INJECTION, SOLUTION INTRAMUSCULAR; INTRAVENOUS; SUBCUTANEOUS at 13:32

## 2017-01-27 RX ADMIN — METRONIDAZOLE 500 MG: 500 TABLET ORAL at 21:30

## 2017-01-27 RX ADMIN — Medication 10 ML: at 14:00

## 2017-01-27 RX ADMIN — METOPROLOL SUCCINATE 100 MG: 100 TABLET, EXTENDED RELEASE ORAL at 08:00

## 2017-01-27 RX ADMIN — ISOSORBIDE DINITRATE 10 MG: 10 TABLET ORAL at 21:30

## 2017-01-27 RX ADMIN — INSULIN LISPRO 2 UNITS: 100 INJECTION, SOLUTION INTRAVENOUS; SUBCUTANEOUS at 16:30

## 2017-01-27 RX ADMIN — PANTOPRAZOLE SODIUM 40 MG: 40 TABLET, DELAYED RELEASE ORAL at 06:10

## 2017-01-27 RX ADMIN — INSULIN LISPRO 5 UNITS: 100 INJECTION, SOLUTION INTRAVENOUS; SUBCUTANEOUS at 16:30

## 2017-01-27 RX ADMIN — Medication 10 ML: at 06:11

## 2017-01-27 RX ADMIN — METOCLOPRAMIDE 10 MG: 5 INJECTION, SOLUTION INTRAMUSCULAR; INTRAVENOUS at 16:15

## 2017-01-27 RX ADMIN — HYDRALAZINE HYDROCHLORIDE 25 MG: 25 TABLET ORAL at 21:30

## 2017-01-27 RX ADMIN — TORSEMIDE 40 MG: 20 TABLET ORAL at 09:00

## 2017-01-27 RX ADMIN — ATORVASTATIN CALCIUM 80 MG: 40 TABLET, FILM COATED ORAL at 09:00

## 2017-01-27 RX ADMIN — METOCLOPRAMIDE 10 MG: 5 INJECTION, SOLUTION INTRAMUSCULAR; INTRAVENOUS at 23:53

## 2017-01-27 RX ADMIN — METOCLOPRAMIDE 5 MG: 10 TABLET ORAL at 10:56

## 2017-01-27 RX ADMIN — Medication 10 ML: at 22:00

## 2017-01-27 RX ADMIN — METOCLOPRAMIDE 10 MG: 5 INJECTION, SOLUTION INTRAMUSCULAR; INTRAVENOUS at 14:00

## 2017-01-27 RX ADMIN — B-COMPLEX W/ C & FOLIC ACID TAB 1 TABLET: TAB at 13:00

## 2017-01-27 RX ADMIN — INSULIN GLARGINE 10 UNITS: 100 INJECTION, SOLUTION SUBCUTANEOUS at 09:00

## 2017-01-27 RX ADMIN — AMLODIPINE BESYLATE 5 MG: 5 TABLET ORAL at 09:00

## 2017-01-27 RX ADMIN — METRONIDAZOLE 500 MG: 500 TABLET ORAL at 09:00

## 2017-01-27 RX ADMIN — POTASSIUM CHLORIDE 20 MEQ: 14.9 INJECTION, SOLUTION INTRAVENOUS at 14:16

## 2017-01-27 RX ADMIN — POTASSIUM CHLORIDE 40 MEQ: 20 SOLUTION ORAL at 14:16

## 2017-01-27 NOTE — PROGRESS NOTES
Problem: Nutrition Deficit  Goal: *Optimize nutritional status  Nutrition F/U   Assessment:   Diet order(s): 1-12:CCHO, 1-18: NPO then CCHO, 1-19: Glucerna shakes tid,1-20: NPO, then CCHO  Pt sitting in chair with no lunch tray in room. RD asked why he did not have a lunch tray and he replies because he has no appetite. He says he did not eat any breakfast either this morning. Reminded pt of his conversation with NP yesterday concerning nutrition and wound healing, and that he must eat meals and drink nutritional supplement or he would have to have a feeding tube. He says he can't chew or swallow so \"come on with the tube\". RD offered to send a tray of something he could chew and swallow and he replied. I ain''t found anything yet\". Pt doesn't want to drink oral supplement because it makes him \"mess on himself\". Note last recorded BM was one on 1-24-17 and also note on board in room \"need stool sample\". Macronutrient needs:   EER: 6573-7749 kcal /day (18-22 kcal/kg BW using 96.6kg)   EPR:  grams protein/day (1-1.2 grams/kg IBW; GFR 30 ml/min-MO on CKD)   Intake/Comparative Standards:  Calorie count result for 3 meals for 1-26: 975 kcal (56% of kcal needs) and 58 grams protein (69% of protein needs. Intake include 1 bottle of Ensure high protien. Intervention:   Meals and snacks: Continue to allow liberalized menu in an attempt to improve nutritional intake. Called placed to  who is bringing him his lunch tray now. Calorie count in process with results to follow. Consider Remeron as appetite stimulant  Consider GI and/or SLP consult for continued c/o difficulty swallowing. However pt has consumed solid food so still wonder if this is more r/t to his decreased appetite than a physiological  issue. Nutrition supplement therapy: Continue Glucerna shake tid. Add Stress tab MVI.   EN: If FT placement is pursued, please order nutrition consult fro TF management      Arsen Garcia, HERIBERTO, LD, CNSC [phone]

## 2017-01-27 NOTE — PROGRESS NOTES
Problem: Nutrition Deficit  Goal: *Optimize nutritional status  Nutrition F/U: Received consult for TF management per nutritional support protocols. ( Dr Marciano Schwartz:   Discussed pt comment :scome on with the tube\" with  Merari Dos Santos. Received order to place FT. Reviewed again with pt option of eating or placing FT and he confirms he would rather have a FT than try to make himself eat. Given pt rreport of diarrhea, would be better to start with low fiber TF formula and adjust insulin to control DM  Pertinent Medications: Flagyl, Rocephin, Protonix, K supplementation Iv and po, Demadex, Lantus (10 untis q hs),mealtime humalog and SSI humalog, reglan  Pertinent labs: K 3.2, Na 146  POC Glucoses:   mg/dl. Expect glucoses will increase with start of TF and since Lantus dose was decreased today. Macronutrient needs:  EER: 2106-3376 kcal /day (18-22 kcal/kg BW using 96.6kg)   EPR:  grams protein/day (1-1.2 grams/kg IBW)  Max CHO:  266 grams/day (50% kcal)  Fluid:  1ml/kcal  Intervention:  Meals and snacks: CCHO. Continue calorie count for now. EN:Start TF of Osmolite 1.2 at 20 ml/hr and increase by 10 ml/hr q 8 hrs as tolerated to 50ml/hr with 10ml water q 1hr to provide 1440 kcal/day (83% of needs), 67 grams protein/day (79% of needs), 190 grams CHO/day (does not exceed max CHO),  and ~ 1225ml free water/day (70% of needs). Once po intake of meal while receiving TF is seen, will adjust TF as needed to meet needs from both TF and po. Nutrition Supplement Therapy: Electrolyte replacement per nutritional support protocols are active on MAR. Check Phos and Mg tomorrow and MWF. Add daily BMP. Education: Reviewed with pt that he should still try to order things he likes and eat the best he can. Also discussed expected loose or semi-forme stool consisently with TF formula but plan to use one that should help to decrease likelihood of this.   Coordination of nutrition care: Discussed with Dr Merari Dos Santos and Ludy Farhan Fisher, RN  Medication r/t nutrition management: Expect need for increased DM medication which is being managed by hospitalist.  Could reglan be changed to prn as this could contribute to diarrhea?      Halle Born, [address], [facility] [place], [address] [place] [address]

## 2017-01-27 NOTE — PROGRESS NOTES
Attempted to insert dobhoff feeding tube. Patient pulled it out and refused to let us try again. States he ate better for dinner and refusing feeding tube now.

## 2017-01-27 NOTE — PROGRESS NOTES
Infectious Disease Progress Note    Today's Date: 1/27/2017   Admit Date: 1/5/2017    Impression:   · R foot acute osteomyelitis of 5th toe and MT, with deep space abscess: s/p I&D, 1/11, 5th toe/MT amputation: Cx Staph caprae, Strep agalactiae, and Staph lugdunensis; pathology showed acute and chronic inflammation with uninvolved margins. Repeat I&D 1/20, prep for skin grafting. · S/P right lower extremity arteriogram with third order select catheterization, ultrasound-guided access (1/19)  · Uncontrolled DM: hgb A1c 9.1  · PAD: abnormal RLE MAGGIE with occlusive disease, unable to undergo eval/intervention presently sec to MO  · Acute on CKD: creatinine appears stable ~2  · Medical non-compliance    Plan:   · Continue Ceftriaxone IV and Flagyl PO X six weeks, EOT 2/22/17   · Per surgery, hyperbaric treatments will be critical for wound healing. Treatments will only be covered if he goes home for home infusion or infusion center. · He is not likely to be able to get the help he needs to be successful at home. Case management continues to work on a safe discharge plan. ID Recommendations for Plan of Care:   Routine care of CL (R chest)  Continue ceftriaxone 2 g IV Q 24 hrs through 2/22/17  Continue metronidazole 500 mg PO Q 8 hrs through 2/22/17  Q Monday monitoring labs:   CBC with diff, serum creatinine, LFTs, ESR, non-cardiac inflammatory CRP  Follow up with ID scheduled for  2/20/17 @ 1000    Anti-infectives:   Ceftriaxone and Flagyl (1/13-  Vancomycin and Zosyn (1/5-1/13)    Subjective:   Date of Consultation:  January 27, 2017  Referring Physician: Dr. Mary Kay Allred    Resting in bed, does not respond openly to ROS questions, or other conversation. Very flat affect today. Asked for assistance with television. Allergies   Allergen Reactions    Lortab [Hydrocodone-Acetaminophen] Itching        Review of Systems: Complete  Review of systems not obtained due to patient factors.     Objective:     Visit Vitals    /78    Pulse (!) 104    Temp 98 °F (36.7 °C)    Resp 15    Ht 6' 1\" (1.854 m)    Wt 115.9 kg (255 lb 8 oz)    SpO2 97%    BMI 33.71 kg/m2     Temp (24hrs), Av.7 °F (37.1 °C), Min:97.9 °F (36.6 °C), Max:99.6 °F (37.6 °C)       Lines:  R chest CL          Physical Exam:    General:  Alert and oriented, obese, well developed, appears stated age   Eyes:  Sclera anicteric. Pupils equally round and reactive to light. Mouth/Throat: Mucous membranes normal, oral pharynx clear   Neck: Supple, trachea midline   Lungs:   Course lung sounds anterior position today   CV:  Regular rate and rhythm, no audible murmur, click, rub or gallop   Abdomen:   Soft, obese, non-tender. bowel sounds very active, non-distended   Extremities: Bilateral LE 3+ edema, from thighs to ankles; no erythema, sluggish cap refill R foot; very tender ankles and feet   Skin: R foot wound with wd vac in place; no erythema   Lymph nodes: Not assessed today   Musculoskeletal: No deformity except surgical removal 5th MT, R thumb amputation   Lines/Devices:  Intact, no erythema, drainage or tenderness   Psych: Depressed, flat affect       Data Review:     CBC:  Recent Labs      17   0615  17   1139  17   0500   WBC  7.1   --   7.9   HGB  8.6*  7.9*  6.9*   HCT  26.9*  25.0*  21.7*   PLT  299   --   346       BMP:  Recent Labs      17   0615  17   0500  17   0518   CREA  1.84*  1.98*  1.94*   BUN  13  12  14   NA  146*  146*  144   K  3.2*  3.3*  3.1*   CL  111*  109*  108*   CO2  25  28  26   AGAP  10  9  10   GLU  104*  82  132*       LFTS:  No results for input(s): TBILI, ALT, SGOT, AP, TP, ALB in the last 72 hours.     Microbiology:     All Micro Results     Procedure Component Value Units Date/Time    CULTURE, ANAEROBIC [[id number]] Collected:  17 1416    Order Status:  Completed Specimen:  Bone Updated:  17 0659     Special Requests: 5TH METATARSAL HEAD      Culture result:         NO ANAEROBES ISOLATED 7 DAYS    CULTURE, ANAEROBIC [[id number]] Collected:  17 1400    Order Status:  Completed Specimen:  Foot Updated:  17 0659     Special Requests: RIGHT FOOT DEEP WOUND      Culture result:         NO ANAEROBES ISOLATED 7 DAYS    CULTURE, WOUND Hermelinda Cordia STAIN [[id number]]  (Susceptibility) Collected:  17 1400    Order Status:  Completed Specimen:  Foot Updated:  01/15/17 0927     Special Requests: RIGHT FOOT DEEP WOUND      GRAM STAIN 0 TO 5 WBC'S/OIF       RARE  GRAM POSITIVE COCCI        Culture result: SCANT  STREPTOCOCCUS AGALACTIAE SERO GROUP B         LIGHT  STAPHYLOCOCCUS LUGDUNENSIS       CULTURE, TISSUE Hermelinda Cordia STAIN [[id number]]  (Susceptibility) Collected:  17 1416    Order Status:  Completed Specimen:  Bone Updated:  01/15/17 0924     Special Requests: 5TH METATARSAL HEAD      GRAM STAIN NO  WBCS SEEN         NO DEFINITE ORGANISM SEEN        Culture result: SCANT  STAPHYLOCOCCUS CAPRAE         CORRECTED RESULT CALLED TO AND CORRECTLY REPEATED BY:  Edis PINA  ON 17 AT 0604       CULTURE, BLOOD [[id number]] Collected:  17 1820    Order Status:  Completed Specimen:  Blood from Blood Updated:  01/10/17 0833     Special Requests: RIGHT ANTECUBITAL        Culture result: NO GROWTH 5 DAYS       CULTURE, BLOOD [[id number]] Collected:  17 1800    Order Status:  Completed Specimen:  Blood from Blood Updated:  01/10/17 0833     Special Requests: LEFT ANTECUBITAL        Culture result: NO GROWTH 5 DAYS       C. DIFFICILE/EPI PCR [[id number]] Collected:  17 1300    Order Status:  Canceled Specimen:  Stool           Imagin/8/17: Bone scan  IMPRESSION: Increased radiotracer activity on all 3 phases within the right foot compared to the left. This localizes on the delayed image to the fifth  metatarsal bone, suggesting osteomyelitis at this site.     17 RLE arterial duplex  IMPRESSION: Abnormal right MAGGIE consistent with severe arterial disease. Noncompressible left lower extremity tibial arteries. Diminished right great toe  pressure. 1/5/17 R foot Xray  Impression:  1. No strong evidence for osteomyelitis. A three phase bone scan or contrasted MRI can be considered if there is continued concern for osteomyelitis.   2. Lucencies in the soft tissues about the proximal phalanx of the fifth digit. Soft tissue gas as can occur with a gas producing infection (i.e. gas gangrene)  cannot be excluded.     Signed By: Mary Leahy NP     January 27, 2017

## 2017-01-27 NOTE — PROGRESS NOTES
PLAN:  Cont management per Hospitalist  Cont BS and BP control  Nutrition consult -- last Albumin 1.2 -- RD following-requested to initiate TFs   K+ today 3.2 -- supplementation per primary team -- remains low   Vasc Surgery s/o -- had A-gram -- no revasc options  Smoking Cessation imperative - discussed  Wound care -- Wound RN placed VAC - re-eval wound today during VAC change  Abx -- per ID -- discussed with NP -- any alternative regimens ? ?   CM -- not able to manage schedule/trans for HBO. Abx, wound vac and PT (NWB to RLE) -- needs WC -- difficult situation -- CM aware and working on solutions/options. Hope that [facility] still an option      Appreciate Cardiology assist -- cleared for surgery or HBO therapy       ASSESSMENT:  Admit Date: 1/5/2017   7 Days Post-Op  Procedure(s):  RIGHT FOOT DEBRIDEMENT  ,PREP FOR GRAFTING AND SKIN SUBSTITUTE GRAFT    Principal Problem:    Type 2 diabetes mellitus with right diabetic foot infection ([address] (1/5/2017)    Active Problems:    Type 2 diabetes mellitus with hyperglycemia ([address] (7/29/2012)      HTN (hypertension) (7/29/2012)      Chronic systolic congestive heart failure ([address] (12/30/2015)      Overview: EF 35-40% on 2015 Echo      Kidney disease, chronic, stage III (moderate, EGFR 30-59 ml/min) (8/11/2016)      Acute renal failure with tubular necrosis ([address] (1/6/2017)      Sepsis due to cellulitis ([address] (1/9/2017)      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 Reports pain as controlled. Remains on abx - ID following. RD following- poor appetite. AF, NAD, VSS. K+ 3.2    OBJECTIVE:  Constitutional: Alert oriented cooperative patient in no acute distress; appears stated age   Visit Vitals    /81    Pulse 94    Temp 98 °F (36.7 °C)    Resp 20    Ht 6' 1\" (1.854 m)    Wt 255 lb 8 oz (115.9 kg)    SpO2 93%    BMI 33.71 kg/m2     Eyes:Sclera are clear.    ENMT: No obvious neck masses, no ear or lip lesions, R IJ tunneled cath c/d/i  CV: Impaired perfusion - pulses PT/DP via doppler  Resp: No JVD. Breathing is non-labored. No wheezing. On RA  GI: Soft, non-tender, non-distended, BS active   Musculoskeletal: Normal function. R thumb amputation  Extremities: R foot wound vac in place, taken down with wound nurse, several soft areas though some improved spots 4th toe looks worse. VAC replaced with duoderm barrier  Neuro: Oriented, sensation to bilat feet intact  Psychiatric: Calm and cooperative       Patient Vitals for the past 24 hrs:   BP Temp Pulse Resp SpO2   01/27/17 1046 145/81 98 °F (36.7 °C) 94 20 93 %   01/27/17 0715 147/78 98 °F (36.7 °C) (!) 104 15 97 %   01/27/17 0444 148/77 98.6 °F (37 °C) (!) 104 20 97 %   01/27/17 0132 124/78 98.9 °F (37.2 °C) 94 18 96 %   01/27/17 0040 132/82 99.4 °F (37.4 °C) 90 18 94 %   01/26/17 2346 126/70 98.9 °F (37.2 °C) 76 18 96 %   01/26/17 2334 134/84 99.6 °F (37.6 °C) 90 16 96 %   01/26/17 1903 134/73 98 °F (36.7 °C) 88 16 95 %   01/26/17 1742 128/62 97.9 °F (36.6 °C) 94 15 96 %     Labs:  Recent Labs      01/27/17   0615   WBC  7.1   HGB  8.6*   PLT  299   NA  146*   K  3.2*   CL  111*   CO2  25   BUN  13   CREA  1.84*   GLU  104*     Jermaine Phelps, FNP-BC    The patient was seen in conjunction with Dr. Kristie Cole who independently evaluated the patient, reviewed the chart and agreed with the assessment and plan. I have seen and examined the patient with the Nurse Practitioner and agree with the above assessment and plan. Meggan Soliman.  Kristie Cole MD

## 2017-01-27 NOTE — WOUND CARE
Right lateral foot wound vac dressing change with Dr. Paul Anderson in to see wound. Wound base with a synthetic material that was removed by Dr. Paul Anderson, periwound skin macerated and peeling, Dr. Paul Anderson cleansed and removed some of this peeling skin at bedside. After cleansing with dermal wound , wound base with slough and pink, some pale pink. Wound VAC dressing applied, hydrocolloid and paste strip used to improve skin. Peeling skin on 4th toe removed by Dr. Paul Anderson. 1x1x0.1cm purple lesion mid plantar foot with bleeding edges, likely popped vascular blister. Ulcers noted on both lateral sides of 2nd toes. Xeroform, gauze applied to 4th toe, plantar lesion and both 2nd toe ulcers, wrapped in timoteo over wound vac dressing as well. Will monitor.

## 2017-01-27 NOTE — PROGRESS NOTES
Problem: Mobility Impaired (Adult and Pediatric)  Goal: *Acute Goals and Plan of Care (Insert Text)  LTG: (Updated 1/27/16)  (1.)Mr. Zaira Fonseca will move from supine to sit and sit to supine , scoot up and down and roll side to side in bed INDEPENDENTLY within 7 day(s). (2.)Mr. Zaira Fonseca will transfer from bed to chair and chair to bed with SUPERVISION using walker or transfer board within 7 day(s). (3.)Mr. Zaira Fonseca will maintain right NWB status during all standing/transfers 100% of the time within 7 days. (4.)Mr. Zaira Fonseca will ambulate with MODERATE ASSIST for 5+ feet maintaining NWB status with the least restrictive device within 7 day(s). (5.)Mr. Zaira Fonseca will perform seated exercises and balance activities for 15+ minutes to improve strength and mobility within 7 days. PHYSICAL THERAPY: Re-evaluation and AM 1/27/2017  INPATIENT: Hospital Day: 21  Payor: CARE IMPROVEMENT PLUS / [place] / Product Type: Managed Care Medicare /     [facility]     NAME/AGE/GENDER: Jackie Sigala is a 73 y.o. male            PRIMARY DIAGNOSIS: Gangrene of toe ([address] Fredi Curling  Diabetic foot ulcer associated with type 1 diabetes mellitus, unspecified laterality ([address] [X90.210, L97.509]  Ulcer of right foot, with unspecified severity ([address] [L97.519] Type 2 diabetes mellitus with right diabetic foot infection ([address] Type 2 diabetes mellitus with right diabetic foot infection ([address]  Procedure(s) (LRB):  RIGHT FOOT DEBRIDEMENT  ,PREP FOR GRAFTING AND SKIN SUBSTITUTE GRAFT (Right)  7 Days Post-Op  ICD-10: Treatment Diagnosis: Generalized Muscle Weakness (M62.81)  Other lack of cordination (R27.8)  Difficulty in walking, Not elsewhere classified (R26.2)  Precautions/Allergies:   Lortab [hydrocodone-acetaminophen]       ASSESSMENT:      Mr. Zaira Fonseca presents in supine and requires max encouragement to participate with therapy.  He appears to be more agitated and frustrated with his situation today and is unrealistic about plan of care, frequently stating he just wants to go home so he can walk around and go fishing. Reviewed importance of participating with therapy to address mobility and educate/instruct on gait maintaining NWB status on right LE for wound healing. He does not appear to understand the importance of NWB. Regardless, he has improved with bed mobility since previous assessment and transfers to sitting with SBA-supervision. LE assessment reveals AROM WFL with decreased strength bilaterally (also limited by hypersensitivity in LEs with any pressure). Sensation appears intact/equal bilaterally. Pt performs sit-stand transfers x3 working on body mechanics and to address standing posture and NWB status. Pt has difficulty with NWB. Tends to keep heel on floor however does not appear to much much pressure. Provided max cueing and education on walker use and use of UEs. On last attempt, pt did transfer to chair however without correct walker use and poor safety awareness. Did not really take steps. Mr. Paul fischer was left sitting up with needs in reach and LEs elevated. Again he appears to be making some progress with mobility but is limited by mental status and decreased willingness to work on keeping weight off of right LE. He will need continued therapy to address deficits. If weight bearing continues to be difficult may need to try transfer board to allow wound healing. Goals updated. Will continue with acute PT per plan of care. The best discharge option would be short term rehab as pt does not have consistent help at home and would be at risk of falling or further damaging right LE due to non compliance. This section established at most recent assessment   PROBLEM LIST (Impairments causing functional limitations):  1. Decreased Strength  2. Decreased ADL/Functional Activities  3. Decreased Transfer Abilities  4. Decreased Ambulation Ability/Technique  5. Decreased Balance  6. Increased Pain  7.  Decreased Activity Tolerance  8. Decreased [place] with Home Exercise Program  9. Decreased Cognition    INTERVENTIONS PLANNED: (Benefits and precautions of physical therapy have been discussed with the patient.)  1. Balance Exercise  2. Bed Mobility  3. Family Education  4. Gait Training  5. Home Exercise Program (HEP)  6. Range of Motion (ROM)  7. Therapeutic Activites  8. Therapeutic Exercise/Strengthening  9. Transfer Training      TREATMENT PLAN: Frequency/Duration: 3-4 times per weel for duration of hospital stay  Rehabilitation Potential For Stated Goals: Verlena Gayla REHABILITATION/EQUIPMENT: (at time of discharge pending progress): Continue Skilled Therapy and Rehab. HISTORY:   History of Present Injury/Illness (Reason for Referral):  Pt is a 74 yo male who presents to ER this evening with progressive pain and poor healing of right foot wound. He reports site started as small blister maybe from a shoe about a month ago but has not improved and recently pain has worsened is now radiating up his leg. He also reports uanble to bear weight on foot in past day or so. He has not been on abx for this but states it was seen by a provider at [facility]. Other PMH includes DM which appears uncontrolled (A1C 9.2 11/2016), CHF, CKD, current smoker. X Ray of right foot has low suspicion for osteomyelitis but does reveal soft tissue lucencies, possible gas producing infection. WBC 18.7, LA 2.2. He has been started on Vanc/Zosyn in ER. Also noted to have MO with Cr 2.99, baseline 1.90. Pt denies any med changes and reports compliance with current med regimen. He denies recent or current CP, SOB, abd pain, fevers  Past Medical History/Comorbidities:   Mr. Eliza Yost  has a past medical history of Abnormal CT scan, chest (12/27/2015); Acute CHF ([address] (12/26/2015); Acute systolic congestive heart failure ([address] (12/30/2015); MO (acute kidney injury) ([facility] (7/29/2012); Bilateral pleural effusion (12/27/2015);  Chest pain (12/26/2015); DM (diabetes mellitus), type 2 ([facility] (7/29/2012); Encephalopathy due to infection (1/7/2017); HTN (hypertension) (7/29/2012); Hypoglycemia (7/29/2012); NSTEMI (non-ST elevated myocardial infarction) ([facility] (1/5/2017); and Tobacco use (12/27/2015). He also has no past medical history of Arthritis; Asthma; Autoimmune disease ([facility] Cancer ([facility] COPD; DEMENTIA; Gastrointestinal disorder; Liver disease; Other ill-defined conditions(799.89); Psychiatric disorder; PUD (peptic ulcer disease); Seizures ([facility] Sleep disorder; Stroke [facility]); or Thromboembolus ([facility] Mr. Venita Stoner  has a past surgical history that includes orthopaedic. Social History/Living Environment:   Home Environment: Private residence  # Steps to Enter: 4  One/Two Story Residence: One story  Living Alone: No  Support Systems: Child(elpidio), Family member(s)  Patient Expects to be Discharged to[de-identified] Rehabilitation facility  Current DME Used/Available at Home: None  Tub or Shower Type: Shower  Prior Level of Function/Work/Activity:  Pt states he was independent PTA. Would sit on his front porch everyday and \"entertain\".   Current Medications:           Current Facility-Administered Medications:     0.9% sodium chloride infusion 250 mL, 250 mL, IntraVENous, PRN, Georgie Sevilla MD    insulin glargine (LANTUS) injection 10 Units, 10 Units, SubCUTAneous, DAILY, Neil Mendez MD, 10 Units at 01/27/17 0900    potassium chloride (K-DUR, KLOR-CON) SR tablet 40 mEq, 40 mEq, Oral, DAILY, Georgie Sevilla MD, 40 mEq at 01/27/17 0900    amLODIPine (NORVASC) tablet 5 mg, 5 mg, Oral, DAILY, Costella Shawn, NP, 5 mg at 01/27/17 0900    isosorbide dinitrate (ISORDIL) tablet 10 mg, 10 mg, Oral, TID, Costella Shawn, NP, 10 mg at 01/27/17 0900    hydrALAZINE (APRESOLINE) tablet 25 mg, 25 mg, Oral, TID, Kendrick Escobar, NP, 25 mg at 01/27/17 0900    insulin lispro (HUMALOG) injection 5 Units, 5 Units, SubCUTAneous, TIDAC, Alea Stewart, MD, 5 Units at 01/27/17 1130    insulin lispro (HUMALOG) injection, , SubCUTAneous, TIDAC, Val Lopez MD, Stopped at 01/26/17 0730    torsemide (DEMADEX) tablet 40 mg, 40 mg, Oral, DAILY, Cali Wheatley MD, 40 mg at 01/27/17 0900    sodium hypochlorite (QUARTER STRENGTH DAKIN'S) 0.125% irrigation (bottle), , Topical, DAILY, Mary Santo NP, Stopped at 01/25/17 0900    sodium chloride (NS) flush 5 mL, 5 mL, InterCATHeter, PRN, Ramses Dejesus PA-C    pantoprazole (PROTONIX) tablet 40 mg, 40 mg, Oral, ACB, Elmer Nielson MD, 40 mg at 01/27/17 0610    HYDROmorphone (PF) (DILAUDID) injection 2 mg, 2 mg, IntraVENous, PRN, Jennifer Kim MD, 2 mg at 01/13/17 1359    cefTRIAXone (ROCEPHIN) 2 g in 0.9% sodium chloride (MBP/ADV) 50 mL, 2 g, IntraVENous, Q24H, Thai Rodriguez NP, Last Rate: 100 mL/hr at 01/26/17 1839, 2 g at 01/26/17 1839    metroNIDAZOLE (FLAGYL) tablet 500 mg, 500 mg, Oral, TID, Thai Rodriguez NP, 500 mg at 01/27/17 0900    HYDROmorphone (PF) (DILAUDID) injection 0.5-1 mg, 0.5-1 mg, IntraVENous, Q3H PRN, Jennifer Kim MD, 1 mg at 01/22/17 0124    oxyCODONE IR (ROXICODONE) tablet 5 mg, 5 mg, Oral, Q4H PRN, Miguel Ángel Garrison MD, 5 mg at 01/26/17 2329    hydrALAZINE (APRESOLINE) 20 mg/mL injection 20 mg, 20 mg, IntraVENous, Q6H PRN, Terri Collins MD    haloperidol lactate (HALDOL) injection 2 mg, 2 mg, IntraVENous, Q4H PRN, Terri Collins MD, 2 mg at 01/20/17 2104    0.9% sodium chloride infusion 250 mL, 250 mL, IntraVENous, PRN, Terri Collins MD    diphenhydrAMINE (BENADRYL) injection 25 mg, 25 mg, IntraVENous, Q4H PRN, Terri Collins MD    traMADol Alver Galas) tablet 50 mg, 50 mg, Oral, Q6H PRN, Terri Collins MD, 50 mg at 01/25/17 2141    atorvastatin (LIPITOR) tablet 80 mg, 80 mg, Oral, DAILY, Carlos Enrique Mullen DO, 80 mg at 01/27/17 0900    metoprolol succinate (TOPROL-XL) tablet 100 mg, 100 mg, Oral, DAILY WITH BREAKFAST, Carlos Enrique Mullen, DO, 100 mg at 01/27/17 0800    sodium chloride (NS) flush 5-10 mL, 5-10 mL, IntraVENous, Q8H, Dai Betancur DO, 10 mL at 01/27/17 7228    sodium chloride (NS) flush 5-10 mL, 5-10 mL, IntraVENous, PRN, Dai Betancur DO    acetaminophen (TYLENOL) tablet 650 mg, 650 mg, Oral, Q4H PRN, Dai Betancur DO, 650 mg at 01/26/17 1126    ondansetron (ZOFRAN) injection 4 mg, 4 mg, IntraVENous, Q4H PRN, Dai Betancur DO, 4 mg at 01/22/17 0135    docusate sodium (COLACE) capsule 100 mg, 100 mg, Oral, DAILY PRN, Dai Betancur DO   Date Last Reviewed:  1/27/2017      Number of Personal Factors/Comorbidities that affect the Plan of Care: 3+: HIGH COMPLEXITY   EXAMINATION:   Most Recent Physical Functioning:   Gross Assessment:    Strength:   LEs:    AROM: Within functional limits (B LEs in sitting)  Strength: Generally decreased, functional (LEs)  Coordination: Within functional limits  Sensation: Intact (to light touch/equal LEs; hypersensitivity)               Posture:  Posture  Posture (WDL): Exceptions to WDL  Posture Assessment: Forward head, Rounded shoulders  Balance:  Poor  Sitting: Intact  Sitting - Static: Good (unsupported)  Sitting - Dynamic: Fair (occasional)  Standing: Impaired  Standing - Static: Constant support  Standing - Dynamic : Fair (to poor with weight bearing) Bed Mobility:    Supine to Sit: Stand-by asssistance  Scooting: Stand-by asssistance  Wheelchair Mobility:  NA     Transfers:   Moderate to max assist to get to perform  Sit to Stand: Minimum assistance  Stand to Sit: Contact guard assistance  Bed to Chair: Minimum assistance  Gait:  Unable today  Right Side Weight Bearing: Non-weight bearing  Left Side Weight Bearing: Full  Base of Support: Center of gravity altered;Shift to left  Speed/Radha: Delayed  Step Length: Left shortened  Gait Abnormalities: Decreased step clearance  Distance (ft): 2 Feet (ft)  Assistive Device: Walker, rolling  Ambulation - Level of Assistance: Minimal assistance  Interventions: Verbal cues; Visual/Demos; Safety awareness training; Tactile cues;Manual cues       Body Structures Involved:  1. Lungs  2. Bones  3. Joints  4. Muscles  5. Ligaments Body Functions Affected:  1. Mental  2. Sensory/Pain  3. Respiratory  4. Neuromusculoskeletal  5. Movement Related  6. Skin Related Activities and Participation Affected:  1. Learning and Applying Knowledge  2. General Tasks and Demands  3. Communication  4. Mobility  5. Self Care  6. Domestic Life  7. Interpersonal Interactions and Relationships  8. Community, Social and [place]   Number of elements that affect the Plan of Care: 4+: HIGH COMPLEXITY   CLINICAL PRESENTATION:   Presentation: Evolving clinical presentation with unstable and unpredictable characteristics: HIGH COMPLEXITY   CLINICAL DECISION MAKIN [facility] Inpatient Short Form  How much difficulty does the patient currently have. .. Unable A Lot A Little None   1. Turning over in bed (including adjusting bedclothes, sheets and blankets)? [ ] 1   [ ] 2   [ ] 3   [X] 4   2. Sitting down on and standing up from a chair with arms ( e.g., wheelchair, bedside commode, etc.)   [ ] 1   [ ] 2   [X] 3   [ ] 4   3. Moving from lying on back to sitting on the side of the bed? [ ] 1   [ ] 2   [ ] 3   [X] 4   How much help from another person does the patient currently need. .. Total A Lot A Little None   4. Moving to and from a bed to a chair (including a wheelchair)? [ ] 1   [ ] 2   [X] 3   [ ] 4   5. Need to walk in hospital room? [X] 1   [ ] 2   [ ] 3   [ ] 4   6. Climbing 3-5 steps with a railing? [X] 1   [ ] 2   [ ] 3   [ ] 4   © , Trustees of [address] [place] [address] [address] [id number], under license to [employer]. All rights reserved          Score:  Initial: 10 Most Recent: 16 (Date: 17 )   Interpretation of Tool:  Represents activities that are increasingly more difficult (i.e. Bed mobility, Transfers, Gait). Score 24 23 22-20 19-15 14-10 9-7 6       Modifier CH CI CJ CK CL CM CN         · Mobility - Walking and Moving Around:               - CURRENT STATUS:    CK - 40%-59% impaired, limited or restricted               - GOAL STATUS:           CJ - 20%-39% impaired, limited or restricted               - D/C STATUS:                       ---------------To be determined---------------  Payor: CARE IMPROVEMENT PLUS [place]PR[place] / Product Type: Observe Medical [employer] Medicare /       Medical Necessity:     · Skilled intervention continues to be required due to debiltity. Reason for Services/Other Comments:  · Patient continues to require skilled intervention due to debility. Use of outcome tool(s) and clinical judgement create a POC that gives a: Difficult prediction of patient's progress: HIGH COMPLEXITY                 TREATMENT:     Pre-treatment Symptoms/Complaints:  LE pain, hypersensitivity  Pain: Initial:   Pain Intensity 1: 0 (at rest pre and post treatment; LEs sensitive/painful)  Post Session:  0/10     Therapeutic Activity: (   12 minutes): Therapeutic activities including Bed mobility, review of R NWB status, sit-stand transfers x3, standing balance and activity tolerance, weight shifts, and Ambulation on level ground to improve mobility, strength, balance, coordination and to improve functional technique during transfers and mobility. Encouraged to keep LEs elevated while sitting up in chair. Required min-mod assist and increased Verbal cues; Visual/Demos; Safety awareness training; Tactile cues;Manual cues to promote static and dynamic balance in standing and promote motor control of bilateral, upper extremity(s), lower extremity(s) and to work on maintaining R weight bearing status.          Date:  1/19/17 Date:  1/25/17 Date:     Activity/Exercise Parameters AM group exs Parameters   AP's x20 B    AA to R X 20 B    MArching x10 B X 15 B    LAQ's X 20 B  AA  To R X 15 B    Hip ABD/ADD X 20 B AA to R X 15 B                                  Treatment/Session Assessment: See initial assessment above. Patients response to todays treatment session was tolerated well with no medical complications. · Response to Treatment:  Tolerated well without complications  · Interdisciplinary Collaboration:  · Physical Therapy Assistant and Registered Nurse  · After treatment position/precautions:  · Up in chair, Bed/Chair-wheels locked, Bed in low position, Call light within reach and RN notified  · Compliance with Program/Exercises: Will assess as treatment progresses. · Recommendations/Intent for next treatment session: \"Next visit will focus on reduction in assistance provided\".   Total Treatment Duration:  PT Patient Time In/Time Out  Time In: 1120  Time Out: VIET Sommers

## 2017-01-27 NOTE — PROGRESS NOTES
Hospitalist Progress Note    2017  Admit Date: 2017  5:07 PM   NAME: Jeanine Perez   :  1945   MRN:  [id number]   Attending: Lynda Nichols DO  PCP:  None      Admitted for:osteomyelitis right lower extremity S/P debridement    SUBJECTIVE:   Patient seems more up beat, had long conversation, complains can not chew food due to teeth, and feels full all the time, denies any fever, pain in foot getting better. Hospital Course  Pt is a 74 yo male who presented with progressive pain and poor healing of right foot wound over 1 month, admitted with infected DM foot. PMH includes DM (A1C 9.2 2016), CHF, CKD, current smoker. X Ray of right foot had low suspicion for osteomyelitis but does reveal soft tissue lucencies, possible gas producing infection. Surgery and Vascular were consulted. He had bedside debridement on  and surgical I&D/5th toe amputation . He was started on Vanc/Zosyn in ER. ID has seen - changed atbx  to IV Rocephin/PO Flagyl. 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 Will need 6 weeks antbx per ID recs. Also seen by vascular surgery due to abnormal right MAGGIE and poor postoperative wound healing.     Review of Systems negative with exception of pertinent positives noted above  Past medical history unchanged from H&P    PHYSICAL EXAM     Visit Vitals    /81    Pulse 94    Temp 98 °F (36.7 °C)    Resp 20    Ht 6' 1\" (1.854 m)    Wt 115.9 kg (255 lb 8 oz)    SpO2 93%    BMI 33.71 kg/m2      Temp (24hrs), Av.6 °F (37 °C), Min:97.9 °F (36.6 °C), Max:99.6 °F (37.6 °C)    Oxygen Therapy  O2 Sat (%): 93 % (17 1046)  Pulse via Oximetry: 97 beats per minute (17 0525)  O2 Device: Room air (17 1145)  O2 Flow Rate (L/min): 2 l/min (17 1440)    Intake/Output Summary (Last 24 hours) at 17 1337  Last data filed at 17 1143   Gross per 24 hour   Intake           1547.3 ml   Output             1825 ml   Net           -277.7 ml General: No acute distress  mucous membranes pink and moist acyanotic anicteric  Neck:  Supple full range of motion  Lungs:  Air entry equal bilaterally, no crepitations rales rhonchi   Heart:  Regular rate and rhythm,  No murmur, rub, or gallop  Abdomen: Soft, Non distended, Non tender, no rebound guarding Positive bowel sounds  Extremities: No cyanosis, clubbing or edema right foot with wound vac  Neurologic:  No focal deficits  Musculoskeletal: no   Joint swelling tenderness erythema  Skin:  No erythema, rashes noted          LAB  Recent Labs      01/27/17   1048  01/27/17   0709  01/26/17   2041  01/26/17   1632  01/26/17   1234   GLUCPOC  106*  108*  154*  144*  138*      Recent Labs      01/27/17   0615   WBC  7.1   HGB  8.6*   HCT  26.9*   PLT  299     Recent Labs      01/27/17   0615   01/25/17   0518   NA  146*   < >  144   K  3.2*   < >  3.1*   CL  111*   < >  108*   CO2  25   < >  26   GLU  104*   < >  132*   BUN  13   < >  14   CREA  1.84*   < >  1.94*   MG   --    --   1.5*   CA  6.7*   < >  7.3*    < > = values in this interval not displayed. EKG and imaging reviewed personally by me  No results found. Results for orders placed or performed during the hospital encounter of 01/05/17   EKG, 12 LEAD, INITIAL   Result Value Ref Range    Systolic BP  mmHg    Diastolic BP  mmHg    Ventricular Rate 98 BPM    Atrial Rate 98 BPM    P-R Interval 166 ms    QRS Duration 74 ms    Q-T Interval 352 ms    QTC Calculation (Bezet) 449 ms    Calculated P Axis 62 degrees    Calculated R Axis 46 degrees    Calculated T Axis -74 degrees    Diagnosis       !! AGE AND GENDER SPECIFIC ECG ANALYSIS !!   Sinus rhythm with occasional Premature ventricular complexes  ST & T wave abnormality, consider inferolateral ischemia  Abnormal ECG  Confirmed by ST LYNNETTE JOSE MD (), JOSE BASHIR ([id number]) on 1/5/2017 7:42:32 PM       XR Results (most recent):    Results from [place] encounter on 01/05/17   XR CHEST PA LAT Narrative CHEST X-RAY, 2 views 1/5/2017    History: Fever/chills/sepsis. Technique: PA and lateral views of the chest.     Comparison: Chest x-ray 12/30/2015    Findings: The cardiac silhouette is normal in respect to size. The lungs are expanded  without evidence for pneumothorax. No consolidation, or evidence of pleural  effusion is seen. The bony thorax demonstrates no acute changes. The upper abdomen is  unremarkable in appearance. Impression IMPRESSION:   1. No acute cardiopulmonary process evident by plain film imaging. 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 ([facility] 01/17/2017    Sepsis due to cellulitis ([facility] 01/09/2017    Acute renal failure with tubular necrosis ([address] 01/06/2017    Type 2 diabetes mellitus with right diabetic foot infection ([address] 01/05/2017    Kidney disease, chronic, stage III (moderate, EGFR 30-59 ml/min) 08/11/2016    Chronic systolic congestive heart failure ([address] 12/30/2015     EF 35-40% on 2015 Echo      Type 2 diabetes mellitus with hyperglycemia ([facility] 07/29/2012    HTN (hypertension) 07/29/2012       ASSESSMENT  AND PLAN      · Diabetic foot with osteomyelitis - on rocephin and flagyl - EOT 2/22 - patient is not a good candidate for home and likely would not be able to obtain both HBO and iv antibiotics due to reliability of family. I RECOMMEND THAT HE GOES TO [facility], given his poor eating, difficulty getting around at all, and his home environment, after much discussion majority of his family is IN AND OUT, highly doubt can rely on consistent follow up with above strategies. And best option may be holding HBO at this time.    · DM - continue  Current regimen glucose controlled  · Severe protein malnutrition - multiple factors likely due to poor dentition, early satiety, may need stimulant but given his complex medical history remeron is not best agent,   · Anemia - likely due to chronic disease and infection - will continue to monitor closely, transfuse as needed    DVT Prophylaxis: none due to anemia, and severe PVD      Signed By: Katherine Higuera MD     January 27, 2017

## 2017-01-27 NOTE — PROGRESS NOTES
Bilateral heels elevated on pillows and mepilex applied to prevent breakdown.  Heels boggy bilaterally

## 2017-01-28 LAB
ANION GAP BLD CALC-SCNC: 10 MMOL/L (ref 7–16)
BUN SERPL-MCNC: 14 MG/DL (ref 8–23)
CALCIUM SERPL-MCNC: 6.2 MG/DL (ref 8.3–10.4)
CHLORIDE SERPL-SCNC: 110 MMOL/L (ref 98–107)
CO2 SERPL-SCNC: 24 MMOL/L (ref 21–32)
CREAT SERPL-MCNC: 2 MG/DL (ref 0.8–1.5)
GLUCOSE BLD STRIP.AUTO-MCNC: 145 MG/DL (ref 65–100)
GLUCOSE BLD STRIP.AUTO-MCNC: 176 MG/DL (ref 65–100)
GLUCOSE BLD STRIP.AUTO-MCNC: 181 MG/DL (ref 65–100)
GLUCOSE BLD STRIP.AUTO-MCNC: 187 MG/DL (ref 65–100)
GLUCOSE SERPL-MCNC: 148 MG/DL (ref 65–100)
MAGNESIUM SERPL-MCNC: 1.5 MG/DL (ref 1.8–2.4)
PHOSPHATE SERPL-MCNC: 3.8 MG/DL (ref 2.3–3.7)
POTASSIUM SERPL-SCNC: 3.6 MMOL/L (ref 3.5–5.1)
SODIUM SERPL-SCNC: 144 MMOL/L (ref 136–145)

## 2017-01-28 PROCEDURE — 65270000029 HC RM PRIVATE

## 2017-01-28 PROCEDURE — 74011250637 HC RX REV CODE- 250/637: Performed by: INTERNAL MEDICINE

## 2017-01-28 PROCEDURE — 74011250637 HC RX REV CODE- 250/637: Performed by: NURSE PRACTITIONER

## 2017-01-28 PROCEDURE — 83735 ASSAY OF MAGNESIUM: CPT | Performed by: SURGERY

## 2017-01-28 PROCEDURE — 74011250637 HC RX REV CODE- 250/637: Performed by: FAMILY MEDICINE

## 2017-01-28 PROCEDURE — 80048 BASIC METABOLIC PNL TOTAL CA: CPT | Performed by: SURGERY

## 2017-01-28 PROCEDURE — 74011250636 HC RX REV CODE- 250/636: Performed by: NURSE PRACTITIONER

## 2017-01-28 PROCEDURE — 74011250637 HC RX REV CODE- 250/637: Performed by: SURGERY

## 2017-01-28 PROCEDURE — 74750000023 HC WOUND THERAPY

## 2017-01-28 PROCEDURE — 74011636637 HC RX REV CODE- 636/637: Performed by: HOSPITALIST

## 2017-01-28 PROCEDURE — 74011000258 HC RX REV CODE- 258: Performed by: NURSE PRACTITIONER

## 2017-01-28 PROCEDURE — 84100 ASSAY OF PHOSPHORUS: CPT | Performed by: SURGERY

## 2017-01-28 PROCEDURE — 74011250636 HC RX REV CODE- 250/636: Performed by: HOSPITALIST

## 2017-01-28 PROCEDURE — 74011250637 HC RX REV CODE- 250/637: Performed by: HOSPITALIST

## 2017-01-28 PROCEDURE — 82962 GLUCOSE BLOOD TEST: CPT

## 2017-01-28 PROCEDURE — 74011636637 HC RX REV CODE- 636/637: Performed by: INTERNAL MEDICINE

## 2017-01-28 RX ORDER — MAGNESIUM SULFATE HEPTAHYDRATE 40 MG/ML
4 INJECTION, SOLUTION INTRAVENOUS ONCE
Status: COMPLETED | OUTPATIENT
Start: 2017-01-28 | End: 2017-01-28

## 2017-01-28 RX ORDER — LANOLIN ALCOHOL/MO/W.PET/CERES
400 CREAM (GRAM) TOPICAL 2 TIMES DAILY
Status: DISCONTINUED | OUTPATIENT
Start: 2017-01-28 | End: 2017-02-02 | Stop reason: HOSPADM

## 2017-01-28 RX ADMIN — INSULIN LISPRO 5 UNITS: 100 INJECTION, SOLUTION INTRAVENOUS; SUBCUTANEOUS at 11:30

## 2017-01-28 RX ADMIN — HYDRALAZINE HYDROCHLORIDE 25 MG: 25 TABLET ORAL at 16:00

## 2017-01-28 RX ADMIN — AMLODIPINE BESYLATE 5 MG: 5 TABLET ORAL at 09:08

## 2017-01-28 RX ADMIN — Medication 10 ML: at 14:00

## 2017-01-28 RX ADMIN — INSULIN LISPRO 5 UNITS: 100 INJECTION, SOLUTION INTRAVENOUS; SUBCUTANEOUS at 16:56

## 2017-01-28 RX ADMIN — METOCLOPRAMIDE 10 MG: 5 INJECTION, SOLUTION INTRAMUSCULAR; INTRAVENOUS at 11:26

## 2017-01-28 RX ADMIN — METOCLOPRAMIDE 10 MG: 5 INJECTION, SOLUTION INTRAMUSCULAR; INTRAVENOUS at 05:30

## 2017-01-28 RX ADMIN — Medication 400 MG: at 11:26

## 2017-01-28 RX ADMIN — MAGNESIUM SULFATE IN WATER 4 G: 40 INJECTION, SOLUTION INTRAVENOUS at 13:03

## 2017-01-28 RX ADMIN — INSULIN LISPRO 5 UNITS: 100 INJECTION, SOLUTION INTRAVENOUS; SUBCUTANEOUS at 07:30

## 2017-01-28 RX ADMIN — PANTOPRAZOLE SODIUM 40 MG: 40 TABLET, DELAYED RELEASE ORAL at 05:31

## 2017-01-28 RX ADMIN — METRONIDAZOLE 500 MG: 500 TABLET ORAL at 16:54

## 2017-01-28 RX ADMIN — OXYCODONE HYDROCHLORIDE 5 MG: 5 TABLET ORAL at 05:48

## 2017-01-28 RX ADMIN — METRONIDAZOLE 500 MG: 500 TABLET ORAL at 22:33

## 2017-01-28 RX ADMIN — INSULIN LISPRO 2 UNITS: 100 INJECTION, SOLUTION INTRAVENOUS; SUBCUTANEOUS at 11:30

## 2017-01-28 RX ADMIN — ISOSORBIDE DINITRATE 10 MG: 10 TABLET ORAL at 22:33

## 2017-01-28 RX ADMIN — TORSEMIDE 40 MG: 20 TABLET ORAL at 09:07

## 2017-01-28 RX ADMIN — INSULIN GLARGINE 10 UNITS: 100 INJECTION, SOLUTION SUBCUTANEOUS at 09:09

## 2017-01-28 RX ADMIN — ATORVASTATIN CALCIUM 80 MG: 40 TABLET, FILM COATED ORAL at 09:07

## 2017-01-28 RX ADMIN — POTASSIUM CHLORIDE 40 MEQ: 20 TABLET, EXTENDED RELEASE ORAL at 09:07

## 2017-01-28 RX ADMIN — METRONIDAZOLE 500 MG: 500 TABLET ORAL at 09:07

## 2017-01-28 RX ADMIN — METOCLOPRAMIDE 10 MG: 5 INJECTION, SOLUTION INTRAMUSCULAR; INTRAVENOUS at 23:00

## 2017-01-28 RX ADMIN — CEFTRIAXONE 2 G: 2 INJECTION, POWDER, FOR SOLUTION INTRAMUSCULAR; INTRAVENOUS at 16:54

## 2017-01-28 RX ADMIN — Medication 10 ML: at 06:00

## 2017-01-28 RX ADMIN — METOPROLOL SUCCINATE 100 MG: 100 TABLET, EXTENDED RELEASE ORAL at 09:08

## 2017-01-28 RX ADMIN — B-COMPLEX W/ C & FOLIC ACID TAB 1 TABLET: TAB at 09:07

## 2017-01-28 RX ADMIN — HYDRALAZINE HYDROCHLORIDE 25 MG: 25 TABLET ORAL at 09:07

## 2017-01-28 RX ADMIN — Medication 400 MG: at 16:57

## 2017-01-28 RX ADMIN — METOCLOPRAMIDE 10 MG: 5 INJECTION, SOLUTION INTRAMUSCULAR; INTRAVENOUS at 16:54

## 2017-01-28 RX ADMIN — INSULIN LISPRO 2 UNITS: 100 INJECTION, SOLUTION INTRAVENOUS; SUBCUTANEOUS at 16:56

## 2017-01-28 RX ADMIN — Medication 10 ML: at 22:34

## 2017-01-28 RX ADMIN — HYDRALAZINE HYDROCHLORIDE 25 MG: 25 TABLET ORAL at 22:33

## 2017-01-28 RX ADMIN — ISOSORBIDE DINITRATE 10 MG: 10 TABLET ORAL at 09:08

## 2017-01-28 RX ADMIN — ISOSORBIDE DINITRATE 10 MG: 10 TABLET ORAL at 16:54

## 2017-01-28 NOTE — PROGRESS NOTES
Chart was reviewed, but patient was not seen. Continue ID plan as stated in previous notes. Please call if questions arise. Will follow up on Monday or sooner if called.

## 2017-01-28 NOTE — PROGRESS NOTES
Hospitalist Progress Note    2017  Admit Date: 2017  5:07 PM   NAME: Macie Jara   :  1945   MRN:  [id number]   Attending: Ethan Steele DO  PCP:  None      Admitted for:Osteomylitis, and foot ulcer, PAD    SUBJECTIVE:   Patient this morning has no complaints eating breakfast no nausea no vomiting, no fever chills no shortness of breath. Hospital Course  Pt is a 74 yo male who presented with progressive pain and poor healing of right foot wound over 1 month, admitted with infected DM foot. PMH includes DM (A1C 9.2 2016), CHF, CKD, current smoker. X Ray of right foot had low suspicion for osteomyelitis but does reveal soft tissue lucencies, possible gas producing infection. Surgery and Vascular were consulted. He had bedside debridement on  and surgical I&D/5th toe amputation . He was started on Vanc/Zosyn in ER. ID has seen - changed atbx  to IV Rocephin/PO Flagyl. 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 Will need 6 weeks antbx per ID recs. Also seen by vascular surgery due to abnormal right MAGGIE and poor postoperative wound healing.     Review of Systems negative with exception of pertinent positives noted above  Past medical history unchanged from H&P    PHYSICAL EXAM     Visit Vitals    /68    Pulse 97    Temp 99.2 °F (37.3 °C)    Resp 20    Ht 6' 1\" (1.854 m)    Wt 115.9 kg (255 lb 8 oz)    SpO2 93%    BMI 33.71 kg/m2      Temp (24hrs), Av.5 °F (36.9 °C), Min:98 °F (36.7 °C), Max:99.2 °F (37.3 °C)    Oxygen Therapy  O2 Sat (%): 93 % (17 0809)  Pulse via Oximetry: 97 beats per minute (17 0525)  O2 Device: Room air (17 1145)  O2 Flow Rate (L/min): 2 l/min (17 1440)    Intake/Output Summary (Last 24 hours) at 17 1047  Last data filed at 17 0939   Gross per 24 hour   Intake             1080 ml   Output             1200 ml   Net             -120 ml        General: No acute distress  mucous membranes pink and moist acyanotic anicteric  Neck:  Supple full range of motion  Lungs:  Air entry equal bilaterally, no crepitations rales rhonchi   Heart:  Regular rate and rhythm,  No murmur, rub, or gallop  Abdomen: Soft, Non distended, Non tender, no rebound guarding Positive bowel sounds  Extremities: No cyanosis, clubbing or edema wound vac on right foot  Neurologic:  No focal deficits  Musculoskeletal: no   Joint swelling tenderness erythema  Skin:  No erythema, rashes noted          LAB  Recent Labs      01/28/17   0757  01/27/17   2138  01/27/17   1659  01/27/17   1048  01/27/17   0709   GLUCPOC  145*  199*  146*  106*  108*      Recent Labs      01/27/17   0615   WBC  7.1   HGB  8.6*   HCT  26.9*   PLT  299     Recent Labs      01/28/17   0545   NA  144   K  3.6   CL  110*   CO2  24   GLU  148*   BUN  14   CREA  2.00*   MG  1.5*   CA  6.2*   PHOS  3.8*       EKG and imaging reviewed personally by me  No results found. Results for orders placed or performed during the hospital encounter of 01/05/17   EKG, 12 LEAD, INITIAL   Result Value Ref Range    Systolic BP  mmHg    Diastolic BP  mmHg    Ventricular Rate 98 BPM    Atrial Rate 98 BPM    P-R Interval 166 ms    QRS Duration 74 ms    Q-T Interval 352 ms    QTC Calculation (Bezet) 449 ms    Calculated P Axis 62 degrees    Calculated R Axis 46 degrees    Calculated T Axis -74 degrees    Diagnosis       !! AGE AND GENDER SPECIFIC ECG ANALYSIS !! Sinus rhythm with occasional Premature ventricular complexes  ST & T wave abnormality, consider inferolateral ischemia  Abnormal ECG  Confirmed by ST LYNNETTE JOSE MD (), JOSE BASHIR ([id number]) on 1/5/2017 7:42:32 PM       XR Results (most recent):    Results from [place] encounter on 01/05/17   XR CHEST PA LAT   Narrative CHEST X-RAY, 2 views 1/5/2017    History: Fever/chills/sepsis. Technique: PA and lateral views of the chest.     Comparison: Chest x-ray 12/30/2015    Findings:    The cardiac silhouette is normal in respect to size.  The lungs are expanded  without evidence for pneumothorax. No consolidation, or evidence of pleural  effusion is seen. The bony thorax demonstrates no acute changes. The upper abdomen is  unremarkable in appearance. Impression IMPRESSION:   1. No acute cardiopulmonary process evident by plain film imaging. 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 ([facility] 01/17/2017    Sepsis due to cellulitis ([facility] 01/09/2017    Acute renal failure with tubular necrosis ([facility] 01/06/2017    Type 2 diabetes mellitus with right diabetic foot infection ([facility] [facility] 01/05/2017    Kidney disease, chronic, stage III (moderate, EGFR 30-59 ml/min) 08/11/2016    Chronic systolic congestive heart failure ([facility] 12/30/2015     EF 35-40% on 2015 Echo      Type 2 diabetes mellitus with hyperglycemia ([facility] 07/29/2012    HTN (hypertension) 07/29/2012       ASSESSMENT  AND PLAN      · Acute on chronic kidney disease (stage 3) -  improved close to baseline. · Hypomagnesemia - will replace today, and follow up repeat mag, start oral magnesium  · Severe protein malnutrition - NG feeds, or PEG feeds not really indicated, patient does not drink Ensures due to Diarrhea, tube feedings will only worsen this reaction, patient eating this morning, will discuss with dietary other supplements less likely to cause diarrhea, that patient may take orally. · DM - patient complains of bloating may have some gastroparesis - started on reglan yesterday, continue insulin glucose has been controlled. · Osteomyelitis, with wound of foot - complicated by PAD - patient will need on going antibiotics EOT 2/22 - best option still remains STR as I do not think home environment patient will be 1. Able to stay off foot, 2. Transportation to and from [address] [place] [address] [facility] [place] is reliable.  3. Social support at home 24/7 is not there reliably for An additional 3 weeks.     dispo - pending    Signed By: Robbie Hylton MD     2017

## 2017-01-28 NOTE — PROGRESS NOTES
Pt rested throughout the night with c/o pain in foot. Medicated per MAR. All needs met at this time.

## 2017-01-29 LAB
ANION GAP BLD CALC-SCNC: 10 MMOL/L (ref 7–16)
BUN SERPL-MCNC: 17 MG/DL (ref 8–23)
CALCIUM SERPL-MCNC: 7.3 MG/DL (ref 8.3–10.4)
CHLORIDE SERPL-SCNC: 107 MMOL/L (ref 98–107)
CO2 SERPL-SCNC: 25 MMOL/L (ref 21–32)
CREAT SERPL-MCNC: 2.15 MG/DL (ref 0.8–1.5)
GLUCOSE BLD STRIP.AUTO-MCNC: 129 MG/DL (ref 65–100)
GLUCOSE BLD STRIP.AUTO-MCNC: 191 MG/DL (ref 65–100)
GLUCOSE BLD STRIP.AUTO-MCNC: 220 MG/DL (ref 65–100)
GLUCOSE BLD STRIP.AUTO-MCNC: 91 MG/DL (ref 65–100)
GLUCOSE SERPL-MCNC: 124 MG/DL (ref 65–100)
POTASSIUM SERPL-SCNC: 3.6 MMOL/L (ref 3.5–5.1)
SODIUM SERPL-SCNC: 142 MMOL/L (ref 136–145)

## 2017-01-29 PROCEDURE — 74011250637 HC RX REV CODE- 250/637: Performed by: INTERNAL MEDICINE

## 2017-01-29 PROCEDURE — 74011250636 HC RX REV CODE- 250/636: Performed by: HOSPITALIST

## 2017-01-29 PROCEDURE — 74011250637 HC RX REV CODE- 250/637: Performed by: NURSE PRACTITIONER

## 2017-01-29 PROCEDURE — 74011636637 HC RX REV CODE- 636/637: Performed by: INTERNAL MEDICINE

## 2017-01-29 PROCEDURE — 80048 BASIC METABOLIC PNL TOTAL CA: CPT | Performed by: SURGERY

## 2017-01-29 PROCEDURE — 74011000258 HC RX REV CODE- 258: Performed by: NURSE PRACTITIONER

## 2017-01-29 PROCEDURE — 74011250637 HC RX REV CODE- 250/637: Performed by: FAMILY MEDICINE

## 2017-01-29 PROCEDURE — 77030019605

## 2017-01-29 PROCEDURE — 65270000029 HC RM PRIVATE

## 2017-01-29 PROCEDURE — 36415 COLL VENOUS BLD VENIPUNCTURE: CPT | Performed by: SURGERY

## 2017-01-29 PROCEDURE — 74011250636 HC RX REV CODE- 250/636: Performed by: NURSE PRACTITIONER

## 2017-01-29 PROCEDURE — 74011250637 HC RX REV CODE- 250/637: Performed by: SURGERY

## 2017-01-29 PROCEDURE — 74011250637 HC RX REV CODE- 250/637: Performed by: HOSPITALIST

## 2017-01-29 PROCEDURE — 74011636637 HC RX REV CODE- 636/637: Performed by: HOSPITALIST

## 2017-01-29 PROCEDURE — 82962 GLUCOSE BLOOD TEST: CPT

## 2017-01-29 PROCEDURE — 74750000023 HC WOUND THERAPY

## 2017-01-29 RX ORDER — METOCLOPRAMIDE 10 MG/1
5 TABLET ORAL
Status: DISCONTINUED | OUTPATIENT
Start: 2017-01-29 | End: 2017-02-01

## 2017-01-29 RX ADMIN — OXYCODONE HYDROCHLORIDE 5 MG: 5 TABLET ORAL at 17:00

## 2017-01-29 RX ADMIN — METOCLOPRAMIDE 5 MG: 10 TABLET ORAL at 16:28

## 2017-01-29 RX ADMIN — PANTOPRAZOLE SODIUM 40 MG: 40 TABLET, DELAYED RELEASE ORAL at 08:47

## 2017-01-29 RX ADMIN — AMLODIPINE BESYLATE 5 MG: 5 TABLET ORAL at 08:47

## 2017-01-29 RX ADMIN — ISOSORBIDE DINITRATE 10 MG: 10 TABLET ORAL at 21:13

## 2017-01-29 RX ADMIN — Medication 10 ML: at 06:08

## 2017-01-29 RX ADMIN — Medication 400 MG: at 08:47

## 2017-01-29 RX ADMIN — ISOSORBIDE DINITRATE 10 MG: 10 TABLET ORAL at 08:47

## 2017-01-29 RX ADMIN — INSULIN LISPRO 5 UNITS: 100 INJECTION, SOLUTION INTRAVENOUS; SUBCUTANEOUS at 11:30

## 2017-01-29 RX ADMIN — METOCLOPRAMIDE 5 MG: 10 TABLET ORAL at 11:06

## 2017-01-29 RX ADMIN — METOCLOPRAMIDE 5 MG: 10 TABLET ORAL at 21:13

## 2017-01-29 RX ADMIN — ATORVASTATIN CALCIUM 80 MG: 40 TABLET, FILM COATED ORAL at 08:47

## 2017-01-29 RX ADMIN — Medication 400 MG: at 16:34

## 2017-01-29 RX ADMIN — CEFTRIAXONE 2 G: 2 INJECTION, POWDER, FOR SOLUTION INTRAMUSCULAR; INTRAVENOUS at 16:49

## 2017-01-29 RX ADMIN — METRONIDAZOLE 500 MG: 500 TABLET ORAL at 16:28

## 2017-01-29 RX ADMIN — OXYCODONE HYDROCHLORIDE 5 MG: 5 TABLET ORAL at 08:47

## 2017-01-29 RX ADMIN — POTASSIUM CHLORIDE 40 MEQ: 20 TABLET, EXTENDED RELEASE ORAL at 08:47

## 2017-01-29 RX ADMIN — Medication 10 ML: at 14:00

## 2017-01-29 RX ADMIN — INSULIN LISPRO 5 UNITS: 100 INJECTION, SOLUTION INTRAVENOUS; SUBCUTANEOUS at 08:49

## 2017-01-29 RX ADMIN — INSULIN LISPRO 5 UNITS: 100 INJECTION, SOLUTION INTRAVENOUS; SUBCUTANEOUS at 16:30

## 2017-01-29 RX ADMIN — HYDRALAZINE HYDROCHLORIDE 25 MG: 25 TABLET ORAL at 21:12

## 2017-01-29 RX ADMIN — METOPROLOL SUCCINATE 100 MG: 100 TABLET, EXTENDED RELEASE ORAL at 08:47

## 2017-01-29 RX ADMIN — INSULIN LISPRO 2 UNITS: 100 INJECTION, SOLUTION INTRAVENOUS; SUBCUTANEOUS at 16:30

## 2017-01-29 RX ADMIN — INSULIN GLARGINE 10 UNITS: 100 INJECTION, SOLUTION SUBCUTANEOUS at 08:47

## 2017-01-29 RX ADMIN — DAKIN'S SOLUTION 0.125% (QUARTER STRENGTH): 0.12 SOLUTION at 09:00

## 2017-01-29 RX ADMIN — INSULIN LISPRO 4 UNITS: 100 INJECTION, SOLUTION INTRAVENOUS; SUBCUTANEOUS at 11:30

## 2017-01-29 RX ADMIN — METRONIDAZOLE 500 MG: 500 TABLET ORAL at 21:13

## 2017-01-29 RX ADMIN — B-COMPLEX W/ C & FOLIC ACID TAB 1 TABLET: TAB at 08:47

## 2017-01-29 RX ADMIN — ISOSORBIDE DINITRATE 10 MG: 10 TABLET ORAL at 16:28

## 2017-01-29 RX ADMIN — HYDRALAZINE HYDROCHLORIDE 25 MG: 25 TABLET ORAL at 16:00

## 2017-01-29 RX ADMIN — TORSEMIDE 40 MG: 20 TABLET ORAL at 08:47

## 2017-01-29 RX ADMIN — METRONIDAZOLE 500 MG: 500 TABLET ORAL at 08:47

## 2017-01-29 RX ADMIN — Medication 10 ML: at 21:16

## 2017-01-29 RX ADMIN — METOCLOPRAMIDE 10 MG: 5 INJECTION, SOLUTION INTRAMUSCULAR; INTRAVENOUS at 06:08

## 2017-01-29 RX ADMIN — HYDRALAZINE HYDROCHLORIDE 25 MG: 25 TABLET ORAL at 08:47

## 2017-01-29 NOTE — PROGRESS NOTES
Hospitalist Progress Note    2017  Admit Date: 2017  5:07 PM   NAME: Juan Stevens   :  1945   MRN:  [id number]   Attending: Carlos Enrique Mullen DO  PCP:  None      Admitted for:Osteomylitis, and foot ulcer, PAD    SUBJECTIVE:       Hospital Course  Pt is a 72 yo male who presented with progressive pain and poor healing of right foot wound over 1 month, admitted with infected DM foot. PMH includes DM (A1C 9.2 2016), CHF, CKD, current smoker. X Ray of right foot had low suspicion for osteomyelitis but does reveal soft tissue lucencies, possible gas producing infection. Surgery and Vascular were consulted. He had bedside debridement on  and surgical I&D/5th toe amputation . He was started on Vanc/Zosyn in ER. ID has seen - changed atbx  to IV Rocephin/PO Flagyl. 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 Will need 6 weeks antbx per ID recs. Also seen by vascular surgery due to abnormal right MAGGIE and poor postoperative wound healing.    . Seen at bedside. Stable. Appetite has improved.      PHYSICAL EXAM     Visit Vitals    /57 (BP 1 Location: Left arm, BP Patient Position: Lying right side)    Pulse (!) 101    Temp 98.3 °F (36.8 °C)    Resp 18    Ht 6' 1\" (1.854 m)    Wt 115.9 kg (255 lb 8 oz)    SpO2 95%    BMI 33.71 kg/m2      Temp (24hrs), Av.5 °F (36.9 °C), Min:98 °F (36.7 °C), Max:99.7 °F (37.6 °C)    Oxygen Therapy  O2 Sat (%): 95 % (17 075)  Pulse via Oximetry: 97 beats per minute (17 0525)  O2 Device: Room air (17 075)  O2 Flow Rate (L/min): 2 l/min (17 1440)    Intake/Output Summary (Last 24 hours) at 17 1017  Last data filed at 17 0759   Gross per 24 hour   Intake             1280 ml   Output              800 ml   Net              480 ml        General: No acute distress  mucous membranes pink and moist acyanotic anicteric  Neck:  Supple full range of motion  Lungs:  Air entry equal bilaterally, no crepitations rales rhonchi   Heart:  Regular rate and rhythm,  No murmur, rub, or gallop  Abdomen: Soft, Non distended, Non tender, no rebound guarding Positive bowel sounds  Extremities: No cyanosis, clubbing or edema wound vac on right foot  Neurologic:  No focal deficits  Musculoskeletal: no   Joint swelling tenderness erythema  Skin:  No erythema, rashes noted          LAB  Recent Labs      01/29/17   0751  01/28/17   2019  01/28/17   1551  01/28/17   1115  01/28/17   0757   GLUCPOC  129*  181*  176*  187*  145*      Recent Labs      01/27/17   0615   WBC  7.1   HGB  8.6*   HCT  26.9*   PLT  299     Recent Labs      01/29/17   0442  01/28/17   0545   NA  142  144   K  3.6  3.6   CL  107  110*   CO2  25  24   GLU  124*  148*   BUN  17  14   CREA  2.15*  2.00*   MG   --   1.5*   CA  7.3*  6.2*   PHOS   --   3.8*       EKG and imaging reviewed personally by me  No results found. Results for orders placed or performed during the hospital encounter of 01/05/17   EKG, 12 LEAD, INITIAL   Result Value Ref Range    Systolic BP  mmHg    Diastolic BP  mmHg    Ventricular Rate 98 BPM    Atrial Rate 98 BPM    P-R Interval 166 ms    QRS Duration 74 ms    Q-T Interval 352 ms    QTC Calculation (Bezet) 449 ms    Calculated P Axis 62 degrees    Calculated R Axis 46 degrees    Calculated T Axis -74 degrees    Diagnosis       !! AGE AND GENDER SPECIFIC ECG ANALYSIS !! Sinus rhythm with occasional Premature ventricular complexes  ST & T wave abnormality, consider inferolateral ischemia  Abnormal ECG  Confirmed by ST LYNNETTE JOSE MD (), JOSE BASHIR ([id number]) on 1/5/2017 7:42:32 PM       XR Results (most recent):    Results from [place] encounter on 01/05/17   XR CHEST PA LAT   Narrative CHEST X-RAY, 2 views 1/5/2017    History: Fever/chills/sepsis. Technique: PA and lateral views of the chest.     Comparison: Chest x-ray 12/30/2015    Findings: The cardiac silhouette is normal in respect to size.   The lungs are expanded  without evidence for pneumothorax. No consolidation, or evidence of pleural  effusion is seen. The bony thorax demonstrates no acute changes. The upper abdomen is  unremarkable in appearance. Impression IMPRESSION:   1. No acute cardiopulmonary process evident by plain film imaging. 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 ([facility] [address] 01/17/2017    Sepsis due to cellulitis ([address] 01/09/2017    Acute renal failure with tubular necrosis ([address] 01/06/2017    Type 2 diabetes mellitus with right diabetic foot infection ([address] 01/05/2017    Kidney disease, chronic, stage III (moderate, EGFR 30-59 ml/min) 08/11/2016    Chronic systolic congestive heart failure ([facility] [address] [facility] 12/30/2015     EF 35-40% on 2015 Echo      Type 2 diabetes mellitus with hyperglycemia ([facility] [address] 07/29/2012    HTN (hypertension) 07/29/2012       ASSESSMENT  AND PLAN      · Acute on chronic kidney disease (stage 3) -  improved close to baseline. · Severe protein malnutrition - appetite has improved. Not a candidate for tube feeds at this time. · DM - BS is controlled.  On reglan for possible gastroparesis   · Osteomyelitis, with wound of foot - complicated by PAD - patient will need on going antibiotics EOT 2/22 - on IV ceftriaxone + flagyl     dispo - pending    Signed By: Haydee Key MD     January 29, 2017

## 2017-01-30 ENCOUNTER — ANESTHESIA EVENT (OUTPATIENT)
Dept: SURGERY | Age: 72
DRG: 853 | End: 2017-01-30
Payer: MEDICARE

## 2017-01-30 ENCOUNTER — ANESTHESIA (OUTPATIENT)
Dept: SURGERY | Age: 72
DRG: 853 | End: 2017-01-30
Payer: MEDICARE

## 2017-01-30 LAB
ALBUMIN SERPL BCP-MCNC: 1.4 G/DL (ref 3.2–4.6)
ALBUMIN/GLOB SERPL: 0.3 {RATIO} (ref 1.2–3.5)
ALP SERPL-CCNC: 55 U/L (ref 50–136)
ALT SERPL-CCNC: 12 U/L (ref 12–65)
ANION GAP BLD CALC-SCNC: 11 MMOL/L (ref 7–16)
AST SERPL W P-5'-P-CCNC: 25 U/L (ref 15–37)
BASOPHILS # BLD AUTO: 0 K/UL (ref 0–0.2)
BASOPHILS # BLD: 0 % (ref 0–2)
BILIRUB DIRECT SERPL-MCNC: 0.1 MG/DL
BILIRUB SERPL-MCNC: 0.4 MG/DL (ref 0.2–1.1)
BUN SERPL-MCNC: 19 MG/DL (ref 8–23)
CALCIUM SERPL-MCNC: 7.5 MG/DL (ref 8.3–10.4)
CHLORIDE SERPL-SCNC: 105 MMOL/L (ref 98–107)
CO2 SERPL-SCNC: 23 MMOL/L (ref 21–32)
CREAT SERPL-MCNC: 2.25 MG/DL (ref 0.8–1.5)
CRP SERPL-MCNC: 3.6 MG/DL (ref 0–0.9)
DIFFERENTIAL METHOD BLD: ABNORMAL
EOSINOPHIL # BLD: 0.4 K/UL (ref 0–0.8)
EOSINOPHIL NFR BLD: 6 % (ref 0.5–7.8)
ERYTHROCYTE [DISTWIDTH] IN BLOOD BY AUTOMATED COUNT: 16.5 % (ref 11.9–14.6)
ERYTHROCYTE [SEDIMENTATION RATE] IN BLOOD: 120 MM/HR (ref 0–30)
GLOBULIN SER CALC-MCNC: 4.8 G/DL (ref 2.3–3.5)
GLUCOSE BLD STRIP.AUTO-MCNC: 137 MG/DL (ref 65–100)
GLUCOSE BLD STRIP.AUTO-MCNC: 143 MG/DL (ref 65–100)
GLUCOSE BLD STRIP.AUTO-MCNC: 147 MG/DL (ref 65–100)
GLUCOSE BLD STRIP.AUTO-MCNC: 179 MG/DL (ref 65–100)
GLUCOSE BLD STRIP.AUTO-MCNC: 181 MG/DL (ref 65–100)
GLUCOSE SERPL-MCNC: 164 MG/DL (ref 65–100)
HCT VFR BLD AUTO: 24.6 % (ref 41.1–50.3)
HGB BLD-MCNC: 7.8 G/DL (ref 13.6–17.2)
IMM GRANULOCYTES # BLD: 0 K/UL (ref 0–0.5)
IMM GRANULOCYTES NFR BLD AUTO: 0.3 % (ref 0–5)
LYMPHOCYTES # BLD AUTO: 21 % (ref 13–44)
LYMPHOCYTES # BLD: 1.5 K/UL (ref 0.5–4.6)
MAGNESIUM SERPL-MCNC: 1.8 MG/DL (ref 1.8–2.4)
MCH RBC QN AUTO: 27.6 PG (ref 26.1–32.9)
MCHC RBC AUTO-ENTMCNC: 31.7 G/DL (ref 31.4–35)
MCV RBC AUTO: 86.9 FL (ref 79.6–97.8)
MONOCYTES # BLD: 0.8 K/UL (ref 0.1–1.3)
MONOCYTES NFR BLD AUTO: 11 % (ref 4–12)
NEUTS SEG # BLD: 4.4 K/UL (ref 1.7–8.2)
NEUTS SEG NFR BLD AUTO: 62 % (ref 43–78)
PHOSPHATE SERPL-MCNC: 2.2 MG/DL (ref 2.3–3.7)
PLATELET # BLD AUTO: 305 K/UL (ref 150–450)
PMV BLD AUTO: 10.2 FL (ref 10.8–14.1)
POTASSIUM SERPL-SCNC: 3.8 MMOL/L (ref 3.5–5.1)
PROT SERPL-MCNC: 6.2 G/DL (ref 6.3–8.2)
RBC # BLD AUTO: 2.83 M/UL (ref 4.23–5.67)
SODIUM SERPL-SCNC: 139 MMOL/L (ref 136–145)
WBC # BLD AUTO: 7.1 K/UL (ref 4.3–11.1)

## 2017-01-30 PROCEDURE — 86923 COMPATIBILITY TEST ELECTRIC: CPT | Performed by: NURSE PRACTITIONER

## 2017-01-30 PROCEDURE — 74750000023 HC WOUND THERAPY

## 2017-01-30 PROCEDURE — 74011000258 HC RX REV CODE- 258: Performed by: NURSE PRACTITIONER

## 2017-01-30 PROCEDURE — 36415 COLL VENOUS BLD VENIPUNCTURE: CPT | Performed by: NURSE PRACTITIONER

## 2017-01-30 PROCEDURE — 83735 ASSAY OF MAGNESIUM: CPT | Performed by: INTERNAL MEDICINE

## 2017-01-30 PROCEDURE — 74011250636 HC RX REV CODE- 250/636: Performed by: NURSE PRACTITIONER

## 2017-01-30 PROCEDURE — 80076 HEPATIC FUNCTION PANEL: CPT | Performed by: INTERNAL MEDICINE

## 2017-01-30 PROCEDURE — 74011250637 HC RX REV CODE- 250/637: Performed by: INTERNAL MEDICINE

## 2017-01-30 PROCEDURE — 82962 GLUCOSE BLOOD TEST: CPT

## 2017-01-30 PROCEDURE — P9047 ALBUMIN (HUMAN), 25%, 50ML: HCPCS | Performed by: INTERNAL MEDICINE

## 2017-01-30 PROCEDURE — 74011250636 HC RX REV CODE- 250/636

## 2017-01-30 PROCEDURE — 76060000036 HC ANESTHESIA 2.5 TO 3 HR: Performed by: SURGERY

## 2017-01-30 PROCEDURE — 74011250637 HC RX REV CODE- 250/637: Performed by: HOSPITALIST

## 2017-01-30 PROCEDURE — 77030007880 HC KT SPN EPDRL BBMI -B: Performed by: ANESTHESIOLOGY

## 2017-01-30 PROCEDURE — 85652 RBC SED RATE AUTOMATED: CPT | Performed by: SURGERY

## 2017-01-30 PROCEDURE — 88307 TISSUE EXAM BY PATHOLOGIST: CPT | Performed by: SURGERY

## 2017-01-30 PROCEDURE — 77030003029 HC SUT VCRL J&J -B: Performed by: SURGERY

## 2017-01-30 PROCEDURE — 77030020753 HC CUF TRNQT 1BLA STRY -B: Performed by: SURGERY

## 2017-01-30 PROCEDURE — 74011250637 HC RX REV CODE- 250/637: Performed by: NURSE PRACTITIONER

## 2017-01-30 PROCEDURE — 36430 TRANSFUSION BLD/BLD COMPNT: CPT

## 2017-01-30 PROCEDURE — 77030031139 HC SUT VCRL2 J&J -A: Performed by: SURGERY

## 2017-01-30 PROCEDURE — 80048 BASIC METABOLIC PNL TOTAL CA: CPT | Performed by: INTERNAL MEDICINE

## 2017-01-30 PROCEDURE — 86140 C-REACTIVE PROTEIN: CPT | Performed by: INTERNAL MEDICINE

## 2017-01-30 PROCEDURE — 99221 1ST HOSP IP/OBS SF/LOW 40: CPT | Performed by: PHYSICAL MEDICINE & REHABILITATION

## 2017-01-30 PROCEDURE — 77030013131 HC IV BLD ST ICUM -A

## 2017-01-30 PROCEDURE — 74011250637 HC RX REV CODE- 250/637: Performed by: ANESTHESIOLOGY

## 2017-01-30 PROCEDURE — 86900 BLOOD TYPING SEROLOGIC ABO: CPT | Performed by: NURSE PRACTITIONER

## 2017-01-30 PROCEDURE — 77030002966 HC SUT PDS J&J -A: Performed by: SURGERY

## 2017-01-30 PROCEDURE — 76010000172 HC OR TIME 2.5 TO 3 HR INTENSV-TIER 1: Performed by: SURGERY

## 2017-01-30 PROCEDURE — 77030011640 HC PAD GRND REM COVD -A: Performed by: SURGERY

## 2017-01-30 PROCEDURE — 77030003028 HC SUT VCRL J&J -A: Performed by: SURGERY

## 2017-01-30 PROCEDURE — 65270000029 HC RM PRIVATE

## 2017-01-30 PROCEDURE — 74011250637 HC RX REV CODE- 250/637: Performed by: FAMILY MEDICINE

## 2017-01-30 PROCEDURE — 76210000063 HC OR PH I REC FIRST 0.5 HR: Performed by: SURGERY

## 2017-01-30 PROCEDURE — 77030008467 HC STPLR SKN COVD -B: Performed by: SURGERY

## 2017-01-30 PROCEDURE — 77030003665 HC NDL SPN BBMI -A: Performed by: ANESTHESIOLOGY

## 2017-01-30 PROCEDURE — 77030006835 HC BLD SAW SAG STRY -B: Performed by: SURGERY

## 2017-01-30 PROCEDURE — 84100 ASSAY OF PHOSPHORUS: CPT | Performed by: INTERNAL MEDICINE

## 2017-01-30 PROCEDURE — 0Y6F0ZZ DETACHMENT AT RIGHT KNEE REGION, OPEN APPROACH: ICD-10-PCS | Performed by: SURGERY

## 2017-01-30 PROCEDURE — 85025 COMPLETE CBC W/AUTO DIFF WBC: CPT | Performed by: SURGERY

## 2017-01-30 PROCEDURE — 77030020782 HC GWN BAIR PAWS FLX 3M -B: Performed by: ANESTHESIOLOGY

## 2017-01-30 PROCEDURE — 74011250636 HC RX REV CODE- 250/636: Performed by: INTERNAL MEDICINE

## 2017-01-30 PROCEDURE — 77030002996 HC SUT SLK J&J -A: Performed by: SURGERY

## 2017-01-30 PROCEDURE — 77030018836 HC SOL IRR NACL ICUM -A: Performed by: SURGERY

## 2017-01-30 PROCEDURE — 74011250637 HC RX REV CODE- 250/637: Performed by: SURGERY

## 2017-01-30 PROCEDURE — 74011000250 HC RX REV CODE- 250

## 2017-01-30 PROCEDURE — P9016 RBC LEUKOCYTES REDUCED: HCPCS | Performed by: NURSE PRACTITIONER

## 2017-01-30 RX ORDER — MIDAZOLAM HYDROCHLORIDE 1 MG/ML
2 INJECTION, SOLUTION INTRAMUSCULAR; INTRAVENOUS ONCE
Status: DISCONTINUED | OUTPATIENT
Start: 2017-01-30 | End: 2017-01-30 | Stop reason: HOSPADM

## 2017-01-30 RX ORDER — ONDANSETRON 2 MG/ML
4 INJECTION INTRAMUSCULAR; INTRAVENOUS ONCE
Status: DISCONTINUED | OUTPATIENT
Start: 2017-01-30 | End: 2017-01-30 | Stop reason: HOSPADM

## 2017-01-30 RX ORDER — PROPOFOL 10 MG/ML
INJECTION, EMULSION INTRAVENOUS AS NEEDED
Status: DISCONTINUED | OUTPATIENT
Start: 2017-01-30 | End: 2017-01-30 | Stop reason: HOSPADM

## 2017-01-30 RX ORDER — FENTANYL CITRATE 50 UG/ML
100 INJECTION, SOLUTION INTRAMUSCULAR; INTRAVENOUS ONCE
Status: DISCONTINUED | OUTPATIENT
Start: 2017-01-30 | End: 2017-01-30 | Stop reason: HOSPADM

## 2017-01-30 RX ORDER — SODIUM CHLORIDE, SODIUM LACTATE, POTASSIUM CHLORIDE, CALCIUM CHLORIDE 600; 310; 30; 20 MG/100ML; MG/100ML; MG/100ML; MG/100ML
100 INJECTION, SOLUTION INTRAVENOUS CONTINUOUS
Status: DISCONTINUED | OUTPATIENT
Start: 2017-01-30 | End: 2017-01-30 | Stop reason: HOSPADM

## 2017-01-30 RX ORDER — ALBUTEROL SULFATE 0.83 MG/ML
2.5 SOLUTION RESPIRATORY (INHALATION) AS NEEDED
Status: DISCONTINUED | OUTPATIENT
Start: 2017-01-30 | End: 2017-01-30 | Stop reason: HOSPADM

## 2017-01-30 RX ORDER — INSULIN LISPRO 100 [IU]/ML
INJECTION, SOLUTION INTRAVENOUS; SUBCUTANEOUS
Status: DISCONTINUED | OUTPATIENT
Start: 2017-01-30 | End: 2017-02-02 | Stop reason: HOSPADM

## 2017-01-30 RX ORDER — SODIUM,POTASSIUM PHOSPHATES 280-250MG
1 POWDER IN PACKET (EA) ORAL 2 TIMES DAILY
Status: COMPLETED | OUTPATIENT
Start: 2017-01-30 | End: 2017-01-31

## 2017-01-30 RX ORDER — NALOXONE HYDROCHLORIDE 0.4 MG/ML
0.1 INJECTION, SOLUTION INTRAMUSCULAR; INTRAVENOUS; SUBCUTANEOUS AS NEEDED
Status: DISCONTINUED | OUTPATIENT
Start: 2017-01-30 | End: 2017-01-30 | Stop reason: HOSPADM

## 2017-01-30 RX ORDER — MIDAZOLAM HYDROCHLORIDE 1 MG/ML
2 INJECTION, SOLUTION INTRAMUSCULAR; INTRAVENOUS
Status: DISCONTINUED | OUTPATIENT
Start: 2017-01-30 | End: 2017-01-30 | Stop reason: HOSPADM

## 2017-01-30 RX ORDER — HYDROMORPHONE HYDROCHLORIDE 2 MG/ML
0.5 INJECTION, SOLUTION INTRAMUSCULAR; INTRAVENOUS; SUBCUTANEOUS
Status: DISCONTINUED | OUTPATIENT
Start: 2017-01-30 | End: 2017-01-30 | Stop reason: HOSPADM

## 2017-01-30 RX ORDER — SODIUM CHLORIDE 9 MG/ML
250 INJECTION, SOLUTION INTRAVENOUS AS NEEDED
Status: DISCONTINUED | OUTPATIENT
Start: 2017-01-30 | End: 2017-02-02 | Stop reason: HOSPADM

## 2017-01-30 RX ORDER — DIPHENHYDRAMINE HYDROCHLORIDE 50 MG/ML
12.5 INJECTION, SOLUTION INTRAMUSCULAR; INTRAVENOUS
Status: DISCONTINUED | OUTPATIENT
Start: 2017-01-30 | End: 2017-01-30 | Stop reason: HOSPADM

## 2017-01-30 RX ORDER — LIDOCAINE HYDROCHLORIDE 10 MG/ML
0.1 INJECTION INFILTRATION; PERINEURAL AS NEEDED
Status: DISCONTINUED | OUTPATIENT
Start: 2017-01-30 | End: 2017-01-30 | Stop reason: HOSPADM

## 2017-01-30 RX ORDER — SODIUM CHLORIDE 9 MG/ML
75 INJECTION, SOLUTION INTRAVENOUS CONTINUOUS
Status: DISCONTINUED | OUTPATIENT
Start: 2017-01-30 | End: 2017-02-02 | Stop reason: HOSPADM

## 2017-01-30 RX ORDER — ALBUMIN HUMAN 250 G/1000ML
25 SOLUTION INTRAVENOUS EVERY 12 HOURS
Status: DISPENSED | OUTPATIENT
Start: 2017-01-30 | End: 2017-02-01

## 2017-01-30 RX ORDER — BUPIVACAINE HYDROCHLORIDE 7.5 MG/ML
INJECTION, SOLUTION INTRASPINAL AS NEEDED
Status: DISCONTINUED | OUTPATIENT
Start: 2017-01-30 | End: 2017-01-30 | Stop reason: HOSPADM

## 2017-01-30 RX ORDER — OXYCODONE HYDROCHLORIDE 5 MG/1
10 TABLET ORAL
Status: DISCONTINUED | OUTPATIENT
Start: 2017-01-30 | End: 2017-01-30 | Stop reason: HOSPADM

## 2017-01-30 RX ORDER — SODIUM CHLORIDE 9 MG/ML
250 INJECTION, SOLUTION INTRAVENOUS AS NEEDED
Status: DISCONTINUED | OUTPATIENT
Start: 2017-01-30 | End: 2017-01-30 | Stop reason: HOSPADM

## 2017-01-30 RX ORDER — SODIUM CHLORIDE, SODIUM LACTATE, POTASSIUM CHLORIDE, CALCIUM CHLORIDE 600; 310; 30; 20 MG/100ML; MG/100ML; MG/100ML; MG/100ML
INJECTION, SOLUTION INTRAVENOUS
Status: DISCONTINUED | OUTPATIENT
Start: 2017-01-30 | End: 2017-01-30 | Stop reason: HOSPADM

## 2017-01-30 RX ADMIN — Medication 10 ML: at 20:43

## 2017-01-30 RX ADMIN — ALBUMIN (HUMAN) 25 G: 0.25 INJECTION, SOLUTION INTRAVENOUS at 20:42

## 2017-01-30 RX ADMIN — HYDRALAZINE HYDROCHLORIDE 25 MG: 25 TABLET ORAL at 08:24

## 2017-01-30 RX ADMIN — HYDRALAZINE HYDROCHLORIDE 25 MG: 25 TABLET ORAL at 20:43

## 2017-01-30 RX ADMIN — METOPROLOL SUCCINATE 100 MG: 100 TABLET, EXTENDED RELEASE ORAL at 08:22

## 2017-01-30 RX ADMIN — METRONIDAZOLE 500 MG: 500 TABLET ORAL at 08:25

## 2017-01-30 RX ADMIN — PROPOFOL 20 MG: 10 INJECTION, EMULSION INTRAVENOUS at 14:31

## 2017-01-30 RX ADMIN — METOCLOPRAMIDE 5 MG: 10 TABLET ORAL at 08:24

## 2017-01-30 RX ADMIN — PROPOFOL 20 MG: 10 INJECTION, EMULSION INTRAVENOUS at 14:58

## 2017-01-30 RX ADMIN — METOCLOPRAMIDE 5 MG: 10 TABLET ORAL at 20:44

## 2017-01-30 RX ADMIN — Medication 400 MG: at 17:56

## 2017-01-30 RX ADMIN — OXYCODONE HYDROCHLORIDE 5 MG: 5 TABLET ORAL at 17:55

## 2017-01-30 RX ADMIN — PROPOFOL 20 MG: 10 INJECTION, EMULSION INTRAVENOUS at 14:44

## 2017-01-30 RX ADMIN — ATORVASTATIN CALCIUM 80 MG: 40 TABLET, FILM COATED ORAL at 08:22

## 2017-01-30 RX ADMIN — POTASSIUM & SODIUM PHOSPHATES POWDER PACK 280-160-250 MG 1 PACKET: 280-160-250 PACK at 17:56

## 2017-01-30 RX ADMIN — PANTOPRAZOLE SODIUM 40 MG: 40 TABLET, DELAYED RELEASE ORAL at 08:24

## 2017-01-30 RX ADMIN — DAKIN'S SOLUTION 0.125% (QUARTER STRENGTH): 0.12 SOLUTION at 09:00

## 2017-01-30 RX ADMIN — AMLODIPINE BESYLATE 5 MG: 5 TABLET ORAL at 08:24

## 2017-01-30 RX ADMIN — PROPOFOL 20 MG: 10 INJECTION, EMULSION INTRAVENOUS at 15:17

## 2017-01-30 RX ADMIN — TRAMADOL HYDROCHLORIDE 50 MG: 50 TABLET, FILM COATED ORAL at 20:43

## 2017-01-30 RX ADMIN — PROPOFOL 20 MG: 10 INJECTION, EMULSION INTRAVENOUS at 15:05

## 2017-01-30 RX ADMIN — BUPIVACAINE HYDROCHLORIDE 2 ML: 7.5 INJECTION, SOLUTION INTRASPINAL at 13:45

## 2017-01-30 RX ADMIN — ISOSORBIDE DINITRATE 10 MG: 10 TABLET ORAL at 08:23

## 2017-01-30 RX ADMIN — PROPOFOL 20 MG: 10 INJECTION, EMULSION INTRAVENOUS at 15:24

## 2017-01-30 RX ADMIN — Medication 10 ML: at 05:07

## 2017-01-30 RX ADMIN — OXYCODONE HYDROCHLORIDE 10 MG: 5 TABLET ORAL at 17:16

## 2017-01-30 RX ADMIN — SODIUM CHLORIDE 75 ML/HR: 900 INJECTION, SOLUTION INTRAVENOUS at 17:53

## 2017-01-30 RX ADMIN — CEFTRIAXONE 2 G: 2 INJECTION, POWDER, FOR SOLUTION INTRAMUSCULAR; INTRAVENOUS at 17:54

## 2017-01-30 RX ADMIN — ISOSORBIDE DINITRATE 10 MG: 10 TABLET ORAL at 20:43

## 2017-01-30 RX ADMIN — SODIUM CHLORIDE, SODIUM LACTATE, POTASSIUM CHLORIDE, CALCIUM CHLORIDE: 600; 310; 30; 20 INJECTION, SOLUTION INTRAVENOUS at 14:21

## 2017-01-30 RX ADMIN — OXYCODONE HYDROCHLORIDE 5 MG: 5 TABLET ORAL at 21:57

## 2017-01-30 RX ADMIN — METRONIDAZOLE 500 MG: 500 TABLET ORAL at 20:43

## 2017-01-30 NOTE — PROGRESS NOTES
Spiritual Care visit. Initial Visit. Called to floor by RN with request to see patient who had learned that the doctor had informed him that his foot (leg) needs to be amputated.  prayed with him, and tried to encourage him. Surgery scheduled for later today. Will follow as needed.     Visit by Gagan Fernandez M.Ed., Th.B. ,Staff

## 2017-01-30 NOTE — WOUND CARE
Patient seen with surgeon this am. Made NPO for surgery today. Foot deteriorating from last week. Eschar to 4th toe and and 3rd and 2nd toe dark red with purple hue. Great toe still pink and with capillary refill. Plantar surface of foot worse, more tender, swollen and macerated. Light redness noted to medial ankle area today with some tenderness as well. Patient tearful but agreed to amputation. Discussed with . May benefit from spiritual services visit. Answered all patient questions. Wound team will follow as needed.

## 2017-01-30 NOTE — PERIOP NOTES
ABHILASH Pop) and OR nurse Jonah Weir RN) at bedside. Hand off report given including recent increase of heart rate followed by heart rate returning to baseline and interventions taken.

## 2017-01-30 NOTE — PROGRESS NOTES
PT note: Patient off floor for procedure. Will attempt PT treatment later as time and schedule permit. Thank you.   Laurita Khoury, PT  1/30/2017

## 2017-01-30 NOTE — ROUTINE PROCESS
TRANSFER - OUT REPORT:    Verbal report given to Valerie SHORT on Ac Perdomo  being transferred to [address] [place] [address] for routine post - op       Report consisted of patients Situation, Background, Assessment and   Recommendations(SBAR). Information from the following report(s) SBAR, Kardex, OR Summary, Procedure Summary, Intake/Output and MAR was reviewed with the receiving nurse. Lines:   Double Lumen 01/17/17 Right Internal jugular (Active)   Central Line Being Utilized No 1/30/2017  4:26 PM   Criteria for Appropriate Use Limited/no vessel suitable for conventional peripheral access 1/30/2017  4:26 PM   Site Assessment Clean, dry, & intact 1/30/2017  4:26 PM   Infiltration Assessment 0 1/30/2017  4:26 PM   Affected Extremity/Extremities Color distal to insertion site pink (or appropriate for race) 1/30/2017  7:22 AM   Date of Last Dressing Change 01/30/17 1/30/2017  7:22 AM   Dressing Status Clean, dry, & intact 1/30/2017  4:26 PM   Dressing Type Disk with Chlorhexadine Gluconate (CHG) 1/30/2017  4:26 PM   Action Taken Open ports on tubing capped 1/30/2017  4:26 PM   Proximal Hub Color/Line Status Red 1/30/2017  7:22 AM   Positive Blood Return (Medial Site) Yes 1/30/2017  7:22 AM   Distal Hub Color/Line Status Purple 1/30/2017  7:22 AM   Positive Blood Return (Lateral Site) Yes 1/30/2017  7:22 AM   Alcohol Cap Used No 1/30/2017  4:26 PM        Opportunity for questions and clarification was provided. Patient transported with room air. VTE prophylaxis orders have not been written for Ac Perdomo. Patient given room number and nurses name. Family updated re: pt status after security code verified.

## 2017-01-30 NOTE — BRIEF OP NOTE
BRIEF OPERATIVE NOTE    Date of Procedure: 1/30/2017   Preoperative Diagnosis: infected right leg  Postoperative Diagnosis: infected right leg gangeous foot    Procedure(s):  RIGHT AMPUTATION KNEE(BKA)  Surgeon(s) and Role:     * Kb Garcia MD - Primary            Surgical Staff:  Circ-1: Sebastián Lawson RN  Circ-Relief: Pancho Caba RN  Scrub Tech-1: Paty Villafana  Scrub Tech-2: Carrie Lee  Scrub Tech-Relief: Mecca Mcdonough CNA  Event Time In   Incision Start 1408   Incision Close 1612     Anesthesia: Regional   Estimated Blood Loss: <150 cc  Specimens:   ID Type Source Tests Collected by Time Destination   1 : right below knee amputation Fresh Leg, Right  Kb Garcia MD 1/30/2017 1508 Pathology      Findings: gangrenous foot without evidence of undrained abscess in foot with any extension, edematous tissues but viable and good blood supply at amputation area. Tibia transected at 13 cm with flap edge at 15 cm from tibial tuberosity. No tourniquet used.      Complications: none apparent  Implants: * No implants in log *    Kb Garcia MD

## 2017-01-30 NOTE — PERIOP NOTES
Heart rate up to 143 per pulse ox. Cardiac leads placed. Heart rate 144 (sinus tach). Respirations 27. Blood rate down to 25 ml/Hr. Dr. Amy Beck notified.

## 2017-01-30 NOTE — PERIOP NOTES
Patient coughed once. Heart rate down to 93. Dr. Javier Lux to bedside to assess patient. Per Dr. Javier Lux, okay to go to surgery.

## 2017-01-30 NOTE — ANESTHESIA PROCEDURE NOTES
Spinal Block    Start time: 1/30/2017 1:40 PM  End time: 1/30/2017 1:45 PM  Performed by: Drake Hollis  Authorized by: Drake Hollis     Pre-procedure: Indications: primary anesthetic  Preanesthetic Checklist: patient identified, risks and benefits discussed, anesthesia consent, site marked, patient being monitored and timeout performed    Timeout Time: 13:40          Spinal Block:   Patient Position:  Seated  Prep Region:  Lumbar  Prep: chlorhexidine      Location:  L3-4  Technique:  Single shot  Local:  Lidocaine 1%  Local Dose (mL):  3    Needle:   Needle Type:  Pencan  Needle Gauge:  25 G  Attempts:  1      Events: CSF confirmed, no blood with aspiration and no paresthesia        Assessment:  Insertion:  Uncomplicated  Patient tolerance:  Patient tolerated the procedure well with no immediate complications  Anesthesiology Spinal Procedure Note    Risks and benefits were discussed with patient and plans are to proceed. Patient was placed in the sitting position. The back was prepped at the lumbar region with Chlorhexidine. 1% xylocaine was used as local at L3-L4. A  25g Pencan was passed 1 times. Easy aspiration of CSF was noted. 15 mg hyperbaric Bupivacaine was injected.

## 2017-01-30 NOTE — PROGRESS NOTES
Hospitalist Progress Note    2017  Admit Date: 2017  5:07 PM   NAME: Krysta Brown   :  1945   MRN:  [id number]   Attending: Anisa Cobb DO  PCP:  None      Admitted for:Osteomylitis, and foot ulcer, PAD    SUBJECTIVE:       Hospital Course  Pt is a 72 yo male who presented with progressive pain and poor healing of right foot wound over 1 month, admitted with infected DM foot. PMH includes DM (A1C 9.2 2016), CHF, CKD, current smoker. X Ray of right foot had low suspicion for osteomyelitis but does reveal soft tissue lucencies, possible gas producing infection. Surgery and Vascular were consulted. He had bedside debridement on  and surgical I&D/5th toe amputation . He was started on Vanc/Zosyn in ER. ID has seen - changed atbx  to IV Rocephin/PO Flagyl. 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 Will need 6 weeks antbx per ID recs. Also seen by vascular surgery due to abnormal right MAGGIE and poor postoperative wound healing.   Unfortunately patient did not show any improvement in his wound and general surgery suggested a R BK amputation. Patient agreed and was taken to the OR this morning .      PHYSICAL EXAM     Visit Vitals    /69    Pulse 96    Temp 97.9 °F (36.6 °C)    Resp 18    Ht 6' 1\" (1.854 m)    Wt 115.9 kg (255 lb 8 oz)    SpO2 97%    BMI 33.71 kg/m2      Temp (24hrs), Av.4 °F (36.9 °C), Min:97.9 °F (36.6 °C), Max:99.2 °F (37.3 °C)    Oxygen Therapy  O2 Sat (%): 97 % (17 1105)  Pulse via Oximetry: 97 beats per minute (17 0525)  O2 Device: Room air (17 1518)  O2 Flow Rate (L/min): 2 l/min (17 1440)  No intake or output data in the 24 hours ending 17 1119     General: No acute distress  mucous membranes pink and moist acyanotic anicteric  Neck:  Supple full range of motion  Lungs:  Air entry equal bilaterally, no crepitations rales rhonchi   Heart:  Regular rate and rhythm,  No murmur, rub, or gallop  Abdomen: Soft, Non distended, Non tender, no rebound guarding Positive bowel sounds  Extremities: No cyanosis, clubbing or edema wound vac on right foot  Neurologic:  No focal deficits  Musculoskeletal: no   Joint swelling tenderness erythema  Skin:  No erythema, rashes noted          LAB  Recent Labs      01/30/17   1107  01/30/17   0755  01/29/17   2217  01/29/17   1641  01/29/17   1121   GLUCPOC  179*  181*  91  191*  220*      Recent Labs      01/30/17   0620   WBC  7.1   HGB  7.8*   HCT  24.6*   PLT  305     Recent Labs      01/30/17   0430   NA  139   K  3.8   CL  105   CO2  23   GLU  164*   BUN  19   CREA  2.25*   MG  1.8   CA  7.5*   PHOS  2.2*   ALB  1.4*   TBILI  0.4   ALT  12   SGOT  25       EKG and imaging reviewed personally by me  No results found. Results for orders placed or performed during the hospital encounter of 01/05/17   EKG, 12 LEAD, INITIAL   Result Value Ref Range    Systolic BP  mmHg    Diastolic BP  mmHg    Ventricular Rate 98 BPM    Atrial Rate 98 BPM    P-R Interval 166 ms    QRS Duration 74 ms    Q-T Interval 352 ms    QTC Calculation (Bezet) 449 ms    Calculated P Axis 62 degrees    Calculated R Axis 46 degrees    Calculated T Axis -74 degrees    Diagnosis       !! AGE AND GENDER SPECIFIC ECG ANALYSIS !! Sinus rhythm with occasional Premature ventricular complexes  ST & T wave abnormality, consider inferolateral ischemia  Abnormal ECG  Confirmed by ST LYNNETTE JOSE MD (), JOSE BASHIR ([id number]) on 1/5/2017 7:42:32 PM       XR Results (most recent):    Results from [place] encounter on 01/05/17   XR CHEST PA LAT   Narrative CHEST X-RAY, 2 views 1/5/2017    History: Fever/chills/sepsis. Technique: PA and lateral views of the chest.     Comparison: Chest x-ray 12/30/2015    Findings: The cardiac silhouette is normal in respect to size. The lungs are expanded  without evidence for pneumothorax. No consolidation, or evidence of pleural  effusion is seen.     The bony thorax demonstrates no acute changes. The upper abdomen is  unremarkable in appearance. Impression IMPRESSION:   1. No acute cardiopulmonary process evident by plain film imaging. 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 ([facility] [address] 01/17/2017    Sepsis due to cellulitis ([facility] [address] 01/09/2017    Acute renal failure with tubular necrosis ([facility] [address] 01/06/2017    Type 2 diabetes mellitus with right diabetic foot infection ([facility] [address] 01/05/2017    Kidney disease, chronic, stage III (moderate, EGFR 30-59 ml/min) 08/11/2016    Chronic systolic congestive heart failure ([facility] [address] 12/30/2015     EF 35-40% on 2015 Echo      Type 2 [facility] mellitus with hyperglycemia [facility] 07/29/2012    HTN (hypertension) 07/29/2012       ASSESSMENT  AND PLAN    #Gangrene of the R foot due to diabetic foot /ulcer: patient failed medical treatment with antibiotics/Wound vac. Taken to the OR today for R BK amputation. Will continue antibiotics for now, most likely will stop them in the next 48h. · Acute on chronic kidney disease (stage 3) -  CR slightly worse today. Will hold any diuretic. Will order some doses of IV albumin. Gentle IV hydration ordered today . Re-evaluate tomorrow. · Severe protein malnutrition - appetite has improved. Will monitor. On oral reglan. · DM - Patient is going to the OR today. Will stop Lantus and pre-meal insulin. Will continue Humalog SS for now. · Anemia: multifactorial. Will monitor CBC.        dispo - pending    Signed By: Liban Baig MD     January 30, 2017

## 2017-01-30 NOTE — ROUTINE PROCESS
[address]      1/30/2017      RE: Marea Slice     RE: Ewa Tineo      To Whom it May Concern: This is to certify that Ewa Tineo has been a primary caretaker for Mr. Anjel Waller. He was taken to surgery unexpectedly today and Ms. Rosalio Coelho has been here at the hospital as his primary support. Please excuse her from work Jan. 27, 32 and Feb 1. She plans to remain available to . Khadijah Jhonatan and his medical team.    Please feel free to contact my office if you have any questions or concerns. Thank you for your assistance in this matter. Sincerely,      IRENE Luciano   Case Management   .    [phone]

## 2017-01-30 NOTE — PROGRESS NOTES
Infectious Disease Progress Note    Today's Date: 2017   Admit Date: 2017    Impression:   · R foot acute osteomyelitis of 5th toe and MT, with deep space abscess: s/p I&D, , 5th toe/MT amputation: Cx Staph caprae, Strep agalactiae, and Staph lugdunensis; pathology showed acute and chronic inflammation with uninvolved margins. Repeat I&D , prep for skin grafting. · S/P right lower extremity arteriogram with third order select catheterization, ultrasound-guided access ()  · Uncontrolled DM: hgb A1c 9.1  · PAD: abnormal RLE MAGGIE with occlusive disease, unable to undergo eval/intervention presently sec to MO  · Acute on CKD: creatinine appears stable  · Medical non-compliance    Plan:   · Continue Ceftriaxone IV and Flagyl  · Await BKA is now planned for today   · Would continue abx 48 hrs past BKA and then stop  · 9th floor rehab is planned    Anti-infectives:   Ceftriaxone and Flagyl (-  Vancomycin and Zosyn (-)    Subjective:   Date of Consultation:  2017  Referring Physician: Dr. Waqas Malone    Resting in bed, NAD. Tearful with anticipation of surgery today. No N/V/D. C/O chills. Allergies   Allergen Reactions    Lortab [Hydrocodone-Acetaminophen] Itching        Review of Systems: Complete  Review of systems not obtained due to patient factors. Objective:     Visit Vitals    /65    Pulse 70    Temp 98 °F (36.7 °C)    Resp 15    Ht 6' 1\" (1.854 m)    Wt 115.9 kg (255 lb 8 oz)    SpO2 99%    BMI 33.71 kg/m2     Temp (24hrs), Av.4 °F (36.9 °C), Min:98 °F (36.7 °C), Max:98.9 °F (37.2 °C)       Lines:  R chest CL          Physical Exam:    General:  Alert and oriented, obese, well developed, appears stated age   Eyes:  Sclera anicteric. Pupils equally round and reactive to light.    Mouth/Throat: Mucous membranes normal, oral pharynx clear   Neck: Supple, trachea midline   Lungs:   Clear bilaterally, poor effort   CV:  Regular rate and rhythm, no audible murmur, click, rub or gallop   Abdomen:   Soft, obese, non-tender.  bowel sounds very active, non-distended   Extremities: Bilateral LE 3+ edema, from thighs to ankles; no erythema, sluggish cap refill R foot; very tender ankles and feet   Skin: R foot wound with wd vac in place; no erythema   Lymph nodes: Not assessed today   Musculoskeletal: No deformity except surgical removal 5th MT, R thumb amputation   Lines/Devices:  Intact, no erythema, drainage or tenderness   Psych: Depressed, flat affect       Data Review:     CBC:  Recent Labs      01/30/17   0620   WBC  7.1   GRANS  62   MONOS  11   EOS  6   ANEU  4.4   ABL  1.5   HGB  7.8*   HCT  24.6*   PLT  305       BMP:  Recent Labs      01/30/17   0430  01/29/17   0442  01/28/17   0545   CREA  2.25*  2.15*  2.00*   BUN  19  17  14   NA  139  142  144   K  3.8  3.6  3.6   CL  105  107  110*   CO2  23  25  24   AGAP  11  10  10   GLU  164*  124*  148*       LFTS:  Recent Labs      01/30/17   0430   TBILI  0.4   ALT  12   SGOT  25   AP  55   TP  6.2*   ALB  1.4*       Microbiology:     All Micro Results     Procedure Component Value Units Date/Time    CULTURE, ANAEROBIC [[id number]] Collected:  01/11/17 1416    Order Status:  Completed Specimen:  Bone Updated:  01/18/17 0659     Special Requests: 5TH METATARSAL HEAD      Culture result:         NO ANAEROBES ISOLATED 7 DAYS    CULTURE, ANAEROBIC [[id number]] Collected:  01/11/17 1400    Order Status:  Completed Specimen:  Foot Updated:  01/18/17 0659     Special Requests: RIGHT FOOT DEEP WOUND      Culture result:         NO ANAEROBES ISOLATED 7 DAYS    CULTURE, WOUND Willistine Fam STAIN [[id number]]  (Susceptibility) Collected:  01/11/17 1400    Order Status:  Completed Specimen:  Foot Updated:  01/15/17 0962     Special Requests: RIGHT FOOT DEEP WOUND      GRAM STAIN 0 TO 5 WBC'S/OIF       RARE  GRAM POSITIVE COCCI        Culture result: SCANT  STREPTOCOCCUS AGALACTIAE SERO GROUP B         LIGHT  STAPHYLOCOCCUS LUGDUNENSIS CULTURE, TISSUE Yony Congo STAIN [[id number]]  (Susceptibility) Collected:  17 1416    Order Status:  Completed Specimen:  Bone Updated:  01/15/17 0924     Special Requests: 5TH METATARSAL HEAD      GRAM STAIN NO  WBCS SEEN         NO DEFINITE ORGANISM SEEN        Culture result: SCANT  STAPHYLOCOCCUS CAPRAE         CORRECTED RESULT CALLED TO AND CORRECTLY REPEATED BY:  Thierry Garduno TO CP ON 17 AT 6499       CULTURE, BLOOD [[id number]] Collected:  17 1820    Order Status:  Completed Specimen:  Blood from Blood Updated:  01/10/17 0833     Special Requests: RIGHT ANTECUBITAL        Culture result: NO GROWTH 5 DAYS       CULTURE, BLOOD [[id number]] Collected:  17 1800    Order Status:  Completed Specimen:  Blood from Blood Updated:  01/10/17 0833     Special Requests: LEFT ANTECUBITAL        Culture result: NO GROWTH 5 DAYS       C. DIFFICILE/EPI PCR [[id number]] Collected:  17 1300    Order Status:  Canceled Specimen:  Stool           Imagin/8/17: Bone scan  IMPRESSION: Increased radiotracer activity on all 3 phases within the right foot compared to the left. This localizes on the delayed image to the fifth  metatarsal bone, suggesting osteomyelitis at this site. 17 RLE arterial duplex  IMPRESSION: Abnormal right MAGGIE consistent with severe arterial disease. Noncompressible left lower extremity tibial arteries. Diminished right great toe  pressure. 17 R foot Xray  Impression:  1. No strong evidence for osteomyelitis. A three phase bone scan or contrasted MRI can be considered if there is continued concern for osteomyelitis.   2. Lucencies in the soft tissues about the proximal phalanx of the fifth digit. Soft tissue gas as can occur with a gas producing infection (i.e. gas gangrene)  cannot be excluded.     Signed By: Colin Leavitt NP     2017

## 2017-01-30 NOTE — PROGRESS NOTES
OT Note:    OT will hold treatment today due to patient having procedure later. Will re-assess at a later date/time.     Thanks,  Carisa Solorio

## 2017-01-30 NOTE — DIABETES MGMT
Pt seen by RN LINH. NPO for right BKA later today. Pt is sleeping soundly. Did not disturb. Blood glucose ranged  yesterday with total of 31 units insulin given (Lantus 10 units; Humalog 21 units) and 164-181 so far today. Per MAR all insulin held this morning per preop. Nursing will continue to monitor blood glucose.

## 2017-01-30 NOTE — PROGRESS NOTES
Problem: Nutrition Deficit  Goal: *Optimize nutritional status  Nutrition F/U   Assessment:   · Diet order(s): 1-12:CCHO, 1-18: NPO then CCHO, 1-19: Glucerna shakes tid,1-20: NPO, then CCHO, 1-30: NPO  · RN attempted FT placement on 1-27 but pt pulled FT out and refused replacement. Plan is for BKA today. Will still need adequate nutrition post-op for wound healing. Despite pt c/o of nutritional supplements causing diarrhea, he does consume the Glucerna shakes at times. There are no other nutritional supplements that are less likely to cause diarrhea that are also low in CHO. Ensure clear might be a consideration but it only contains 7 grams protein and is high in CHO. Since documented BM are none to one per day for the past 5 days with no diarrhea documented, would continue Glucerna shakes for now. He is on Reglan for suspected gastroparesis which could contribute to pt c/o diarrhea. Po Mg and Phos can also contribute to diarrhea. · Phos 2.2  · Mg 1.8-WNL since Mg supplementation started on 1-28. · Macronutrient needs:  EER: 2819-0808 kcal /day (18-22 kcal/kg BW using 96.6kg)   EPR:  grams protein/day (1-1.2 grams/kg IBW; GFR 30 ml/min-MO on CKD)   Intake/Comparative Standards:    Calorie count result for 2 meals for 1-27: 1195 kcal (69% of kcal needs) and 57 grams protein (68% of protein needs. Intake included 3 cans of Glucerna shakes. Calorie count result for 3 meals for 1-28: 1325 kcal (76% of kcal needs) and 68 grams protein (80% of protein needs. Intake included 1 can of Glucerna shakes. Calorie count result for 3 meals for 1-29: 1055 kcal (61% of kcal needs) and 64grams protein (76% of protein needs. Intake included 2 cans of Glucerna shakes. Intervention:   Meals and snacks: Continue to allow liberalized menu in an attempt to improve nutritional intake. Calorie count to continue daily post-op.    Nutrition Supplement Therapy: Electrolyte replacement per nutritional support protocols are active on MAR. Supplement Phos per protocol at renal dose. Continue Glucerna shake tid and Stress tab MVI.          Estela Solano, [address], [address] [place] [address], [address] [place] [address]

## 2017-01-30 NOTE — PROGRESS NOTES
Spiritual Care visit. Follow up.  spoke briefly with patient before he was to go to Pre Op. Affirmed his feelings. Visit by David Rosenthal M.Ed., Th.B. ,Staff

## 2017-01-30 NOTE — CONSULTS
PM&R Rehab Consult    Subjective:     Date of Consultation:  January 30, 2017    Referring Physician: Dr. Ghislanie Moreno    Patient is a 73 y.o. male who is being seen for rehab recommendation regarding new R. BKA due to a diabetic, gangrenous right foot wound and PVD    HPI; Pt is a 72 yo previously functionally independent AA male who presented with progressive pain and poor healing of right foot wound over 1 month on 1/5/17. Hw was admitted by the Hospitalist service with a right infected Diabetic foot wound  PMH includes DM (A1C 9.2 11/2016), CHF, CKD, current smoker. X Ray of right foot had low suspicion for osteomyelitis but did reveal soft tissue lucencies, possible gas producing infection. Surgery and Vascular were consulted. He had bedside debridement on 1/6. He was started on Vanc/Zosyn in ER. Also noted to have MO with Cr 2.99, baseline 1.90. He also had elevated trops without any typical symptoms. He had significant anemia and has required several blood transfusions. Arterial duplex was abnormal noting reduced MAGGIE to the right lower extremity with right peroneal artery occlusion and posterior tibial artery with slow flow. A bone scan was completed on 1/9/2017 noting positive osteomyelitis findings at the fifth metatarsal bone. His fifth toe was found to be necrotic and on 1/11/2017 he underwent an I&D and amputation of the fifth toe at the fifth metatarsal level, operative note indicating an abscess encountered at the level of the interspace and metatarsal head, extending deep into the space of the foot. Cultures positive with beta strep,Staph caprae, Strep agalactiae, and Staph lugdunensis; pathology showed acute and chronic inflammation with uninvolved margins. Repeat I&D 1/20, prep for skin grafting . His antibiotics were changed to Rocephin for renal protection. It was determined that he was not a revascularization candidate.  Due to insurance issues he was also not able to reap the possible benefits of HBO. Thus, this a.m, pt is going to the OR today for a R. BKA. He is tearful. Both parents were bilateral amputees, and he knows how difficult things were for them. The patient has stopped eating and the plan is to place a DHT intraoperatively for means of providing nutrition.  Patient has severe protein malnutrition with an albumin of 1.4     Principal Problem:    Type 2 diabetes mellitus with right diabetic foot infection ([address] (1/5/2017)    Active Problems:    Type 2 diabetes mellitus with hyperglycemia ([address] (7/29/2012)      HTN (hypertension) (7/29/2012)      Chronic systolic congestive heart failure ([address] (12/30/2015)      Overview: EF 35-40% on 2015 Echo      Kidney disease, chronic, stage III (moderate, EGFR 30-59 ml/min) (8/11/2016)      Acute renal failure with tubular necrosis ([address] (1/6/2017)      Sepsis due to cellulitis ([address] (1/9/2017)      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 ([address] (1/17/2017)      Past Medical History   Diagnosis Date    Abnormal CT scan, chest 12/27/2015    Acute CHF ([address] 98/97/4175    Acute systolic congestive heart failure ([address] 12/30/2015    MO (acute kidney injury) ([address] 7/29/2012    Bilateral pleural effusion 12/27/2015    Chest pain 12/26/2015    DM (diabetes mellitus), type 2 ([address] 7/29/2012    Encephalopathy due to infection 1/7/2017    HTN (hypertension) 7/29/2012    Hypoglycemia 7/29/2012    NSTEMI (non-ST elevated myocardial infarction) ([address] 1/5/2017    Tobacco use 12/27/2015      Family History   Problem Relation Age of Onset    Diabetes Mother     Kidney Disease Mother     Diabetes Father     Kidney Disease Father     Kidney Disease Sister       Social History   Substance Use Topics    Smoking status: Current Every Day Smoker     Packs/day: 1.00     Years: 45.00    Smokeless tobacco: Not on file    Alcohol use Yes      Comment: occasional   lives alone in a one story home, with 4 steps to enter  Past Surgical History   Procedure Laterality Date    Hx orthopaedic        Prior to Admission medications    Medication Sig Start Date End Date Taking? Authorizing Provider   ramipril (ALTACE) 10 mg capsule Take 1 Cap by mouth daily. 16  Yes Abundio Flores MD   atorvastatin (LIPITOR) 80 mg tablet Take 1 Tab by mouth daily. 16  Yes Abundio Flores MD   amLODIPine (NORVASC) 5 mg tablet Take 1 Tab by mouth daily. 16  Yes Abundio Flores MD   metoprolol succinate (TOPROL-XL) 100 mg tablet Take 1 Tab by mouth daily. 16  Yes Abundio Flores MD   spironolactone (ALDACTONE) 25 mg tablet Take 25 mg by mouth daily. 3/6/16  Yes Historical Provider   glipiZIDE SR (GLUCOTROL) 2.5 mg CR tablet Take  by mouth daily. Yes Historical Provider   bumetanide (BUMEX) 1 mg tablet Take 1 Tab by mouth daily. 3/10/16  Yes Abundio Flores MD   aspirin 81 mg chewable tablet Take 1 Tab by mouth daily (after breakfast). 12/30/15  Yes Josh Draper NP     Allergies   Allergen Reactions   Clance Dural [Hydrocodone-Acetaminophen] Itching        Review of Systems:  Review of systems not obtained due to patient factors. Going to OR  Objective:     Vitals:  Blood pressure 138/72, pulse 93, temperature 98.7 °F (37.1 °C), resp. rate 18, height 6' 1\" (1.854 m), weight 250 lb (113.4 kg), SpO2 93 %.   Temp (24hrs), Av.4 °F (36.9 °C), Min:97.9 °F (36.6 °C), Max:99.2 °F (37.3 °C)      Intake and Output:   1901 -  0700  In: 560 [P.O.:560]  Out: 300 [Urine:300]    Physical Exam:  No significant changes  Exam not performed as pt is going down to preop  Labs/Studies:  Recent Results (from the past 72 hour(s))   GLUCOSE, POC    Collection Time: 17  4:59 PM   Result Value Ref Range    Glucose (POC) 146 (H) 65 - 100 mg/dL   OCCULT BLOOD, STOOL    Collection Time: 17  5:04 PM   Result Value Ref Range    Occult blood, stool NEGATIVE  NEG     GLUCOSE, POC    Collection Time: 17  9:38 PM   Result Value Ref Range    Glucose (POC) 199 (H) 65 - 100 mg/dL   METABOLIC PANEL, BASIC    Collection Time: 01/28/17  5:45 AM   Result Value Ref Range    Sodium 144 136 - 145 mmol/L    Potassium 3.6 3.5 - 5.1 mmol/L    Chloride 110 (H) 98 - 107 mmol/L    CO2 24 21 - 32 mmol/L    Anion gap 10 7 - 16 mmol/L    Glucose 148 (H) 65 - 100 mg/dL    BUN 14 8 - 23 MG/DL    Creatinine 2.00 (H) 0.8 - 1.5 MG/DL    GFR est AA 43 (L) >60 ml/min/1.73m2    GFR est non-AA 35 (L) >60 ml/min/1.73m2    Calcium 6.2 (L) 8.3 - 10.4 MG/DL   PHOSPHORUS    Collection Time: 01/28/17  5:45 AM   Result Value Ref Range    Phosphorus 3.8 (H) 2.3 - 3.7 MG/DL   MAGNESIUM    Collection Time: 01/28/17  5:45 AM   Result Value Ref Range    Magnesium 1.5 (L) 1.8 - 2.4 mg/dL   GLUCOSE, POC    Collection Time: 01/28/17  7:57 AM   Result Value Ref Range    Glucose (POC) 145 (H) 65 - 100 mg/dL   GLUCOSE, POC    Collection Time: 01/28/17 11:15 AM   Result Value Ref Range    Glucose (POC) 187 (H) 65 - 100 mg/dL   GLUCOSE, POC    Collection Time: 01/28/17  3:51 PM   Result Value Ref Range    Glucose (POC) 176 (H) 65 - 100 mg/dL   GLUCOSE, POC    Collection Time: 01/28/17  8:19 PM   Result Value Ref Range    Glucose (POC) 181 (H) 65 - 249 mg/dL   METABOLIC PANEL, BASIC    Collection Time: 01/29/17  4:42 AM   Result Value Ref Range    Sodium 142 136 - 145 mmol/L    Potassium 3.6 3.5 - 5.1 mmol/L    Chloride 107 98 - 107 mmol/L    CO2 25 21 - 32 mmol/L    Anion gap 10 7 - 16 mmol/L    Glucose 124 (H) 65 - 100 mg/dL    BUN 17 8 - 23 MG/DL    Creatinine 2.15 (H) 0.8 - 1.5 MG/DL    GFR est AA 39 (L) >60 ml/min/1.73m2    GFR est non-AA 33 (L) >60 ml/min/1.73m2    Calcium 7.3 (L) 8.3 - 10.4 MG/DL   GLUCOSE, POC    Collection Time: 01/29/17  7:51 AM   Result Value Ref Range    Glucose (POC) 129 (H) 65 - 100 mg/dL   GLUCOSE, POC    Collection Time: 01/29/17 11:21 AM   Result Value Ref Range    Glucose (POC) 220 (H) 65 - 100 mg/dL   GLUCOSE, POC    Collection Time: 01/29/17  4:41 PM   Result Value Ref Range    Glucose (POC) 191 (H) 65 - 100 mg/dL   GLUCOSE, POC    Collection Time: 01/29/17 10:17 PM   Result Value Ref Range    Glucose (POC) 91 65 - 221 mg/dL   METABOLIC PANEL, BASIC    Collection Time: 01/30/17  4:30 AM   Result Value Ref Range    Sodium 139 136 - 145 mmol/L    Potassium 3.8 3.5 - 5.1 mmol/L    Chloride 105 98 - 107 mmol/L    CO2 23 21 - 32 mmol/L    Anion gap 11 7 - 16 mmol/L    Glucose 164 (H) 65 - 100 mg/dL    BUN 19 8 - 23 MG/DL    Creatinine 2.25 (H) 0.8 - 1.5 MG/DL    GFR est AA 37 (L) >60 ml/min/1.73m2    GFR est non-AA 31 (L) >60 ml/min/1.73m2    Calcium 7.5 (L) 8.3 - 10.4 MG/DL   C REACTIVE PROTEIN, QT    Collection Time: 01/30/17  4:30 AM   Result Value Ref Range    C-Reactive protein 3.6 (H) 0.0 - 0.9 mg/dL   HEPATIC FUNCTION PANEL    Collection Time: 01/30/17  4:30 AM   Result Value Ref Range    Protein, total 6.2 (L) 6.3 - 8.2 g/dL    Albumin 1.4 (L) 3.2 - 4.6 g/dL    Globulin 4.8 (H) 2.3 - 3.5 g/dL    A-G Ratio 0.3 (L) 1.2 - 3.5      Bilirubin, total 0.4 0.2 - 1.1 MG/DL    Bilirubin, direct 0.1 <0.4 MG/DL    Alk. phosphatase 55 50 - 136 U/L    AST 25 15 - 37 U/L    ALT 12 12 - 65 U/L   MAGNESIUM    Collection Time: 01/30/17  4:30 AM   Result Value Ref Range    Magnesium 1.8 1.8 - 2.4 mg/dL   PHOSPHORUS    Collection Time: 01/30/17  4:30 AM   Result Value Ref Range    Phosphorus 2.2 (L) 2.3 - 3.7 MG/DL   CBC WITH AUTOMATED DIFF    Collection Time: 01/30/17  6:20 AM   Result Value Ref Range    WBC 7.1 4.3 - 11.1 K/uL    RBC 2.83 (L) 4.23 - 5.67 M/uL    HGB 7.8 (L) 13.6 - 17.2 g/dL    HCT 24.6 (L) 41.1 - 50.3 %    MCV 86.9 79.6 - 97.8 FL    MCH 27.6 26.1 - 32.9 PG    MCHC 31.7 31.4 - 35.0 g/dL    RDW 16.5 (H) 11.9 - 14.6 %    PLATELET 987 131 - 819 K/uL    MPV 10.2 (L) 10.8 - 14.1 FL    DF AUTOMATED      NEUTROPHILS 62 43 - 78 %    LYMPHOCYTES 21 13 - 44 %    MONOCYTES 11 4.0 - 12.0 %    EOSINOPHILS 6 0.5 - 7.8 %    BASOPHILS 0 0.0 - 2.0 %    IMMATURE GRANULOCYTES 0.3 0.0 - 5.0 %    ABS.  NEUTROPHILS 4.4 1.7 - 8.2 K/UL    ABS. LYMPHOCYTES 1.5 0.5 - 4.6 K/UL    ABS. MONOCYTES 0.8 0.1 - 1.3 K/UL    ABS. EOSINOPHILS 0.4 0.0 - 0.8 K/UL    ABS. BASOPHILS 0.0 0.0 - 0.2 K/UL    ABS. IMM.  GRANS. 0.0 0.0 - 0.5 K/UL   SED RATE, AUTOMATED    Collection Time: 01/30/17  6:20 AM   Result Value Ref Range    Sed rate, automated 120 (H) 0 - 30 mm/hr   GLUCOSE, POC    Collection Time: 01/30/17  7:55 AM   Result Value Ref Range    Glucose (POC) 181 (H) 65 - 100 mg/dL   TYPE & CROSSMATCH    Collection Time: 01/30/17  8:31 AM   Result Value Ref Range    Crossmatch Expiration 02/02/2017     ABO/Rh(D) B POSITIVE     Antibody screen NEG     Unit number [id number]     Blood component type [facility] AS5     Unit division 00     Status of unit ALLOCATED     Crossmatch result Compatible     Unit number [id number]     Blood component type [facility] AS5     Unit division 00     Status of unit ISSUED     Crossmatch result Compatible    GLUCOSE, POC    Collection Time: 01/30/17 11:07 AM   Result Value Ref Range    Glucose (POC) 179 (H) 65 - 100 mg/dL           Bed Mobility:       Functional Assessment:  Functional Assessment  Fall in Past 12 Months: No  Decline in Gait/Transfer/Balance: No  Decline in Capacity to Feed/Dress/Bathe: No  Developmental Delay: No  Chewing/Swallowing Problems: No  Difficulty with Secretions: No  Speech Slurred/Thick/Garbled: No     Whitfield Score:        Speech Assessment:                     Ambulation:  Activity and Safety  Activity Level: Head of bed elevated (degrees)  Ambulate: No (Comment)  Activity: In bed, Crying, Watching t.v.  Activity Assistance: Complete care  Weight Bearing Status: PWB (Partial Weight Bearing %)  NWB (Non Weight Bearing extremities: RLE (Right Lower Extremity)  Mode of Transportation: Stretcher  Repositioned: Head of bed elevated (degrees)  Patient Turned: Turns self  Assistive Device: Fall prevention device  Safety Measures: Bed/Chair-Wheels locked, Bed in low position, Call light within reach Impression/Plan:     Principal Problem:    Type 2 diabetes mellitus with right diabetic foot infection ([address] (1/5/2017)    Active Problems:    Type 2 diabetes mellitus with hyperglycemia ([address] (7/29/2012)      HTN (hypertension) (7/29/2012)      Chronic systolic congestive heart failure ([address] (12/30/2015)      Overview: EF 35-40% on 2015 Echo      Kidney disease, chronic, stage III (moderate, EGFR 30-59 ml/min) (8/11/2016)      Acute renal failure with tubular necrosis ([address] (1/6/2017)      Sepsis due to cellulitis ([address] (1/9/2017)      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 I did not have the opportunity to discuss this aspect to the pt. He lives on social security, thus a $2933 copay would be very financially straining. Will need to involve CM to assist with SNF placement. I will discuss further with Mr Venita Stoner.  Will pursue CIP approval if and only if, patient willing and able to pay copay.   -continue PT/OT post operatively  -good prosthetic candidate  -spoke with pt regarding goals of independence, pre prosthetic planning, and importance of nutrition in his healing process  Discussion with patient/ NP  Reviewed Therapies/Labs/Meds/Records  30 min  Signed By:  Kira Munson MD     January 30, 2017

## 2017-01-30 NOTE — ANESTHESIA POSTPROCEDURE EVALUATION
Post-Anesthesia Evaluation and Assessment    Patient: Juan Stevens MRN: [id number]  SSN: [id number]    YOB: 1945  Age: 73 y.o. Sex: male       Cardiovascular Function/Vital Signs  Visit Vitals    /76    Pulse 93    Temp 37.4 °C (99.4 °F)    Resp 16    Ht 6' 1\" (1.854 m)    Wt 113.4 kg (250 lb)    SpO2 95%    BMI 32.98 kg/m2       Patient is status post spinal anesthesia for Procedure(s):  RIGHT AMPUTATION KNEE(BKA). Nausea/Vomiting: None    Postoperative hydration reviewed and adequate. Pain:  Pain Scale 1: Numeric (0 - 10) (01/30/17 1626)  Pain Intensity 1: 0 (01/30/17 1626)   Managed    Neurological Status:   Neuro (WDL): Exceptions to WDL (01/30/17 1626)  Neuro  Neurologic State: Drowsy (01/30/17 1626)  Orientation Level: Appropriate for age;Oriented X4 (01/30/17 9967)  Cognition: Appropriate for age attention/concentration; Appropriate safety awareness; Follows commands (01/30/17 0722)  LUE Motor Response: Purposeful (01/30/17 0722)  LLE Motor Response: Purposeful (01/30/17 0722)  RUE Motor Response: Purposeful (01/30/17 7885)  RLE Motor Response: Numbness;Weak (01/30/17 2589)   At baseline    Mental Status and Level of Consciousness: Arousable    Pulmonary Status:   O2 Device: Room air (01/30/17 1626)   Adequate oxygenation and airway patent    Complications related to anesthesia: None    Post-anesthesia assessment completed.  No concerns    Signed By: Bubba Kimble MD     January 30, 2017

## 2017-01-30 NOTE — PROGRESS NOTES
PLAN:  Cont management per Hospitalist  Cont BS and BP control  Nutrition consult -- last Albumin 1.2 -- RD following-requested to initiate TFs but not done   Vasc Surgery s/o -- had A-gram -- no revasc options  Smoking Cessation imperative - discussed  Wound care -- Wound RN removed VAC today, needs BKA  Abx -- per ID -- may shorten course after BKA  CM -- needs BKA, may be candidate for 9th floor as patient gets to that point of recovery             ASSESSMENT:  Admit Date: 1/5/2017   10 Days Post-Op  Procedure(s):  RIGHT FOOT DEBRIDEMENT  ,PREP FOR GRAFTING AND SKIN SUBSTITUTE GRAFT    Principal Problem:    Type 2 diabetes mellitus with right diabetic foot infection ([address] (1/5/2017)    Active Problems:    Type 2 diabetes mellitus with hyperglycemia ([address] (7/29/2012)      HTN (hypertension) (7/29/2012)      Chronic systolic congestive heart failure ([address] (12/30/2015)      Overview: EF 35-40% on 2015 Echo      Kidney disease, chronic, stage III (moderate, EGFR 30-59 ml/min) (8/11/2016)      Acute renal failure with tubular necrosis ([address] (1/6/2017)      Sepsis due to cellulitis ([address] (1/9/2017)      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 Reports pain as controlled. Remains on abx - ID following. RD following- poor appetite. AF, NAD, VSS. After long discussion, he is agreeable and provides consent for BKA    OBJECTIVE:  Constitutional: Alert oriented cooperative patient in no acute distress; appears stated age   Visit Vitals    /65    Pulse 70    Temp 98 °F (36.7 °C)    Resp 15    Ht 6' 1\" (1.854 m)    Wt 255 lb 8 oz (115.9 kg)    SpO2 99%    BMI 33.71 kg/m2     Eyes:Sclera are clear. ENMT: No obvious neck masses, no ear or lip lesions, R IJ tunneled cath c/d/i  CV: Impaired perfusion - pulses PT/DP via doppler  Resp: No JVD. Breathing is non-labored. No wheezing.  On RA  GI: Soft, non-tender, non-distended, BS active   Musculoskeletal: Normal function. R thumb amputation  Extremities: R foot wound vac in place, taken down with wound nurse, several soft areas with erythema and tenderness on plantar surface, no purulence expressed, 4th toe looks gangrenous. Dry dressing placed   Neuro: Oriented, sensation to bilat feet intact  Psychiatric: Calm and cooperative       Patient Vitals for the past 24 hrs:   BP Temp Pulse Resp SpO2   01/30/17 0739 132/65 98 °F (36.7 °C) 70 15 99 %   01/30/17 0439 118/64 98 °F (36.7 °C) 80 18 97 %   01/29/17 2317 112/50 98.2 °F (36.8 °C) 83 18 97 %   01/29/17 1929 116/52 98.4 °F (36.9 °C) 88 18 97 %   01/29/17 1518 121/65 98.9 °F (37.2 °C) 88 18 93 %   01/29/17 1122 139/76 98.7 °F (37.1 °C) 95 18 93 %   01/29/17 0757 125/57 98.3 °F (36.8 °C) (!) 101 18 95 %     Labs:    Recent Labs      01/30/17   0620  01/29/17   0442   WBC  7.1   --    HGB  7.8*   --    PLT  305   --    NA   --   142   K   --   3.6   CL   --   107   CO2   --   25   BUN   --   17   CREA   --   2.15*   GLU   --   124*        I discussed proceeding with BKA as best way to salvage any leg. Gangrene is progressing without improvements in foot wound. I discussed the patient's condition and treatment options with the patient. I discussed risks of surgery in language the patient could understand including bleeding, infection, need for further surgery, abscess, fistula, nonhealing, need for additional amputations, DVT, PE, heart attack, stroke, renal failure, respiratory failure, ventilatory dependence, and death. The patient voiced understanding of all this and all questions were answered. Alternatives to surgery were discussed also and risks of the alternatives. The patient requested that we proceed with surgery. Informed consent was obtained. Meggan Soliman.  Kristie Cole MD

## 2017-01-30 NOTE — PERIOP NOTES
TRANSFER - IN REPORT:    Verbal report received from Krystin Ramon RN on Arlyn Carrillo  being received from 6th floor for ordered procedure      Report consisted of patients Situation, Background, Assessment and   Recommendations(SBAR). Information from the following report(s) SBAR was reviewed with the receiving nurse. Opportunity for questions and clarification was provided. Nurse Carol Jeff to insure patient has had Hibiclens bath, initiate blood transfusion, and give meds as discussed. Assessment completed upon patients arrival to unit and care assumed.

## 2017-01-30 NOTE — PROGRESS NOTES
Talked to blood bank([facility]), cross match  8 hours ago, lab will come up to redraw new type and cross

## 2017-01-31 ENCOUNTER — APPOINTMENT (OUTPATIENT)
Dept: GENERAL RADIOLOGY | Age: 72
DRG: 853 | End: 2017-01-31
Attending: HOSPITALIST
Payer: MEDICARE

## 2017-01-31 LAB
AMORPH CRY URNS QL MICRO: ABNORMAL
ANION GAP BLD CALC-SCNC: 8 MMOL/L (ref 7–16)
ANION GAP BLD CALC-SCNC: 9 MMOL/L (ref 7–16)
APPEARANCE UR: ABNORMAL
BACTERIA URNS QL MICRO: 0 /HPF
BILIRUB UR QL: NEGATIVE
BUN SERPL-MCNC: 18 MG/DL (ref 8–23)
BUN SERPL-MCNC: 18 MG/DL (ref 8–23)
CALCIUM SERPL-MCNC: 7.3 MG/DL (ref 8.3–10.4)
CALCIUM SERPL-MCNC: 7.4 MG/DL (ref 8.3–10.4)
CASTS URNS QL MICRO: ABNORMAL /LPF
CHLORIDE SERPL-SCNC: 106 MMOL/L (ref 98–107)
CHLORIDE SERPL-SCNC: 108 MMOL/L (ref 98–107)
CO2 SERPL-SCNC: 24 MMOL/L (ref 21–32)
CO2 SERPL-SCNC: 26 MMOL/L (ref 21–32)
COLOR UR: ABNORMAL
CREAT SERPL-MCNC: 2.03 MG/DL (ref 0.8–1.5)
CREAT SERPL-MCNC: 2.13 MG/DL (ref 0.8–1.5)
ERYTHROCYTE [DISTWIDTH] IN BLOOD BY AUTOMATED COUNT: 16 % (ref 11.9–14.6)
ERYTHROCYTE [DISTWIDTH] IN BLOOD BY AUTOMATED COUNT: 16.1 % (ref 11.9–14.6)
GLUCOSE BLD STRIP.AUTO-MCNC: 130 MG/DL (ref 65–100)
GLUCOSE BLD STRIP.AUTO-MCNC: 170 MG/DL (ref 65–100)
GLUCOSE BLD STRIP.AUTO-MCNC: 177 MG/DL (ref 65–100)
GLUCOSE BLD STRIP.AUTO-MCNC: 187 MG/DL (ref 65–100)
GLUCOSE SERPL-MCNC: 127 MG/DL (ref 65–100)
GLUCOSE SERPL-MCNC: 168 MG/DL (ref 65–100)
GLUCOSE UR STRIP.AUTO-MCNC: NEGATIVE MG/DL
HCT VFR BLD AUTO: 27.2 % (ref 41.1–50.3)
HCT VFR BLD AUTO: 28.5 % (ref 41.1–50.3)
HGB BLD-MCNC: 8.8 G/DL (ref 13.6–17.2)
HGB BLD-MCNC: 9.2 G/DL (ref 13.6–17.2)
HGB UR QL STRIP: ABNORMAL
KETONES UR QL STRIP.AUTO: ABNORMAL MG/DL
LEUKOCYTE ESTERASE UR QL STRIP.AUTO: NEGATIVE
MCH RBC QN AUTO: 27.8 PG (ref 26.1–32.9)
MCH RBC QN AUTO: 27.9 PG (ref 26.1–32.9)
MCHC RBC AUTO-ENTMCNC: 32.3 G/DL (ref 31.4–35)
MCHC RBC AUTO-ENTMCNC: 32.4 G/DL (ref 31.4–35)
MCV RBC AUTO: 86.1 FL (ref 79.6–97.8)
MCV RBC AUTO: 86.3 FL (ref 79.6–97.8)
NITRITE UR QL STRIP.AUTO: NEGATIVE
PH UR STRIP: 5.5 [PH] (ref 5–9)
PLATELET # BLD AUTO: 297 K/UL (ref 150–450)
PLATELET # BLD AUTO: 304 K/UL (ref 150–450)
PMV BLD AUTO: 10.1 FL (ref 10.8–14.1)
PMV BLD AUTO: 10.2 FL (ref 10.8–14.1)
POTASSIUM SERPL-SCNC: 4.1 MMOL/L (ref 3.5–5.1)
POTASSIUM SERPL-SCNC: 4.4 MMOL/L (ref 3.5–5.1)
PROT UR STRIP-MCNC: >300 MG/DL
RBC # BLD AUTO: 3.15 M/UL (ref 4.23–5.67)
RBC # BLD AUTO: 3.31 M/UL (ref 4.23–5.67)
RBC #/AREA URNS HPF: ABNORMAL /HPF
SODIUM SERPL-SCNC: 140 MMOL/L (ref 136–145)
SODIUM SERPL-SCNC: 141 MMOL/L (ref 136–145)
SP GR UR REFRACTOMETRY: 1.02 (ref 1–1.02)
UROBILINOGEN UR QL STRIP.AUTO: 0.2 EU/DL (ref 0.2–1)
WBC # BLD AUTO: 9 K/UL (ref 4.3–11.1)
WBC # BLD AUTO: 9.7 K/UL (ref 4.3–11.1)
WBC URNS QL MICRO: ABNORMAL /HPF

## 2017-01-31 PROCEDURE — 74011250637 HC RX REV CODE- 250/637: Performed by: INTERNAL MEDICINE

## 2017-01-31 PROCEDURE — P9047 ALBUMIN (HUMAN), 25%, 50ML: HCPCS | Performed by: INTERNAL MEDICINE

## 2017-01-31 PROCEDURE — 74011636637 HC RX REV CODE- 636/637: Performed by: INTERNAL MEDICINE

## 2017-01-31 PROCEDURE — 74011250637 HC RX REV CODE- 250/637: Performed by: HOSPITALIST

## 2017-01-31 PROCEDURE — 74011250636 HC RX REV CODE- 250/636: Performed by: INTERNAL MEDICINE

## 2017-01-31 PROCEDURE — 74011250637 HC RX REV CODE- 250/637: Performed by: NURSE PRACTITIONER

## 2017-01-31 PROCEDURE — 85027 COMPLETE CBC AUTOMATED: CPT | Performed by: SURGERY

## 2017-01-31 PROCEDURE — 74011250636 HC RX REV CODE- 250/636

## 2017-01-31 PROCEDURE — 65270000029 HC RM PRIVATE

## 2017-01-31 PROCEDURE — 81001 URINALYSIS AUTO W/SCOPE: CPT | Performed by: HOSPITALIST

## 2017-01-31 PROCEDURE — 80048 BASIC METABOLIC PNL TOTAL CA: CPT | Performed by: SURGERY

## 2017-01-31 PROCEDURE — 74011250636 HC RX REV CODE- 250/636: Performed by: NURSE PRACTITIONER

## 2017-01-31 PROCEDURE — 74011000258 HC RX REV CODE- 258: Performed by: NURSE PRACTITIONER

## 2017-01-31 PROCEDURE — 87040 BLOOD CULTURE FOR BACTERIA: CPT | Performed by: HOSPITALIST

## 2017-01-31 PROCEDURE — 74011250637 HC RX REV CODE- 250/637: Performed by: SURGERY

## 2017-01-31 PROCEDURE — 36415 COLL VENOUS BLD VENIPUNCTURE: CPT | Performed by: HOSPITALIST

## 2017-01-31 PROCEDURE — 71010 XR CHEST PORT: CPT

## 2017-01-31 PROCEDURE — 82962 GLUCOSE BLOOD TEST: CPT

## 2017-01-31 PROCEDURE — 74011250636 HC RX REV CODE- 250/636: Performed by: SURGERY

## 2017-01-31 PROCEDURE — 74011250637 HC RX REV CODE- 250/637: Performed by: FAMILY MEDICINE

## 2017-01-31 PROCEDURE — 97164 PT RE-EVAL EST PLAN CARE: CPT

## 2017-01-31 PROCEDURE — 97168 OT RE-EVAL EST PLAN CARE: CPT

## 2017-01-31 RX ORDER — ENOXAPARIN SODIUM 100 MG/ML
40 INJECTION SUBCUTANEOUS EVERY 24 HOURS
Status: DISCONTINUED | OUTPATIENT
Start: 2017-01-31 | End: 2017-02-02 | Stop reason: HOSPADM

## 2017-01-31 RX ORDER — HYDROMORPHONE HYDROCHLORIDE 1 MG/ML
INJECTION, SOLUTION INTRAMUSCULAR; INTRAVENOUS; SUBCUTANEOUS
Status: COMPLETED
Start: 2017-01-31 | End: 2017-01-31

## 2017-01-31 RX ORDER — HYDROMORPHONE HYDROCHLORIDE 1 MG/ML
.5-1 INJECTION, SOLUTION INTRAMUSCULAR; INTRAVENOUS; SUBCUTANEOUS
Status: DISCONTINUED | OUTPATIENT
Start: 2017-01-31 | End: 2017-02-01

## 2017-01-31 RX ADMIN — METOPROLOL SUCCINATE 100 MG: 100 TABLET, EXTENDED RELEASE ORAL at 07:41

## 2017-01-31 RX ADMIN — Medication 5 ML: at 22:08

## 2017-01-31 RX ADMIN — INSULIN LISPRO 2 UNITS: 100 INJECTION, SOLUTION INTRAVENOUS; SUBCUTANEOUS at 16:30

## 2017-01-31 RX ADMIN — ISOSORBIDE DINITRATE 10 MG: 10 TABLET ORAL at 16:00

## 2017-01-31 RX ADMIN — ISOSORBIDE DINITRATE 10 MG: 10 TABLET ORAL at 07:42

## 2017-01-31 RX ADMIN — ALBUMIN (HUMAN) 25 G: 0.25 INJECTION, SOLUTION INTRAVENOUS at 21:54

## 2017-01-31 RX ADMIN — METOCLOPRAMIDE 5 MG: 10 TABLET ORAL at 10:52

## 2017-01-31 RX ADMIN — ENOXAPARIN SODIUM 40 MG: 40 INJECTION SUBCUTANEOUS at 10:00

## 2017-01-31 RX ADMIN — HYDRALAZINE HYDROCHLORIDE 25 MG: 25 TABLET ORAL at 21:53

## 2017-01-31 RX ADMIN — ISOSORBIDE DINITRATE 10 MG: 10 TABLET ORAL at 21:53

## 2017-01-31 RX ADMIN — HYDRALAZINE HYDROCHLORIDE 25 MG: 25 TABLET ORAL at 16:00

## 2017-01-31 RX ADMIN — Medication 400 MG: at 15:55

## 2017-01-31 RX ADMIN — Medication 10 ML: at 14:00

## 2017-01-31 RX ADMIN — AMLODIPINE BESYLATE 5 MG: 5 TABLET ORAL at 07:42

## 2017-01-31 RX ADMIN — METOCLOPRAMIDE 5 MG: 10 TABLET ORAL at 06:09

## 2017-01-31 RX ADMIN — B-COMPLEX W/ C & FOLIC ACID TAB 1 TABLET: TAB at 07:42

## 2017-01-31 RX ADMIN — HYDROMORPHONE HYDROCHLORIDE 1 MG: 1 INJECTION, SOLUTION INTRAMUSCULAR; INTRAVENOUS; SUBCUTANEOUS at 15:55

## 2017-01-31 RX ADMIN — HYDROMORPHONE HYDROCHLORIDE 1 MG: 1 INJECTION, SOLUTION INTRAMUSCULAR; INTRAVENOUS; SUBCUTANEOUS at 11:57

## 2017-01-31 RX ADMIN — INSULIN LISPRO 2 UNITS: 100 INJECTION, SOLUTION INTRAVENOUS; SUBCUTANEOUS at 11:30

## 2017-01-31 RX ADMIN — HYDROMORPHONE HYDROCHLORIDE 1 MG: 1 INJECTION, SOLUTION INTRAMUSCULAR; INTRAVENOUS; SUBCUTANEOUS at 18:21

## 2017-01-31 RX ADMIN — METOCLOPRAMIDE 5 MG: 10 TABLET ORAL at 21:53

## 2017-01-31 RX ADMIN — HYDROMORPHONE HYDROCHLORIDE 1 MG: 1 INJECTION, SOLUTION INTRAMUSCULAR; INTRAVENOUS; SUBCUTANEOUS at 08:44

## 2017-01-31 RX ADMIN — INSULIN LISPRO 2 UNITS: 100 INJECTION, SOLUTION INTRAVENOUS; SUBCUTANEOUS at 22:08

## 2017-01-31 RX ADMIN — OXYCODONE HYDROCHLORIDE 5 MG: 5 TABLET ORAL at 06:09

## 2017-01-31 RX ADMIN — ATORVASTATIN CALCIUM 80 MG: 40 TABLET, FILM COATED ORAL at 07:41

## 2017-01-31 RX ADMIN — Medication 400 MG: at 07:42

## 2017-01-31 RX ADMIN — ALBUMIN (HUMAN) 25 G: 0.25 INJECTION, SOLUTION INTRAVENOUS at 07:56

## 2017-01-31 RX ADMIN — SODIUM CHLORIDE 75 ML/HR: 900 INJECTION, SOLUTION INTRAVENOUS at 21:55

## 2017-01-31 RX ADMIN — HYDRALAZINE HYDROCHLORIDE 25 MG: 25 TABLET ORAL at 07:42

## 2017-01-31 RX ADMIN — METOCLOPRAMIDE 5 MG: 10 TABLET ORAL at 16:30

## 2017-01-31 RX ADMIN — POTASSIUM & SODIUM PHOSPHATES POWDER PACK 280-160-250 MG 1 PACKET: 280-160-250 PACK at 07:41

## 2017-01-31 RX ADMIN — ACETAMINOPHEN 650 MG: 325 TABLET, FILM COATED ORAL at 06:09

## 2017-01-31 RX ADMIN — CEFTRIAXONE 2 G: 2 INJECTION, POWDER, FOR SOLUTION INTRAMUSCULAR; INTRAVENOUS at 17:00

## 2017-01-31 RX ADMIN — HYDROMORPHONE HYDROCHLORIDE 1 MG: 1 INJECTION, SOLUTION INTRAMUSCULAR; INTRAVENOUS; SUBCUTANEOUS at 21:58

## 2017-01-31 RX ADMIN — POTASSIUM CHLORIDE 40 MEQ: 20 TABLET, EXTENDED RELEASE ORAL at 07:41

## 2017-01-31 RX ADMIN — METRONIDAZOLE 500 MG: 500 TABLET ORAL at 07:42

## 2017-01-31 RX ADMIN — METRONIDAZOLE 500 MG: 500 TABLET ORAL at 21:53

## 2017-01-31 RX ADMIN — PANTOPRAZOLE SODIUM 40 MG: 40 TABLET, DELAYED RELEASE ORAL at 06:09

## 2017-01-31 RX ADMIN — Medication 10 ML: at 06:09

## 2017-01-31 RX ADMIN — METRONIDAZOLE 500 MG: 500 TABLET ORAL at 16:00

## 2017-01-31 NOTE — PROGRESS NOTES
Infectious Disease Progress Note    Today's Date: 2017   Admit Date: 2017    Impression:   · R foot acute osteomyelitis of 5th toe and MT, with deep space abscess: s/p I&D, , 5th toe/MT amputation: Cx Staph caprae, Strep agalactiae, and Staph lugdunensis;  Repeat I&D ; R BKA ()  · S/P right lower extremity arteriogram with third order select catheterization, ultrasound-guided access ()  · Uncontrolled DM: hgb A1c 9.1  · PAD: abnormal RLE MAGGIE with occlusive disease, unable to undergo eval/intervention presently sec to MO  · Acute on CKD: creatinine appears stable  · Medical non-compliance    Plan:   · Stop all abx tomorrow, completing 48h post BKA. · **Will sign off at this time. Please call with questions or concerns. Anti-infectives:   Ceftriaxone and Flagyl (-  Vancomycin and Zosyn (-)    Subjective:     Denies nausea, vomiting, diarrhea, fever, chills, or sweats. Allergies   Allergen Reactions    Lortab [Hydrocodone-Acetaminophen] Itching        Review of Systems: Complete  Review of systems not obtained due to patient factors. Objective:     Visit Vitals    /78    Pulse 100    Temp 99.9 °F (37.7 °C)    Resp 20    Ht 6' 1\" (1.854 m)    Wt 113.4 kg (250 lb)    SpO2 91%    BMI 32.98 kg/m2     Temp (24hrs), Av.8 °F (37.1 °C), Min:97 °F (36.1 °C), Max:101.1 °F (38.4 °C)       Lines:  R chest CL          Physical Exam:    General:  Alert and oriented, obese, well developed, appears stated age   Eyes:  Sclera anicteric. Pupils equally round and reactive to light. Mouth/Throat: Mucous membranes normal, oral pharynx clear   Neck: Supple, trachea midline   Lungs:   Clear bilaterally, poor effort   CV:  Regular rate and rhythm, no audible murmur, click, rub or gallop   Abdomen:   Soft, obese, non-tender. bowel sounds very active, non-distended   Extremities: L LE 3+ edema, from thighs to ankles. R BKA in post op dsg. Skin: NO rashes or lesions. Lymph nodes: Not assessed today   Musculoskeletal: No deformity except surgical removal 5th MT, R thumb amputation   Lines/Devices:  Intact, no erythema, drainage or tenderness   Psych: Depressed, flat affect       Data Review:     CBC:  Recent Labs      01/31/17   1059  01/31/17   0615  01/30/17   0620   WBC  9.7  9.0  7.1   GRANS   --    --   62   MONOS   --    --   11   EOS   --    --   6   ANEU   --    --   4.4   ABL   --    --   1.5   HGB  8.8*  9.2*  7.8*   HCT  27.2*  28.5*  24.6*   PLT  297  304  305       BMP:  Recent Labs      01/31/17   1059  01/31/17   0615  01/30/17   0430   CREA  2.13*  2.03*  2.25*   BUN  18  18  19   NA  140  141  139   K  4.4  4.1  3.8   CL  106  108*  105   CO2  26  24  23   AGAP  8  9  11   GLU  168*  127*  164*       LFTS:  Recent Labs      01/30/17   0430   TBILI  0.4   ALT  12   SGOT  25   AP  55   TP  6.2*   ALB  1.4*       Microbiology:     All Micro Results     Procedure Component Value Units Date/Time    CULTURE, BLOOD [[id number]] Collected:  01/31/17 0840    Order Status:  Completed Specimen:  Whole Blood from Blood Updated:  01/31/17 0958    CULTURE, BLOOD [[id number]] Collected:  01/31/17 0857    Order Status:  Completed Specimen:  Whole Blood from Blood Updated:  01/31/17 0958    CULTURE, ANAEROBIC [[id number]] Collected:  01/11/17 1416    Order Status:  Completed Specimen:  Bone Updated:  01/18/17 0659     Special Requests: 5TH METATARSAL HEAD      Culture result:         NO ANAEROBES ISOLATED 7 DAYS    CULTURE, ANAEROBIC [[id number]] Collected:  01/11/17 1400    Order Status:  Completed Specimen:  Foot Updated:  01/18/17 0659     Special Requests: RIGHT FOOT DEEP WOUND      Culture result:         NO ANAEROBES ISOLATED 7 DAYS    CULTURE, WOUND Durel Euler STAIN [[id number]]  (Susceptibility) Collected:  01/11/17 1400    Order Status:  Completed Specimen:  Foot Updated:  01/15/17 0927     Special Requests: RIGHT FOOT DEEP WOUND      GRAM STAIN 0 TO 5 WBC'S/OIF RARE  GRAM POSITIVE COCCI        Culture result: SCANT  STREPTOCOCCUS AGALACTIAE SERO GROUP B         LIGHT  STAPHYLOCOCCUS LUGDUNENSIS       CULTURE, TISSUE Manohar Alpers STAIN [[id number]]  (Susceptibility) Collected:  17 1416    Order Status:  Completed Specimen:  Bone Updated:  01/15/17 0924     Special Requests: 5TH METATARSAL HEAD      GRAM STAIN NO  WBCS SEEN         NO DEFINITE ORGANISM SEEN        Culture result: SCANT  STAPHYLOCOCCUS CAPRAE         CORRECTED RESULT CALLED TO AND CORRECTLY REPEATED BY:  Khoa Kyle TO CP ON 17 AT 8248       CULTURE, BLOOD [[id number]] Collected:  17 1820    Order Status:  Completed Specimen:  Blood from Blood Updated:  01/10/17 0833     Special Requests: RIGHT ANTECUBITAL        Culture result: NO GROWTH 5 DAYS       CULTURE, BLOOD [[id number]] Collected:  17 1800    Order Status:  Completed Specimen:  Blood from Blood Updated:  01/10/17 0833     Special Requests: LEFT ANTECUBITAL        Culture result: NO GROWTH 5 DAYS       C. DIFFICILE/EPI PCR [[id number]] Collected:  17 1300    Order Status:  Canceled Specimen:  Stool           Imagin/8/17: Bone scan  IMPRESSION: Increased radiotracer activity on all 3 phases within the right foot compared to the left. This localizes on the delayed image to the fifth  metatarsal bone, suggesting osteomyelitis at this site. 17 RLE arterial duplex  IMPRESSION: Abnormal right MAGGIE consistent with severe arterial disease. Noncompressible left lower extremity tibial arteries. Diminished right great toe  pressure. 17 R foot Xray  Impression:  1. No strong evidence for osteomyelitis. A three phase bone scan or contrasted MRI can be considered if there is continued concern for osteomyelitis.   2. Lucencies in the soft tissues about the proximal phalanx of the fifth digit. Soft tissue gas as can occur with a gas producing infection (i.e. gas gangrene)  cannot be excluded.     Signed By: Jones Bustamante NP     January 31, 2017

## 2017-01-31 NOTE — PROGRESS NOTES
Problem: Self Care Deficits Care Plan (Adult)  Goal: *Acute Goals and Plan of Care (Insert Text)  1. Patient will tolerate 15 minutes of OT treatment with no rest breaks to increase activity tolerance for ADLs. 2. Patient will complete lower body bathing and dressing with minimal assistance. 3. Patient will attempt standing in preparation for functional transfers. 4. Patient will verbalize/ demonstrate proper positioning of R BKA to prevent future complications and promote positive functional outcomes. Timeframe: 7 visits        OCCUPATIONAL THERAPY: Re-evaluation 1/31/2017  INPATIENT: Hospital Day: 32  Payor: CARE IMPROVEMENT PLUS [place] / Product Type: [employer] Medicare /         NAME/AGE/GENDER: Loc Kimble is a 73 y.o. male        PRIMARY DIAGNOSIS:  Gangrene of toe ([facility] Patsey Aaron  Diabetic foot ulcer associated with type 1 diabetes mellitus, unspecified laterality ([facility] [E17.937, L97.509]  Ulcer of right foot, with unspecified severity ([facility] [L97.519] Type 2 diabetes mellitus with right diabetic foot infection ([facility] Type 2 diabetes mellitus with right diabetic foot infection ([address]  Procedure(s) (LRB):  RIGHT AMPUTATION KNEE(BKA) (Right)  1 Day Post-Op  ICD-10: Treatment Diagnosis:        · Generalized Muscle Weakness (M62.81)  · Other lack of cordination (R27.8)   Precautions/Allergies:         Lortab [hydrocodone-acetaminophen]   R BKA        ASSESSMENT:      Mr. Khadijah Israel underwent R BKA on 1/30/2017 and was seen today for OT reassessment. Goals updated to reflect current functional status. Patient supine in bed upon arrival with daughter at bedside. Needs encouragement to participate. Reports pain 10/10 in R stump. RN notified. Required moderate assistance, additional time, and additional cueing to come to edge of bed. Once seated edge of bed demonstrates fair/ poor sitting balance. Patient agitated that he had to do therapy only one day post-op.  Oriented patient to OT goals and plan of care and the importance of participating in therapy. Educated patient/ daughter on importance of maintaining full knee extension in RLE. Patient is currently functioning below baseline for ADLs and would benefit from acute OT To increase independence and safety. This section established at most recent assessment   PROBLEM LIST (Impairments causing functional limitations):  1. Decreased Strength  2. Decreased ADL/Functional Activities  3. Decreased Transfer Abilities  4. Decreased Ambulation Ability/Technique  5. Decreased Balance  6. Decreased Activity Tolerance  7. Increased Fatigue  8. Decreased Flexibility/Joint Mobility  9. Edema/Girth  10. Decreased Knowledge of Precautions  11. Decreased [place] with Home Exercise Program  12. Decreased Cognition    INTERVENTIONS PLANNED: (Benefits and precautions of occupational therapy have been discussed with the patient.)  1. Activities of daily living training  2. Adaptive equipment training  3. Balance training  4. Cognitive training  5. Donning&doffing training  6. Group therapy  7. Hygiene training  8. Sensory reintergration training  9. Theraputic activity  10. Theraputic exercise      TREATMENT PLAN: Frequency/Duration: Follow patient 3x/week to address above goals. Rehabilitation Potential For Stated Goals: Good      RECOMMENDED REHABILITATION/EQUIPMENT: (at time of discharge pending progress): Continue Skilled Therapy. OCCUPATIONAL PROFILE AND HISTORY:   History of Present Injury/Illness (Reason for Referral):  Pt is a 74 yo male who presents to ER this evening with progressive pain and poor healing of right foot wound. He reports site started as small blister maybe from a shoe about a month ago but has not improved and recently pain has worsened is now radiating up his leg. He also reports uanble to bear weight on foot in past day or so. He has not been on abx for this but states it was seen by a provider at [facility].  Other [facility] includes DM which appears uncontrolled (A1C 9.2 11/2016), CHF, CKD, current smoker. X Ray of right foot has low suspicion for osteomyelitis but does reveal soft tissue lucencies, possible gas producing infection. WBC 18.7, LA 2.2. He has been started on Vanc/Zosyn in ER. Also noted to have MO with Cr 2.99, baseline 1.90. Pt denies any med changes and reports compliance with current med regimen. He denies recent or current CP, SOB, abd pain, fevers. Past Medical History/Comorbidities:   Mr. Deloris Rosenthal  has a past medical history of Abnormal CT scan, chest (12/27/2015); Acute CHF ([facility] (12/26/2015); Acute systolic congestive heart failure ([address] (12/30/2015); MO (acute kidney injury) ([address] (7/29/2012); Bilateral pleural effusion (12/27/2015); Chest pain (12/26/2015); DM (diabetes mellitus), type 2 ([address] (7/29/2012); Encephalopathy due to infection (1/7/2017); HTN (hypertension) (7/29/2012); Hypoglycemia (7/29/2012); NSTEMI (non-ST elevated myocardial infarction) ([address] (1/5/2017); and Tobacco use (12/27/2015). He also has no past medical history of Arthritis; Asthma; Autoimmune disease ([address] Cancer ([address] COPD; DEMENTIA; Gastrointestinal disorder; Liver disease; Other ill-defined conditions(799.89); Psychiatric disorder; PUD (peptic ulcer disease); Seizures ([address] Sleep disorder; Stroke [facility]); or Thromboembolus ([address] Mr. Deloris Rosenthal  has a past surgical history that includes orthopaedic.   Social History/Living Environment:   Home Environment: Private residence  # Steps to Enter: 4  One/Two Story Residence: One story  Living Alone: No  Support Systems: Child(elpidio), Family member(s)  Patient Expects to be Discharged to[de-identified] Rehabilitation facility  Current DME Used/Available at Home: None  Tub or Shower Type: Shower  Prior Level of Function/Work/Activity:  Modified indepedent  Dominant Side:         RIGHT   Number of Personal Factors/Comorbidities that affect the Plan of Care: Expanded review of therapy/medical records (1-2): MODERATE COMPLEXITY   ASSESSMENT OF OCCUPATIONAL PERFORMANCE[de-identified]   Activities of Daily Living:         Requires moderate to maximal assistance  Basic ADLs (From Assessment) Complex ADLs (From Assessment)   Basic ADL  Feeding: Setup  Oral Facial Hygiene/Grooming: Setup  Bathing: Maximum assistance  Upper Body Dressing: Minimum assistance  Lower Body Dressing: Maximum assistance  Toileting: Maximum assistance Instrumental ADL  Meal Preparation: Total assistance  Homemaking: Total assistance   Grooming/Bathing/Dressing Activities of Daily Living     Cognitive Retraining  Safety/Judgement: Decreased awareness of need for safety;Decreased awareness of need for assistance; Fall prevention                       Bed/Mat Mobility  Rolling: Moderate assistance  Supine to Sit: Moderate assistance; Additional time  Sit to Supine: Minimum assistance  Sit to Stand:  (unable)  Scooting: Maximum assistance          Most Recent Physical Functioning:   Gross Assessment:  AROM: Within functional limits (BUE)  Strength: Within functional limits (BUE)               Posture:  Posture (WDL): Exceptions to WDL  Posture Assessment: Forward head, Rounded shoulders  Balance:  Sitting: Impaired  Sitting - Static: Fair (occasional)  Sitting - Dynamic: Poor (constant support)  Standing:  (unable) Bed Mobility:  Rolling: Moderate assistance  Supine to Sit: Moderate assistance; Additional time  Sit to Supine: Minimum assistance  Scooting: Maximum assistance  Wheelchair Mobility:     Transfers:  Sit to Stand:  (unable)      ROM:          Decreased; functional BUE  Strength:          WFL BUE  Mental Status:          Intermittent confusion  Activities of Daily Living:         Requires assistance  Basic ADLs (From Assessment) Complex ADLs (From Assessment)   Basic ADL  Feeding: Setup  Oral Facial Hygiene/Grooming: Setup  Bathing: Maximum assistance  Upper Body Dressing: Minimum assistance  Lower Body Dressing: Maximum assistance  Toileting: Maximum assistance Instrumental ADL  Meal Preparation: Total assistance  Homemaking: Total assistance   Grooming/Bathing/Dressing Activities of Daily Living     Cognitive Retraining  Safety/Judgement: Decreased awareness of need for safety;Decreased awareness of need for assistance; Fall prevention                       Bed/Mat Mobility  Rolling: Moderate assistance  Supine to Sit: Moderate assistance; Additional time  Sit to Supine: Minimum assistance  Sit to Stand:  (unable)  Scooting: Maximum assistance                Patient Vitals for the past 6 hrs:   BP SpO2 Pulse   17 1149 146/78 91 % 100   17 1531 150/87 95 % (!) 104        Mental Status  Neurologic State: Alert  Orientation Level: Oriented to person, Oriented to situation, Oriented to place  Cognition: Decreased command following  Perception: Appears intact  Perseveration: No perseveration noted  Safety/Judgement: Decreased awareness of need for safety, Decreased awareness of need for assistance, Fall prevention                               Physical Skills Involved:  1. Range of Motion  2. Balance  3. Mobility  4. Strength  5. Endurance Cognitive Skills Affected (resulting in the inability to perform in a timely and safe manner):  1. Attending  2. Perceiving  3. Thinking  4. Understanding  5. Problem Solving  6. Mental Sequencing  7. Learning  8. Remembering Psychosocial Skills Affected:  1. Routines and Behaviors  2. Active Use of Coping Strategies  3. Environmental Adaptations   Number of elements that affect the Plan of Care: 5+:  HIGH COMPLEXITY   CLINICAL DECISION MAKIN [facility] Mobility Inpatient Short Form  How much help from another person does the patient currently need. .. Total A Lot A Little None   1. Putting on and taking off regular lower body clothing?   [ ] 1   [x ] 2   [ ] 3   [ ] 4   2. Bathing (including washing, rinsing, drying)? [ ] 1   [x ] 2   [ ] 3   [ ] 4   3.   Toileting, which includes using toilet, bedpan or urinal?   [ ] 1   [x ] 2   [ ] 3   [ ] 4   4. Putting on and taking off regular upper body clothing?   [ ] 1   [ ] 2   [x ] 3   [ ] 4   5. Taking care of personal grooming such as brushing teeth? [ ] 1   [ ] 2   [x ] 3   [ ] 4   6. Eating meals? [ ] 1   [ ] 2   [ ] 3   [x ] 4   © 2007, Trustees of [address] [place] [address] [address] [address], under license to [employer]. All rights reserved    Score:  Initial: 16 Most Recent: 16 (Date: -- )     Interpretation of Tool:  Represents activities that are increasingly more difficult (i.e. Bed mobility, Transfers, Gait). Score 24 23 22-20 19-15 14-10 9-7 6       Modifier CH CI CJ CK CL CM CN        ? Self Care:     - CURRENT STATUS: CK - 40%-59% impaired, limited or restricted    - GOAL STATUS: CJ - 20%-39% impaired, limited or restricted    - D/C STATUS:  ---------------To be determined---------------  Payor: CARE IMPROVEMENT PLUS [place] / Product Type: Managed Care Medicare /       Medical Necessity:     · Patient demonstrates good rehab potential due to higher previous functional level. Reason for Services/Other Comments:  · Patient continues to require skilled intervention due to decreased ability to perform self care. Use of outcome tool(s) and clinical judgement create a POC that gives a: MODERATE COMPLEXITY             TREATMENT:   (In addition to Assessment/Re-Assessment sessions the following treatments were rendered)      Pre-treatment Symptoms/Complaints:  C/o pain and agitated with therapist for causing him increased pain   Pain: Initial:   Pain Intensity 1: 10  Pain Intervention(s) 1: Nurse notified at rest, increases with movement Post Session:  10/10. RN aware. Re-evaluation only today.      UE Exercises (with theraband and dowel) Date:  1/19/2017 Date:  1/25/2017 Date:  1/26/2017   Activity/Exercise Parameters Parameters Parameters   Shoulder Abd/Adduction 15 reps 15 reps    Shoulder Flexion 15 reps 10 reps 10 reps   Elbow Flexion 15 reps 15 reps 2 sets15 reps   Punches 15 reps 15 reps 2 sets 15 reps                            Treatment/Session Assessment:         Response to Treatment:  In agreement to oxana AC. Interdisciplinary Collaboration:   · Occupational Therapist  · Registered Nurse  · Certified Nursing Assistant/Patient Care Technician     After treatment position/precautions:   · Supine in bed  · Bed/Chair-wheels locked  · Bed in low position  · Call light within reach  · Family at bedside     Compliance with Program/Exercises: Non-compliant at times. .     Recommendations/Intent for next treatment session:   \"Next visit will focus on advancements to more challenging activities and reduction in assistance provided\"       Total Treatment Duration:    OT Patient Time In/Time Out  Time In: 1528  Time Out: ERIN Yates/SULAIMAN

## 2017-01-31 NOTE — PROGRESS NOTES
PLAN:  Cont management per Hospitalist  Cont BS and BP control  Nutrition -- needs to eat  Smoking Cessation imperative - discussed  Leave dressing intact -- reinforce PRN -- MD to remove  Abx -- per ID -- may shorten course after BKA  CM -- candidate for 9th floor as patient gets to that point of recovery ? ? Pain control -- adjust meds  Follow labs/lytes  Fever -- Hosp has ordered CXR and UA           ASSESSMENT:  Admit Date: 1/5/2017   1 Days Post-Op  Procedure(s):  RIGHT AMPUTATION KNEE(BKA)    Principal Problem:    Type 2 diabetes mellitus with right diabetic foot infection ([address] (1/5/2017)    Active Problems:    Type 2 diabetes mellitus with hyperglycemia ([address] (7/29/2012)      HTN (hypertension) (7/29/2012)      Chronic systolic congestive heart failure ([address] (12/30/2015)      Overview: EF 35-40% on 2015 Echo      Kidney disease, chronic, stage III (moderate, EGFR 30-59 ml/min) (8/11/2016)      Acute renal failure with tubular necrosis ([address] (1/6/2017)      Sepsis due to cellulitis ([address] (1/9/2017)      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 Reports pain at BKA site -- very tender to touch. No N/V. Slept fair. Temp 101F this AM. Tachy. BP stable. Denies CP, SOB, cough/congestion. UOP marginal.       OBJECTIVE:  Constitutional: Alert oriented cooperative patient in no acute distress; appears stated age   Visit Vitals    /51    Pulse (!) 101    Temp (!) 100.5 °F (38.1 °C)  Comment: Nurse was notified    Resp 20    Ht 6' 1\" (1.854 m)    Wt 113.4 kg (250 lb)    SpO2 91%    BMI 32.98 kg/m2     Eyes: Sclera are clear. PERRLA  ENMT: No obvious neck masses, no ear or lip lesions, R IJ tunneled cath c/d/i  CV: RRR, impaired perfusion - pulses PT/DP via doppler  Resp: No JVD. Breathing is non-labored. No wheezing. On RA  GI: Soft, non-tender, non-distended, BS active   Musculoskeletal: Normal function.  R thumb amputation. R BKA - dressing c/d/i - taken down to [place] dressing - no drainage. TTP. No crepitus. Warm and dry. Re-wrapped with ACE by Dr. Dexter Bartlett   Neuro: Oriented, sensation intact  Psychiatric: Calm and cooperative       Patient Vitals for the past 24 hrs:   BP Temp Pulse Resp SpO2 Height Weight   01/31/17 0720 125/51 (!) 100.5 °F (38.1 °C) (!) 101 20 91 % - -   01/31/17 0503 144/83 (!) 101.1 °F (38.4 °C) (!) 107 18 95 % - -   01/30/17 2347 145/78 99.6 °F (37.6 °C) 94 18 98 % - -   01/30/17 1838 125/88 97.3 °F (36.3 °C) 93 15 94 % - -   01/30/17 1801 152/76 97 °F (36.1 °C) 96 15 98 % - -   01/30/17 1658 154/75 98.1 °F (36.7 °C) 90 16 97 % - -   01/30/17 1653 152/81 - 93 16 97 % - -   01/30/17 1648 148/84 - 92 16 97 % - -   01/30/17 1643 163/80 - 94 16 97 % - -   01/30/17 1638 153/76 - 93 16 95 % - -   01/30/17 1633 169/89 - 93 16 94 % - -   01/30/17 1626 161/74 99.4 °F (37.4 °C) 91 16 97 % - -   01/30/17 1320 156/82 98.6 °F (37 °C) (!) 143 27 95 % - -   01/30/17 1305 147/76 98.1 °F (36.7 °C) 93 20 93 % - -   01/30/17 1300 142/74 98.2 °F (36.8 °C) 90 18 97 % - -   01/30/17 1255 143/72 98.3 °F (36.8 °C) 93 18 96 % - -   01/30/17 1250 145/71 98.1 °F (36.7 °C) 92 18 96 % - -   01/30/17 1226 142/78 98.8 °F (37.1 °C) 93 18 95 % - -   01/30/17 1130 138/72 98.7 °F (37.1 °C) 93 18 93 % 6' 1\" (1.854 m) 113.4 kg (250 lb)   01/30/17 1105 128/69 97.9 °F (36.6 °C) 96 18 97 % - -   01/30/17 1027 144/64 99.2 °F (37.3 °C) 95 18 96 % - -     Labs:    Recent Labs      01/31/17   0615   01/30/17   0430   WBC  9.0   < >   --    HGB  9.2*   < >   --    PLT  304   < >   --    NA  141   --   139   K  4.1   --   3.8   CL  108*   --   105   CO2  24   --   23   BUN  18   --   19   CREA  2.03*   --   2.25*   GLU  127*   --   164*   TBILI   --    --   0.4   CBIL   --    --   0.1   SGOT   --    --   25   ALT   --    --   12   AP   --    --   55    < > = values in this interval not displayed.      Yordan Alatorre, NP-C    I have seen and examined the patient with the Nurse Practitioner and agree with the above assessment and plan. Yvan Dong.  Ting Rivera MD

## 2017-01-31 NOTE — PROGRESS NOTES
Problem: Mobility Impaired (Adult and Pediatric)  Goal: *Acute Goals and Plan of Care (Insert Text)  LTG: (Updated 1/31/16 post R BKA)  (1.)Mr. Zaira Fonseca will move from supine to sit and sit to supine , scoot up and down and roll side to side in bed with MINIMAL ASSIST within 7 day(s). (2.)Mr. Zaira Fonseca will perform seated exercises and balance activities for 15 minutes with fair to fair+ balance to improve trunk control/strength and LE strength within 7 days. (3.)Mr. Zaira Fonseca will transfer from bed to chair and chair to bed with MAXIMAL ASSIST using walker or transfer board within 7 day(s). (4.)Mr. Zaira Fonseca will verbalize correct supine positioning of right residual limb to maintain joint mobility and prevent contractures within 7 days. PHYSICAL THERAPY: Re-evaluation and AM 1/31/2017  INPATIENT: Hospital Day: 32  Payor: CARE IMPROVEMENT PLUS / [place] / Product Type: [employer] Medicare /     R BKA     NAME/AGE/GENDER: Jackie Sigala is a 73 y.o. male            PRIMARY DIAGNOSIS: Gangrene of toe ([address] Fredi Curling  Diabetic foot ulcer associated with type 1 diabetes mellitus, unspecified laterality ([address] [X14.676, L97.509]  Ulcer of right foot, with unspecified severity ([address] [L97.519] Type 2 diabetes mellitus with right diabetic foot infection ([address] Type 2 diabetes mellitus with right diabetic foot infection ([address]  Procedure(s) (LRB):  RIGHT AMPUTATION KNEE(BKA) (Right)  1 Day Post-Op  ICD-10: Treatment Diagnosis: Generalized Muscle Weakness (M62.81)  Other lack of cordination (R27.8)  Difficulty in walking, Not elsewhere classified (R26.2)  Precautions/Allergies:   Lortab [hydrocodone-acetaminophen]       ASSESSMENT:      Mr. Zaira Fonseca presents in supine after new R BKA. He denies pain initially. He does not appear at all motivated to participate with therapy session today. He requirse max cueing, coaxing, and encouragement to even attempt roll or transfer to sitting.  Pt has flat affect and mostly lays still looking away from therapist. Once during the session pt did make an effort to roll to side and transfer to sitting. Requires mod-max assist to roll and was unable to fully achieve seated position. Demonstrates posterior trunk lean and gives up quickly, laying back down on bed. He does appear to have significant R LE pain with any mobility and again puts forth minimal effort. Pt was positioned in supine and left with needs in reach. He is currently demonstrating new deficits with strength, mobility, and transfers following his recent amputation; requiring significant assistance at this time and will need continued therapy during acute care stay/at discharge. Goals updated to reflect decline in status that was observed today. This section established at most recent assessment   PROBLEM LIST (Impairments causing functional limitations):  1. Decreased Strength  2. Decreased ADL/Functional Activities  3. Decreased Transfer Abilities  4. Decreased Ambulation Ability/Technique  5. Decreased Balance  6. Increased Pain  7. Decreased Activity Tolerance  8. Decreased [place] with Home Exercise Program  9. Decreased Cognition    INTERVENTIONS PLANNED: (Benefits and precautions of physical therapy have been discussed with the patient.)  1. Balance Exercise  2. Bed Mobility  3. Family Education  4. Gait Training  5. Home Exercise Program (HEP)  6. Range of Motion (ROM)  7. Therapeutic Activites  8. Therapeutic Exercise/Strengthening  9. Transfer Training      TREATMENT PLAN: Frequency/Duration: 3-4 times per weel for duration of hospital stay  Rehabilitation Potential For Stated Goals: Net[facility] Les[facility] REHABILITATION/EQUIPMENT: (at time of discharge pending progress): Continue Skilled Therapy and Rehab.                    HISTORY:   History of Present Injury/Illness (Reason for Referral):  Pt is a 72 yo male who presents to ER this evening with progressive pain and poor healing of right foot wound. He reports site started as small blister maybe from a shoe about a month ago but has not improved and recently pain has worsened is now radiating up his leg. He also reports uanble to bear weight on foot in past day or so. He has not been on abx for this but states it was seen by a provider at [facility]. Other PMH includes DM which appears uncontrolled (A1C 9.2 11/2016), CHF, CKD, current smoker. X Ray of right foot has low suspicion for osteomyelitis but does reveal soft tissue lucencies, possible gas producing infection. WBC 18.7, LA 2.2. He has been started on Vanc/Zosyn in ER. Also noted to have MO with Cr 2.99, baseline 1.90. Pt denies any med changes and reports compliance with current med regimen. He denies recent or current CP, SOB, abd pain, fevers  Past Medical History/Comorbidities:   Mr. Kael Renee  has a past medical history of Abnormal CT scan, chest (12/27/2015); Acute CHF ([address] (12/26/2015); Acute systolic congestive heart failure ([address] (12/30/2015); MO (acute kidney injury) ([address] (7/29/2012); Bilateral pleural effusion (12/27/2015); Chest pain (12/26/2015); DM (diabetes mellitus), type 2 ([address] (7/29/2012); Encephalopathy due to infection (1/7/2017); HTN (hypertension) (7/29/2012); Hypoglycemia (7/29/2012); NSTEMI (non-ST elevated myocardial infarction) ([address] (1/5/2017); and Tobacco use (12/27/2015). He also has no past medical history of Arthritis; Asthma; Autoimmune disease ([address] Cancer ([address] COPD; DEMENTIA; Gastrointestinal disorder; Liver disease; Other ill-defined conditions(799.89); Psychiatric disorder; PUD (peptic ulcer disease); Seizures ([address] Sleep disorder; Stroke [facility]); or Thromboembolus ([address] Mr. Kael Renee  has a past surgical history that includes orthopaedic.   Social History/Living Environment:   Home Environment: Private residence  # Steps to Enter: 4  One/Two Story Residence: One story  Living Alone: No  Support Systems: Child(elpidio), Family member(s)  Patient Expects to be Discharged to[de-identified] Rehabilitation facility  Current DME Used/Available at Home: None  Tub or Shower Type: Shower  Prior Level of Function/Work/Activity:  Pt states he was independent PTA. Would sit on his front porch everyday and \"entertain\".   Current Medications:           Current Facility-Administered Medications:     enoxaparin (LOVENOX) injection 40 mg, 40 mg, SubCUTAneous, Q24H, Dominguez Cuevas MD    HYDROmorphone (PF) (DILAUDID) injection 0.5-1 mg, 0.5-1 mg, IntraVENous, Q2H PRN, Dominguez Cuevas MD, 1 mg at 01/31/17 0844    0.9% sodium chloride infusion 250 mL, 250 mL, IntraVENous, PRN, Sherren Splinter, NP    albumin human 25% (BUMINATE) solution 25 g, 25 g, IntraVENous, Q12H, Neil Montes De Oca MD, 25 g at 01/31/17 0756    insulin lispro (HUMALOG) injection, , SubCUTAneous, AC&HS, Yonis Winston MD, Stopped at 01/31/17 0730    0.9% sodium chloride infusion, 75 mL/hr, IntraVENous, CONTINUOUS, Yonis Winston MD, Last Rate: 75 mL/hr at 01/30/17 1753, 75 mL/hr at 01/30/17 1753    metoclopramide HCl (REGLAN) tablet 5 mg, 5 mg, Oral, AC&HS, Yonis Winston MD, 5 mg at 01/31/17 1052    magnesium oxide (MAG-OX) tablet 400 mg, 400 mg, Oral, BID, Edvin Oneal MD, 400 mg at 01/31/17 4626    multivitamin, stress formula (STRESS TAB) tablet 1 Tab, 1 Tab, Oral, DAILY, Dominguez Cuevas MD, 1 Tab at 01/31/17 3846    NUTRITIONAL SUPPORT ELECTROLYTE PRN ORDERS, , Does Not Apply, PRN, Dominguez Cuevas MD    potassium chloride (K-DUR, KLOR-CON) SR tablet 40 mEq, 40 mEq, Oral, DAILY, Neil Smith MD, 40 mEq at 01/31/17 0741    amLODIPine (NORVASC) tablet 5 mg, 5 mg, Oral, DAILY, Lorena Leigh NP, 5 mg at 01/31/17 1122    isosorbide dinitrate (ISORDIL) tablet 10 mg, 10 mg, Oral, TID, Maudie Lu, NP, 10 mg at 01/31/17 2827    hydrALAZINE (APRESOLINE) tablet 25 mg, 25 mg, Oral, TID, Maudie Lu, NP, 25 mg at 01/31/17 0742    sodium chloride (NS) flush 5 mL, 5 mL, InterCATHeter, PRN, Christine Chen PA-C    pantoprazole (PROTONIX) tablet 40 mg, 40 mg, Oral, ACB, Shashank Ureña MD, 40 mg at 01/31/17 0609    cefTRIAXone (ROCEPHIN) 2 g in 0.9% sodium chloride (MBP/ADV) 50 mL, 2 g, IntraVENous, Q24H, Hitesh Sprain, NP, Last Rate: 100 mL/hr at 01/30/17 1754, 2 g at 01/30/17 1754    metroNIDAZOLE (FLAGYL) tablet 500 mg, 500 mg, Oral, TID, Hitesh Sprain, NP, 500 mg at 01/31/17 0742    oxyCODONE IR (ROXICODONE) tablet 5 mg, 5 mg, Oral, Q4H PRN, Hilario Chung MD, 5 mg at 01/31/17 0609    hydrALAZINE (APRESOLINE) 20 mg/mL injection 20 mg, 20 mg, IntraVENous, Q6H PRN, Martha De La Cruz MD    diphenhydrAMINE (BENADRYL) injection 25 mg, 25 mg, IntraVENous, Q4H PRN, Martha De La Cruz MD    traMADol [place]) tablet 50 mg, 50 mg, Oral, Q6H PRN, Martha De La Cruz MD, 50 mg at 01/30/17 2043    atorvastatin (LIPITOR) tablet 80 mg, 80 mg, Oral, DAILY, Fahad Delta, DO, 80 mg at 01/31/17 0741    metoprolol succinate (TOPROL-XL) tablet 100 mg, 100 mg, Oral, DAILY WITH BREAKFAST, Fahad Delta, DO, 100 mg at 01/31/17 0741    sodium chloride (NS) flush 5-10 mL, 5-10 mL, IntraVENous, Q8H, Fahad Delta, DO, 10 mL at 01/31/17 0609    sodium chloride (NS) flush 5-10 mL, 5-10 mL, IntraVENous, PRN, Fahad Delta, DO    acetaminophen (TYLENOL) tablet 650 mg, 650 mg, Oral, Q4H PRN, Fahad Delta, DO, 650 mg at 01/31/17 0609    ondansetron [facility]) injection 4 mg, 4 mg, IntraVENous, Q4H PRN, Fahad Delta, DO, 4 mg at 01/22/17 0135    docusate sodium (COLACE) capsule 100 mg, 100 mg, Oral, DAILY PRN, Fahad Rodriguez DO   Date Last Reviewed:  1/31/2017      Number of Personal Factors/Comorbidities that affect the Plan of Care: 3+: HIGH COMPLEXITY   EXAMINATION:   Most Recent Physical Functioning:   Gross Assessment:    Strength:   LEs:    AROM: Generally decreased, functional  Strength: Grossly decreased, non-functional  Coordination: Grossly decreased, non-functional  Sensation: Intact (to light touch/equal LEs; hypersensitivity)               Posture:   Posture (WDL): Exceptions to WDL  Posture Assessment: Forward head, Rounded shoulders  Balance:  Sitting: Impaired  Sitting - Static: Poor (constant support) Bed Mobility:    Rolling: Moderate assistance  Supine to Sit: Maximum assistance  Scooting: Maximum assistance; Total assistance  Wheelchair Mobility:       Transfers:       Gait:    Right Side Weight Bearing: Non-weight bearing  Left Side Weight Bearing: Full          Body Structures Involved:  1. Lungs  2. Bones  3. Joints  4. Muscles  5. Ligaments Body Functions Affected:  1. Mental  2. Sensory/Pain  3. Respiratory  4. Neuromusculoskeletal  5. Movement Related  6. Skin Related Activities and Participation Affected:  1. Learning and Applying Knowledge  2. General Tasks and Demands  3. Communication  4. Mobility  5. Self Care  6. Domestic Life  7. Interpersonal Interactions and Relationships  8. Community, Social and [place]   Number of elements that affect the Plan of Care: 4+: HIGH COMPLEXITY   CLINICAL PRESENTATION:   Presentation: Evolving clinical presentation with unstable and unpredictable characteristics: HIGH COMPLEXITY   CLINICAL DECISION MAKIN [facility] Short Form  How much difficulty does the patient currently have. .. Unable A Lot A Little None   1. Turning over in bed (including adjusting bedclothes, sheets and blankets)? [ ] 1   [X] 2   [ ] 3   [ ] 4   2. Sitting down on and standing up from a chair with arms ( e.g., wheelchair, bedside commode, etc.)   [X] 1   [ ] 2   [ ] 3   [ ] 4   3. Moving from lying on back to sitting on the side of the bed? [ ] 1   [X] 2   [ ] 3   [ ] 4   How much help from another person does the patient currently need. .. Total A Lot A Little None   4. Moving to and from a bed to a chair (including a wheelchair)? [X] 1   [ ] 2   [ ] 3   [ ] 4   5.   Need to walk in hospital room?   [X] 1   [ ] 2   [ ] 3   [ ] 4   6. Climbing 3-5 steps with a railing? [X] 1   [ ] 2   [ ] 3   [ ] 4   © 2007, Trustees of [employer], under license to [employer]. All rights reserved          Score:  Initial: 10 Most Recent: 8 (Date: 1/31/17 )   Interpretation of Tool:  Represents activities that are increasingly more difficult (i.e. Bed mobility, Transfers, Gait). Score 24 23 22-20 19-15 14-10 9-7 6       Modifier CH CI CJ CK CL CM CN         · Mobility - Walking and Moving Around:               - CURRENT STATUS:    CM - 80%-99% impaired, limited or restricted               - GOAL STATUS:           CL - 60%-79% impaired, limited or restricted               - D/C STATUS:                       ---------------To be determined---------------  Payor: CARE IMPROVEMENT PLUS [place] / Product Type: [employer] Care Medicare /       Medical Necessity:     · Patient has experienced a delay in progress due to medical complications and new R BKA. The following measures have been taken to improve response to treatment: therapy will be working with Mr. Shane Salamanca post op in an attempt to improve strength and mobility  Reason for Services/Other Comments:  · Patient continues to require skilled intervention due to new R BKA with need for therapy services to help with mobility and education on care/positioning.    Use of outcome tool(s) and clinical judgement create a POC that gives a: Difficult prediction of patient's progress: HIGH COMPLEXITY                 TREATMENT:     Pre-treatment Symptoms/Complaints:  R LE pain  Pain: Initial:   Pain Intensity 1: 0  Post Session:  0/10     PT Reassessment Table:   Initial Physical Functioning: Most Recent Physical Functioning:   Gross Assessment:  AROM: Generally decreased, functional  Strength: Generally decreased, functional  Coordination: Generally decreased, functional  Sensation: Intact (to light touch/equal LEs; hypersensitivity) Gross Assessment:   AROM: Generally decreased, functional  Strength: Grossly decreased, non-functional  Coordination: Grossly decreased, non-functional   Posture:   Posture (WDL): Exceptions to WDL  Posture Assessment: Forward head, Rounded shoulders Posture:   Posture (WDL): Exceptions to WDL   Balance:   Sitting: Impaired  Sitting - Static: Fair (occasional)  Sitting - Dynamic: Fair (occasional) (-)  Standing:  (not assessed)  Standing - Static: Poor  Standing - Dynamic : Poor Balance:   Sitting: Impaired  Sitting - Static: Poor (constant support)   Bed Mobility:   Rolling: Maximum assistance  Supine to Sit: Maximum assistance  Sit to Supine: Moderate assistance  Scooting: Maximum assistance, Total assistance Bed Mobility:   Rolling: Moderate assistance  Supine to Sit: Maximum assistance  Scooting: Maximum assistance; Total assistance   Transfers:   Sit to Stand:  (not assessed)  Stand to Sit: Minimum assistance  Stand Pivot Transfers:  (min/mod assist)  Bed to Chair: Moderate assistance, Maximum assistance  Interventions: Verbal cues, Visual cues, Safety awareness training, Tactile cues, Manual cues  Duration: 25 Minutes Transfers: unable      Gait:   Base of Support: Shift to left  Speed/Radha: Pace decreased (<100 feet/min), Shuffled  Step Length: Left shortened, Right shortened  Gait Abnormalities: Trunk sway increased, Decreased step clearance, Shuffling gait  Ambulation - Level of Assistance: Minimal assistance, Moderate assistance  Distance (ft): 3 Feet (ft)  Assistive Device: Walker, rolling  Interventions: Verbal cues, Visual/Demos, Safety awareness training, Tactile cues, Manual cues Gait: unable          Assessment/Reassessment only, no treatment provided today            Treatment/Session Assessment: See initial assessment above. Patients response to todays treatment session was tolerated with increased pain and pt unable to really participate.      · Response to Treatment: Increased pain  · Interdisciplinary Collaboration:  · Physical Therapist, Registered Nurse and Physician  · After treatment position/precautions:  · Supine in bed, Bed/Chair-wheels locked, Bed in low position, Call light within reach and RN notified  · Compliance with Program/Exercises: Will assess as treatment progresses. · Recommendations/Intent for next treatment session: \"Next visit will focus on advancements to more challenging activities and reduction in assistance provided\".   Total Treatment Duration:  PT Patient Time In/Time Out  Time In: 1000  Time Out: VIET Lugo

## 2017-01-31 NOTE — INTERDISCIPLINARY ROUNDS
Interdisciplinary team rounds were held 1/31/2017 with the following team members:Care Management, Nursing, Pastoral Care, Pharmacy and Physician. Plan of care discussed. See clinical pathway and/or care plan for interventions and desired outcomes. Anticipating discharge to [facility] when medically ready.

## 2017-01-31 NOTE — DIABETES MGMT
Pt seen by RN ANNAMARIEE. S/p right BKA yesterday. Pt is sleeping soundly. Did not disturb. Blood glucose ranged 148-181 yesterday with no insulin given. Lantus and prandial insulin discontinued yesterday. Pt is eating very little per primary nurse. Blood glucose ranging 127-131 so far today. Creat 2.03. Sliding scale Humalog orders continued. Nursing will continue to monitor blood glucose.

## 2017-01-31 NOTE — PROGRESS NOTES
Temp 100.5 notified by assistant. Patient already had tyleonol this morning. Not able to remedicate at this time. MD made aware at bedside. Chest xray, blood cultures and UA ordered.

## 2017-01-31 NOTE — WOUND CARE
Patient seen for follow up. Had his BKA yesterday. Present dressing dry and intact. Will sign off, call if needed.

## 2017-01-31 NOTE — PROGRESS NOTES
Problem: Nutrition Deficit  Goal: *Optimize nutritional status  Nutrition F/U   Assessment:   · Diet order(s): 1-12:CCHO, 1-18: NPO then CCHO, 1-19: Glucerna shakes tid,1-20: NPO, then CCHO, 1-30: NPO, 1-31: CCHO  · Pt says he got gagged with the FT placement and then \"I guess they forgot about it\". He says his son brought him some jumbo shrimp, which he compared to the size of his flip phone, and he ate about 5 or 6,\"I tore them up\". He says he is trying to eat better. His lunch tray is at bedside with ~75% consumed. However he chewed the meal and the spit it back out. He says it just \"balls up\" and he can't swallow it. He again tells the story about his problems swallowing are d/t throat being cut for surgery and it impacting his salvia production. He says he guesses he'll have that problem as long as he lives. He continues to say the Glucerna \"goes straight thru\", however he denies diarrhea and says he has a BM when he eats because \"it has to come out\". · Revised adjusted IBW for BKA: 173#  · Revised Macronutrient needs:  EER: 6041-9673 kcal /day (18-22 kcal/kg adjusted BW of 78.6kg)   EPR: 79-94 grams protein/day (1-1.2 grams/kg IBW; CKD)   Intake/Comparative Standards:  Pt NPO 1-31. Intervention:   Meals and snacks: Continue to allow liberalized menu in an attempt to improve nutritional intake. Discussed preferences entered on 1-2-17 in EMR with  and requested pt receive these items rather than standard menu choices. Calorie count continues. Nutrition Supplement Therapy: Electrolyte replacement per nutritional support protocols are active on MAR. Javi Elizondo Continue Glucerna shake tid and Stress tab MVI. Encouarged intake of Glucerna shake.         [employer], [address] [place] [address], [address] [place] [address]

## 2017-01-31 NOTE — PROGRESS NOTES
Hospitalist Progress Note    2017  Admit Date: 2017  5:07 PM   NAME: Juani Atkinson   :  1945   MRN:  [id number]   Attending: Anson Harper DO  PCP:  None      Admitted for:PAD, gangrene right foot day 1 S/P BKA    SUBJECTIVE:   Patient this morning complaining of pain in left bka, no nausea no vomiting, eating breakfast. Denies any fever chills no shortness of breath    Hospital Course  Pt is a 72 yo male who presented with progressive pain and poor healing of right foot wound over 1 month, admitted with infected DM foot. PMH includes DM (A1C 9.2 2016), CHF, CKD, current smoker. X Ray of right foot had low suspicion for osteomyelitis but does reveal soft tissue lucencies, possible gas producing infection. Surgery and Vascular were consulted. He had bedside debridement on  and surgical I&D/5th toe amputation . He was started on Vanc/Zosyn in ER. ID has seen - changed atbx  to IV Rocephin/PO Flagyl. 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 Will need 6 weeks antbx per ID recs.  Also seen by vascular surgery due to abnormal right MAGGIE and poor postoperative wound healing, day 1 post BKA    Review of Systems negative with exception of pertinent positives noted above  Past medical history unchanged from H&P    PHYSICAL EXAM     Visit Vitals    /51    Pulse (!) 101    Temp (!) 100.5 °F (38.1 °C)  Comment: Nurse was notified    Resp 20    Ht 6' 1\" (1.854 m)    Wt 113.4 kg (250 lb)    SpO2 91%    BMI 32.98 kg/m2      Temp (24hrs), Av.7 °F (37.1 °C), Min:97 °F (36.1 °C), Max:101.1 °F (38.4 °C)    Oxygen Therapy  O2 Sat (%): 91 % (17 0720)  Pulse via Oximetry: 95 beats per minute (17 0503)  O2 Device: Room air (17 4631)  O2 Flow Rate (L/min): 2 l/min (17 1440)    Intake/Output Summary (Last 24 hours) at 17 0852  Last data filed at 17 5147   Gross per 24 hour   Intake              500 ml   Output              350 ml   Net 150 ml        General: No acute distress  mucous membranes pink and moist acyanotic anicteric  Neck:  Supple full range of motion  Lungs:  Air entry equal bilaterally, no crepitations rales rhonchi   Heart:  Regular rate and rhythm,  No murmur, rub, or gallop  Abdomen: Soft, Non distended, Non tender, no rebound guarding Positive bowel sounds  Extremities: No cyanosis, clubbing or edema right bka, dressing clean and dry  Neurologic:  No focal deficits  Musculoskeletal: no   Joint swelling tenderness erythema  Skin:  No erythema, rashes noted          LAB  Recent Labs      01/31/17   0757  01/30/17   1953  01/30/17   1646  01/30/17   1149  01/30/17   1107   GLUCPOC  130*  143*  137*  147*  179*      Recent Labs      01/31/17   0615   WBC  9.0   HGB  9.2*   HCT  28.5*   PLT  304     Recent Labs      01/31/17   0615  01/30/17   0430   NA  141  139   K  4.1  3.8   CL  108*  105   CO2  24  23   GLU  127*  164*   BUN  18  19   CREA  2.03*  2.25*   MG   --   1.8   CA  7.4*  7.5*   PHOS   --   2.2*   ALB   --   1.4*   TBILI   --   0.4   ALT   --   12   SGOT   --   25       EKG and imaging reviewed personally by me  No results found. Results for orders placed or performed during the hospital encounter of 01/05/17   EKG, 12 LEAD, INITIAL   Result Value Ref Range    Systolic BP  mmHg    Diastolic BP  mmHg    Ventricular Rate 98 BPM    Atrial Rate 98 BPM    P-R Interval 166 ms    QRS Duration 74 ms    Q-T Interval 352 ms    QTC Calculation (Bezet) 449 ms    Calculated P Axis 62 degrees    Calculated R Axis 46 degrees    Calculated T Axis -74 degrees    Diagnosis       !! AGE AND GENDER SPECIFIC ECG ANALYSIS !!   Sinus rhythm with occasional Premature ventricular complexes  ST & T wave abnormality, consider inferolateral ischemia  Abnormal ECG  Confirmed by ST LYNNETTE JOSE MD (), JOSE BASHIR ([id number]) on 1/5/2017 7:42:32 PM       XR Results (most recent):    Results from [place] encounter on 01/05/17   XR CHEST PA LAT Narrative CHEST X-RAY, 2 views 1/5/2017    History: Fever/chills/sepsis. Technique: PA and lateral views of the chest.     Comparison: Chest x-ray 12/30/2015    Findings: The cardiac silhouette is normal in respect to size. The lungs are expanded  without evidence for pneumothorax. No consolidation, or evidence of pleural  effusion is seen. The bony thorax demonstrates no acute changes. The upper abdomen is  unremarkable in appearance. Impression IMPRESSION:   1. No acute cardiopulmonary process evident by plain film imaging. 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 ([facility] 01/17/2017    Sepsis due to cellulitis ([address] 01/09/2017    Acute renal failure with tubular necrosis ([address] 01/06/2017    Type 2 diabetes mellitus with right diabetic foot infection ([address] 01/05/2017    Kidney disease, chronic, stage III (moderate, EGFR 30-59 ml/min) 08/11/2016    Chronic systolic congestive heart failure ([address] 12/30/2015     EF 35-40% on 2015 Echo      Type 2 diabetes mellitus with hyperglycemia ([address] 07/29/2012    HTN (hypertension) 07/29/2012       ASSESSMENT  AND PLAN      · Gangrene Right foot - day 1 post Bka - will continue management as per surgery  · Sepsis - had fever - unsure why, will continue antibiotics, BC today.  Chest xray, UA  · CKD stage 3 - stable will continue to monitor  · S/P BKA - will need rehab    DVT Prophylaxis: LMWH  dispo - rehab    Signed By: Anna Marx MD     January 31, 2017

## 2017-02-01 ENCOUNTER — APPOINTMENT (OUTPATIENT)
Dept: GENERAL RADIOLOGY | Age: 72
DRG: 853 | End: 2017-02-01
Attending: HOSPITALIST
Payer: MEDICARE

## 2017-02-01 ENCOUNTER — APPOINTMENT (OUTPATIENT)
Dept: ULTRASOUND IMAGING | Age: 72
DRG: 853 | End: 2017-02-01
Attending: HOSPITALIST
Payer: MEDICARE

## 2017-02-01 ENCOUNTER — APPOINTMENT (OUTPATIENT)
Dept: NUCLEAR MEDICINE | Age: 72
DRG: 853 | End: 2017-02-01
Attending: HOSPITALIST
Payer: MEDICARE

## 2017-02-01 LAB
ANION GAP BLD CALC-SCNC: 10 MMOL/L (ref 7–16)
BUN SERPL-MCNC: 21 MG/DL (ref 8–23)
CALCIUM SERPL-MCNC: 7.4 MG/DL (ref 8.3–10.4)
CHLORIDE SERPL-SCNC: 107 MMOL/L (ref 98–107)
CO2 SERPL-SCNC: 23 MMOL/L (ref 21–32)
CREAT SERPL-MCNC: 2.23 MG/DL (ref 0.8–1.5)
GLUCOSE BLD STRIP.AUTO-MCNC: 148 MG/DL (ref 65–100)
GLUCOSE BLD STRIP.AUTO-MCNC: 180 MG/DL (ref 65–100)
GLUCOSE BLD STRIP.AUTO-MCNC: 214 MG/DL (ref 65–100)
GLUCOSE BLD STRIP.AUTO-MCNC: 220 MG/DL (ref 65–100)
GLUCOSE SERPL-MCNC: 214 MG/DL (ref 65–100)
MAGNESIUM SERPL-MCNC: 2.1 MG/DL (ref 1.8–2.4)
PHOSPHATE SERPL-MCNC: 2.8 MG/DL (ref 2.3–3.7)
POTASSIUM SERPL-SCNC: 4.5 MMOL/L (ref 3.5–5.1)
SODIUM SERPL-SCNC: 140 MMOL/L (ref 136–145)

## 2017-02-01 PROCEDURE — 93970 EXTREMITY STUDY: CPT

## 2017-02-01 PROCEDURE — A9567 TECHNETIUM TC-99M AEROSOL: HCPCS

## 2017-02-01 PROCEDURE — 74011250637 HC RX REV CODE- 250/637: Performed by: NURSE PRACTITIONER

## 2017-02-01 PROCEDURE — 82962 GLUCOSE BLOOD TEST: CPT

## 2017-02-01 PROCEDURE — 94760 N-INVAS EAR/PLS OXIMETRY 1: CPT

## 2017-02-01 PROCEDURE — 74011636637 HC RX REV CODE- 636/637: Performed by: HOSPITALIST

## 2017-02-01 PROCEDURE — 65270000029 HC RM PRIVATE

## 2017-02-01 PROCEDURE — 74011636637 HC RX REV CODE- 636/637: Performed by: INTERNAL MEDICINE

## 2017-02-01 PROCEDURE — 74011250637 HC RX REV CODE- 250/637: Performed by: FAMILY MEDICINE

## 2017-02-01 PROCEDURE — 74011250637 HC RX REV CODE- 250/637: Performed by: HOSPITALIST

## 2017-02-01 PROCEDURE — 83735 ASSAY OF MAGNESIUM: CPT | Performed by: SURGERY

## 2017-02-01 PROCEDURE — 74011250637 HC RX REV CODE- 250/637

## 2017-02-01 PROCEDURE — 74011250636 HC RX REV CODE- 250/636: Performed by: HOSPITALIST

## 2017-02-01 PROCEDURE — 74011250637 HC RX REV CODE- 250/637: Performed by: INTERNAL MEDICINE

## 2017-02-01 PROCEDURE — 74011000250 HC RX REV CODE- 250: Performed by: HOSPITALIST

## 2017-02-01 PROCEDURE — 74011250636 HC RX REV CODE- 250/636: Performed by: SURGERY

## 2017-02-01 PROCEDURE — 74011250636 HC RX REV CODE- 250/636: Performed by: NURSE PRACTITIONER

## 2017-02-01 PROCEDURE — 74011000258 HC RX REV CODE- 258: Performed by: NURSE PRACTITIONER

## 2017-02-01 PROCEDURE — 71010 XR CHEST PORT: CPT

## 2017-02-01 PROCEDURE — 74011250637 HC RX REV CODE- 250/637: Performed by: SURGERY

## 2017-02-01 PROCEDURE — 80048 BASIC METABOLIC PNL TOTAL CA: CPT | Performed by: SURGERY

## 2017-02-01 PROCEDURE — 77010033678 HC OXYGEN DAILY

## 2017-02-01 PROCEDURE — 84100 ASSAY OF PHOSPHORUS: CPT | Performed by: SURGERY

## 2017-02-01 PROCEDURE — 74011250636 HC RX REV CODE- 250/636: Performed by: INTERNAL MEDICINE

## 2017-02-01 RX ORDER — OXYCODONE HYDROCHLORIDE 5 MG/1
TABLET ORAL
Status: COMPLETED
Start: 2017-02-01 | End: 2017-02-01

## 2017-02-01 RX ORDER — PANTOPRAZOLE SODIUM 40 MG/1
40 TABLET, DELAYED RELEASE ORAL
Status: DISCONTINUED | OUTPATIENT
Start: 2017-02-01 | End: 2017-02-02 | Stop reason: HOSPADM

## 2017-02-01 RX ORDER — OXYCODONE HYDROCHLORIDE 5 MG/1
10 TABLET ORAL
Status: DISCONTINUED | OUTPATIENT
Start: 2017-02-01 | End: 2017-02-02

## 2017-02-01 RX ORDER — MAG HYDROX/ALUMINUM HYD/SIMETH 200-200-20
30 SUSPENSION, ORAL (FINAL DOSE FORM) ORAL
Status: DISCONTINUED | OUTPATIENT
Start: 2017-02-01 | End: 2017-02-02 | Stop reason: HOSPADM

## 2017-02-01 RX ORDER — FUROSEMIDE 10 MG/ML
60 INJECTION INTRAMUSCULAR; INTRAVENOUS ONCE
Status: COMPLETED | OUTPATIENT
Start: 2017-02-01 | End: 2017-02-01

## 2017-02-01 RX ORDER — METOPROLOL TARTRATE 5 MG/5ML
10 INJECTION INTRAVENOUS ONCE
Status: COMPLETED | OUTPATIENT
Start: 2017-02-01 | End: 2017-02-01

## 2017-02-01 RX ORDER — METOCLOPRAMIDE 10 MG/1
10 TABLET ORAL
Status: DISCONTINUED | OUTPATIENT
Start: 2017-02-01 | End: 2017-02-02 | Stop reason: HOSPADM

## 2017-02-01 RX ORDER — INSULIN GLARGINE 100 [IU]/ML
5 INJECTION, SOLUTION SUBCUTANEOUS
Status: DISCONTINUED | OUTPATIENT
Start: 2017-02-01 | End: 2017-02-02 | Stop reason: HOSPADM

## 2017-02-01 RX ORDER — IPRATROPIUM BROMIDE AND ALBUTEROL SULFATE 2.5; .5 MG/3ML; MG/3ML
3 SOLUTION RESPIRATORY (INHALATION)
Status: DISCONTINUED | OUTPATIENT
Start: 2017-02-01 | End: 2017-02-02 | Stop reason: HOSPADM

## 2017-02-01 RX ADMIN — METOCLOPRAMIDE 5 MG: 10 TABLET ORAL at 05:57

## 2017-02-01 RX ADMIN — AMLODIPINE BESYLATE 5 MG: 5 TABLET ORAL at 09:21

## 2017-02-01 RX ADMIN — HYDRALAZINE HYDROCHLORIDE 25 MG: 25 TABLET ORAL at 17:24

## 2017-02-01 RX ADMIN — HYDROMORPHONE HYDROCHLORIDE 1 MG: 1 INJECTION, SOLUTION INTRAMUSCULAR; INTRAVENOUS; SUBCUTANEOUS at 09:33

## 2017-02-01 RX ADMIN — POTASSIUM CHLORIDE 40 MEQ: 20 TABLET, EXTENDED RELEASE ORAL at 09:21

## 2017-02-01 RX ADMIN — HYDRALAZINE HYDROCHLORIDE 25 MG: 25 TABLET ORAL at 09:21

## 2017-02-01 RX ADMIN — OXYCODONE HYDROCHLORIDE 10 MG: 5 TABLET ORAL at 15:43

## 2017-02-01 RX ADMIN — Medication 5 ML: at 06:02

## 2017-02-01 RX ADMIN — METRONIDAZOLE 500 MG: 500 TABLET ORAL at 17:24

## 2017-02-01 RX ADMIN — HYDRALAZINE HYDROCHLORIDE 25 MG: 25 TABLET ORAL at 22:03

## 2017-02-01 RX ADMIN — METRONIDAZOLE 500 MG: 500 TABLET ORAL at 09:20

## 2017-02-01 RX ADMIN — ISOSORBIDE DINITRATE 10 MG: 10 TABLET ORAL at 09:21

## 2017-02-01 RX ADMIN — INSULIN GLARGINE 5 UNITS: 100 INJECTION, SOLUTION SUBCUTANEOUS at 22:04

## 2017-02-01 RX ADMIN — HYDROMORPHONE HYDROCHLORIDE 1 MG: 1 INJECTION, SOLUTION INTRAMUSCULAR; INTRAVENOUS; SUBCUTANEOUS at 01:12

## 2017-02-01 RX ADMIN — METOPROLOL TARTRATE 10 MG: 5 INJECTION INTRAVENOUS at 12:11

## 2017-02-01 RX ADMIN — Medication 400 MG: at 09:21

## 2017-02-01 RX ADMIN — ONDANSETRON 4 MG: 2 INJECTION INTRAMUSCULAR; INTRAVENOUS at 09:22

## 2017-02-01 RX ADMIN — B-COMPLEX W/ C & FOLIC ACID TAB 1 TABLET: TAB at 09:21

## 2017-02-01 RX ADMIN — ALUMINUM HYDROXIDE, MAGNESIUM HYDROXIDE, AND SIMETHICONE 30 ML: 200; 200; 20 SUSPENSION ORAL at 09:33

## 2017-02-01 RX ADMIN — ATORVASTATIN CALCIUM 80 MG: 40 TABLET, FILM COATED ORAL at 09:21

## 2017-02-01 RX ADMIN — METOCLOPRAMIDE 10 MG: 10 TABLET ORAL at 11:30

## 2017-02-01 RX ADMIN — ISOSORBIDE DINITRATE 10 MG: 10 TABLET ORAL at 17:24

## 2017-02-01 RX ADMIN — HYDROMORPHONE HYDROCHLORIDE 1 MG: 1 INJECTION, SOLUTION INTRAMUSCULAR; INTRAVENOUS; SUBCUTANEOUS at 05:56

## 2017-02-01 RX ADMIN — INSULIN LISPRO 2 UNITS: 100 INJECTION, SOLUTION INTRAVENOUS; SUBCUTANEOUS at 11:30

## 2017-02-01 RX ADMIN — METRONIDAZOLE 500 MG: 500 TABLET ORAL at 22:02

## 2017-02-01 RX ADMIN — CEFTRIAXONE 2 G: 2 INJECTION, POWDER, FOR SOLUTION INTRAMUSCULAR; INTRAVENOUS at 17:23

## 2017-02-01 RX ADMIN — Medication 10 ML: at 14:00

## 2017-02-01 RX ADMIN — Medication 400 MG: at 17:24

## 2017-02-01 RX ADMIN — INSULIN LISPRO 4 UNITS: 100 INJECTION, SOLUTION INTRAVENOUS; SUBCUTANEOUS at 09:22

## 2017-02-01 RX ADMIN — FUROSEMIDE 60 MG: 10 INJECTION, SOLUTION INTRAMUSCULAR; INTRAVENOUS at 15:48

## 2017-02-01 RX ADMIN — OXYCODONE HYDROCHLORIDE 10 MG: 5 TABLET ORAL at 23:40

## 2017-02-01 RX ADMIN — ENOXAPARIN SODIUM 40 MG: 40 INJECTION SUBCUTANEOUS at 09:22

## 2017-02-01 RX ADMIN — INSULIN LISPRO 4 UNITS: 100 INJECTION, SOLUTION INTRAVENOUS; SUBCUTANEOUS at 22:03

## 2017-02-01 RX ADMIN — PANTOPRAZOLE SODIUM 40 MG: 40 TABLET, DELAYED RELEASE ORAL at 05:57

## 2017-02-01 RX ADMIN — METOCLOPRAMIDE 10 MG: 10 TABLET ORAL at 22:03

## 2017-02-01 RX ADMIN — METOCLOPRAMIDE 10 MG: 10 TABLET ORAL at 17:24

## 2017-02-01 RX ADMIN — Medication 10 ML: at 22:04

## 2017-02-01 RX ADMIN — ISOSORBIDE DINITRATE 10 MG: 10 TABLET ORAL at 22:02

## 2017-02-01 RX ADMIN — PANTOPRAZOLE SODIUM 40 MG: 40 TABLET, DELAYED RELEASE ORAL at 17:24

## 2017-02-01 RX ADMIN — METOPROLOL SUCCINATE 100 MG: 100 TABLET, EXTENDED RELEASE ORAL at 09:21

## 2017-02-01 NOTE — PROGRESS NOTES
approached by diabetes educator and asked to inform hospitalist of possible need to reevaluate need for Lantus. Dr. Char Apodaca informed, he will review chart.

## 2017-02-01 NOTE — PROGRESS NOTES
Problem: Nutrition Deficit  Goal: *Optimize nutritional status  Nutrition F/U   Assessment:   · Diet order(s): 1-12:CCHO, 1-18: NPO then CCHO, 1-19: Glucerna shakes tid,1-20: NPO, then CCHO, 1-30: NPO, 1-31: CCHO  · Pt says he doesn't think he got any breakfast this morning. However NP reports pt had his untouched breakfast tray in front of him at time of her rounds. He also told her that he ate shrimp that his son brought him yesterday. However he tells RD that he doesn't even remember yesterday and doesn't know if his family brought him anything to eat. He denies having problems with swallowing foods at ome because he cooks things tender so he can eat it. He gives the example of cube steak with peppers and onions. He continues to say that all the supplements \"tear up my stomach\"  · Revised Macronutrient needs:EER: 1101-1484 kcal /day (18-22 kcal/kg adjusted BW of 78.6kg),EPR: 79-94 grams protein/day (1-1.2 grams/kg IBW; CKD)  · Intake/Comparative Standards:  Calorie count result for 3 meals for 1-31: 730 kcal (37% of kcal needs) and 37grams protein (57% of protein needs. Intake of Glucerna can is questionable as RD observed no intake of Glucerna at lunch yesterday but  recorded intake as 100% of Glucerna at all meals. RD suspects this may be an inaccurate recording of Glucerna intake so this was not included in the calorie count totals. Intervention:   Meals and snacks: Continue to allow liberalized menu in an attempt to improve nutritional intake. Discussed preferences entered on 1-25-17 in EMR with  again today and again requested pt receive these items rather than standard menu choices. Calorie count continues. Nutrition Supplement Therapy: Electrolyte replacement per nutritional support protocols are active on MAR. Katherene Means Continue Glucerna shake tid and Stress tab MVI. Add L-emental arginine protein supplement tid. Described to pt and encouraged intake of it.  It is orange flavor but had to reassure pt that it is only flavoring and should not cause heartburn like he experiences with orange juice.   Coordination of nutrition care: Discussed with Brianne Man, NP      Christian Rush, [address] [place] [address], [address] [place] [address]

## 2017-02-01 NOTE — INTERDISCIPLINARY ROUNDS
Interdisciplinary team rounds were held 2/1/2017 with the following team members:Care Management, Nursing, Pastoral Care, Pharmacy and Physician. Plan of care discussed. See clinical pathway and/or care plan for interventions and desired outcomes.

## 2017-02-01 NOTE — PROGRESS NOTES
Abg attempted x 3, unsucessfully at 1230. Patient somewhat confused and uncooperative. 032 saturation is 90-91% on 6L hfnc. He is gone to Nuclear Medicine at this time.

## 2017-02-01 NOTE — DIABETES MGMT
Pt seen by RN LINH. S/p right RODOLFO 1/30/17. Pt is drowsy and unable to remember what he ate for breakfast.  Blood glucose ranged 127-187 yesterday with Humalog 6 units given. Lantus and prandial insulin discontinued 2 days ago. FBS elevated - 214 this morning. Sliding scale Humalog orders continued.   Nursing will continue to monitor blood glucose

## 2017-02-01 NOTE — PROGRESS NOTES
Progress Note      Patient noted to be hypoxic now acutely, and tachycardic. He denies shortness of breath. This is new and sudden. Concern for PE will get VQ scan and doppler.  Will continue to monitor closely    Shelia Joseph    3:18 pm VQ scan negative doppler negative, will give dose of lasix and nebulizers see if this helps symptoms    Shelia Joseph MD

## 2017-02-01 NOTE — PROGRESS NOTES
PLAN:  Cont management per Hospitalist  Cont BS and BP control  Nutrition -- needs to eat - encouraged PO intake  Leave dressing intact -- reinforce PRN -- MD to remove  CM -- needs 9th floor but apparently will have a daily co-pay   Consult to Prosthetist   Pain control PRN  Follow labs/lytes  OOB daily -- PT   VTE prophy           ASSESSMENT:  Admit Date: 1/5/2017   2 Days Post-Op  Procedure(s):  RIGHT AMPUTATION KNEE(BKA)    Principal Problem:    Type 2 diabetes mellitus with right diabetic foot infection ([address] (1/5/2017)    Active Problems:    Type 2 diabetes mellitus with hyperglycemia ([address] (7/29/2012)      HTN (hypertension) (7/29/2012)      Chronic systolic congestive heart failure ([address] (12/30/2015)      Overview: EF 35-40% on 2015 Echo      Kidney disease, chronic, stage III (moderate, EGFR 30-59 ml/min) (8/11/2016)      Acute renal failure with tubular necrosis ([address] (1/6/2017)      Sepsis due to cellulitis ([address] (1/9/2017)      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 Reports pain more controlled. No N/V. Denies CP, SOB. UOP marginal. AF, VSS. Reports he ate some shrimp last night. OBJECTIVE:  Constitutional: Alert oriented cooperative patient in no acute distress; appears stated age   Visit Vitals    /73 (BP 1 Location: Right arm, BP Patient Position: At rest)    Pulse (!) 106    Temp 98 °F (36.7 °C)    Resp 18    Ht 6' 1\" (1.854 m)    Wt 113.4 kg (250 lb)    SpO2 (!) 89%    BMI 32.98 kg/m2     Eyes: Sclera are clear. PERRLA  ENMT: No obvious neck masses, no ear or lip lesions, R IJ tunneled cath c/d/i  CV: RRR, S1S2  Resp: No JVD. Breathing is non-labored. No wheezing. On RA  GI: Soft, non-tender, non-distended, BS active   Musculoskeletal: Normal function. R thumb amputation. R BKA - dressing c/d/i - no drainage. Appropriately TTP. No crepitus. Warm and dry.  ACE wrap intact  Neuro: Oriented, sensation intact  Psychiatric: Calm and cooperative       Patient Vitals for the past 24 hrs:   BP Temp Pulse Resp SpO2   02/01/17 0645 139/73 98 °F (36.7 °C) (!) 106 18 (!) 89 %   02/01/17 0507 130/80 98.6 °F (37 °C) 97 18 92 %   02/01/17 0119 129/75 98.4 °F (36.9 °C) 99 18 90 %   01/31/17 2105 139/80 99.6 °F (37.6 °C) 96 18 91 %   01/31/17 1531 150/87 99.8 °F (37.7 °C) (!) 104 15 95 %   01/31/17 1149 146/78 99.9 °F (37.7 °C) 100 20 91 %     Labs:    Recent Labs      02/01/17   0632  01/31/17   1059   01/30/17   0430   WBC   --   9.7   < >   --    HGB   --   8.8*   < >   --    PLT   --   297   < >   --    NA  140  140   < >  139   K  4.5  4.4   < >  3.8   CL  107  106   < >  105   CO2  23  26   < >  23   BUN  21  18   < >  19   CREA  2.23*  2.13*   < >  2.25*   GLU  214*  168*   < >  164*   TBILI   --    --    --   0.4   CBIL   --    --    --   0.1   SGOT   --    --    --   25   ALT   --    --    --   12   AP   --    --    --   55    < > = values in this interval not displayed. MISHA Chowdhury      I have seen and examined the patient with the Nurse Practitioner and agree with the above assessment and plan. Miles Garcia.  Marry Sanchez MD

## 2017-02-01 NOTE — PROGRESS NOTES
PT NOTE:    Pt leaving floor to have test run to R/O PE and DVT. Will await results and check back at later time.     Casey Brochure, PTA

## 2017-02-01 NOTE — PROGRESS NOTES
Hospitalist Progress Note    2017  Admit Date: 2017  5:07 PM   NAME: Brent Ibarra   :  1945   MRN:  [id number]   Attending: Fahad Rodriguez DO  PCP:  None      Admitted for:PAD, gangrene right foot s/p BKA    SUBJECTIVE:   Patient this morning very drowsy, states cant stay awake, wants to eat breakfast. No nausea no vomiting, no shortness of breath    Hospital Course  Pt is a 74 yo male who presented with progressive pain and poor healing of right foot wound over 1 month, admitted with infected DM foot. PMH includes DM (A1C 9.2 2016), CHF, CKD, current smoker. X Ray of right foot had low suspicion for osteomyelitis but does reveal soft tissue lucencies, possible gas producing infection. Surgery and Vascular were consulted. He had bedside debridement on  and surgical I&D/5th toe amputation . He was started on Vanc/Zosyn in ER. ID has seen - changed atbx  to IV Rocephin/PO Flagyl. 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 Will need 6 weeks antbx per ID recs. Also seen by vascular surgery due to abnormal right MAGGIE and poor postoperative wound healing, day 2 post BKA.      Review of Systems negative with exception of pertinent positives noted above  Past medical history unchanged from H&P    PHYSICAL EXAM     Visit Vitals    /73 (BP 1 Location: Right arm, BP Patient Position: At rest)    Pulse (!) 111    Temp 99.5 °F (37.5 °C)    Resp 18    Ht 6' 1\" (1.854 m)    Wt 113.4 kg (250 lb)    SpO2 (!) 84%  Comment: placed on o2     BMI 32.98 kg/m2      Temp (24hrs), Av.1 °F (37.3 °C), Min:98 °F (36.7 °C), Max:99.9 °F (37.7 °C)    Oxygen Therapy  O2 Sat (%): (!) 84 % (placed on o2 ) (17 1059)  Pulse via Oximetry: 92 beats per minute (17 0507)  O2 Device: Room air (17 1024)  O2 Flow Rate (L/min): 2 l/min (17 1440)    Intake/Output Summary (Last 24 hours) at 17 1130  Last data filed at 17 1845   Gross per 24 hour   Intake 240 ml   Output              120 ml   Net              120 ml        General: No acute distress  mucous membranes pink and moist acyanotic anicteric  Neck:  Supple full range of motion  Lungs:  Air entry equal bilaterally, no crepitations rales rhonchi   Heart:  Regular rate and rhythm,  No murmur, rub, or gallop  Abdomen: Soft, Non distended, Non tender, no rebound guarding Positive bowel sounds  Extremities: Right BKA, with   Neurologic:  No focal deficits  Musculoskeletal: no   Joint swelling tenderness erythema  Skin:  No erythema, rashes noted          LAB  Recent Labs      02/01/17   1100  02/01/17   0648  01/31/17   2025  01/31/17   1719  01/31/17   1218   GLUCPOC  180*  214*  177*  187*  170*      Recent Labs      01/31/17   1059   WBC  9.7   HGB  8.8*   HCT  27.2*   PLT  297     Recent Labs      02/01/17   0632   01/30/17   0430   NA  140   < >  139   K  4.5   < >  3.8   CL  107   < >  105   CO2  23   < >  23   GLU  214*   < >  164*   BUN  21   < >  19   CREA  2.23*   < >  2.25*   MG  2.1   --   1.8   CA  7.4*   < >  7.5*   PHOS  2.8   --   2.2*   ALB   --    --   1.4*   TBILI   --    --   0.4   ALT   --    --   12   SGOT   --    --   25    < > = values in this interval not displayed. EKG and imaging reviewed personally by me  No results found. Results for orders placed or performed during the hospital encounter of 01/05/17   EKG, 12 LEAD, INITIAL   Result Value Ref Range    Systolic BP  mmHg    Diastolic BP  mmHg    Ventricular Rate 98 BPM    Atrial Rate 98 BPM    P-R Interval 166 ms    QRS Duration 74 ms    Q-T Interval 352 ms    QTC Calculation (Bezet) 449 ms    Calculated P Axis 62 degrees    Calculated R Axis 46 degrees    Calculated T Axis -74 degrees    Diagnosis       !! AGE AND GENDER SPECIFIC ECG ANALYSIS !!   Sinus rhythm with occasional Premature ventricular complexes  ST & T wave abnormality, consider inferolateral ischemia  Abnormal ECG  Confirmed by ST LYNNETTE JOSE MD (), JOSE BASHIR ([id number]) on 1/5/2017 7:42:32 PM       XR Results (most recent):    Results from [place] encounter on 01/05/17   XR CHEST PORT   Narrative CHEST X-RAY, one view. HISTORY:  Fever  and infection. TECHNIQUE:  AP upright portable view. COMPARISON: 5 January 2017    FINDINGS:   The lungs are clear. The heart size is normal. The costophrenic  angles are sharp. The pulmonary vasculature is unremarkable. Included portion of  the upper abdomen is unremarkable. Right-sided PICC line tip projected near the  caval atrial junction. Impression IMPRESSION:  Negative for acute change. 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 ([address] 01/17/2017    Sepsis due to cellulitis ([address] 01/09/2017    Acute renal failure with tubular necrosis ([address] 01/06/2017    Type 2 diabetes mellitus with right diabetic foot infection ([address] 01/05/2017    Kidney disease, chronic, stage III (moderate, EGFR 30-59 ml/min) 08/11/2016    Chronic systolic congestive heart failure ([address] 12/30/2015     EF 35-40% on 2015 Echo      Type 2 diabetes mellitus with hyperglycemia ([address] 07/29/2012    HTN (hypertension) 07/29/2012       ASSESSMENT  AND PLAN      · Gangrene right foot - day 2 post op - continue management as per surgery  · Sepsis - fever resolved, ID recommend stop antibiotics after today. · CKD - stable  · DM -  Can restart lantus - patient intake variable    DVT Prophylaxis: LMWH  dispo rehab tomorrow?     Signed By: Mami Jorgensen MD     February 1, 2017

## 2017-02-01 NOTE — PROGRESS NOTES
Re-referred to [facility]. They are looking at new clinicals and the goal would be for dc in the am. Patient will see [facility]. He will need frequent follow up with surgeon. Spoke to the rehab re: aggressive therapy that will center around obtaining and becoming functional with prosthesis. This is not a casde of training patient in transfers to . [facility] will  Be able to follow in the rehab. Surgery wants to see wound in the am.   Arie Blount.  Lele De La Garza

## 2017-02-02 VITALS
TEMPERATURE: 98.7 F | HEIGHT: 73 IN | DIASTOLIC BLOOD PRESSURE: 75 MMHG | SYSTOLIC BLOOD PRESSURE: 135 MMHG | RESPIRATION RATE: 15 BRPM | OXYGEN SATURATION: 95 % | WEIGHT: 250 LBS | BODY MASS INDEX: 33.13 KG/M2 | HEART RATE: 101 BPM

## 2017-02-02 LAB
ANION GAP BLD CALC-SCNC: 9 MMOL/L (ref 7–16)
BUN SERPL-MCNC: 23 MG/DL (ref 8–23)
CALCIUM SERPL-MCNC: 7.6 MG/DL (ref 8.3–10.4)
CHLORIDE SERPL-SCNC: 110 MMOL/L (ref 98–107)
CO2 SERPL-SCNC: 23 MMOL/L (ref 21–32)
CREAT SERPL-MCNC: 2.42 MG/DL (ref 0.8–1.5)
GLUCOSE BLD STRIP.AUTO-MCNC: 167 MG/DL (ref 65–100)
GLUCOSE BLD STRIP.AUTO-MCNC: 211 MG/DL (ref 65–100)
GLUCOSE BLD STRIP.AUTO-MCNC: 222 MG/DL (ref 65–100)
GLUCOSE SERPL-MCNC: 202 MG/DL (ref 65–100)
POTASSIUM SERPL-SCNC: 5 MMOL/L (ref 3.5–5.1)
SODIUM SERPL-SCNC: 142 MMOL/L (ref 136–145)

## 2017-02-02 PROCEDURE — 77010033678 HC OXYGEN DAILY

## 2017-02-02 PROCEDURE — 74011000250 HC RX REV CODE- 250: Performed by: NURSE PRACTITIONER

## 2017-02-02 PROCEDURE — 82962 GLUCOSE BLOOD TEST: CPT

## 2017-02-02 PROCEDURE — 74011636637 HC RX REV CODE- 636/637: Performed by: INTERNAL MEDICINE

## 2017-02-02 PROCEDURE — 74011250637 HC RX REV CODE- 250/637: Performed by: INTERNAL MEDICINE

## 2017-02-02 PROCEDURE — 80048 BASIC METABOLIC PNL TOTAL CA: CPT | Performed by: SURGERY

## 2017-02-02 PROCEDURE — 74011250636 HC RX REV CODE- 250/636: Performed by: SURGERY

## 2017-02-02 PROCEDURE — 74011250636 HC RX REV CODE- 250/636: Performed by: INTERNAL MEDICINE

## 2017-02-02 PROCEDURE — 74011250636 HC RX REV CODE- 250/636: Performed by: HOSPITALIST

## 2017-02-02 PROCEDURE — 74011250637 HC RX REV CODE- 250/637: Performed by: NURSE PRACTITIONER

## 2017-02-02 PROCEDURE — 74011250637 HC RX REV CODE- 250/637: Performed by: SURGERY

## 2017-02-02 PROCEDURE — 74011250637 HC RX REV CODE- 250/637: Performed by: HOSPITALIST

## 2017-02-02 RX ORDER — LANOLIN ALCOHOL/MO/W.PET/CERES
400 CREAM (GRAM) TOPICAL 2 TIMES DAILY
Qty: 30 TAB | Refills: 0 | Status: SHIPPED
Start: 2017-02-02 | End: 2017-10-05

## 2017-02-02 RX ORDER — ISOSORBIDE DINITRATE 10 MG/1
10 TABLET ORAL 3 TIMES DAILY
Qty: 30 TAB | Refills: 0 | Status: SHIPPED
Start: 2017-02-02 | End: 2017-10-05

## 2017-02-02 RX ORDER — OXYCODONE HYDROCHLORIDE 20 MG/1
20 TABLET ORAL
Qty: 10 TAB | Refills: 0 | Status: SHIPPED | OUTPATIENT
Start: 2017-02-02 | End: 2017-10-05

## 2017-02-02 RX ORDER — MAG HYDROX/ALUMINUM HYD/SIMETH 200-200-20
30 SUSPENSION, ORAL (FINAL DOSE FORM) ORAL
Qty: 150 ML | Refills: 0 | Status: SHIPPED
Start: 2017-02-02 | End: 2017-10-05

## 2017-02-02 RX ORDER — METOCLOPRAMIDE 10 MG/1
10 TABLET ORAL
Qty: 40 TAB | Refills: 0 | Status: SHIPPED
Start: 2017-02-02 | End: 2017-02-12

## 2017-02-02 RX ORDER — TRAMADOL HYDROCHLORIDE 50 MG/1
100 TABLET ORAL
Status: DISCONTINUED | OUTPATIENT
Start: 2017-02-02 | End: 2017-02-02 | Stop reason: HOSPADM

## 2017-02-02 RX ORDER — LIDOCAINE HYDROCHLORIDE 10 MG/ML
10 INJECTION INFILTRATION; PERINEURAL ONCE
Status: COMPLETED | OUTPATIENT
Start: 2017-02-02 | End: 2017-02-02

## 2017-02-02 RX ORDER — SODIUM CHLORIDE 9 MG/ML
100 INJECTION, SOLUTION INTRAVENOUS CONTINUOUS
Status: DISCONTINUED | OUTPATIENT
Start: 2017-02-02 | End: 2017-02-02 | Stop reason: HOSPADM

## 2017-02-02 RX ORDER — POTASSIUM CHLORIDE 20 MEQ/1
40 TABLET, EXTENDED RELEASE ORAL DAILY
Qty: 10 TAB | Refills: 0 | Status: SHIPPED
Start: 2017-02-02 | End: 2017-04-07

## 2017-02-02 RX ORDER — PANTOPRAZOLE SODIUM 40 MG/1
40 TABLET, DELAYED RELEASE ORAL
Qty: 20 TAB | Refills: 0 | Status: SHIPPED
Start: 2017-02-02 | End: 2017-10-05

## 2017-02-02 RX ORDER — TRAMADOL HYDROCHLORIDE 50 MG/1
100 TABLET ORAL
Qty: 10 TAB | Refills: 0 | Status: SHIPPED | OUTPATIENT
Start: 2017-02-02 | End: 2017-02-21

## 2017-02-02 RX ADMIN — TRAMADOL HYDROCHLORIDE 100 MG: 50 TABLET, FILM COATED ORAL at 08:54

## 2017-02-02 RX ADMIN — ATORVASTATIN CALCIUM 80 MG: 40 TABLET, FILM COATED ORAL at 08:54

## 2017-02-02 RX ADMIN — INSULIN LISPRO 2 UNITS: 100 INJECTION, SOLUTION INTRAVENOUS; SUBCUTANEOUS at 17:27

## 2017-02-02 RX ADMIN — PANTOPRAZOLE SODIUM 40 MG: 40 TABLET, DELAYED RELEASE ORAL at 17:23

## 2017-02-02 RX ADMIN — Medication 400 MG: at 17:25

## 2017-02-02 RX ADMIN — METOCLOPRAMIDE 10 MG: 10 TABLET ORAL at 08:55

## 2017-02-02 RX ADMIN — HYDRALAZINE HYDROCHLORIDE 25 MG: 25 TABLET ORAL at 17:25

## 2017-02-02 RX ADMIN — OXYCODONE HYDROCHLORIDE 20 MG: 5 TABLET ORAL at 12:01

## 2017-02-02 RX ADMIN — LIDOCAINE HYDROCHLORIDE 1 ML: 10 INJECTION, SOLUTION INFILTRATION; PERINEURAL at 15:20

## 2017-02-02 RX ADMIN — PANTOPRAZOLE SODIUM 40 MG: 40 TABLET, DELAYED RELEASE ORAL at 05:20

## 2017-02-02 RX ADMIN — ENOXAPARIN SODIUM 40 MG: 40 INJECTION SUBCUTANEOUS at 09:00

## 2017-02-02 RX ADMIN — METOPROLOL SUCCINATE 100 MG: 100 TABLET, EXTENDED RELEASE ORAL at 08:54

## 2017-02-02 RX ADMIN — INSULIN LISPRO 4 UNITS: 100 INJECTION, SOLUTION INTRAVENOUS; SUBCUTANEOUS at 12:05

## 2017-02-02 RX ADMIN — INSULIN LISPRO 4 UNITS: 100 INJECTION, SOLUTION INTRAVENOUS; SUBCUTANEOUS at 08:56

## 2017-02-02 RX ADMIN — POTASSIUM CHLORIDE 40 MEQ: 20 TABLET, EXTENDED RELEASE ORAL at 08:54

## 2017-02-02 RX ADMIN — HYDRALAZINE HYDROCHLORIDE 25 MG: 25 TABLET ORAL at 08:54

## 2017-02-02 RX ADMIN — B-COMPLEX W/ C & FOLIC ACID TAB 1 TABLET: TAB at 08:54

## 2017-02-02 RX ADMIN — ISOSORBIDE DINITRATE 10 MG: 10 TABLET ORAL at 08:54

## 2017-02-02 RX ADMIN — METOCLOPRAMIDE 10 MG: 10 TABLET ORAL at 17:25

## 2017-02-02 RX ADMIN — SODIUM CHLORIDE 100 ML/HR: 900 INJECTION, SOLUTION INTRAVENOUS at 13:00

## 2017-02-02 RX ADMIN — Medication 10 ML: at 05:20

## 2017-02-02 RX ADMIN — ISOSORBIDE DINITRATE 10 MG: 10 TABLET ORAL at 17:23

## 2017-02-02 RX ADMIN — Medication 10 ML: at 13:46

## 2017-02-02 RX ADMIN — OXYCODONE HYDROCHLORIDE 10 MG: 5 TABLET ORAL at 05:13

## 2017-02-02 RX ADMIN — SODIUM CHLORIDE 75 ML/HR: 900 INJECTION, SOLUTION INTRAVENOUS at 03:35

## 2017-02-02 RX ADMIN — Medication 400 MG: at 08:54

## 2017-02-02 RX ADMIN — AMLODIPINE BESYLATE 5 MG: 5 TABLET ORAL at 08:54

## 2017-02-02 RX ADMIN — METOCLOPRAMIDE 10 MG: 10 TABLET ORAL at 11:33

## 2017-02-02 NOTE — PROGRESS NOTES
PLAN:  Cont medical management per Hospitalist   UOP poor -- Cr up to 2.42 -- defer management to [address] and BP control  Nutrition -- needs to eat - encouraged PO intake  Leave dressing intact -- reinforce PRN -- MD to remove in AM  CM -- needs 9th floor but apparently will have a daily co-pay, working on STR options  Pain control PRN  Follow labs/lytes  OOB daily -- PT   VTE prophy        ASSESSMENT:  Admit Date: 1/5/2017   3 Days Post-Op  Procedure(s):  RIGHT AMPUTATION KNEE(BKA)    Principal Problem:    Type 2 diabetes mellitus with right diabetic foot infection ([facility] (1/5/2017)    Active Problems:    Type 2 diabetes mellitus with hyperglycemia ([address] (7/29/2012)      HTN (hypertension) (7/29/2012)      Chronic systolic congestive heart failure ([address] (12/30/2015)      Overview: EF 35-40% on 2015 Echo      Kidney disease, chronic, stage III (moderate, EGFR 30-59 ml/min) (8/11/2016)      Acute renal failure with tubular necrosis ([address] (1/6/2017)      Sepsis due to cellulitis ([address] (1/9/2017)      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 Reports pain controlled. No N/V. Denies CP, SOB. UOP only 120cc yesterday? ? Had some hypoxia and confusion yesterday. V/Q was negative for PE. Tmax yesterday 100.1, tachy, BP stable. OBJECTIVE:  Constitutional: Alert oriented cooperative patient in no acute distress; appears stated age   Visit Vitals    /78    Pulse (!) 115    Temp 99 °F (37.2 °C)    Resp 20    Ht 6' 1\" (1.854 m)    Wt 113.4 kg (250 lb)    SpO2 93%    BMI 32.98 kg/m2     Eyes: Sclera are clear. PERRLA  ENMT: No obvious neck masses, no ear or lip lesions, R IJ tunneled cath c/d/i  CV: RRR, S1S2  Resp: No JVD. Breathing is non-labored. No wheezing. On O2  GI: Soft, non-tender, non-distended, BS active   Musculoskeletal: Normal function. R thumb amputation. R BKA - dressing c/d/i - no drainage.  Appropriately TTP. No crepitus. Warm and dry. ACE wrap intact  Neuro: Oriented, sensation intact  Psychiatric: Calm and cooperative at present       Patient Vitals for the past 24 hrs:   BP Temp Pulse Resp SpO2   02/02/17 0723 146/78 99 °F (37.2 °C) (!) 115 20 93 %   02/02/17 0500 149/84 98.6 °F (37 °C) (!) 115 18 90 %   02/02/17 0038 143/64 99 °F (37.2 °C) 84 18 92 %   02/01/17 2050 147/54 99.3 °F (37.4 °C) (!) 104 18 96 %   02/01/17 1615 144/78 100.1 °F (37.8 °C) (!) 104 21 92 %   02/01/17 1548 141/79 - (!) 104 - -   02/01/17 1211 137/69 - (!) 107 - -   02/01/17 1144 - - - - 90 %   02/01/17 1059 116/73 99.5 °F (37.5 °C) (!) 111 18 (!) 84 %     Labs:    Recent Labs      02/02/17   0331   01/31/17   1059   WBC   --    --   9.7   HGB   --    --   8.8*   PLT   --    --   297   NA  142   < >  140   K  5.0   < >  4.4   CL  110*   < >  106   CO2  23   < >  26   BUN  23   < >  18   CREA  2.42*   < >  2.13*   GLU  202*   < >  168*    < > = values in this interval not displayed. MISHA Rojas    I have seen and examined the patient with the Nurse Practitioner and agree with the above assessment and plan. Has been approved for IP rehab at outside facility and will leave today. Dressing taken down and residual leg and suture line look excellent. Redressed. F/U and dressing orders conveyed. Lester Floyd.  Flaquito Stephen MD

## 2017-02-02 NOTE — PROGRESS NOTES
Problem: Nutrition Deficit  Goal: *Optimize nutritional status  Nutrition F/U   Assessment:   Intake/Comparative Standards:  Calorie count result for 3 meals for 2-1: 430 kcal (22% of kcal needs) and 21grams protein (27% of protein needs. Pt did not consume any L-emental arginine proteins supplement. No intake of Glucerna recorded.   Intervention:   F/U by nutrition services at rehab facility  Kaushal Will, [address], [address] [place] [address]

## 2017-02-02 NOTE — PROGRESS NOTES
Patient upset, reports being in pain after amputation; angry about condition/ailment; talked about killing people; wanting to \"take some people out\". Spiritual support provided. GAMALIEL Maher.

## 2017-02-02 NOTE — DISCHARGE SUMMARY
Patient ID:  Brent Ibarra  [id number]  76 y.o.  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 ([address] (1/5/2017)    Active Problems:    Type 2 diabetes mellitus with hyperglycemia ([address] (7/29/2012)      HTN (hypertension) (7/29/2012)      Chronic systolic congestive heart failure ([address] (12/30/2015)      Overview: EF 35-40% on 2015 Echo      Kidney disease, chronic, stage III (moderate, EGFR 30-59 ml/min) (8/11/2016)      Acute renal failure with tubular necrosis ([address] (1/6/2017)      Sepsis due to cellulitis ([address] (1/9/2017)      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 In summary, the patient is 72 yo male who presented with progressive pain and poor healing of right foot wound over 1 month, admitted with infected DM foot. PMH includes DM (A1C 9.2 11/2016), CHF, CKD, current smoker. X Ray of right foot had low suspicion for osteomyelitis but does reveal soft tissue lucencies, possible gas producing infection. Surgery and Vascular were consulted. He had bedside debridement on 1/6 and surgical I&D/5th toe amputation 1/12. He was started on Vanc/Zosyn in ER. ID has seen - changed atbx 1/13 to IV Rocephin/PO Flagyl. 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 After 21 days patient had no improvement in wound and underwent a BKA right leg 1/30.   He has a lot of pain, post op, and due to very prolonged hospitalization and now RODOLFO is very deconditioned. He will need aggressive PT, and pain medications. He will be discharged to Rehab today, with close follow up with surgery for monitoring of BKA stump. He was thought to have some hypoxia however we found that this is due to likely PAD, and checking on his ear he is saturating fine. Significant Diagnostic Studies:       Labs: Results:       Chemistry Recent Labs      02/02/17   0331  02/01/17   0632  01/31/17   1059   GLU  202*  214*  168*   NA  142  140  140   K  5.0  4.5  4.4   CL  110*  107  106   CO2  23  23  26   BUN  23  21  18   CREA  2.42*  2.23*  2.13*   CA  7.6*  7.4*  7.3*   AGAP  9  10  8      CBC w/Diff Recent Labs      01/31/17   1059  01/31/17   0615   WBC  9.7  9.0   RBC  3.15*  3.31*   HGB  8.8*  9.2*   HCT  27.2*  28.5*   PLT  297  304      Cardiac Enzymes No results for input(s): CPK, CKND1, FIDEL in the last 72 hours. No lab exists for component: CKRMB, TROIP   Coagulation No results for input(s): PTP, INR, APTT in the last 72 hours. No lab exists for component: INREXT    Lipid Panel No results found for: CHOL, CHOLPOCT, CHOLX, CHLST, CHOLV, [id number], HDL, LDL, NLDLCT, DLDL, LDLC, DLDLP, 144293, VLDLC, VLDL, TGL, TGLX, TRIGL, [id number], TRIGP, TGLPOCT, [id number], CHHD, CHHDX   BNP No results for input(s): BNPP in the last 72 hours. Liver Enzymes No results for input(s): TP, ALB, TBIL, AP, SGOT, GPT in the last 72 hours.     No lab exists for component: DBIL   Thyroid Studies No results found for: T4, T3U, TSH, TSHEXT       Results for orders placed or performed during the hospital encounter of 01/05/17   EKG, 12 LEAD, INITIAL   Result Value Ref Range    Systolic BP  mmHg    Diastolic BP  mmHg    Ventricular Rate 98 BPM    Atrial Rate 98 BPM    P-R Interval 166 ms    QRS Duration 74 ms    Q-T Interval 352 ms    QTC Calculation (Bezet) 449 ms    Calculated P Axis 62 degrees    Calculated R Axis 46 degrees    Calculated T Axis -74 degrees    Diagnosis       !! AGE AND GENDER SPECIFIC ECG ANALYSIS !! Sinus rhythm with occasional Premature ventricular complexes  ST & T wave abnormality, consider inferolateral ischemia  Abnormal ECG  Confirmed by ST LYNNETTE JOSE MD (), JOSE BASHIR ([id number]) on 1/5/2017 7:42:32 PM       CT Results (most recent):    Results from [place] encounter on 12/26/15   CT CHEST W CONT   Narrative Exam: CT angiography of the chest with coronal reformats following  administration of 100 cc Isovue 370 IV contrast.    Indication: Positive d-dimer, midsternal acute chest pain and shortness of  breath    Comparison: None available    Findings:   No evidence of pulmonary artery filling defects. Suboptimal valuation of the  distal segmental and subsegmental pulmonary arteries due to respiratory motion. No evidence of acute thoracic aortic injury. Small to moderate-sized bilateral pleural effusions, left greater than right,  measuring up to 5 cm on the left and 3.5 cm on the right. No focal  consolidations. There is an ill-defined non-solid-appearing density in the right  upper lobe measuring approximately 16 x 12 mm (image 37). Mild bilateral lung  base atelectasis. No endobronchial abnormalities. No abnormal mediastinal or  hilar lymphadenopathy. No pericardial effusion. Limited evaluation of the visualized intraabdominal contents demonstrates no  acute abnormalities. Bilateral adreniform thickening without focal nodularity. No suspicious osseous abnormality. Impression Impression:    No evidence of pulmonary embolus. Small to moderate sized bilateral pleural effusions with adjacent atelectasis. Ill-defined focal density in the right upper lobe is nonspecific and could  represent focal pneumonitis, recommend followup CT thorax in 3 months to  document stability/resolution.         VAS/US Results (most recent):    Results from Hospital Encounter encounter on 01/05/17   DUPLEX LOWER EXT VENOUS BILAT   Narrative Bilateral lower extremity venous Doppler evaluation 02/01/2017    HISTORY: Moderate chronic pain of lower extremities. Prior below-the-knee  amputation on the right 01/30/2017. .    Grayscale, color-flow and Doppler evaluation the major veins of the lower  extremities was performed. Comparison: None    FINDINGS: There is normal compression and augmentation of the common femoral,  superficial femoral and popliteal veins bilaterally. No grayscale or color-flow  findings within the vessels or within the distal greater saphenous or profunda  femoral veins were noted to suggest deep venous thrombosis. Interrogated  portions of peroneal and posterior tibial veins were also unremarkable on the  left. No popliteal cyst was identified on either side. There is edema within the  subcutaneous tissues of the lower extremities. Impression IMPRESSION: Negative evaluation for deep venous thrombosis within either lower  extremity. XR Results (most recent):    Results from Hospital Encounter encounter on 01/05/17   XR CHEST PORT   Narrative Portable chest:     History: Acute moderate shortness of breath. Comparison: 01/31/2017    Findings: A single view of the chest was obtained at 1645 hours. A right  internal jugular catheter is unchanged. The patient is rotated slightly towards  the left. The cardiac and mediastinal silhouette are normal in size and configuration. The  lungs and pleural spaces are clear. The pulmonary vascularity is within normal  limits.          Impression Impression: Unremarkable portable chest radiograph                  Discharge Exam:  Visit Vitals    /78    Pulse (!) 115    Temp 99 °F (37.2 °C)    Resp 20    Ht 6' 1\" (1.854 m)    Wt 113.4 kg (250 lb)    SpO2 93%    BMI 32.98 kg/m2     General appearance: alert, cooperative, no distress, appears stated age  Lungs: clear to auscultation bilaterally  Heart: regular rate and rhythm, S1, S2 normal, no murmur, click, rub or gallop  Abdomen: soft, non-tender. Bowel sounds normal. No masses,  no organomegaly  Extremities: right BKA  Neurologic: Grossly normal, intermittently confused, likely due to pain medications    Disposition: SNF  Discharge Condition: stable  Patient Instructions:   Current Discharge Medication List      START taking these medications    Details   alum-mag hydroxide-simeth (MYLANTA) 200-200-20 mg/5 mL susp Take 30 mL by mouth every four (4) hours as needed. Qty: 150 mL, Refills: 0      isosorbide dinitrate (ISORDIL) 10 mg tablet Take 1 Tab by mouth three (3) times daily. Qty: 30 Tab, Refills: 0      magnesium oxide (MAG-OX) 400 mg tablet Take 1 Tab by mouth two (2) times a day. Qty: 30 Tab, Refills: 0      metoclopramide HCl (REGLAN) 10 mg tablet Take 1 Tab by mouth Before breakfast, lunch, dinner and at bedtime for 10 days. Qty: 40 Tab, Refills: 0      multivitamin, stress formula (STRESS TAB) tablet Take 1 Tab by mouth daily for 10 days. Qty: 10 Tab, Refills: 0      oxyCODONE IR (ROXICODONE) 20 mg immediate release tablet Take 1 Tab by mouth every four (4) hours as needed. Max Daily Amount: 120 mg.  Qty: 10 Tab, Refills: 0      pantoprazole (PROTONIX) 40 mg tablet Take 1 Tab by mouth Before breakfast and dinner. Indications: HEARTBURN  Qty: 20 Tab, Refills: 0      potassium chloride (K-DUR, KLOR-CON) 20 mEq tablet Take 2 Tabs by mouth daily. Qty: 10 Tab, Refills: 0      traMADol (ULTRAM) 50 mg tablet Take 2 Tabs by mouth every six (6) hours as needed. Max Daily Amount: 400 mg. Qty: 10 Tab, Refills: 0         CONTINUE these medications which have NOT CHANGED    Details   ramipril (ALTACE) 10 mg capsule Take 1 Cap by mouth daily. Qty: 30 Cap, Refills: 11    Associated Diagnoses: Cardiomyopathy ([facility]      atorvastatin (LIPITOR) 80 mg tablet Take 1 Tab by mouth daily. Qty: 30 Tab, Refills: 6      amLODIPine (NORVASC) 5 mg tablet Take 1 Tab by mouth daily.   Qty: 30 Tab, Refills: 11 Associated Diagnoses: Cardiomyopathy ([address]      metoprolol succinate (TOPROL-XL) 100 mg tablet Take 1 Tab by mouth daily. Qty: 30 Tab, Refills: 11    Associated Diagnoses: Cardiomyopathy ([address]      spironolactone (ALDACTONE) 25 mg tablet Take 25 mg by mouth daily. Refills: 1      glipiZIDE SR (GLUCOTROL) 2.5 mg CR tablet Take  by mouth daily. bumetanide (BUMEX) 1 mg tablet Take 1 Tab by mouth daily. Qty: 30 Tab, Refills: 3      aspirin 81 mg chewable tablet Take 1 Tab by mouth daily (after breakfast).   Qty: 30 Tab, Refills: 11             Activity: Activity as tolerated and PT/OT Eval and Treat  Diet: Diabetic Diet  Wound Care: As directed    Follow-up  ·   Dr Gillermina Barthel 2 days  · PCP follow up glucose   Time spent to discharge patient 40 minutes  Signed:  Ceasar Kyle MD  2/2/2017  8:06 AM

## 2017-02-03 LAB
ABO + RH BLD: NORMAL
BLD PROD TYP BPU: NORMAL
BLOOD GROUP ANTIBODIES SERPL: NORMAL
BPU ID: NORMAL
CROSSMATCH RESULT,%XM: NORMAL
SPECIMEN EXP DATE BLD: NORMAL
STATUS OF UNIT,%ST: NORMAL
UNIT DIVISION, %UDIV: 0

## 2017-02-03 NOTE — OP NOTES
[address], [address] [facility]  [phone]    [facility] REPORT    Name: Arlyn Carrillo     Date of Surgery: 1/20/2017  Med Record Number: [id number]   Age: 73 y.o. Sex: male   Preoperative Diagnosis: Open wound right foot, with PVD, gangrene, necrotizing foot infection and abscess s/p debridement  Postoperative Diagnosis: same with no active abscess  Procedure(s):  RIGHT FOOT DEBRIDEMENT, PREP FOR GRAFTING AND SKIN SUBSTITUTE GRAFT  Surgeon(s) and Role:  * Sanjeev Hale MD - Primary         Surgical Staff:  Circ-1: Gino Magallanes RN  Scrub Tech-1: Philipp [employer]  Scrub Tech-2: Shelbi Anton  Event Time In   Incision Start 1315   Incision Close 1342      Anesthesia: General   Estimated Blood Loss: <20 cc  Specimens: * No specimens in log *   Findings: open wound in Patient with non-reconstructable PVD and Type 2 DM s/p debridement of R foot with removal of 5th toe and metatarsal, necrotic tissue debrided including skin, soft tissue and fascia, no further tendon or bone removal. Transected 5th metatarsal well covered. No tissues that were viable or borderline viable were removed. Excised tissue was frankly necrotic and nonviable. Wound dimensions of 9 (L) x 5 (W) x 7 (D) cm = 108 sq cm. After wound prepped for grafting 6 x 9 cm PurAplyAM placed over entire wound covering exposed area completely. Graft moistened with saline then covered with fenestrated Mepilex transfer and foot dressed with guaze, ABD, Kerlex and light wrap of Coban. Complications: none apparent  Implants:   Implant Name Type Inv. Item Serial No.  Lot No. LRB No. Used Action   MATRIX WND CLLGN 6X9CM W/PHMB -- PURAPLY ANTIMICROBIAL - [id number]    MATRIX WND CLLGN [id number] W/PHMB -- PURAPLY ANTIMICROBIAL    [employer] [id number]. 1.1C Right 1 Implanted             Procedure Description:   The risks, benefits, potential complications, treatment options, and expected outcomes were discussed with the patient pre-operatively. The patient voiced understanding and gave informed consent preoperatively. The patient was taken to the Operating Room, and the [facility] time-out protocol checklist was followed. After the induction of adequate anesthesia, the right foot was undressed and then prepped with Betadine scrub and Betadine solution. He was draped in sterile fashion. The foot wound was open. The sutures covering the transected fifth metatarsal head were intact and there was no evidence of exposed bone. Distal in the wound there was additional nonvital tissue. This was sharply debrided and excised using scalpel, scissors, and monopolar electrosurgical device. Fibrous portion of the fourth metatarsal head was exposed without true exposure of bone. The deep space appeared to be without any abscess. This remained adherent and no pus could be expressed from foot. There were several spots of new full-thickness necrosis on the distal plantar surface. These were excised. No tissues that were viable or borderline viable were removed. Excised tissue was frankly necrotic and nonviable. Wound dimensions of 9 (L) x 5 (W) x 7 (D) cm = 108 sq cm. the tissues appeared viable. The foot was then copiously irrigated with saline. Hemostasis was confirmed. The foot appeared to be adequately prepped for grafting. A 6 x 9 cm piece of PurAplyAM skin substitute graft was placed in the wound covering most of it. The graft was then moistened with saline. The wrap was then secured in place using a piece of Mepilex transfer that was fenestrated to promote drainage. The foot was then further dressed with gauze, ABD, Kerlix, and light wrap of Coban. All sponge, instruments, and needle counts were correct. The patient was taken to recovery in good condition.     Rochelle Raygoza MD, FACS

## 2017-02-05 LAB
BACTERIA SPEC CULT: NORMAL
BACTERIA SPEC CULT: NORMAL
SERVICE CMNT-IMP: NORMAL
SERVICE CMNT-IMP: NORMAL

## 2017-02-08 NOTE — OP NOTES
[address], [address] [facility]  [phone]    [address] REPORT    Name: Maik Lawson     Date of Surgery: 1/30/2017  Med Record Number: [id number]   Age: 73 y.o. Sex: male   Preoperative Diagnosis: infected right leg  Postoperative Diagnosis: infected right leg gangeous foot   Procedure(s):  RIGHT AMPUTATION KNEE(BKA)  Surgeon(s) and Role:  * Melba Schneider MD - Primary         Surgical Staff:  Circ-1: Malina Arias RN  Circ-Relief: Dima Jackson RN  Scrub Tech-1: Deirdre Tabares  Scrub Tech-2: Gary Lee  Scrub Tech-Relief: Jose L Zamudio CNA  Event Time In   Incision Start 1408   Incision Close 1612      Anesthesia: Regional   Estimated Blood Loss: <150 cc  Specimens:   ID Type Source Tests Collected by Time Destination   1 : right below knee amputation Fresh Leg, Right   Melba Schneider MD 1/30/2017 1508 Pathology       Findings: gangrenous foot without evidence of undrained abscess in foot with any extension, edematous tissues but viable and good blood supply at amputation area. Tibia transected at 13 cm with flap edge at 15 cm from tibial tuberosity. No tourniquet used. Complications: none apparent  Implants: * No implants in log *    Procedure Description:   The risks, benefits, potential complications, treatment options, and expected outcomes were discussed with the patient pre-operatively. The patient voiced understanding and gave informed consent preoperatively. The patient was taken to the Operating Room, and the [address] [place] [address] time-out protocol checklist was followed. After the induction of adequate spinal anesthesia, the RLE was prepped and draped in the usual sterile fashion. A tourniquet device was placed on the right thigh outside of the drapes to be used as needed. Preoperative antibiotics were given.   A planned tibial transection was marked at 13 cm distal to the tuberosity and the anterior skin flap incision was marked 2 cm more distal. Skin incisions were drawn as planned to obtain a non dog-eared closure. The incision was made using #15 scalpel and underlying soft tissue was divided using the monopolar electrocautery. The tibia was exposed and muscle was transected to expose it. It was encircled and the 13 cm spot was confirmed and the tibia was transected using the Bionostra handheld power-pro saw. The vascular bundle in the anterior compartment was clamped and divided and suture ligated with 2-0 silk suture. The fibula was exposed and transected at the same level as the tibial transection. The posterior soft tissues were now exposed and the vascular bundles were isolated, clamped, divided and suture ligated with 2-0 silk suture. There was no clot in the deep veins. The skin incisions were refined with a scalpel and the remaining posterior compartment was divided with the amputation knife to create the posterior flap. The specimen was sent to pathology. Veins were clamped and ligated with 2-0 silk. There was good hemostasis and adequate blood supply. The tibia was then beveled at 45 degrees anteriorly and both bones were smoothed with a rasp. The bone bleeding was controlled with monopolar electrocautery. Again the open stump was irrigated and hemostasis confirmed. The proximal transected Achilles tendon was brought under the anterior skin flap and attached to the pretibial fascia with 0 vicryl suture. The fascia and subcutaneous tissue was then approximated with interrupted 2-0 vicryl sutures. The skin was reapproximated with clips after multiple retention sutures of 2-0 nylon were  placed sporadically to help keep the incision closed and better maria elena the skin edges where needed. The BKA wound was covered with xeroform, 4x4s and an Ioban drape. The leg was then wrapped with Kerlix and Ace wraps. The tourniquet was not used. All sponge, instruments, and needle counts were correct.   The patient was taken to recovery in good condition.     Inocente Seip, MD, FACS

## 2017-02-13 PROBLEM — Z89.519 S/P BKA (BELOW KNEE AMPUTATION) UNILATERAL (HCC): Status: ACTIVE | Noted: 2017-02-13

## 2017-03-16 ENCOUNTER — PATIENT OUTREACH (OUTPATIENT)
Dept: CASE MANAGEMENT | Age: 72
End: 2017-03-16

## 2017-03-16 NOTE — PROGRESS NOTES
Care Coordinator spoke with patient's daughter Hank Weiner who stated that patient [facility] Pedro Luis [facility]ains [facility] Ms. Paula Monroy states that patient had discharge date of March 15, 2017 but was not discharged due to patient requiring more Physical Therapy and nursing services. Patient's daughter states discharge date is unknown at this time. This note will not be viewable in [address]

## 2017-03-20 ENCOUNTER — HOME HEALTH ADMISSION (OUTPATIENT)
Dept: HOME HEALTH SERVICES | Facility: HOME HEALTH | Age: 72
End: 2017-03-20
Payer: MEDICARE

## 2017-03-21 ENCOUNTER — HOME CARE VISIT (OUTPATIENT)
Dept: SCHEDULING | Facility: HOME HEALTH | Age: 72
End: 2017-03-21
Payer: MEDICARE

## 2017-03-21 ENCOUNTER — HOSPITAL ENCOUNTER (OUTPATIENT)
Dept: LAB | Age: 72
Discharge: HOME | End: 2017-03-21
Attending: SURGERY
Payer: MEDICARE

## 2017-03-21 LAB
ANION GAP BLD CALC-SCNC: 11 MMOL/L (ref 7–16)
BUN SERPL-MCNC: 42 MG/DL (ref 8–23)
CALCIUM SERPL-MCNC: 8.4 MG/DL (ref 8.3–10.4)
CHLORIDE SERPL-SCNC: 103 MMOL/L (ref 98–107)
CO2 SERPL-SCNC: 21 MMOL/L (ref 21–32)
CREAT SERPL-MCNC: 2.09 MG/DL (ref 0.8–1.5)
GLUCOSE SERPL-MCNC: 225 MG/DL (ref 65–100)
POTASSIUM SERPL-SCNC: 5 MMOL/L (ref 3.5–5.1)
SODIUM SERPL-SCNC: 135 MMOL/L (ref 136–145)

## 2017-03-21 PROCEDURE — 80048 BASIC METABOLIC PNL TOTAL CA: CPT | Performed by: SURGERY

## 2017-03-21 PROCEDURE — 36415 COLL VENOUS BLD VENIPUNCTURE: CPT | Performed by: SURGERY

## 2017-03-21 PROCEDURE — G0299 HHS/HOSPICE OF RN EA 15 MIN: HCPCS

## 2017-03-21 PROCEDURE — 3331090001 HH PPS REVENUE CREDIT

## 2017-03-21 PROCEDURE — 3331090002 HH PPS REVENUE DEBIT

## 2017-03-21 PROCEDURE — 400013 HH SOC

## 2017-03-22 ENCOUNTER — HOME CARE VISIT (OUTPATIENT)
Dept: SCHEDULING | Facility: HOME HEALTH | Age: 72
End: 2017-03-22
Payer: MEDICARE

## 2017-03-22 VITALS
HEART RATE: 96 BPM | SYSTOLIC BLOOD PRESSURE: 154 MMHG | DIASTOLIC BLOOD PRESSURE: 86 MMHG | RESPIRATION RATE: 18 BRPM | TEMPERATURE: 98.8 F

## 2017-03-22 VITALS
HEART RATE: 80 BPM | DIASTOLIC BLOOD PRESSURE: 80 MMHG | SYSTOLIC BLOOD PRESSURE: 124 MMHG | OXYGEN SATURATION: 98 % | RESPIRATION RATE: 16 BRPM | TEMPERATURE: 97.5 F

## 2017-03-22 PROCEDURE — 3331090002 HH PPS REVENUE DEBIT

## 2017-03-22 PROCEDURE — 3331090001 HH PPS REVENUE CREDIT

## 2017-03-22 PROCEDURE — G0151 HHCP-SERV OF PT,EA 15 MIN: HCPCS

## 2017-03-23 PROCEDURE — 3331090001 HH PPS REVENUE CREDIT

## 2017-03-23 PROCEDURE — 3331090002 HH PPS REVENUE DEBIT

## 2017-03-24 ENCOUNTER — HOME CARE VISIT (OUTPATIENT)
Dept: HOME HEALTH SERVICES | Facility: HOME HEALTH | Age: 72
End: 2017-03-24
Payer: MEDICARE

## 2017-03-24 PROCEDURE — 3331090002 HH PPS REVENUE DEBIT

## 2017-03-24 PROCEDURE — 3331090001 HH PPS REVENUE CREDIT

## 2017-03-25 PROCEDURE — 3331090001 HH PPS REVENUE CREDIT

## 2017-03-25 PROCEDURE — 3331090002 HH PPS REVENUE DEBIT

## 2017-03-26 PROCEDURE — 3331090002 HH PPS REVENUE DEBIT

## 2017-03-26 PROCEDURE — 3331090001 HH PPS REVENUE CREDIT

## 2017-03-27 ENCOUNTER — HOME CARE VISIT (OUTPATIENT)
Dept: SCHEDULING | Facility: HOME HEALTH | Age: 72
End: 2017-03-27
Payer: MEDICARE

## 2017-03-27 PROCEDURE — 3331090002 HH PPS REVENUE DEBIT

## 2017-03-27 PROCEDURE — 3331090001 HH PPS REVENUE CREDIT

## 2017-03-27 PROCEDURE — G0152 HHCP-SERV OF OT,EA 15 MIN: HCPCS

## 2017-03-28 VITALS
DIASTOLIC BLOOD PRESSURE: 80 MMHG | TEMPERATURE: 97.5 F | HEART RATE: 122 BPM | OXYGEN SATURATION: 98 % | SYSTOLIC BLOOD PRESSURE: 145 MMHG

## 2017-03-28 PROCEDURE — 3331090002 HH PPS REVENUE DEBIT

## 2017-03-28 PROCEDURE — 3331090001 HH PPS REVENUE CREDIT

## 2017-03-29 ENCOUNTER — HOME CARE VISIT (OUTPATIENT)
Dept: SCHEDULING | Facility: HOME HEALTH | Age: 72
End: 2017-03-29
Payer: MEDICARE

## 2017-03-29 VITALS
HEART RATE: 66 BPM | TEMPERATURE: 98.4 F | DIASTOLIC BLOOD PRESSURE: 78 MMHG | RESPIRATION RATE: 16 BRPM | SYSTOLIC BLOOD PRESSURE: 144 MMHG

## 2017-03-29 PROCEDURE — G0157 HHC PT ASSISTANT EA 15: HCPCS

## 2017-03-29 PROCEDURE — 3331090002 HH PPS REVENUE DEBIT

## 2017-03-29 PROCEDURE — 3331090001 HH PPS REVENUE CREDIT

## 2017-03-30 PROCEDURE — 3331090002 HH PPS REVENUE DEBIT

## 2017-03-30 PROCEDURE — 3331090001 HH PPS REVENUE CREDIT

## 2017-03-31 ENCOUNTER — HOME CARE VISIT (OUTPATIENT)
Dept: SCHEDULING | Facility: HOME HEALTH | Age: 72
End: 2017-03-31
Payer: MEDICARE

## 2017-03-31 VITALS
RESPIRATION RATE: 20 BRPM | DIASTOLIC BLOOD PRESSURE: 90 MMHG | TEMPERATURE: 98.4 F | SYSTOLIC BLOOD PRESSURE: 160 MMHG | HEART RATE: 72 BPM

## 2017-03-31 VITALS
HEART RATE: 72 BPM | TEMPERATURE: 98.4 F | SYSTOLIC BLOOD PRESSURE: 160 MMHG | RESPIRATION RATE: 20 BRPM | DIASTOLIC BLOOD PRESSURE: 90 MMHG

## 2017-03-31 PROCEDURE — 3331090002 HH PPS REVENUE DEBIT

## 2017-03-31 PROCEDURE — G0299 HHS/HOSPICE OF RN EA 15 MIN: HCPCS

## 2017-03-31 PROCEDURE — G0157 HHC PT ASSISTANT EA 15: HCPCS

## 2017-03-31 PROCEDURE — 3331090001 HH PPS REVENUE CREDIT

## 2017-04-01 PROCEDURE — 3331090002 HH PPS REVENUE DEBIT

## 2017-04-01 PROCEDURE — 3331090001 HH PPS REVENUE CREDIT

## 2017-04-02 PROCEDURE — 3331090002 HH PPS REVENUE DEBIT

## 2017-04-02 PROCEDURE — 3331090001 HH PPS REVENUE CREDIT

## 2017-04-03 ENCOUNTER — HOME CARE VISIT (OUTPATIENT)
Dept: SCHEDULING | Facility: HOME HEALTH | Age: 72
End: 2017-04-03
Payer: MEDICARE

## 2017-04-03 VITALS
SYSTOLIC BLOOD PRESSURE: 110 MMHG | HEART RATE: 92 BPM | TEMPERATURE: 98.4 F | RESPIRATION RATE: 18 BRPM | DIASTOLIC BLOOD PRESSURE: 74 MMHG

## 2017-04-03 VITALS
RESPIRATION RATE: 18 BRPM | OXYGEN SATURATION: 98 % | TEMPERATURE: 99.4 F | HEART RATE: 80 BPM | DIASTOLIC BLOOD PRESSURE: 70 MMHG | SYSTOLIC BLOOD PRESSURE: 128 MMHG

## 2017-04-03 PROCEDURE — G0299 HHS/HOSPICE OF RN EA 15 MIN: HCPCS

## 2017-04-03 PROCEDURE — 3331090001 HH PPS REVENUE CREDIT

## 2017-04-03 PROCEDURE — G0157 HHC PT ASSISTANT EA 15: HCPCS

## 2017-04-03 PROCEDURE — 3331090002 HH PPS REVENUE DEBIT

## 2017-04-04 PROCEDURE — 3331090002 HH PPS REVENUE DEBIT

## 2017-04-04 PROCEDURE — 3331090001 HH PPS REVENUE CREDIT

## 2017-04-05 PROCEDURE — 3331090002 HH PPS REVENUE DEBIT

## 2017-04-05 PROCEDURE — 3331090001 HH PPS REVENUE CREDIT

## 2017-04-06 ENCOUNTER — HOME CARE VISIT (OUTPATIENT)
Dept: HOME HEALTH SERVICES | Facility: HOME HEALTH | Age: 72
End: 2017-04-06
Payer: MEDICARE

## 2017-04-06 PROCEDURE — 3331090001 HH PPS REVENUE CREDIT

## 2017-04-06 PROCEDURE — 3331090002 HH PPS REVENUE DEBIT

## 2017-04-07 PROBLEM — A41.9 SEPSIS DUE TO CELLULITIS (HCC): Status: RESOLVED | Noted: 2017-01-09 | Resolved: 2017-04-07

## 2017-04-07 PROBLEM — I25.10 CORONARY ARTERY DISEASE INVOLVING NATIVE CORONARY ARTERY OF NATIVE HEART WITHOUT ANGINA PECTORIS: Status: ACTIVE | Noted: 2017-04-07

## 2017-04-07 PROBLEM — L03.90 SEPSIS DUE TO CELLULITIS (HCC): Status: RESOLVED | Noted: 2017-01-09 | Resolved: 2017-04-07

## 2017-04-07 PROBLEM — R53.81 DEBILITY: Status: ACTIVE | Noted: 2017-04-07

## 2017-04-07 PROBLEM — N17.0 ACUTE RENAL FAILURE WITH TUBULAR NECROSIS (HCC): Status: RESOLVED | Noted: 2017-01-06 | Resolved: 2017-04-07

## 2017-04-07 PROBLEM — E11.8 TYPE 2 DIABETES MELLITUS WITH COMPLICATION, WITHOUT LONG-TERM CURRENT USE OF INSULIN (HCC): Status: ACTIVE | Noted: 2017-04-07

## 2017-04-07 PROCEDURE — 3331090002 HH PPS REVENUE DEBIT

## 2017-04-07 PROCEDURE — 3331090001 HH PPS REVENUE CREDIT

## 2017-04-08 PROCEDURE — 3331090001 HH PPS REVENUE CREDIT

## 2017-04-08 PROCEDURE — 3331090002 HH PPS REVENUE DEBIT

## 2017-04-09 PROCEDURE — 3331090002 HH PPS REVENUE DEBIT

## 2017-04-09 PROCEDURE — 3331090001 HH PPS REVENUE CREDIT

## 2017-04-10 ENCOUNTER — HOME CARE VISIT (OUTPATIENT)
Dept: HOME HEALTH SERVICES | Facility: HOME HEALTH | Age: 72
End: 2017-04-10
Payer: MEDICARE

## 2017-04-10 PROCEDURE — 3331090001 HH PPS REVENUE CREDIT

## 2017-04-10 PROCEDURE — 3331090002 HH PPS REVENUE DEBIT

## 2017-04-11 PROCEDURE — 3331090001 HH PPS REVENUE CREDIT

## 2017-04-11 PROCEDURE — 3331090002 HH PPS REVENUE DEBIT

## 2017-04-12 ENCOUNTER — HOME CARE VISIT (OUTPATIENT)
Dept: SCHEDULING | Facility: HOME HEALTH | Age: 72
End: 2017-04-12
Payer: MEDICARE

## 2017-04-12 VITALS
TEMPERATURE: 98 F | HEART RATE: 72 BPM | RESPIRATION RATE: 18 BRPM | OXYGEN SATURATION: 96 % | DIASTOLIC BLOOD PRESSURE: 68 MMHG | SYSTOLIC BLOOD PRESSURE: 124 MMHG

## 2017-04-12 PROCEDURE — 3331090001 HH PPS REVENUE CREDIT

## 2017-04-12 PROCEDURE — G0151 HHCP-SERV OF PT,EA 15 MIN: HCPCS

## 2017-04-12 PROCEDURE — 3331090002 HH PPS REVENUE DEBIT

## 2017-04-12 PROCEDURE — 3331090003 HH PPS REVENUE ADJ

## 2017-04-13 PROCEDURE — 3331090002 HH PPS REVENUE DEBIT

## 2017-04-13 PROCEDURE — 3331090001 HH PPS REVENUE CREDIT

## 2017-04-14 PROCEDURE — 3331090001 HH PPS REVENUE CREDIT

## 2017-04-14 PROCEDURE — 3331090002 HH PPS REVENUE DEBIT

## 2017-04-21 PROBLEM — H40.9 GLAUCOMA SYNDROME: Status: ACTIVE | Noted: 2017-04-21

## 2017-05-01 ENCOUNTER — HOSPITAL ENCOUNTER (OUTPATIENT)
Dept: PHYSICAL THERAPY | Age: 72
Discharge: HOME | End: 2017-05-01
Attending: FAMILY MEDICINE
Payer: MEDICARE

## 2017-05-01 DIAGNOSIS — Z89.519: ICD-10-CM

## 2017-05-01 DIAGNOSIS — R53.81 DEBILITY: ICD-10-CM

## 2017-05-01 PROCEDURE — 97162 PT EVAL MOD COMPLEX 30 MIN: CPT

## 2017-05-01 PROCEDURE — G8978 MOBILITY CURRENT STATUS: HCPCS

## 2017-05-01 PROCEDURE — G8979 MOBILITY GOAL STATUS: HCPCS

## 2017-05-01 NOTE — PROGRESS NOTES
Quique Castro  : 1945 [facility] [address]  [phone]   [phone]         OUTPATIENT PHYSICAL THERAPY:Initial Assessment 2017    ICD-10: Treatment Diagnosis: Difficulty in walking, not elsewhere classified (R26.2)  Precautions/Allergies:   Lortab [hydrocodone-acetaminophen]   Fall Risk Score: 5 (? 5 = High Risk)  MD Orders: evaluate and treat MEDICAL/REFERRING DIAGNOSIS:  Debility [R53.81]  S/P BKA (below knee amputation) unilateral, unspecified laterality [Z89.519]   DATE OF ONSET: 2017  REFERRING PHYSICIAN: Mike Bourne MD  RETURN PHYSICIAN APPOINTMENT: 2017     ASSESSMENT (Date: 17):  Mr. Kavita Landrum presents to physical therapy s/p R BKA in January. He has since received a prosthetic limb from [facility]. Upon examination, he has decreased LE strength, decreased LE ROM, decreased balance, and poor gait mechanics. He would benefit from skilled PT to improve his functional independence and decrease his risk of falls. PROBLEM LIST (Impacting functional limitations):  1. Decreased Strength  2. Decreased ADL/Functional Activities  3. Decreased Transfer Abilities  4. Decreased Ambulation Ability/Technique  5. Decreased Balance  6. Increased Pain  7. Decreased Activity Tolerance  8. Decreased Flexibility/Joint Mobility  9. Decreased [place] with Home Exercise Program INTERVENTIONS PLANNED:  1. Balance Exercise  2. Bed Mobility  3. Family Education  4. Gait Training  5. Home Exercise Program (HEP)  6. Neuromuscular Re-education/Strengthening  7. Prosthetic Training  8. Range of Motion (ROM)  9. Therapeutic Activites  10. Therapeutic Exercise/Strengthening  11. Transfer Training   TREATMENT PLAN:  Effective Dates: 17 TO 17.   Frequency/Duration: 2 times a week for 12 weeks  GOALS: (Goals have been discussed and agreed upon with patient.)  Short-Term Functional Goals: Time Frame: 6 weeks  1. Patient will be compliant with HEP. 2. Patient will have a a decrease on the TUG to less than 50 seconds in order to improved functional mobility. 3. Patient will have an increase on the cassidy balance to 12/56 in order to improve his standing balance. Discharge Goals: Time Frame: 12 weeks  1. Patient will be independent with HEP. 2. Patient will have an increase on the cassidy balance to 23/56 in order to decrease his risk of falls. 3. Patient will have a decrease on the TUG to less than 35 seconds in order improve his functional mobility. 4. Patient will demonstrate ambulating without steppage gait on the R in order to improve his efficiency of mobility. Rehabilitation Potential For Stated Goals: Good  Regarding [facility] therapy, I certify that the treatment plan above will be carried out by a therapist or under their direction. Thank you for this referral,  Quique Mcdonald DPT, NCS     Referring Physician Signature: Florence Lott MD              Date                    HISTORY:   Patient Stated Goal:        I want to be able to walk and move around. History of Present Injury/Illness (Reason for Referral):  Patient had his R foot amputated in January. He got a prosthetic from [facility], Jacqueline Leija. He reports he can walk with a walker. Says his prosthetic hurts. Reports at times he needs help to gordon prosthetic. Rates his pain in his residual limb 10/10. Reports phantom pain. He mostly uses the wheelchair for home mobility. Reports taking a few steps on the walker only. Rhetta Soulier thinks cannabis oil would help him and wants to know why they won't give him that. Past Medical History/Comorbidities:   Mr. Fabrizio Jaeger  has a past medical history of Abnormal CT scan, chest (12/27/2015); Acute CHF ([facility] (12/26/2015); Acute systolic congestive heart failure ([address] (12/30/2015); MO (acute kidney injury) ([address] (7/29/2012); Bilateral pleural effusion (12/27/2015);  Chest pain (12/26/2015); Depression; DM (diabetes mellitus), type 2 ([facility] (7/29/2012); Encephalopathy due to infection (1/7/2017); HTN (hypertension) (7/29/2012); Hypoglycemia (7/29/2012); NSTEMI (non-ST elevated myocardial infarction) ([facility] (1/5/2017); and Tobacco use (12/27/2015). He also has no past medical history of Anemia; Arrhythmia; Arthritis; Asthma; Autoimmune disease ([facility] Calculus of kidney; Cancer ([facility] Chronic pain; COPD; DEMENTIA; Gastrointestinal disorder; GERD (gastroesophageal reflux disease); Headache; Hypercholesterolemia; Liver disease; Other ill-defined conditions; Psychiatric disorder; Psychotic disorder; PUD (peptic ulcer disease); Seizures ([facility] Sleep disorder; Stroke [facility]); Thromboembolus ([facility] Thyroid disease; or Trauma. Mr. Yahir Browne  has a past surgical history that includes orthopaedic. Social History/Living Environment:     lives with goddaughter. Ramp to enter. Prior Level of Function/Work/Activity:  Doesn't work, was driving but hasn't driven since. Personal Factors:          Age:  81 y.o. Current Medications:    Current Outpatient Prescriptions:     amLODIPine (NORVASC) 5 mg tablet, Take 1 Tab by mouth daily. , Disp: 90 Tab, Rfl: 3    ramipril (ALTACE) 10 mg capsule, Take 1 Cap by mouth daily. , Disp: 90 Cap, Rfl: 3    metoprolol succinate (TOPROL-XL) 100 mg tablet, Take 1 Tab by mouth daily. , Disp: 90 Tab, Rfl: 3    metoclopramide HCl (REGLAN) 10 mg tablet, , Disp: , Rfl:     senna (SENNA) 8.6 mg tablet, Take 1 Tab by mouth daily. , Disp: , Rfl:     nitroglycerin (NITROSTAT) 0.4 mg SL tablet, by SubLINGual route every five (5) minutes as needed for Chest Pain., Disp: , Rfl:     alum-mag hydroxide-simeth (MYLANTA) 200-200-20 mg/5 mL susp, Take 30 mL by mouth every four (4) hours as needed. , Disp: 150 mL, Rfl: 0    isosorbide dinitrate (ISORDIL) 10 mg tablet, Take 1 Tab by mouth three (3) times daily. , Disp: 30 Tab, Rfl: 0    magnesium oxide (MAG-OX) 400 mg tablet, Take 1 Tab by mouth two (2) times a day., Disp: 30 Tab, Rfl: 0    oxyCODONE IR (ROXICODONE) 20 mg immediate release tablet, Take 1 Tab by mouth every four (4) hours as needed. Max Daily Amount: 120 mg., Disp: 10 Tab, Rfl: 0    pantoprazole (PROTONIX) 40 mg tablet, Take 1 Tab by mouth Before breakfast and dinner. Indications: HEARTBURN, Disp: 20 Tab, Rfl: 0    atorvastatin (LIPITOR) 80 mg tablet, Take 1 Tab by mouth daily. , Disp: 30 Tab, Rfl: 6    spironolactone (ALDACTONE) 25 mg tablet, Take 25 mg by mouth daily. , Disp: , Rfl: 1    glipiZIDE SR (GLUCOTROL) 2.5 mg CR tablet, Take  by mouth daily. , Disp: , Rfl:     bumetanide (BUMEX) 1 mg tablet, Take 1 Tab by mouth daily. , Disp: 30 Tab, Rfl: 3    aspirin 81 mg chewable tablet, Take 1 Tab by mouth daily (after breakfast). , Disp: 30 Tab, Rfl: 11     Date Last Reviewed:  5/1/2017   Number of Personal Factors/Comorbidities that affect the Plan of Care: 1-2: MODERATE COMPLEXITY   EXAMINATION:   Observation/Orthostatic Postural Assessment:          Patient presents in wheelchair. God daughter present   Mental Status:          Patient relies on god daughter to remember medical history and relevant information  Palpation:          No increased muscle tone  Sensation:         Functional in R residual limb, L ankle and above. Skin Integrity:          Good scar healing noted  Vision:          Has cataracts, wears glasses some of the time. Balance:          Unable to static balance without UE support for more than 5 seconds. Coordination:          diminished    Lower Extremity:   Strength PROM   Action R L R  L    Hip Flexion 4-      Hip Extension 3+  limited    Hip Abduction 3+      Hip Adduction       Knee Flexion 4-      Knee Extension 4      Dorsi Flexion       Plantar Flexion       Inversion       Eversion                  Upper Extremity:         Grossly 4/5  Functional Mobility:         Gait/Ambulation:  Ambulates with rolling walker and prosthetic on R LE.   Steppage gait pattern on R. Decreased step length, step to R, foot flat on R, decreased gait speed. Min A. Transfers:  Slow, min A for safety        Bed Mobility:  Slow, modified independent         Stairs:  NT        Wheelchair:  Modified independent with propelling and brake management                Body Structures Involved:  1. Nerves  2. Bones  3. Joints  4. Muscles  5. Ligaments Body Functions Affected:  1. Sensory/Pain  2. Neuromusculoskeletal  3. Movement Related Activities and Participation Affected:  1. General Tasks and Demands  2. Mobility  3. Self Care  4. Domestic Life   Number of elements that affect the Plan of Care: 4+: HIGH COMPLEXITY   CLINICAL PRESENTATION:   Presentation: Evolving clinical presentation with changing clinical characteristics: MODERATE COMPLEXITY   CLINICAL DECISION MAKING:   Outcome Measure: Tool Used: Evans Balance Scale  Score:  Initial: 7/56 Most Recent: X/56 (Date: -- )   Interpretation of Score: Each section is scored on a 0-4 scale, 0 representing the patients inability to perform the task and 4 representing independence. The scores of each section are added together for a total score of 56. The higher the patients score, the more independent the patient is. Any score below 45 indicates increased risk for falls. Score 56 55-45 44-34 33-23 22-12 11-1 0   Modifier CH CI CJ CK CL CM CN     ? Mobility - Walking and Moving Around:     - CURRENT STATUS: CM - 80%-99% impaired, limited or restricted    - GOAL STATUS: CK - 40%-59% impaired, limited or restricted    - D/C STATUS:  ---------------To be determined---------------    Tool Used: Timed Up and Go (TUG)  Score:  Initial: 1 minute 7 seconds Most Recent: X seconds (Date: -- )   Interpretation of Score:  The test measures, in seconds, the time taken by an individual to stand up from a standard arm chair (seat height 46 cm [18 in], arm height 65 cm [25.6 in]), walk a distance of 3 meters (118 in, approx 10 ft), turn, walk back to the chair and sit down. If the individual takes longer than 14 seconds to complete TUG, this indicates risk for falls. Score 7 7.5-10.5 11-14 14.5-17.5 18-21 21.5-24.5 25+   Modifier CH CI CJ CK CL CM CN         Medical Necessity:   · Patient is expected to demonstrate progress in strength, range of motion, balance and functional technique to decrease assistance required with ADLs and IADLs. · Patient demonstrates good rehab potential due to higher previous functional level. Reason for Services/Other Comments:  · Patient continues to require modification of therapeutic interventions to increase complexity of exercises. Use of outcome tool(s) and clinical judgement create a POC that gives a: Questionable prediction of patient's progress: MODERATE COMPLEXITY   TREATMENT:   (In addition to Assessment/Re-Assessment sessions the following treatments were rendered)  Assessment only today, no treatment provided. Pre Treatment Symptoms: see above  Treatment/Session Assessment:  See assessment above. · Pain/ Symptoms: Initial:   10/10 Post Session:  10/10 ·   Compliance with Program/Exercises: Will assess as treatment progresses. · Recommendations/Intent for next treatment session: \"Next visit will focus on advancements to more challenging activities and reduction in assistance provided\". Total Treatment Duration:  PT Patient Time In/Time Out  Time In: 1000  Time Out: [address] Saman Benjamin, DPT               .

## 2017-05-02 NOTE — PROGRESS NOTES
Ambulatory/Rehab Services H2 Model Falls Risk Assessment    Risk Factor Pts. ·   Confusion/Disorientation/Impulsivity  []    4 ·   Symptomatic Depression  []   2 ·   Altered Elimination  []   1 ·   Dizziness/Vertigo  []   1 ·   Gender (Male)  [x]   1 ·   Any administered antiepileptics (anticonvulsants):  []   2 ·   Any administered benzodiazepines:  []   1 ·   Visual Impairment (specify): glaucoma   [x]   1 ·   Portable Oxygen Use  []   1 ·   Orthostatic ? BP  []   1 ·   History of Recent Falls (within 3 mos.)  []   5     Ability to Rise from Chair (choose one) Pts. ·   Ability to rise in a single movement  []   0 ·   Pushes up, successful in one attempt  []   1 ·   Multiple attempts, but successful  [x]   3 ·   Unable to rise without assistance  []   4   Total: (5 or greater = High Risk) 5     Falls Prevention Plan:   []                Physical Limitations to Exercise (specify):   [x]                Mobility Assistance Device (type): using a rolling walker and wheelchair    []                Exercise/Equipment Adaptation (specify):    ©2010 [facility] [address] [place] [address] Patent #[id number].  Federal Law prohibits the replication, distribution or use without written permission from [facility]

## 2017-05-08 ENCOUNTER — HOSPITAL ENCOUNTER (OUTPATIENT)
Dept: PHYSICAL THERAPY | Age: 72
Discharge: HOME | End: 2017-05-08
Attending: FAMILY MEDICINE
Payer: MEDICARE

## 2017-05-08 PROCEDURE — 97110 THERAPEUTIC EXERCISES: CPT

## 2017-05-08 NOTE — PROGRESS NOTES
Joyceann Number  : [facility] [address]  [phone]   [phone]         OUTPATIENT PHYSICAL THERAPY:Daily Note 2017    ICD-10: Treatment Diagnosis: Difficulty in walking, not elsewhere classified (R26.2)  Precautions/Allergies:   Lortab [hydrocodone-acetaminophen]   Fall Risk Score: 5 (? 5 = High Risk)  MD Orders: evaluate and treat MEDICAL/REFERRING DIAGNOSIS:  Debility [R53.81]   DATE OF ONSET: 2017  REFERRING PHYSICIAN: Daly Schwartz MD  RETURN PHYSICIAN APPOINTMENT: 2017     ASSESSMENT (Date: 17):  Mr. 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 He has since received a prosthetic limb from [facility]. Upon examination, he has decreased LE strength, decreased LE ROM, decreased balance, and poor gait mechanics. He would benefit from skilled PT to improve his functional independence and decrease his risk of falls. PROBLEM LIST (Impacting functional limitations):  1. Decreased Strength  2. Decreased ADL/Functional Activities  3. Decreased Transfer Abilities  4. Decreased Ambulation Ability/Technique  5. Decreased Balance  6. Increased Pain  7. Decreased Activity Tolerance  8. Decreased Flexibility/Joint Mobility  9. Decreased Tompkins with Home Exercise Program INTERVENTIONS PLANNED:  1. Balance Exercise  2. Bed Mobility  3. Family Education  4. Gait Training  5. Home Exercise Program (HEP)  6. Neuromuscular Re-education/Strengthening  7. Prosthetic Training  8. Range of Motion (ROM)  9. Therapeutic Activites  10. Therapeutic Exercise/Strengthening  11. Transfer Training   TREATMENT PLAN:  Effective Dates: 17 TO 17. Frequency/Duration: 2 times a week for 12 weeks  GOALS: (Goals have been discussed and agreed upon with patient.)  Short-Term Functional Goals: Time Frame: 6 weeks  1. Patient will be compliant with HEP.   2. Patient will have a a decrease on the TUG to less than 50 seconds in order to improved functional mobility. 3. Patient will have an increase on the cassidy balance to 12/56 in order to improve his standing balance. Discharge Goals: Time Frame: 12 weeks  1. Patient will be independent with HEP. 2. Patient will have an increase on the cassidy balance to 23/56 in order to decrease his risk of falls. 3. Patient will have a decrease on the TUG to less than 35 seconds in order improve his functional mobility. 4. Patient will demonstrate ambulating without steppage gait on the R in order to improve his efficiency of mobility. Rehabilitation Potential For Stated Goals: Good  Regarding [facility] therapy, I certify that the treatment plan above will be carried out by a therapist or under their direction. Thank you for this referral,  Jeani Landau, DPT, NCS     Referring Physician Signature: Bertha Cintron MD              Date                    HISTORY:   Patient Stated Goal:        I want to be able to walk and move around. History of Present Injury/Illness (Reason for Referral):  Patient had his R foot amputated in January. He got a prosthetic from [facility] [employer], [employer] [place]. He reports he can walk with a walker. Says his prosthetic hurts. Reports at times he needs help to gordno prosthetic. Rates his pain in his residual limb 10/10. Reports phantom pain. He mostly uses the wheelchair for home mobility. Reports taking a few steps on the walker only. Larissa Gibson thinks cannabis oil would help him and wants to know why they won't give him that. Past Medical History/Comorbidities:   Mr. Noah Metcalf  has a past medical history of Abnormal CT scan, chest (12/27/2015); Acute CHF ([facility] (12/26/2015); Acute systolic congestive heart failure ([address] (12/30/2015); MO (acute kidney injury) ([address] (7/29/2012); Bilateral pleural effusion (12/27/2015); Chest pain (12/26/2015); Depression; DM (diabetes mellitus), type 2 ([address] (7/29/2012);  Encephalopathy due to infection (1/7/2017); HTN (hypertension) (7/29/2012); Hypoglycemia (7/29/2012); NSTEMI (non-ST elevated myocardial infarction) ([facility] (1/5/2017); and Tobacco use (12/27/2015). He also has no past medical history of Anemia; Arrhythmia; Arthritis; Asthma; Autoimmune disease ([facility] Calculus of kidney; Cancer ([facility] Chronic pain; COPD; DEMENTIA; Gastrointestinal disorder; GERD (gastroesophageal reflux disease); Headache; Hypercholesterolemia; Liver disease; Other ill-defined conditions; Psychiatric disorder; Psychotic disorder; PUD (peptic ulcer disease); Seizures ([facility] Sleep disorder; Stroke [facility]); Thromboembolus ([facility] Thyroid disease; or Trauma. Mr. Willi Clark  has a past surgical history that includes orthopaedic. Social History/Living Environment:     lives with goddaughter. Ramp to enter. Prior Level of Function/Work/Activity:  Doesn't work, was driving but hasn't driven since. Personal Factors:          Age:  81 y.o. Current Medications:    Current Outpatient Prescriptions:     amLODIPine (NORVASC) 5 mg tablet, Take 1 Tab by mouth daily. , Disp: 90 Tab, Rfl: 3    ramipril (ALTACE) 10 mg capsule, Take 1 Cap by mouth daily. , Disp: 90 Cap, Rfl: 3    metoprolol succinate (TOPROL-XL) 100 mg tablet, Take 1 Tab by mouth daily. , Disp: 90 Tab, Rfl: 3    metoclopramide HCl (REGLAN) 10 mg tablet, , Disp: , Rfl:     senna (SENNA) 8.6 mg tablet, Take 1 Tab by mouth daily. , Disp: , Rfl:     nitroglycerin (NITROSTAT) 0.4 mg SL tablet, by SubLINGual route every five (5) minutes as needed for Chest Pain., Disp: , Rfl:     alum-mag hydroxide-simeth (MYLANTA) 200-200-20 mg/5 mL susp, Take 30 mL by mouth every four (4) hours as needed. , Disp: 150 mL, Rfl: 0    isosorbide dinitrate (ISORDIL) 10 mg tablet, Take 1 Tab by mouth three (3) times daily. , Disp: 30 Tab, Rfl: 0    magnesium oxide (MAG-OX) 400 mg tablet, Take 1 Tab by mouth two (2) times a day., Disp: 30 Tab, Rfl: 0    oxyCODONE IR (ROXICODONE) 20 mg immediate release tablet, Take 1 Tab by mouth every four (4) hours as needed. Max Daily Amount: 120 mg., Disp: 10 Tab, Rfl: 0    pantoprazole (PROTONIX) 40 mg tablet, Take 1 Tab by mouth Before breakfast and dinner. Indications: HEARTBURN, Disp: 20 Tab, Rfl: 0    atorvastatin (LIPITOR) 80 mg tablet, Take 1 Tab by mouth daily. , Disp: 30 Tab, Rfl: 6    spironolactone (ALDACTONE) 25 mg tablet, Take 25 mg by mouth daily. , Disp: , Rfl: 1    glipiZIDE SR (GLUCOTROL) 2.5 mg CR tablet, Take  by mouth daily. , Disp: , Rfl:     bumetanide (BUMEX) 1 mg tablet, Take 1 Tab by mouth daily. , Disp: 30 Tab, Rfl: 3    aspirin 81 mg chewable tablet, Take 1 Tab by mouth daily (after breakfast). , Disp: 30 Tab, Rfl: 11     Date Last Reviewed:  5/8/2017   Number of Personal Factors/Comorbidities that affect the Plan of Care: 1-2: MODERATE COMPLEXITY   EXAMINATION:   Observation/Orthostatic Postural Assessment:          Patient presents in wheelchair. God daughter present   Mental Status:          Patient relies on god daughter to remember medical history and relevant information  Palpation:          No increased muscle tone  Sensation:         Functional in R residual limb, L ankle and above. Skin Integrity:          Good scar healing noted  Vision:          Has cataracts, wears glasses some of the time. Balance:          Unable to static balance without UE support for more than 5 seconds. Coordination:          diminished    Lower Extremity:   Strength PROM   Action R L R  L    Hip Flexion 4-      Hip Extension 3+  limited    Hip Abduction 3+      Hip Adduction       Knee Flexion 4-      Knee Extension 4      Dorsi Flexion       Plantar Flexion       Inversion       Eversion                  Upper Extremity:         Grossly 4/5  Functional Mobility:         Gait/Ambulation:  Ambulates with rolling walker and prosthetic on R LE. Steppage gait pattern on R. Decreased step length, step to R, foot flat on R, decreased gait speed. Min A. Transfers:  Slow, min A for safety        Bed Mobility:  Slow, modified independent         Stairs:  NT        Wheelchair:  Modified independent with propelling and brake management                Body Structures Involved:  1. Nerves  2. Bones  3. Joints  4. Muscles  5. Ligaments Body Functions Affected:  1. Sensory/Pain  2. Neuromusculoskeletal  3. Movement Related Activities and Participation Affected:  1. General Tasks and Demands  2. Mobility  3. Self Care  4. Domestic Life   Number of elements that affect the Plan of Care: 4+: HIGH COMPLEXITY   CLINICAL PRESENTATION:   Presentation: Evolving clinical presentation with changing clinical characteristics: MODERATE COMPLEXITY   CLINICAL DECISION MAKING:   Outcome Measure: Tool Used: Evans Balance Scale  Score:  Initial: 7/56 Most Recent: X/56 (Date: -- )   Interpretation of Score: Each section is scored on a 0-4 scale, 0 representing the patients inability to perform the task and 4 representing independence. The scores of each section are added together for a total score of 56. The higher the patients score, the more independent the patient is. Any score below 45 indicates increased risk for falls. Score 56 55-45 44-34 33-23 22-12 11-1 0   Modifier CH CI CJ CK CL CM CN     ? Mobility - Walking and Moving Around:     - CURRENT STATUS: CM - 80%-99% impaired, limited or restricted    - GOAL STATUS: CK - 40%-59% impaired, limited or restricted    - D/C STATUS:  ---------------To be determined---------------    Tool Used: Timed Up and Go (TUG)  Score:  Initial: 1 minute 7 seconds Most Recent: X seconds (Date: -- )   Interpretation of Score: The test measures, in seconds, the time taken by an individual to stand up from a standard arm chair (seat height 46 cm [18 in], arm height 65 cm [25.6 in]), walk a distance of 3 meters (118 in, approx 10 ft), turn, walk back to the chair and sit down.   If the individual takes longer than 14 seconds to complete TUG, this indicates risk for falls. Score 7 7.5-10.5 11-14 14.5-17.5 18-21 21.5-24.5 25+   Modifier CH CI CJ CK CL CM CN         Medical Necessity:   · Patient is expected to demonstrate progress in strength, range of motion, balance and functional technique to decrease assistance required with ADLs and IADLs. · Patient demonstrates good rehab potential due to higher previous functional level. Reason for Services/Other Comments:  · Patient continues to require modification of therapeutic interventions to increase complexity of exercises. Use of outcome tool(s) and clinical judgement create a POC that gives a: Questionable prediction of patient's progress: MODERATE COMPLEXITY   TREATMENT:   (In addition to Assessment/Re-Assessment sessions the following treatments were rendered)  Therapeutic Exercise: ( 40 minutes):  Exercises per grid below to improve mobility, strength and balance. Required moderate visual, verbal and tactile cues to promote proper body alignment and promote proper body mechanics. Progressed complexity of movement as indicated. Date:  5/8/17 Date:   Date:     Activity/Exercise Parameters Parameters Parameters   HS stretch 2 x 30 sec R     Sidelying hip flexor stretch 2 x 45 sec R      Sidelying hip abduction  2 x 10 reps with yellow band resistance     Modified bridges with bolster  2 x 10 reps      Static standing X 4 reps with 15 sec max without UE support     Standing marching X 10 reps L  2 x 10 reps L with L hand hold only, significant R lateral lean, min A     ambulation In bars 2 x 10'              Pre Treatment Symptoms: patient states he has been working on his wheelchair exercises at home. Treatment/Session Assessment:  Patient was educated on an HEP for LE strengthening. He is very weak and it limits his functional mobility. He continues to benefit from skilled PT to improve mobility.  Patient was educated to bring socks with him at next visit as he is sinking down in his prosthesis. · Pain/ Symptoms: Initial:   0/10 Post Session:  0/10 ·   Compliance with Program/Exercises: Will assess as treatment progresses. · Recommendations/Intent for next treatment session: \"Next visit will focus on advancements to more challenging activities and reduction in assistance provided\". Total Treatment Duration:  PT Patient Time In/Time Out  Time In: 1300  Time Out: Mehrdad Salinas DPT               .

## 2017-05-10 ENCOUNTER — HOSPITAL ENCOUNTER (OUTPATIENT)
Dept: PHYSICAL THERAPY | Age: 72
Discharge: HOME | End: 2017-05-10
Attending: FAMILY MEDICINE
Payer: MEDICARE

## 2017-05-10 PROCEDURE — 97110 THERAPEUTIC EXERCISES: CPT

## 2017-05-10 NOTE — PROGRESS NOTES
Joyceann Number  : 1945 [facility] [address]  [phone]   [phone]         OUTPATIENT PHYSICAL THERAPY:Daily Note 5/10/2017    ICD-10: Treatment Diagnosis: Difficulty in walking, not elsewhere classified (R26.2)  Precautions/Allergies:   Lortab [hydrocodone-acetaminophen]   Fall Risk Score: 5 (? 5 = High Risk)  MD Orders: evaluate and treat MEDICAL/REFERRING DIAGNOSIS:  Debility [R53.81]   DATE OF ONSET: 2017  REFERRING PHYSICIAN: Daly Schwartz MD  RETURN PHYSICIAN APPOINTMENT: 2017     ASSESSMENT (Date: 17):  Mr. 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 He has since received a prosthetic limb from [facility]. Upon examination, he has decreased LE strength, decreased LE ROM, decreased balance, and poor gait mechanics. He would benefit from skilled PT to improve his functional independence and decrease his risk of falls. PROBLEM LIST (Impacting functional limitations):  1. Decreased Strength  2. Decreased ADL/Functional Activities  3. Decreased Transfer Abilities  4. Decreased Ambulation Ability/Technique  5. Decreased Balance  6. Increased Pain  7. Decreased Activity Tolerance  8. Decreased Flexibility/Joint Mobility  9. Decreased Riley with Home Exercise Program INTERVENTIONS PLANNED:  1. Balance Exercise  2. Bed Mobility  3. Family Education  4. Gait Training  5. Home Exercise Program (HEP)  6. Neuromuscular Re-education/Strengthening  7. Prosthetic Training  8. Range of Motion (ROM)  9. Therapeutic Activites  10. Therapeutic Exercise/Strengthening  11. Transfer Training   TREATMENT PLAN:  Effective Dates: 17 TO 17. Frequency/Duration: 2 times a week for 12 weeks  GOALS: (Goals have been discussed and agreed upon with patient.)  Short-Term Functional Goals: Time Frame: 6 weeks  1. Patient will be compliant with HEP.   2. Patient will have a a decrease on the TUG to less than 50 seconds in order to improved functional mobility. 3. Patient will have an increase on the cassidy balance to 12/56 in order to improve his standing balance. Discharge Goals: Time Frame: 12 weeks  1. Patient will be independent with HEP. 2. Patient will have an increase on the cassidy balance to 23/56 in order to decrease his risk of falls. 3. Patient will have a decrease on the TUG to less than 35 seconds in order improve his functional mobility. 4. Patient will demonstrate ambulating without steppage gait on the R in order to improve his efficiency of mobility. Rehabilitation Potential For Stated Goals: Good  Regarding [facility] Law therapy, I certify that the treatment plan above will be carried out by a therapist or under their direction. Thank you for this referral,  Ruth Bravo PTA, NCS     Referring Physician Signature: Massimo Milton MD              Date                    HISTORY:   Patient Stated Goal:        I want to be able to walk and move around. History of Present Injury/Illness (Reason for Referral):  Patient had his R foot amputated in January. He got a prosthetic from [facility], Juan Vo. He reports he can walk with a walker. Says his prosthetic hurts. Reports at times he needs help to gordon prosthetic. Rates his pain in his residual limb 10/10. Reports phantom pain. He mostly uses the wheelchair for home mobility. Reports taking a few steps on the walker only. Nickie Heath thinks cannabis oil would help him and wants to know why they won't give him that. Past Medical History/Comorbidities:   Mr. Vasile Tamez  has a past medical history of Abnormal CT scan, chest (12/27/2015); Acute CHF ([address] (12/26/2015); Acute systolic congestive heart failure ([address] (12/30/2015); MO (acute kidney injury) ([address] (7/29/2012); Bilateral pleural effusion (12/27/2015); Chest pain (12/26/2015); Depression; DM (diabetes mellitus), type 2 ([address] (7/29/2012);  Encephalopathy due to infection (1/7/2017); HTN (hypertension) (7/29/2012); Hypoglycemia (7/29/2012); NSTEMI (non-ST elevated myocardial infarction) ([facility] (1/5/2017); and Tobacco use (12/27/2015). He also has no past medical history of Anemia; Arrhythmia; Arthritis; Asthma; Autoimmune disease ([facility] Calculus of kidney; Cancer ([facility] Chronic pain; COPD; DEMENTIA; Gastrointestinal disorder; GERD (gastroesophageal reflux disease); Headache; Hypercholesterolemia; Liver disease; Other ill-defined conditions; Psychiatric disorder; Psychotic disorder; PUD (peptic ulcer disease); Seizures ([facility] Sleep disorder; Stroke [facility]); Thromboembolus ([facility] Thyroid disease; or Trauma. Mr. Kavita Landrum  has a past surgical history that includes orthopaedic. Social History/Living Environment:     lives with goddaughter. Ramp to enter. Prior Level of Function/Work/Activity:  Doesn't work, was driving but hasn't driven since. Personal Factors:          Age:  81 y.o. Current Medications:    Current Outpatient Prescriptions:     amLODIPine (NORVASC) 5 mg tablet, Take 1 Tab by mouth daily. , Disp: 90 Tab, Rfl: 3    ramipril (ALTACE) 10 mg capsule, Take 1 Cap by mouth daily. , Disp: 90 Cap, Rfl: 3    metoprolol succinate (TOPROL-XL) 100 mg tablet, Take 1 Tab by mouth daily. , Disp: 90 Tab, Rfl: 3    metoclopramide HCl (REGLAN) 10 mg tablet, , Disp: , Rfl:     senna (SENNA) 8.6 mg tablet, Take 1 Tab by mouth daily. , Disp: , Rfl:     nitroglycerin (NITROSTAT) 0.4 mg SL tablet, by SubLINGual route every five (5) minutes as needed for Chest Pain., Disp: , Rfl:     alum-mag hydroxide-simeth (MYLANTA) 200-200-20 mg/5 mL susp, Take 30 mL by mouth every four (4) hours as needed. , Disp: 150 mL, Rfl: 0    isosorbide dinitrate (ISORDIL) 10 mg tablet, Take 1 Tab by mouth three (3) times daily. , Disp: 30 Tab, Rfl: 0    magnesium oxide (MAG-OX) 400 mg tablet, Take 1 Tab by mouth two (2) times a day., Disp: 30 Tab, Rfl: 0    oxyCODONE IR (ROXICODONE) 20 mg immediate release tablet, Take 1 Tab by mouth every four (4) hours as needed. Max Daily Amount: 120 mg., Disp: 10 Tab, Rfl: 0    pantoprazole (PROTONIX) 40 mg tablet, Take 1 Tab by mouth Before breakfast and dinner. Indications: HEARTBURN, Disp: 20 Tab, Rfl: 0    atorvastatin (LIPITOR) 80 mg tablet, Take 1 Tab by mouth daily. , Disp: 30 Tab, Rfl: 6    spironolactone (ALDACTONE) 25 mg tablet, Take 25 mg by mouth daily. , Disp: , Rfl: 1    glipiZIDE SR (GLUCOTROL) 2.5 mg CR tablet, Take  by mouth daily. , Disp: , Rfl:     bumetanide (BUMEX) 1 mg tablet, Take 1 Tab by mouth daily. , Disp: 30 Tab, Rfl: 3    aspirin 81 mg chewable tablet, Take 1 Tab by mouth daily (after breakfast). , Disp: 30 Tab, Rfl: 11     Date Last Reviewed:  5/10/2017   Number of Personal Factors/Comorbidities that affect the Plan of Care: 1-2: MODERATE COMPLEXITY   EXAMINATION:   Observation/Orthostatic Postural Assessment:          Patient presents in wheelchair. God daughter present   Mental Status:          Patient relies on god daughter to remember medical history and relevant information  Palpation:          No increased muscle tone  Sensation:         Functional in R residual limb, L ankle and above. Skin Integrity:          Good scar healing noted  Vision:          Has cataracts, wears glasses some of the time. Balance:          Unable to static balance without UE support for more than 5 seconds. Coordination:          diminished    Lower Extremity:   Strength PROM   Action R L R  L    Hip Flexion 4-      Hip Extension 3+  limited    Hip Abduction 3+      Hip Adduction       Knee Flexion 4-      Knee Extension 4      Dorsi Flexion       Plantar Flexion       Inversion       Eversion                  Upper Extremity:         Grossly 4/5  Functional Mobility:         Gait/Ambulation:  Ambulates with rolling walker and prosthetic on R LE. Steppage gait pattern on R.   Decreased step length, step to R, foot flat on R, decreased gait speed.  Min A. Transfers:  Slow, min A for safety        Bed Mobility:  Slow, modified independent         Stairs:  NT        Wheelchair:  Modified independent with propelling and brake management                Body Structures Involved:  1. Nerves  2. Bones  3. Joints  4. Muscles  5. Ligaments Body Functions Affected:  1. Sensory/Pain  2. Neuromusculoskeletal  3. Movement Related Activities and Participation Affected:  1. General Tasks and Demands  2. Mobility  3. Self Care  4. Domestic Life   Number of elements that affect the Plan of Care: 4+: HIGH COMPLEXITY   CLINICAL PRESENTATION:   Presentation: Evolving clinical presentation with changing clinical characteristics: MODERATE COMPLEXITY   CLINICAL DECISION MAKING:   Outcome Measure: Tool Used: Evans Balance Scale  Score:  Initial: 7/56 Most Recent: X/56 (Date: -- )   Interpretation of Score: Each section is scored on a 0-4 scale, 0 representing the patients inability to perform the task and 4 representing independence. The scores of each section are added together for a total score of 56. The higher the patients score, the more independent the patient is. Any score below 45 indicates increased risk for falls. Score 56 55-45 44-34 33-23 22-12 11-1 0   Modifier CH CI CJ CK CL CM CN     ? Mobility - Walking and Moving Around:     - CURRENT STATUS: CM - 80%-99% impaired, limited or restricted    - GOAL STATUS: CK - 40%-59% impaired, limited or restricted    - D/C STATUS:  ---------------To be determined---------------    Tool Used: Timed Up and Go (TUG)  Score:  Initial: 1 minute 7 seconds Most Recent: X seconds (Date: -- )   Interpretation of Score: The test measures, in seconds, the time taken by an individual to stand up from a standard arm chair (seat height 46 cm [18 in], arm height 65 cm [25.6 in]), walk a distance of 3 meters (118 in, approx 10 ft), turn, walk back to the chair and sit down.   If the individual takes longer than 14 seconds to complete TUG, this indicates risk for falls. Score 7 7.5-10.5 11-14 14.5-17.5 18-21 21.5-24.5 25+   Modifier CH CI CJ CK CL CM CN         Medical Necessity:   · Patient is expected to demonstrate progress in strength, range of motion, balance and functional technique to decrease assistance required with ADLs and IADLs. · Patient demonstrates good rehab potential due to higher previous functional level. Reason for Services/Other Comments:  · Patient continues to require modification of therapeutic interventions to increase complexity of exercises. Use of outcome tool(s) and clinical judgement create a POC that gives a: Questionable prediction of patient's progress: MODERATE COMPLEXITY   TREATMENT:   (In addition to Assessment/Re-Assessment sessions the following treatments were rendered)  Therapeutic Exercise: ( 43 minutes):  Exercises per grid below to improve mobility, strength and balance. Required moderate visual, verbal and tactile cues to promote proper body alignment and promote proper body mechanics. Progressed complexity of movement as indicated. Date:  5/8/17 Date:  5-10-17 Date:     Activity/Exercise Parameters Parameters Parameters   HS stretch 2 x 30 sec R 3 x 30 sec B    Sidelying hip flexor stretch 2 x 45 sec R  3 x 30 sec R    Sidelying hip abduction  2 x 10 reps with yellow band resistance 2 x 10 with yellow band    Modified bridges with bolster  2 x 10 reps  2 x 20 with pretty good control. Static standing X 4 reps with 15 sec max without UE support 5 x 15 sec with UE    Standing marching X 10 reps L  2 x 10 reps L with L hand hold only, significant R lateral lean, min A 2 x 10 B with both hands to one hand    ambulation In bars 2 x 10'  50 feet x 2 with rolling walker             Pre Treatment Symptoms: Patient reports his leg is slipping some when ambulating. No complaints upon arrival   Treatment/Session Assessment:  Patient worked hard today.  I checked his R residual limb twice for redness during session. No redness noted, but he reports a pinch at the knee cap region. I did ask the daughter to bring his other L shoes which is a size smaller due to the L shoes catching toe on ground and his L foot sliding back and forth. I was able to put 3 fingers in at the heel. He is very tight in his L hip flexors. Asked patient to bring other shoe and socks to next visit. · Pain/ Symptoms: Initial:   0/10 Post Session:  0/10 ·   Compliance with Program/Exercises: Will assess as treatment progresses. · Recommendations/Intent for next treatment session: \"Next visit will focus on advancements to more challenging activities and reduction in assistance provided\". Total Treatment Duration:  43 minutes   PT Patient Time In/Time Out  Time In: 1300  Time Out: Best 82, PTA               .

## 2017-05-15 ENCOUNTER — HOSPITAL ENCOUNTER (OUTPATIENT)
Dept: PHYSICAL THERAPY | Age: 72
Discharge: HOME | End: 2017-05-15
Attending: FAMILY MEDICINE
Payer: MEDICARE

## 2017-05-15 PROCEDURE — 97110 THERAPEUTIC EXERCISES: CPT

## 2017-05-15 NOTE — PROGRESS NOTES
Glenna Stanton  : 1945 [facility], [facility], [address]  [phone]   [phone]            5/15/2017        Patient was a no show and no call for therapy today. I tried to call, but was not able to leave a message. Will follow up with the patient at the next visit.   Patrica Washington, PTA

## 2017-05-15 NOTE — PROGRESS NOTES
Jaclyn Yajaira  : 1945 [facility] at [address]  [phone]   [phone]         OUTPATIENT PHYSICAL THERAPY:Daily Note 5/15/2017    ICD-10: Treatment Diagnosis: Difficulty in walking, not elsewhere classified (R26.2)  Precautions/Allergies:   Lortab [hydrocodone-acetaminophen]   Fall Risk Score: 5 (? 5 = High Risk)  MD Orders: evaluate and treat MEDICAL/REFERRING DIAGNOSIS:  Debility [R53.81]   DATE OF ONSET: 2017  REFERRING PHYSICIAN: Holly Arango MD  RETURN PHYSICIAN APPOINTMENT: 2017     ASSESSMENT (Date: 17):  Mr. 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 He has since received a prosthetic limb from [employer]. Upon examination, he has decreased LE strength, decreased LE ROM, decreased balance, and poor gait mechanics. He would benefit from skilled PT to improve his functional independence and decrease his risk of falls. PROBLEM LIST (Impacting functional limitations):  1. Decreased Strength  2. Decreased ADL/Functional Activities  3. Decreased Transfer Abilities  4. Decreased Ambulation Ability/Technique  5. Decreased Balance  6. Increased Pain  7. Decreased Activity Tolerance  8. Decreased Flexibility/Joint Mobility  9. Decreased [place] with Home Exercise Program INTERVENTIONS PLANNED:  1. Balance Exercise  2. Bed Mobility  3. Family Education  4. Gait Training  5. Home Exercise Program (HEP)  6. Neuromuscular Re-education/Strengthening  7. Prosthetic Training  8. Range of Motion (ROM)  9. Therapeutic Activites  10. Therapeutic Exercise/Strengthening  11. Transfer Training   TREATMENT PLAN:  Effective Dates: 17 TO 17. Frequency/Duration: 2 times a week for 12 weeks  GOALS: (Goals have been discussed and agreed upon with patient.)  Short-Term Functional Goals: Time Frame: 6 weeks  1. Patient will be compliant with HEP.   2. Patient will have a a decrease on the TUG to less than 50 seconds in order to improved functional mobility. 3. Patient will have an increase on the cassidy balance to 12/56 in order to improve his standing balance. Discharge Goals: Time Frame: 12 weeks  1. Patient will be independent with HEP. 2. Patient will have an increase on the cassidy balance to 23/56 in order to decrease his risk of falls. 3. Patient will have a decrease on the TUG to less than 35 seconds in order improve his functional mobility. 4. Patient will demonstrate ambulating without steppage gait on the R in order to improve his efficiency of mobility. Rehabilitation Potential For Stated Goals: Good  Regarding Irma Robert therapy, I certify that the treatment plan above will be carried out by a therapist or under their direction. Thank you for this referral,  Jatin Sauceda PTA, NCS     Referring Physician Signature: Erendira Conn MD              Date                    HISTORY:   Patient Stated Goal:        I want to be able to walk and move around. History of Present Injury/Illness (Reason for Referral):  Patient had his R foot amputated in January. He got a prosthetic from [employer], [place] [facility]. He reports he can walk with a walker. Says his prosthetic hurts. Reports at times he needs help to gordon prosthetic. Rates his pain in his residual limb 10/10. Reports phantom pain. He mostly uses the wheelchair for home mobility. Reports taking a few steps on the walker only. Landenjulien Nava thinks cannabis oil would help him and wants to know why they won't give him that. Past Medical History/Comorbidities:   Mr. Doyle Hughes  has a past medical history of Abnormal CT scan, chest (12/27/2015); Acute CHF ([address] (12/26/2015); Acute systolic congestive heart failure ([address] (12/30/2015); MO (acute kidney injury) ([address] (7/29/2012); Bilateral pleural effusion (12/27/2015); Chest pain (12/26/2015); Depression; DM (diabetes mellitus), type 2 ([address] (7/29/2012);  Encephalopathy due to infection (1/7/2017); HTN (hypertension) (7/29/2012); Hypoglycemia (7/29/2012); NSTEMI (non-ST elevated myocardial infarction) ([facility] (1/5/2017); and Tobacco use (12/27/2015). He also has no past medical history of Anemia; Arrhythmia; Arthritis; Asthma; Autoimmune disease ([facility] Calculus of kidney; Cancer ([facility] Chronic pain; COPD; DEMENTIA; Gastrointestinal disorder; GERD (gastroesophageal reflux disease); Headache; Hypercholesterolemia; Liver disease; Other ill-defined conditions; Psychiatric disorder; Psychotic disorder; PUD (peptic ulcer disease); Seizures ([facility] Sleep disorder; Stroke [facility]); Thromboembolus ([facility] Thyroid disease; or Trauma. Mr. Shravan Lyons  has a past surgical history that includes orthopaedic. Social History/Living Environment:     lives with goddaughter. Ramp to enter. Prior Level of Function/Work/Activity:  Doesn't work, was driving but hasn't driven since. Personal Factors:          Age:  81 y.o. Current Medications:    Current Outpatient Prescriptions:     amLODIPine (NORVASC) 5 mg tablet, Take 1 Tab by mouth daily. , Disp: 90 Tab, Rfl: 3    ramipril (ALTACE) 10 mg capsule, Take 1 Cap by mouth daily. , Disp: 90 Cap, Rfl: 3    metoprolol succinate (TOPROL-XL) 100 mg tablet, Take 1 Tab by mouth daily. , Disp: 90 Tab, Rfl: 3    metoclopramide HCl (REGLAN) 10 mg tablet, , Disp: , Rfl:     senna (SENNA) 8.6 mg tablet, Take 1 Tab by mouth daily. , Disp: , Rfl:     nitroglycerin (NITROSTAT) 0.4 mg SL tablet, by SubLINGual route every five (5) minutes as needed for Chest Pain., Disp: , Rfl:     alum-mag hydroxide-simeth (MYLANTA) 200-200-20 mg/5 mL susp, Take 30 mL by mouth every four (4) hours as needed. , Disp: 150 mL, Rfl: 0    isosorbide dinitrate (ISORDIL) 10 mg tablet, Take 1 Tab by mouth three (3) times daily. , Disp: 30 Tab, Rfl: 0    magnesium oxide (MAG-OX) 400 mg tablet, Take 1 Tab by mouth two (2) times a day., Disp: 30 Tab, Rfl: 0    oxyCODONE IR (ROXICODONE) 20 mg immediate release tablet, Take 1 Tab by mouth every four (4) hours as needed. Max Daily Amount: 120 mg., Disp: 10 Tab, Rfl: 0    pantoprazole (PROTONIX) 40 mg tablet, Take 1 Tab by mouth Before breakfast and dinner. Indications: HEARTBURN, Disp: 20 Tab, Rfl: 0    atorvastatin (LIPITOR) 80 mg tablet, Take 1 Tab by mouth daily. , Disp: 30 Tab, Rfl: 6    spironolactone (ALDACTONE) 25 mg tablet, Take 25 mg by mouth daily. , Disp: , Rfl: 1    glipiZIDE SR (GLUCOTROL) 2.5 mg CR tablet, Take  by mouth daily. , Disp: , Rfl:     bumetanide (BUMEX) 1 mg tablet, Take 1 Tab by mouth daily. , Disp: 30 Tab, Rfl: 3    aspirin 81 mg chewable tablet, Take 1 Tab by mouth daily (after breakfast). , Disp: 30 Tab, Rfl: 11     Date Last Reviewed:  5/15/2017   Number of Personal Factors/Comorbidities that affect the Plan of Care: 1-2: MODERATE COMPLEXITY   EXAMINATION:   Observation/Orthostatic Postural Assessment:          Patient presents in wheelchair. God daughter present   Mental Status:          Patient relies on god daughter to remember medical history and relevant information  Palpation:          No increased muscle tone  Sensation:         Functional in R residual limb, L ankle and above. Skin Integrity:          Good scar healing noted  Vision:          Has cataracts, wears glasses some of the time. Balance:          Unable to static balance without UE support for more than 5 seconds. Coordination:          diminished    Lower Extremity:   Strength PROM   Action R L R  L    Hip Flexion 4-      Hip Extension 3+  limited    Hip Abduction 3+      Hip Adduction       Knee Flexion 4-      Knee Extension 4      Dorsi Flexion       Plantar Flexion       Inversion       Eversion                  Upper Extremity:         Grossly 4/5  Functional Mobility:         Gait/Ambulation:  Ambulates with rolling walker and prosthetic on R LE. Steppage gait pattern on R.   Decreased step length, step to R, foot flat on R, decreased gait speed.  Min A. Transfers:  Slow, min A for safety        Bed Mobility:  Slow, modified independent         Stairs:  NT        Wheelchair:  Modified independent with propelling and brake management                Body Structures Involved:  1. Nerves  2. Bones  3. Joints  4. Muscles  5. Ligaments Body Functions Affected:  1. Sensory/Pain  2. Neuromusculoskeletal  3. Movement Related Activities and Participation Affected:  1. General Tasks and Demands  2. Mobility  3. Self Care  4. Domestic Life   Number of elements that affect the Plan of Care: 4+: HIGH COMPLEXITY   CLINICAL PRESENTATION:   Presentation: Evolving clinical presentation with changing clinical characteristics: MODERATE COMPLEXITY   CLINICAL DECISION MAKING:   Outcome Measure: Tool Used: Evans Balance Scale  Score:  Initial: 7/56 Most Recent: X/56 (Date: -- )   Interpretation of Score: Each section is scored on a 0-4 scale, 0 representing the patients inability to perform the task and 4 representing independence. The scores of each section are added together for a total score of 56. The higher the patients score, the more independent the patient is. Any score below 45 indicates increased risk for falls. Score 56 55-45 44-34 33-23 22-12 11-1 0   Modifier CH CI CJ CK CL CM CN     ? Mobility - Walking and Moving Around:     - CURRENT STATUS: CM - 80%-99% impaired, limited or restricted    - GOAL STATUS: CK - 40%-59% impaired, limited or restricted    - D/C STATUS:  ---------------To be determined---------------    Tool Used: Timed Up and Go (TUG)  Score:  Initial: 1 minute 7 seconds Most Recent: X seconds (Date: -- )   Interpretation of Score: The test measures, in seconds, the time taken by an individual to stand up from a standard arm chair (seat height 46 cm [18 in], arm height 65 cm [25.6 in]), walk a distance of 3 meters (118 in, approx 10 ft), turn, walk back to the chair and sit down.   If the individual takes longer than 14 seconds to complete TUG, this indicates risk for falls. Score 7 7.5-10.5 11-14 14.5-17.5 18-21 21.5-24.5 25+   Modifier CH CI CJ CK CL CM CN         Medical Necessity:   · Patient is expected to demonstrate progress in strength, range of motion, balance and functional technique to decrease assistance required with ADLs and IADLs. · Patient demonstrates good rehab potential due to higher previous functional level. Reason for Services/Other Comments:  · Patient continues to require modification of therapeutic interventions to increase complexity of exercises. Use of outcome tool(s) and clinical judgement create a POC that gives a: Questionable prediction of patient's progress: MODERATE COMPLEXITY   TREATMENT:   (In addition to Assessment/Re-Assessment sessions the following treatments were rendered)  Therapeutic Exercise: ( 50 minutes):  Exercises per grid below to improve mobility, strength and balance. Required moderate visual, verbal and tactile cues to promote proper body alignment and promote proper body mechanics. Progressed complexity of movement as indicated. Date:  5/8/17 Date:  5-10-17 Date:  5-15-17   Activity/Exercise Parameters Parameters Parameters   HS stretch 2 x 30 sec R 3 x 30 sec B 3 x 30 sec B   Sidelying hip flexor stretch 2 x 45 sec R  3 x 30 sec R 3 x 30 sec R   Sidelying hip abduction  2 x 10 reps with yellow band resistance 2 x 10 with yellow band 2 x 15 with yellow band   Modified bridges with bolster  2 x 10 reps  2 x 20 with pretty good control.   2 x 20 with cues to lift higher   Static standing X 4 reps with 15 sec max without UE support 5 x 15 sec with UE Several times throughout the session   Standing marching X 10 reps L  2 x 10 reps L with L hand hold only, significant R lateral lean, min A 2 x 10 B with both hands to one hand 2 x 10 B with B hands down to one hand   ambulation In bars 2 x 10'  50 feet x 2 with rolling walker  50 feet x 3 with rest breaks, 110 feet then 90 feet - all with a rest break and rolling walker           Pre Treatment Symptoms: Patient reports no slipping today with ambulation. No complaints upon arrival.  He reports he is walking more at home. Treatment/Session Assessment:  Patient working hard and increasing endurance. Checked for redness on R residual limb at end of session. No redness noted. I did not need to add any socks today, but they did bring them in case today. · Pain/ Symptoms: Initial:   0/10 Post Session:  0/10 ·   Compliance with Program/Exercises: Will assess as treatment progresses. · Recommendations/Intent for next treatment session: \"Next visit will focus on advancements to more challenging activities and reduction in assistance provided\".   Total Treatment Duration:  50 minutes   PT Patient Time In/Time Out  Time In: 1440  Time Out: [facility]

## 2017-05-17 ENCOUNTER — HOSPITAL ENCOUNTER (OUTPATIENT)
Dept: PHYSICAL THERAPY | Age: 72
Discharge: HOME | End: 2017-05-17
Attending: FAMILY MEDICINE
Payer: MEDICARE

## 2017-05-17 NOTE — PROGRESS NOTES
[facility] Center at [address], [facility]  [phone]   [phone]     OUTPATIENT DAILY NOTE     DATE: 5/17/2017    SUBJECTIVE:  Patient called  for appointment today due to called and rescheduled for tomorrow. Will plan to follow up on next scheduled visit.     Anisa Collins, PTA

## 2017-05-18 ENCOUNTER — HOSPITAL ENCOUNTER (OUTPATIENT)
Dept: PHYSICAL THERAPY | Age: 72
Discharge: HOME | End: 2017-05-18
Attending: FAMILY MEDICINE
Payer: MEDICARE

## 2017-05-18 PROCEDURE — 97110 THERAPEUTIC EXERCISES: CPT

## 2017-05-18 NOTE — PROGRESS NOTES
Nan Pagan  : 1945 [facility] at [address]  [phone]   [phone]         OUTPATIENT PHYSICAL THERAPY:Daily Note 2017    ICD-10: Treatment Diagnosis: Difficulty in walking, not elsewhere classified (R26.2)  Precautions/Allergies:   Lortab [hydrocodone-acetaminophen]   Fall Risk Score: 5 (? 5 = High Risk)  MD Orders: evaluate and treat MEDICAL/REFERRING DIAGNOSIS:  Debility [R53.81]   DATE OF ONSET: 2017  REFERRING PHYSICIAN: Edna Sarabia MD  RETURN PHYSICIAN APPOINTMENT: 2017     ASSESSMENT (Date: 17):  Mr. 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 He has since received a prosthetic limb from [employer]. Upon examination, he has decreased LE strength, decreased LE ROM, decreased balance, and poor gait mechanics. He would benefit from skilled PT to improve his functional independence and decrease his risk of falls. PROBLEM LIST (Impacting functional limitations):  1. Decreased Strength  2. Decreased ADL/Functional Activities  3. Decreased Transfer Abilities  4. Decreased Ambulation Ability/Technique  5. Decreased Balance  6. Increased Pain  7. Decreased Activity Tolerance  8. Decreased Flexibility/Joint Mobility  9. Decreased Barnes with Home Exercise Program INTERVENTIONS PLANNED:  1. Balance Exercise  2. Bed Mobility  3. Family Education  4. Gait Training  5. Home Exercise Program (HEP)  6. Neuromuscular Re-education/Strengthening  7. Prosthetic Training  8. Range of Motion (ROM)  9. Therapeutic Activites  10. Therapeutic Exercise/Strengthening  11. Transfer Training   TREATMENT PLAN:  Effective Dates: 17 TO 17. Frequency/Duration: 2 times a week for 12 weeks  GOALS: (Goals have been discussed and agreed upon with patient.)  Short-Term Functional Goals: Time Frame: 6 weeks  1. Patient will be compliant with HEP.   2. Patient will have a a decrease on the TUG to less than 50 seconds in order to improved functional mobility. 3. Patient will have an increase on the cassidy balance to 12/56 in order to improve his standing balance. Discharge Goals: Time Frame: 12 weeks  1. Patient will be independent with HEP. 2. Patient will have an increase on the cassidy balance to 23/56 in order to decrease his risk of falls. 3. Patient will have a decrease on the TUG to less than 35 seconds in order improve his functional mobility. 4. Patient will demonstrate ambulating without steppage gait on the R in order to improve his efficiency of mobility. Rehabilitation Potential For Stated Goals: Good  Regarding Recardo Quails therapy, I certify that the treatment plan above will be carried out by a therapist or under their direction. Thank you for this referral,  Pily Prince PTA, NCS     Referring Physician Signature: Dia Ewing MD              Date                    HISTORY:   Patient Stated Goal:        I want to be able to walk and move around. History of Present Injury/Illness (Reason for Referral):  Patient had his R foot amputated in January. He got a prosthetic from [employer], [place]. He reports he can walk with a walker. Says his prosthetic hurts. Reports at times he needs help to gordon prosthetic. Rates his pain in his residual limb 10/10. Reports phantom pain. He mostly uses the wheelchair for home mobility. Reports taking a few steps on the walker only. Jasiel Brayan thinks cannabis oil would help him and wants to know why they won't give him that. Past Medical History/Comorbidities:   Mr. Monae Fierro  has a past medical history of Abnormal CT scan, chest (12/27/2015); Acute CHF ([address] (12/26/2015); Acute systolic congestive heart failure ([address] (12/30/2015); MO (acute kidney injury) ([address] (7/29/2012); Bilateral pleural effusion (12/27/2015); Chest pain (12/26/2015); Depression; DM (diabetes mellitus), type 2 ([address] (7/29/2012);  Encephalopathy due to infection (1/7/2017); HTN (hypertension) (7/29/2012); Hypoglycemia (7/29/2012); NSTEMI (non-ST elevated myocardial infarction) ([facility] (1/5/2017); and Tobacco use (12/27/2015). He also has no past medical history of Anemia; Arrhythmia; Arthritis; Asthma; Autoimmune disease ([facility] Calculus of kidney; Cancer ([facility] Chronic pain; COPD; DEMENTIA; Gastrointestinal disorder; GERD (gastroesophageal reflux disease); Headache; Hypercholesterolemia; Liver disease; Other ill-defined conditions; Psychiatric disorder; Psychotic disorder; PUD (peptic ulcer disease); Seizures ([facility] Sleep disorder; Stroke [facility]); Thromboembolus ([facility] Thyroid disease; or Trauma. Mr. Ramsey Interiano  has a past surgical history that includes orthopaedic. Social History/Living Environment:     lives with goddaughter. Ramp to enter. Prior Level of Function/Work/Activity:  Doesn't work, was driving but hasn't driven since. Personal Factors:          Age:  81 y.o. Current Medications:    Current Outpatient Prescriptions:     glipiZIDE SR (GLUCOTROL XL) 2.5 mg CR tablet, Take 1 Tab by mouth daily. Indications: type 2 diabetes mellitus, Disp: 30 Tab, Rfl: 1    amLODIPine (NORVASC) 5 mg tablet, Take 1 Tab by mouth daily. , Disp: 90 Tab, Rfl: 3    ramipril (ALTACE) 10 mg capsule, Take 1 Cap by mouth daily. , Disp: 90 Cap, Rfl: 3    metoprolol succinate (TOPROL-XL) 100 mg tablet, Take 1 Tab by mouth daily. , Disp: 90 Tab, Rfl: 3    metoclopramide HCl (REGLAN) 10 mg tablet, , Disp: , Rfl:     senna (SENNA) 8.6 mg tablet, Take 1 Tab by mouth daily. , Disp: , Rfl:     nitroglycerin (NITROSTAT) 0.4 mg SL tablet, by SubLINGual route every five (5) minutes as needed for Chest Pain., Disp: , Rfl:     alum-mag hydroxide-simeth (MYLANTA) 200-200-20 mg/5 mL susp, Take 30 mL by mouth every four (4) hours as needed. , Disp: 150 mL, Rfl: 0    isosorbide dinitrate (ISORDIL) 10 mg tablet, Take 1 Tab by mouth three (3) times daily. , Disp: 30 Tab, Rfl: 0    magnesium oxide (MAG-OX) 400 mg tablet, Take 1 Tab by mouth two (2) times a day., Disp: 30 Tab, Rfl: 0    oxyCODONE IR (ROXICODONE) 20 mg immediate release tablet, Take 1 Tab by mouth every four (4) hours as needed. Max Daily Amount: 120 mg., Disp: 10 Tab, Rfl: 0    pantoprazole (PROTONIX) 40 mg tablet, Take 1 Tab by mouth Before breakfast and dinner. Indications: HEARTBURN, Disp: 20 Tab, Rfl: 0    atorvastatin (LIPITOR) 80 mg tablet, Take 1 Tab by mouth daily. , Disp: 30 Tab, Rfl: 6    spironolactone (ALDACTONE) 25 mg tablet, Take 25 mg by mouth daily. , Disp: , Rfl: 1    bumetanide (BUMEX) 1 mg tablet, Take 1 Tab by mouth daily. , Disp: 30 Tab, Rfl: 3    aspirin 81 mg chewable tablet, Take 1 Tab by mouth daily (after breakfast). , Disp: 30 Tab, Rfl: 11     Date Last Reviewed:  5/18/2017   Number of Personal Factors/Comorbidities that affect the Plan of Care: 1-2: MODERATE COMPLEXITY   EXAMINATION:   Observation/Orthostatic Postural Assessment:          Patient presents in wheelchair. God daughter present   Mental Status:          Patient relies on god daughter to remember medical history and relevant information  Palpation:          No increased muscle tone  Sensation:         Functional in R residual limb, L ankle and above. Skin Integrity:          Good scar healing noted  Vision:          Has cataracts, wears glasses some of the time. Balance:          Unable to static balance without UE support for more than 5 seconds. Coordination:          diminished    Lower Extremity:   Strength PROM   Action R L R  L    Hip Flexion 4-      Hip Extension 3+  limited    Hip Abduction 3+      Hip Adduction       Knee Flexion 4-      Knee Extension 4      Dorsi Flexion       Plantar Flexion       Inversion       Eversion                  Upper Extremity:         Grossly 4/5  Functional Mobility:         Gait/Ambulation:  Ambulates with rolling walker and prosthetic on R LE. Steppage gait pattern on R.   Decreased step length, step to R, foot flat on R, decreased gait speed. Min A. Transfers:  Slow, min A for safety        Bed Mobility:  Slow, modified independent         Stairs:  NT        Wheelchair:  Modified independent with propelling and brake management                Body Structures Involved:  1. Nerves  2. Bones  3. Joints  4. Muscles  5. Ligaments Body Functions Affected:  1. Sensory/Pain  2. Neuromusculoskeletal  3. Movement Related Activities and Participation Affected:  1. General Tasks and Demands  2. Mobility  3. Self Care  4. Domestic Life   Number of elements that affect the Plan of Care: 4+: HIGH COMPLEXITY   CLINICAL PRESENTATION:   Presentation: Evolving clinical presentation with changing clinical characteristics: MODERATE COMPLEXITY   CLINICAL DECISION MAKING:   Outcome Measure: Tool Used: Evans Balance Scale  Score:  Initial: 7/56 Most Recent: X/56 (Date: -- )   Interpretation of Score: Each section is scored on a 0-4 scale, 0 representing the patients inability to perform the task and 4 representing independence. The scores of each section are added together for a total score of 56. The higher the patients score, the more independent the patient is. Any score below 45 indicates increased risk for falls. Score 56 55-45 44-34 33-23 22-12 11-1 0   Modifier CH CI CJ CK CL CM CN     ? Mobility - Walking and Moving Around:     - CURRENT STATUS: CM - 80%-99% impaired, limited or restricted    - GOAL STATUS: CK - 40%-59% impaired, limited or restricted    - D/C STATUS:  ---------------To be determined---------------    Tool Used: Timed Up and Go (TUG)  Score:  Initial: 1 minute 7 seconds Most Recent: X seconds (Date: -- )   Interpretation of Score:  The test measures, in seconds, the time taken by an individual to stand up from a standard arm chair (seat height 46 cm [18 in], arm height 65 cm [25.6 in]), walk a distance of 3 meters (118 in, approx 10 ft), turn, walk back to the chair and sit down. If the individual takes longer than 14 seconds to complete TUG, this indicates risk for falls. Score 7 7.5-10.5 11-14 14.5-17.5 18-21 21.5-24.5 25+   Modifier CH CI CJ CK CL CM CN         Medical Necessity:   · Patient is expected to demonstrate progress in strength, range of motion, balance and functional technique to decrease assistance required with ADLs and IADLs. · Patient demonstrates good rehab potential due to higher previous functional level. Reason for Services/Other Comments:  · Patient continues to require modification of therapeutic interventions to increase complexity of exercises. Use of outcome tool(s) and clinical judgement create a POC that gives a: Questionable prediction of patient's progress: MODERATE COMPLEXITY   TREATMENT:   (In addition to Assessment/Re-Assessment sessions the following treatments were rendered)  Therapeutic Exercise: ( 50 minutes):  Exercises per grid below to improve mobility, strength and balance. Required moderate visual, verbal and tactile cues to promote proper body alignment and promote proper body mechanics. Progressed complexity of movement as indicated. Date:  5/8/17 Date:  5-10-17 Date:  5-15-17 Date  5/18/17   Activity/Exercise Parameters Parameters Parameters    HS stretch 2 x 30 sec R 3 x 30 sec B 3 x 30 sec B 3 x 30 second hold B     Sidelying hip flexor stretch 2 x 45 sec R  3 x 30 sec R 3 x 30 sec R 3 x 30 second hold right     Sidelying hip abduction  2 x 10 reps with yellow band resistance 2 x 10 with yellow band 2 x 15 with yellow band 2 x 15 reps with yellow band   Modified bridges with bolster  2 x 10 reps  2 x 20 with pretty good control.   2 x 20 with cues to lift higher 2 x 20 reps with cues to lift higher   Static standing X 4 reps with 15 sec max without UE support 5 x 15 sec with UE Several times throughout the session 4 x 15-30 second hold throughout session   Standing marching X 10 reps L  2 x 10 reps L with L hand hold only, significant R lateral lean, min A 2 x 10 B with both hands to one hand 2 x 10 B with B hands down to one hand 2 x 10 reps with B hands   ambulation In bars 2 x 10'  50 feet x 2 with rolling walker  50 feet x 3 with rest breaks, 110 feet then 90 feet - all with a rest break and rolling walker From waiting room to mat  75 feet,   1 x 50 feet   1 x 120 feet           Pre Treatment Symptoms: Patient reports no pain today and states that he his prosthesis is fitting good today and does not need sock. Patient ambulated into clinic on walker today with care giver. Treatment/Session Assessment:  Patient progressing with distance walking with rolling walker with gait belt in place and CGA. Patient ambulated 120 at end of treatment to waiting room and then stated he wanted to continue to walk out to car with care giver another 50-75 feet. Good progress. · Pain/ Symptoms: Initial:   0/10 Post Session:  0/10 ·   Compliance with Program/Exercises: Will assess as treatment progresses. · Recommendations/Intent for next treatment session: \"Next visit will focus on advancements to more challenging activities and reduction in assistance provided\".   Total Treatment Duration:  50 minutes   PT Patient Time In/Time Out  Time In: 1400  Time Out: Albert Peña PTA

## 2017-05-22 ENCOUNTER — HOSPITAL ENCOUNTER (OUTPATIENT)
Dept: PHYSICAL THERAPY | Age: 72
Discharge: HOME | End: 2017-05-22
Attending: FAMILY MEDICINE
Payer: MEDICARE

## 2017-05-22 NOTE — PROGRESS NOTES
Richard Lange  : 1945 [facility] at [facility], [address] [facility], [address]  [phone]   [phone]          2017        Patient's family member called to cancel his therapy appointment for today due to him being sick. Will follow up with him at the next visit.   Addy Elaine, PTA

## 2017-05-24 ENCOUNTER — HOSPITAL ENCOUNTER (OUTPATIENT)
Dept: PHYSICAL THERAPY | Age: 72
Discharge: HOME | End: 2017-05-24
Attending: FAMILY MEDICINE
Payer: MEDICARE

## 2017-05-24 NOTE — PROGRESS NOTES
Glenna Maximino  : 1945 [facility] at [facility], [address] [facility], [address]  [phone]   [phone]          2017        Patient's family member called to cancel his therapy appointment for today due to him being sick. Will follow up with him at the next visit.   EDEL AlejandroT

## 2017-06-13 ENCOUNTER — APPOINTMENT (OUTPATIENT)
Dept: PHYSICAL THERAPY | Age: 72
End: 2017-06-13
Attending: FAMILY MEDICINE

## 2017-07-25 NOTE — PROGRESS NOTES
Wilfredo Quintana  : 1945 [facility] at [address]  [phone]   [phone]         OUTPATIENT PHYSICAL THERAPY:Discontinuation Summary 2017    ICD-10: Treatment Diagnosis: Difficulty in walking, not elsewhere classified (R26.2)  Precautions/Allergies:   Lortab [hydrocodone-acetaminophen]   Fall Risk Score: 5 (? 5 = High Risk)  MD Orders: evaluate and treat MEDICAL/REFERRING DIAGNOSIS:  Debility [R53.81]   DATE OF ONSET: 2017  REFERRING PHYSICIAN: Carrol Chahal MD  RETURN PHYSICIAN APPOINTMENT: 2017     ASSESSMENT (Date: 17):  Mr. Waleska Garcia was seen in physical therapy for 5 visits from 17 to 17. He did not show of cancelled for 6 appointments between 17 and 17. He did not show for his final scheduled appointments. He was called and a message was left but patient never called back to schedule more appointments. Will discontinue at this time. 1.  1.    TREATMENT PLAN:  Discontinue at this time 2° patient not scheduling any more follow up visits. HISTORY:   Patient Stated Goal:        I want to be able to walk and move around. History of Present Injury/Illness (Reason for Referral):  Patient had his R foot amputated in January. He got a prosthetic from [employer] [facility], [facility]. He reports he can walk with a walker. Says his prosthetic hurts. Reports at times he needs help to gordon prosthetic. Rates his pain in his residual limb 10/10. Reports phantom pain. He mostly uses the wheelchair for home mobility. Reports taking a few steps on the walker only. Joesph Wadsworth thinks cannabis oil would help him and wants to know why they won't give him that. Past Medical History/Comorbidities:   Mr. Waleska Garcia  has a past medical history of Abnormal CT scan, chest (2015); Acute CHF ([address] (2015); Acute systolic congestive heart failure ([address] (2015);  MO (acute kidney injury) ([facility] (7/29/2012); Bilateral pleural effusion (12/27/2015); Chest pain (12/26/2015); Depression; DM (diabetes mellitus), type 2 ([facility] (7/29/2012); Encephalopathy due to infection (1/7/2017); HTN (hypertension) (7/29/2012); Hypoglycemia (7/29/2012); NSTEMI (non-ST elevated myocardial infarction) ([facility] (1/5/2017); and Tobacco use (12/27/2015). He also has no past medical history of Anemia; Arrhythmia; Arthritis; Asthma; Autoimmune disease ([facility] Calculus of kidney; Cancer ([facility] Chronic pain; COPD; DEMENTIA; Gastrointestinal disorder; GERD (gastroesophageal reflux disease); Headache; Hypercholesterolemia; Liver disease; Other ill-defined conditions; Psychiatric disorder; Psychotic disorder; PUD (peptic ulcer disease); Seizures ([facility] Sleep disorder; Stroke [facility]); Thromboembolus ([facility] Thyroid disease; or Trauma. Mr. Godwin Granados  has a past surgical history that includes orthopaedic. Social History/Living Environment:     lives with goddaughter. Ramp to enter. Prior Level of Function/Work/Activity:  Doesn't work, was driving but hasn't driven since. Personal Factors:          Age:  81 y.o. Current Medications:    Current Outpatient Prescriptions:     glipiZIDE SR (GLUCOTROL XL) 2.5 mg CR tablet, Take 1 Tab by mouth daily. Indications: type 2 diabetes mellitus, Disp: 30 Tab, Rfl: 1    amLODIPine (NORVASC) 5 mg tablet, Take 1 Tab by mouth daily. , Disp: 90 Tab, Rfl: 3    ramipril (ALTACE) 10 mg capsule, Take 1 Cap by mouth daily. , Disp: 90 Cap, Rfl: 3    metoprolol succinate (TOPROL-XL) 100 mg tablet, Take 1 Tab by mouth daily. , Disp: 90 Tab, Rfl: 3    metoclopramide HCl (REGLAN) 10 mg tablet, , Disp: , Rfl:     senna (SENNA) 8.6 mg tablet, Take 1 Tab by mouth daily. , Disp: , Rfl:     nitroglycerin (NITROSTAT) 0.4 mg SL tablet, by SubLINGual route every five (5) minutes as needed for Chest Pain., Disp: , Rfl:     alum-mag hydroxide-simeth (MYLANTA) 200-200-20 mg/5 mL susp, Take 30 mL by mouth every four (4) hours as needed. , Disp: 150 mL, Rfl: 0    isosorbide dinitrate (ISORDIL) 10 mg tablet, Take 1 Tab by mouth three (3) times daily. , Disp: 30 Tab, Rfl: 0    magnesium oxide (MAG-OX) 400 mg tablet, Take 1 Tab by mouth two (2) times a day., Disp: 30 Tab, Rfl: 0    oxyCODONE IR (ROXICODONE) 20 mg immediate release tablet, Take 1 Tab by mouth every four (4) hours as needed. Max Daily Amount: 120 mg., Disp: 10 Tab, Rfl: 0    pantoprazole (PROTONIX) 40 mg tablet, Take 1 Tab by mouth Before breakfast and dinner. Indications: HEARTBURN, Disp: 20 Tab, Rfl: 0    atorvastatin (LIPITOR) 80 mg tablet, Take 1 Tab by mouth daily. , Disp: 30 Tab, Rfl: 6    spironolactone (ALDACTONE) 25 mg tablet, Take 25 mg by mouth daily. , Disp: , Rfl: 1    bumetanide (BUMEX) 1 mg tablet, Take 1 Tab by mouth daily. , Disp: 30 Tab, Rfl: 3    aspirin 81 mg chewable tablet, Take 1 Tab by mouth daily (after breakfast). , Disp: 30 Tab, Rfl: 11     Date Last Reviewed:  5/15/2017   Number of Personal Factors/Comorbidities that affect the Plan of Care: 1-2: MODERATE COMPLEXITY   EXAMINATION:   Observation/Orthostatic Postural Assessment:          Patient presents in wheelchair. God daughter present   Mental Status:          Patient relies on god daughter to remember medical history and relevant information  Palpation:          No increased muscle tone  Sensation:         Functional in R residual limb, L ankle and above. Skin Integrity:          Good scar healing noted  Vision:          Has cataracts, wears glasses some of the time. Balance:          Unable to static balance without UE support for more than 5 seconds.    Coordination:          diminished    Lower Extremity:   Strength PROM   Action R L R  L    Hip Flexion 4-      Hip Extension 3+  limited    Hip Abduction 3+      Hip Adduction       Knee Flexion 4-      Knee Extension 4      Dorsi Flexion       Plantar Flexion       Inversion       Eversion                  Upper Extremity: Grossly 4/5  Functional Mobility:         Gait/Ambulation:  Ambulates with rolling walker and prosthetic on R LE. Steppage gait pattern on R. Decreased step length, step to R, foot flat on R, decreased gait speed. Min A. Transfers:  Slow, min A for safety        Bed Mobility:  Slow, modified independent         Stairs:  NT        Wheelchair:  Modified independent with propelling and brake management                Body Structures Involved:  1. Nerves  2. Bones  3. Joints  4. Muscles  5. Ligaments Body Functions Affected:  1. Sensory/Pain  2. Neuromusculoskeletal  3. Movement Related Activities and Participation Affected:  1. General Tasks and Demands  2. Mobility  3. Self Care  4. Domestic Life   Number of elements that affect the Plan of Care: 4+: HIGH COMPLEXITY   CLINICAL PRESENTATION:   Presentation: Evolving clinical presentation with changing clinical characteristics: MODERATE COMPLEXITY   CLINICAL DECISION MAKING:   Outcome Measure: Tool Used: Evans Balance Scale  Score:  Initial: 7/56 Most Recent: X/56 (Date: -- )   Interpretation of Score: Each section is scored on a 0-4 scale, 0 representing the patients inability to perform the task and 4 representing independence. The scores of each section are added together for a total score of 56. The higher the patients score, the more independent the patient is. Any score below 45 indicates increased risk for falls. Score 56 55-45 44-34 33-23 22-12 11-1 0   Modifier CH CI CJ CK CL CM CN     ? Mobility - Walking and Moving Around:     - CURRENT STATUS: CM - 80%-99% impaired, limited or restricted    - GOAL STATUS: CK - 40%-59% impaired, limited or restricted    - D/C STATUS:  not reassessed    Tool Used: Timed Up and Go (TUG)  Score:  Initial: 1 minute 7 seconds Most Recent: X seconds (Date: -- )   Interpretation of Score:  The test measures, in seconds, the time taken by an individual to stand up from a standard arm chair (seat height 46 cm [18 in], arm height 65 cm [25.6 in]), walk a distance of 3 meters (118 in, approx 10 ft), turn, walk back to the chair and sit down. If the individual takes longer than 14 seconds to complete TUG, this indicates risk for falls.   Score 7 7.5-10.5 11-14 14.5-17.5 18-21 21.5-24.5 25+   Modifier CH CI CJ CK CL CM CN        Use of outcome tool(s) and clinical judgement create a POC that gives a: Questionable prediction of patient's progress: MODERATE COMPLEXITY   TREATMENT:          EDEL MárquezT

## 2017-10-05 ENCOUNTER — HOME HEALTH ADMISSION (OUTPATIENT)
Dept: HOME HEALTH SERVICES | Facility: HOME HEALTH | Age: 72
End: 2017-10-05

## 2017-10-05 PROBLEM — B35.1 ONYCHOMYCOSIS: Status: ACTIVE | Noted: 2017-10-05

## 2018-01-01 ENCOUNTER — HOME CARE VISIT (OUTPATIENT)
Dept: SCHEDULING | Facility: HOME HEALTH | Age: 73
End: 2018-01-01
Payer: MEDICARE

## 2018-01-01 ENCOUNTER — PATIENT OUTREACH (OUTPATIENT)
Dept: CASE MANAGEMENT | Age: 73
End: 2018-01-01

## 2018-01-01 ENCOUNTER — APPOINTMENT (OUTPATIENT)
Dept: ULTRASOUND IMAGING | Age: 73
DRG: 280 | End: 2018-01-01
Attending: INTERNAL MEDICINE
Payer: MEDICARE

## 2018-01-01 ENCOUNTER — APPOINTMENT (OUTPATIENT)
Dept: GENERAL RADIOLOGY | Age: 73
DRG: 871 | End: 2018-01-01
Attending: EMERGENCY MEDICINE
Payer: MEDICARE

## 2018-01-01 ENCOUNTER — HOSPITAL ENCOUNTER (INPATIENT)
Age: 73
LOS: 5 days | Discharge: HOME | DRG: 683 | End: 2018-09-10
Attending: EMERGENCY MEDICINE | Admitting: INTERNAL MEDICINE
Payer: MEDICARE

## 2018-01-01 ENCOUNTER — ANESTHESIA (OUTPATIENT)
Dept: SURGERY | Age: 73
End: 2018-01-01

## 2018-01-01 ENCOUNTER — HOME CARE VISIT (OUTPATIENT)
Dept: HOME HEALTH SERVICES | Facility: HOME HEALTH | Age: 73
End: 2018-01-01

## 2018-01-01 ENCOUNTER — HOSPITAL ENCOUNTER (INPATIENT)
Age: 73
LOS: 2 days | Discharge: HOME HEALTH | DRG: 638 | End: 2018-12-22
Attending: EMERGENCY MEDICINE | Admitting: INTERNAL MEDICINE
Payer: MEDICARE

## 2018-01-01 ENCOUNTER — APPOINTMENT (OUTPATIENT)
Dept: GENERAL RADIOLOGY | Age: 73
DRG: 637 | End: 2018-01-01
Attending: EMERGENCY MEDICINE
Payer: MEDICARE

## 2018-01-01 ENCOUNTER — APPOINTMENT (OUTPATIENT)
Dept: GENERAL RADIOLOGY | Age: 73
DRG: 638 | End: 2018-01-01
Attending: EMERGENCY MEDICINE
Payer: MEDICARE

## 2018-01-01 ENCOUNTER — HOSPITAL ENCOUNTER (OUTPATIENT)
Dept: WOUND CARE | Age: 73
Discharge: HOME | End: 2018-11-28
Attending: PODIATRIST
Payer: MEDICARE

## 2018-01-01 ENCOUNTER — ANESTHESIA EVENT (OUTPATIENT)
Dept: SURGERY | Age: 73
End: 2018-01-01

## 2018-01-01 ENCOUNTER — HOME HEALTH ADMISSION (OUTPATIENT)
Dept: HOME HEALTH SERVICES | Facility: HOME HEALTH | Age: 73
End: 2018-01-01

## 2018-01-01 ENCOUNTER — APPOINTMENT (OUTPATIENT)
Dept: ULTRASOUND IMAGING | Age: 73
DRG: 871 | End: 2018-01-01
Attending: INTERNAL MEDICINE
Payer: MEDICARE

## 2018-01-01 ENCOUNTER — HOSPITAL ENCOUNTER (INPATIENT)
Age: 73
LOS: 4 days | Discharge: HOME | DRG: 280 | End: 2018-09-28
Attending: EMERGENCY MEDICINE | Admitting: INTERNAL MEDICINE
Payer: MEDICARE

## 2018-01-01 ENCOUNTER — APPOINTMENT (OUTPATIENT)
Dept: MRI IMAGING | Age: 73
DRG: 871 | End: 2018-01-01
Attending: INTERNAL MEDICINE
Payer: MEDICARE

## 2018-01-01 ENCOUNTER — HOSPITAL ENCOUNTER (OUTPATIENT)
Dept: WOUND CARE | Age: 73
Discharge: HOME | End: 2018-12-12
Attending: PODIATRIST
Payer: MEDICARE

## 2018-01-01 ENCOUNTER — HOSPITAL ENCOUNTER (EMERGENCY)
Age: 73
Discharge: HOME | End: 2018-09-25
Attending: EMERGENCY MEDICINE
Payer: MEDICARE

## 2018-01-01 ENCOUNTER — HOME CARE VISIT (OUTPATIENT)
Dept: SCHEDULING | Facility: HOME HEALTH | Age: 73
End: 2018-01-01

## 2018-01-01 ENCOUNTER — HOME CARE VISIT (OUTPATIENT)
Dept: HOME HEALTH SERVICES | Facility: HOME HEALTH | Age: 73
End: 2018-01-01
Payer: MEDICARE

## 2018-01-01 ENCOUNTER — HOSPITAL ENCOUNTER (INPATIENT)
Age: 73
LOS: 7 days | Discharge: HOME HEALTH | DRG: 637 | End: 2018-10-29
Attending: EMERGENCY MEDICINE | Admitting: HOSPITALIST
Payer: MEDICARE

## 2018-01-01 ENCOUNTER — APPOINTMENT (OUTPATIENT)
Dept: ULTRASOUND IMAGING | Age: 73
DRG: 871 | End: 2018-01-01
Attending: PHYSICIAN ASSISTANT
Payer: MEDICARE

## 2018-01-01 ENCOUNTER — HOSPITAL ENCOUNTER (INPATIENT)
Age: 73
LOS: 6 days | Discharge: HOME HEALTH | DRG: 638 | End: 2018-11-10
Attending: EMERGENCY MEDICINE | Admitting: INTERNAL MEDICINE
Payer: MEDICARE

## 2018-01-01 ENCOUNTER — APPOINTMENT (OUTPATIENT)
Dept: GENERAL RADIOLOGY | Age: 73
End: 2018-01-01
Attending: EMERGENCY MEDICINE
Payer: MEDICARE

## 2018-01-01 ENCOUNTER — APPOINTMENT (OUTPATIENT)
Dept: GENERAL RADIOLOGY | Age: 73
DRG: 280 | End: 2018-01-01
Attending: PHYSICIAN ASSISTANT
Payer: MEDICARE

## 2018-01-01 ENCOUNTER — APPOINTMENT (OUTPATIENT)
Dept: CT IMAGING | Age: 73
DRG: 637 | End: 2018-01-01
Attending: INTERNAL MEDICINE
Payer: MEDICARE

## 2018-01-01 ENCOUNTER — HOSPITAL ENCOUNTER (INPATIENT)
Age: 73
LOS: 6 days | Discharge: HOME | DRG: 871 | End: 2018-10-11
Attending: EMERGENCY MEDICINE | Admitting: INTERNAL MEDICINE
Payer: MEDICARE

## 2018-01-01 ENCOUNTER — APPOINTMENT (OUTPATIENT)
Dept: CT IMAGING | Age: 73
DRG: 637 | End: 2018-01-01
Attending: EMERGENCY MEDICINE
Payer: MEDICARE

## 2018-01-01 ENCOUNTER — APPOINTMENT (OUTPATIENT)
Dept: GENERAL RADIOLOGY | Age: 73
DRG: 871 | End: 2018-01-01
Attending: INTERNAL MEDICINE
Payer: MEDICARE

## 2018-01-01 ENCOUNTER — HOME HEALTH ADMISSION (OUTPATIENT)
Dept: HOME HEALTH SERVICES | Facility: HOME HEALTH | Age: 73
End: 2018-01-01
Payer: MEDICARE

## 2018-01-01 ENCOUNTER — APPOINTMENT (OUTPATIENT)
Dept: GENERAL RADIOLOGY | Age: 73
DRG: 280 | End: 2018-01-01
Attending: EMERGENCY MEDICINE
Payer: MEDICARE

## 2018-01-01 ENCOUNTER — TELEPHONE (OUTPATIENT)
Dept: WOUND CARE | Age: 73
End: 2018-01-01

## 2018-01-01 ENCOUNTER — APPOINTMENT (OUTPATIENT)
Dept: ULTRASOUND IMAGING | Age: 73
DRG: 638 | End: 2018-01-01
Attending: EMERGENCY MEDICINE
Payer: MEDICARE

## 2018-01-01 ENCOUNTER — APPOINTMENT (OUTPATIENT)
Dept: GENERAL RADIOLOGY | Age: 73
DRG: 683 | End: 2018-01-01
Attending: EMERGENCY MEDICINE
Payer: MEDICARE

## 2018-01-01 ENCOUNTER — APPOINTMENT (OUTPATIENT)
Dept: ULTRASOUND IMAGING | Age: 73
DRG: 683 | End: 2018-01-01
Attending: INTERNAL MEDICINE
Payer: MEDICARE

## 2018-01-01 VITALS
DIASTOLIC BLOOD PRESSURE: 74 MMHG | HEART RATE: 55 BPM | TEMPERATURE: 97.8 F | HEIGHT: 73 IN | BODY MASS INDEX: 25.08 KG/M2 | RESPIRATION RATE: 18 BRPM | OXYGEN SATURATION: 97 % | WEIGHT: 189.2 LBS | SYSTOLIC BLOOD PRESSURE: 166 MMHG

## 2018-01-01 VITALS
OXYGEN SATURATION: 98 % | SYSTOLIC BLOOD PRESSURE: 132 MMHG | DIASTOLIC BLOOD PRESSURE: 74 MMHG | RESPIRATION RATE: 16 BRPM | TEMPERATURE: 97.9 F | HEART RATE: 73 BPM

## 2018-01-01 VITALS
TEMPERATURE: 98.4 F | OXYGEN SATURATION: 100 % | DIASTOLIC BLOOD PRESSURE: 82 MMHG | HEART RATE: 75 BPM | BODY MASS INDEX: 26.08 KG/M2 | SYSTOLIC BLOOD PRESSURE: 168 MMHG | RESPIRATION RATE: 20 BRPM | HEIGHT: 73 IN | WEIGHT: 196.8 LBS

## 2018-01-01 VITALS
RESPIRATION RATE: 19 BRPM | SYSTOLIC BLOOD PRESSURE: 132 MMHG | WEIGHT: 186 LBS | BODY MASS INDEX: 24.65 KG/M2 | DIASTOLIC BLOOD PRESSURE: 68 MMHG | TEMPERATURE: 97.5 F | OXYGEN SATURATION: 90 % | HEIGHT: 73 IN | HEART RATE: 73 BPM

## 2018-01-01 VITALS
OXYGEN SATURATION: 98 % | RESPIRATION RATE: 18 BRPM | SYSTOLIC BLOOD PRESSURE: 132 MMHG | TEMPERATURE: 97.7 F | DIASTOLIC BLOOD PRESSURE: 78 MMHG | HEART RATE: 87 BPM

## 2018-01-01 VITALS
OXYGEN SATURATION: 98 % | DIASTOLIC BLOOD PRESSURE: 76 MMHG | RESPIRATION RATE: 16 BRPM | TEMPERATURE: 97.5 F | HEART RATE: 82 BPM | SYSTOLIC BLOOD PRESSURE: 128 MMHG

## 2018-01-01 VITALS
RESPIRATION RATE: 16 BRPM | TEMPERATURE: 97.1 F | DIASTOLIC BLOOD PRESSURE: 66 MMHG | OXYGEN SATURATION: 99 % | SYSTOLIC BLOOD PRESSURE: 120 MMHG | HEART RATE: 70 BPM

## 2018-01-01 VITALS
DIASTOLIC BLOOD PRESSURE: 85 MMHG | RESPIRATION RATE: 16 BRPM | HEART RATE: 84 BPM | TEMPERATURE: 97.5 F | SYSTOLIC BLOOD PRESSURE: 140 MMHG | OXYGEN SATURATION: 99 %

## 2018-01-01 VITALS
HEART RATE: 77 BPM | RESPIRATION RATE: 16 BRPM | OXYGEN SATURATION: 98 % | DIASTOLIC BLOOD PRESSURE: 70 MMHG | SYSTOLIC BLOOD PRESSURE: 132 MMHG | TEMPERATURE: 97.7 F

## 2018-01-01 VITALS
WEIGHT: 210 LBS | DIASTOLIC BLOOD PRESSURE: 64 MMHG | HEIGHT: 73 IN | RESPIRATION RATE: 18 BRPM | BODY MASS INDEX: 27.83 KG/M2 | OXYGEN SATURATION: 96 % | TEMPERATURE: 98.3 F | HEART RATE: 69 BPM | SYSTOLIC BLOOD PRESSURE: 127 MMHG

## 2018-01-01 VITALS
SYSTOLIC BLOOD PRESSURE: 164 MMHG | RESPIRATION RATE: 26 BRPM | TEMPERATURE: 97.6 F | DIASTOLIC BLOOD PRESSURE: 76 MMHG | HEART RATE: 127 BPM | OXYGEN SATURATION: 71 %

## 2018-01-01 VITALS
DIASTOLIC BLOOD PRESSURE: 70 MMHG | SYSTOLIC BLOOD PRESSURE: 150 MMHG | HEART RATE: 89 BPM | RESPIRATION RATE: 20 BRPM | OXYGEN SATURATION: 98 % | TEMPERATURE: 97.8 F

## 2018-01-01 VITALS
HEIGHT: 73 IN | HEART RATE: 82 BPM | WEIGHT: 187.6 LBS | OXYGEN SATURATION: 97 % | TEMPERATURE: 98.1 F | DIASTOLIC BLOOD PRESSURE: 70 MMHG | BODY MASS INDEX: 24.86 KG/M2 | RESPIRATION RATE: 18 BRPM | SYSTOLIC BLOOD PRESSURE: 119 MMHG

## 2018-01-01 VITALS
SYSTOLIC BLOOD PRESSURE: 122 MMHG | BODY MASS INDEX: 25.98 KG/M2 | WEIGHT: 196 LBS | DIASTOLIC BLOOD PRESSURE: 93 MMHG | OXYGEN SATURATION: 98 % | RESPIRATION RATE: 20 BRPM | HEIGHT: 73 IN | TEMPERATURE: 98.7 F | HEART RATE: 85 BPM

## 2018-01-01 VITALS
OXYGEN SATURATION: 98 % | HEIGHT: 73 IN | RESPIRATION RATE: 20 BRPM | WEIGHT: 200 LBS | TEMPERATURE: 98.8 F | SYSTOLIC BLOOD PRESSURE: 132 MMHG | HEART RATE: 69 BPM | DIASTOLIC BLOOD PRESSURE: 77 MMHG | BODY MASS INDEX: 26.51 KG/M2

## 2018-01-01 VITALS
RESPIRATION RATE: 18 BRPM | HEIGHT: 73 IN | WEIGHT: 206.2 LBS | BODY MASS INDEX: 27.33 KG/M2 | DIASTOLIC BLOOD PRESSURE: 91 MMHG | TEMPERATURE: 97.5 F | SYSTOLIC BLOOD PRESSURE: 148 MMHG | OXYGEN SATURATION: 94 % | HEART RATE: 82 BPM

## 2018-01-01 DIAGNOSIS — Z51.5 ENCOUNTER FOR PALLIATIVE CARE: ICD-10-CM

## 2018-01-01 DIAGNOSIS — N17.9 ACUTE KIDNEY INJURY (HCC): Primary | ICD-10-CM

## 2018-01-01 DIAGNOSIS — I50.22 CHRONIC SYSTOLIC CONGESTIVE HEART FAILURE (HCC): Chronic | ICD-10-CM

## 2018-01-01 DIAGNOSIS — E86.0 DEHYDRATION: ICD-10-CM

## 2018-01-01 DIAGNOSIS — M86.9 OSTEOMYELITIS OF LEFT FOOT, UNSPECIFIED TYPE (HCC): ICD-10-CM

## 2018-01-01 DIAGNOSIS — J96.01 ACUTE HYPOXEMIC RESPIRATORY FAILURE (HCC): ICD-10-CM

## 2018-01-01 DIAGNOSIS — I24.9 ACS (ACUTE CORONARY SYNDROME) (HCC): ICD-10-CM

## 2018-01-01 DIAGNOSIS — M86.9 OSTEOMYELITIS OF LEFT FOOT, UNSPECIFIED TYPE (HCC): Primary | ICD-10-CM

## 2018-01-01 DIAGNOSIS — G93.40 ENCEPHALOPATHY ACUTE: Primary | ICD-10-CM

## 2018-01-01 DIAGNOSIS — R06.00 DYSPNEA, UNSPECIFIED TYPE: ICD-10-CM

## 2018-01-01 DIAGNOSIS — N18.30 KIDNEY DISEASE, CHRONIC, STAGE III (MODERATE, EGFR 30-59 ML/MIN) (HCC): Chronic | ICD-10-CM

## 2018-01-01 DIAGNOSIS — E11.621 DIABETIC ULCER OF TOE OF LEFT FOOT ASSOCIATED WITH TYPE 2 DIABETES MELLITUS, WITH NECROSIS OF BONE (HCC): Primary | ICD-10-CM

## 2018-01-01 DIAGNOSIS — I50.9 CONGESTIVE HEART FAILURE, UNSPECIFIED HF CHRONICITY, UNSPECIFIED HEART FAILURE TYPE (HCC): ICD-10-CM

## 2018-01-01 DIAGNOSIS — D64.9 ANEMIA, UNSPECIFIED TYPE: ICD-10-CM

## 2018-01-01 DIAGNOSIS — R53.81 DEBILITY: ICD-10-CM

## 2018-01-01 DIAGNOSIS — Z72.0 TOBACCO USE: Chronic | ICD-10-CM

## 2018-01-01 DIAGNOSIS — L08.9 TYPE 2 DIABETES MELLITUS WITH RIGHT DIABETIC FOOT INFECTION (HCC): ICD-10-CM

## 2018-01-01 DIAGNOSIS — R93.89 ABNORMAL CT SCAN, CHEST: ICD-10-CM

## 2018-01-01 DIAGNOSIS — R65.21 SEPTIC SHOCK (HCC): ICD-10-CM

## 2018-01-01 DIAGNOSIS — E11.8 TYPE 2 DIABETES MELLITUS WITH COMPLICATION, WITHOUT LONG-TERM CURRENT USE OF INSULIN (HCC): ICD-10-CM

## 2018-01-01 DIAGNOSIS — Z89.519 S/P BKA (BELOW KNEE AMPUTATION) UNILATERAL (HCC): ICD-10-CM
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
## 2018-01-01 DIAGNOSIS — J81.0 ACUTE PULMONARY EDEMA (HCC): ICD-10-CM

## 2018-01-01 DIAGNOSIS — I42.9 CARDIOMYOPATHY, UNSPECIFIED TYPE (HCC): ICD-10-CM

## 2018-01-01 DIAGNOSIS — J18.9 HAP (HOSPITAL-ACQUIRED PNEUMONIA): Primary | ICD-10-CM

## 2018-01-01 DIAGNOSIS — R53.83 FATIGUE, UNSPECIFIED TYPE: ICD-10-CM

## 2018-01-01 DIAGNOSIS — E11.628 TYPE 2 DIABETES MELLITUS WITH RIGHT DIABETIC FOOT INFECTION (HCC): ICD-10-CM

## 2018-01-01 DIAGNOSIS — N17.9 ACUTE RENAL FAILURE SUPERIMPOSED ON CHRONIC KIDNEY DISEASE, UNSPECIFIED CKD STAGE, UNSPECIFIED ACUTE RENAL FAILURE TYPE (HCC): Primary | ICD-10-CM

## 2018-01-01 DIAGNOSIS — I10 HYPERTENSION, UNSPECIFIED TYPE: ICD-10-CM

## 2018-01-01 DIAGNOSIS — J96.01 ACUTE RESPIRATORY FAILURE WITH HYPOXIA (HCC): ICD-10-CM

## 2018-01-01 DIAGNOSIS — N18.6 END STAGE RENAL DISEASE (HCC): ICD-10-CM

## 2018-01-01 DIAGNOSIS — R53.83 OTHER FATIGUE: ICD-10-CM

## 2018-01-01 DIAGNOSIS — N17.9 AKI (ACUTE KIDNEY INJURY) (HCC): ICD-10-CM

## 2018-01-01 DIAGNOSIS — N18.9 ACUTE RENAL FAILURE SUPERIMPOSED ON CHRONIC KIDNEY DISEASE, UNSPECIFIED CKD STAGE, UNSPECIFIED ACUTE RENAL FAILURE TYPE (HCC): Primary | ICD-10-CM

## 2018-01-01 DIAGNOSIS — L97.524 DIABETIC ULCER OF TOE OF LEFT FOOT ASSOCIATED WITH TYPE 2 DIABETES MELLITUS, WITH NECROSIS OF BONE (HCC): Primary | ICD-10-CM

## 2018-01-01 DIAGNOSIS — A41.9 SEPTIC SHOCK (HCC): ICD-10-CM

## 2018-01-01 DIAGNOSIS — I70.261 ATHEROSCLEROSIS OF NATIVE ARTERY OF RIGHT LOWER EXTREMITY WITH GANGRENE (HCC): ICD-10-CM

## 2018-01-01 DIAGNOSIS — I10 ESSENTIAL HYPERTENSION: ICD-10-CM

## 2018-01-01 DIAGNOSIS — R53.81 PHYSICAL DEBILITY: ICD-10-CM

## 2018-01-01 LAB
ABO + RH BLD: NORMAL
ABO + RH BLD: NORMAL
ALBUMIN SERPL-MCNC: 1.9 G/DL (ref 3.2–4.6)
ALBUMIN SERPL-MCNC: 1.9 G/DL (ref 3.2–4.6)
ALBUMIN SERPL-MCNC: 2.3 G/DL (ref 3.2–4.6)
ALBUMIN SERPL-MCNC: 2.5 G/DL (ref 3.2–4.6)
ALBUMIN SERPL-MCNC: 2.5 G/DL (ref 3.2–4.6)
ALBUMIN SERPL-MCNC: 2.6 G/DL (ref 3.2–4.6)
ALBUMIN SERPL-MCNC: 3 G/DL (ref 3.2–4.6)
ALBUMIN SERPL-MCNC: 3.1 G/DL (ref 3.2–4.6)
ALBUMIN/GLOB SERPL: 0.4 {RATIO} (ref 1.2–3.5)
ALBUMIN/GLOB SERPL: 0.4 {RATIO} (ref 1.2–3.5)
ALBUMIN/GLOB SERPL: 0.5 {RATIO} (ref 1.2–3.5)
ALBUMIN/GLOB SERPL: 0.6 {RATIO} (ref 1.2–3.5)
ALBUMIN/GLOB SERPL: 0.7 {RATIO} (ref 1.2–3.5)
ALBUMIN/GLOB SERPL: 0.7 {RATIO} (ref 1.2–3.5)
ALBUMIN/GLOB SERPL: 0.8 {RATIO} (ref 1.2–3.5)
ALP SERPL-CCNC: 108 U/L (ref 50–136)
ALP SERPL-CCNC: 62 U/L (ref 50–136)
ALP SERPL-CCNC: 64 U/L (ref 50–136)
ALP SERPL-CCNC: 69 U/L (ref 50–136)
ALP SERPL-CCNC: 70 U/L (ref 50–136)
ALP SERPL-CCNC: 71 U/L (ref 50–136)
ALP SERPL-CCNC: 76 U/L (ref 50–136)
ALP SERPL-CCNC: 80 U/L (ref 50–136)
ALP SERPL-CCNC: 84 U/L (ref 50–136)
ALP SERPL-CCNC: 85 U/L (ref 50–136)
ALT SERPL-CCNC: 16 U/L (ref 12–65)
ALT SERPL-CCNC: 18 U/L (ref 12–65)
ALT SERPL-CCNC: 19 U/L (ref 12–65)
ALT SERPL-CCNC: 19 U/L (ref 12–65)
ALT SERPL-CCNC: 20 U/L (ref 12–65)
ALT SERPL-CCNC: 20 U/L (ref 12–65)
ALT SERPL-CCNC: 22 U/L (ref 12–65)
ALT SERPL-CCNC: 23 U/L (ref 12–65)
ANION GAP SERPL CALC-SCNC: 10 MMOL/L (ref 7–16)
ANION GAP SERPL CALC-SCNC: 11 MMOL/L (ref 7–16)
ANION GAP SERPL CALC-SCNC: 14 MMOL/L (ref 7–16)
ANION GAP SERPL CALC-SCNC: 14 MMOL/L (ref 7–16)
ANION GAP SERPL CALC-SCNC: 6 MMOL/L (ref 7–16)
ANION GAP SERPL CALC-SCNC: 7 MMOL/L (ref 7–16)
ANION GAP SERPL CALC-SCNC: 7 MMOL/L (ref 7–16)
ANION GAP SERPL CALC-SCNC: 8 MMOL/L (ref 7–16)
ANION GAP SERPL CALC-SCNC: 9 MMOL/L (ref 7–16)
APPEARANCE UR: ABNORMAL
APPEARANCE UR: CLEAR
APTT PPP: 101.4 SEC (ref 23.2–35.3)
APTT PPP: 105 SEC (ref 23.2–35.3)
APTT PPP: 124.5 SEC (ref 23.2–35.3)
APTT PPP: 63.5 SEC (ref 23.2–35.3)
ARTERIAL PATENCY WRIST A: ABNORMAL
ARTERIAL PATENCY WRIST A: POSITIVE
ARTERIAL PATENCY WRIST A: POSITIVE
ARTERIAL PATENCY WRIST A: YES
AST SERPL-CCNC: 16 U/L (ref 15–37)
AST SERPL-CCNC: 17 U/L (ref 15–37)
AST SERPL-CCNC: 18 U/L (ref 15–37)
AST SERPL-CCNC: 20 U/L (ref 15–37)
AST SERPL-CCNC: 23 U/L (ref 15–37)
AST SERPL-CCNC: 23 U/L (ref 15–37)
AST SERPL-CCNC: 26 U/L (ref 15–37)
AST SERPL-CCNC: 27 U/L (ref 15–37)
AST SERPL-CCNC: 36 U/L (ref 15–37)
AST SERPL-CCNC: 65 U/L (ref 15–37)
ATRIAL RATE: 107 BPM
ATRIAL RATE: 132 BPM
ATRIAL RATE: 83 BPM
ATRIAL RATE: 90 BPM
ATRIAL RATE: 93 BPM
BACTERIA SPEC CULT: ABNORMAL
BACTERIA SPEC CULT: NORMAL
BACTERIA URNS QL MICRO: 0 /HPF
BACTERIA URNS QL MICRO: ABNORMAL /HPF
BASE DEFICIT BLD-SCNC: 2 MMOL/L
BASE DEFICIT BLDA-SCNC: 14 MMOL/L (ref 0–2)
BASE DEFICIT BLDA-SCNC: 8.3 MMOL/L (ref 0–2)
BASE DEFICIT BLDV-SCNC: 4 MMOL/L
BASOPHILS # BLD: 0 K/UL (ref 0–0.2)
BASOPHILS # BLD: 0.1 K/UL (ref 0–0.2)
BASOPHILS NFR BLD: 0 % (ref 0–2)
BASOPHILS NFR BLD: 1 % (ref 0–2)
BDY SITE: ABNORMAL
BILIRUB DIRECT SERPL-MCNC: <0.1 MG/DL
BILIRUB SERPL-MCNC: 0.2 MG/DL (ref 0.2–1.1)
BILIRUB SERPL-MCNC: 0.3 MG/DL (ref 0.2–1.1)
BILIRUB SERPL-MCNC: 0.4 MG/DL (ref 0.2–1.1)
BILIRUB SERPL-MCNC: 0.4 MG/DL (ref 0.2–1.1)
BILIRUB SERPL-MCNC: 0.5 MG/DL (ref 0.2–1.1)
BILIRUB SERPL-MCNC: 0.5 MG/DL (ref 0.2–1.1)
BILIRUB SERPL-MCNC: 0.7 MG/DL (ref 0.2–1.1)
BILIRUB UR QL: NEGATIVE
BLD PROD TYP BPU: NORMAL
BLD PROD TYP BPU: NORMAL
BLOOD GROUP ANTIBODIES SERPL: NORMAL
BLOOD GROUP ANTIBODIES SERPL: NORMAL
BNP SERPL-MCNC: 157 PG/ML
BNP SERPL-MCNC: 577 PG/ML
BNP SERPL-MCNC: 680 PG/ML
BODY TEMPERATURE: 98.6
BPU ID: NORMAL
BPU ID: NORMAL
BUN SERPL-MCNC: 27 MG/DL (ref 8–23)
BUN SERPL-MCNC: 28 MG/DL (ref 8–23)
BUN SERPL-MCNC: 33 MG/DL (ref 8–23)
BUN SERPL-MCNC: 34 MG/DL (ref 8–23)
BUN SERPL-MCNC: 37 MG/DL (ref 8–23)
BUN SERPL-MCNC: 37 MG/DL (ref 8–23)
BUN SERPL-MCNC: 38 MG/DL (ref 8–23)
BUN SERPL-MCNC: 39 MG/DL (ref 8–23)
BUN SERPL-MCNC: 41 MG/DL (ref 8–23)
BUN SERPL-MCNC: 42 MG/DL (ref 8–23)
BUN SERPL-MCNC: 42 MG/DL (ref 8–23)
BUN SERPL-MCNC: 43 MG/DL (ref 8–23)
BUN SERPL-MCNC: 45 MG/DL (ref 8–23)
BUN SERPL-MCNC: 45 MG/DL (ref 8–23)
BUN SERPL-MCNC: 46 MG/DL (ref 8–23)
BUN SERPL-MCNC: 47 MG/DL (ref 8–23)
BUN SERPL-MCNC: 48 MG/DL (ref 8–23)
BUN SERPL-MCNC: 51 MG/DL (ref 8–23)
BUN SERPL-MCNC: 52 MG/DL (ref 8–23)
BUN SERPL-MCNC: 52 MG/DL (ref 8–23)
BUN SERPL-MCNC: 53 MG/DL (ref 8–23)
BUN SERPL-MCNC: 54 MG/DL (ref 8–23)
BUN SERPL-MCNC: 56 MG/DL (ref 8–23)
BUN SERPL-MCNC: 60 MG/DL (ref 8–23)
BUN SERPL-MCNC: 62 MG/DL (ref 8–23)
BUN SERPL-MCNC: 68 MG/DL (ref 8–23)
BUN SERPL-MCNC: 71 MG/DL (ref 8–23)
BUN SERPL-MCNC: 72 MG/DL (ref 8–23)
BUN SERPL-MCNC: 73 MG/DL (ref 8–23)
BUN SERPL-MCNC: 78 MG/DL (ref 8–23)
BUN SERPL-MCNC: 87 MG/DL (ref 8–23)
CA-I BLD-SCNC: 1.08 MMOL/L (ref 1–1.3)
CALCIUM SERPL-MCNC: 6.9 MG/DL (ref 8.3–10.4)
CALCIUM SERPL-MCNC: 7.2 MG/DL (ref 8.3–10.4)
CALCIUM SERPL-MCNC: 7.4 MG/DL (ref 8.3–10.4)
CALCIUM SERPL-MCNC: 7.5 MG/DL (ref 8.3–10.4)
CALCIUM SERPL-MCNC: 7.6 MG/DL (ref 8.3–10.4)
CALCIUM SERPL-MCNC: 7.7 MG/DL (ref 8.3–10.4)
CALCIUM SERPL-MCNC: 7.8 MG/DL (ref 8.3–10.4)
CALCIUM SERPL-MCNC: 7.9 MG/DL (ref 8.3–10.4)
CALCIUM SERPL-MCNC: 8 MG/DL (ref 8.3–10.4)
CALCIUM SERPL-MCNC: 8.1 MG/DL (ref 8.3–10.4)
CALCIUM SERPL-MCNC: 8.2 MG/DL (ref 8.3–10.4)
CALCIUM SERPL-MCNC: 8.3 MG/DL (ref 8.3–10.4)
CALCIUM SERPL-MCNC: 8.3 MG/DL (ref 8.3–10.4)
CALCIUM SERPL-MCNC: 8.4 MG/DL (ref 8.3–10.4)
CALCULATED P AXIS, ECG09: 55 DEGREES
CALCULATED P AXIS, ECG09: 65 DEGREES
CALCULATED P AXIS, ECG09: 65 DEGREES
CALCULATED P AXIS, ECG09: 74 DEGREES
CALCULATED P AXIS, ECG09: 76 DEGREES
CALCULATED R AXIS, ECG10: 41 DEGREES
CALCULATED R AXIS, ECG10: 63 DEGREES
CALCULATED R AXIS, ECG10: 65 DEGREES
CALCULATED R AXIS, ECG10: 66 DEGREES
CALCULATED T AXIS, ECG11: -119 DEGREES
CALCULATED T AXIS, ECG11: -83 DEGREES
CALCULATED T AXIS, ECG11: -94 DEGREES
CALCULATED T AXIS, ECG11: 153 DEGREES
CALCULATED T AXIS, ECG11: 163 DEGREES
CASTS URNS QL MICRO: ABNORMAL /LPF
CASTS URNS QL MICRO: NORMAL /LPF
CHLORIDE BLDA-SCNC: 106 MMOL/L (ref 98–106)
CHLORIDE SERPL-SCNC: 106 MMOL/L (ref 98–107)
CHLORIDE SERPL-SCNC: 107 MMOL/L (ref 98–107)
CHLORIDE SERPL-SCNC: 108 MMOL/L (ref 98–107)
CHLORIDE SERPL-SCNC: 109 MMOL/L (ref 98–107)
CHLORIDE SERPL-SCNC: 110 MMOL/L (ref 98–107)
CHLORIDE SERPL-SCNC: 111 MMOL/L (ref 98–107)
CHLORIDE SERPL-SCNC: 112 MMOL/L (ref 98–107)
CHLORIDE SERPL-SCNC: 113 MMOL/L (ref 98–107)
CHLORIDE SERPL-SCNC: 114 MMOL/L (ref 98–107)
CHLORIDE SERPL-SCNC: 114 MMOL/L (ref 98–107)
CHLORIDE SERPL-SCNC: 115 MMOL/L (ref 98–107)
CK SERPL-CCNC: 102 U/L (ref 21–215)
CK SERPL-CCNC: 199 U/L (ref 21–215)
CO2 BLD-SCNC: 22 MMOL/L
CO2 SERPL-SCNC: 17 MMOL/L (ref 21–32)
CO2 SERPL-SCNC: 18 MMOL/L (ref 21–32)
CO2 SERPL-SCNC: 19 MMOL/L (ref 21–32)
CO2 SERPL-SCNC: 20 MMOL/L (ref 21–32)
CO2 SERPL-SCNC: 21 MMOL/L (ref 21–32)
CO2 SERPL-SCNC: 22 MMOL/L (ref 21–32)
CO2 SERPL-SCNC: 23 MMOL/L (ref 21–32)
CO2 SERPL-SCNC: 24 MMOL/L (ref 21–32)
CO2 SERPL-SCNC: 24 MMOL/L (ref 21–32)
CO2 SERPL-SCNC: 25 MMOL/L (ref 21–32)
CO2 SERPL-SCNC: 25 MMOL/L (ref 21–32)
CO2 SERPL-SCNC: 26 MMOL/L (ref 21–32)
COHGB MFR BLD: 0.3 % (ref 0.5–1.5)
COHGB MFR BLD: 0.3 % (ref 0.5–1.5)
COHGB MFR BLD: 1.1 % (ref 0.5–1.5)
COLOR UR: YELLOW
CORTIS BS SERPL-MCNC: 12.3 UG/DL
CREAT SERPL-MCNC: 3.44 MG/DL (ref 0.8–1.5)
CREAT SERPL-MCNC: 3.47 MG/DL (ref 0.8–1.5)
CREAT SERPL-MCNC: 3.56 MG/DL (ref 0.8–1.5)
CREAT SERPL-MCNC: 3.6 MG/DL (ref 0.8–1.5)
CREAT SERPL-MCNC: 3.64 MG/DL (ref 0.8–1.5)
CREAT SERPL-MCNC: 3.72 MG/DL (ref 0.8–1.5)
CREAT SERPL-MCNC: 3.73 MG/DL (ref 0.8–1.5)
CREAT SERPL-MCNC: 3.74 MG/DL (ref 0.8–1.5)
CREAT SERPL-MCNC: 3.76 MG/DL (ref 0.8–1.5)
CREAT SERPL-MCNC: 4.11 MG/DL (ref 0.8–1.5)
CREAT SERPL-MCNC: 4.14 MG/DL (ref 0.8–1.5)
CREAT SERPL-MCNC: 4.14 MG/DL (ref 0.8–1.5)
CREAT SERPL-MCNC: 4.28 MG/DL (ref 0.8–1.5)
CREAT SERPL-MCNC: 4.31 MG/DL (ref 0.8–1.5)
CREAT SERPL-MCNC: 4.34 MG/DL (ref 0.8–1.5)
CREAT SERPL-MCNC: 4.34 MG/DL (ref 0.8–1.5)
CREAT SERPL-MCNC: 4.36 MG/DL (ref 0.8–1.5)
CREAT SERPL-MCNC: 4.37 MG/DL (ref 0.8–1.5)
CREAT SERPL-MCNC: 4.44 MG/DL (ref 0.8–1.5)
CREAT SERPL-MCNC: 4.5 MG/DL (ref 0.8–1.5)
CREAT SERPL-MCNC: 4.54 MG/DL (ref 0.8–1.5)
CREAT SERPL-MCNC: 4.7 MG/DL (ref 0.8–1.5)
CREAT SERPL-MCNC: 4.78 MG/DL (ref 0.8–1.5)
CREAT SERPL-MCNC: 4.81 MG/DL (ref 0.8–1.5)
CREAT SERPL-MCNC: 4.84 MG/DL (ref 0.8–1.5)
CREAT SERPL-MCNC: 5.03 MG/DL (ref 0.8–1.5)
CREAT SERPL-MCNC: 5.14 MG/DL (ref 0.8–1.5)
CREAT SERPL-MCNC: 5.17 MG/DL (ref 0.8–1.5)
CREAT SERPL-MCNC: 5.23 MG/DL (ref 0.8–1.5)
CREAT SERPL-MCNC: 5.27 MG/DL (ref 0.8–1.5)
CREAT SERPL-MCNC: 5.44 MG/DL (ref 0.8–1.5)
CREAT SERPL-MCNC: 5.45 MG/DL (ref 0.8–1.5)
CREAT SERPL-MCNC: 5.98 MG/DL (ref 0.8–1.5)
CROSSMATCH RESULT,%XM: NORMAL
CROSSMATCH RESULT,%XM: NORMAL
CRP SERPL HS-MCNC: 47.5 MG/L
CRP SERPL-MCNC: 2.4 MG/DL (ref 0–0.9)
CRP SERPL-MCNC: 3.7 MG/DL (ref 0–0.9)
CRP SERPL-MCNC: 4.6 MG/DL (ref 0–0.9)
DIAGNOSIS, 93000: NORMAL
DIFFERENTIAL METHOD BLD: ABNORMAL
DO-HGB BLD-MCNC: 2 % (ref 0–5)
DO-HGB BLD-MCNC: 3 % (ref 0–5)
DO-HGB BLD-MCNC: 8 % (ref 0–5)
EOSINOPHIL # BLD: 0 K/UL (ref 0–0.8)
EOSINOPHIL # BLD: 0 K/UL (ref 0–0.8)
EOSINOPHIL # BLD: 0.2 K/UL (ref 0–0.8)
EOSINOPHIL # BLD: 0.3 K/UL (ref 0–0.8)
EOSINOPHIL # BLD: 0.4 K/UL (ref 0–0.8)
EOSINOPHIL # BLD: 0.5 K/UL (ref 0–0.8)
EOSINOPHIL # BLD: 0.6 K/UL (ref 0–0.8)
EOSINOPHIL # BLD: 0.7 K/UL (ref 0–0.8)
EOSINOPHIL # BLD: 0.8 K/UL (ref 0–0.8)
EOSINOPHIL NFR BLD: 0 % (ref 0.5–7.8)
EOSINOPHIL NFR BLD: 0 % (ref 0.5–7.8)
EOSINOPHIL NFR BLD: 10 % (ref 0.5–7.8)
EOSINOPHIL NFR BLD: 2 % (ref 0.5–7.8)
EOSINOPHIL NFR BLD: 3 % (ref 0.5–7.8)
EOSINOPHIL NFR BLD: 4 % (ref 0.5–7.8)
EOSINOPHIL NFR BLD: 5 % (ref 0.5–7.8)
EOSINOPHIL NFR BLD: 5 % (ref 0.5–7.8)
EOSINOPHIL NFR BLD: 6 % (ref 0.5–7.8)
EOSINOPHIL NFR BLD: 8 % (ref 0.5–7.8)
EOSINOPHIL NFR BLD: 9 % (ref 0.5–7.8)
EOSINOPHIL NFR BLD: 9 % (ref 0.5–7.8)
EPI CELLS #/AREA URNS HPF: ABNORMAL /HPF
EPI CELLS #/AREA URNS HPF: NORMAL /HPF
ERYTHROCYTE [DISTWIDTH] IN BLOOD BY AUTOMATED COUNT: 12.1 %
ERYTHROCYTE [DISTWIDTH] IN BLOOD BY AUTOMATED COUNT: 12.2 %
ERYTHROCYTE [DISTWIDTH] IN BLOOD BY AUTOMATED COUNT: 12.2 %
ERYTHROCYTE [DISTWIDTH] IN BLOOD BY AUTOMATED COUNT: 12.5 %
ERYTHROCYTE [DISTWIDTH] IN BLOOD BY AUTOMATED COUNT: 12.7 %
ERYTHROCYTE [DISTWIDTH] IN BLOOD BY AUTOMATED COUNT: 12.7 %
ERYTHROCYTE [DISTWIDTH] IN BLOOD BY AUTOMATED COUNT: 12.8 %
ERYTHROCYTE [DISTWIDTH] IN BLOOD BY AUTOMATED COUNT: 12.9 %
ERYTHROCYTE [DISTWIDTH] IN BLOOD BY AUTOMATED COUNT: 13 %
ERYTHROCYTE [DISTWIDTH] IN BLOOD BY AUTOMATED COUNT: 13.1 %
ERYTHROCYTE [DISTWIDTH] IN BLOOD BY AUTOMATED COUNT: 13.2 %
ERYTHROCYTE [DISTWIDTH] IN BLOOD BY AUTOMATED COUNT: 13.2 %
ERYTHROCYTE [DISTWIDTH] IN BLOOD BY AUTOMATED COUNT: 13.4 %
ERYTHROCYTE [DISTWIDTH] IN BLOOD BY AUTOMATED COUNT: 14 %
ERYTHROCYTE [DISTWIDTH] IN BLOOD BY AUTOMATED COUNT: 14.3 %
ERYTHROCYTE [DISTWIDTH] IN BLOOD BY AUTOMATED COUNT: 15.1 % (ref 11.9–14.6)
ERYTHROCYTE [DISTWIDTH] IN BLOOD BY AUTOMATED COUNT: 15.3 % (ref 11.9–14.6)
ERYTHROCYTE [SEDIMENTATION RATE] IN BLOOD: 90 MM/HR (ref 0–20)
ERYTHROCYTE [SEDIMENTATION RATE] IN BLOOD: >120 MM/HR (ref 0–20)
EST. AVERAGE GLUCOSE BLD GHB EST-MCNC: ABNORMAL MG/DL
EST. AVERAGE GLUCOSE BLD GHB EST-MCNC: ABNORMAL MG/DL
ETHANOL SERPL-MCNC: <3 MG/DL
FERRITIN SERPL-MCNC: 313 NG/ML (ref 8–388)
FIO2 ON VENT: 100 %
FIO2 ON VENT: 55 %
FIO2 ON VENT: 65 %
FLUAV AG NPH QL IA: NEGATIVE
FLUBV AG NPH QL IA: NEGATIVE
FOLATE SERPL-MCNC: 6.9 NG/ML (ref 3.1–17.5)
GAS FLOW.O2 O2 DELIVERY SYS: ABNORMAL L/MIN
GLOBULIN SER CALC-MCNC: 3.5 G/DL (ref 2.3–3.5)
GLOBULIN SER CALC-MCNC: 3.7 G/DL (ref 2.3–3.5)
GLOBULIN SER CALC-MCNC: 4.1 G/DL (ref 2.3–3.5)
GLOBULIN SER CALC-MCNC: 4.6 G/DL (ref 2.3–3.5)
GLOBULIN SER CALC-MCNC: 4.6 G/DL (ref 2.3–3.5)
GLOBULIN SER CALC-MCNC: 4.8 G/DL (ref 2.3–3.5)
GLOBULIN SER CALC-MCNC: 4.9 G/DL (ref 2.3–3.5)
GLOBULIN SER CALC-MCNC: 5.2 G/DL (ref 2.3–3.5)
GLOBULIN SER CALC-MCNC: 5.2 G/DL (ref 2.3–3.5)
GLOBULIN SER CALC-MCNC: 5.3 G/DL (ref 2.3–3.5)
GLUCOSE BLD STRIP.AUTO-MCNC: 100 MG/DL (ref 65–100)
GLUCOSE BLD STRIP.AUTO-MCNC: 101 MG/DL (ref 65–100)
GLUCOSE BLD STRIP.AUTO-MCNC: 101 MG/DL (ref 65–100)
GLUCOSE BLD STRIP.AUTO-MCNC: 102 MG/DL (ref 65–100)
GLUCOSE BLD STRIP.AUTO-MCNC: 102 MG/DL (ref 65–100)
GLUCOSE BLD STRIP.AUTO-MCNC: 105 MG/DL (ref 65–100)
GLUCOSE BLD STRIP.AUTO-MCNC: 109 MG/DL (ref 65–100)
GLUCOSE BLD STRIP.AUTO-MCNC: 111 MG/DL (ref 65–100)
GLUCOSE BLD STRIP.AUTO-MCNC: 112 MG/DL (ref 65–100)
GLUCOSE BLD STRIP.AUTO-MCNC: 114 MG/DL (ref 65–100)
GLUCOSE BLD STRIP.AUTO-MCNC: 114 MG/DL (ref 65–100)
GLUCOSE BLD STRIP.AUTO-MCNC: 116 MG/DL (ref 65–100)
GLUCOSE BLD STRIP.AUTO-MCNC: 118 MG/DL (ref 65–100)
GLUCOSE BLD STRIP.AUTO-MCNC: 119 MG/DL (ref 65–100)
GLUCOSE BLD STRIP.AUTO-MCNC: 119 MG/DL (ref 65–100)
GLUCOSE BLD STRIP.AUTO-MCNC: 122 MG/DL (ref 65–100)
GLUCOSE BLD STRIP.AUTO-MCNC: 123 MG/DL (ref 65–100)
GLUCOSE BLD STRIP.AUTO-MCNC: 124 MG/DL (ref 65–100)
GLUCOSE BLD STRIP.AUTO-MCNC: 124 MG/DL (ref 65–100)
GLUCOSE BLD STRIP.AUTO-MCNC: 125 MG/DL (ref 65–100)
GLUCOSE BLD STRIP.AUTO-MCNC: 126 MG/DL (ref 65–100)
GLUCOSE BLD STRIP.AUTO-MCNC: 126 MG/DL (ref 65–100)
GLUCOSE BLD STRIP.AUTO-MCNC: 129 MG/DL (ref 65–100)
GLUCOSE BLD STRIP.AUTO-MCNC: 129 MG/DL (ref 65–100)
GLUCOSE BLD STRIP.AUTO-MCNC: 130 MG/DL (ref 65–100)
GLUCOSE BLD STRIP.AUTO-MCNC: 131 MG/DL (ref 65–100)
GLUCOSE BLD STRIP.AUTO-MCNC: 132 MG/DL (ref 65–100)
GLUCOSE BLD STRIP.AUTO-MCNC: 133 MG/DL (ref 65–100)
GLUCOSE BLD STRIP.AUTO-MCNC: 133 MG/DL (ref 65–100)
GLUCOSE BLD STRIP.AUTO-MCNC: 134 MG/DL (ref 65–100)
GLUCOSE BLD STRIP.AUTO-MCNC: 134 MG/DL (ref 65–100)
GLUCOSE BLD STRIP.AUTO-MCNC: 136 MG/DL (ref 65–100)
GLUCOSE BLD STRIP.AUTO-MCNC: 137 MG/DL (ref 65–100)
GLUCOSE BLD STRIP.AUTO-MCNC: 139 MG/DL (ref 65–100)
GLUCOSE BLD STRIP.AUTO-MCNC: 140 MG/DL (ref 65–100)
GLUCOSE BLD STRIP.AUTO-MCNC: 140 MG/DL (ref 65–100)
GLUCOSE BLD STRIP.AUTO-MCNC: 143 MG/DL (ref 65–100)
GLUCOSE BLD STRIP.AUTO-MCNC: 143 MG/DL (ref 65–100)
GLUCOSE BLD STRIP.AUTO-MCNC: 144 MG/DL (ref 65–100)
GLUCOSE BLD STRIP.AUTO-MCNC: 146 MG/DL (ref 65–100)
GLUCOSE BLD STRIP.AUTO-MCNC: 147 MG/DL (ref 65–100)
GLUCOSE BLD STRIP.AUTO-MCNC: 147 MG/DL (ref 65–100)
GLUCOSE BLD STRIP.AUTO-MCNC: 149 MG/DL (ref 65–100)
GLUCOSE BLD STRIP.AUTO-MCNC: 149 MG/DL (ref 65–100)
GLUCOSE BLD STRIP.AUTO-MCNC: 150 MG/DL (ref 65–100)
GLUCOSE BLD STRIP.AUTO-MCNC: 151 MG/DL (ref 65–100)
GLUCOSE BLD STRIP.AUTO-MCNC: 152 MG/DL (ref 65–100)
GLUCOSE BLD STRIP.AUTO-MCNC: 153 MG/DL (ref 65–100)
GLUCOSE BLD STRIP.AUTO-MCNC: 154 MG/DL (ref 65–100)
GLUCOSE BLD STRIP.AUTO-MCNC: 156 MG/DL (ref 65–100)
GLUCOSE BLD STRIP.AUTO-MCNC: 157 MG/DL (ref 65–100)
GLUCOSE BLD STRIP.AUTO-MCNC: 157 MG/DL (ref 65–100)
GLUCOSE BLD STRIP.AUTO-MCNC: 161 MG/DL (ref 65–100)
GLUCOSE BLD STRIP.AUTO-MCNC: 162 MG/DL (ref 65–100)
GLUCOSE BLD STRIP.AUTO-MCNC: 164 MG/DL (ref 65–100)
GLUCOSE BLD STRIP.AUTO-MCNC: 172 MG/DL (ref 65–100)
GLUCOSE BLD STRIP.AUTO-MCNC: 178 MG/DL (ref 65–100)
GLUCOSE BLD STRIP.AUTO-MCNC: 180 MG/DL (ref 65–100)
GLUCOSE BLD STRIP.AUTO-MCNC: 181 MG/DL (ref 65–100)
GLUCOSE BLD STRIP.AUTO-MCNC: 184 MG/DL (ref 65–100)
GLUCOSE BLD STRIP.AUTO-MCNC: 186 MG/DL (ref 65–100)
GLUCOSE BLD STRIP.AUTO-MCNC: 187 MG/DL (ref 65–100)
GLUCOSE BLD STRIP.AUTO-MCNC: 191 MG/DL (ref 65–100)
GLUCOSE BLD STRIP.AUTO-MCNC: 193 MG/DL (ref 65–100)
GLUCOSE BLD STRIP.AUTO-MCNC: 194 MG/DL (ref 65–100)
GLUCOSE BLD STRIP.AUTO-MCNC: 197 MG/DL (ref 65–100)
GLUCOSE BLD STRIP.AUTO-MCNC: 206 MG/DL (ref 65–100)
GLUCOSE BLD STRIP.AUTO-MCNC: 207 MG/DL (ref 65–100)
GLUCOSE BLD STRIP.AUTO-MCNC: 209 MG/DL (ref 65–100)
GLUCOSE BLD STRIP.AUTO-MCNC: 213 MG/DL (ref 65–100)
GLUCOSE BLD STRIP.AUTO-MCNC: 213 MG/DL (ref 65–100)
GLUCOSE BLD STRIP.AUTO-MCNC: 218 MG/DL (ref 65–100)
GLUCOSE BLD STRIP.AUTO-MCNC: 222 MG/DL (ref 65–100)
GLUCOSE BLD STRIP.AUTO-MCNC: 223 MG/DL (ref 65–100)
GLUCOSE BLD STRIP.AUTO-MCNC: 237 MG/DL (ref 65–100)
GLUCOSE BLD STRIP.AUTO-MCNC: 238 MG/DL (ref 65–100)
GLUCOSE BLD STRIP.AUTO-MCNC: 241 MG/DL (ref 65–100)
GLUCOSE BLD STRIP.AUTO-MCNC: 243 MG/DL (ref 65–100)
GLUCOSE BLD STRIP.AUTO-MCNC: 243 MG/DL (ref 65–100)
GLUCOSE BLD STRIP.AUTO-MCNC: 250 MG/DL (ref 65–100)
GLUCOSE BLD STRIP.AUTO-MCNC: 294 MG/DL (ref 65–100)
GLUCOSE BLD STRIP.AUTO-MCNC: 311 MG/DL (ref 65–100)
GLUCOSE BLD STRIP.AUTO-MCNC: 332 MG/DL (ref 65–100)
GLUCOSE BLD STRIP.AUTO-MCNC: 51 MG/DL (ref 65–100)
GLUCOSE BLD STRIP.AUTO-MCNC: 64 MG/DL (ref 65–100)
GLUCOSE BLD STRIP.AUTO-MCNC: 66 MG/DL (ref 65–100)
GLUCOSE BLD STRIP.AUTO-MCNC: 68 MG/DL (ref 65–100)
GLUCOSE BLD STRIP.AUTO-MCNC: 73 MG/DL (ref 65–100)
GLUCOSE BLD STRIP.AUTO-MCNC: 76 MG/DL (ref 65–100)
GLUCOSE BLD STRIP.AUTO-MCNC: 81 MG/DL (ref 65–100)
GLUCOSE BLD STRIP.AUTO-MCNC: 85 MG/DL (ref 65–100)
GLUCOSE BLD STRIP.AUTO-MCNC: 86 MG/DL (ref 65–100)
GLUCOSE BLD STRIP.AUTO-MCNC: 86 MG/DL (ref 65–100)
GLUCOSE BLD STRIP.AUTO-MCNC: 88 MG/DL (ref 65–100)
GLUCOSE BLD STRIP.AUTO-MCNC: 89 MG/DL (ref 65–100)
GLUCOSE BLD STRIP.AUTO-MCNC: 90 MG/DL (ref 65–100)
GLUCOSE BLD STRIP.AUTO-MCNC: 90 MG/DL (ref 65–100)
GLUCOSE BLD STRIP.AUTO-MCNC: 96 MG/DL (ref 65–100)
GLUCOSE SERPL-MCNC: 104 MG/DL (ref 65–100)
GLUCOSE SERPL-MCNC: 107 MG/DL (ref 65–100)
GLUCOSE SERPL-MCNC: 108 MG/DL (ref 65–100)
GLUCOSE SERPL-MCNC: 108 MG/DL (ref 65–100)
GLUCOSE SERPL-MCNC: 109 MG/DL (ref 65–100)
GLUCOSE SERPL-MCNC: 110 MG/DL (ref 65–100)
GLUCOSE SERPL-MCNC: 111 MG/DL (ref 65–100)
GLUCOSE SERPL-MCNC: 111 MG/DL (ref 65–100)
GLUCOSE SERPL-MCNC: 112 MG/DL (ref 65–100)
GLUCOSE SERPL-MCNC: 114 MG/DL (ref 65–100)
GLUCOSE SERPL-MCNC: 125 MG/DL (ref 65–100)
GLUCOSE SERPL-MCNC: 126 MG/DL (ref 65–100)
GLUCOSE SERPL-MCNC: 128 MG/DL (ref 65–100)
GLUCOSE SERPL-MCNC: 128 MG/DL (ref 65–100)
GLUCOSE SERPL-MCNC: 149 MG/DL (ref 65–100)
GLUCOSE SERPL-MCNC: 196 MG/DL (ref 65–100)
GLUCOSE SERPL-MCNC: 197 MG/DL (ref 65–100)
GLUCOSE SERPL-MCNC: 200 MG/DL (ref 65–100)
GLUCOSE SERPL-MCNC: 208 MG/DL (ref 65–100)
GLUCOSE SERPL-MCNC: 219 MG/DL (ref 65–100)
GLUCOSE SERPL-MCNC: 238 MG/DL (ref 65–100)
GLUCOSE SERPL-MCNC: 261 MG/DL (ref 65–100)
GLUCOSE SERPL-MCNC: 63 MG/DL (ref 65–100)
GLUCOSE SERPL-MCNC: 65 MG/DL (ref 65–100)
GLUCOSE SERPL-MCNC: 78 MG/DL (ref 65–100)
GLUCOSE SERPL-MCNC: 81 MG/DL (ref 65–100)
GLUCOSE SERPL-MCNC: 81 MG/DL (ref 65–100)
GLUCOSE SERPL-MCNC: 83 MG/DL (ref 65–100)
GLUCOSE SERPL-MCNC: 85 MG/DL (ref 65–100)
GLUCOSE SERPL-MCNC: 86 MG/DL (ref 65–100)
GLUCOSE SERPL-MCNC: 93 MG/DL (ref 65–100)
GLUCOSE SERPL-MCNC: 94 MG/DL (ref 65–100)
GLUCOSE SERPL-MCNC: 97 MG/DL (ref 65–100)
GLUCOSE UR STRIP.AUTO-MCNC: 100 MG/DL
GLUCOSE UR STRIP.AUTO-MCNC: NEGATIVE MG/DL
GRAM STN SPEC: ABNORMAL
HBA1C MFR BLD: <3.5 % (ref 4.8–6)
HBA1C MFR BLD: <3.5 % (ref 4.8–6)
HCO3 BLD-SCNC: 21.2 MMOL/L (ref 22–26)
HCO3 BLDA-SCNC: 13 MMOL/L (ref 22–26)
HCO3 BLDA-SCNC: 17 MMOL/L (ref 22–26)
HCO3 BLDV-SCNC: 20 MMOL/L
HCT VFR BLD AUTO: 23.5 % (ref 41.1–50.3)
HCT VFR BLD AUTO: 23.5 % (ref 41.1–50.3)
HCT VFR BLD AUTO: 23.8 % (ref 41.1–50.3)
HCT VFR BLD AUTO: 23.9 % (ref 41.1–50.3)
HCT VFR BLD AUTO: 24.5 % (ref 41.1–50.3)
HCT VFR BLD AUTO: 24.5 % (ref 41.1–50.3)
HCT VFR BLD AUTO: 24.8 % (ref 41.1–50.3)
HCT VFR BLD AUTO: 25.1 % (ref 41.1–50.3)
HCT VFR BLD AUTO: 25.6 % (ref 41.1–50.3)
HCT VFR BLD AUTO: 25.6 % (ref 41.1–50.3)
HCT VFR BLD AUTO: 25.9 % (ref 41.1–50.3)
HCT VFR BLD AUTO: 26.4 % (ref 41.1–50.3)
HCT VFR BLD AUTO: 26.9 % (ref 41.1–50.3)
HCT VFR BLD AUTO: 27 % (ref 41.1–50.3)
HCT VFR BLD AUTO: 27.1 % (ref 41.1–50.3)
HCT VFR BLD AUTO: 27.3 % (ref 41.1–50.3)
HCT VFR BLD AUTO: 27.6 % (ref 41.1–50.3)
HCT VFR BLD AUTO: 27.6 % (ref 41.1–50.3)
HCT VFR BLD AUTO: 27.7 % (ref 41.1–50.3)
HCT VFR BLD AUTO: 27.9 % (ref 41.1–50.3)
HCT VFR BLD AUTO: 28.2 % (ref 41.1–50.3)
HCT VFR BLD AUTO: 28.5 % (ref 41.1–50.3)
HCT VFR BLD AUTO: 28.5 % (ref 41.1–50.3)
HCT VFR BLD AUTO: 28.6 % (ref 41.1–50.3)
HCT VFR BLD AUTO: 29.2 % (ref 41.1–50.3)
HCT VFR BLD AUTO: 29.4 % (ref 41.1–50.3)
HCT VFR BLD AUTO: 29.7 % (ref 41.1–50.3)
HCT VFR BLD AUTO: 30 % (ref 41.1–50.3)
HCT VFR BLD AUTO: 30.5 % (ref 41.1–50.3)
HCT VFR BLD AUTO: 30.6 % (ref 41.1–50.3)
HCT VFR BLD AUTO: 31.5 % (ref 41.1–50.3)
HCT VFR BLD AUTO: 32.1 % (ref 41.1–50.3)
HGB BLD-MCNC: 10 G/DL (ref 13.6–17.2)
HGB BLD-MCNC: 7.3 G/DL (ref 13.6–17.2)
HGB BLD-MCNC: 7.4 G/DL (ref 13.6–17.2)
HGB BLD-MCNC: 7.4 G/DL (ref 13.6–17.2)
HGB BLD-MCNC: 7.5 G/DL (ref 13.6–17.2)
HGB BLD-MCNC: 7.5 G/DL (ref 13.6–17.2)
HGB BLD-MCNC: 7.6 G/DL (ref 13.6–17.2)
HGB BLD-MCNC: 7.7 G/DL (ref 13.6–17.2)
HGB BLD-MCNC: 7.8 G/DL (ref 13.6–17.2)
HGB BLD-MCNC: 7.8 G/DL (ref 13.6–17.2)
HGB BLD-MCNC: 7.9 G/DL (ref 13.6–17.2)
HGB BLD-MCNC: 8.1 G/DL (ref 13.6–17.2)
HGB BLD-MCNC: 8.2 G/DL (ref 13.6–17.2)
HGB BLD-MCNC: 8.3 G/DL (ref 13.6–17.2)
HGB BLD-MCNC: 8.4 G/DL (ref 13.6–17.2)
HGB BLD-MCNC: 8.6 G/DL (ref 13.6–17.2)
HGB BLD-MCNC: 8.7 G/DL (ref 13.6–17.2)
HGB BLD-MCNC: 8.8 G/DL (ref 13.6–17.2)
HGB BLD-MCNC: 9.1 G/DL (ref 13.6–17.2)
HGB BLD-MCNC: 9.2 G/DL (ref 13.6–17.2)
HGB BLD-MCNC: 9.2 G/DL (ref 13.6–17.2)
HGB BLD-MCNC: 9.3 G/DL (ref 13.6–17.2)
HGB BLD-MCNC: 9.4 G/DL (ref 13.6–17.2)
HGB BLD-MCNC: 9.7 G/DL (ref 13.6–17.2)
HGB BLDMV-MCNC: 10.3 GM/DL (ref 11.7–15)
HGB BLDMV-MCNC: 8.2 GM/DL (ref 11.7–15)
HGB BLDMV-MCNC: 8.5 GM/DL (ref 11.7–15)
HGB UR QL STRIP: ABNORMAL
HGB UR QL STRIP: NEGATIVE
IMM GRANULOCYTES # BLD: 0 K/UL (ref 0–0.5)
IMM GRANULOCYTES # BLD: 0.1 K/UL (ref 0–0.5)
IMM GRANULOCYTES NFR BLD AUTO: 0 % (ref 0–5)
IMM GRANULOCYTES NFR BLD AUTO: 1 % (ref 0–5)
INR PPP: 1.1
IRON SATN MFR SERPL: 26 %
IRON SERPL-MCNC: 37 UG/DL (ref 35–150)
IRON SERPL-MCNC: 40 UG/DL (ref 35–150)
KETONES UR QL STRIP.AUTO: NEGATIVE MG/DL
LACTATE BLD-SCNC: 0.8 MMOL/L (ref 0.5–1.9)
LACTATE BLD-SCNC: 1 MMOL/L (ref 0.5–1.9)
LACTATE BLD-SCNC: 1.12 MMOL/L (ref 0.5–1.9)
LACTATE BLD-SCNC: 1.7 MMOL/L (ref 0.5–1.9)
LACTATE BLD-SCNC: 5.2 MMOL/L (ref 0.5–1.9)
LEUKOCYTE ESTERASE UR QL STRIP.AUTO: ABNORMAL
LEUKOCYTE ESTERASE UR QL STRIP.AUTO: ABNORMAL
LEUKOCYTE ESTERASE UR QL STRIP.AUTO: NEGATIVE
LEUKOCYTE ESTERASE UR QL STRIP.AUTO: NEGATIVE
LYMPHOCYTES # BLD: 0.4 K/UL (ref 0.5–4.6)
LYMPHOCYTES # BLD: 0.5 K/UL (ref 0.5–4.6)
LYMPHOCYTES # BLD: 0.5 K/UL (ref 0.5–4.6)
LYMPHOCYTES # BLD: 0.6 K/UL (ref 0.5–4.6)
LYMPHOCYTES # BLD: 0.7 K/UL (ref 0.5–4.6)
LYMPHOCYTES # BLD: 0.8 K/UL (ref 0.5–4.6)
LYMPHOCYTES # BLD: 0.9 K/UL (ref 0.5–4.6)
LYMPHOCYTES # BLD: 1 K/UL (ref 0.5–4.6)
LYMPHOCYTES # BLD: 1 K/UL (ref 0.5–4.6)
LYMPHOCYTES # BLD: 1.2 K/UL (ref 0.5–4.6)
LYMPHOCYTES # BLD: 1.2 K/UL (ref 0.5–4.6)
LYMPHOCYTES # BLD: 1.3 K/UL (ref 0.5–4.6)
LYMPHOCYTES # BLD: 1.5 K/UL (ref 0.5–4.6)
LYMPHOCYTES # BLD: 1.6 K/UL (ref 0.5–4.6)
LYMPHOCYTES # BLD: 4 K/UL (ref 0.5–4.6)
LYMPHOCYTES NFR BLD: 10 % (ref 13–44)
LYMPHOCYTES NFR BLD: 11 % (ref 13–44)
LYMPHOCYTES NFR BLD: 12 % (ref 13–44)
LYMPHOCYTES NFR BLD: 13 % (ref 13–44)
LYMPHOCYTES NFR BLD: 14 % (ref 13–44)
LYMPHOCYTES NFR BLD: 17 % (ref 13–44)
LYMPHOCYTES NFR BLD: 19 % (ref 13–44)
LYMPHOCYTES NFR BLD: 19 % (ref 13–44)
LYMPHOCYTES NFR BLD: 21 % (ref 13–44)
LYMPHOCYTES NFR BLD: 21 % (ref 13–44)
LYMPHOCYTES NFR BLD: 24 % (ref 13–44)
LYMPHOCYTES NFR BLD: 26 % (ref 13–44)
LYMPHOCYTES NFR BLD: 3 % (ref 13–44)
LYMPHOCYTES NFR BLD: 35 % (ref 13–44)
LYMPHOCYTES NFR BLD: 4 % (ref 13–44)
LYMPHOCYTES NFR BLD: 5 % (ref 13–44)
MAGNESIUM SERPL-MCNC: 1.8 MG/DL (ref 1.8–2.4)
MAGNESIUM SERPL-MCNC: 1.9 MG/DL (ref 1.8–2.4)
MAGNESIUM SERPL-MCNC: 1.9 MG/DL (ref 1.8–2.4)
MAGNESIUM SERPL-MCNC: 2.2 MG/DL (ref 1.8–2.4)
MAGNESIUM SERPL-MCNC: 2.3 MG/DL (ref 1.8–2.4)
MAGNESIUM SERPL-MCNC: 2.4 MG/DL (ref 1.8–2.4)
MCH RBC QN AUTO: 27.2 PG (ref 26.1–32.9)
MCH RBC QN AUTO: 27.3 PG (ref 26.1–32.9)
MCH RBC QN AUTO: 27.4 PG (ref 26.1–32.9)
MCH RBC QN AUTO: 28 PG (ref 26.1–32.9)
MCH RBC QN AUTO: 28.1 PG (ref 26.1–32.9)
MCH RBC QN AUTO: 28.1 PG (ref 26.1–32.9)
MCH RBC QN AUTO: 28.2 PG (ref 26.1–32.9)
MCH RBC QN AUTO: 28.3 PG (ref 26.1–32.9)
MCH RBC QN AUTO: 28.4 PG (ref 26.1–32.9)
MCH RBC QN AUTO: 28.5 PG (ref 26.1–32.9)
MCH RBC QN AUTO: 28.6 PG (ref 26.1–32.9)
MCH RBC QN AUTO: 28.7 PG (ref 26.1–32.9)
MCH RBC QN AUTO: 28.7 PG (ref 26.1–32.9)
MCH RBC QN AUTO: 28.8 PG (ref 26.1–32.9)
MCH RBC QN AUTO: 29.2 PG (ref 26.1–32.9)
MCHC RBC AUTO-ENTMCNC: 30.1 G/DL (ref 31.4–35)
MCHC RBC AUTO-ENTMCNC: 30.2 G/DL (ref 31.4–35)
MCHC RBC AUTO-ENTMCNC: 30.5 G/DL (ref 31.4–35)
MCHC RBC AUTO-ENTMCNC: 30.5 G/DL (ref 31.4–35)
MCHC RBC AUTO-ENTMCNC: 30.7 G/DL (ref 31.4–35)
MCHC RBC AUTO-ENTMCNC: 30.7 G/DL (ref 31.4–35)
MCHC RBC AUTO-ENTMCNC: 30.8 G/DL (ref 31.4–35)
MCHC RBC AUTO-ENTMCNC: 30.9 G/DL (ref 31.4–35)
MCHC RBC AUTO-ENTMCNC: 31 G/DL (ref 31.4–35)
MCHC RBC AUTO-ENTMCNC: 31 G/DL (ref 31.4–35)
MCHC RBC AUTO-ENTMCNC: 31.1 G/DL (ref 31.4–35)
MCHC RBC AUTO-ENTMCNC: 31.2 G/DL (ref 31.4–35)
MCHC RBC AUTO-ENTMCNC: 31.2 G/DL (ref 31.4–35)
MCHC RBC AUTO-ENTMCNC: 31.3 G/DL (ref 31.4–35)
MCHC RBC AUTO-ENTMCNC: 31.4 G/DL (ref 31.4–35)
MCHC RBC AUTO-ENTMCNC: 31.5 G/DL (ref 31.4–35)
MCHC RBC AUTO-ENTMCNC: 31.5 G/DL (ref 31.4–35)
MCHC RBC AUTO-ENTMCNC: 31.6 G/DL (ref 31.4–35)
MCHC RBC AUTO-ENTMCNC: 31.9 G/DL (ref 31.4–35)
MCHC RBC AUTO-ENTMCNC: 32.2 G/DL (ref 31.4–35)
MCV RBC AUTO: 86.6 FL (ref 79.6–97.8)
MCV RBC AUTO: 88.6 FL (ref 79.6–97.8)
MCV RBC AUTO: 89.4 FL (ref 79.6–97.8)
MCV RBC AUTO: 89.8 FL (ref 79.6–97.8)
MCV RBC AUTO: 90 FL (ref 79.6–97.8)
MCV RBC AUTO: 90.6 FL (ref 79.6–97.8)
MCV RBC AUTO: 90.8 FL (ref 79.6–97.8)
MCV RBC AUTO: 91 FL (ref 79.6–97.8)
MCV RBC AUTO: 91.1 FL (ref 79.6–97.8)
MCV RBC AUTO: 91.5 FL (ref 79.6–97.8)
MCV RBC AUTO: 91.5 FL (ref 79.6–97.8)
MCV RBC AUTO: 91.6 FL (ref 79.6–97.8)
MCV RBC AUTO: 92.2 FL (ref 79.6–97.8)
MCV RBC AUTO: 92.8 FL (ref 79.6–97.8)
MCV RBC AUTO: 93.1 FL (ref 79.6–97.8)
MCV RBC AUTO: 93.3 FL (ref 79.6–97.8)
MCV RBC AUTO: 93.4 FL (ref 79.6–97.8)
MCV RBC AUTO: 94.1 FL (ref 79.6–97.8)
METHGB MFR BLD: 0 % (ref 0–1.5)
METHGB MFR BLD: 0.3 % (ref 0–1.5)
METHGB MFR BLD: 0.5 % (ref 0–1.5)
MM INDURATION POC: 0 MM (ref 0–5)
MM INDURATION POC: NORMAL MM (ref 0–5)
MONOCYTES # BLD: 0.3 K/UL (ref 0.1–1.3)
MONOCYTES # BLD: 0.4 K/UL (ref 0.1–1.3)
MONOCYTES # BLD: 0.5 K/UL (ref 0.1–1.3)
MONOCYTES # BLD: 0.6 K/UL (ref 0.1–1.3)
MONOCYTES # BLD: 0.6 K/UL (ref 0.1–1.3)
MONOCYTES # BLD: 0.7 K/UL (ref 0.1–1.3)
MONOCYTES NFR BLD: 10 % (ref 4–12)
MONOCYTES NFR BLD: 4 % (ref 4–12)
MONOCYTES NFR BLD: 4 % (ref 4–12)
MONOCYTES NFR BLD: 5 % (ref 4–12)
MONOCYTES NFR BLD: 5 % (ref 4–12)
MONOCYTES NFR BLD: 6 % (ref 4–12)
MONOCYTES NFR BLD: 7 % (ref 4–12)
MONOCYTES NFR BLD: 8 % (ref 4–12)
MONOCYTES NFR BLD: 9 % (ref 4–12)
NEUTS SEG # BLD: 11.1 K/UL (ref 1.7–8.2)
NEUTS SEG # BLD: 14.2 K/UL (ref 1.7–8.2)
NEUTS SEG # BLD: 2.5 K/UL (ref 1.7–8.2)
NEUTS SEG # BLD: 2.7 K/UL (ref 1.7–8.2)
NEUTS SEG # BLD: 2.8 K/UL (ref 1.7–8.2)
NEUTS SEG # BLD: 3.1 K/UL (ref 1.7–8.2)
NEUTS SEG # BLD: 3.2 K/UL (ref 1.7–8.2)
NEUTS SEG # BLD: 3.2 K/UL (ref 1.7–8.2)
NEUTS SEG # BLD: 3.4 K/UL (ref 1.7–8.2)
NEUTS SEG # BLD: 4 K/UL (ref 1.7–8.2)
NEUTS SEG # BLD: 4.1 K/UL (ref 1.7–8.2)
NEUTS SEG # BLD: 4.6 K/UL (ref 1.7–8.2)
NEUTS SEG # BLD: 4.7 K/UL (ref 1.7–8.2)
NEUTS SEG # BLD: 4.9 K/UL (ref 1.7–8.2)
NEUTS SEG # BLD: 5 K/UL (ref 1.7–8.2)
NEUTS SEG # BLD: 5 K/UL (ref 1.7–8.2)
NEUTS SEG # BLD: 5.2 K/UL (ref 1.7–8.2)
NEUTS SEG # BLD: 5.4 K/UL (ref 1.7–8.2)
NEUTS SEG # BLD: 6.5 K/UL (ref 1.7–8.2)
NEUTS SEG # BLD: 7.9 K/UL (ref 1.7–8.2)
NEUTS SEG NFR BLD: 56 % (ref 43–78)
NEUTS SEG NFR BLD: 56 % (ref 43–78)
NEUTS SEG NFR BLD: 58 % (ref 43–78)
NEUTS SEG NFR BLD: 59 % (ref 43–78)
NEUTS SEG NFR BLD: 62 % (ref 43–78)
NEUTS SEG NFR BLD: 62 % (ref 43–78)
NEUTS SEG NFR BLD: 63 % (ref 43–78)
NEUTS SEG NFR BLD: 64 % (ref 43–78)
NEUTS SEG NFR BLD: 68 % (ref 43–78)
NEUTS SEG NFR BLD: 69 % (ref 43–78)
NEUTS SEG NFR BLD: 69 % (ref 43–78)
NEUTS SEG NFR BLD: 70 % (ref 43–78)
NEUTS SEG NFR BLD: 76 % (ref 43–78)
NEUTS SEG NFR BLD: 76 % (ref 43–78)
NEUTS SEG NFR BLD: 82 % (ref 43–78)
NEUTS SEG NFR BLD: 86 % (ref 43–78)
NEUTS SEG NFR BLD: 91 % (ref 43–78)
NEUTS SEG NFR BLD: 92 % (ref 43–78)
NITRITE UR QL STRIP.AUTO: NEGATIVE
NRBC # BLD: 0 K/UL (ref 0–0.2)
O2/TOTAL GAS SETTING VFR VENT: 21 %
OXYHGB MFR BLDA: 96.6 % (ref 94–97)
OXYHGB MFR BLDA: 97 % (ref 94–97)
OXYHGB MFR BLDV: 90.6 %
P-R INTERVAL, ECG05: 112 MS
P-R INTERVAL, ECG05: 176 MS
P-R INTERVAL, ECG05: 176 MS
P-R INTERVAL, ECG05: 182 MS
PCO2 BLD: 30.2 MMHG (ref 35–45)
PCO2 BLDA: 32 MMHG (ref 35–45)
PCO2 BLDA: 36 MMHG (ref 35–45)
PCO2 BLDV: 34.4 MMHG
PEEP RESPIRATORY: 5 CM[H2O]
PH BLD: 7.45 [PH] (ref 7.35–7.45)
PH BLDA: 7.19 [PH] (ref 7.35–7.45)
PH BLDA: 7.33 [PH] (ref 7.35–7.45)
PH BLDV: 7.39 [PH]
PH UR STRIP: 5.5 [PH] (ref 5–9)
PH UR STRIP: 5.5 [PH] (ref 5–9)
PH UR STRIP: 6 [PH] (ref 5–9)
PH UR STRIP: 6.5 [PH] (ref 5–9)
PHOSPHATE SERPL-MCNC: 3.5 MG/DL (ref 2.3–3.7)
PHOSPHATE SERPL-MCNC: 3.7 MG/DL (ref 2.3–3.7)
PLATELET # BLD AUTO: 203 K/UL (ref 150–450)
PLATELET # BLD AUTO: 204 K/UL (ref 150–450)
PLATELET # BLD AUTO: 210 K/UL (ref 150–450)
PLATELET # BLD AUTO: 212 K/UL (ref 150–450)
PLATELET # BLD AUTO: 217 K/UL (ref 150–450)
PLATELET # BLD AUTO: 223 K/UL (ref 150–450)
PLATELET # BLD AUTO: 228 K/UL (ref 150–450)
PLATELET # BLD AUTO: 232 K/UL (ref 150–450)
PLATELET # BLD AUTO: 233 K/UL (ref 150–450)
PLATELET # BLD AUTO: 233 K/UL (ref 150–450)
PLATELET # BLD AUTO: 259 K/UL (ref 150–450)
PLATELET # BLD AUTO: 261 K/UL (ref 150–450)
PLATELET # BLD AUTO: 269 K/UL (ref 150–450)
PLATELET # BLD AUTO: 271 K/UL (ref 150–450)
PLATELET # BLD AUTO: 276 K/UL (ref 150–450)
PLATELET # BLD AUTO: 293 K/UL (ref 150–450)
PLATELET # BLD AUTO: 295 K/UL (ref 150–450)
PLATELET # BLD AUTO: 298 K/UL (ref 150–450)
PLATELET # BLD AUTO: 301 K/UL (ref 150–450)
PLATELET # BLD AUTO: 302 K/UL (ref 150–450)
PLATELET # BLD AUTO: 326 K/UL (ref 150–450)
PLATELET # BLD AUTO: 338 K/UL (ref 150–450)
PMV BLD AUTO: 10.2 FL (ref 9.4–12.3)
PMV BLD AUTO: 10.4 FL (ref 9.4–12.3)
PMV BLD AUTO: 10.6 FL (ref 9.4–12.3)
PMV BLD AUTO: 10.7 FL (ref 9.4–12.3)
PMV BLD AUTO: 10.7 FL (ref 9.4–12.3)
PMV BLD AUTO: 10.8 FL (ref 9.4–12.3)
PMV BLD AUTO: 10.8 FL (ref 9.4–12.3)
PMV BLD AUTO: 10.9 FL (ref 9.4–12.3)
PMV BLD AUTO: 11 FL (ref 9.4–12.3)
PMV BLD AUTO: 11.1 FL (ref 9.4–12.3)
PMV BLD AUTO: 11.1 FL (ref 9.4–12.3)
PMV BLD AUTO: 11.2 FL (ref 9.4–12.3)
PMV BLD AUTO: 11.3 FL (ref 9.4–12.3)
PMV BLD AUTO: 11.3 FL (ref 9.4–12.3)
PMV BLD AUTO: 11.4 FL (ref 9.4–12.3)
PMV BLD AUTO: 9.9 FL (ref 9.4–12.3)
PO2 BLD: 59 MMHG (ref 75–100)
PO2 BLDA: 116 MMHG (ref 80–105)
PO2 BLDA: 134 MMHG (ref 80–105)
PO2 BLDV: 62 MMHG
POTASSIUM BLDA-SCNC: 3.72 MMOL/L (ref 3.5–5.3)
POTASSIUM SERPL-SCNC: 3.4 MMOL/L (ref 3.5–5.1)
POTASSIUM SERPL-SCNC: 3.4 MMOL/L (ref 3.5–5.1)
POTASSIUM SERPL-SCNC: 3.6 MMOL/L (ref 3.5–5.1)
POTASSIUM SERPL-SCNC: 3.6 MMOL/L (ref 3.5–5.1)
POTASSIUM SERPL-SCNC: 3.7 MMOL/L (ref 3.5–5.1)
POTASSIUM SERPL-SCNC: 3.8 MMOL/L (ref 3.5–5.1)
POTASSIUM SERPL-SCNC: 3.8 MMOL/L (ref 3.5–5.1)
POTASSIUM SERPL-SCNC: 4 MMOL/L (ref 3.5–5.1)
POTASSIUM SERPL-SCNC: 4.1 MMOL/L (ref 3.5–5.1)
POTASSIUM SERPL-SCNC: 4.1 MMOL/L (ref 3.5–5.1)
POTASSIUM SERPL-SCNC: 4.2 MMOL/L (ref 3.5–5.1)
POTASSIUM SERPL-SCNC: 4.3 MMOL/L (ref 3.5–5.1)
POTASSIUM SERPL-SCNC: 4.3 MMOL/L (ref 3.5–5.1)
POTASSIUM SERPL-SCNC: 4.4 MMOL/L (ref 3.5–5.1)
POTASSIUM SERPL-SCNC: 4.5 MMOL/L (ref 3.5–5.1)
POTASSIUM SERPL-SCNC: 4.6 MMOL/L (ref 3.5–5.1)
POTASSIUM SERPL-SCNC: 4.7 MMOL/L (ref 3.5–5.1)
POTASSIUM SERPL-SCNC: 4.7 MMOL/L (ref 3.5–5.1)
POTASSIUM SERPL-SCNC: 4.8 MMOL/L (ref 3.5–5.1)
POTASSIUM SERPL-SCNC: 5.2 MMOL/L (ref 3.5–5.1)
POTASSIUM SERPL-SCNC: 5.5 MMOL/L (ref 3.5–5.1)
PPD POC: NEGATIVE NEGATIVE
PPD POC: NORMAL NEGATIVE
PROCALCITONIN SERPL-MCNC: 0.6 NG/ML
PROCALCITONIN SERPL-MCNC: <0.1 NG/ML
PROT SERPL-MCNC: 6 G/DL (ref 6.3–8.2)
PROT SERPL-MCNC: 6 G/DL (ref 6.3–8.2)
PROT SERPL-MCNC: 6.5 G/DL (ref 6.3–8.2)
PROT SERPL-MCNC: 6.7 G/DL (ref 6.3–8.2)
PROT SERPL-MCNC: 7.2 G/DL (ref 6.3–8.2)
PROT SERPL-MCNC: 7.5 G/DL (ref 6.3–8.2)
PROT SERPL-MCNC: 7.6 G/DL (ref 6.3–8.2)
PROT SERPL-MCNC: 7.8 G/DL (ref 6.3–8.2)
PROT UR STRIP-MCNC: 100 MG/DL
PROT UR STRIP-MCNC: 300 MG/DL
PROTHROMBIN TIME: 14 SEC (ref 11.5–14.5)
PTH-INTACT SERPL-MCNC: 265.2 PG/ML (ref 18.5–88)
Q-T INTERVAL, ECG07: 342 MS
Q-T INTERVAL, ECG07: 346 MS
Q-T INTERVAL, ECG07: 366 MS
Q-T INTERVAL, ECG07: 404 MS
Q-T INTERVAL, ECG07: 406 MS
QRS DURATION, ECG06: 76 MS
QRS DURATION, ECG06: 76 MS
QRS DURATION, ECG06: 84 MS
QRS DURATION, ECG06: 86 MS
QRS DURATION, ECG06: 86 MS
QTC CALCULATION (BEZET), ECG08: 447 MS
QTC CALCULATION (BEZET), ECG08: 461 MS
QTC CALCULATION (BEZET), ECG08: 474 MS
QTC CALCULATION (BEZET), ECG08: 504 MS
QTC CALCULATION (BEZET), ECG08: 506 MS
RBC # BLD AUTO: 2.56 M/UL (ref 4.23–5.6)
RBC # BLD AUTO: 2.58 M/UL (ref 4.23–5.6)
RBC # BLD AUTO: 2.61 M/UL (ref 4.23–5.6)
RBC # BLD AUTO: 2.69 M/UL (ref 4.23–5.6)
RBC # BLD AUTO: 2.76 M/UL (ref 4.23–5.6)
RBC # BLD AUTO: 2.81 M/UL (ref 4.23–5.6)
RBC # BLD AUTO: 2.94 M/UL (ref 4.23–5.6)
RBC # BLD AUTO: 2.96 M/UL (ref 4.23–5.6)
RBC # BLD AUTO: 2.98 M/UL (ref 4.23–5.6)
RBC # BLD AUTO: 2.98 M/UL (ref 4.23–5.6)
RBC # BLD AUTO: 3.04 M/UL (ref 4.23–5.6)
RBC # BLD AUTO: 3.06 M/UL (ref 4.23–5.6)
RBC # BLD AUTO: 3.14 M/UL (ref 4.23–5.6)
RBC # BLD AUTO: 3.27 M/UL (ref 4.23–5.6)
RBC # BLD AUTO: 3.28 M/UL (ref 4.23–5.6)
RBC # BLD AUTO: 3.29 M/UL (ref 4.23–5.6)
RBC # BLD AUTO: 3.35 M/UL (ref 4.23–5.6)
RBC # BLD AUTO: 3.37 M/UL (ref 4.23–5.6)
RBC # BLD AUTO: 3.41 M/UL (ref 4.23–5.6)
RBC #/AREA URNS HPF: ABNORMAL /HPF
RBC #/AREA URNS HPF: NORMAL /HPF
SAO2 % BLD: 92 % (ref 95–98)
SAO2 % BLD: 97 % (ref 92–98.5)
SAO2 % BLD: 98 % (ref 92–98.5)
SAO2 % BLDV: 92 %
SERVICE CMNT-IMP: ABNORMAL
SERVICE CMNT-IMP: NORMAL
SODIUM BLDA-SCNC: 136.4 MMOL/L (ref 135–148)
SODIUM SERPL-SCNC: 137 MMOL/L (ref 136–145)
SODIUM SERPL-SCNC: 139 MMOL/L (ref 136–145)
SODIUM SERPL-SCNC: 140 MMOL/L (ref 136–145)
SODIUM SERPL-SCNC: 141 MMOL/L (ref 136–145)
SODIUM SERPL-SCNC: 142 MMOL/L (ref 136–145)
SODIUM SERPL-SCNC: 143 MMOL/L (ref 136–145)
SODIUM SERPL-SCNC: 144 MMOL/L (ref 136–145)
SODIUM SERPL-SCNC: 145 MMOL/L (ref 136–145)
SP GR UR REFRACTOMETRY: 1.01 (ref 1–1.02)
SP GR UR REFRACTOMETRY: 1.02 (ref 1–1.02)
SPECIMEN EXP DATE BLD: NORMAL
SPECIMEN EXP DATE BLD: NORMAL
SPECIMEN SOURCE: NORMAL
SPECIMEN TYPE: ABNORMAL
STATUS OF UNIT,%ST: NORMAL
STATUS OF UNIT,%ST: NORMAL
T4 FREE SERPL-MCNC: 1.2 NG/DL (ref 0.9–1.8)
TIBC SERPL-MCNC: 141 UG/DL (ref 250–450)
TROPONIN I BLD-MCNC: 0.07 NG/ML (ref 0.02–0.05)
TROPONIN I BLD-MCNC: 0.54 NG/ML (ref 0.02–0.05)
TROPONIN I BLD-MCNC: 0.67 NG/ML (ref 0.02–0.05)
TROPONIN I SERPL-MCNC: 0.03 NG/ML (ref 0.02–0.05)
TROPONIN I SERPL-MCNC: 0.03 NG/ML (ref 0.02–0.05)
TROPONIN I SERPL-MCNC: 0.04 NG/ML (ref 0.02–0.05)
TROPONIN I SERPL-MCNC: 0.04 NG/ML (ref 0.02–0.05)
TROPONIN I SERPL-MCNC: 0.06 NG/ML (ref 0.02–0.05)
TROPONIN I SERPL-MCNC: 0.07 NG/ML (ref 0.02–0.05)
TROPONIN I SERPL-MCNC: 1.56 NG/ML (ref 0.02–0.05)
TROPONIN I SERPL-MCNC: 1.77 NG/ML (ref 0.02–0.05)
TROPONIN I SERPL-MCNC: 1.77 NG/ML (ref 0.02–0.05)
TROPONIN I SERPL-MCNC: 28.4 NG/ML (ref 0.02–0.05)
TROPONIN I SERPL-MCNC: 7.34 NG/ML (ref 0.02–0.05)
TSH SERPL DL<=0.005 MIU/L-ACNC: 1.16 UIU/ML (ref 0.36–3.74)
UNIT DIVISION, %UDIV: 0
UNIT DIVISION, %UDIV: 0
UROBILINOGEN UR QL STRIP.AUTO: 0.2 EU/DL (ref 0.2–1)
UROBILINOGEN UR QL STRIP.AUTO: 1 EU/DL (ref 0.2–1)
VANCOMYCIN SERPL-MCNC: 14.5 UG/ML
VANCOMYCIN SERPL-MCNC: 22.9 UG/ML
VANCOMYCIN SERPL-MCNC: 23.6 UG/ML
VENTILATION MODE VENT: ABNORMAL
VENTRICULAR RATE, ECG03: 107 BPM
VENTRICULAR RATE, ECG03: 132 BPM
VENTRICULAR RATE, ECG03: 83 BPM
VENTRICULAR RATE, ECG03: 90 BPM
VENTRICULAR RATE, ECG03: 93 BPM
VIT B12 SERPL-MCNC: 585 PG/ML (ref 193–986)
WBC # BLD AUTO: 11.6 K/UL (ref 4.3–11.1)
WBC # BLD AUTO: 12.2 K/UL (ref 4.3–11.1)
WBC # BLD AUTO: 15.5 K/UL (ref 4.3–11.1)
WBC # BLD AUTO: 4.3 K/UL (ref 4.3–11.1)
WBC # BLD AUTO: 4.3 K/UL (ref 4.3–11.1)
WBC # BLD AUTO: 4.4 K/UL (ref 4.3–11.1)
WBC # BLD AUTO: 5 K/UL (ref 4.3–11.1)
WBC # BLD AUTO: 5.1 K/UL (ref 4.3–11.1)
WBC # BLD AUTO: 5.4 K/UL (ref 4.3–11.1)
WBC # BLD AUTO: 5.5 K/UL (ref 4.3–11.1)
WBC # BLD AUTO: 6 K/UL (ref 4.3–11.1)
WBC # BLD AUTO: 6.4 K/UL (ref 4.3–11.1)
WBC # BLD AUTO: 6.4 K/UL (ref 4.3–11.1)
WBC # BLD AUTO: 6.7 K/UL (ref 4.3–11.1)
WBC # BLD AUTO: 6.8 K/UL (ref 4.3–11.1)
WBC # BLD AUTO: 6.9 K/UL (ref 4.3–11.1)
WBC # BLD AUTO: 6.9 K/UL (ref 4.3–11.1)
WBC # BLD AUTO: 7.1 K/UL (ref 4.3–11.1)
WBC # BLD AUTO: 7.3 K/UL (ref 4.3–11.1)
WBC # BLD AUTO: 7.3 K/UL (ref 4.3–11.1)
WBC # BLD AUTO: 7.4 K/UL (ref 4.3–11.1)
WBC # BLD AUTO: 9.2 K/UL (ref 4.3–11.1)
WBC URNS QL MICRO: ABNORMAL /HPF
WBC URNS QL MICRO: NORMAL /HPF

## 2018-01-01 PROCEDURE — 83036 HEMOGLOBIN GLYCOSYLATED A1C: CPT

## 2018-01-01 PROCEDURE — 74011250636 HC RX REV CODE- 250/636: Performed by: INTERNAL MEDICINE

## 2018-01-01 PROCEDURE — 80048 BASIC METABOLIC PNL TOTAL CA: CPT

## 2018-01-01 PROCEDURE — 74011000258 HC RX REV CODE- 258: Performed by: EMERGENCY MEDICINE

## 2018-01-01 PROCEDURE — 74011636637 HC RX REV CODE- 636/637: Performed by: HOSPITALIST

## 2018-01-01 PROCEDURE — 82962 GLUCOSE BLOOD TEST: CPT

## 2018-01-01 PROCEDURE — 36415 COLL VENOUS BLD VENIPUNCTURE: CPT

## 2018-01-01 PROCEDURE — 65660000000 HC RM CCU STEPDOWN

## 2018-01-01 PROCEDURE — 74011250637 HC RX REV CODE- 250/637: Performed by: INTERNAL MEDICINE

## 2018-01-01 PROCEDURE — 85025 COMPLETE CBC W/AUTO DIFF WBC: CPT

## 2018-01-01 PROCEDURE — 76450000000

## 2018-01-01 PROCEDURE — 65610000001 HC ROOM ICU GENERAL

## 2018-01-01 PROCEDURE — 87205 SMEAR GRAM STAIN: CPT

## 2018-01-01 PROCEDURE — A6260 WOUND CLEANSER ANY TYPE/SIZE: HCPCS

## 2018-01-01 PROCEDURE — 71045 X-RAY EXAM CHEST 1 VIEW: CPT

## 2018-01-01 PROCEDURE — 74011250637 HC RX REV CODE- 250/637: Performed by: EMERGENCY MEDICINE

## 2018-01-01 PROCEDURE — 74011250636 HC RX REV CODE- 250/636: Performed by: NURSE PRACTITIONER

## 2018-01-01 PROCEDURE — G0299 HHS/HOSPICE OF RN EA 15 MIN: HCPCS

## 2018-01-01 PROCEDURE — 74011636637 HC RX REV CODE- 636/637: Performed by: INTERNAL MEDICINE

## 2018-01-01 PROCEDURE — 74011000258 HC RX REV CODE- 258: Performed by: INTERNAL MEDICINE

## 2018-01-01 PROCEDURE — 74011250636 HC RX REV CODE- 250/636: Performed by: HOSPITALIST

## 2018-01-01 PROCEDURE — 85730 THROMBOPLASTIN TIME PARTIAL: CPT

## 2018-01-01 PROCEDURE — 36430 TRANSFUSION BLD/BLD COMPNT: CPT

## 2018-01-01 PROCEDURE — 86580 TB INTRADERMAL TEST: CPT | Performed by: INTERNAL MEDICINE

## 2018-01-01 PROCEDURE — 74011250637 HC RX REV CODE- 250/637: Performed by: HOSPITALIST

## 2018-01-01 PROCEDURE — 87804 INFLUENZA ASSAY W/OPTIC: CPT

## 2018-01-01 PROCEDURE — 86923 COMPATIBILITY TEST ELECTRIC: CPT

## 2018-01-01 PROCEDURE — 77030032490 HC SLV COMPR SCD KNE COVD -B

## 2018-01-01 PROCEDURE — 82550 ASSAY OF CK (CPK): CPT

## 2018-01-01 PROCEDURE — 87086 URINE CULTURE/COLONY COUNT: CPT

## 2018-01-01 PROCEDURE — 81001 URINALYSIS AUTO W/SCOPE: CPT

## 2018-01-01 PROCEDURE — 74011250636 HC RX REV CODE- 250/636: Performed by: EMERGENCY MEDICINE

## 2018-01-01 PROCEDURE — 74011000250 HC RX REV CODE- 250: Performed by: INTERNAL MEDICINE

## 2018-01-01 PROCEDURE — 85027 COMPLETE CBC AUTOMATED: CPT

## 2018-01-01 PROCEDURE — P9016 RBC LEUKOCYTES REDUCED: HCPCS

## 2018-01-01 PROCEDURE — 74011250637 HC RX REV CODE- 250/637: Performed by: NURSE PRACTITIONER

## 2018-01-01 PROCEDURE — 74011000258 HC RX REV CODE- 258: Performed by: NURSE PRACTITIONER

## 2018-01-01 PROCEDURE — 97165 OT EVAL LOW COMPLEX 30 MIN: CPT

## 2018-01-01 PROCEDURE — 83605 ASSAY OF LACTIC ACID: CPT

## 2018-01-01 PROCEDURE — 80053 COMPREHEN METABOLIC PANEL: CPT

## 2018-01-01 PROCEDURE — 83540 ASSAY OF IRON: CPT

## 2018-01-01 PROCEDURE — 81003 URINALYSIS AUTO W/O SCOPE: CPT | Performed by: EMERGENCY MEDICINE

## 2018-01-01 PROCEDURE — 65270000029 HC RM PRIVATE

## 2018-01-01 PROCEDURE — 81015 MICROSCOPIC EXAM OF URINE: CPT

## 2018-01-01 PROCEDURE — 84145 PROCALCITONIN (PCT): CPT

## 2018-01-01 PROCEDURE — 70450 CT HEAD/BRAIN W/O DYE: CPT

## 2018-01-01 PROCEDURE — 97162 PT EVAL MOD COMPLEX 30 MIN: CPT

## 2018-01-01 PROCEDURE — 93005 ELECTROCARDIOGRAM TRACING: CPT | Performed by: EMERGENCY MEDICINE

## 2018-01-01 PROCEDURE — 85014 HEMATOCRIT: CPT

## 2018-01-01 PROCEDURE — 97530 THERAPEUTIC ACTIVITIES: CPT

## 2018-01-01 PROCEDURE — 51702 INSERT TEMP BLADDER CATH: CPT | Performed by: EMERGENCY MEDICINE

## 2018-01-01 PROCEDURE — 94760 N-INVAS EAR/PLS OXIMETRY 1: CPT

## 2018-01-01 PROCEDURE — 93970 EXTREMITY STUDY: CPT

## 2018-01-01 PROCEDURE — 87040 BLOOD CULTURE FOR BACTERIA: CPT

## 2018-01-01 PROCEDURE — 74011000302 HC RX REV CODE- 302: Performed by: INTERNAL MEDICINE

## 2018-01-01 PROCEDURE — 30233N1 TRANSFUSION OF NONAUTOLOGOUS RED BLOOD CELLS INTO PERIPHERAL VEIN, PERCUTANEOUS APPROACH: ICD-10-PCS | Performed by: HOSPITALIST

## 2018-01-01 PROCEDURE — 83880 ASSAY OF NATRIURETIC PEPTIDE: CPT

## 2018-01-01 PROCEDURE — 73630 X-RAY EXAM OF FOOT: CPT

## 2018-01-01 PROCEDURE — 96360 HYDRATION IV INFUSION INIT: CPT | Performed by: EMERGENCY MEDICINE

## 2018-01-01 PROCEDURE — 77030011254 HC DRSG HYDRGEL S&N -A

## 2018-01-01 PROCEDURE — 83735 ASSAY OF MAGNESIUM: CPT

## 2018-01-01 PROCEDURE — 82728 ASSAY OF FERRITIN: CPT

## 2018-01-01 PROCEDURE — 93922 UPR/L XTREMITY ART 2 LEVELS: CPT

## 2018-01-01 PROCEDURE — 99284 EMERGENCY DEPT VISIT MOD MDM: CPT | Performed by: EMERGENCY MEDICINE

## 2018-01-01 PROCEDURE — 83970 ASSAY OF PARATHORMONE: CPT

## 2018-01-01 PROCEDURE — 74011636637 HC RX REV CODE- 636/637: Performed by: NURSE PRACTITIONER

## 2018-01-01 PROCEDURE — 99285 EMERGENCY DEPT VISIT HI MDM: CPT | Performed by: EMERGENCY MEDICINE

## 2018-01-01 PROCEDURE — 77030020257 HC SOL INJ SOD CL 0.45% 1000ML BG

## 2018-01-01 PROCEDURE — 77010033678 HC OXYGEN DAILY

## 2018-01-01 PROCEDURE — 400013 HH SOC

## 2018-01-01 PROCEDURE — 99232 SBSQ HOSP IP/OBS MODERATE 35: CPT | Performed by: INTERNAL MEDICINE

## 2018-01-01 PROCEDURE — 86140 C-REACTIVE PROTEIN: CPT

## 2018-01-01 PROCEDURE — 82803 BLOOD GASES ANY COMBINATION: CPT

## 2018-01-01 PROCEDURE — 51798 US URINE CAPACITY MEASURE: CPT

## 2018-01-01 PROCEDURE — 86580 TB INTRADERMAL TEST: CPT | Performed by: HOSPITALIST

## 2018-01-01 PROCEDURE — 82533 TOTAL CORTISOL: CPT

## 2018-01-01 PROCEDURE — 84484 ASSAY OF TROPONIN QUANT: CPT

## 2018-01-01 PROCEDURE — 82746 ASSAY OF FOLIC ACID SERUM: CPT

## 2018-01-01 PROCEDURE — 80076 HEPATIC FUNCTION PANEL: CPT

## 2018-01-01 PROCEDURE — 96365 THER/PROPH/DIAG IV INF INIT: CPT | Performed by: EMERGENCY MEDICINE

## 2018-01-01 PROCEDURE — 97161 PT EVAL LOW COMPLEX 20 MIN: CPT

## 2018-01-01 PROCEDURE — C8929 TTE W OR WO FOL WCON,DOPPLER: HCPCS

## 2018-01-01 PROCEDURE — 74011636637 HC RX REV CODE- 636/637: Performed by: PHYSICIAN ASSISTANT

## 2018-01-01 PROCEDURE — A4452 WATERPROOF TAPE: HCPCS

## 2018-01-01 PROCEDURE — 85018 HEMOGLOBIN: CPT

## 2018-01-01 PROCEDURE — 80307 DRUG TEST PRSMV CHEM ANLYZR: CPT

## 2018-01-01 PROCEDURE — A6216 NON-STERILE GAUZE<=16 SQ IN: HCPCS

## 2018-01-01 PROCEDURE — 85652 RBC SED RATE AUTOMATED: CPT

## 2018-01-01 PROCEDURE — 97535 SELF CARE MNGMENT TRAINING: CPT

## 2018-01-01 PROCEDURE — 99233 SBSQ HOSP IP/OBS HIGH 50: CPT | Performed by: NURSE PRACTITIONER

## 2018-01-01 PROCEDURE — 87077 CULTURE AEROBIC IDENTIFY: CPT

## 2018-01-01 PROCEDURE — 80202 ASSAY OF VANCOMYCIN: CPT

## 2018-01-01 PROCEDURE — 84100 ASSAY OF PHOSPHORUS: CPT

## 2018-01-01 PROCEDURE — 36600 WITHDRAWAL OF ARTERIAL BLOOD: CPT

## 2018-01-01 PROCEDURE — 74011000250 HC RX REV CODE- 250: Performed by: EMERGENCY MEDICINE

## 2018-01-01 PROCEDURE — 86900 BLOOD TYPING SEROLOGIC ABO: CPT

## 2018-01-01 PROCEDURE — 86901 BLOOD TYPING SEROLOGIC RH(D): CPT

## 2018-01-01 PROCEDURE — A6209 FOAM DRSG <=16 SQ IN W/O BDR: HCPCS

## 2018-01-01 PROCEDURE — 71046 X-RAY EXAM CHEST 2 VIEWS: CPT

## 2018-01-01 PROCEDURE — 74011000258 HC RX REV CODE- 258: Performed by: HOSPITALIST

## 2018-01-01 PROCEDURE — 75810000275 HC EMERGENCY DEPT VISIT NO LEVEL OF CARE: Performed by: EMERGENCY MEDICINE

## 2018-01-01 PROCEDURE — 77030020263 HC SOL INJ SOD CL0.9% LFCR 1000ML

## 2018-01-01 PROCEDURE — 73718 MRI LOWER EXTREMITY W/O DYE: CPT

## 2018-01-01 PROCEDURE — 97597 DBRDMT OPN WND 1ST 20 CM/<: CPT

## 2018-01-01 PROCEDURE — 96367 TX/PROPH/DG ADDL SEQ IV INF: CPT | Performed by: EMERGENCY MEDICINE

## 2018-01-01 PROCEDURE — 74011000302 HC RX REV CODE- 302: Performed by: HOSPITALIST

## 2018-01-01 PROCEDURE — 86141 C-REACTIVE PROTEIN HS: CPT

## 2018-01-01 PROCEDURE — 99232 SBSQ HOSP IP/OBS MODERATE 35: CPT | Performed by: NURSE PRACTITIONER

## 2018-01-01 PROCEDURE — 82607 VITAMIN B-12: CPT

## 2018-01-01 PROCEDURE — 99223 1ST HOSP IP/OBS HIGH 75: CPT | Performed by: NURSE PRACTITIONER

## 2018-01-01 PROCEDURE — 99233 SBSQ HOSP IP/OBS HIGH 50: CPT | Performed by: INTERNAL MEDICINE

## 2018-01-01 PROCEDURE — 77030005518 HC CATH URETH FOL 2W BARD -B

## 2018-01-01 PROCEDURE — 77030039270 HC TU BLD FLTR CARD -A

## 2018-01-01 PROCEDURE — 76775 US EXAM ABDO BACK WALL LIM: CPT

## 2018-01-01 PROCEDURE — 84439 ASSAY OF FREE THYROXINE: CPT

## 2018-01-01 PROCEDURE — 99214 OFFICE O/P EST MOD 30 MIN: CPT

## 2018-01-01 PROCEDURE — 99231 SBSQ HOSP IP/OBS SF/LOW 25: CPT | Performed by: NURSE PRACTITIONER

## 2018-01-01 PROCEDURE — 93971 EXTREMITY STUDY: CPT

## 2018-01-01 PROCEDURE — 80051 ELECTROLYTE PANEL: CPT

## 2018-01-01 PROCEDURE — 87641 MR-STAPH DNA AMP PROBE: CPT

## 2018-01-01 PROCEDURE — 87186 SC STD MICRODIL/AGAR DIL: CPT

## 2018-01-01 PROCEDURE — 96375 TX/PRO/DX INJ NEW DRUG ADDON: CPT | Performed by: EMERGENCY MEDICINE

## 2018-01-01 PROCEDURE — A6446 CONFORM BAND S W>=3" <5"/YD: HCPCS

## 2018-01-01 PROCEDURE — 93005 ELECTROCARDIOGRAM TRACING: CPT | Performed by: INTERNAL MEDICINE

## 2018-01-01 PROCEDURE — 99291 CRITICAL CARE FIRST HOUR: CPT | Performed by: INTERNAL MEDICINE

## 2018-01-01 PROCEDURE — P9040 RBC LEUKOREDUCED IRRADIATED: HCPCS

## 2018-01-01 PROCEDURE — 85610 PROTHROMBIN TIME: CPT

## 2018-01-01 PROCEDURE — 84443 ASSAY THYROID STIM HORMONE: CPT

## 2018-01-01 RX ORDER — AMLODIPINE BESYLATE 10 MG/1
10 TABLET ORAL DAILY
Status: DISCONTINUED | OUTPATIENT
Start: 2018-01-01 | End: 2018-01-01 | Stop reason: HOSPADM

## 2018-01-01 RX ORDER — FUROSEMIDE 10 MG/ML
40 INJECTION INTRAMUSCULAR; INTRAVENOUS
Status: DISCONTINUED | OUTPATIENT
Start: 2018-01-01 | End: 2018-01-01

## 2018-01-01 RX ORDER — MINOCYCLINE HYDROCHLORIDE 100 MG/1
100 CAPSULE ORAL EVERY 12 HOURS
Qty: 32 CAP | Refills: 0 | Status: SHIPPED | OUTPATIENT
Start: 2018-01-01 | End: 2018-01-01

## 2018-01-01 RX ORDER — MIDAZOLAM HYDROCHLORIDE 1 MG/ML
2 INJECTION, SOLUTION INTRAMUSCULAR; INTRAVENOUS
Status: CANCELLED | OUTPATIENT
Start: 2018-01-01 | End: 2018-01-01

## 2018-01-01 RX ORDER — HYDRALAZINE HYDROCHLORIDE 25 MG/1
25 TABLET, FILM COATED ORAL 3 TIMES DAILY
Status: DISCONTINUED | OUTPATIENT
Start: 2018-01-01 | End: 2018-01-01

## 2018-01-01 RX ORDER — CEPHALEXIN 500 MG/1
500 CAPSULE ORAL 2 TIMES DAILY
Qty: 60 CAP | Refills: 1 | Status: SHIPPED | OUTPATIENT
Start: 2018-01-01

## 2018-01-01 RX ORDER — SODIUM CHLORIDE 9 MG/ML
250 INJECTION, SOLUTION INTRAVENOUS AS NEEDED
Status: DISCONTINUED | OUTPATIENT
Start: 2018-01-01 | End: 2018-01-01 | Stop reason: HOSPADM

## 2018-01-01 RX ORDER — HEPARIN SODIUM 5000 [USP'U]/ML
5000 INJECTION, SOLUTION INTRAVENOUS; SUBCUTANEOUS EVERY 8 HOURS
Status: DISCONTINUED | OUTPATIENT
Start: 2018-01-01 | End: 2018-01-01 | Stop reason: ALTCHOICE

## 2018-01-01 RX ORDER — SODIUM BICARBONATE 650 MG/1
650 TABLET ORAL
Status: DISCONTINUED | OUTPATIENT
Start: 2018-01-01 | End: 2018-01-01 | Stop reason: HOSPADM

## 2018-01-01 RX ORDER — GUAIFENESIN 100 MG/5ML
324 LIQUID (ML) ORAL
Status: COMPLETED | OUTPATIENT
Start: 2018-01-01 | End: 2018-01-01

## 2018-01-01 RX ORDER — SODIUM CHLORIDE 9 MG/ML
100 INJECTION, SOLUTION INTRAVENOUS CONTINUOUS
Status: DISCONTINUED | OUTPATIENT
Start: 2018-01-01 | End: 2018-01-01

## 2018-01-01 RX ORDER — ACETAMINOPHEN 325 MG/1
650 TABLET ORAL
Status: DISCONTINUED | OUTPATIENT
Start: 2018-01-01 | End: 2018-01-01 | Stop reason: HOSPADM

## 2018-01-01 RX ORDER — ISOSORBIDE MONONITRATE 60 MG/1
30 TABLET, EXTENDED RELEASE ORAL DAILY
Status: DISCONTINUED | OUTPATIENT
Start: 2018-01-01 | End: 2018-01-01 | Stop reason: HOSPADM

## 2018-01-01 RX ORDER — SODIUM CHLORIDE 0.9 % (FLUSH) 0.9 %
5-10 SYRINGE (ML) INJECTION AS NEEDED
Status: DISCONTINUED | OUTPATIENT
Start: 2018-01-01 | End: 2018-01-01 | Stop reason: HOSPADM

## 2018-01-01 RX ORDER — LISINOPRIL 20 MG/1
40 TABLET ORAL DAILY
Status: DISCONTINUED | OUTPATIENT
Start: 2018-01-01 | End: 2018-01-01 | Stop reason: HOSPADM

## 2018-01-01 RX ORDER — LIDOCAINE HYDROCHLORIDE 10 MG/ML
0.1 INJECTION INFILTRATION; PERINEURAL AS NEEDED
Status: CANCELLED | OUTPATIENT
Start: 2018-01-01

## 2018-01-01 RX ORDER — DIPHENHYDRAMINE HCL 25 MG
25 CAPSULE ORAL
Status: DISCONTINUED | OUTPATIENT
Start: 2018-01-01 | End: 2018-01-01 | Stop reason: HOSPADM

## 2018-01-01 RX ORDER — LANCETS
EACH MISCELLANEOUS
Qty: 1 EACH | Refills: 11 | Status: SHIPPED | OUTPATIENT
Start: 2018-01-01

## 2018-01-01 RX ORDER — OXYCODONE HYDROCHLORIDE 5 MG/1
5 TABLET ORAL
Status: DISCONTINUED | OUTPATIENT
Start: 2018-01-01 | End: 2018-01-01 | Stop reason: HOSPADM

## 2018-01-01 RX ORDER — SODIUM CHLORIDE 0.9 % (FLUSH) 0.9 %
5-10 SYRINGE (ML) INJECTION EVERY 8 HOURS
Status: DISCONTINUED | OUTPATIENT
Start: 2018-01-01 | End: 2018-01-01 | Stop reason: HOSPADM

## 2018-01-01 RX ORDER — ZOLPIDEM TARTRATE 5 MG/1
5 TABLET ORAL
Status: DISCONTINUED | OUTPATIENT
Start: 2018-01-01 | End: 2018-01-01 | Stop reason: HOSPADM

## 2018-01-01 RX ORDER — AMLODIPINE BESYLATE 10 MG/1
10 TABLET ORAL DAILY
Status: DISCONTINUED | OUTPATIENT
Start: 2018-01-01 | End: 2018-01-01

## 2018-01-01 RX ORDER — METOPROLOL SUCCINATE 50 MG/1
100 TABLET, EXTENDED RELEASE ORAL DAILY
Status: DISCONTINUED | OUTPATIENT
Start: 2018-01-01 | End: 2018-01-01 | Stop reason: HOSPADM

## 2018-01-01 RX ORDER — SYRINGE-NEEDLE,INSULIN,0.5 ML 30 GX5/16"
SYRINGE, EMPTY DISPOSABLE MISCELLANEOUS
Qty: 200 SYRINGE | Refills: 0 | Status: SHIPPED | OUTPATIENT
Start: 2018-01-01 | End: 2018-01-01

## 2018-01-01 RX ORDER — VANCOMYCIN HYDROCHLORIDE
1250 ONCE
Status: COMPLETED | OUTPATIENT
Start: 2018-01-01 | End: 2018-01-01

## 2018-01-01 RX ORDER — SODIUM POLYSTYRENE SULFONATE 15 G/60ML
15 SUSPENSION ORAL; RECTAL
Status: COMPLETED | OUTPATIENT
Start: 2018-01-01 | End: 2018-01-01

## 2018-01-01 RX ORDER — SODIUM CHLORIDE 9 MG/ML
125 INJECTION, SOLUTION INTRAVENOUS ONCE
Status: COMPLETED | OUTPATIENT
Start: 2018-01-01 | End: 2018-01-01

## 2018-01-01 RX ORDER — FUROSEMIDE 10 MG/ML
100 INJECTION INTRAMUSCULAR; INTRAVENOUS EVERY 12 HOURS
Status: DISCONTINUED | OUTPATIENT
Start: 2018-01-01 | End: 2018-01-01

## 2018-01-01 RX ORDER — HEPARIN SODIUM 5000 [USP'U]/ML
5000 INJECTION, SOLUTION INTRAVENOUS; SUBCUTANEOUS EVERY 12 HOURS
Status: DISCONTINUED | OUTPATIENT
Start: 2018-01-01 | End: 2018-01-01 | Stop reason: HOSPADM

## 2018-01-01 RX ORDER — NITROGLYCERIN 0.4 MG/1
0.4 TABLET SUBLINGUAL
Status: DISCONTINUED | OUTPATIENT
Start: 2018-01-01 | End: 2018-01-01 | Stop reason: HOSPADM

## 2018-01-01 RX ORDER — PANTOPRAZOLE SODIUM 40 MG/1
40 TABLET, DELAYED RELEASE ORAL DAILY
Qty: 30 TAB | Refills: 1 | Status: SHIPPED | OUTPATIENT
Start: 2018-01-01 | End: 2018-01-01 | Stop reason: SDUPTHER

## 2018-01-01 RX ORDER — SODIUM CHLORIDE 9 MG/ML
50 INJECTION, SOLUTION INTRAVENOUS CONTINUOUS
Status: CANCELLED | OUTPATIENT
Start: 2018-01-01 | End: 2018-01-01

## 2018-01-01 RX ORDER — RAMIPRIL 5 MG/1
10 CAPSULE ORAL DAILY
Status: DISCONTINUED | OUTPATIENT
Start: 2018-01-01 | End: 2018-01-01

## 2018-01-01 RX ORDER — INSULIN LISPRO 100 [IU]/ML
INJECTION, SOLUTION INTRAVENOUS; SUBCUTANEOUS
Status: DISCONTINUED | OUTPATIENT
Start: 2018-01-01 | End: 2018-01-01 | Stop reason: HOSPADM

## 2018-01-01 RX ORDER — FENTANYL CITRATE 50 UG/ML
25 INJECTION, SOLUTION INTRAMUSCULAR; INTRAVENOUS ONCE
Status: CANCELLED | OUTPATIENT
Start: 2018-01-01 | End: 2018-01-01

## 2018-01-01 RX ORDER — ATORVASTATIN CALCIUM 40 MG/1
80 TABLET, FILM COATED ORAL DAILY
Status: DISCONTINUED | OUTPATIENT
Start: 2018-01-01 | End: 2018-01-01 | Stop reason: HOSPADM

## 2018-01-01 RX ORDER — SODIUM CHLORIDE 0.9 % (FLUSH) 0.9 %
5-10 SYRINGE (ML) INJECTION AS NEEDED
Status: CANCELLED | OUTPATIENT
Start: 2018-01-01

## 2018-01-01 RX ORDER — HYDRALAZINE HYDROCHLORIDE 20 MG/ML
20 INJECTION INTRAMUSCULAR; INTRAVENOUS
Status: DISCONTINUED | OUTPATIENT
Start: 2018-01-01 | End: 2018-01-01 | Stop reason: HOSPADM

## 2018-01-01 RX ORDER — INSULIN LISPRO 100 [IU]/ML
INJECTION, SOLUTION INTRAVENOUS; SUBCUTANEOUS
Status: DISCONTINUED | OUTPATIENT
Start: 2018-01-01 | End: 2018-01-01 | Stop reason: SDUPTHER

## 2018-01-01 RX ORDER — ISOSORBIDE MONONITRATE 30 MG/1
30 TABLET, EXTENDED RELEASE ORAL DAILY
Status: DISCONTINUED | OUTPATIENT
Start: 2018-01-01 | End: 2018-01-01 | Stop reason: HOSPADM

## 2018-01-01 RX ORDER — MAG HYDROX/ALUMINUM HYD/SIMETH 200-200-20
30 SUSPENSION, ORAL (FINAL DOSE FORM) ORAL
Status: DISCONTINUED | OUTPATIENT
Start: 2018-01-01 | End: 2018-01-01

## 2018-01-01 RX ORDER — HEPARIN SODIUM 5000 [USP'U]/ML
5000 INJECTION, SOLUTION INTRAVENOUS; SUBCUTANEOUS EVERY 8 HOURS
Status: DISCONTINUED | OUTPATIENT
Start: 2018-01-01 | End: 2018-01-01 | Stop reason: HOSPADM

## 2018-01-01 RX ORDER — GUAIFENESIN 100 MG/5ML
81 LIQUID (ML) ORAL
Status: DISCONTINUED | OUTPATIENT
Start: 2018-01-01 | End: 2018-01-01 | Stop reason: HOSPADM

## 2018-01-01 RX ORDER — FACIAL-BODY WIPES
10 EACH TOPICAL DAILY PRN
Status: DISCONTINUED | OUTPATIENT
Start: 2018-01-01 | End: 2018-01-01 | Stop reason: HOSPADM

## 2018-01-01 RX ORDER — NALOXONE HYDROCHLORIDE 0.4 MG/ML
0.4 INJECTION, SOLUTION INTRAMUSCULAR; INTRAVENOUS; SUBCUTANEOUS AS NEEDED
Status: DISCONTINUED | OUTPATIENT
Start: 2018-01-01 | End: 2018-01-01 | Stop reason: HOSPADM

## 2018-01-01 RX ORDER — MORPHINE SULFATE 2 MG/ML
4 INJECTION, SOLUTION INTRAMUSCULAR; INTRAVENOUS
Status: COMPLETED | OUTPATIENT
Start: 2018-01-01 | End: 2018-01-01

## 2018-01-01 RX ORDER — CEPHALEXIN 500 MG/1
500 CAPSULE ORAL 2 TIMES DAILY
Qty: 60 CAP | Refills: 1 | Status: SHIPPED | OUTPATIENT
Start: 2018-01-01 | End: 2018-01-01 | Stop reason: SDUPTHER

## 2018-01-01 RX ORDER — SODIUM CHLORIDE, SODIUM LACTATE, POTASSIUM CHLORIDE, CALCIUM CHLORIDE 600; 310; 30; 20 MG/100ML; MG/100ML; MG/100ML; MG/100ML
100 INJECTION, SOLUTION INTRAVENOUS CONTINUOUS
Status: CANCELLED | OUTPATIENT
Start: 2018-01-01 | End: 2018-01-01

## 2018-01-01 RX ORDER — METOPROLOL SUCCINATE 50 MG/1
100 TABLET, EXTENDED RELEASE ORAL DAILY
Status: DISCONTINUED | OUTPATIENT
Start: 2018-01-01 | End: 2018-01-01

## 2018-01-01 RX ORDER — CLINDAMYCIN HYDROCHLORIDE 150 MG/1
300 CAPSULE ORAL EVERY 8 HOURS
Status: DISCONTINUED | OUTPATIENT
Start: 2018-01-01 | End: 2018-01-01

## 2018-01-01 RX ORDER — ONDANSETRON 2 MG/ML
4 INJECTION INTRAMUSCULAR; INTRAVENOUS
Status: DISCONTINUED | OUTPATIENT
Start: 2018-01-01 | End: 2018-01-01 | Stop reason: HOSPADM

## 2018-01-01 RX ORDER — VANCOMYCIN 2 GRAM/500 ML IN 0.9 % SODIUM CHLORIDE INTRAVENOUS
2000 ONCE
Status: COMPLETED | OUTPATIENT
Start: 2018-01-01 | End: 2018-01-01

## 2018-01-01 RX ORDER — POLYETHYLENE GLYCOL 3350 17 G/17G
17 POWDER, FOR SOLUTION ORAL
Status: DISCONTINUED | OUTPATIENT
Start: 2018-01-01 | End: 2018-01-01 | Stop reason: HOSPADM

## 2018-01-01 RX ORDER — POVIDONE-IODINE 10 %
SOLUTION, NON-ORAL TOPICAL DAILY
Status: DISCONTINUED | OUTPATIENT
Start: 2018-01-01 | End: 2018-01-01 | Stop reason: HOSPADM

## 2018-01-01 RX ORDER — HYDRALAZINE HYDROCHLORIDE 20 MG/ML
20 INJECTION INTRAMUSCULAR; INTRAVENOUS
Status: DISCONTINUED | OUTPATIENT
Start: 2018-01-01 | End: 2018-01-01

## 2018-01-01 RX ORDER — AMLODIPINE BESYLATE 10 MG/1
5 TABLET ORAL DAILY
Status: DISCONTINUED | OUTPATIENT
Start: 2018-01-01 | End: 2018-01-01

## 2018-01-01 RX ORDER — MORPHINE SULFATE 2 MG/ML
2 INJECTION, SOLUTION INTRAMUSCULAR; INTRAVENOUS
Status: DISCONTINUED | OUTPATIENT
Start: 2018-01-01 | End: 2018-01-01

## 2018-01-01 RX ORDER — SODIUM CHLORIDE 0.9 % (FLUSH) 0.9 %
5-10 SYRINGE (ML) INJECTION EVERY 8 HOURS
Status: CANCELLED | OUTPATIENT
Start: 2018-01-01

## 2018-01-01 RX ORDER — FUROSEMIDE 40 MG/1
80 TABLET ORAL 2 TIMES DAILY
Status: DISCONTINUED | OUTPATIENT
Start: 2018-01-01 | End: 2018-01-01 | Stop reason: HOSPADM

## 2018-01-01 RX ORDER — OXYCODONE HYDROCHLORIDE 5 MG/1
5 TABLET ORAL
Status: CANCELLED | OUTPATIENT
Start: 2018-01-01 | End: 2018-01-01

## 2018-01-01 RX ORDER — HYDRALAZINE HYDROCHLORIDE 50 MG/1
50 TABLET, FILM COATED ORAL 3 TIMES DAILY
Status: DISCONTINUED | OUTPATIENT
Start: 2018-01-01 | End: 2018-01-01 | Stop reason: HOSPADM

## 2018-01-01 RX ORDER — HYDROCODONE BITARTRATE AND ACETAMINOPHEN 5; 325 MG/1; MG/1
1 TABLET ORAL
Status: DISCONTINUED | OUTPATIENT
Start: 2018-01-01 | End: 2018-01-01 | Stop reason: HOSPADM

## 2018-01-01 RX ORDER — OXYCODONE HYDROCHLORIDE 5 MG/1
10 TABLET ORAL
Status: CANCELLED | OUTPATIENT
Start: 2018-01-01 | End: 2018-01-01

## 2018-01-01 RX ORDER — OXYCODONE AND ACETAMINOPHEN 7.5; 325 MG/1; MG/1
1 TABLET ORAL
Status: DISCONTINUED | OUTPATIENT
Start: 2018-01-01 | End: 2018-01-01 | Stop reason: HOSPADM

## 2018-01-01 RX ORDER — SODIUM CHLORIDE 0.9 % (FLUSH) 0.9 %
5-10 SYRINGE (ML) INJECTION AS NEEDED
Status: DISCONTINUED | OUTPATIENT
Start: 2018-01-01 | End: 2018-01-01

## 2018-01-01 RX ORDER — SODIUM CHLORIDE 9 MG/ML
75 INJECTION, SOLUTION INTRAVENOUS CONTINUOUS
Status: DISCONTINUED | OUTPATIENT
Start: 2018-01-01 | End: 2018-01-01

## 2018-01-01 RX ORDER — MINOCYCLINE HYDROCHLORIDE 50 MG/1
100 CAPSULE ORAL EVERY 12 HOURS
Status: DISCONTINUED | OUTPATIENT
Start: 2018-01-01 | End: 2018-01-01 | Stop reason: HOSPADM

## 2018-01-01 RX ORDER — GLIPIZIDE 2.5 MG/1
2.5 TABLET, EXTENDED RELEASE ORAL
Status: DISCONTINUED | OUTPATIENT
Start: 2018-01-01 | End: 2018-01-01

## 2018-01-01 RX ORDER — RAMIPRIL 5 MG/1
10 CAPSULE ORAL DAILY
Status: DISCONTINUED | OUTPATIENT
Start: 2018-01-01 | End: 2018-01-01 | Stop reason: HOSPADM

## 2018-01-01 RX ORDER — FUROSEMIDE 80 MG/1
80 TABLET ORAL 2 TIMES DAILY
Qty: 60 TAB | Refills: 1 | Status: SHIPPED | OUTPATIENT
Start: 2018-01-01 | End: 2018-01-01

## 2018-01-01 RX ORDER — MINOCYCLINE HYDROCHLORIDE 100 MG/1
100 CAPSULE ORAL EVERY 12 HOURS
Qty: 56 CAP | Refills: 0 | Status: ON HOLD | OUTPATIENT
Start: 2018-01-01 | End: 2018-01-01 | Stop reason: SDUPTHER

## 2018-01-01 RX ORDER — DIPHENHYDRAMINE HYDROCHLORIDE 50 MG/ML
12.5 INJECTION, SOLUTION INTRAMUSCULAR; INTRAVENOUS ONCE
Status: CANCELLED | OUTPATIENT
Start: 2018-01-01 | End: 2018-01-01

## 2018-01-01 RX ORDER — ISOSORBIDE MONONITRATE 30 MG/1
30 TABLET, EXTENDED RELEASE ORAL DAILY
Qty: 30 TAB | Refills: 0 | Status: SHIPPED | OUTPATIENT
Start: 2018-01-01 | End: 2018-01-01 | Stop reason: SDUPTHER

## 2018-01-01 RX ORDER — GLIPIZIDE 2.5 MG/1
2.5 TABLET, EXTENDED RELEASE ORAL DAILY
Status: DISCONTINUED | OUTPATIENT
Start: 2018-01-01 | End: 2018-01-01 | Stop reason: HOSPADM

## 2018-01-01 RX ORDER — LORAZEPAM 2 MG/ML
0.5 CONCENTRATE ORAL
Status: DISCONTINUED | OUTPATIENT
Start: 2018-01-01 | End: 2018-01-01

## 2018-01-01 RX ORDER — LEVOFLOXACIN 750 MG/1
750 TABLET ORAL
Qty: 8 TAB | Refills: 0 | Status: SHIPPED | OUTPATIENT
Start: 2018-01-01 | End: 2018-01-01

## 2018-01-01 RX ORDER — HYDRALAZINE HYDROCHLORIDE 20 MG/ML
10 INJECTION INTRAMUSCULAR; INTRAVENOUS
Status: DISCONTINUED | OUTPATIENT
Start: 2018-01-01 | End: 2018-01-01

## 2018-01-01 RX ORDER — DEXTROSE 40 %
15 GEL (GRAM) ORAL AS NEEDED
Status: DISCONTINUED | OUTPATIENT
Start: 2018-01-01 | End: 2018-01-01 | Stop reason: HOSPADM

## 2018-01-01 RX ORDER — FUROSEMIDE 10 MG/ML
60 INJECTION INTRAMUSCULAR; INTRAVENOUS 2 TIMES DAILY
Status: DISCONTINUED | OUTPATIENT
Start: 2018-01-01 | End: 2018-01-01

## 2018-01-01 RX ORDER — ONDANSETRON 2 MG/ML
4 INJECTION INTRAMUSCULAR; INTRAVENOUS ONCE
Status: CANCELLED | OUTPATIENT
Start: 2018-01-01 | End: 2018-01-01

## 2018-01-01 RX ORDER — HYDRALAZINE HYDROCHLORIDE 50 MG/1
50 TABLET, FILM COATED ORAL 3 TIMES DAILY
Qty: 90 TAB | Refills: 0 | Status: SHIPPED | OUTPATIENT
Start: 2018-01-01 | End: 2018-01-01

## 2018-01-01 RX ORDER — HEPARIN SODIUM 5000 [USP'U]/100ML
12-25 INJECTION, SOLUTION INTRAVENOUS
Status: DISCONTINUED | OUTPATIENT
Start: 2018-01-01 | End: 2018-01-01

## 2018-01-01 RX ORDER — INSULIN LISPRO 100 [IU]/ML
INJECTION, SOLUTION INTRAVENOUS; SUBCUTANEOUS EVERY 4 HOURS
Status: DISCONTINUED | OUTPATIENT
Start: 2018-01-01 | End: 2018-01-01

## 2018-01-01 RX ORDER — AMOXICILLIN 250 MG
2 CAPSULE ORAL
Status: DISCONTINUED | OUTPATIENT
Start: 2018-01-01 | End: 2018-01-01 | Stop reason: HOSPADM

## 2018-01-01 RX ORDER — MIDAZOLAM HYDROCHLORIDE 1 MG/ML
2 INJECTION, SOLUTION INTRAMUSCULAR; INTRAVENOUS ONCE
Status: CANCELLED | OUTPATIENT
Start: 2018-01-01 | End: 2018-01-01

## 2018-01-01 RX ORDER — SODIUM CHLORIDE, SODIUM LACTATE, POTASSIUM CHLORIDE, CALCIUM CHLORIDE 600; 310; 30; 20 MG/100ML; MG/100ML; MG/100ML; MG/100ML
1000 INJECTION, SOLUTION INTRAVENOUS CONTINUOUS
Status: CANCELLED | OUTPATIENT
Start: 2018-01-01 | End: 2018-01-01

## 2018-01-01 RX ORDER — CEPHALEXIN 500 MG/1
500 CAPSULE ORAL EVERY 8 HOURS
Status: DISCONTINUED | OUTPATIENT
Start: 2018-01-01 | End: 2018-01-01 | Stop reason: HOSPADM

## 2018-01-01 RX ORDER — HALOPERIDOL 5 MG/ML
2 INJECTION INTRAMUSCULAR ONCE
Status: COMPLETED | OUTPATIENT
Start: 2018-01-01 | End: 2018-01-01

## 2018-01-01 RX ORDER — AMLODIPINE BESYLATE 10 MG/1
10 TABLET ORAL DAILY
Qty: 30 TAB | Refills: 1 | Status: SHIPPED | OUTPATIENT
Start: 2018-01-01 | End: 2018-01-01 | Stop reason: SDUPTHER

## 2018-01-01 RX ORDER — FAMOTIDINE 20 MG/1
20 TABLET, FILM COATED ORAL ONCE
Status: CANCELLED | OUTPATIENT
Start: 2018-01-01 | End: 2018-01-01

## 2018-01-01 RX ORDER — HYDRALAZINE HYDROCHLORIDE 50 MG/1
50 TABLET, FILM COATED ORAL 3 TIMES DAILY
Status: DISCONTINUED | OUTPATIENT
Start: 2018-01-01 | End: 2018-01-01

## 2018-01-01 RX ORDER — SODIUM BICARBONATE 1 MEQ/ML
50 SYRINGE (ML) INTRAVENOUS ONCE
Status: COMPLETED | OUTPATIENT
Start: 2018-01-01 | End: 2018-01-01

## 2018-01-01 RX ORDER — FUROSEMIDE 10 MG/ML
80 INJECTION INTRAMUSCULAR; INTRAVENOUS
Status: COMPLETED | OUTPATIENT
Start: 2018-01-01 | End: 2018-01-01

## 2018-01-01 RX ORDER — METOPROLOL SUCCINATE 100 MG/1
100 TABLET, EXTENDED RELEASE ORAL DAILY
Status: DISCONTINUED | OUTPATIENT
Start: 2018-01-01 | End: 2018-01-01 | Stop reason: HOSPADM

## 2018-01-01 RX ORDER — SODIUM CHLORIDE 9 MG/ML
200 INJECTION, SOLUTION INTRAVENOUS CONTINUOUS
Status: DISCONTINUED | OUTPATIENT
Start: 2018-01-01 | End: 2018-01-01

## 2018-01-01 RX ORDER — DEXTROSE 50 % IN WATER (D50W) INTRAVENOUS SYRINGE
25-50 AS NEEDED
Status: DISCONTINUED | OUTPATIENT
Start: 2018-01-01 | End: 2018-01-01 | Stop reason: HOSPADM

## 2018-01-01 RX ORDER — CEPHALEXIN 500 MG/1
500 CAPSULE ORAL EVERY 8 HOURS
Status: DISCONTINUED | OUTPATIENT
Start: 2018-01-01 | End: 2018-01-01

## 2018-01-01 RX ORDER — PANTOPRAZOLE SODIUM 40 MG/1
40 TABLET, DELAYED RELEASE ORAL DAILY
Qty: 30 TAB | Refills: 1 | Status: SHIPPED | OUTPATIENT
Start: 2018-01-01

## 2018-01-01 RX ORDER — LEVOFLOXACIN 500 MG/1
500 TABLET, FILM COATED ORAL
Status: DISCONTINUED | OUTPATIENT
Start: 2018-01-01 | End: 2018-01-01

## 2018-01-01 RX ORDER — MINOCYCLINE HYDROCHLORIDE 100 MG/1
100 CAPSULE ORAL 2 TIMES DAILY
Qty: 60 CAP | Refills: 1 | Status: SHIPPED | OUTPATIENT
Start: 2018-01-01

## 2018-01-01 RX ORDER — BISACODYL 5 MG
5 TABLET, DELAYED RELEASE (ENTERIC COATED) ORAL DAILY PRN
Status: DISCONTINUED | OUTPATIENT
Start: 2018-01-01 | End: 2018-01-01 | Stop reason: HOSPADM

## 2018-01-01 RX ORDER — SODIUM CHLORIDE 0.9 % (FLUSH) 0.9 %
5-10 SYRINGE (ML) INJECTION EVERY 8 HOURS
Status: DISCONTINUED | OUTPATIENT
Start: 2018-01-01 | End: 2018-01-01

## 2018-01-01 RX ORDER — SODIUM CHLORIDE 450 MG/100ML
125 INJECTION, SOLUTION INTRAVENOUS CONTINUOUS
Status: DISCONTINUED | OUTPATIENT
Start: 2018-01-01 | End: 2018-01-01

## 2018-01-01 RX ORDER — SODIUM BICARBONATE 650 MG/1
650 TABLET ORAL
Qty: 90 TAB | Refills: 0 | Status: SHIPPED | OUTPATIENT
Start: 2018-01-01 | End: 2018-01-01 | Stop reason: SDUPTHER

## 2018-01-01 RX ORDER — NALOXONE HYDROCHLORIDE 0.4 MG/ML
0.1 INJECTION, SOLUTION INTRAMUSCULAR; INTRAVENOUS; SUBCUTANEOUS AS NEEDED
Status: CANCELLED | OUTPATIENT
Start: 2018-01-01 | End: 2018-01-01

## 2018-01-01 RX ORDER — MINOCYCLINE HYDROCHLORIDE 100 MG/1
100 CAPSULE ORAL EVERY 12 HOURS
Qty: 36 CAP | Refills: 0 | Status: SHIPPED | OUTPATIENT
Start: 2018-01-01 | End: 2018-01-01

## 2018-01-01 RX ORDER — SODIUM BICARBONATE 1 MEQ/ML
50 SYRINGE (ML) INTRAVENOUS
Status: COMPLETED | OUTPATIENT
Start: 2018-01-01 | End: 2018-01-01

## 2018-01-01 RX ORDER — FENTANYL CITRATE 50 UG/ML
100 INJECTION, SOLUTION INTRAMUSCULAR; INTRAVENOUS AS NEEDED
Status: CANCELLED | OUTPATIENT
Start: 2018-01-01

## 2018-01-01 RX ORDER — CIPROFLOXACIN 500 MG/1
500 TABLET ORAL EVERY 24 HOURS
Qty: 18 TAB | Refills: 0 | Status: SHIPPED | OUTPATIENT
Start: 2018-01-01 | End: 2018-01-01

## 2018-01-01 RX ORDER — HYDROMORPHONE HYDROCHLORIDE 2 MG/ML
0.5 INJECTION, SOLUTION INTRAMUSCULAR; INTRAVENOUS; SUBCUTANEOUS
Status: CANCELLED | OUTPATIENT
Start: 2018-01-01

## 2018-01-01 RX ORDER — HYDRALAZINE HYDROCHLORIDE 20 MG/ML
10 INJECTION INTRAMUSCULAR; INTRAVENOUS
Status: DISCONTINUED | OUTPATIENT
Start: 2018-01-01 | End: 2018-01-01 | Stop reason: HOSPADM

## 2018-01-01 RX ORDER — INSULIN LISPRO 100 [IU]/ML
INJECTION, SOLUTION INTRAVENOUS; SUBCUTANEOUS
Qty: 1 VIAL | Refills: 1 | Status: SHIPPED | OUTPATIENT
Start: 2018-01-01 | End: 2018-01-01

## 2018-01-01 RX ORDER — SODIUM CHLORIDE, SODIUM LACTATE, POTASSIUM CHLORIDE, CALCIUM CHLORIDE 600; 310; 30; 20 MG/100ML; MG/100ML; MG/100ML; MG/100ML
75 INJECTION, SOLUTION INTRAVENOUS CONTINUOUS
Status: CANCELLED | OUTPATIENT
Start: 2018-01-01

## 2018-01-01 RX ORDER — TEMAZEPAM 7.5 MG/1
7.5 CAPSULE ORAL
Status: DISCONTINUED | OUTPATIENT
Start: 2018-01-01 | End: 2018-01-01 | Stop reason: HOSPADM

## 2018-01-01 RX ORDER — LORAZEPAM 2 MG/ML
0.5 INJECTION INTRAMUSCULAR
Status: DISCONTINUED | OUTPATIENT
Start: 2018-01-01 | End: 2018-01-01 | Stop reason: HOSPADM

## 2018-01-01 RX ORDER — LEVOFLOXACIN 500 MG/1
500 TABLET, FILM COATED ORAL
Status: DISCONTINUED | OUTPATIENT
Start: 2018-01-01 | End: 2018-01-01 | Stop reason: HOSPADM

## 2018-01-01 RX ORDER — ACETAMINOPHEN 500 MG
500 TABLET ORAL ONCE
Status: CANCELLED | OUTPATIENT
Start: 2018-01-01 | End: 2018-01-01

## 2018-01-01 RX ORDER — HEPARIN SODIUM 5000 [USP'U]/ML
35 INJECTION, SOLUTION INTRAVENOUS; SUBCUTANEOUS ONCE
Status: COMPLETED | OUTPATIENT
Start: 2018-01-01 | End: 2018-01-01

## 2018-01-01 RX ORDER — AMLODIPINE BESYLATE 5 MG/1
10 TABLET ORAL DAILY
Qty: 30 TAB | Refills: 0 | Status: SHIPPED | OUTPATIENT
Start: 2018-01-01 | End: 2018-01-01 | Stop reason: SDUPTHER

## 2018-01-01 RX ORDER — CIPROFLOXACIN 500 MG/1
500 TABLET ORAL EVERY 24 HOURS
Status: DISCONTINUED | OUTPATIENT
Start: 2018-01-01 | End: 2018-01-01 | Stop reason: HOSPADM

## 2018-01-01 RX ORDER — GUAIFENESIN/DEXTROMETHORPHAN 100-10MG/5
10 SYRUP ORAL
Status: DISCONTINUED | OUTPATIENT
Start: 2018-01-01 | End: 2018-01-01 | Stop reason: HOSPADM

## 2018-01-01 RX ORDER — MINOCYCLINE HYDROCHLORIDE 100 MG/1
100 CAPSULE ORAL 2 TIMES DAILY
Qty: 60 CAP | Refills: 1 | Status: SHIPPED | OUTPATIENT
Start: 2018-01-01 | End: 2018-01-01 | Stop reason: SDUPTHER

## 2018-01-01 RX ORDER — CEPHALEXIN 500 MG/1
500 CAPSULE ORAL EVERY 8 HOURS
Qty: 84 CAP | Refills: 0 | Status: SHIPPED | OUTPATIENT
Start: 2018-01-01 | End: 2018-01-01

## 2018-01-01 RX ADMIN — Medication 1 AMPULE: at 11:58

## 2018-01-01 RX ADMIN — ASPIRIN 81 MG 81 MG: 81 TABLET ORAL at 09:50

## 2018-01-01 RX ADMIN — FUROSEMIDE 80 MG: 40 TABLET ORAL at 08:47

## 2018-01-01 RX ADMIN — ATORVASTATIN CALCIUM 80 MG: 40 TABLET, FILM COATED ORAL at 09:26

## 2018-01-01 RX ADMIN — PIPERACILLIN SODIUM,TAZOBACTAM SODIUM 3.38 G: 3; .375 INJECTION, POWDER, FOR SOLUTION INTRAVENOUS at 01:36

## 2018-01-01 RX ADMIN — HYDRALAZINE HYDROCHLORIDE 10 MG: 20 INJECTION INTRAMUSCULAR; INTRAVENOUS at 02:13

## 2018-01-01 RX ADMIN — Medication 1 AMPULE: at 09:44

## 2018-01-01 RX ADMIN — POVIDONE-IODINE: 10 SOLUTION TOPICAL at 08:41

## 2018-01-01 RX ADMIN — VANCOMYCIN HYDROCHLORIDE 2500 MG: 10 INJECTION, POWDER, LYOPHILIZED, FOR SOLUTION INTRAVENOUS at 16:46

## 2018-01-01 RX ADMIN — HYDRALAZINE HYDROCHLORIDE 25 MG: 25 TABLET, FILM COATED ORAL at 07:50

## 2018-01-01 RX ADMIN — TUBERCULIN PURIFIED PROTEIN DERIVATIVE 5 UNITS: 5 INJECTION, SOLUTION INTRADERMAL at 18:32

## 2018-01-01 RX ADMIN — MORPHINE SULFATE 4 MG: 2 INJECTION, SOLUTION INTRAMUSCULAR; INTRAVENOUS at 12:21

## 2018-01-01 RX ADMIN — SODIUM BICARBONATE 650 MG TABLET 650 MG: at 16:30

## 2018-01-01 RX ADMIN — HEPARIN SODIUM 5000 UNITS: 5000 INJECTION INTRAVENOUS; SUBCUTANEOUS at 21:24

## 2018-01-01 RX ADMIN — Medication 10 ML: at 13:19

## 2018-01-01 RX ADMIN — SODIUM BICARBONATE 650 MG TABLET 650 MG: at 12:05

## 2018-01-01 RX ADMIN — SODIUM BICARBONATE 650 MG TABLET 650 MG: at 09:25

## 2018-01-01 RX ADMIN — AMLODIPINE BESYLATE 10 MG: 10 TABLET ORAL at 08:07

## 2018-01-01 RX ADMIN — MINOCYCLINE HYDROCHLORIDE 100 MG: 50 CAPSULE ORAL at 08:37

## 2018-01-01 RX ADMIN — AMLODIPINE BESYLATE 5 MG: 10 TABLET ORAL at 05:31

## 2018-01-01 RX ADMIN — SODIUM CHLORIDE 125 ML/HR: 450 INJECTION, SOLUTION INTRAVENOUS at 14:07

## 2018-01-01 RX ADMIN — ASPIRIN 81 MG 81 MG: 81 TABLET ORAL at 08:37

## 2018-01-01 RX ADMIN — AMLODIPINE BESYLATE 10 MG: 10 TABLET ORAL at 08:49

## 2018-01-01 RX ADMIN — HEPARIN SODIUM 5000 UNITS: 5000 INJECTION INTRAVENOUS; SUBCUTANEOUS at 15:54

## 2018-01-01 RX ADMIN — ACETAMINOPHEN 650 MG: 325 TABLET ORAL at 06:41

## 2018-01-01 RX ADMIN — CEPHALEXIN 500 MG: 500 CAPSULE ORAL at 08:49

## 2018-01-01 RX ADMIN — METOPROLOL SUCCINATE 100 MG: 100 TABLET, EXTENDED RELEASE ORAL at 09:32

## 2018-01-01 RX ADMIN — GUAIFENESIN AND DEXTROMETHORPHAN 10 ML: 100; 10 SYRUP ORAL at 01:28

## 2018-01-01 RX ADMIN — LISINOPRIL 40 MG: 20 TABLET ORAL at 09:26

## 2018-01-01 RX ADMIN — SODIUM BICARBONATE 650 MG TABLET 650 MG: at 16:23

## 2018-01-01 RX ADMIN — Medication 10 ML: at 20:49

## 2018-01-01 RX ADMIN — ATORVASTATIN CALCIUM 80 MG: 40 TABLET, FILM COATED ORAL at 08:14

## 2018-01-01 RX ADMIN — SODIUM BICARBONATE 650 MG TABLET 650 MG: at 11:57

## 2018-01-01 RX ADMIN — SODIUM BICARBONATE 650 MG TABLET 650 MG: at 08:40

## 2018-01-01 RX ADMIN — METOPROLOL SUCCINATE 100 MG: 100 TABLET, EXTENDED RELEASE ORAL at 08:28

## 2018-01-01 RX ADMIN — Medication 10 ML: at 06:27

## 2018-01-01 RX ADMIN — SODIUM BICARBONATE 650 MG TABLET 650 MG: at 08:49

## 2018-01-01 RX ADMIN — MINOCYCLINE HYDROCHLORIDE 100 MG: 50 CAPSULE ORAL at 09:43

## 2018-01-01 RX ADMIN — Medication 10 ML: at 05:14

## 2018-01-01 RX ADMIN — Medication 10 ML: at 23:19

## 2018-01-01 RX ADMIN — FUROSEMIDE 80 MG: 40 TABLET ORAL at 08:36

## 2018-01-01 RX ADMIN — INSULIN LISPRO 4 UNITS: 100 INJECTION, SOLUTION INTRAVENOUS; SUBCUTANEOUS at 18:12

## 2018-01-01 RX ADMIN — METOPROLOL SUCCINATE 100 MG: 100 TABLET, EXTENDED RELEASE ORAL at 08:11

## 2018-01-01 RX ADMIN — MINOCYCLINE HYDROCHLORIDE 100 MG: 50 CAPSULE ORAL at 09:54

## 2018-01-01 RX ADMIN — CIPROFLOXACIN HYDROCHLORIDE 500 MG: 500 TABLET, FILM COATED ORAL at 16:25

## 2018-01-01 RX ADMIN — HEPARIN SODIUM 5000 UNITS: 5000 INJECTION INTRAVENOUS; SUBCUTANEOUS at 02:41

## 2018-01-01 RX ADMIN — GLIPIZIDE 2.5 MG: 2.5 TABLET, FILM COATED, EXTENDED RELEASE ORAL at 08:49

## 2018-01-01 RX ADMIN — INSULIN LISPRO 2 UNITS: 100 INJECTION, SOLUTION INTRAVENOUS; SUBCUTANEOUS at 12:29

## 2018-01-01 RX ADMIN — SODIUM BICARBONATE 650 MG TABLET 650 MG: at 11:36

## 2018-01-01 RX ADMIN — FUROSEMIDE 80 MG: 40 TABLET ORAL at 08:40

## 2018-01-01 RX ADMIN — Medication 10 ML: at 05:59

## 2018-01-01 RX ADMIN — Medication 1 AMPULE: at 08:36

## 2018-01-01 RX ADMIN — SODIUM BICARBONATE 650 MG TABLET 650 MG: at 12:33

## 2018-01-01 RX ADMIN — ACETAMINOPHEN 650 MG: 325 TABLET ORAL at 22:28

## 2018-01-01 RX ADMIN — TUBERCULIN PURIFIED PROTEIN DERIVATIVE 5 UNITS: 5 INJECTION, SOLUTION INTRADERMAL at 04:44

## 2018-01-01 RX ADMIN — HEPARIN SODIUM 5000 UNITS: 5000 INJECTION INTRAVENOUS; SUBCUTANEOUS at 14:59

## 2018-01-01 RX ADMIN — FUROSEMIDE 80 MG: 40 TABLET ORAL at 12:46

## 2018-01-01 RX ADMIN — Medication 10 ML: at 06:26

## 2018-01-01 RX ADMIN — HEPARIN SODIUM 5000 UNITS: 5000 INJECTION INTRAVENOUS; SUBCUTANEOUS at 16:21

## 2018-01-01 RX ADMIN — MINOCYCLINE HYDROCHLORIDE 100 MG: 50 CAPSULE ORAL at 08:41

## 2018-01-01 RX ADMIN — ASPIRIN 81 MG 81 MG: 81 TABLET ORAL at 08:53

## 2018-01-01 RX ADMIN — ATORVASTATIN CALCIUM 80 MG: 40 TABLET, FILM COATED ORAL at 08:49

## 2018-01-01 RX ADMIN — CLINDAMYCIN HYDROCHLORIDE 300 MG: 150 CAPSULE ORAL at 09:28

## 2018-01-01 RX ADMIN — SODIUM BICARBONATE 50 MEQ: 84 INJECTION, SOLUTION INTRAVENOUS at 03:05

## 2018-01-01 RX ADMIN — SODIUM BICARBONATE 650 MG TABLET 650 MG: at 11:46

## 2018-01-01 RX ADMIN — AMLODIPINE BESYLATE 10 MG: 10 TABLET ORAL at 09:26

## 2018-01-01 RX ADMIN — HEPARIN SODIUM 5000 UNITS: 5000 INJECTION INTRAVENOUS; SUBCUTANEOUS at 11:12

## 2018-01-01 RX ADMIN — METOPROLOL SUCCINATE 100 MG: 50 TABLET, EXTENDED RELEASE ORAL at 08:36

## 2018-01-01 RX ADMIN — SODIUM BICARBONATE 650 MG TABLET 650 MG: at 09:26

## 2018-01-01 RX ADMIN — FUROSEMIDE 80 MG: 40 TABLET ORAL at 17:22

## 2018-01-01 RX ADMIN — FUROSEMIDE 80 MG: 10 INJECTION, SOLUTION INTRAMUSCULAR; INTRAVENOUS at 12:22

## 2018-01-01 RX ADMIN — ASPIRIN 81 MG 81 MG: 81 TABLET ORAL at 08:11

## 2018-01-01 RX ADMIN — ATORVASTATIN CALCIUM 80 MG: 40 TABLET, FILM COATED ORAL at 08:37

## 2018-01-01 RX ADMIN — CIPROFLOXACIN HYDROCHLORIDE 500 MG: 500 TABLET, FILM COATED ORAL at 18:53

## 2018-01-01 RX ADMIN — ASPIRIN 81 MG 81 MG: 81 TABLET ORAL at 08:50

## 2018-01-01 RX ADMIN — INSULIN LISPRO 2 UNITS: 100 INJECTION, SOLUTION INTRAVENOUS; SUBCUTANEOUS at 17:02

## 2018-01-01 RX ADMIN — Medication 10 ML: at 05:53

## 2018-01-01 RX ADMIN — SODIUM BICARBONATE 650 MG TABLET 650 MG: at 17:29

## 2018-01-01 RX ADMIN — SODIUM POLYSTYRENE SULFONATE 15 G: 15 SUSPENSION ORAL; RECTAL at 17:58

## 2018-01-01 RX ADMIN — POVIDONE-IODINE: 10 SOLUTION TOPICAL at 09:00

## 2018-01-01 RX ADMIN — MINOCYCLINE HYDROCHLORIDE 100 MG: 50 CAPSULE ORAL at 21:25

## 2018-01-01 RX ADMIN — PIPERACILLIN SODIUM,TAZOBACTAM SODIUM 4.5 G: 4; .5 INJECTION, POWDER, FOR SOLUTION INTRAVENOUS at 21:20

## 2018-01-01 RX ADMIN — ASPIRIN 81 MG 81 MG: 81 TABLET ORAL at 08:12

## 2018-01-01 RX ADMIN — METOPROLOL SUCCINATE 100 MG: 100 TABLET, EXTENDED RELEASE ORAL at 08:40

## 2018-01-01 RX ADMIN — MINOCYCLINE HYDROCHLORIDE 100 MG: 50 CAPSULE ORAL at 07:58

## 2018-01-01 RX ADMIN — SODIUM BICARBONATE 650 MG TABLET 650 MG: at 12:00

## 2018-01-01 RX ADMIN — CLINDAMYCIN HYDROCHLORIDE 300 MG: 150 CAPSULE ORAL at 13:58

## 2018-01-01 RX ADMIN — ISOSORBIDE MONONITRATE 30 MG: 60 TABLET, EXTENDED RELEASE ORAL at 08:13

## 2018-01-01 RX ADMIN — HEPARIN SODIUM 5000 UNITS: 5000 INJECTION INTRAVENOUS; SUBCUTANEOUS at 05:49

## 2018-01-01 RX ADMIN — INSULIN LISPRO 4 UNITS: 100 INJECTION, SOLUTION INTRAVENOUS; SUBCUTANEOUS at 21:19

## 2018-01-01 RX ADMIN — POVIDONE-IODINE: 10 SOLUTION TOPICAL at 08:52

## 2018-01-01 RX ADMIN — ATORVASTATIN CALCIUM 80 MG: 40 TABLET, FILM COATED ORAL at 07:57

## 2018-01-01 RX ADMIN — FUROSEMIDE 80 MG: 40 TABLET ORAL at 08:49

## 2018-01-01 RX ADMIN — AMLODIPINE BESYLATE 10 MG: 10 TABLET ORAL at 08:36

## 2018-01-01 RX ADMIN — INSULIN LISPRO 2 UNITS: 100 INJECTION, SOLUTION INTRAVENOUS; SUBCUTANEOUS at 18:53

## 2018-01-01 RX ADMIN — INSULIN LISPRO 4 UNITS: 100 INJECTION, SOLUTION INTRAVENOUS; SUBCUTANEOUS at 21:33

## 2018-01-01 RX ADMIN — VANCOMYCIN HYDROCHLORIDE 2500 MG: 10 INJECTION, POWDER, LYOPHILIZED, FOR SOLUTION INTRAVENOUS at 13:57

## 2018-01-01 RX ADMIN — ASPIRIN 81 MG 81 MG: 81 TABLET ORAL at 09:00

## 2018-01-01 RX ADMIN — ASPIRIN 324 MG: 81 TABLET, CHEWABLE ORAL at 03:58

## 2018-01-01 RX ADMIN — MINOCYCLINE HYDROCHLORIDE 100 MG: 50 CAPSULE ORAL at 21:42

## 2018-01-01 RX ADMIN — HEPARIN SODIUM 5000 UNITS: 5000 INJECTION INTRAVENOUS; SUBCUTANEOUS at 21:16

## 2018-01-01 RX ADMIN — HYDRALAZINE HYDROCHLORIDE 25 MG: 25 TABLET, FILM COATED ORAL at 21:32

## 2018-01-01 RX ADMIN — HEPARIN SODIUM 5000 UNITS: 5000 INJECTION INTRAVENOUS; SUBCUTANEOUS at 21:09

## 2018-01-01 RX ADMIN — HEPARIN SODIUM 5000 UNITS: 5000 INJECTION INTRAVENOUS; SUBCUTANEOUS at 10:46

## 2018-01-01 RX ADMIN — Medication 1 AMPULE: at 21:37

## 2018-01-01 RX ADMIN — ISOSORBIDE MONONITRATE 30 MG: 30 TABLET, EXTENDED RELEASE ORAL at 08:53

## 2018-01-01 RX ADMIN — AMLODIPINE BESYLATE 10 MG: 10 TABLET ORAL at 07:53

## 2018-01-01 RX ADMIN — Medication 10 ML: at 06:11

## 2018-01-01 RX ADMIN — FUROSEMIDE 80 MG: 40 TABLET ORAL at 17:18

## 2018-01-01 RX ADMIN — INSULIN LISPRO 4 UNITS: 100 INJECTION, SOLUTION INTRAVENOUS; SUBCUTANEOUS at 22:28

## 2018-01-01 RX ADMIN — Medication 1 AMPULE: at 21:42

## 2018-01-01 RX ADMIN — SODIUM BICARBONATE 650 MG TABLET 650 MG: at 17:04

## 2018-01-01 RX ADMIN — SODIUM POLYSTYRENE SULFONATE 15 G: 15 SUSPENSION ORAL; RECTAL at 23:45

## 2018-01-01 RX ADMIN — SODIUM BICARBONATE 650 MG TABLET 650 MG: at 08:12

## 2018-01-01 RX ADMIN — ATORVASTATIN CALCIUM 80 MG: 40 TABLET, FILM COATED ORAL at 09:51

## 2018-01-01 RX ADMIN — SODIUM BICARBONATE 650 MG TABLET 650 MG: at 17:31

## 2018-01-01 RX ADMIN — SODIUM BICARBONATE 650 MG TABLET 650 MG: at 16:45

## 2018-01-01 RX ADMIN — SODIUM BICARBONATE 650 MG TABLET 650 MG: at 17:22

## 2018-01-01 RX ADMIN — MINOCYCLINE HYDROCHLORIDE 100 MG: 50 CAPSULE ORAL at 22:00

## 2018-01-01 RX ADMIN — SODIUM BICARBONATE 650 MG TABLET 650 MG: at 17:05

## 2018-01-01 RX ADMIN — METOPROLOL SUCCINATE 100 MG: 50 TABLET, EXTENDED RELEASE ORAL at 08:54

## 2018-01-01 RX ADMIN — FUROSEMIDE 80 MG: 40 TABLET ORAL at 08:44

## 2018-01-01 RX ADMIN — HEPARIN SODIUM 5000 UNITS: 5000 INJECTION INTRAVENOUS; SUBCUTANEOUS at 08:54

## 2018-01-01 RX ADMIN — INSULIN LISPRO 4 UNITS: 100 INJECTION, SOLUTION INTRAVENOUS; SUBCUTANEOUS at 20:48

## 2018-01-01 RX ADMIN — ISOSORBIDE MONONITRATE 30 MG: 30 TABLET, EXTENDED RELEASE ORAL at 08:36

## 2018-01-01 RX ADMIN — METOPROLOL SUCCINATE 100 MG: 50 TABLET, EXTENDED RELEASE ORAL at 09:50

## 2018-01-01 RX ADMIN — Medication 10 ML: at 21:48

## 2018-01-01 RX ADMIN — HEPARIN SODIUM 5000 UNITS: 5000 INJECTION INTRAVENOUS; SUBCUTANEOUS at 14:39

## 2018-01-01 RX ADMIN — INSULIN LISPRO 2 UNITS: 100 INJECTION, SOLUTION INTRAVENOUS; SUBCUTANEOUS at 12:33

## 2018-01-01 RX ADMIN — MINOCYCLINE HYDROCHLORIDE 100 MG: 50 CAPSULE ORAL at 21:10

## 2018-01-01 RX ADMIN — FUROSEMIDE 100 MG: 10 INJECTION, SOLUTION INTRAMUSCULAR; INTRAVENOUS at 20:18

## 2018-01-01 RX ADMIN — ASPIRIN 81 MG 81 MG: 81 TABLET ORAL at 07:54

## 2018-01-01 RX ADMIN — ISOSORBIDE MONONITRATE 30 MG: 60 TABLET, EXTENDED RELEASE ORAL at 08:11

## 2018-01-01 RX ADMIN — SODIUM BICARBONATE 50 MEQ: 84 INJECTION, SOLUTION INTRAVENOUS at 05:50

## 2018-01-01 RX ADMIN — FUROSEMIDE 80 MG: 40 TABLET ORAL at 17:31

## 2018-01-01 RX ADMIN — GLIPIZIDE 2.5 MG: 2.5 TABLET, FILM COATED, EXTENDED RELEASE ORAL at 08:54

## 2018-01-01 RX ADMIN — SODIUM ACETATE 50 MEQ: 3.28 INJECTION, SOLUTION, CONCENTRATE INTRAVENOUS at 13:45

## 2018-01-01 RX ADMIN — HYDRALAZINE HYDROCHLORIDE 25 MG: 25 TABLET, FILM COATED ORAL at 06:46

## 2018-01-01 RX ADMIN — FUROSEMIDE 80 MG: 40 TABLET ORAL at 09:42

## 2018-01-01 RX ADMIN — ERYTHROPOIETIN 10000 UNITS: 10000 INJECTION, SOLUTION INTRAVENOUS; SUBCUTANEOUS at 12:05

## 2018-01-01 RX ADMIN — TUBERCULIN PURIFIED PROTEIN DERIVATIVE 5 UNITS: 5 INJECTION, SOLUTION INTRADERMAL at 06:14

## 2018-01-01 RX ADMIN — CIPROFLOXACIN HYDROCHLORIDE 500 MG: 500 TABLET, FILM COATED ORAL at 17:52

## 2018-01-01 RX ADMIN — PIPERACILLIN SODIUM,TAZOBACTAM SODIUM 4.5 G: 4; .5 INJECTION, POWDER, FOR SOLUTION INTRAVENOUS at 08:14

## 2018-01-01 RX ADMIN — ACETAMINOPHEN 650 MG: 325 TABLET, FILM COATED ORAL at 10:11

## 2018-01-01 RX ADMIN — SODIUM CHLORIDE 125 ML/HR: 900 INJECTION, SOLUTION INTRAVENOUS at 22:33

## 2018-01-01 RX ADMIN — MINOCYCLINE HYDROCHLORIDE 100 MG: 50 CAPSULE ORAL at 09:51

## 2018-01-01 RX ADMIN — SODIUM CHLORIDE 1000 ML: 900 INJECTION, SOLUTION INTRAVENOUS at 03:13

## 2018-01-01 RX ADMIN — SODIUM BICARBONATE 650 MG TABLET 650 MG: at 11:01

## 2018-01-01 RX ADMIN — SODIUM CHLORIDE 100 ML/HR: 900 INJECTION, SOLUTION INTRAVENOUS at 01:20

## 2018-01-01 RX ADMIN — SODIUM BICARBONATE 650 MG TABLET 650 MG: at 17:08

## 2018-01-01 RX ADMIN — HEPARIN SODIUM AND DEXTROSE 10 UNITS/KG/HR: 5000; 5 INJECTION INTRAVENOUS at 13:29

## 2018-01-01 RX ADMIN — RAMIPRIL 10 MG: 5 CAPSULE ORAL at 08:53

## 2018-01-01 RX ADMIN — SODIUM BICARBONATE 650 MG TABLET 650 MG: at 11:58

## 2018-01-01 RX ADMIN — SODIUM BICARBONATE 650 MG TABLET 650 MG: at 18:32

## 2018-01-01 RX ADMIN — SODIUM BICARBONATE 650 MG TABLET 650 MG: at 12:45

## 2018-01-01 RX ADMIN — AMLODIPINE BESYLATE 10 MG: 10 TABLET ORAL at 09:31

## 2018-01-01 RX ADMIN — ISOSORBIDE MONONITRATE 30 MG: 30 TABLET, EXTENDED RELEASE ORAL at 08:50

## 2018-01-01 RX ADMIN — ISOSORBIDE MONONITRATE 30 MG: 30 TABLET, EXTENDED RELEASE ORAL at 14:41

## 2018-01-01 RX ADMIN — CIPROFLOXACIN HYDROCHLORIDE 500 MG: 500 TABLET, FILM COATED ORAL at 16:58

## 2018-01-01 RX ADMIN — CEPHALEXIN 500 MG: 500 CAPSULE ORAL at 23:35

## 2018-01-01 RX ADMIN — ASPIRIN 81 MG 81 MG: 81 TABLET ORAL at 08:07

## 2018-01-01 RX ADMIN — Medication 10 ML: at 22:25

## 2018-01-01 RX ADMIN — INSULIN LISPRO 6 UNITS: 100 INJECTION, SOLUTION INTRAVENOUS; SUBCUTANEOUS at 11:58

## 2018-01-01 RX ADMIN — HEPARIN SODIUM 5000 UNITS: 5000 INJECTION INTRAVENOUS; SUBCUTANEOUS at 20:57

## 2018-01-01 RX ADMIN — SODIUM BICARBONATE 650 MG TABLET 650 MG: at 11:51

## 2018-01-01 RX ADMIN — Medication 1 AMPULE: at 08:37

## 2018-01-01 RX ADMIN — ASPIRIN 81 MG 81 MG: 81 TABLET ORAL at 09:25

## 2018-01-01 RX ADMIN — ASPIRIN 81 MG 81 MG: 81 TABLET ORAL at 08:41

## 2018-01-01 RX ADMIN — AMLODIPINE BESYLATE 10 MG: 10 TABLET ORAL at 07:58

## 2018-01-01 RX ADMIN — MORPHINE SULFATE 2 MG: 2 INJECTION, SOLUTION INTRAMUSCULAR; INTRAVENOUS at 19:22

## 2018-01-01 RX ADMIN — PERFLUTREN 1 ML: 6.52 INJECTION, SUSPENSION INTRAVENOUS at 10:00

## 2018-01-01 RX ADMIN — ASPIRIN 81 MG 81 MG: 81 TABLET ORAL at 09:43

## 2018-01-01 RX ADMIN — METOPROLOL SUCCINATE 100 MG: 100 TABLET, EXTENDED RELEASE ORAL at 08:36

## 2018-01-01 RX ADMIN — SODIUM BICARBONATE 50 MEQ: 84 INJECTION, SOLUTION INTRAVENOUS at 23:46

## 2018-01-01 RX ADMIN — SODIUM BICARBONATE 650 MG TABLET 650 MG: at 08:37

## 2018-01-01 RX ADMIN — Medication 5 ML: at 13:41

## 2018-01-01 RX ADMIN — FUROSEMIDE 80 MG: 40 TABLET ORAL at 17:40

## 2018-01-01 RX ADMIN — ATORVASTATIN CALCIUM 80 MG: 40 TABLET, FILM COATED ORAL at 08:36

## 2018-01-01 RX ADMIN — PIPERACILLIN SODIUM,TAZOBACTAM SODIUM 4.5 G: 4; .5 INJECTION, POWDER, FOR SOLUTION INTRAVENOUS at 21:18

## 2018-01-01 RX ADMIN — FUROSEMIDE 100 MG: 10 INJECTION, SOLUTION INTRAMUSCULAR; INTRAVENOUS at 08:33

## 2018-01-01 RX ADMIN — ASPIRIN 81 MG 81 MG: 81 TABLET ORAL at 08:36

## 2018-01-01 RX ADMIN — Medication 10 ML: at 21:02

## 2018-01-01 RX ADMIN — FUROSEMIDE 80 MG: 40 TABLET ORAL at 18:11

## 2018-01-01 RX ADMIN — GLIPIZIDE 2.5 MG: 2.5 TABLET, FILM COATED, EXTENDED RELEASE ORAL at 09:43

## 2018-01-01 RX ADMIN — RAMIPRIL 10 MG: 5 CAPSULE ORAL at 09:42

## 2018-01-01 RX ADMIN — SODIUM BICARBONATE 650 MG TABLET 650 MG: at 18:05

## 2018-01-01 RX ADMIN — SODIUM BICARBONATE 650 MG TABLET 650 MG: at 12:14

## 2018-01-01 RX ADMIN — ASPIRIN 81 MG 81 MG: 81 TABLET ORAL at 08:13

## 2018-01-01 RX ADMIN — Medication 1 AMPULE: at 21:15

## 2018-01-01 RX ADMIN — Medication 10 ML: at 05:23

## 2018-01-01 RX ADMIN — METOPROLOL SUCCINATE 100 MG: 50 TABLET, EXTENDED RELEASE ORAL at 09:42

## 2018-01-01 RX ADMIN — ISOSORBIDE MONONITRATE 30 MG: 30 TABLET, EXTENDED RELEASE ORAL at 09:27

## 2018-01-01 RX ADMIN — MINOCYCLINE HYDROCHLORIDE 100 MG: 50 CAPSULE ORAL at 21:16

## 2018-01-01 RX ADMIN — ATORVASTATIN CALCIUM 80 MG: 40 TABLET, FILM COATED ORAL at 08:44

## 2018-01-01 RX ADMIN — MINOCYCLINE HYDROCHLORIDE 100 MG: 50 CAPSULE ORAL at 08:36

## 2018-01-01 RX ADMIN — ATORVASTATIN CALCIUM 80 MG: 40 TABLET, FILM COATED ORAL at 08:12

## 2018-01-01 RX ADMIN — SODIUM BICARBONATE 650 MG TABLET 650 MG: at 07:30

## 2018-01-01 RX ADMIN — INSULIN LISPRO 2 UNITS: 100 INJECTION, SOLUTION INTRAVENOUS; SUBCUTANEOUS at 22:52

## 2018-01-01 RX ADMIN — FUROSEMIDE 80 MG: 40 TABLET ORAL at 08:37

## 2018-01-01 RX ADMIN — Medication 10 ML: at 13:58

## 2018-01-01 RX ADMIN — FUROSEMIDE 80 MG: 40 TABLET ORAL at 18:17

## 2018-01-01 RX ADMIN — ISOSORBIDE MONONITRATE 30 MG: 30 TABLET, EXTENDED RELEASE ORAL at 09:24

## 2018-01-01 RX ADMIN — AMLODIPINE BESYLATE 10 MG: 10 TABLET ORAL at 08:41

## 2018-01-01 RX ADMIN — SODIUM BICARBONATE 650 MG TABLET 650 MG: at 08:13

## 2018-01-01 RX ADMIN — HEPARIN SODIUM 5000 UNITS: 5000 INJECTION INTRAVENOUS; SUBCUTANEOUS at 04:17

## 2018-01-01 RX ADMIN — AMLODIPINE BESYLATE 10 MG: 10 TABLET ORAL at 08:12

## 2018-01-01 RX ADMIN — SODIUM BICARBONATE 650 MG TABLET 650 MG: at 12:01

## 2018-01-01 RX ADMIN — HEPARIN SODIUM 5000 UNITS: 5000 INJECTION INTRAVENOUS; SUBCUTANEOUS at 22:29

## 2018-01-01 RX ADMIN — ASPIRIN 81 MG 81 MG: 81 TABLET ORAL at 08:40

## 2018-01-01 RX ADMIN — ISOSORBIDE MONONITRATE 30 MG: 30 TABLET, EXTENDED RELEASE ORAL at 07:58

## 2018-01-01 RX ADMIN — SODIUM CHLORIDE 150 ML/HR: 450 INJECTION, SOLUTION INTRAVENOUS at 13:47

## 2018-01-01 RX ADMIN — TUBERCULIN PURIFIED PROTEIN DERIVATIVE 5 UNITS: 5 INJECTION, SOLUTION INTRADERMAL at 15:26

## 2018-01-01 RX ADMIN — MINOCYCLINE HYDROCHLORIDE 100 MG: 50 CAPSULE ORAL at 22:01

## 2018-01-01 RX ADMIN — SODIUM BICARBONATE 650 MG TABLET 650 MG: at 16:25

## 2018-01-01 RX ADMIN — ASPIRIN 81 MG 81 MG: 81 TABLET ORAL at 08:42

## 2018-01-01 RX ADMIN — ISOSORBIDE MONONITRATE 30 MG: 30 TABLET, EXTENDED RELEASE ORAL at 08:07

## 2018-01-01 RX ADMIN — MINOCYCLINE HYDROCHLORIDE 100 MG: 50 CAPSULE ORAL at 08:48

## 2018-01-01 RX ADMIN — PIPERACILLIN SODIUM,TAZOBACTAM SODIUM 4.5 G: 4; .5 INJECTION, POWDER, FOR SOLUTION INTRAVENOUS at 21:33

## 2018-01-01 RX ADMIN — INSULIN LISPRO 2 UNITS: 100 INJECTION, SOLUTION INTRAVENOUS; SUBCUTANEOUS at 15:54

## 2018-01-01 RX ADMIN — Medication 5 ML: at 21:20

## 2018-01-01 RX ADMIN — ISOSORBIDE MONONITRATE 30 MG: 30 TABLET, EXTENDED RELEASE ORAL at 08:27

## 2018-01-01 RX ADMIN — HEPARIN SODIUM AND DEXTROSE 12 UNITS/KG/HR: 5000; 5 INJECTION INTRAVENOUS at 15:11

## 2018-01-01 RX ADMIN — Medication 5 ML: at 05:56

## 2018-01-01 RX ADMIN — ISOSORBIDE MONONITRATE 30 MG: 30 TABLET, EXTENDED RELEASE ORAL at 08:41

## 2018-01-01 RX ADMIN — Medication 5 ML: at 03:24

## 2018-01-01 RX ADMIN — ATORVASTATIN CALCIUM 80 MG: 40 TABLET, FILM COATED ORAL at 07:51

## 2018-01-01 RX ADMIN — AMLODIPINE BESYLATE 10 MG: 10 TABLET ORAL at 08:37

## 2018-01-01 RX ADMIN — Medication 10 ML: at 14:39

## 2018-01-01 RX ADMIN — METOPROLOL SUCCINATE 100 MG: 100 TABLET, EXTENDED RELEASE ORAL at 08:13

## 2018-01-01 RX ADMIN — ASPIRIN 81 MG 81 MG: 81 TABLET ORAL at 09:41

## 2018-01-01 RX ADMIN — PIPERACILLIN SODIUM,TAZOBACTAM SODIUM 4.5 G: 4; .5 INJECTION, POWDER, FOR SOLUTION INTRAVENOUS at 13:07

## 2018-01-01 RX ADMIN — TUBERCULIN PURIFIED PROTEIN DERIVATIVE 5 UNITS: 5 INJECTION, SOLUTION INTRADERMAL at 16:26

## 2018-01-01 RX ADMIN — SODIUM CHLORIDE 125 ML/HR: 450 INJECTION, SOLUTION INTRAVENOUS at 21:40

## 2018-01-01 RX ADMIN — SODIUM CHLORIDE 125 ML/HR: 450 INJECTION, SOLUTION INTRAVENOUS at 13:28

## 2018-01-01 RX ADMIN — Medication 10 ML: at 05:42

## 2018-01-01 RX ADMIN — ASPIRIN 81 MG 81 MG: 81 TABLET ORAL at 08:28

## 2018-01-01 RX ADMIN — HYDRALAZINE HYDROCHLORIDE 20 MG: 20 INJECTION INTRAMUSCULAR; INTRAVENOUS at 20:07

## 2018-01-01 RX ADMIN — ISOSORBIDE MONONITRATE 30 MG: 30 TABLET, EXTENDED RELEASE ORAL at 09:32

## 2018-01-01 RX ADMIN — ACETAMINOPHEN 650 MG: 325 TABLET, FILM COATED ORAL at 22:39

## 2018-01-01 RX ADMIN — INSULIN LISPRO 2 UNITS: 100 INJECTION, SOLUTION INTRAVENOUS; SUBCUTANEOUS at 21:22

## 2018-01-01 RX ADMIN — TEMAZEPAM 7.5 MG: 7.5 CAPSULE ORAL at 21:56

## 2018-01-01 RX ADMIN — Medication 10 ML: at 21:33

## 2018-01-01 RX ADMIN — INSULIN LISPRO 2 UNITS: 100 INJECTION, SOLUTION INTRAVENOUS; SUBCUTANEOUS at 12:08

## 2018-01-01 RX ADMIN — AMLODIPINE BESYLATE 10 MG: 10 TABLET ORAL at 09:43

## 2018-01-01 RX ADMIN — ATORVASTATIN CALCIUM 80 MG: 40 TABLET, FILM COATED ORAL at 08:42

## 2018-01-01 RX ADMIN — LEVOFLOXACIN 500 MG: 500 TABLET, FILM COATED ORAL at 14:51

## 2018-01-01 RX ADMIN — SODIUM BICARBONATE 650 MG TABLET 650 MG: at 09:31

## 2018-01-01 RX ADMIN — PIPERACILLIN SODIUM,TAZOBACTAM SODIUM 4.5 G: 4; .5 INJECTION, POWDER, FOR SOLUTION INTRAVENOUS at 07:36

## 2018-01-01 RX ADMIN — Medication 10 ML: at 15:51

## 2018-01-01 RX ADMIN — Medication 10 ML: at 21:19

## 2018-01-01 RX ADMIN — SODIUM BICARBONATE 650 MG TABLET 650 MG: at 12:06

## 2018-01-01 RX ADMIN — ISOSORBIDE MONONITRATE 30 MG: 60 TABLET, EXTENDED RELEASE ORAL at 08:47

## 2018-01-01 RX ADMIN — Medication 5 ML: at 14:04

## 2018-01-01 RX ADMIN — HALOPERIDOL LACTATE 2 MG: 5 INJECTION, SOLUTION INTRAMUSCULAR at 10:27

## 2018-01-01 RX ADMIN — PIPERACILLIN SODIUM,TAZOBACTAM SODIUM 4.5 G: 4; .5 INJECTION, POWDER, FOR SOLUTION INTRAVENOUS at 06:28

## 2018-01-01 RX ADMIN — SODIUM BICARBONATE 650 MG TABLET 650 MG: at 09:42

## 2018-01-01 RX ADMIN — MINOCYCLINE HYDROCHLORIDE 100 MG: 50 CAPSULE ORAL at 20:24

## 2018-01-01 RX ADMIN — ACETAMINOPHEN 650 MG: 325 TABLET, FILM COATED ORAL at 03:58

## 2018-01-01 RX ADMIN — HEPARIN SODIUM AND DEXTROSE 12 UNITS/KG/HR: 5000; 5 INJECTION INTRAVENOUS at 17:39

## 2018-01-01 RX ADMIN — CEPHALEXIN 500 MG: 500 CAPSULE ORAL at 15:35

## 2018-01-01 RX ADMIN — HEPARIN SODIUM 5000 UNITS: 5000 INJECTION INTRAVENOUS; SUBCUTANEOUS at 14:41

## 2018-01-01 RX ADMIN — ATORVASTATIN CALCIUM 80 MG: 40 TABLET, FILM COATED ORAL at 08:53

## 2018-01-01 RX ADMIN — SODIUM BICARBONATE 650 MG TABLET 650 MG: at 07:59

## 2018-01-01 RX ADMIN — VANCOMYCIN HYDROCHLORIDE 1000 MG: 1 INJECTION, POWDER, LYOPHILIZED, FOR SOLUTION INTRAVENOUS at 09:55

## 2018-01-01 RX ADMIN — SODIUM BICARBONATE 650 MG TABLET 650 MG: at 08:07

## 2018-01-01 RX ADMIN — CLINDAMYCIN HYDROCHLORIDE 300 MG: 150 CAPSULE ORAL at 21:22

## 2018-01-01 RX ADMIN — HEPARIN SODIUM 5000 UNITS: 5000 INJECTION INTRAVENOUS; SUBCUTANEOUS at 03:09

## 2018-01-01 RX ADMIN — HEPARIN SODIUM 5000 UNITS: 5000 INJECTION INTRAVENOUS; SUBCUTANEOUS at 15:25

## 2018-01-01 RX ADMIN — MORPHINE SULFATE 2 MG: 2 INJECTION, SOLUTION INTRAMUSCULAR; INTRAVENOUS at 14:23

## 2018-01-01 RX ADMIN — FUROSEMIDE 80 MG: 40 TABLET ORAL at 17:29

## 2018-01-01 RX ADMIN — Medication 1 AMPULE: at 22:28

## 2018-01-01 RX ADMIN — MINOCYCLINE HYDROCHLORIDE 100 MG: 50 CAPSULE ORAL at 09:30

## 2018-01-01 RX ADMIN — HEPARIN SODIUM 5000 UNITS: 5000 INJECTION INTRAVENOUS; SUBCUTANEOUS at 02:07

## 2018-01-01 RX ADMIN — Medication 10 ML: at 15:09

## 2018-01-01 RX ADMIN — METOPROLOL SUCCINATE 100 MG: 100 TABLET, EXTENDED RELEASE ORAL at 09:26

## 2018-01-01 RX ADMIN — LISINOPRIL 40 MG: 20 TABLET ORAL at 09:30

## 2018-01-01 RX ADMIN — SODIUM BICARBONATE 650 MG TABLET 650 MG: at 12:41

## 2018-01-01 RX ADMIN — HEPARIN SODIUM 5000 UNITS: 5000 INJECTION INTRAVENOUS; SUBCUTANEOUS at 08:48

## 2018-01-01 RX ADMIN — GLIPIZIDE 2.5 MG: 2.5 TABLET, FILM COATED, EXTENDED RELEASE ORAL at 08:37

## 2018-01-01 RX ADMIN — SODIUM BICARBONATE 650 MG TABLET 650 MG: at 16:21

## 2018-01-01 RX ADMIN — Medication 10 ML: at 21:42

## 2018-01-01 RX ADMIN — SODIUM BICARBONATE 650 MG TABLET 650 MG: at 18:53

## 2018-01-01 RX ADMIN — ATORVASTATIN CALCIUM 80 MG: 40 TABLET, FILM COATED ORAL at 07:33

## 2018-01-01 RX ADMIN — ATORVASTATIN CALCIUM 80 MG: 40 TABLET, FILM COATED ORAL at 09:06

## 2018-01-01 RX ADMIN — RAMIPRIL 10 MG: 5 CAPSULE ORAL at 08:37

## 2018-01-01 RX ADMIN — FUROSEMIDE 80 MG: 40 TABLET ORAL at 09:43

## 2018-01-01 RX ADMIN — AMLODIPINE BESYLATE 10 MG: 10 TABLET ORAL at 09:24

## 2018-01-01 RX ADMIN — PIPERACILLIN SODIUM,TAZOBACTAM SODIUM 4.5 G: 4; .5 INJECTION, POWDER, FOR SOLUTION INTRAVENOUS at 08:42

## 2018-01-01 RX ADMIN — RAMIPRIL 10 MG: 5 CAPSULE ORAL at 09:25

## 2018-01-01 RX ADMIN — MINOCYCLINE HYDROCHLORIDE 100 MG: 50 CAPSULE ORAL at 08:07

## 2018-01-01 RX ADMIN — HYDRALAZINE HYDROCHLORIDE 25 MG: 25 TABLET, FILM COATED ORAL at 13:28

## 2018-01-01 RX ADMIN — METOPROLOL SUCCINATE 100 MG: 100 TABLET, EXTENDED RELEASE ORAL at 07:51

## 2018-01-01 RX ADMIN — SODIUM BICARBONATE 650 MG TABLET 650 MG: at 14:34

## 2018-01-01 RX ADMIN — ASPIRIN 81 MG 81 MG: 81 TABLET ORAL at 07:32

## 2018-01-01 RX ADMIN — Medication 10 ML: at 13:52

## 2018-01-01 RX ADMIN — SODIUM BICARBONATE 650 MG TABLET 650 MG: at 09:51

## 2018-01-01 RX ADMIN — ISOSORBIDE MONONITRATE 30 MG: 30 TABLET, EXTENDED RELEASE ORAL at 09:43

## 2018-01-01 RX ADMIN — SODIUM BICARBONATE 650 MG TABLET 650 MG: at 18:11

## 2018-01-01 RX ADMIN — VANCOMYCIN HYDROCHLORIDE 2000 MG: 10 INJECTION, POWDER, LYOPHILIZED, FOR SOLUTION INTRAVENOUS at 04:30

## 2018-01-01 RX ADMIN — RAMIPRIL 10 MG: 5 CAPSULE ORAL at 08:36

## 2018-01-01 RX ADMIN — ASPIRIN 81 MG 81 MG: 81 TABLET ORAL at 07:52

## 2018-01-01 RX ADMIN — Medication 1 AMPULE: at 08:41

## 2018-01-01 RX ADMIN — METOPROLOL SUCCINATE 100 MG: 100 TABLET, EXTENDED RELEASE ORAL at 08:45

## 2018-01-01 RX ADMIN — FUROSEMIDE 60 MG: 10 INJECTION, SOLUTION INTRAMUSCULAR; INTRAVENOUS at 08:15

## 2018-01-01 RX ADMIN — MINOCYCLINE HYDROCHLORIDE 100 MG: 50 CAPSULE ORAL at 21:15

## 2018-01-01 RX ADMIN — FUROSEMIDE 80 MG: 40 TABLET ORAL at 09:51

## 2018-01-01 RX ADMIN — CALCIUM GLUCONATE 1 G: 98 INJECTION, SOLUTION INTRAVENOUS at 15:51

## 2018-01-01 RX ADMIN — SODIUM BICARBONATE 650 MG TABLET 650 MG: at 13:50

## 2018-01-01 RX ADMIN — SODIUM BICARBONATE 650 MG TABLET 650 MG: at 08:27

## 2018-01-01 RX ADMIN — GLIPIZIDE 2.5 MG: 2.5 TABLET, FILM COATED, EXTENDED RELEASE ORAL at 06:04

## 2018-01-01 RX ADMIN — FUROSEMIDE 80 MG: 40 TABLET ORAL at 16:22

## 2018-01-01 RX ADMIN — HEPARIN SODIUM 5000 UNITS: 5000 INJECTION INTRAVENOUS; SUBCUTANEOUS at 20:51

## 2018-01-01 RX ADMIN — MINOCYCLINE HYDROCHLORIDE 100 MG: 50 CAPSULE ORAL at 08:52

## 2018-01-01 RX ADMIN — Medication 10 ML: at 22:03

## 2018-01-01 RX ADMIN — AMLODIPINE BESYLATE 10 MG: 10 TABLET ORAL at 08:40

## 2018-01-01 RX ADMIN — VANCOMYCIN HYDROCHLORIDE 1250 MG: 10 INJECTION, POWDER, LYOPHILIZED, FOR SOLUTION INTRAVENOUS at 03:00

## 2018-01-01 RX ADMIN — CALCIUM GLUCONATE 1 G: 98 INJECTION, SOLUTION INTRAVENOUS at 23:46

## 2018-01-01 RX ADMIN — PIPERACILLIN SODIUM,TAZOBACTAM SODIUM 3.38 G: 3; .375 INJECTION, POWDER, FOR SOLUTION INTRAVENOUS at 20:23

## 2018-01-01 RX ADMIN — HYDRALAZINE HYDROCHLORIDE 25 MG: 25 TABLET, FILM COATED ORAL at 15:10

## 2018-01-01 RX ADMIN — ISOSORBIDE MONONITRATE 30 MG: 30 TABLET, EXTENDED RELEASE ORAL at 08:37

## 2018-01-01 RX ADMIN — ATORVASTATIN CALCIUM 80 MG: 40 TABLET, FILM COATED ORAL at 08:11

## 2018-01-01 RX ADMIN — INSULIN LISPRO 4 UNITS: 100 INJECTION, SOLUTION INTRAVENOUS; SUBCUTANEOUS at 16:28

## 2018-01-01 RX ADMIN — INSULIN LISPRO 2 UNITS: 100 INJECTION, SOLUTION INTRAVENOUS; SUBCUTANEOUS at 21:01

## 2018-01-01 RX ADMIN — ATORVASTATIN CALCIUM 80 MG: 40 TABLET, FILM COATED ORAL at 08:13

## 2018-01-01 RX ADMIN — AMLODIPINE BESYLATE 10 MG: 10 TABLET ORAL at 09:51

## 2018-01-01 RX ADMIN — ISOSORBIDE MONONITRATE 30 MG: 60 TABLET, EXTENDED RELEASE ORAL at 08:40

## 2018-01-01 RX ADMIN — LEVOFLOXACIN 750 MG: 500 TABLET, FILM COATED ORAL at 14:34

## 2018-01-01 RX ADMIN — HEPARIN SODIUM 5000 UNITS: 5000 INJECTION INTRAVENOUS; SUBCUTANEOUS at 11:50

## 2018-01-01 RX ADMIN — SODIUM BICARBONATE 650 MG TABLET 650 MG: at 08:05

## 2018-01-01 RX ADMIN — HEPARIN SODIUM 5000 UNITS: 5000 INJECTION INTRAVENOUS; SUBCUTANEOUS at 15:36

## 2018-01-01 RX ADMIN — METOPROLOL SUCCINATE 100 MG: 50 TABLET, EXTENDED RELEASE ORAL at 09:25

## 2018-01-01 RX ADMIN — PIPERACILLIN SODIUM,TAZOBACTAM SODIUM 3.38 G: 3; .375 INJECTION, POWDER, FOR SOLUTION INTRAVENOUS at 01:10

## 2018-01-01 RX ADMIN — SODIUM BICARBONATE 650 MG TABLET 650 MG: at 12:58

## 2018-01-01 RX ADMIN — MINOCYCLINE HYDROCHLORIDE 100 MG: 50 CAPSULE ORAL at 22:28

## 2018-01-01 RX ADMIN — INSULIN LISPRO 2 UNITS: 100 INJECTION, SOLUTION INTRAVENOUS; SUBCUTANEOUS at 06:26

## 2018-01-01 RX ADMIN — HYDROCODONE BITARTRATE AND ACETAMINOPHEN 1 TABLET: 5; 325 TABLET ORAL at 20:51

## 2018-01-01 RX ADMIN — ACETAMINOPHEN 650 MG: 325 TABLET ORAL at 11:34

## 2018-01-01 RX ADMIN — Medication 1 AMPULE: at 21:24

## 2018-01-01 RX ADMIN — METOPROLOL SUCCINATE 100 MG: 100 TABLET, EXTENDED RELEASE ORAL at 07:53

## 2018-01-01 RX ADMIN — ASPIRIN 81 MG 81 MG: 81 TABLET ORAL at 08:43

## 2018-01-01 RX ADMIN — ASPIRIN 81 MG 81 MG: 81 TABLET ORAL at 07:58

## 2018-01-01 RX ADMIN — Medication 1 AMPULE: at 08:52

## 2018-01-01 RX ADMIN — ATORVASTATIN CALCIUM 80 MG: 40 TABLET, FILM COATED ORAL at 08:40

## 2018-01-01 RX ADMIN — MINOCYCLINE HYDROCHLORIDE 100 MG: 50 CAPSULE ORAL at 20:47

## 2018-01-01 RX ADMIN — INSULIN LISPRO 2 UNITS: 100 INJECTION, SOLUTION INTRAVENOUS; SUBCUTANEOUS at 08:10

## 2018-01-01 RX ADMIN — SODIUM BICARBONATE 650 MG TABLET 650 MG: at 12:09

## 2018-01-01 RX ADMIN — MINOCYCLINE HYDROCHLORIDE 100 MG: 50 CAPSULE ORAL at 07:32

## 2018-01-01 RX ADMIN — FUROSEMIDE 60 MG: 10 INJECTION, SOLUTION INTRAMUSCULAR; INTRAVENOUS at 17:57

## 2018-01-01 RX ADMIN — SODIUM BICARBONATE 650 MG TABLET 650 MG: at 16:08

## 2018-01-01 RX ADMIN — ISOSORBIDE MONONITRATE 30 MG: 30 TABLET, EXTENDED RELEASE ORAL at 07:31

## 2018-01-01 RX ADMIN — SODIUM BICARBONATE 650 MG TABLET 650 MG: at 08:43

## 2018-01-01 RX ADMIN — HYDRALAZINE HYDROCHLORIDE 10 MG: 20 INJECTION INTRAMUSCULAR; INTRAVENOUS at 05:43

## 2018-01-01 RX ADMIN — Medication 10 ML: at 05:29

## 2018-01-01 RX ADMIN — POVIDONE-IODINE: 10 SOLUTION TOPICAL at 11:46

## 2018-01-01 RX ADMIN — SODIUM BICARBONATE 650 MG TABLET 650 MG: at 08:35

## 2018-01-01 RX ADMIN — SODIUM BICARBONATE 650 MG TABLET 650 MG: at 09:43

## 2018-01-01 RX ADMIN — SODIUM BICARBONATE 650 MG TABLET 650 MG: at 17:52

## 2018-01-01 RX ADMIN — SODIUM CHLORIDE 75 ML/HR: 900 INJECTION, SOLUTION INTRAVENOUS at 19:11

## 2018-01-01 RX ADMIN — Medication 1 AMPULE: at 21:09

## 2018-01-01 RX ADMIN — PIPERACILLIN SODIUM,TAZOBACTAM SODIUM 3.38 G: 3; .375 INJECTION, POWDER, FOR SOLUTION INTRAVENOUS at 05:49

## 2018-01-01 RX ADMIN — Medication 1 AMPULE: at 22:03

## 2018-01-01 RX ADMIN — FUROSEMIDE 80 MG: 40 TABLET ORAL at 09:25

## 2018-01-01 RX ADMIN — VANCOMYCIN HYDROCHLORIDE 1000 MG: 1 INJECTION, POWDER, LYOPHILIZED, FOR SOLUTION INTRAVENOUS at 12:05

## 2018-01-01 RX ADMIN — ATORVASTATIN CALCIUM 80 MG: 40 TABLET, FILM COATED ORAL at 09:43

## 2018-01-01 RX ADMIN — INSULIN LISPRO 4 UNITS: 100 INJECTION, SOLUTION INTRAVENOUS; SUBCUTANEOUS at 22:03

## 2018-01-01 RX ADMIN — ASPIRIN 81 MG 81 MG: 81 TABLET ORAL at 08:05

## 2018-01-01 RX ADMIN — HYDRALAZINE HYDROCHLORIDE 50 MG: 50 TABLET, FILM COATED ORAL at 06:24

## 2018-01-01 RX ADMIN — Medication 10 ML: at 21:10

## 2018-01-01 RX ADMIN — OXYCODONE HYDROCHLORIDE 5 MG: 5 TABLET ORAL at 07:31

## 2018-01-01 RX ADMIN — METOPROLOL SUCCINATE 100 MG: 100 TABLET, EXTENDED RELEASE ORAL at 08:41

## 2018-01-01 RX ADMIN — ATORVASTATIN CALCIUM 80 MG: 40 TABLET, FILM COATED ORAL at 09:42

## 2018-01-01 RX ADMIN — Medication 5 ML: at 13:32

## 2018-01-01 RX ADMIN — Medication 10 ML: at 13:24

## 2018-01-01 RX ADMIN — ASPIRIN 81 MG 81 MG: 81 TABLET ORAL at 09:30

## 2018-01-01 RX ADMIN — INSULIN LISPRO 2 UNITS: 100 INJECTION, SOLUTION INTRAVENOUS; SUBCUTANEOUS at 21:24

## 2018-01-01 RX ADMIN — ISOSORBIDE MONONITRATE 30 MG: 30 TABLET, EXTENDED RELEASE ORAL at 08:11

## 2018-01-01 RX ADMIN — SODIUM BICARBONATE 650 MG TABLET 650 MG: at 08:53

## 2018-01-01 RX ADMIN — Medication 1 AMPULE: at 08:39

## 2018-01-01 RX ADMIN — SODIUM BICARBONATE 650 MG TABLET 650 MG: at 08:50

## 2018-01-01 RX ADMIN — Medication 1 AMPULE: at 21:25

## 2018-01-01 RX ADMIN — INSULIN LISPRO 2 UNITS: 100 INJECTION, SOLUTION INTRAVENOUS; SUBCUTANEOUS at 17:08

## 2018-01-01 RX ADMIN — METOPROLOL SUCCINATE 100 MG: 100 TABLET, EXTENDED RELEASE ORAL at 08:50

## 2018-01-01 RX ADMIN — PIPERACILLIN SODIUM,TAZOBACTAM SODIUM 4.5 G: 4; .5 INJECTION, POWDER, FOR SOLUTION INTRAVENOUS at 18:05

## 2018-01-01 RX ADMIN — ISOSORBIDE MONONITRATE 30 MG: 60 TABLET, EXTENDED RELEASE ORAL at 08:43

## 2018-01-01 RX ADMIN — ATORVASTATIN CALCIUM 80 MG: 40 TABLET, FILM COATED ORAL at 08:07

## 2018-01-01 RX ADMIN — METOPROLOL SUCCINATE 100 MG: 50 TABLET, EXTENDED RELEASE ORAL at 08:49

## 2018-01-01 RX ADMIN — SODIUM BICARBONATE 650 MG TABLET 650 MG: at 08:47

## 2018-01-01 RX ADMIN — PIPERACILLIN SODIUM,TAZOBACTAM SODIUM 4.5 G: 4; .5 INJECTION, POWDER, FOR SOLUTION INTRAVENOUS at 13:34

## 2018-01-01 RX ADMIN — INSULIN LISPRO 4 UNITS: 100 INJECTION, SOLUTION INTRAVENOUS; SUBCUTANEOUS at 11:27

## 2018-01-01 RX ADMIN — ACETAMINOPHEN 650 MG: 325 TABLET ORAL at 04:50

## 2018-01-01 RX ADMIN — INSULIN LISPRO 6 UNITS: 100 INJECTION, SOLUTION INTRAVENOUS; SUBCUTANEOUS at 16:23

## 2018-01-01 RX ADMIN — INSULIN LISPRO 2 UNITS: 100 INJECTION, SOLUTION INTRAVENOUS; SUBCUTANEOUS at 16:27

## 2018-01-01 RX ADMIN — AMLODIPINE BESYLATE 10 MG: 10 TABLET ORAL at 07:51

## 2018-01-01 RX ADMIN — Medication 10 ML: at 13:29

## 2018-01-01 RX ADMIN — ATORVASTATIN CALCIUM 80 MG: 40 TABLET, FILM COATED ORAL at 09:25

## 2018-01-01 RX ADMIN — OXYCODONE HYDROCHLORIDE 5 MG: 5 TABLET ORAL at 16:59

## 2018-01-01 RX ADMIN — FUROSEMIDE 80 MG: 40 TABLET ORAL at 08:06

## 2018-01-01 RX ADMIN — HEPARIN SODIUM 3200 UNITS: 5000 INJECTION INTRAVENOUS; SUBCUTANEOUS at 15:38

## 2018-01-01 RX ADMIN — ACETAMINOPHEN 650 MG: 325 TABLET ORAL at 21:48

## 2018-01-01 RX ADMIN — Medication 10 ML: at 05:09

## 2018-01-01 RX ADMIN — GUAIFENESIN AND DEXTROMETHORPHAN 10 ML: 100; 10 SYRUP ORAL at 16:28

## 2018-01-01 RX ADMIN — PIPERACILLIN SODIUM,TAZOBACTAM SODIUM 3.38 G: 3; .375 INJECTION, POWDER, FOR SOLUTION INTRAVENOUS at 08:15

## 2018-01-01 RX ADMIN — SODIUM BICARBONATE 650 MG TABLET 650 MG: at 12:46

## 2018-01-01 RX ADMIN — SODIUM BICARBONATE 650 MG TABLET 650 MG: at 09:06

## 2018-01-01 RX ADMIN — Medication 5 ML: at 21:22

## 2018-01-01 RX ADMIN — HEPARIN SODIUM 5000 UNITS: 5000 INJECTION INTRAVENOUS; SUBCUTANEOUS at 21:13

## 2018-01-01 RX ADMIN — HEPARIN SODIUM 5000 UNITS: 5000 INJECTION INTRAVENOUS; SUBCUTANEOUS at 14:04

## 2018-01-01 RX ADMIN — METOPROLOL SUCCINATE 100 MG: 100 TABLET, EXTENDED RELEASE ORAL at 08:21

## 2018-01-01 RX ADMIN — PIPERACILLIN SODIUM,TAZOBACTAM SODIUM 4.5 G: 4; .5 INJECTION, POWDER, FOR SOLUTION INTRAVENOUS at 03:05

## 2018-01-01 RX ADMIN — ISOSORBIDE MONONITRATE 30 MG: 30 TABLET, EXTENDED RELEASE ORAL at 08:21

## 2018-01-01 RX ADMIN — Medication 5 ML: at 21:36

## 2018-01-01 RX ADMIN — SODIUM BICARBONATE 650 MG TABLET 650 MG: at 18:17

## 2018-01-01 RX ADMIN — ASPIRIN 81 MG 81 MG: 81 TABLET ORAL at 09:26

## 2018-01-01 RX ADMIN — ERYTHROPOIETIN 10000 UNITS: 10000 INJECTION, SOLUTION INTRAVENOUS; SUBCUTANEOUS at 12:36

## 2018-01-01 RX ADMIN — SODIUM CHLORIDE 200 ML/HR: 900 INJECTION, SOLUTION INTRAVENOUS at 16:44

## 2018-01-01 RX ADMIN — HYDRALAZINE HYDROCHLORIDE 25 MG: 25 TABLET, FILM COATED ORAL at 22:29

## 2018-01-01 RX ADMIN — METOPROLOL SUCCINATE 100 MG: 100 TABLET, EXTENDED RELEASE ORAL at 07:59

## 2018-01-01 RX ADMIN — HYDRALAZINE HYDROCHLORIDE 50 MG: 50 TABLET, FILM COATED ORAL at 23:19

## 2018-01-01 RX ADMIN — INSULIN LISPRO 2 UNITS: 100 INJECTION, SOLUTION INTRAVENOUS; SUBCUTANEOUS at 21:20

## 2018-01-01 RX ADMIN — SODIUM BICARBONATE 650 MG TABLET 650 MG: at 17:18

## 2018-01-01 RX ADMIN — METOPROLOL SUCCINATE 100 MG: 100 TABLET, EXTENDED RELEASE ORAL at 07:33

## 2018-01-01 RX ADMIN — PIPERACILLIN SODIUM,TAZOBACTAM SODIUM 4.5 G: 4; .5 INJECTION, POWDER, FOR SOLUTION INTRAVENOUS at 10:44

## 2018-01-01 RX ADMIN — INSULIN LISPRO 2 UNITS: 100 INJECTION, SOLUTION INTRAVENOUS; SUBCUTANEOUS at 12:30

## 2018-01-01 RX ADMIN — FUROSEMIDE 80 MG: 40 TABLET ORAL at 17:05

## 2018-01-01 RX ADMIN — SODIUM BICARBONATE 650 MG TABLET 650 MG: at 11:44

## 2018-01-01 RX ADMIN — PIPERACILLIN SODIUM,TAZOBACTAM SODIUM 4.5 G: 4; .5 INJECTION, POWDER, FOR SOLUTION INTRAVENOUS at 21:48

## 2018-01-01 RX ADMIN — ATORVASTATIN CALCIUM 80 MG: 40 TABLET, FILM COATED ORAL at 07:53

## 2018-01-01 RX ADMIN — LEVOFLOXACIN 500 MG: 500 TABLET, FILM COATED ORAL at 16:06

## 2018-01-01 RX ADMIN — INSULIN LISPRO 4 UNITS: 100 INJECTION, SOLUTION INTRAVENOUS; SUBCUTANEOUS at 21:48

## 2018-01-01 RX ADMIN — CLINDAMYCIN HYDROCHLORIDE 300 MG: 150 CAPSULE ORAL at 05:20

## 2018-01-01 RX ADMIN — ISOSORBIDE MONONITRATE 30 MG: 30 TABLET, EXTENDED RELEASE ORAL at 09:50

## 2018-01-01 RX ADMIN — SODIUM BICARBONATE 650 MG TABLET 650 MG: at 13:30

## 2018-01-01 RX ADMIN — ASPIRIN 81 MG 81 MG: 81 TABLET ORAL at 08:49

## 2018-01-01 RX ADMIN — METOPROLOL SUCCINATE 100 MG: 100 TABLET, EXTENDED RELEASE ORAL at 08:05

## 2018-01-01 RX ADMIN — INSULIN LISPRO 4 UNITS: 100 INJECTION, SOLUTION INTRAVENOUS; SUBCUTANEOUS at 11:44

## 2018-01-01 RX ADMIN — ATORVASTATIN CALCIUM 80 MG: 40 TABLET, FILM COATED ORAL at 08:05

## 2018-01-01 RX ADMIN — FUROSEMIDE 80 MG: 40 TABLET ORAL at 17:30

## 2018-01-01 RX ADMIN — METOPROLOL SUCCINATE 100 MG: 50 TABLET, EXTENDED RELEASE ORAL at 09:43

## 2018-01-01 RX ADMIN — ACETAMINOPHEN 650 MG: 325 TABLET, FILM COATED ORAL at 21:20

## 2018-01-01 RX ADMIN — HYDRALAZINE HYDROCHLORIDE 50 MG: 50 TABLET, FILM COATED ORAL at 13:26

## 2018-01-01 RX ADMIN — MINOCYCLINE HYDROCHLORIDE 100 MG: 50 CAPSULE ORAL at 20:57

## 2018-01-01 RX ADMIN — HEPARIN SODIUM 5000 UNITS: 5000 INJECTION INTRAVENOUS; SUBCUTANEOUS at 14:12

## 2018-01-01 RX ADMIN — RAMIPRIL 10 MG: 5 CAPSULE ORAL at 08:49

## 2018-01-01 RX ADMIN — OXYCODONE HYDROCHLORIDE 5 MG: 5 TABLET ORAL at 20:57

## 2018-01-01 RX ADMIN — Medication 10 ML: at 14:31

## 2018-01-01 RX ADMIN — ISOSORBIDE MONONITRATE 30 MG: 60 TABLET, EXTENDED RELEASE ORAL at 08:05

## 2018-01-01 RX ADMIN — HEPARIN SODIUM 5000 UNITS: 5000 INJECTION INTRAVENOUS; SUBCUTANEOUS at 03:23

## 2018-01-01 RX ADMIN — GUAIFENESIN AND DEXTROMETHORPHAN 10 ML: 100; 10 SYRUP ORAL at 09:48

## 2018-01-01 RX ADMIN — AMLODIPINE BESYLATE 10 MG: 10 TABLET ORAL at 07:32

## 2018-01-01 RX ADMIN — SODIUM BICARBONATE 650 MG TABLET 650 MG: at 16:59

## 2018-01-01 RX ADMIN — HEPARIN SODIUM AND DEXTROSE 12 UNITS/KG/HR: 5000; 5 INJECTION INTRAVENOUS at 15:34

## 2018-01-01 RX ADMIN — SODIUM BICARBONATE 650 MG TABLET 650 MG: at 22:25

## 2018-01-01 RX ADMIN — SODIUM BICARBONATE 650 MG TABLET 650 MG: at 08:11

## 2018-01-01 RX ADMIN — Medication 1 SPRAY: at 11:37

## 2018-01-01 RX ADMIN — Medication 5 ML: at 21:25

## 2018-01-01 RX ADMIN — ATORVASTATIN CALCIUM 80 MG: 40 TABLET, FILM COATED ORAL at 08:50

## 2018-01-01 RX ADMIN — HEPARIN SODIUM 5000 UNITS: 5000 INJECTION INTRAVENOUS; SUBCUTANEOUS at 05:23

## 2018-01-01 RX ADMIN — Medication 5 ML: at 06:04

## 2018-01-01 RX ADMIN — SODIUM BICARBONATE 650 MG TABLET 650 MG: at 17:59

## 2018-01-01 RX ADMIN — RAMIPRIL 10 MG: 5 CAPSULE ORAL at 09:43

## 2018-01-01 RX ADMIN — METOPROLOL SUCCINATE 100 MG: 100 TABLET, EXTENDED RELEASE ORAL at 08:12

## 2018-01-01 RX ADMIN — ATORVASTATIN CALCIUM 80 MG: 40 TABLET, FILM COATED ORAL at 08:28

## 2018-01-01 RX ADMIN — FUROSEMIDE 80 MG: 40 TABLET ORAL at 08:53

## 2018-01-01 RX ADMIN — METOPROLOL SUCCINATE 100 MG: 100 TABLET, EXTENDED RELEASE ORAL at 08:07

## 2018-01-01 RX ADMIN — Medication 5 ML: at 01:11

## 2018-01-01 RX ADMIN — SODIUM BICARBONATE 650 MG TABLET 650 MG: at 20:24

## 2018-01-01 RX ADMIN — INSULIN LISPRO 4 UNITS: 100 INJECTION, SOLUTION INTRAVENOUS; SUBCUTANEOUS at 16:45

## 2018-01-01 RX ADMIN — HYDRALAZINE HYDROCHLORIDE 50 MG: 50 TABLET, FILM COATED ORAL at 13:29

## 2018-01-01 RX ADMIN — INSULIN LISPRO 2 UNITS: 100 INJECTION, SOLUTION INTRAVENOUS; SUBCUTANEOUS at 17:28

## 2018-01-01 RX ADMIN — HEPARIN SODIUM 5000 UNITS: 5000 INJECTION INTRAVENOUS; SUBCUTANEOUS at 04:43

## 2018-01-01 RX ADMIN — ISOSORBIDE MONONITRATE 30 MG: 30 TABLET, EXTENDED RELEASE ORAL at 08:49

## 2018-01-01 RX ADMIN — Medication 5 ML: at 14:00

## 2018-01-01 RX ADMIN — METOPROLOL SUCCINATE 100 MG: 100 TABLET, EXTENDED RELEASE ORAL at 09:06

## 2018-01-01 RX ADMIN — GLIPIZIDE 2.5 MG: 2.5 TABLET, FILM COATED, EXTENDED RELEASE ORAL at 09:25

## 2018-01-01 RX ADMIN — AMLODIPINE BESYLATE 10 MG: 10 TABLET ORAL at 08:54

## 2018-01-01 RX ADMIN — FUROSEMIDE 80 MG: 40 TABLET ORAL at 17:59

## 2018-01-01 RX ADMIN — RAMIPRIL 10 MG: 5 CAPSULE ORAL at 09:50

## 2018-01-01 RX ADMIN — FUROSEMIDE 80 MG: 40 TABLET ORAL at 08:11

## 2018-01-01 RX ADMIN — MINOCYCLINE HYDROCHLORIDE 100 MG: 50 CAPSULE ORAL at 21:36

## 2018-01-01 RX ADMIN — Medication 5 ML: at 05:20

## 2018-01-01 RX ADMIN — Medication 5 ML: at 05:51

## 2018-01-01 RX ADMIN — ATORVASTATIN CALCIUM 80 MG: 40 TABLET, FILM COATED ORAL at 09:31

## 2018-01-01 RX ADMIN — METOPROLOL SUCCINATE 100 MG: 100 TABLET, EXTENDED RELEASE ORAL at 08:48

## 2018-01-01 RX ADMIN — ASPIRIN 81 MG 81 MG: 81 TABLET ORAL at 09:06

## 2018-01-01 RX ADMIN — Medication 10 ML: at 15:54

## 2018-01-01 RX ADMIN — Medication 5 ML: at 21:15

## 2018-01-01 RX ADMIN — ASPIRIN 81 MG 324 MG: 81 TABLET ORAL at 12:21

## 2018-01-01 RX ADMIN — INSULIN LISPRO 8 UNITS: 100 INJECTION, SOLUTION INTRAVENOUS; SUBCUTANEOUS at 16:21

## 2018-01-01 RX ADMIN — SODIUM CHLORIDE 75 ML/HR: 900 INJECTION, SOLUTION INTRAVENOUS at 20:23

## 2018-01-01 RX ADMIN — GLIPIZIDE 2.5 MG: 2.5 TABLET, FILM COATED, EXTENDED RELEASE ORAL at 09:51

## 2018-01-01 RX ADMIN — Medication 5 ML: at 12:45

## 2018-01-01 RX ADMIN — METOPROLOL SUCCINATE 100 MG: 100 TABLET, EXTENDED RELEASE ORAL at 05:31

## 2018-01-01 RX ADMIN — INSULIN LISPRO 8 UNITS: 100 INJECTION, SOLUTION INTRAVENOUS; SUBCUTANEOUS at 16:30

## 2018-01-01 RX ADMIN — Medication 10 ML: at 14:00

## 2018-01-01 RX ADMIN — HEPARIN SODIUM 5000 UNITS: 5000 INJECTION INTRAVENOUS; SUBCUTANEOUS at 05:19

## 2018-09-05 PROBLEM — N17.9 AKI (ACUTE KIDNEY INJURY) (HCC): Status: ACTIVE | Noted: 2018-01-01

## 2018-09-05 NOTE — IP AVS SNAPSHOT
[address] 
[phone] Patient: Yajaira Martínez MRN: [id number] SHAD:[birth year] About your hospitalization You were admitted on:  September 5, 2018 You last received care in the:  [facility] 2 SURGICAL You were discharged on:  September 10, 2018 Why you were hospitalized Your primary diagnosis was:  Carl (Acute Kidney Injury) (Hcc) Your diagnoses also included:  Chest Pain, Chronic Systolic Congestive Heart Failure (Hcc), Coronary Artery Disease Involving Native Coronary Artery Of Native Heart Without Angina Pectoris, Htn (Hypertension), Kidney Disease, Chronic, Stage Iii (Moderate, Egfr 30-59 Ml/Min), Tobacco Use, Type 2 Diabetes Mellitus With Complication, Without Long-Term Current Use Of Insulin (Hcc), Anemia Of Chronic Disease Follow-up Information Follow up With Details Comments Contact Info Luis Carreon MD On 9/17/2018 @ 3:00 [address] 4[address] [facility] [address] 
[phone] Rafat Burgess MD On 10/3/2018 @ 2:00 at [facility] Nephrology [facility] [address] 
[phone] Your Scheduled Appointments Monday September 17, 2018  3:00 PM EDT Office Visit with Luis Carreon MD  
[address] [facility] ([address] [facility] [address]) [address] [place] [address]  
[phone] Discharge Orders None A check michael indicates which time of day the medication should be taken. My Medications START taking these medications Instructions Each Dose to Equal  
 Morning Noon Evening Bedtime  
 hydrALAZINE 50 mg tablet Commonly known as:  APRESOLINE Take 1 Tab by mouth three (3) times daily. 50 mg  
    
   
   
  
   
  
 sodium bicarbonate 650 mg tablet Take 1 Tab by mouth three (3) times daily (with meals).   
 650 mg  
    
   
   
  
   
 CHANGE how you take these medications Instructions Each Dose to Equal  
 Morning Noon Evening Bedtime  
 amLODIPine 5 mg tablet Commonly known as:  Loraine Sadler What changed:  how much to take Take 2 Tabs by mouth daily. 10 mg CONTINUE taking these medications Instructions Each Dose to Equal  
 Morning Noon Evening Bedtime  
 aspirin 81 mg chewable tablet Take 1 Tab by mouth daily (after breakfast). 81 mg  
    
  
   
   
   
  
 atorvastatin 80 mg tablet Commonly known as:  LIPITOR Take 1 Tab by mouth daily. 80 mg  
    
  
   
   
   
  
 glipiZIDE SR 2.5 mg CR tablet Commonly known as:  GLUCOTROL XL Your next dose is: Take as directed   
   
 take 1 tablet by mouth once daily  
     
   
   
   
  
 metoprolol succinate 100 mg tablet Commonly known as:  TOPROL-XL Take 1 Tab by mouth daily. 100 mg NITROSTAT 0.4 mg SL tablet Generic drug:  nitroglycerin Your next dose is: Take on as needed schedule 
  
   
 by SubLINGual route every five (5) minutes as needed for Chest Pain. STOP taking these medications   
 ramipril 10 mg capsule Commonly known as:  ALTACE Where to Get Your Medications These medications were sent to [address] [place] [address], [address] [place] [address] Entrance  [address] Phone:  [phone]  
  amLODIPine 5 mg tablet  
 hydrALAZINE 50 mg tablet  
 sodium bicarbonate 650 mg tablet Discharge Instructions DISCHARGE SUMMARY from Nurse PATIENT INSTRUCTIONS: 
 
 
Recognize signs and symptoms of STROKE: 
 
 F-face looks uneven A-arms unable to move or move unevenly S-speech slurred or non-existent T-time-call 911 as soon as signs and symptoms begin-DO NOT go Back to bed or wait to see if you get better-TIME IS BRAIN. Warning Signs of HEART ATTACK Call 911 if you have these symptoms: 
? Chest discomfort. Most heart attacks involve discomfort in the center of the chest that lasts more than a few minutes, or that goes away and comes back. It can feel like uncomfortable pressure, squeezing, fullness, or pain. ? Discomfort in other areas of the upper body. Symptoms can include pain or discomfort in one or both arms, the back, neck, jaw, or stomach. ? Shortness of breath with or without chest discomfort. ? Other signs may include breaking out in a cold sweat, nausea, or lightheadedness. Don't wait more than five minutes to call [address]! Fast action can save your life. Calling 911 is almost always the fastest way to get lifesaving treatment. Emergency Medical Services staff can begin treatment when they arrive  up to an hour sooner than if someone gets to the hospital by car. The discharge information has been reviewed with the patient. The patient verbalized understanding. Discharge medications reviewed with the patient and appropriate educational materials and side effects teaching were provided. ___________________________________________________________________________________________________________________________________ Acute Kidney Injury: Care Instructions Your Care Instructions Acute kidney injury (MO) is a sudden decrease in kidney function. This can happen over a period of hours, days or, in some cases, weeks. MO used to be called acute renal failure, but kidney failure doesn't always happen with MO. Common causes of MO are dehydration, blood loss, and medicines.  
When MO happens, the kidneys have trouble removing waste and excess fluids from the body. The waste and fluids build up and become harmful. Kidney function may return to normal if the cause of MO is treated quickly. Your chance of a full recovery depends on what caused the problem, how quickly the cause was treated, and what other medical problems you have. A machine may be used to help your kidneys remove waste and fluids for a short period of time. This is called dialysis. Follow-up care is a key part of your treatment and safety. Be sure to make and go to all appointments, and call your doctor if you are having problems. It's also a good idea to know your test results and keep a list of the medicines you take. How can you care for yourself at home? · Talk to your doctor about how much fluid you should drink. · Eat a balanced diet. Talk to your doctor or a dietitian about what type of diet may be best for you. You may need to limit sodium, potassium, and phosphorus. · If you need dialysis, follow the instructions and schedule for dialysis that your doctor gives you. · Do not smoke. Smoking can make your condition worse. If you need help quitting, talk to your doctor about stop-smoking programs and medicines. These can increase your chances of quitting for good. · Do not drink alcohol. · Review all of your medicines with your doctor. Do not take any medicines, including nonsteroidal anti-inflammatory drugs (NSAIDs) such as ibuprofen (Advil, Motrin) or naproxen (Aleve), unless your doctor says it is safe for you to do so. · Make sure that anyone treating you for any health problem knows that you have had MO. When should you call for help? Call 911 anytime you think you may need emergency care. For example, call if: 
  · You passed out (lost consciousness).  
 Call your doctor now or seek immediate medical care if: 
  · You have new or worse nausea and vomiting.  
  · You have much less urine than normal, or you have no urine.   · You are feeling confused or cannot think clearly.  
  · You have new or more blood in your urine.  
  · You have new swelling.  
  · You are dizzy or lightheaded, or feel like you may faint.  
 Watch closely for changes in your health, and be sure to contact your doctor if: 
  · You do not get better as expected. Where can you learn more? Go to http://ulises-jared.info/. Enter X047 in the search box to learn more about \"Acute Kidney Injury: Care Instructions. \" Current as of: May 12, 2017 Content Version: 11.7 © 8794-1737 Grid20/20. Care instructions adapted under license by [email or web address] (which disclaims liability or warranty for this information). If you have questions about a medical condition or this instruction, always ask your healthcare professional. [address] any warranty or liability for your use of this information. [employer] Announcement We are excited to announce that we are making your provider's discharge notes available to you in [employer]. You will see these notes when they are completed and signed by the physician that discharged you from your recent hospital stay. If you have any questions or concerns about any information you see in [employer], please call the Health Information Department where you were seen or reach out to your Primary Care Provider for more information about your plan of care. Introducing [facility]! Dear Brenda Coats: Thank you for requesting a [employer] account. Our records indicate that you already have an active [employer] account. You can access your account anytime at [email or web address] Did you know that you can access your hospital and ER discharge instructions at any time in [employer]? You can also review all of your test results from your hospital stay or ER visit. Additional Information If you have questions, please visit the Frequently Asked Questions section of the MyChart website at [email or web address] Remember, [employer] is NOT to be used for urgent needs. For medical emergencies, dial 911. Now available from your iPhone and Android! Introducing Paolo Francis As a [employer] patient, I wanted to make you aware of our electronic visit tool called Paolo Francis. [employer] 24/7 allows you to connect within minutes with a medical provider 24 hours a day, seven days a week via a mobile device or tablet or logging into a secure website from your computer. You can access Paolo Francis from anywhere in the [place]. A virtual visit might be right for you when you have a simple condition and feel like you just dont want to get out of bed, or cant get away from work for an appointment, when your regular [employer] provider is not available (evenings, weekends or holidays), or when youre out of town and need minor care. Electronic visits cost only $49 and if the [employer] 24/7 provider determines a prescription is needed to treat your condition, one can be electronically transmitted to a nearby pharmacy*. Please take a moment to enroll today if you have not already done so. The enrollment process is free and takes just a few minutes. To enroll, please download the [employer] 24/7 teresa to your tablet or phone, or visit [email or web address] to enroll on your computer. And, as an [address] [place] [address] patient with a [employer] account, the results of your visits will be scanned into your electronic medical record and your primary care provider will be able to view the scanned results. We urge you to continue to see your regular [employer] provider for your ongoing medical care.   And while your primary care provider may not be the one available when you seek a Paolo Francis virtual visit, the peace of mind you get from getting a real diagnosis real time can be priceless. For more information on Paolo Federicoshari, view our Frequently Asked Questions (FAQs) at [email or web address]. Sincerely, 
 
Sabiha Antoine MD 
Chief Medical Officer [employer] *:  certain medications cannot be prescribed via Paolo Francis Providers Seen During Your Hospitalization Provider Specialty Primary office phone Melina Abraham MD Emergency Medicine [phone] Mark Ruffin MD Internal Medicine [phone] Immunizations Administered for This Admission Name Date Influenza Vaccine (Quad) PF  Deferred () TB Skin Test (PPD) Intradermal  Deferred (), 9/6/2018 Your Primary Care Physician (PCP) Primary Care Physician Office Phone Office Fax Elijah Augustine [phone] You are allergic to the following Allergen Reactions Lortab (Hydrocodone-Acetaminophen) Itching Recent Documentation Height Weight BMI Smoking Status 1.854 m 95.3 kg 27.71 kg/m2 Current Every Day Smoker Emergency Contacts Name Discharge Info Relation Home Work Mobile Marco A Schaefer  Child [2] [phone] Chantale Greco  Daughter [21] [phone] Patient Belongings The following personal items are in your possession at time of discharge: 
  Dental Appliances: None         Home Medications: None   Jewelry: None  Clothing: With patient, Undergarments, Socks, Shirt, Pants, Footwear, Belt    Other Valuables: At bedside Please provide this summary of care documentation to your next provider. Signatures-by signing, you are acknowledging that this After Visit Summary has been reviewed with you and you have received a copy. Patient Signature:  ____________________________________________________________ Date:  ____________________________________________________________  
  
Ellwood Gene Provider Signature:  ____________________________________________________________ Date:  ____________________________________________________________

## 2018-09-05 NOTE — ED TRIAGE NOTES
Patient complains of chest pain. Patient also states he does not feel well and is having difficulty getting around the house

## 2018-09-06 PROBLEM — D63.8 ANEMIA OF CHRONIC DISEASE: Chronic | Status: ACTIVE | Noted: 2018-01-01

## 2018-09-06 NOTE — PROGRESS NOTES
Problem: Mobility Impaired (Adult and Pediatric) Goal: *Acute Goals and Plan of Care (Insert Text) 1. Mr. Camilla Ordonez will perform sit to stand and bed to chair independently in 7 days. 2.  Mr. Camilla Ordonez will perform gait with least restrictive device and prosthesis >200 ft in 7 days. PHYSICAL THERAPY: Initial Assessment 9/6/2018 INPATIENT: Hospital Day: 2 [place] / [place] / Product Type: Medicare /  
  
NAME/AGE/GENDER: Arlyn Carrillo is a 72 y.o. male PRIMARY DIAGNOSIS: MO (acute kidney injury) ([facility] [facility] MO (acute kidney injury) ([facility] [address] [facility] OM (acute kidney injury) ([facility] [address] ICD-10: Treatment Diagnosis:  
 · Generalized Muscle Weakness (M62.81) · Difficulty in walking, Not elsewhere classified (R26.2) Precaution/Allergies: 
Lortab [hydrocodone-acetaminophen] ASSESSMENT:  
 
Mr. Camilla Ordonez presents in the ER waiting for a room. He is fully dressed with his prosthesis on. When asked why he was laying in bed with it on he tells me that he has a very hard time taking it on and off. Its broken. He asked if I would call advanced prosthetics. He got to the edge of the bed with ease. And sit to stand with supervision. When he went to bear weight on his right leg that has the prosthesis he was limited by pain. He used a walker. He then told me he had a sore on his stump. He would not take the prosthesis off for me to assess. Mr. Camilla Ordonez lives by himself and at baseline does not use any assistive device other than his prosthesis. He is currently functioning below baseline and is appropriate for skilled PT to maximize his rehab potential. 
 
Mono Estes and they will come see him while he is here. This section established at most recent assessment PROBLEM LIST (Impairments causing functional limitations): 1. Decreased Strength 2. Decreased Transfer Abilities 3. Decreased Ambulation Ability/Technique 4. Increased Pain 5. Decreased Activity Tolerance INTERVENTIONS PLANNED: (Benefits and precautions of physical therapy have been discussed with the patient.) 1. Bed Mobility 2. Gait Training 3. Therapeutic Activites 4. Therapeutic Exercise/Strengthening 5. Transfer Training 6. Group Therapy TREATMENT PLAN: Frequency/Duration: 4 times a week for duration of hospital stay Rehabilitation Potential For Stated Goals: Good RECOMMENDED REHABILITATION/EQUIPMENT: (at time of discharge pending progress): Due to the probability of continued deficits (see above) this patient will likely need continued skilled physical therapy after discharge. Equipment:  
? None at this time HISTORY:  
History of Present Injury/Illness (Reason for Referral): Mr. Elenita Solis is a 73 yo male with PMH of CKD3, DM2, right BKA and right thumb amputation, HTN, CAD, sCHF (EF 45-50%), tobacco use evaluated with malaise and fatigue and found to have MO on CKD with mild hyperkalemia. He states he has felt unwell for several days, resulting in remaining bedbound with anorexia. He denies fever, dysuria, cough or dyspnea. He had some muscular appearing left chest wall pain that improved after massage and with position changes. He is still making urine, no BM changes and presently he is hungry. Past Medical History/Comorbidities:  
Mr. Elenita Solis  has a past medical history of Abnormal CT scan, chest (12/27/2015); Acute CHF ([address] (12/26/2015); Acute systolic congestive heart failure ([address] (12/30/2015); MO (acute kidney injury) ([address] (7/29/2012); Anemia of chronic disease (9/6/2018); Bilateral pleural effusion (12/27/2015); Chest pain (12/26/2015); Depression; DM (diabetes mellitus), type 2 ([address] (7/29/2012); Encephalopathy due to infection (1/7/2017); HTN (hypertension) (7/29/2012); Hypoglycemia (7/29/2012); NSTEMI (non-ST elevated myocardial infarction) ([address] (1/5/2017); and Tobacco use (12/27/2015).  He also has no past medical history of Arrhythmia; Arthritis; Asthma; Autoimmune disease ([facility] [address] Calculus of kidney; Cancer ([facility] [address] [facility] Chronic pain; COPD; DEMENTIA; Gastrointestinal disorder; GERD (gastroesophageal reflux disease); Headache; Hypercholesterolemia; Liver disease; Other ill-defined conditions(799.89); Psychiatric disorder; Psychotic disorder; PUD (peptic ulcer disease); Seizures ([facility] [address] [facility] Sleep disorder; Stroke [facility]); Thromboembolus ([facility] [address] Thyroid disease; or Trauma. Mr. Boris Manuel  has a past surgical history that includes hx orthopaedic. Social History/Living Environment:  
Home Environment: Private residence Rails to Enter: No 
One/Two Story Residence: One story Living Alone: No 
Support Systems: Family member(s) Patient Expects to be Discharged to[de-identified] [facility] Current DME Used/Available at Home: Cane, straight, Walker (prosthesis) Prior Level of Function/Work/Activity: 
independent BKA Number of Personal Factors/Comorbidities that affect the Plan of Care: 1-2: MODERATE COMPLEXITY EXAMINATION:  
Most Recent Physical Functioning:  
Gross Assessment: 
AROM: Within functional limits Strength: Within functional limits Posture: 
Posture (WDL): Within defined limits Balance: 
  Bed Mobility: 
Rolling: Independent Supine to Sit: Supervision Sit to Supine: Supervision Wheelchair Mobility: 
  
Transfers: 
Sit to Stand: Supervision Stand to Sit: Supervision Gait: 
  
Step Length: Right shortened;Left shortened Distance (ft): 1 Feet (ft) Assistive Device: Joe Rodriguez Ambulation - Level of Assistance: Contact guard assistance Interventions: Verbal cues Body Structures Involved: 1. Muscles Body Functions Affected: 1. Movement Related Activities and Participation Affected: 1. Mobility Number of elements that affect the Plan of Care: 3: MODERATE COMPLEXITY CLINICAL PRESENTATION:  
Presentation: Stable and uncomplicated: LOW COMPLEXITY CLINICAL DECISION MAKIN [facility] [address] [id number] AM-PAC 6 Clicks Basic Mobility Inpatient Short Form How much difficulty does the patient currently have. .. Unable A Lot A Little None 1. Turning over in bed (including adjusting bedclothes, sheets and blankets)? [] 1   [] 2   [] 3   [x] 4  
2. Sitting down on and standing up from a chair with arms ( e.g., wheelchair, bedside commode, etc.)   [] 1   [] 2   [] 3   [x] 4  
3. Moving from lying on back to sitting on the side of the bed? [] 1   [] 2   [] 3   [x] 4 How much help from another person does the patient currently need. .. Total A Lot A Little None 4. Moving to and from a bed to a chair (including a wheelchair)? [] 1   [] 2   [x] 3   [] 4  
5. Need to walk in hospital room? [] 1   [x] 2   [] 3   [] 4  
6. Climbing 3-5 steps with a railing? [] 1   [x] 2   [] 3   [] 4  
© 2007, Trustees of [address] [place] [address], under license to [employer]. All rights reserved Score:  Initial: 19 Most Recent: X (Date: -- ) Interpretation of Tool:  Represents activities that are increasingly more difficult (i.e. Bed mobility, Transfers, Gait). Score 24 23 22-20 19-15 14-10 9-7 6 Modifier CH CI CJ CK CL CM CN   
 
? Mobility - Walking and Moving Around:  
  - CURRENT STATUS: CK - 40%-59% impaired, limited or restricted  - GOAL STATUS: CJ - 20%-39% impaired, limited or restricted  - D/C STATUS:  ---------------To be determined--------------- [place] / [place] B / Product Type: Medicare /   
 
Medical Necessity:    
· Patient is expected to demonstrate progress in functional technique to increase independence with mobility and gait. , . 
Reason for Services/Other Comments: 
· Patient continues to require present interventions due to patient's inability to function at baseline.  .  
Use of outcome tool(s) and clinical judgement create a POC that gives a: Clear prediction of patient's progress: LOW COMPLEXITY  
  
 
 
 
TREATMENT:  
 (In addition to Assessment/Re-Assessment sessions the following treatments were rendered) Pre-treatment Symptoms/Complaints:  Tired. Didn't sleep well, complaining of his prosthesis not working right Pain: Initial:  
Pain Intensity 1: 4 Pain Location 1:  (right BKA stump)  Post Session:  tired Assessment/Reassessment only, no treatment provided today Braces/Orthotics/Lines/Etc:  
· O2 Device: Room air Treatment/Session Assessment:   
· Response to Treatment:  Good · Interdisciplinary Collaboration:  
o Registered Nurse · After treatment position/precautions:  
o Up in chair 
o Call light within reach 
o RN notified · Compliance with Program/Exercises: Will assess as treatment progresses. · Recommendations/Intent for next treatment session: \"Next visit will focus on advancements to more challenging activities and reduction in assistance provided\". Total Treatment Duration: PT Patient Time In/Time Out Time In: 1130 Time Out: 1200 Maria Fernanda Odom PT

## 2018-09-06 NOTE — H&P
Hospitalist H&P Note Admit Date:  2018 10:25 PM  
Name:  Jeanine Perez Age:  72 y.o. 
:  1945 MRN:  [id number] PCP:  Jan Willis MD 
Treatment Team: Attending Provider: Steven Liu MD; Primary Nurse: Rodolfo Mariscal RN 
 
HPI:  
 
CC: fatigue Mr. Poppy Ren is a 73 yo male with PMH of CKD3, DM2, right BKA and right thumb amputation, HTN, CAD, sCHF (EF 45-50%), tobacco use evaluated with malaise and fatigue and found to have MO on CKD with mild hyperkalemia. He states he has felt unwell for several days, resulting in remaining bedbound with anorexia. He denies fever, dysuria, cough or dyspnea. He had some muscular appearing left chest wall pain that improved after massage and with position changes. He is still making urine, no BM changes and presently he is hungry. 10 systems reviewed and negative except as noted in HPI. -  Has vision changes/ cataracts, has headache and neuropathy, has easy bruising and memory changes Past Medical History:  
Diagnosis Date  Abnormal CT scan, chest 2015  Acute CHF ([address] 2015  Acute systolic congestive heart failure ([address] 2015  MO (acute kidney injury) ([address] 2012  Anemia of chronic disease 2018  Bilateral pleural effusion 2015  Chest pain 2015  Depression  DM (diabetes mellitus), type 2 ([address] 2012  Encephalopathy due to infection 2017  
 HTN (hypertension) 2012  Hypoglycemia 2012  
 NSTEMI (non-ST elevated myocardial infarction) ([address] 2017  Tobacco use 2015 Past Surgical History:  
Procedure Laterality Date  HX ORTHOPAEDIC Allergies Allergen Reactions  Lortab [Hydrocodone-Acetaminophen] Itching Social History Substance Use Topics  Smoking status: Current Every Day Smoker Packs/day: 0.50 Years: 45.00 Types: Cigarettes  Smokeless tobacco: Never Used  Alcohol use No  
  
 Family History Problem Relation Age of Onset  Diabetes Mother  Kidney Disease Mother  Diabetes Father  Kidney Disease Father  Kidney Disease Sister Immunization History Administered Date(s) Administered  DTAP Vaccine 06/03/2010  TB Skin Test (PPD) Intradermal 01/08/2017 PTA Medications: 
Prior to Admission Medications Prescriptions Last Dose Informant Patient Reported? Taking? amLODIPine (NORVASC) 5 mg tablet   No No  
Sig: Take 1 Tab by mouth daily. aspirin 81 mg chewable tablet   No No  
Sig: Take 1 Tab by mouth daily (after breakfast). atorvastatin (LIPITOR) 80 mg tablet   No No  
Sig: Take 1 Tab by mouth daily. glipiZIDE SR (GLUCOTROL XL) 2.5 mg CR tablet   No No  
Sig: take 1 tablet by mouth once daily  
metoprolol succinate (TOPROL-XL) 100 mg tablet   No No  
Sig: Take 1 Tab by mouth daily. nitroglycerin (NITROSTAT) 0.4 mg SL tablet   Yes No  
Sig: by SubLINGual route every five (5) minutes as needed for Chest Pain. ramipril (ALTACE) 10 mg capsule   No No  
Sig: Take 1 Cap by mouth daily. Facility-Administered Medications: None Objective:  
Patient Vitals for the past 24 hrs: 
 Temp Pulse Resp BP SpO2  
09/05/18 2239 - 81 22 (!) 190/92 98 % 09/05/18 1852 98.4 °F (36.9 °C) 89 18 151/89 98 % Oxygen Therapy O2 Sat (%): 98 % (09/05/18 2239) Pulse via Oximetry: 80 beats per minute (09/05/18 2239) O2 Device: Room air (09/05/18 1852) No intake or output data in the 24 hours ending 09/06/18 0028 Physical Exam: 
General:    Alert. No distress Eyes:   Normal sclera. Extraocular movements intact. PERRLA 
ENT:  Normocephalic, atraumatic. Moist mucous membranes CV:   RRR. No m/r/g. No edema Lungs:  CTAB. No wheezing, rhonchi, or rales. Abdomen: Soft, nontender, nondistended. Present BS Extremities: Warm and dry. .right BKA with prosthesis, right thumb amputation Neurologic:  grossly intact. Skin:     No rashes or jaundice. Normal coloration Psych:  Normal mood and affect. I reviewed the labs, imaging, EKGs, telemetry, and other studies done this admission. EKG: tracing personally reviewed as NSR with nonspecific ST segment changes Data Review:  
Recent Results (from the past 24 hour(s)) EKG, 12 LEAD, INITIAL Collection Time: 09/05/18  6:48 PM  
Result Value Ref Range Ventricular Rate 90 BPM  
 Atrial Rate 90 BPM  
 P-R Interval 176 ms QRS Duration 76 ms  
 Q-T Interval 366 ms  
 QTC Calculation (Bezet) 447 ms Calculated P Axis 65 degrees Calculated T Axis 163 degrees Diagnosis Normal sinus rhythm Septal infarct , age undetermined ST & T wave abnormality, consider inferolateral ischemia Abnormal ECG When compared with ECG of 05-JAN-2017 18:43, Significant changes have occurred CBC WITH AUTOMATED DIFF Collection Time: 09/05/18  7:02 PM  
Result Value Ref Range WBC 4.4 4.3 - 11.1 K/uL  
 RBC 3.28 (L) 4.23 - 5.6 M/uL HGB 9.3 (L) 13.6 - 17.2 g/dL HCT 30.0 (L) 41.1 - 50.3 % MCV 91.5 79.6 - 97.8 FL  
 MCH 28.4 26.1 - 32.9 PG  
 MCHC 31.0 (L) 31.4 - 35.0 g/dL  
 RDW 12.7 % PLATELET 375 129 - 390 K/uL MPV 11.0 9.4 - 12.3 FL ABSOLUTE NRBC 0.00 0.0 - 0.2 K/uL NEUTROPHILS 70 43 - 78 % LYMPHOCYTES 19 13 - 44 % MONOCYTES 6 4.0 - 12.0 % EOSINOPHILS 4 0.5 - 7.8 % BASOPHILS 1 0.0 - 2.0 % IMMATURE GRANULOCYTES 0 0.0 - 5.0 %  
 ABS. NEUTROPHILS 3.1 1.7 - 8.2 K/UL  
 ABS. LYMPHOCYTES 0.8 0.5 - 4.6 K/UL  
 ABS. MONOCYTES 0.3 0.1 - 1.3 K/UL  
 ABS. EOSINOPHILS 0.2 0.0 - 0.8 K/UL  
 ABS. BASOPHILS 0.0 0.0 - 0.2 K/UL  
 ABS. IMM. GRANS. 0.0 0.0 - 0.5 K/UL  
 DF AUTOMATED METABOLIC PANEL, COMPREHENSIVE Collection Time: 09/05/18  7:02 PM  
Result Value Ref Range Sodium 141 136 - 145 mmol/L Potassium 5.2 (H) 3.5 - 5.1 mmol/L Chloride 113 (H) 98 - 107 mmol/L  
 CO2 20 (L) 21 - 32 mmol/L  Anion gap 8 7 - 16 mmol/L  
 Glucose 63 (L) 65 - 100 mg/dL BUN 42 (H) 8 - 23 MG/DL Creatinine 4.34 (H) 0.8 - 1.5 MG/DL  
 GFR est AA 17 (L) >60 ml/min/1.73m2 GFR est non-AA 14 (L) >60 ml/min/1.73m2 Calcium 8.3 8.3 - 10.4 MG/DL Bilirubin, total 0.2 0.2 - 1.1 MG/DL  
 ALT (SGPT) 20 12 - 65 U/L  
 AST (SGOT) 17 15 - 37 U/L Alk. phosphatase 70 50 - 136 U/L Protein, total 7.2 6.3 - 8.2 g/dL Albumin 3.1 (L) 3.2 - 4.6 g/dL Globulin 4.1 (H) 2.3 - 3.5 g/dL A-G Ratio 0.8 (L) 1.2 - 3.5 BNP Collection Time: 09/05/18  7:02 PM  
Result Value Ref Range  pg/mL TROPONIN I Collection Time: 09/05/18  7:02 PM  
Result Value Ref Range Troponin-I, Qt. 0.03 0.02 - 0.05 NG/ML All Micro Results None Other Studies: Xr Chest Pa Lat Result Date: 9/5/2018 Two view chest History: Chest pain, weakness, blurred vision, unsteady gait x2 days. Comparison: 02/01/2017 Findings: The heart and mediastinal silhouette are normal in size and configuration. The lungs and pleural spaces are clear. The pulmonary vascularity is within normal limits. The visualized osseous structures are unremarkable. Impression: No active disease in the chest.  
 
 
Assessment and Plan:  
 
Hospital Problems as of 9/6/2018  Date Reviewed: 10/5/2017 Codes Class Noted - Resolved POA Anemia of chronic disease (Chronic) ICD-10-CM: D63.8 ICD-9-CM: 285.29  9/6/2018 - Present Yes * (Principal)MO (acute kidney injury) [address] ICD-10-CM: N17.9 ICD-9-CM: 584.9  9/5/2018 - Present Yes Type 2 diabetes mellitus with complication, without long-term current use of insulin (HCC) ICD-10-CM: E11.8 ICD-9-CM: 250.90  4/7/2017 - Present Yes Coronary artery disease involving native coronary artery of native heart without angina pectoris ICD-10-CM: I25.10 ICD-9-CM: 414.01  4/7/2017 - Present Yes Kidney disease, chronic, stage III (moderate, EGFR 30-59 ml/min) (Chronic) ICD-10-CM: N18.3 ICD-9-CM: 585.3  8/11/2016 - Present Yes Chronic systolic congestive heart failure (HCC) (Chronic) ICD-10-CM: D71.02 ICD-9-CM: 428.22, 428.0  12/30/2015 - Present Yes Overview Signed 1/6/2017  8:59 AM by Becka Evans MD  
  EF 35-40% on 2015 Echo Tobacco use (Chronic) ICD-10-CM: Z72.0 ICD-9-CM: 305.1  12/27/2015 - Present Yes Chest pain ICD-10-CM: R07.9 ICD-9-CM: 786.50  12/26/2015 - Present Yes HTN (hypertension) (Chronic) ICD-10-CM: I10 
ICD-9-CM: 401.9  7/29/2012 - Present Yes · MO on CKD: will hydrate over next 12 hours and reassess need for IVF in light of known depressed EF, check renal US and UA, followup BMP, mild hyperkalemia was addressed in the ED with kayexalate/ calcium/ bicarb · HTN: continue norvasc and metoprolol, hold altace, add prn hydralazine · DM2: stop glucotrol, check POC, add SSI, check A1C  
· CAD: trend troponin, current chest pain appears muscular, continue medical management · Tobacco use: needs cessation, declines patch  
· SCHF: stable · Anemia: trend CBC,stable Discharge planning:  Pending , PPD, PT/OT/ case management DVT ppx: heparin Code status:  Full Estimated LOS:  Greater than 2 midnights Risk:  high Care plan: unable to give information per patient Signed: Ander Lozano MD

## 2018-09-06 NOTE — ED NOTES
Patient resting in bed with eyes closed. Respirations present. Vitals cycling. No distress observed.

## 2018-09-06 NOTE — ED PROVIDER NOTES
HPI Comments: 72-year-old male presenting for diffuse weakness. He said tired he couldn't get out of bed. His only eaten one meal today. He fell back asleep when he woke up he had some subtle left-sided chest pain. This popped him to come in for further evaluation. He denies shortness breath, nausea, vomiting. No fevers. Patient is a 72 y.o. male presenting with fatigue. Fatigue This is a new problem. The current episode started yesterday. There was no focality noted. Pertinent negatives include no focal weakness, no loss of sensation, no loss of balance, no slurred speech, no speech difficulty, no memory loss, no movement disorder, no agitation, no visual change, no auditory change, no mental status change, no unresponsiveness and no disorientation. There has been no fever. Associated symptoms include chest pain. Pertinent negatives include no shortness of breath, no vomiting, no altered mental status, no confusion, no headaches, no choking, no nausea, no bowel incontinence and no bladder incontinence. Past Medical History:  
Diagnosis Date  Abnormal CT scan, chest 12/27/2015  Acute CHF ([address] 12/26/2015  Acute systolic congestive heart failure ([address] 12/30/2015  MO (acute kidney injury) ([address] 7/29/2012  Bilateral pleural effusion 12/27/2015  Chest pain 12/26/2015  Depression  DM (diabetes mellitus), type 2 ([address] 7/29/2012  Encephalopathy due to infection 1/7/2017  
 HTN (hypertension) 7/29/2012  Hypoglycemia 7/29/2012  
 NSTEMI (non-ST elevated myocardial infarction) ([address] 1/5/2017  Tobacco use 12/27/2015 Past Surgical History:  
Procedure Laterality Date  HX ORTHOPAEDIC Family History:  
Problem Relation Age of Onset  Diabetes Mother  Kidney Disease Mother  Diabetes Father  Kidney Disease Father  Kidney Disease Sister Social History Social History  Marital status: SINGLE   Spouse name: N/A  
  Number of children: N/A  
 Years of education: N/A Occupational History  retired brick layer Social History Main Topics  Smoking status: Current Every Day Smoker Packs/day: 0.50 Years: 45.00 Types: Cigarettes  Smokeless tobacco: Never Used  Alcohol use No  
 Drug use: Yes Special: Prescription  Sexual activity: No  
 
Other Topics Concern  Not on file Social History Narrative ALLERGIES: Lortab [hydrocodone-acetaminophen] Review of Systems Constitutional: Positive for fatigue. Respiratory: Negative for choking and shortness of breath. Cardiovascular: Positive for chest pain. Negative for palpitations and leg swelling. Gastrointestinal: Negative for blood in stool, bowel incontinence, diarrhea, nausea and vomiting. Genitourinary: Negative for bladder incontinence. Neurological: Negative for focal weakness, speech difficulty, headaches and loss of balance. Psychiatric/Behavioral: Negative for agitation, confusion and memory loss. All other systems reviewed and are negative. Vitals:  
 09/05/18 1852 09/05/18 2239 BP: 151/89 (!) 190/92 Pulse: 89 81 Resp: 18 22 Temp: 98.4 °F (36.9 °C) SpO2: 98% 98% Weight: 95.3 kg (210 lb) Height: 6' 1\" (1.854 m) Physical Exam  
Constitutional: He is oriented to person, place, and time. He appears well-developed and well-nourished. HENT:  
Head: Normocephalic and atraumatic. Eyes: Conjunctivae are normal. Pupils are equal, round, and reactive to light. Neck: Normal range of motion. Neck supple. Cardiovascular: Normal rate and regular rhythm. Pulmonary/Chest: Effort normal and breath sounds normal.  
Abdominal: Soft. Bowel sounds are normal.  
Musculoskeletal: Normal range of motion. Neurological: He is alert and oriented to person, place, and time. Skin: Skin is warm and dry. Nursing note and vitals reviewed. MDM Number of Diagnoses or Management Options Acute renal failure superimposed on chronic kidney disease, unspecified CKD stage, unspecified acute renal failure type [facility]): Hypertension, unspecified type:  
Diagnosis management comments: 62-year-old male presenting for generalized fatigue. EKG showed a normal sinus rhythm rate 75, normal axis, prominent T waves with some peaking, no STEMI criteria, intervals are otherwise normal. 
 
Labs are drawn and reviewed by me. They reveal an acute on chronic renal sufficiency. Amount and/or Complexity of Data Reviewed Clinical lab tests: ordered and reviewed Tests in the radiology section of CPT®: ordered and reviewed Tests in the medicine section of CPT®: ordered and reviewed Decide to obtain previous medical records or to obtain history from someone other than the patient: yes Discuss the patient with other providers: yes Independent visualization of images, tracings, or specimens: yes Risk of Complications, Morbidity, and/or Mortality Presenting problems: high Diagnostic procedures: high Management options: high Patient Progress Patient progress: improved ED Course  
started weight fluids of normal saline 125 mL per hour. Treated his hyperkalemia with Kayexalate, sodium bicarbonate, and calcium gluconate. Repeat the patient's troponin. I consult the hospitalist service and accept the patient. Procedures

## 2018-09-06 NOTE — ED NOTES
PT at bedside. Patient reports that he has not taken his prothesis off for 4 days. PT to call for assistance.

## 2018-09-06 NOTE — ED NOTES
TRANSFER - OUT REPORT: 
 
Verbal report given to [facility] RN(name) on Jennifer Situ  being transferred to  204(unit) for routine progression of care Report consisted of patients Situation, Background, Assessment and  
Recommendations(SBAR). Information from the following report(s) SBAR was reviewed with the receiving nurse. Lines:  
Peripheral IV Left Forearm (Active) Site Assessment Clean, dry, & intact 9/5/2018 10:55 PM  
Phlebitis Assessment 0 9/5/2018 10:55 PM  
Infiltration Assessment 0 9/5/2018 10:55 PM  
Dressing Status Clean, dry, & intact 9/5/2018 10:55 PM  
Hub Color/Line Status Green 9/5/2018 10:55 PM  
  
 
Opportunity for questions and clarification was provided.    
 
Patient transported with:

## 2018-09-06 NOTE — PROGRESS NOTES
Problem: Self Care Deficits Care Plan (Adult) Goal: *Acute Goals and Plan of Care (Insert Text) 1. Patient will complete lower body bathing and dressing with mod I and adaptive equipment as needed. 2. Patient will complete toileting with mod I.  
3. Patient will tolerate 23 minutes of OT treatment with less than 4 rest breaks to increase activity tolerance for ADLs. 4. Patient will complete functional transfers with mod I and adaptive equipment as needed. Timeframe: 7 visits Comments: OCCUPATIONAL THERAPY: Initial Assessment, Daily Note, Treatment Day: Day of Assessment and 1st and PM 9/6/2018 INPATIENT: Hospital Day: 2 Payor: CARE IMPROVEMENT PLUS / [place] / Product Type: Managed Care Medicare /  
  
NAME/AGE/GENDER: Ac Perdomo is a 72 y.o. male PRIMARY DIAGNOSIS:  MO (acute kidney injury) ([facility] [address] MO (acute kidney injury) ([facility] [address] MO (acute kidney injury) ([facility] [address] ICD-10: Treatment Diagnosis:  
 · Generalized Muscle Weakness (M62.81) Precautions/Allergies: 
   Lortab [hydrocodone-acetaminophen] ASSESSMENT:  
 
Mr. Mat Starks presents to hospital for above. Pt lives alone in a one-level home, where he is typically independent to mod I with ADLs/IADLs, no longer driving. Pt uses a cane PRN at baseline for functional mobility; pt endorses 0 falls in the last 6 months. Today, pt is found supine in bed upon arrival, AOx4, and agreeable to OT evaluation. Per BUE screen, AROM, strength, and coordination are WNL. Pt able to move supine to sit with independence, demonstrating intact sitting balance at EOB. Pt completes STS with SBA, demonstrating good to fair balance in standing. Pt having trouble with donning prosthesis today, advanced prosthetic notified by PT. Pt dons B shoes after set up assistance. Pt left with possessions in reach and all needs met.  Mr. Mat Starks presents with functional limitations listed below and appears to be functioning below baseline. They will benefit from continued skilled OT services to maximize safety and independence with ADLs. Will follow during acute stay. This section established at most recent assessment PROBLEM LIST (Impairments causing functional limitations): 1. Decreased Strength 2. Decreased ADL/Functional Activities 3. Decreased Transfer Abilities 4. Decreased Balance 5. Decreased Activity Tolerance INTERVENTIONS PLANNED: (Benefits and precautions of occupational therapy have been discussed with the patient.) 1. Activities of daily living training 2. Adaptive equipment training 3. Balance training 4. Donning&doffing training 5. Therapeutic activity 6. Therapeutic exercise TREATMENT PLAN: Frequency/Duration: Follow patient 3x/week to address above goals. Rehabilitation Potential For Stated Goals: Good RECOMMENDED REHABILITATION/EQUIPMENT: (at time of discharge pending progress): Due to the probability of continued deficits (see above) this patient will not likely need continued skilled occupational therapy after discharge. Equipment:  
? None at this time OCCUPATIONAL PROFILE AND HISTORY:  
History of Present Injury/Illness (Reason for Referral): 
See H&P Past Medical History/Comorbidities:  
Mr. Ibeth Yanes  has a past medical history of Abnormal CT scan, chest (12/27/2015); Acute CHF ([facility] [address] (12/26/2015); Acute systolic congestive heart failure ([facility] [address] (12/30/2015); MO (acute kidney injury) ([facility] [address] (7/29/2012); Anemia of chronic disease (9/6/2018); Bilateral pleural effusion (12/27/2015); Chest pain (12/26/2015); Depression; DM (diabetes mellitus), type 2 ([address] (7/29/2012); Encephalopathy due to infection (1/7/2017); HTN (hypertension) (7/29/2012); Hypoglycemia (7/29/2012); NSTEMI (non-ST elevated myocardial infarction) ([facility] [address] (1/5/2017); and Tobacco use (12/27/2015).  He also has no past medical history of Arrhythmia; Arthritis; Asthma; Autoimmune disease ([facility] [address] Calculus of kidney; Cancer ([facility] [address] Chronic pain; COPD; DEMENTIA; Gastrointestinal disorder; GERD (gastroesophageal reflux disease); Headache; Hypercholesterolemia; Liver disease; Other ill-defined conditions(799.89); Psychiatric disorder; Psychotic disorder; PUD (peptic ulcer disease); Seizures ([facility] [address] Sleep disorder; Stroke [facility]); [facility] Thyroid disease; or Trauma. Mr. Zaira Fonseca  has a past surgical history that includes hx orthopaedic. Social History/Living Environment:  
Home Environment: Private residence Rails to Enter: No 
One/Two Story Residence: One story Living Alone: No 
Support Systems: Family member(s) [facility] Used/Available at Home: Cane, straight, Walker (prosthesis) Prior Level of Function/Work/Activity: 
[place] to mod I with ADLs Personal Factors:   
      Sex:  male Age:  72 y.o. Number of Personal Factors/Comorbidities that affect the Plan of Care: Expanded review of therapy/medical records (1-2):  MODERATE COMPLEXITY ASSESSMENT OF OCCUPATIONAL PERFORMANCE[de-identified]  
Activities of Daily Living:  
Basic ADLs (From Assessment) Complex ADLs (From Assessment) Feeding: Independent Oral Facial Hygiene/Grooming: Independent Bathing: Contact guard assistance Upper Body Dressing: Independent Lower Body Dressing: Minimum assistance Toileting: Contact guard assistance Grooming/Bathing/Dressing Activities of Daily Living Cognitive Retraining Safety/Judgement: Awareness of environment Bed/Mat Mobility Rolling: Independent Supine to Sit: Independent Sit to Supine: Independent Sit to Stand: Stand-by assistance Scooting: Independent Most Recent Physical Functioning:  
Gross Assessment: 
AROM: Within functional limits Strength: Within functional limits Posture: 
Posture (WDL): Within defined limits Balance: Bed Mobility: 
Rolling: Independent Supine to Sit: Independent Sit to Supine: Independent Scooting: Independent Wheelchair Mobility: 
  
Transfers: 
Sit to Stand: Stand-by assistance Stand to Sit: Stand-by assistance Patient Vitals for the past 6 hrs: 
 BP SpO2 Pulse 18 0918 179/87 99 % 78  
18 0938 143/67 100 % 80  
18 0958 142/66 100 % 85  
18 1019 166/78 100 % 83  
18 1039 178/80 99 % 82  
18 1059 158/73 99 % 76  
18 1119 158/73 97 % 91  
18 1138 172/85 98 % 77  
18 1219 179/88 - 77  
18 1239 155/77 - 78  
18 1259 (!) 151/97 - 79  
18 1421 165/78 98 % 79 Mental Status Neurologic State: Alert Orientation Level: Oriented X4 Cognition: Appropriate for age attention/concentration, Appropriate safety awareness, Follows commands Perception: Appears intact Perseveration: No perseveration noted Safety/Judgement: Awareness of environment Physical Skills Involved: 
1. Balance 2. Strength 3. Activity Tolerance 4. Pain (Chronic) Cognitive Skills Affected (resulting in the inability to perform in a timely and safe manner): 
1. n/a Psychosocial Skills Affected: 1. Habits/Routines Number of elements that affect the Plan of Care: 3-5:  MODERATE COMPLEXITY CLINICAL DECISION MAKIN [facility] [address] [address] AM-PAC 6 Clicks Daily Activity Inpatient Short Form How much help from another person does the patient currently need. .. Total A Lot A Little None 1. Putting on and taking off regular lower body clothing? [] 1   [] 2   [x] 3   [] 4  
2. Bathing (including washing, rinsing, drying)? [] 1   [] 2   [x] 3   [] 4  
3. Toileting, which includes using toilet, bedpan or urinal?   [] 1   [] 2   [x] 3   [] 4  
4. Putting on and taking off regular upper body clothing? [] 1   [] 2   [] 3   [x] 4  
5. Taking care of personal grooming such as brushing teeth? [] 1   [] 2   [] 3   [x] 4 6.  Eating meals? [] 1   [] 2   [] 3   [x] 4  
© 2007, Trustees of [address] [place] [address] [address] [id number], under license to [employer]. All rights reserved Score:  Initial: 21 Most Recent: X (Date: -- ) Interpretation of Tool:  Represents activities that are increasingly more difficult (i.e. Bed mobility, Transfers, Gait). Score 24 23 22-20 19-15 14-10 9-7 6 Modifier CH CI CJ CK CL CM CN   
 
? Self Care:  
  - CURRENT STATUS: CJ - 20%-39% impaired, limited or restricted  - GOAL STATUS: CI - 1%-19% impaired, limited or restricted  - D/C STATUS:  ---------------To be determined--------------- Payor: CARE IMPROVEMENT PLUS [place] CARE IMPR[place] / Product Type: Managed Care Medicare /   
 
Medical Necessity:    
· Patient is expected to demonstrate progress in strength, balance and functional technique to increase independence with ADLs. Reason for Services/Other Comments: 
· Patient continues to require skilled intervention due to medical complications. Use of outcome tool(s) and clinical judgement create a POC that gives a: LOW COMPLEXITY  
 
 
 
TREATMENT:  
(In addition to Assessment/Re-Assessment sessions the following treatments were rendered) Pre-treatment Symptoms/Complaints:   
Pain: Initial:  
Pain Intensity 1: 0  Post Session:  same Self Care: (8 minutes): Procedure(s) (per grid) utilized to improve and/or restore self-care/home management as related to dressing. Required minimal verbal and tactile cueing to facilitate activities of daily living skills and compensatory activities. Braces/Orthotics/Lines/Etc:  
· IV 
· O2 Device: Room air Treatment/Session Assessment:   
· Response to Treatment:  Tolerated well · Interdisciplinary Collaboration:  
o Occupational Therapist 
o Registered Nurse 
o Certified Nursing Assistant/Patient Care Technician · After treatment position/precautions:  
o seated at EOB · Compliance with Program/Exercises: compliant all of the time. · Recommendations/Intent for next treatment session: \"Next visit will focus on advancements to more challenging activities and reduction in assistance provided\". Total Treatment Duration: OT Patient Time In/Time Out Time In: 9070 Time Out: 1184 [facility]

## 2018-09-06 NOTE — ED NOTES
Patient bgl 189. Patient refused insulin. States \"I ain't messing with no insulin. I aint messing with no metformin either. \" Patient educated on the use of sliding scale insulin while admitted. Patient insistent \"I ain't taking insulin. \" Cirilo Acosta, at bedside.

## 2018-09-06 NOTE — PROGRESS NOTES
Hospitalist Progress Note Admit Date:  2018 10:25 PM  
Name:  Macie Jara Age:  72 y.o. 
:  1945 MRN:  [id number] PCP:  Deneen Calvert MD 
Treatment Team: Primary Nurse: Bina Stock; Utilization Review: Brian Dave RN Subjective:  
71 yo male with PMH of CKD3, DM2, right BKA and right thumb amputation, HTN, CAD, sCHF (EF 45-50%), tobacco use evaluated with malaise and fatigue and found to have MO on CKD with mild hyperkalemia. Had anorexia for several days PTA. 
 
9/6 - pt tired but otherwise no complaints. No pain, n/v, diarrhea, SOB. Objective:  
Patient Vitals for the past 24 hrs: 
 Temp Pulse Resp BP SpO2  
18 - 80 20 143/67 100 % 18 -  18 179/87 99 % 1859 - 77 19 (!) 174/93 98 % 18 - 78 22 176/89 99 % 18 - 80 23 168/84 100 % 18 0758 - 79 19 179/88 99 % 18 0738 - 79 16 182/87 99 % 1819 - 82 20 180/87 99 % 18 0659 - 85 20 163/84 99 % 1838 - 82 18 183/87 98 % 1819 - 88 22 180/86 99 % 188 - 89 25 (!) 198/92 99 % 18 0418 - 87 23 189/89 98 % 18 0339 - 85 22 (!) 203/91 99 % 18 2344 - 73 30 (!) 183/93 100 % 18 2239 - 81 22 (!) 190/92 98 % 18 1852 98.4 °F (36.9 °C) 89 18 151/89 98 % Oxygen Therapy O2 Sat (%): 100 % (18 0938) Pulse via Oximetry: 80 beats per minute (18 6505) O2 Device: Room air (18 3562) No intake or output data in the 24 hours ending 18 1035 General:    Well nourished. Alert. CV:   RRR. No murmur, rub, or gallop. Lungs:   CTAB. No wheezing, rhonchi, or rales. Extremities: Warm and dry. No cyanosis or edema. Skin:     No rashes or jaundice. Neuro:  No gross focal deficits Data Review: 
I have reviewed all labs, meds, telemetry events, and studies from the last 24 hours: 
 
Recent Results (from the past 24 hour(s)) EKG, 12 LEAD, INITIAL Collection Time: 09/05/18  6:48 PM  
Result Value Ref Range Ventricular Rate 90 BPM  
 Atrial Rate 90 BPM  
 P-R Interval 176 ms QRS Duration 76 ms  
 Q-T Interval 366 ms  
 QTC Calculation (Bezet) 447 ms Calculated P Axis 65 degrees Calculated T Axis 163 degrees Diagnosis Normal sinus rhythm Septal infarct , age undetermined ST & T wave abnormality, consider inferolateral ischemia Abnormal ECG When compared with ECG of 05-JAN-2017 18:43, Significant changes have occurred Confirmed by SEEMA CRAVEN (), Josef Yoo ([id number]) on 9/6/2018 7:45:53 AM 
  
CBC WITH AUTOMATED DIFF Collection Time: 09/05/18  7:02 PM  
Result Value Ref Range WBC 4.4 4.3 - 11.1 K/uL  
 RBC 3.28 (L) 4.23 - 5.6 M/uL HGB 9.3 (L) 13.6 - 17.2 g/dL HCT 30.0 (L) 41.1 - 50.3 % MCV 91.5 79.6 - 97.8 FL  
 MCH 28.4 26.1 - 32.9 PG  
 MCHC 31.0 (L) 31.4 - 35.0 g/dL  
 RDW 12.7 % PLATELET 434 914 - 557 K/uL MPV 11.0 9.4 - 12.3 FL ABSOLUTE NRBC 0.00 0.0 - 0.2 K/uL NEUTROPHILS 70 43 - 78 % LYMPHOCYTES 19 13 - 44 % MONOCYTES 6 4.0 - 12.0 % EOSINOPHILS 4 0.5 - 7.8 % BASOPHILS 1 0.0 - 2.0 % IMMATURE GRANULOCYTES 0 0.0 - 5.0 %  
 ABS. NEUTROPHILS 3.1 1.7 - 8.2 K/UL  
 ABS. LYMPHOCYTES 0.8 0.5 - 4.6 K/UL  
 ABS. MONOCYTES 0.3 0.1 - 1.3 K/UL  
 ABS. EOSINOPHILS 0.2 0.0 - 0.8 K/UL  
 ABS. BASOPHILS 0.0 0.0 - 0.2 K/UL  
 ABS. IMM. GRANS. 0.0 0.0 - 0.5 K/UL  
 DF AUTOMATED METABOLIC PANEL, COMPREHENSIVE Collection Time: 09/05/18  7:02 PM  
Result Value Ref Range Sodium 141 136 - 145 mmol/L Potassium 5.2 (H) 3.5 - 5.1 mmol/L Chloride 113 (H) 98 - 107 mmol/L  
 CO2 20 (L) 21 - 32 mmol/L Anion gap 8 7 - 16 mmol/L Glucose 63 (L) 65 - 100 mg/dL BUN 42 (H) 8 - 23 MG/DL Creatinine 4.34 (H) 0.8 - 1.5 MG/DL  
 GFR est AA 17 (L) >60 ml/min/1.73m2 GFR est non-AA 14 (L) >60 ml/min/1.73m2 Calcium 8.3 8.3 - 10.4 MG/DL  Bilirubin, total 0.2 0.2 - 1.1 MG/DL  
 ALT (SGPT) 20 12 - 65 U/L  
 AST (SGOT) 17 15 - 37 U/L Alk. phosphatase 70 50 - 136 U/L Protein, total 7.2 6.3 - 8.2 g/dL Albumin 3.1 (L) 3.2 - 4.6 g/dL Globulin 4.1 (H) 2.3 - 3.5 g/dL A-G Ratio 0.8 (L) 1.2 - 3.5 BNP Collection Time: 09/05/18  7:02 PM  
Result Value Ref Range  pg/mL TROPONIN I Collection Time: 09/05/18  7:02 PM  
Result Value Ref Range Troponin-I, Qt. 0.03 0.02 - 0.05 NG/ML  
TROPONIN I Collection Time: 09/06/18  2:07 AM  
Result Value Ref Range Troponin-I, Qt. 0.03 0.02 - 4.52 NG/ML  
METABOLIC PANEL, BASIC Collection Time: 09/06/18  4:54 AM  
Result Value Ref Range Sodium 145 136 - 145 mmol/L Potassium 4.6 3.5 - 5.1 mmol/L Chloride 115 (H) 98 - 107 mmol/L  
 CO2 20 (L) 21 - 32 mmol/L Anion gap 10 7 - 16 mmol/L Glucose 128 (H) 65 - 100 mg/dL BUN 42 (H) 8 - 23 MG/DL Creatinine 4.11 (H) 0.8 - 1.5 MG/DL  
 GFR est AA 19 (L) >60 ml/min/1.73m2 GFR est non-AA 15 (L) >60 ml/min/1.73m2 Calcium 7.9 (L) 8.3 - 10.4 MG/DL  
CBC WITH AUTOMATED DIFF Collection Time: 09/06/18  4:54 AM  
Result Value Ref Range WBC 5.0 4.3 - 11.1 K/uL  
 RBC 3.04 (L) 4.23 - 5.6 M/uL HGB 8.7 (L) 13.6 - 17.2 g/dL HCT 27.7 (L) 41.1 - 50.3 % MCV 91.1 79.6 - 97.8 FL  
 MCH 28.6 26.1 - 32.9 PG  
 MCHC 31.4 31.4 - 35.0 g/dL  
 RDW 12.9 % PLATELET 680 774 - 702 K/uL MPV 10.7 9.4 - 12.3 FL ABSOLUTE NRBC 0.00 0.0 - 0.2 K/uL  
 DF AUTOMATED NEUTROPHILS 64 43 - 78 % LYMPHOCYTES 24 13 - 44 % MONOCYTES 7 4.0 - 12.0 % EOSINOPHILS 5 0.5 - 7.8 % BASOPHILS 1 0.0 - 2.0 % IMMATURE GRANULOCYTES 0 0.0 - 5.0 %  
 ABS. NEUTROPHILS 3.2 1.7 - 8.2 K/UL  
 ABS. LYMPHOCYTES 1.2 0.5 - 4.6 K/UL  
 ABS. MONOCYTES 0.4 0.1 - 1.3 K/UL  
 ABS. EOSINOPHILS 0.2 0.0 - 0.8 K/UL  
 ABS. BASOPHILS 0.0 0.0 - 0.2 K/UL  
 ABS. IMM. GRANS. 0.0 0.0 - 0.5 K/UL  
URINALYSIS W/ RFLX MICROSCOPIC Collection Time: 09/06/18  4:54 AM  
Result Value Ref Range Color YELLOW Appearance CLEAR Specific gravity 1.016 1.001 - 1.023    
 pH (UA) 6.0 5.0 - 9.0 Protein 300 (A) NEG mg/dL Glucose NEGATIVE  mg/dL Ketone NEGATIVE  NEG mg/dL Bilirubin NEGATIVE  NEG Blood TRACE (A) NEG Urobilinogen 0.2 0.2 - 1.0 EU/dL Nitrites NEGATIVE  NEG Leukocyte Esterase TRACE (A) NEG    
 WBC 5-10 0 /hpf  
 RBC 0-3 0 /hpf Epithelial cells 0-3 0 /hpf Bacteria 0 0 /hpf Casts 0-3 0 /lpf  
TROPONIN I Collection Time: 09/06/18  4:54 AM  
Result Value Ref Range Troponin-I, Qt. 0.04 0.02 - 0.05 NG/ML  
GLUCOSE, POC Collection Time: 09/06/18  7:52 AM  
Result Value Ref Range Glucose (POC) 139 (H) 65 - 100 mg/dL All Micro Results None No results found for this visit on 09/05/18. Current Meds: 
Current Facility-Administered Medications Medication Dose Route Frequency  aspirin chewable tablet 81 mg  81 mg Oral DAILY AFTER BREAKFAST  atorvastatin (LIPITOR) tablet 80 mg  80 mg Oral DAILY  sodium chloride (NS) flush 5-10 mL  5-10 mL IntraVENous Q8H  
 sodium chloride (NS) flush 5-10 mL  5-10 mL IntraVENous PRN  
 acetaminophen (TYLENOL) tablet 650 mg  650 mg Oral Q6H PRN  
 ondansetron (ZOFRAN) injection 4 mg  4 mg IntraVENous Q4H PRN  
 heparin (porcine) injection 5,000 Units  5,000 Units SubCUTAneous Q8H  
 tuberculin injection 5 Units  5 Units IntraDERMal ONCE  
 insulin lispro (HUMALOG) injection   SubCUTAneous Q4H  
 metoprolol succinate (TOPROL-XL) XL tablet 100 mg  100 mg Oral DAILY  0.45% sodium chloride infusion  125 mL/hr IntraVENous CONTINUOUS  
 hydrALAZINE (APRESOLINE) 20 mg/mL injection 20 mg  20 mg IntraVENous Q4H PRN  
 [START ON 9/7/2018] amLODIPine (NORVASC) tablet 10 mg  10 mg Oral DAILY Current Outpatient Prescriptions Medication Sig  
 glipiZIDE SR (GLUCOTROL XL) 2.5 mg CR tablet take 1 tablet by mouth once daily  ramipril (ALTACE) 10 mg capsule Take 1 Cap by mouth daily.  amLODIPine (NORVASC) 5 mg tablet Take 1 Tab by mouth daily.  metoprolol succinate (TOPROL-XL) 100 mg tablet Take 1 Tab by mouth daily.  nitroglycerin (NITROSTAT) 0.4 mg SL tablet by SubLINGual route every five (5) minutes as needed for Chest Pain.  atorvastatin (LIPITOR) 80 mg tablet Take 1 Tab by mouth daily.  aspirin 81 mg chewable tablet Take 1 Tab by mouth daily (after breakfast). Other Studies (last 24 hours): Xr Chest Pa Lat Result Date: 9/5/2018 Two view chest History: Chest pain, weakness, blurred vision, unsteady gait x2 days. Comparison: 02/01/2017 Findings: The heart and mediastinal silhouette are normal in size and configuration. The lungs and pleural spaces are clear. The pulmonary vascularity is within normal limits. The visualized osseous structures are unremarkable. Impression: No active disease in the chest.  
 
[address] Result Date: 9/6/2018 RENAL / AORTA ULTRASOUND HISTORY: Acute renal failure COMPARISON: None TECHNIQUE: Sonographic imaging of both kidneys and the aorta was performed. FINDINGS: *  RIGHT KIDNEY: 10.1 cm  in size. No mass, hydronephrosis or calculi. Normal cortical thickness and echogenicity. *  LEFT KIDNEY: 9.1 cm  in size. No mass, hydronephrosis or calculi. Normal cortical thickness and echogenicity. *  OTHER FINDINGS: None IMPRESSION: Unremarkable renal ultrasound. Date Of Dictation: 9/6/2018 3:10 AM 
 
 
Assessment and Plan:  
 
Hospital Problems as of 9/6/2018  Date Reviewed: 10/5/2017 Codes Class Noted - Resolved POA Anemia of chronic disease (Chronic) ICD-10-CM: D63.8 ICD-9-CM: 285.29  9/6/2018 - Present Yes * (Principal)MO (acute kidney injury) ([facility] [address] ICD-10-CM: N17.9 ICD-9-CM: 584.9  9/5/2018 - Present Yes Type 2 diabetes mellitus with complication, without long-term current use of insulin (HCC) ICD-10-CM: E11.8 ICD-9-CM: 250.90  4/7/2017 - Present Yes Coronary artery disease involving native coronary artery of native heart without angina pectoris ICD-10-CM: I25.10 ICD-9-CM: 414.01  4/7/2017 - Present Yes Kidney disease, chronic, stage III (moderate, EGFR 30-59 ml/min) (Chronic) ICD-10-CM: N18.3 ICD-9-CM: 585.3  8/11/2016 - Present Yes Chronic systolic congestive heart failure (HCC) (Chronic) ICD-10-CM: Y80.66 ICD-9-CM: 428.22, 428.0  12/30/2015 - Present Yes Overview Signed 1/6/2017  8:59 AM by Agustín Armenta MD  
  EF 35-40% on 2015 Echo Tobacco use (Chronic) ICD-10-CM: Z72.0 ICD-9-CM: 305.1  12/27/2015 - Present Yes Chest pain ICD-10-CM: R07.9 ICD-9-CM: 786.50  12/26/2015 - Present Yes HTN (hypertension) (Chronic) ICD-10-CM: I10 
ICD-9-CM: 401.9  7/29/2012 - Present Yes Plan: MO - Baseline Cr ~2.1.  increase IVF. Switch to 1/2NS  EF was only 45-50% last echo, not on diuretics at home. should be able to handle more aggressive IVF HTN - increase norvasc. DC planning/Dispo:  PPD done. PT/OT/CM consulted Diet:  DIET DIABETIC CONSISTENT CARB 
DVT ppx:  heparin Signed: 
Agustín Armenta MD

## 2018-09-06 NOTE — PROGRESS NOTES
09/06/18 1422 Dual Skin Pressure Injury Assessment Second Care Provider (Based on [address] [place] [address]) Pennie Stacy RN

## 2018-09-07 NOTE — PROGRESS NOTES
Pt awaken and upset with laboratory attempting to draw am labs. Refused,  States wants to go home and leaving. Pt alert and oriented x4. Pt calm and resting with eyes closed 5 minutes later. dtr notified of pt wanting to leave to go home. sr up x3, bed lowered and locked, and call light within reach.

## 2018-09-07 NOTE — PROGRESS NOTES
Hospitalist Progress Note Admit Date:  2018 10:25 PM  
Name:  Fina Morrison Age:  72 y.o. 
:  1945 MRN:  [id number] PCP:  Jabari Perla MD 
Treatment Team: Attending Provider: Emily Thornton MD; Utilization Review: Sylvain Laurent RN; Utilization Review: Thor Raines RN Subjective:  
71 yo male with PMH of CKD3, DM2, right BKA and right thumb amputation, HTN, CAD, sCHF (EF 45-50%), tobacco use evaluated with malaise and fatigue and found to have MO on CKD with mild hyperkalemia. Had anorexia for several days PTA. 
 - pt tired but otherwise no complaints. No pain, n/v, diarrhea, SOB.  - feeling better, stronger. Wants to go home. We do not have BMP yet today, ordered. No CP, SOB, diarrhea, lightheadedness Objective:  
 
Patient Vitals for the past 24 hrs: 
 Temp Pulse Resp BP SpO2  
18 0726 98.4 °F (36.9 °C) 72 17 167/82 97 % 18 0346 97.8 °F (36.6 °C) 70 18 166/74 97 % 18 2327 98 °F (36.7 °C) 73 18 133/73 97 % 18 2007 - 75 - (!) 172/91 -  
18 1927 97.7 °F (36.5 °C) 75 18 (!) 172/91 98 % 18 1421 97.1 °F (36.2 °C) 79 18 165/78 98 % 18 1259 - 79 - (!) 151/97 -  
18 1239 - 78 - 155/77 -  
18 1219 - 77 - 179/88 -  
18 1138 - 77 17 172/85 98 % 18 1119 - 91 23 158/73 97 % 18 1059 - 76 15 158/73 99 % 18 1039 - 82 15 178/80 99 % 18 1019 - 83 19 166/78 100 % Oxygen Therapy O2 Sat (%): 97 % (18) Pulse via Oximetry: 78 beats per minute (18 1138) O2 Device: Room air (18) Intake/Output Summary (Last 24 hours) at 18 1016 Last data filed at 18 0190 Gross per 24 hour Intake             1677 ml Output             1250 ml Net              427 ml General:    Well nourished. Alert. CV:   RRR. No murmur, rub, or gallop. Lungs:   CTAB. No wheezing, rhonchi, or rales. Extremities: Warm and dry. No cyanosis or edema. Skin:     No rashes or jaundice. Neuro:  No gross focal deficits Data Review: 
I have reviewed all labs, meds, telemetry events, and studies from the last 24 hours: 
 
Recent Results (from the past 24 hour(s)) GLUCOSE, POC Collection Time: 09/06/18 11:58 AM  
Result Value Ref Range Glucose (POC) 184 (H) 65 - 100 mg/dL TROPONIN I Collection Time: 09/06/18  4:03 PM  
Result Value Ref Range Troponin-I, Qt. 0.04 0.02 - 0.05 NG/ML  
GLUCOSE, POC Collection Time: 09/06/18  4:20 PM  
Result Value Ref Range Glucose (POC) 137 (H) 65 - 100 mg/dL All Micro Results None No results found for this visit on 09/05/18. Current Meds: 
Current Facility-Administered Medications Medication Dose Route Frequency  hydrALAZINE (APRESOLINE) tablet 25 mg  25 mg Oral TID  aspirin chewable tablet 81 mg  81 mg Oral DAILY AFTER BREAKFAST  atorvastatin (LIPITOR) tablet 80 mg  80 mg Oral DAILY  sodium chloride (NS) flush 5-10 mL  5-10 mL IntraVENous Q8H  
 sodium chloride (NS) flush 5-10 mL  5-10 mL IntraVENous PRN  
 acetaminophen (TYLENOL) tablet 650 mg  650 mg Oral Q6H PRN  
 ondansetron (ZOFRAN) injection 4 mg  4 mg IntraVENous Q4H PRN  
 heparin (porcine) injection 5,000 Units  5,000 Units SubCUTAneous Q8H  
 metoprolol succinate (TOPROL-XL) XL tablet 100 mg  100 mg Oral DAILY  0.45% sodium chloride infusion  125 mL/hr IntraVENous CONTINUOUS  
 hydrALAZINE (APRESOLINE) 20 mg/mL injection 20 mg  20 mg IntraVENous Q4H PRN  
 amLODIPine (NORVASC) tablet 10 mg  10 mg Oral DAILY  influenza vaccine 2018-19 (6 mos+)(PF) (FLUARIX QUAD/FLULAVAL QUAD) injection 0.5 mL  0.5 mL IntraMUSCular PRIOR TO DISCHARGE  temazepam (RESTORIL) capsule 7.5 mg  7.5 mg Oral QHS PRN Other Studies (last 24 hours): No results found. Assessment and Plan:  
 
Hospital Problems as of 9/7/2018  Date Reviewed: 10/5/2017 Codes Class Noted - Resolved POA Anemia of chronic disease (Chronic) ICD-10-CM: D63.8 ICD-9-CM: 285.29  9/6/2018 - Present Yes * (Principal)MO (acute kidney injury) ([facility] [address] ICD-10-CM: N17.9 ICD-9-CM: 584.9  9/5/2018 - Present Yes Type 2 diabetes mellitus with complication, without long-term current use of insulin (HCC) ICD-10-CM: E11.8 ICD-9-CM: 250.90  4/7/2017 - Present Yes Coronary artery disease involving native coronary artery of native heart without angina pectoris ICD-10-CM: I25.10 ICD-9-CM: 414.01  4/7/2017 - Present Yes Kidney disease, chronic, stage III (moderate, EGFR 30-59 ml/min) (Chronic) ICD-10-CM: N18.3 ICD-9-CM: 585.3  8/11/2016 - Present Yes Chronic systolic congestive heart failure (HCC) (Chronic) ICD-10-CM: Y92.84 ICD-9-CM: 428.22, 428.0  12/30/2015 - Present Yes Overview Signed 1/6/2017  8:59 AM by Becka Evans MD  
  EF 35-40% on 2015 Echo Tobacco use (Chronic) ICD-10-CM: Z72.0 ICD-9-CM: 305.1  12/27/2015 - Present Yes Chest pain ICD-10-CM: R07.9 ICD-9-CM: 786.50  12/26/2015 - Present Yes HTN (hypertension) (Chronic) ICD-10-CM: I10 
ICD-9-CM: 401.9  7/29/2012 - Present Yes Plan: MO - Baseline Cr ~2.1. Awaiting BMP today; Cont IVF 1/2NS for now. EF was 45-50% last echo, not on diuretics at home. should be able to handle more aggressive IVF HTN - increased norvasc 9/6. Add hydralazine today. Holding home ramipril. DC planning/Dispo:  PPD done. PT/OT/CM consulted Diet:  DIET REGULAR 
DVT ppx:  heparin Signed: 
Becka Evans MD

## 2018-09-07 NOTE — PROGRESS NOTES
PT NOTE: 
 
Pt politely declined therapy this morning. Will check back at later time/date as schedule allows.  
 
Asael Francisco, PTA

## 2018-09-08 NOTE — PROGRESS NOTES
Hospitalist Progress Note Admit Date:  2018 10:25 PM  
Name:  Penny Steele Age:  72 y.o. 
:  1945 MRN:  [id number] PCP:  Willi Castillo MD 
Treatment Team: Attending Provider: Mark Ruffin MD; Utilization Review: Mahsa Hu RN; Utilization Review: Asuncion Rose RN Subjective:  
71 yo male with PMH of CKD3, DM2, right BKA and right thumb amputation, HTN, CAD, sCHF (EF 45-50%), tobacco use evaluated with malaise and fatigue and found to have MO on CKD with mild hyperkalemia. Had anorexia for several days PTA. 
 - pt tired but otherwise no complaints. No pain, n/v, diarrhea, SOB.  - feeling better, stronger. Wants to go home. We do not have BMP yet today, ordered. No CP, SOB, diarrhea, lightheadedness  - Cr still not at baseline, improving slowly. Pt threatening to leave AMA, refused IVF this morning. Is urinating. Denies pain. Objective:  
 
Patient Vitals for the past 24 hrs: 
 Temp Pulse Resp BP SpO2  
18 0750 - 70 - 147/72 -  
18 0722 98.1 °F (36.7 °C) 64 18 163/81 97 % 18 0340 98.5 °F (36.9 °C) 68 16 (!) 185/95 97 % 18 2330 98.3 °F (36.8 °C) 69 16 172/83 97 % 18 1920 98.1 °F (36.7 °C) 74 16 161/83 97 % 18 1547 98 °F (36.7 °C) 73 16 149/75 95 % Oxygen Therapy O2 Sat (%): 97 % (18 0722) Pulse via Oximetry: 78 beats per minute (18 1138) O2 Device: Room air (18 0715) Intake/Output Summary (Last 24 hours) at 18 1009 Last data filed at 18 6809 Gross per 24 hour Intake              828 ml Output              600 ml Net              228 ml General:    Well nourished. Alert. CV:   RRR. No murmur, rub, or gallop. Lungs:   CTAB. No wheezing, rhonchi, or rales. Extremities: Warm and dry. No cyanosis or edema. Skin:     No rashes or jaundice. Neuro:  No gross focal deficits Data Review: I have reviewed all labs, meds, telemetry events, and studies from the last 24 hours: 
 
Recent Results (from the past 24 hour(s)) METABOLIC PANEL, BASIC Collection Time: 09/07/18 10:35 AM  
Result Value Ref Range Sodium 141 136 - 145 mmol/L Potassium 4.6 3.5 - 5.1 mmol/L Chloride 113 (H) 98 - 107 mmol/L  
 CO2 22 21 - 32 mmol/L Anion gap 6 (L) 7 - 16 mmol/L Glucose 126 (H) 65 - 100 mg/dL BUN 37 (H) 8 - 23 MG/DL Creatinine 3.76 (H) 0.8 - 1.5 MG/DL  
 GFR est AA 21 (L) >60 ml/min/1.73m2 GFR est non-AA 17 (L) >60 ml/min/1.73m2 Calcium 8.2 (L) 8.3 - 10.4 MG/DL  
CBC WITH AUTOMATED DIFF Collection Time: 09/07/18 10:35 AM  
Result Value Ref Range WBC 4.3 4.3 - 11.1 K/uL  
 RBC 3.35 (L) 4.23 - 5.6 M/uL HGB 9.4 (L) 13.6 - 17.2 g/dL HCT 30.5 (L) 41.1 - 50.3 % MCV 91.0 79.6 - 97.8 FL  
 MCH 28.1 26.1 - 32.9 PG  
 MCHC 30.8 (L) 31.4 - 35.0 g/dL  
 RDW 12.9 % PLATELET 563 710 - 451 K/uL MPV 10.8 9.4 - 12.3 FL ABSOLUTE NRBC 0.00 0.0 - 0.2 K/uL  
 DF AUTOMATED NEUTROPHILS 58 43 - 78 % LYMPHOCYTES 24 13 - 44 % MONOCYTES 7 4.0 - 12.0 % EOSINOPHILS 10 (H) 0.5 - 7.8 % BASOPHILS 1 0.0 - 2.0 % IMMATURE GRANULOCYTES 1 0.0 - 5.0 %  
 ABS. NEUTROPHILS 2.5 1.7 - 8.2 K/UL  
 ABS. LYMPHOCYTES 1.0 0.5 - 4.6 K/UL  
 ABS. MONOCYTES 0.3 0.1 - 1.3 K/UL  
 ABS. EOSINOPHILS 0.4 0.0 - 0.8 K/UL  
 ABS. BASOPHILS 0.0 0.0 - 0.2 K/UL  
 ABS. IMM. GRANS. 0.0 0.0 - 0.5 K/UL METABOLIC PANEL, BASIC Collection Time: 09/08/18  4:57 AM  
Result Value Ref Range Sodium 141 136 - 145 mmol/L Potassium 4.7 3.5 - 5.1 mmol/L Chloride 113 (H) 98 - 107 mmol/L  
 CO2 20 (L) 21 - 32 mmol/L Anion gap 8 7 - 16 mmol/L Glucose 111 (H) 65 - 100 mg/dL BUN 37 (H) 8 - 23 MG/DL Creatinine 3.56 (H) 0.8 - 1.5 MG/DL  
 GFR est AA 22 (L) >60 ml/min/1.73m2 GFR est non-AA 18 (L) >60 ml/min/1.73m2 Calcium 7.8 (L) 8.3 - 10.4 MG/DL All Micro Results None No results found for this visit on 09/05/18. Current Meds: 
Current Facility-Administered Medications Medication Dose Route Frequency  hydrALAZINE (APRESOLINE) tablet 25 mg  25 mg Oral TID  aspirin chewable tablet 81 mg  81 mg Oral DAILY AFTER BREAKFAST  atorvastatin (LIPITOR) tablet 80 mg  80 mg Oral DAILY  sodium chloride (NS) flush 5-10 mL  5-10 mL IntraVENous Q8H  
 sodium chloride (NS) flush 5-10 mL  5-10 mL IntraVENous PRN  
 acetaminophen (TYLENOL) tablet 650 mg  650 mg Oral Q6H PRN  
 ondansetron (ZOFRAN) injection 4 mg  4 mg IntraVENous Q4H PRN  
 heparin (porcine) injection 5,000 Units  5,000 Units SubCUTAneous Q8H  
 metoprolol succinate (TOPROL-XL) XL tablet 100 mg  100 mg Oral DAILY  0.45% sodium chloride infusion  125 mL/hr IntraVENous CONTINUOUS  
 hydrALAZINE (APRESOLINE) 20 mg/mL injection 20 mg  20 mg IntraVENous Q4H PRN  
 amLODIPine (NORVASC) tablet 10 mg  10 mg Oral DAILY  influenza vaccine 2018-19 (6 mos+)(PF) (FLUARIX QUAD/FLULAVAL QUAD) injection 0.5 mL  0.5 mL IntraMUSCular PRIOR TO DISCHARGE  temazepam (RESTORIL) capsule 7.5 mg  7.5 mg Oral QHS PRN Other Studies (last 24 hours): No results found. Assessment and Plan:  
 
Hospital Problems as of 9/8/2018  Date Reviewed: 10/5/2017 Codes Class Noted - Resolved POA Anemia of chronic disease (Chronic) ICD-10-CM: D63.8 ICD-9-CM: 285.29  9/6/2018 - Present Yes * (Principal)MO (acute kidney injury) ([facility] [address] ICD-10-CM: N17.9 ICD-9-CM: 584.9  9/5/2018 - Present Yes Type 2 diabetes mellitus with complication, without long-term current use of insulin (HCC) ICD-10-CM: E11.8 ICD-9-CM: 250.90  4/7/2017 - Present Yes Coronary artery disease involving native coronary artery of native heart without angina pectoris ICD-10-CM: I25.10 ICD-9-CM: 414.01  4/7/2017 - Present Yes Kidney disease, chronic, stage III (moderate, EGFR 30-59 ml/min) (Chronic) ICD-10-CM: N18.3 ICD-9-CM: 585.3  8/11/2016 - Present Yes Chronic systolic congestive heart failure (HCC) (Chronic) ICD-10-CM: A35.38 ICD-9-CM: 428.22, 428.0  12/30/2015 - Present Yes Overview Signed 1/6/2017  8:59 AM by Car Roman MD  
  EF 35-40% on 2015 Echo Tobacco use (Chronic) ICD-10-CM: Z72.0 ICD-9-CM: 305.1  12/27/2015 - Present Yes Chest pain ICD-10-CM: R07.9 ICD-9-CM: 786.50  12/26/2015 - Present Yes HTN (hypertension) (Chronic) ICD-10-CM: I10 
ICD-9-CM: 401.9  7/29/2012 - Present Yes Plan: MO - Baseline Cr ~2.1. Cont IVF 1/2NS for now. EF was 45-50% last echo, not on diuretics at home. should be able to handle more aggressive IVF Misc - pt wanted to leave AMA this morning. I told him I cant stop him but I wanted him to stay to receive IVF. Told him he may get worse, suffer permanent kidney damage or other complications, and be right back in the hospital if he leaves HTN - increased norvasc 9/6. Added hydralazine 9/7. Holding home ramipril. DC planning/Dispo:  Probably going home when better. PPD done. PT/OT/CM consulted. Diet:  DIET REGULAR 
DVT ppx:  heparin Signed: 
Car Roman MD

## 2018-09-08 NOTE — PROGRESS NOTES
END OF SHIFT NOTE: 
 
INTAKE/OUTPUT 
09/07 0701 - 09/08 0700 In: 420 [I.V.:420] Out: 775 [Urine:775] Voiding: YES Catheter: NO 
Drain:   
 
 
 
 
 
Flatus: Patient does have flatus present. Stool:  0 occurrences. Characteristics: 
  
 
Emesis: 0 occurrences. Characteristics: VITAL SIGNS Patient Vitals for the past 12 hrs: 
 Temp Pulse Resp BP SpO2  
09/08/18 0340 98.5 °F (36.9 °C) 68 16 (!) 185/95 97 % 09/07/18 2330 98.3 °F (36.8 °C) 69 16 172/83 97 % 09/07/18 1920 98.1 °F (36.7 °C) 74 16 161/83 97 % Pain Assessment Pain Intensity 1: 0 (09/07/18 2307) Pain Location 1:  (right BKA stump) Patient Stated Pain Goal: 0 Ambulating Yes Shift report given to oncoming nurse at the bedside.  
 
Ladi Patel RN

## 2018-09-09 NOTE — PROGRESS NOTES
Progress Note Patient: Teresa Javier               Sex: male          MRN: [id number] YOB: 1945      Age:  72 y.o. PCP:  Aris Zelaya MD 
Treatment Team: Attending Provider: Erik Veronica MD; Utilization Review: Jean-Paul Bennett, DEJA; Utilization Review: Nirmala Vázquez, DEJA; Hospitalist: Artur Banks MD 
Subjective:  
 
New pt for me today. No specific complaints. Eating okay. No chest pain or shortness of breath or abdominal pain or nausea or vomiting's. Blood pressure was elevated this morning. Objective:  
Physical Exam:  
Visit Vitals  /75  Pulse 83  Temp 98.6 °F (37 °C)  Resp 16  
 Ht 6' 1\" (1.854 m)  Wt 95.3 kg (210 lb)  SpO2 98%  BMI 27.71 kg/m2 Temp (24hrs), Av.2 °F (36.8 °C), Min:97.8 °F (36.6 °C), Max:98.7 °F (37.1 °C) Oxygen Therapy O2 Sat (%): 98 % (18 1105) Pulse via Oximetry: 78 beats per minute (18 1138) O2 Device: Room air (18 0715) Intake/Output Summary (Last 24 hours) at 18 1244 Last data filed at 18 1105 Gross per 24 hour Intake             2216 ml Output              925 ml Net             1291 ml General: Conscious, No acute distress Eyes:  MRAY, No pallor/icterus HENT:             Oral Mucosa is Moist, No sinus tenderness Neck:               Supple, No JVD Lungs:  CTA Bilaterally, No significant wheeze/rhonchi Heart:  S1 S2 regular Abdomen: Soft, normal bowel sounds, NTND, No guarding/rigidity/rebound tend. Extremities: No pedal edema. Rt BKA with prosthesis. Neurologic:  AAOX3, No acute FND, Motor:  LUE: 5/5, LLE: 5/5, RUE: 5/5, RLE: 5/5 Skin:                No acute rashes Musculoskeletal: No Acute findings Psych:             Appropriate mood, Thought process is normal 
 
LAB Recent Results (from the past 24 hour(s)) METABOLIC PANEL, BASIC Collection Time: 18  4:39 AM  
Result Value Ref Range Sodium 141 136 - 145 mmol/L Potassium 4.5 3.5 - 5.1 mmol/L Chloride 112 (H) 98 - 107 mmol/L  
 CO2 21 21 - 32 mmol/L Anion gap 8 7 - 16 mmol/L Glucose 107 (H) 65 - 100 mg/dL BUN 33 (H) 8 - 23 MG/DL Creatinine 3.60 (H) 0.8 - 1.5 MG/DL  
 GFR est AA 22 (L) >60 ml/min/1.73m2 GFR est non-AA 18 (L) >60 ml/min/1.73m2 Calcium 8.0 (L) 8.3 - 10.4 MG/DL  
CK Collection Time: 09/09/18  4:39 AM  
Result Value Ref Range  21 - 215 U/L No results found. No results found. All Micro Results None Current Medications Reviewed Assessment/Plan Principal Problem: 
  MO (acute kidney injury) ([facility] (9/5/2018) Probably sec to NSAID use. Cr is stable around 3.6. Stop IVF, ctn oral hydration, monitor cr. If stable may d/c home tomorrow with out pt nephrology f/u. Advised to avoid any OTC NSAIDs. Tylenol for pain. Active Problems: 
  HTN (hypertension) - BP elevated. Adjust BP meds, monitor. Chest pain (12/26/2015) Tobacco use (12/27/2015) Chronic systolic congestive heart failure ([facility] (12/30/2015) Overview: EF 45% Kidney disease, chronic, stage 4 Type 2 diabetes mellitus with complication, without long-term current use of insulin ([facility] (4/7/2017) BS stable. Check A1C. Coronary artery disease involving native coronary artery of native heart without angina pectoris (4/7/2017) Anemia of chronic disease (9/6/2018) Moderate risk pt. Esther Grajeda MD 
September 9, 2018

## 2018-09-09 NOTE — PROGRESS NOTES
END OF SHIFT NOTE: 
 
INTAKE/OUTPUT 
09/08 0701 - 09/09 0700 In: 2243 [I.V.:2243] Out: 750 [Urine:750] Voiding: YES Catheter: NO 
Drain:   
 
 
 
 
 
Flatus: Patient does have flatus present. Stool:  0 occurrences. Characteristics: 
  
 
Emesis: 0 occurrences. Characteristics: VITAL SIGNS Patient Vitals for the past 12 hrs: 
 Temp Pulse Resp BP SpO2  
09/09/18 0325 98 °F (36.7 °C) 88 16 191/83 97 % 09/08/18 2330 97.9 °F (36.6 °C) 78 16 192/87 97 % 09/08/18 1930 97.8 °F (36.6 °C) 65 16 180/84 96 % Pain Assessment Pain Intensity 1: 3 (09/09/18 0400) Pain Location 1:  (right BKA stump) Patient Stated Pain Goal: 0 Ambulating Yes Shift report given to oncoming nurse at the bedside.  
 
Tere Easton RN

## 2018-09-10 NOTE — DISCHARGE INSTRUCTIONS
DISCHARGE SUMMARY from Nurse    PATIENT INSTRUCTIONS:    After general anesthesia or intravenous sedation, for 24 hours or while taking prescription Narcotics:  · Limit your activities  · Do not drive and operate hazardous machinery  · Do not make important personal or business decisions  · Do  not drink alcoholic beverages  · If you have not urinated within 8 hours after discharge, please contact your surgeon on call. Report the following to your surgeon:  · Excessive pain, swelling, redness or odor of or around the surgical area  · Temperature over 100.5  · Nausea and vomiting lasting longer than 4 hours or if unable to take medications  · Any signs of decreased circulation or nerve impairment to extremity: change in color, persistent  numbness, tingling, coldness or increase pain  · Any questions    What to do at Home:  Recommended activity: Activity as tolerated,     If you experience any of the following symptoms fever greater then 100.5 pain unrelieved by medication, increase in shortness of breath, please follow up with primary care doctor. *  Please give a list of your current medications to your Primary Care Provider. *  Please update this list whenever your medications are discontinued, doses are      changed, or new medications (including over-the-counter products) are added. *  Please carry medication information at all times in case of emergency situations. These are general instructions for a healthy lifestyle:    No smoking/ No tobacco products/ Avoid exposure to second hand smoke  Surgeon General's Warning:  Quitting smoking now greatly reduces serious risk to your health.     Obesity, smoking, and sedentary lifestyle greatly increases your risk for illness    A healthy diet, regular physical exercise & weight monitoring are important for maintaining a healthy lifestyle    You may be retaining fluid if you have a history of heart failure or if you experience any of the following symptoms:  Weight gain of 3 pounds or more overnight or 5 pounds in a week, increased swelling in our hands or feet or shortness of breath while lying flat in bed. Please call your doctor as soon as you notice any of these symptoms; do not wait until your next office visit. Recognize signs and symptoms of STROKE:    F-face looks uneven    A-arms unable to move or move unevenly    S-speech slurred or non-existent    T-time-call 911 as soon as signs and symptoms begin-DO NOT go       Back to bed or wait to see if you get better-TIME IS BRAIN. Warning Signs of HEART ATTACK     Call 911 if you have these symptoms:   Chest discomfort. Most heart attacks involve discomfort in the center of the chest that lasts more than a few minutes, or that goes away and comes back. It can feel like uncomfortable pressure, squeezing, fullness, or pain.  Discomfort in other areas of the upper body. Symptoms can include pain or discomfort in one or both arms, the back, neck, jaw, or stomach.  Shortness of breath with or without chest discomfort.  Other signs may include breaking out in a cold sweat, nausea, or lightheadedness. Don't wait more than five minutes to call 911 - MINUTES MATTER! Fast action can save your life. Calling 911 is almost always the fastest way to get lifesaving treatment. Emergency Medical Services staff can begin treatment when they arrive -- up to an hour sooner than if someone gets to the hospital by car. The discharge information has been reviewed with the patient. The patient verbalized understanding. Discharge medications reviewed with the patient and appropriate educational materials and side effects teaching were provided. ___________________________________________________________________________________________________________________________________     Acute Kidney Injury: Care Instructions  Your Care Instructions    Acute kidney injury (MO) is a sudden decrease in kidney function. This can happen over a period of hours, days or, in some cases, weeks. MO used to be called acute renal failure, but kidney failure doesn't always happen with MO. Common causes of MO are dehydration, blood loss, and medicines. When MO happens, the kidneys have trouble removing waste and excess fluids from the body. The waste and fluids build up and become harmful. Kidney function may return to normal if the cause of MO is treated quickly. Your chance of a full recovery depends on what caused the problem, how quickly the cause was treated, and what other medical problems you have. A machine may be used to help your kidneys remove waste and fluids for a short period of time. This is called dialysis. Follow-up care is a key part of your treatment and safety. Be sure to make and go to all appointments, and call your doctor if you are having problems. It's also a good idea to know your test results and keep a list of the medicines you take. How can you care for yourself at home? · Talk to your doctor about how much fluid you should drink. · Eat a balanced diet. Talk to your doctor or a dietitian about what type of diet may be best for you. You may need to limit sodium, potassium, and phosphorus. · If you need dialysis, follow the instructions and schedule for dialysis that your doctor gives you. · Do not smoke. Smoking can make your condition worse. If you need help quitting, talk to your doctor about stop-smoking programs and medicines. These can increase your chances of quitting for good. · Do not drink alcohol. · Review all of your medicines with your doctor. Do not take any medicines, including nonsteroidal anti-inflammatory drugs (NSAIDs) such as ibuprofen (Advil, Motrin) or naproxen (Aleve), unless your doctor says it is safe for you to do so. · Make sure that anyone treating you for any health problem knows that you have had MO. When should you call for help?   Call 911 anytime you think you may need emergency care. For example, call if:    · You passed out (lost consciousness).    Call your doctor now or seek immediate medical care if:    · You have new or worse nausea and vomiting.     · You have much less urine than normal, or you have no urine.     · You are feeling confused or cannot think clearly.     · You have new or more blood in your urine.     · You have new swelling.     · You are dizzy or lightheaded, or feel like you may faint.    Watch closely for changes in your health, and be sure to contact your doctor if:    · You do not get better as expected. Where can you learn more? Go to http://ulises-jared.info/. Enter E046 in the search box to learn more about \"Acute Kidney Injury: Care Instructions. \"  Current as of: May 12, 2017  Content Version: 11.7  © 6683-1775 [employer]. Care instructions adapted under license by [employer] (which disclaims liability or warranty for this information). If you have questions about a medical condition or this instruction, always ask your healthcare professional. [address] any warranty or liability for your use of this information.

## 2018-09-10 NOTE — PROGRESS NOTES
Problem: Mobility Impaired (Adult and Pediatric) Goal: *Acute Goals and Plan of Care (Insert Text) 1. Mr. Venita Stoner will perform sit to stand and bed to chair independently in 7 days. 2.  Mr. Venita Stoner will perform gait with least restrictive device and prosthesis >200 ft in 7 days. PHYSICAL THERAPY: Daily Note, Treatment Day: 1st, AM 9/10/2018 INPATIENT: Hospital Day: 6 Payor: CARE IMPROVEMENT PLUS / [place] / Product Type: [employer] Medicare /  
  
NAME/AGE/GENDER: Boy Sanchez is a 72 y.o. male PRIMARY DIAGNOSIS: MO (acute kidney injury) ([facility] MO (acute kidney injury) ([facility] MO (acute kidney injury) ([facility] ICD-10: Treatment Diagnosis:  
 · Generalized Muscle Weakness (M62.81) · Difficulty in walking, Not elsewhere classified (R26.2) Precaution/Allergies: 
Lortab [hydrocodone-acetaminophen] ASSESSMENT:  
 
Mr. Venita Stoner presents sitting EOB on arrival, clothes on, prothesis donned R LE. Pt reports [facility] came by and fixed prothesis over weekend. Pt expressed he is ready to go home but would also like to have a bath. Pt ambulated into hallway with standard walker for 250', unsteady gait at times but insistent on performing ambulation the way he is use to walking. Pt then returned to room, demo fair to good dynamic and static standing balance while at sink for bathing. Left pt sitting in chair by sink for lower body bathing, PCT aware. Pt making progress toward goals this date, PT to cont to follow for acute care needs. This section established at most recent assessment PROBLEM LIST (Impairments causing functional limitations): 1. Decreased Strength 2. Decreased Transfer Abilities 3. Decreased Ambulation Ability/Technique 4. Increased Pain 5. Decreased Activity Tolerance INTERVENTIONS PLANNED: (Benefits and precautions of physical therapy have been discussed with the patient.) 1. Bed Mobility 2. Gait Training 3. Therapeutic Activites 4. Therapeutic Exercise/Strengthening 5. Transfer Training 6. Group Therapy TREATMENT PLAN: Frequency/Duration: 4 times a week for duration of hospital stay Rehabilitation Potential For Stated Goals: Good RECOMMENDED REHABILITATION/EQUIPMENT: (at time of discharge pending progress): Due to the probability of continued deficits (see above) this patient will likely need continued skilled physical therapy after discharge. Equipment:  
? None at this time HISTORY:  
History of Present Injury/Illness (Reason for Referral): Mr. Nati Marsh is a 73 yo male with PMH of CKD3, DM2, right BKA and right thumb amputation, HTN, CAD, sCHF (EF 45-50%), tobacco use evaluated with malaise and fatigue and found to have MO on CKD with mild hyperkalemia. He states he has felt unwell for several days, resulting in remaining bedbound with anorexia. He denies fever, dysuria, cough or dyspnea. He had some muscular appearing left chest wall pain that improved after massage and with position changes. He is still making urine, no BM changes and presently he is hungry. Past Medical History/Comorbidities:  
Mr. Nati Marsh  has a past medical history of Abnormal CT scan, chest (12/27/2015); Acute CHF ([address] (12/26/2015); Acute systolic congestive heart failure ([address] (12/30/2015); MO (acute kidney injury) ([address] (7/29/2012); Anemia of chronic disease (9/6/2018); Bilateral pleural effusion (12/27/2015); Chest pain (12/26/2015); Depression; DM (diabetes mellitus), type 2 ([address] (7/29/2012); Encephalopathy due to infection (1/7/2017); HTN (hypertension) (7/29/2012); Hypoglycemia (7/29/2012); NSTEMI (non-ST elevated myocardial infarction) ([address] (1/5/2017); and Tobacco use (12/27/2015). He also has no past medical history of Arrhythmia; Arthritis; Asthma; Autoimmune disease ([address] Calculus of kidney; Cancer ([address] Chronic pain; COPD; DEMENTIA; Gastrointestinal disorder; GERD (gastroesophageal reflux disease);  Headache; Hypercholesterolemia; Liver disease; Other ill-defined conditions(799.89); Psychiatric disorder; Psychotic disorder; PUD (peptic ulcer disease); Seizures ([facility] Sleep disorder; Stroke [facility]); Thromboembolus ([facility] Thyroid disease; or Trauma. Mr. Deloris Rosenthal  has a past surgical history that includes hx orthopaedic. Social History/Living Environment:  
Home Environment: Apartment Rails to Enter: No 
One/Two Story Residence: One story Living Alone: No 
Support Systems: Family member(s), Child(elpidio) Patient Expects to be [facility] Current DME Used/Available at Home: [employer], straight, [employer] Prior Level of Function/Work/Activity: 
independent BKA Number of Personal Factors/Comorbidities that affect the Plan of Care: 1-2: MODERATE COMPLEXITY EXAMINATION:  
Most Recent Physical Functioning:  
Gross Assessment: 
  
         
  
Posture: 
Posture (WDL): Within defined limits Balance: 
Sitting: Intact Standing: Impaired Standing - Static: Fair Standing - Dynamic : Fair Bed Mobility: 
Supine to Sit:  (sitting EOB) Sit to Supine:  (left set up for bathing) Scooting: Independent Wheelchair Mobility: 
  
Transfers: 
Sit to Stand: Stand-by assistance Stand to Sit: Stand-by assistance Gait: 
  
Base of Support: Center of gravity altered Speed/Radha: Fluctuations Step Length: Left shortened;Right shortened Swing Pattern: Right asymmetrical 
Gait Abnormalities: Decreased step clearance Distance (ft): 250 Feet (ft) (with R prothesis) Assistive Device: [employer] Ambulation - Level of Assistance: Stand-by assistance;Contact guard assistance Interventions: Verbal cues Body Structures Involved: 1. Muscles Body Functions Affected: 1. Movement Related Activities and Participation Affected: 1. Mobility Number of elements that affect the Plan of Care: 3: MODERATE COMPLEXITY CLINICAL PRESENTATION:  
Presentation: Stable and uncomplicated: LOW COMPLEXITY CLINICAL DECISION MAKING:  
 [facility] Basic Mobility Inpatient Short Form How much difficulty does the patient currently have. .. Unable A Lot A Little None 1. Turning over in bed (including adjusting bedclothes, sheets and blankets)? [] 1   [] 2   [] 3   [x] 4  
2. Sitting down on and standing up from a chair with arms ( e.g., wheelchair, bedside commode, etc.)   [] 1   [] 2   [] 3   [x] 4  
3. Moving from lying on back to sitting on the side of the bed? [] 1   [] 2   [] 3   [x] 4 How much help from another person does the patient currently need. .. Total A Lot A Little None 4. Moving to and from a bed to a chair (including a wheelchair)? [] 1   [] 2   [x] 3   [] 4  
5. Need to walk in hospital room? [] 1   [x] 2   [] 3   [] 4  
6. Climbing 3-5 steps with a railing? [] 1   [x] 2   [] 3   [] 4  
© 2007, Trustees of [address] [place] [address], under license to [employer]. All rights reserved Score:  Initial: 19 Most Recent: X (Date: -- ) Interpretation of Tool:  Represents activities that are increasingly more difficult (i.e. Bed mobility, Transfers, Gait). Score 24 23 22-20 19-15 14-10 9-7 6 Modifier CH CI CJ CK CL CM CN   
 
? Mobility - Walking and Moving Around:  
  - CURRENT STATUS: CK - 40%-59% impaired, limited or restricted  - GOAL STATUS: CJ - 20%-39% impaired, limited or restricted  - D/C STATUS:  ---------------To be determined--------------- Payor: CARE IMPROVEMENT PLUS [place] / Product Type: Managed Care Medicare /   
 
Medical Necessity:    
· Patient is expected to demonstrate progress in functional technique to increase independence with mobility and gait. , . 
Reason for Services/Other Comments: 
· Patient continues to require present interventions due to patient's inability to function at baseline.  .  
Use of outcome tool(s) and clinical judgement create a POC that gives a: Clear prediction of patient's progress: LOW COMPLEXITY  
  
 
 
 
TREATMENT:  
(In addition to Assessment/Re-Assessment sessions the following treatments were rendered) Pre-treatment Symptoms/Complaints:  \"I am ready to go home\" Pain: Initial:  
Pain Intensity 1: 0  Post Session:  0/10 post session Therapeutic Activity: (    23 min): Therapeutic activities including Chair transfers, Ambulation on level ground and static and dynamic standing balance at sink with bathing, weight shifting, safety with ambulation to improve mobility, strength, balance and coordination. Required minimal Verbal cues to promote static and dynamic balance in standing and promote coordination of bilateral, lower extremity(s). Braces/Orthotics/Lines/Etc:  
· O2 Device: Room air Treatment/Session Assessment:   
· Response to Treatment: tolerated well · Interdisciplinary Collaboration:  
o Registered Nurse · After treatment position/precautions:  
o Up in chair 
o Bed/Chair-wheels locked 
o Bed in low position 
o Call light within reach 
o pt bathing at sink · Compliance with Program/Exercises: Will assess as treatment progresses. · Recommendations/Intent for next treatment session: \"Next visit will focus on advancements to more challenging activities and reduction in assistance provided\". Total Treatment Duration: PT Patient Time In/Time Out Time In: 6455 Time Out: 1156 Val Livingston PT

## 2018-09-10 NOTE — DISCHARGE SUMMARY
Hospitalist Discharge Summary Patient ID: Gildardo Denver 
[id number] 
72 y.o. 
1945 Admit date: 9/5/2018 Discharge date: Attending: Michelle Pham MD 
PCP:  Shahnaz Salas MD 
 
Admission Diagnosis: MO (acute kidney [facility]) [facility] Discharge Diagnosis: MO (acute kidney injury) ([facility] Principal Problem: 
  MO (acute kidney injury) [facility] (9/5/2018) Active Problems: 
  HTN (hypertension) (7/29/2012) Chest pain (12/26/2015) Tobacco use (12/27/2015) Chronic systolic congestive heart failure ([facility] (12/30/2015) Overview: EF 35-40% on 2015 Echo Kidney disease, chronic, stage III (moderate, EGFR 30-59 ml/min) (8/11/2016) Type 2 diabetes mellitus with complication, without long-term current use of insulin ([facility] (4/7/2017) Coronary artery disease involving native coronary artery of native heart without angina pectoris (4/7/2017) Anemia of chronic disease (9/6/2018) Hospital Course: 
Please refer to the admission H&P for details of presentation. In summary, This is a 81-year-old gentleman with history of hypertension, diabetes, CKD stage IV likely, admitted for acute kidney injury and generalized weakness. Patient was placed on IV fluids and his creatinine improved and been stable around 3.6-3.7. Patient was seen by nephrology in consultation and recommended close outpatient follow-up as patient seems to be stable. He was started on sodium bicarbonate for metabolic acidosis. His blood pressure medications were adjusted as his blood pressure has been running high. He was taken off ramipril as he had hyperkalemia at the time of admission and considering his CKD stage IV. As patient is clinically stable at this time, cleared by nephrology, he is being discharged home. He was advised to follow-up with PCP in 1 week and nephrology in 2-3 weeks.   Patient was advised to avoid NSAIDs including ibuprofen or Motrin or naproxen etc. 
  
 
 Lab/Data Reviewed Discharge Exam: 
Visit Vitals  /64  Pulse 69  Temp 98.3 °F (36.8 °C)  Resp 18  Ht 6' 1\" (1.854 m)  Wt 95.3 kg (210 lb)  SpO2 96%  BMI 27.71 kg/m2 General: Conscious, comfortable Lungs:  CTA Bilaterally, No significant wheeze/rhonchi Heart:  S1 S2 regular Abdomen: Soft, Positive bowel sounds, NTND, No guarding/rigidity/rebound tend. Neurologic:  AAOX3, No gross FND Psych:             Appropriate mood Disposition: home Discharge Condition: Stable Patient Instructions:  
Current Discharge Medication List  
  
START taking these medications Details  
hydrALAZINE (APRESOLINE) 50 mg tablet Take 1 Tab by mouth three (3) times daily. Qty: 90 Tab, Refills: 0 CONTINUE these medications which have CHANGED Details  
amLODIPine (NORVASC) 5 mg tablet Take 2 Tabs by mouth daily. Qty: 30 Tab, Refills: 0 Associated Diagnoses: Cardiomyopathy, unspecified type ([address] CONTINUE these medications which have NOT CHANGED Details  
glipiZIDE SR (GLUCOTROL XL) 2.5 mg CR tablet take 1 tablet by mouth once daily 
Qty: 30 Tab, Refills: 1  
  
metoprolol succinate (TOPROL-XL) 100 mg tablet Take 1 Tab by mouth daily. Qty: 90 Tab, Refills: 3 Associated Diagnoses: Cardiomyopathy, unspecified type ([address]  
  
atorvastatin (LIPITOR) 80 mg tablet Take 1 Tab by mouth daily. Qty: 30 Tab, Refills: 6  
  
aspirin 81 mg chewable tablet Take 1 Tab by mouth daily (after breakfast). Qty: 30 Tab, Refills: 11  
  
nitroglycerin (NITROSTAT) 0.4 mg SL tablet by SubLINGual route every five (5) minutes as needed for Chest Pain. STOP taking these medications  
  
 ramipril (ALTACE) 10 mg capsule Comments:  
Reason for Stopping: Follow-up Pt was advised to follow up with PCP in one week Total time spent in discharge day management: 25 minutes Signed: 
Ai West MD

## 2018-09-10 NOTE — PROGRESS NOTES
Spoke to . Grazyna Whaley in room 204. He lives alone, but has family, and is fairly independent with ADLs (using his prosthetic leg of about one year, and his rolling walker). His son is working on his water line from his water meter to his house (leaks). No discharge needs identified. Care Management Interventions Plan discussed with Pt/Family/Caregiver:  Yes

## 2018-09-10 NOTE — PROGRESS NOTES
Gave discharge instructions to the pt. The pt voices a clear understanding, IV have been removed and site dressed. The pt states \"He maybe unable to find a ride\"  Explained to the pt that a cab could be arranged if he is unable to find a ride.

## 2018-09-10 NOTE — PROGRESS NOTES
END OF SHIFT NOTE: 
 
INTAKE/OUTPUT 
09/09 0701 - 09/10 0700 In: 7178 [I.V.:1314] Out: 1800 [Urine:1800] Voiding: YES Catheter: NO 
Drain:   
 
 
 
 
 
Flatus: Patient does have flatus present. Stool:  0 occurrences. Characteristics: 
  
 
Emesis: 0 occurrences. Characteristics: VITAL SIGNS Patient Vitals for the past 12 hrs: 
 Temp Pulse Resp BP SpO2  
09/10/18 0320 99 °F (37.2 °C) 77 16 156/84 97 % 09/09/18 2320 98.4 °F (36.9 °C) 71 16 152/87 98 % 09/09/18 1938 98 °F (36.7 °C) 77 16 151/72 96 % Pain Assessment Pain Intensity 1: 5 (09/10/18 0452) Pain Location 1: Head 
Pain Intervention(s) 1: Medication (see MAR) Patient Stated Pain Goal: 0 Ambulating Yes Shift report given to oncoming nurse at the bedside.  
 
Jaime Field RN

## 2018-09-10 NOTE — CONSULTS
[facility] Subjective: CKD4  Renal consult dictated # [id number] Review of Systems -  
General ROS: negative for - fever, chills Respiratory ROS: no SOB, cough, AN Cardiovascular ROS: no CP, palpitations Gastrointestinal ROS: no N&V, abdominal pain, diarrhea Genito-Urinary ROS: no difficulty voiding, dysuria Neurological ROS: no seizures, focal weekness Objective: 
 
Vitals:  
 09/09/18 1938 09/09/18 2320 09/10/18 0320 09/10/18 0710 BP: 151/72 152/87 156/84 180/86 Pulse: 77 71 77 82 Resp: 16 16 16 19 Temp: 98 °F (36.7 °C) 98.4 °F (36.9 °C) 99 °F (37.2 °C) 98.1 °F (36.7 °C) SpO2: 96% 98% 97% 98% Weight:      
Height:      
 
 
PE 
Gen: in no acute distress CV:reg rate Chest:clear Abd: soft Ext/Access: no edema Destiny Judith LAB Recent Labs  
   09/10/18 
 0445  09/07/18 
 1035 WBC  5.5  4.3 HGB  8.7*  9.4* HCT  27.3*  30.5* PLT  217  233 Recent Labs  
   09/10/18 
 0445  09/09/18 
 0439  09/08/18 
 0457 NA  141  141  141  
K  4.7  4.5  4.7 CL  114*  112*  113* CO2  19*  21  20* GLU  114*  107*  111* BUN  38*  33*  37* CREA  3.73*  3.60*  3.56* MG  1.9   --    --   
CA  7.9*  8.0*  7.8* ALB  2.5*   --    --   
TBILI  0.3   --    --   
ALT  19   --    --   
SGOT  16   --    --   
 
 
 
 
Radiology A/P:  
Patient Active Problem List  
Diagnosis Code  
 HTN (hypertension) I10  
 Chest pain R07.9  Bilateral pleural effusion J90  
 Tobacco use Z72.0  Abnormal CT scan, chest R93.8  Chronic systolic congestive heart failure (HCC) I50.22  
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  
  Type 2 diabetes mellitus with complication, without long-term current use of insulin (HCC) E11.8  Coronary artery disease involving native coronary artery of native heart without angina pectoris I25.10  Debility R53.81  
 Glaucoma syndrome H40.9  Onychomycosis B35.1  MO (acute kidney injury) ([facility] N17.9  Anemia of chronic disease D63.8 CKD4 - probably due to combination of DM and CHF. No acute need for HD. I told him that his CKD is progressive and that he will probably need dialysis in less than 12 months and will need to follow closely with us DM 
CHF 
HTN Anemia- may need EPO soon Juan Saini MD

## 2018-09-10 NOTE — CONSULTS
Hoang Aldana 
MR#: [id number] : 1945 ACCOUNT #: [de-identified] DATE OF SERVICE: 09/10/2018 We were seeing the patient at the request of Dr. Ishaan Downing with regards to chronic kidney disease. HISTORY OF PRESENT ILLNESS:  The patient is a 81-year-old -American male who was admitted to [facility] on 2018. He had problems with generalized weakness and had been bedbound for several days with anorexia as well as some left chest wall pain. He was found to have worsening azotemia and was admitted to the hospital for IV fluids and evaluation of his overall condition. We have been asked to see him now with regards to renal insufficiency. The patient was first seen by our group on a previous admission in 2017. He was found to have nephrotic range proteinuria with creatinines that were hovered in the mid 2s with a GFR of about 25. During that hospitalization, he underwent a right BKA for peripheral vascular disease. Over the past year it seems like his renal function has hovered with a creatinine of anywhere from about 1.5-2.5. On 2018 when he was admitted to the hospital, his creatinine was up to 4.34. With careful hydration, there has been only some moderate improvement in his renal function. LABORATORY DATA:  Today has a sodium of 141, potassium 4.7, chloride is 114, CO2 is 19, blood sugar 114, BUN is 38, creatinine is 3.73, calcium is 79, GFR is 21. His urinalysis shows a urine specific gravity of 1.016 with 300 mg/dL of protein, 5-10 WBCs, 0-3 RBCs. A renal ultrasound done on  showed normal sized kidneys bilaterally with the right measuring 10.1 cm, left kidney measuring 9.1 cm in length with no hydronephrosis or abnormal echogenicity serum. Urine protein electrophoresis in 2017 were negative for any M-spike. PAST MEDICAL HISTORY:  Significant for history of systolic and diastolic congestive heart failure, history of recurrent bilateral pleural effusions, type 2 diabetes mellitus, atherosclerotic coronary artery disease, status post STEMI and chronic depression. ALLERGIES:  HIS ONLY KNOWN DRUG ALLERGY IS  TO LORTAB WHICH CAUSES ITCHING. CURRENT MEDICATIONS:  Include Norvasc 10 mg p.o. daily, aspirin 81 mg p.o. daily, Lipitor 80 mg p.o. daily, heparin 5000 units subcutaneous q. 8 hours, Apresoline 50 mg p.o. 3 times a day, Toprol- mg p.o. daily. SOCIAL HISTORY:  , lives alone, smokes about a pack of cigarettes every 3 or 4 days. He does not drink any alcohol anymore. FAMILY HISTORY:  Mother and sister both had end-stage renal disease requiring dialysis from diabetic nephropathy. REVIEW OF SYSTEMS:  He denies any fevers, chills. No headaches, dizzy spells, fainting spells. No shortness of breath, cough, wheezing. No chest pain or palpitations. No nausea, vomiting, heartburn or abdominal pain. No constipation or diarrhea. No dysuria or polyuria. PHYSICAL EXAMINATION: 
GENERAL:  Reveals a middle-aged  male in no acute distress. VITAL SIGNS:  Afebrile, blood pressure is 180/86, pulse is 82, respirations are 19, they are not labored. HEAD:  Normocephalic. EYES:  Pupils equal, react to light and accommodation. Extraocular muscles are intact. Fundi were not visualized. LUNGS:  Clear. No rales or wheezes. Breath sounds equal bilaterally. HEART:  Regular rate and rhythm. ABDOMEN:  Soft. Bowel sounds are present. There is no hepatosplenomegaly. GENITORECTAL:  Deferred. EXTREMITIES:  Status post right BKA. He has no left leg edema. ASSESSMENT: 
1.  Stage IV chronic kidney disease, probably multifactorial including underlying diabetic nephropathy. There may be a cardiorenal component from his systolic and diastolic heart failure. 2.  Type 2 diabetes mellitus. 3.  Chronic hypertension. 4.  Atherosclerotic coronary artery disease. 5.  Peripheral vascular disease. 6.  Chronic anemia of renal disease. PLANS AND RECOMMENDATIONS:  There is no acute indication for dialysis, but I discussed with the patient and told him that he is progressively worsening his renal function. I think he is reasonably optimized. He needs to follow up with us closely in the office so that we can help arrange to get a dialysis access placed in a timely manner as I suspect he would probably reach end-stage renal disease in the next year. Thank you very much for allowing us to see him and helping in his care. We will follow closely with you. MD Donovan Rivero / [place] 
D: 09/10/2018 09:53    
T: 09/10/2018 10:16 JOB #: [id number] CC: Ruth Etienne MD 
CC: Alexis Waite [facility] [address] [place] [address]. 
[address] CC: Zach James MD 
[address] [place] [address]
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
## 2018-09-24 PROBLEM — R77.8 ELEVATED TROPONIN: Status: ACTIVE | Noted: 2018-01-01

## 2018-09-24 PROBLEM — R94.31 EKG, ABNORMAL: Status: ACTIVE | Noted: 2018-01-01

## 2018-09-24 PROBLEM — J96.01 ACUTE HYPOXEMIC RESPIRATORY FAILURE (HCC): Status: ACTIVE | Noted: 2018-01-01

## 2018-09-24 PROBLEM — J18.9 PNEUMONIA DUE TO INFECTIOUS ORGANISM: Status: ACTIVE | Noted: 2018-01-01

## 2018-09-24 NOTE — ED NOTES
TRANSFER - OUT REPORT: 
 
Verbal report given to West Ferrera  on Mg Cart  being transferred to [facility] for routine progression of care Report consisted of patients Situation, Background, Assessment and  
Recommendations(SBAR). Information from the following report(s) SBAR was reviewed with the receiving nurse. Lines:  
Peripheral IV 09/24/18 Right Hand (Active) Site Assessment Clean, dry, & intact 9/24/2018  3:01 AM  
Phlebitis Assessment 0 9/24/2018  3:01 AM  
Infiltration Assessment 0 9/24/2018  3:01 AM  
Dressing Status Clean, dry, & intact 9/24/2018  3:01 AM  
   
Peripheral IV Left Antecubital (Active) Site Assessment Clean, dry, & intact 9/24/2018  3:02 AM  
Phlebitis Assessment 0 9/24/2018  3:02 AM  
Infiltration Assessment 0 9/24/2018  3:02 AM  
Dressing Status Clean, dry, & intact 9/24/2018  3:02 AM  
  
 
Opportunity for questions and clarification was provided.    
 
Patient transported with: 
 Registered Nurse and RT
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
## 2018-09-24 NOTE — CONSULTS
[address] [facility], [address] 
[phone] History and Physical / Surgical Consultation Jacquelyn Goltz    Admit date: 2018 MRN: [id number]     : 1945     Age: 72 y.o.       
 
2018 2:50 PM 
 
Subjective/HPI:  
This patient is a 72 y.o. seen at the request of Adeola Rush MD and evaluated for permanent dialysis access. Patient was alone in room and provided an adequate history. PMH with CAD, sCHF (EF 35%), DM2, CKD4 and hospitalization earlier this month for MO, PAD, s/p right BKA (2017, Kelsey Aguilar MD) for gangrenous right foot, he arrived in the ED early this AM via EMS in acute respiratory failure and was admitted for work-up of pneumonia vs. pulmonary edema. Nephrology service noted patient's worsening renal function and consulted us for permanent HD access. Cardiology was also consulted due to patient's CAD, poor EF, elevated troponin and new ST depression on ECG. Mr. Alesia Espinoza reports he was originally right-hand dominant, but following amputation of his right thumb due to infection, he uses his left hand for everything except writing. Patient denies previous dialysis but notes strong family history of ESRD, with both parents and a sister on dialysis. Today he notes dyspnea, weakness, fever and chills. Patient's past medical, surgical, family and social histories were reviewed as noted here and below. Review of Systems A comprehensive review of systems was negative except for that written in the HPI. Past Medical History:  
Diagnosis Date  Abnormal CT scan, chest 2015  Acute CHF ([address] 2015  Acute systolic congestive heart failure ([address] 2015  MO (acute kidney injury) ([address] 2012  Anemia of chronic disease 2018  Bilateral pleural effusion 2015  Chest pain 2015  Depression  DM (diabetes mellitus), type 2 ([address] 2012  Encephalopathy due to infection 1/7/2017  
 HTN (hypertension) 7/29/2012  Hypoglycemia 7/29/2012  
 NSTEMI (non-ST elevated myocardial infarction) ([facility] 1/5/2017  Tobacco use 12/27/2015 Past Surgical History:  
Procedure Laterality Date  HX ORTHOPAEDIC Allergies Allergen Reactions  Lortab [Hydrocodone-Acetaminophen] Itching Social History Substance Use Topics  Smoking status: Current Every Day Smoker Packs/day: 0.50 Years: 45.00 Types: Cigarettes  Smokeless tobacco: Never Used  Alcohol use No  
  
Social History Social History Narrative Family History Problem Relation Age of Onset  Diabetes Mother  Kidney Disease Mother  Diabetes Father  Kidney Disease Father  Kidney Disease Sister Prior to Admission Medications Prescriptions Last Dose Informant Patient Reported? Taking? amLODIPine (NORVASC) 5 mg tablet 9/24/2018 at Unknown time  No Yes Sig: Take 2 Tabs by mouth daily. aspirin 81 mg chewable tablet 9/24/2018 at Unknown time  No Yes Sig: Take 1 Tab by mouth daily (after breakfast). atorvastatin (LIPITOR) 80 mg tablet 9/24/2018 at Unknown time  No Yes Sig: Take 1 Tab by mouth daily. glipiZIDE SR (GLUCOTROL XL) 2.5 mg CR tablet 9/24/2018 at Unknown time  No Yes Sig: take 1 tablet by mouth once daily  
hydrALAZINE (APRESOLINE) 50 mg tablet 9/24/2018 at Unknown time  No Yes Sig: Take 1 Tab by mouth three (3) times daily. metoprolol succinate (TOPROL-XL) 100 mg tablet 9/24/2018 at Unknown time  No Yes Sig: Take 1 Tab by mouth daily. nitroglycerin (NITROSTAT) 0.4 mg SL tablet 9/24/2018 at Unknown time  Yes Yes Sig: by SubLINGual route every five (5) minutes as needed for Chest Pain.  
sodium bicarbonate 650 mg tablet 9/24/2018 at Unknown time  No Yes Sig: Take 1 Tab by mouth three (3) times daily (with meals). Facility-Administered Medications: None Current Facility-Administered Medications Medication Dose Route Frequency  aspirin chewable tablet 81 mg  81 mg Oral DAILY AFTER BREAKFAST  atorvastatin (LIPITOR) tablet 80 mg  80 mg Oral DAILY  metoprolol succinate (TOPROL-XL) tablet 100 mg  100 mg Oral DAILY  sodium bicarbonate tablet 650 mg  650 mg Oral TID WITH MEALS  sodium chloride (NS) flush 5-10 mL  5-10 mL IntraVENous Q8H  
 sodium chloride (NS) flush 5-10 mL  5-10 mL IntraVENous PRN  
 acetaminophen (TYLENOL) tablet 650 mg  650 mg Oral Q4H PRN  piperacillin-tazobactam (ZOSYN) 4.5 g in 0.9% sodium chloride (MBP/ADV) 100 mL  4.5 g IntraVENous Q12H  
 heparin (porcine) injection 5,000 Units  5,000 Units SubCUTAneous Q8H  
 furosemide (LASIX) injection 100 mg  100 mg IntraVENous Q12H  hydrALAZINE (APRESOLINE) 20 mg/mL injection 20 mg  20 mg IntraVENous Q6H PRN  
 vancomycin intermittent dosing place castañeda   Other Rx Dosing/Monitoring  isosorbide mononitrate ER (IMDUR) tablet 30 mg  30 mg Oral DAILY Objective:  
 
Vitals:  
 09/24/18 1208 09/24/18 1209 09/24/18 1441 09/24/18 1450 BP:   125/78 Pulse:   92 Resp:      
Temp:    98.4 °F (36.9 °C) SpO2: 93% (!) 88% Weight:      
Height:      
 
 
Physical Exam:  
General well-developed, no acute distress but answers in short phrases due to dyspnea Skin  warm and moist with texture appropriate for age; no jaundice or rashes HEENT normocephalic, extraocular muscles intact without nystagmus; oral mucosa moist 
Neck  supple without JVD or bruits; trachea midline Lungs  tachypnic, course breath sounds, wheeze on right Heart  regular rate and rhythm Abdomen soft, protuberant but non-distended, non-tender; bowel sounds normoactive Extremities warm without cyanosis; s/p right thumb amputation and right BKA Pulses  2+ palpable and symmetric at radial, brachial and popliteal 
Neurological dozing but alerts easily, oriented; no gross sensorimotor deficits Data Review Recent Labs  
   09/24/18 0474 77 19 07  09/24/18 
 5035  09/24/18 
 6893 WBC   --   12.2*  11.6* HGB  8.2*  7.4*  9.7* HCT  25.9*  23.5*  32.1*  
PLT   --   212  338 Recent Labs  
   09/24/18 
 8906  09/24/18 
 0259 NA  141  139  
K  4.4  4.1 CL  111*  108* CO2  21  17* GLU  200*  238* BUN  46*  45* CREA  3.64*  4.14* MG  1.8  1.9 PHOS  3.5   --   
TROIQ  7.34*   --   
INR   --   1.1 Assessment / Plan:  
 
Hospital Problems  Date Reviewed: 10/5/2017 Codes Class Noted POA * (Principal)Acute hypoxemic respiratory failure ([facility] ICD-10-CM: J96.01 
ICD-9-CM: 518.81  9/24/2018 Unknown Pneumonia due to infectious organism ICD-10-CM: J18.9 ICD-9-CM: 136.9, 484.8  9/24/2018 Unknown EKG, abnormal ICD-10-CM: R94.31 
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  
  EF 35-40% on 2015 Echo Tobacco use (Chronic) ICD-10-CM: Z72.0 ICD-9-CM: 305.1  12/27/2015 Yes HTN (hypertension) (Chronic) ICD-10-CM: I10 
ICD-9-CM: 401.9  7/29/2012 Yes Shermon Dakin is a 72 y.o. male with worsening renal function, now in need of permanent dialysis access. Patient also has new cardiac changes, NSTEMI, and ongoing cardiac work-up. Cardiac issues take priority.  
- Vein mapping ordered by Nephrology, studies postponed this AM due to patient issues - Imaging will be reviewed by vascular surgeon on-call, Trevin Mcgee MD, who will plan HD access Thank you very much for this referral. We appreciate the opportunity to participate in this patient's care. We will follow along with above stated plan. Bolivar Marx PA-C Physician Assistant with [facility] Vascular Surgery [place].  Roya Kinney MD / Fili Dawn MD

## 2018-09-24 NOTE — IP AVS SNAPSHOT
[address] [facility] 
[phone] Patient: Zayra Pierre MRN: [id number] EKU:[birth year] A check michael indicates which time of day the medication should be taken. My Medications START taking these medications Instructions Each Dose to Equal  
 Morning Noon Evening Bedtime  
 glucose blood VI test strips strip Commonly known as:  ASCENSIA AUTODISC VI, ONE TOUCH ULTRA TEST VI  
   
 As directed  
     
   
   
   
  
 insulin lispro 100 unit/mL injection Commonly known as:  HUMALOG Your next dose is:  As ordered For Blood Sugar (mg/dL) of:  Less than 150 = 0 units  150 -199 = 2 units 200 -249 = 4 units 250 -299 = 6 units 300 -349 = 8 units Insulin Syringe-Needle U-100 1 mL 30 gauge x 5/16 Syrg Commonly known as:  INSULIN SYRINGE As directed  
     
   
   
   
  
 isosorbide mononitrate ER 30 mg tablet Commonly known as:  IMDUR Start taking on:  9/29/2018 Your last dose was:  09/28/18 am  
Your next dose is:  09/29/18 am  
   
 Take 1 Tab by mouth daily. 30 mg Lancets Misc As directed CONTINUE taking these medications Instructions Each Dose to Equal  
 Morning Noon Evening Bedtime  
 amLODIPine 5 mg tablet Commonly known as:  Lillette Cabot Your next dose is:  Resume home schedule Take 2 Tabs by mouth daily. 10 mg  
    
  
   
   
   
  
 aspirin 81 mg chewable tablet Your last dose was:  09/28/18 am  
Your next dose is:  09/29/18 am  
   
 Take 1 Tab by mouth daily (after breakfast). 81 mg  
    
  
   
   
   
  
 atorvastatin 80 mg tablet Commonly known as:  LIPITOR Your last dose was:  09/28/18 am  
Your next dose is:  09/29/18 am  
   
 Take 1 Tab by mouth daily. 80 mg  
    
  
   
   
   
  
 metoprolol succinate 100 mg tablet Commonly known as:  TOPROL-XL  
 Your last dose was:  09/28/18 am  
Your next dose is:  09/29/18 am  
   
 Take 1 Tab by mouth daily. 100 mg NITROSTAT 0.4 mg SL tablet Generic drug:  nitroglycerin Your next dose is:  Per as needed schedule  
   
 by SubLINGual route every five (5) minutes as needed for Chest Pain.  
     
   
   
   
  
 sodium bicarbonate 650 mg tablet Your last dose was:  09/28/18 am  
Your next dose is:  09/28/18 with next meal  
   
 Take 1 Tab by mouth three (3) times daily (with meals). 650 mg  
    
  
   
  
   
  
   
  
  
STOP taking these medications   
 glipiZIDE SR 2.5 mg CR tablet Commonly known as:  GLUCOTROL XL  
   
  
 hydrALAZINE 50 mg tablet Commonly known as:  APRESOLINE Where to Get Your Medications Information on where to get these meds will be given to you by the nurse or doctor. ! Ask your nurse or doctor about these medications  
  glucose blood VI test strips strip  
 insulin lispro 100 unit/mL injection Insulin Syringe-Needle U-100 1 mL 30 gauge x 5/16 Syrg  
 isosorbide mononitrate ER 30 mg tablet Lancets Misc

## 2018-09-24 NOTE — PROGRESS NOTES
Pharmacokinetic Consult to Pharmacist 
 
Irene Gal is a 72 y.o. male being treated for Sepsis with vancomycin. Weight: 95.3 kg (210 lb) Lab Results Component Value Date/Time BUN 45 (H) 09/24/2018 02:59 AM  
 Creatinine 4.14 (H) 09/24/2018 02:59 AM  
 WBC 11.6 (H) 09/24/2018 02:59 AM  
 Procalcitonin <0.1 09/24/2018 02:59 AM  
 Lactic acid 8.3 (H) 07/29/2012 03:15 PM  
 Lactic Acid (POC) 2.2 (H) 01/05/2017 06:24 PM  
  
Estimated Creatinine Clearance: 18.5 mL/min (based on Cr of 4.14). CULTURES: 
All Micro Results Procedure Component Value Units Date/Time INFLUENZA A & B AG (RAPID TEST) [[id number]] Order Status:  Sent Specimen:  Nasopharyngeal from Nasal washing CULTURE, BLOOD [[id number]] Collected:  09/24/18 0259 Order Status:  Completed Specimen:  Blood from Blood Updated:  09/24/18 0315 CULTURE, BLOOD [[id number]] Collected:  09/24/18 0259 Order Status:  Completed Specimen:  Blood from Blood Updated:  09/24/18 0315 Day 1 of vancomycin. Goal trough is 15-20. Vancomycin dose initiated at 2,000 mg load, followed by intermittent dosing due to renal function. Will continue to follow patient. Thank you, Em Badillo, PharmD

## 2018-09-24 NOTE — ED NOTES
Called pt's daughter Shelbi Sauceda ([phone]) per patient's request. Remy Martinez to reach Shelbi Sauceda, straight to .

## 2018-09-24 NOTE — PROGRESS NOTES
Bedside shift report received from [facility]. Patient is resting in bed on Bipap. Patient is resting eyes but answers all orientation questions correct.

## 2018-09-24 NOTE — PROGRESS NOTES
TRANSFER - IN REPORT: 
 
Verbal report received from Hoang Neal RN (name) on Amberly Mills  being received from ED (unit) for urgent transfer Report consisted of patients Situation, Background, Assessment and  
Recommendations(SBAR). Information from the following report(s) SBAR, Kardex, ED Summary, OR Summary, Procedure Summary, Recent Results, Med Rec Status and Cardiac Rhythm Sinus Tach was reviewed with the receiving nurse. Opportunity for questions and clarification was provided. Assessment completed upon patients arrival to unit and care assumed.

## 2018-09-24 NOTE — CONSULTS
[facility] Cardiology Consult Date of  Admission: 9/24/2018  2:51 AM  
 
Primary Care Physician: Dr. Demarcus Farfan Primary Cardiologist: Dr. Sierra Delgado Referring Physician: Richard Humphreys pulmonary Consulting Physician: Dr. Misa Boone CC/Reason for consult: ST depressions with depressed EF Ziyad Eisenberg is a 72 y.o. male with prior h/o CAD s/p NSTEMI with last [facility] 2015 showing EF 35% with diffuse CAD and first marginal branch of the left circumflex has high-grade disease with recommendation for angioplasty of the left circumflex if recurrent or refractory symptoms. Additional h/o chronic systolic heart failure with last echo Jan 2017 showed EF 45-50%, DM, CKD stage 4-5, and HTN. The patient was admitted earlier this month and discharged on 9/10/18 with MO and received gentle IVFs. Patient presented to ED [facility] with acute hypoxemic respiratory failure. He reports increased shortness of breath all day yesterday prior to admission. . Normally able to walk 2 block before getting short of breath, but yesterday was short of breath going to front porch. Stayed on front porch with fan blowing in his face to help him breath all day. Had minimal cough, non-productive, rarely had clear sputum. When EMS arrived at his home, he wwas placed on CPAP, then switched to BIPAP in ED. He required IVF bolus in ED as well. Labs reviewed and showed WBC 12.2 with hgb 7.4, Cr 4.14 (baseline around 3.5-3.7),  , LA 1, procal neg, . This am Trop 7.34, Cr improved to 3.64, chest xray with RLL PNA, US Left lower ext with no DVT. EKG showed ST depression in inferior leads which are new since last admission. [place] pul admitted to ICU and consulted cardiology for ST depressions with depressed EF. Repeat echo pending today. Diagnosis  HTN (hypertension)  Chest pain  Bilateral pleural effusion  Tobacco use  Abnormal CT scan, chest  
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 ([facility]  S/P BKA (below knee amputation) unilateral ([facility]  Type 2 diabetes mellitus with complication, without long-term current use of insulin ([facility]  Coronary artery disease involving native coronary artery of native heart without angina pectoris  Debility  Glaucoma syndrome  Onychomycosis  MO (acute kidney injury) ([facility]  Anemia of chronic disease  Acute hypoxemic respiratory failure (HCC)  Pneumonia due to infectious organism Past Medical History:  
Diagnosis Date  Abnormal CT scan, chest 12/27/2015  Acute CHF ([facility] 12/26/2015  Acute systolic congestive heart failure ([facility] 12/30/2015  MO (acute kidney injury) ([facility] 7/29/2012  Anemia of chronic disease 9/6/2018  Bilateral pleural effusion 12/27/2015  Chest pain 12/26/2015  Depression  DM (diabetes mellitus), type 2 ([facility] 7/29/2012  Encephalopathy due to infection 1/7/2017  
 HTN (hypertension) 7/29/2012  Hypoglycemia 7/29/2012  
 NSTEMI (non-ST elevated myocardial infarction) ([facility] 1/5/2017  Tobacco use 12/27/2015 Past Surgical History:  
Procedure Laterality Date  HX ORTHOPAEDIC Allergies Allergen Reactions  Lortab [Hydrocodone-Acetaminophen] Itching Family History Problem Relation Age of Onset  Diabetes Mother  Kidney Disease Mother  Diabetes Father  Kidney Disease Father  Kidney Disease Sister Current Facility-Administered Medications Medication Dose Route Frequency  aspirin chewable tablet 81 mg  81 mg Oral DAILY AFTER BREAKFAST  atorvastatin (LIPITOR) tablet 80 mg  80 mg Oral DAILY  metoprolol succinate (TOPROL-XL) tablet 100 mg  100 mg Oral DAILY  sodium bicarbonate tablet 650 mg  650 mg Oral TID WITH MEALS  sodium chloride (NS) flush 5-10 mL  5-10 mL IntraVENous Q8H  
 sodium chloride (NS) flush 5-10 mL  5-10 mL IntraVENous PRN  
 acetaminophen (TYLENOL) tablet 650 mg  650 mg Oral Q4H PRN  piperacillin-tazobactam (ZOSYN) 4.5 g in 0.9% sodium chloride (MBP/ADV) 100 mL  4.5 g IntraVENous Q12H  
 heparin (porcine) injection 5,000 Units  5,000 Units SubCUTAneous Q8H  
 furosemide (LASIX) injection 100 mg  100 mg IntraVENous Q12H  hydrALAZINE (APRESOLINE) 20 mg/mL injection 20 mg  20 mg IntraVENous Q6H PRN  Vancomycin Intermittent Therapy-pharmacy to dose   Other PRN Review of Systems Unable to perform ROS: severe respiratory distress Patient nodded head yes to SOB, currently on BIPAP. Obtained limited ROS from H&P. Physical Exam 
Vitals:  
 09/24/18 3760 09/24/18 2790 09/24/18 8888 09/24/18 2731 BP:  127/77 127/77 140/80 Pulse:  93 92 96 Resp:  22  27 Temp:      
SpO2: 93% 93%  90% Weight:      
Height:      
 
 
Physical Exam: 
General: + resp distress on BIPAP Neck: supple, + JVD, no carotid bruits Heart: S1S2 with RRR without murmurs or gallops Lungs: Crackles in bases Abd: soft, nontender, nondistended, with good bowel sounds Ext: warm, left with 1+ edema and right BKA, calves supple/nontender, pulses 2+ bilaterally Skin: warm and dry Neurologic: unable to obtain, on BIPAP, did nod head \"yes\" to SOB Cardiographics Telemetry: SR with improved ST depression ECG: ST with ST depression in inferior/lateral leads Echocardiogram: Jan 2017 showed -  Left ventricle: Systolic function was mildly reduced. Ejection fraction  Was estimated in the range of 45 % to 50 %. There was hypokinesis of the basal-mid inferior wall(s). There was mild concentric hypertrophy. -  Right ventricle: The ventricle was dilated. -  Left atrium: The atrium was mildly dilated. -  Right atrium:  The atrium was mildly dilated. -  Mitral valve: There was mild regurgitation. -  Pulmonic valve: There was mild to moderate regurgitation. Repeat echo pending today Labs:  
Recent Labs  
   09/24/18 
 8500  09/24/18 
 0259 NA  141  139  
K  4.4  4.1 MG  1.8  1.9 BUN  46*  45* CREA  3.64*  4.14* GLU  200*  238* WBC  12.2*  11.6* HGB  7.4*  9.7* HCT  23.5*  32.1*  
PLT  212  338 INR   --   1.1 Assessment/Plan: 
 
 Assessment:  
  
Principal Problem: 
  Acute hypoxemic respiratory failure ([facility] (9/24/2018)- per primary team; on Abx for PNA. Active Problems: 
  HTN (hypertension) (7/29/2012)- controlled; continue Toprol, Tobacco use (12/27/2015) Chronic systolic congestive heart failure ([facility] (12/30/2015)- on toprol but no ACE-I or ARB with CKD. Repeat echo pending today. Continue lasix IV. CKD (chronic kidney disease) stage 4, GFR 15-29 ml/min ([facility]) (8/11/2016)- worse on admission but improved post IVFs. Nephrology recommended Vascular to see him while he is here to evaluate for AV access for ? HD later. 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 Pneumonia due to infectious organism (9/24/2018)- per primary team.  
 
  EKG, abnormal (9/24/2018)- ST depression 3-4 mm noted in inferior lateral leads on admission but now much improved. Trop elevated as well (7.34). Currently with PNA on abx and BIPAP and unable to lie flat. May need to consider invasive w/u once acute issues resolve. Will manage medical at this time. Continue asa, statin and Toprol.  No ACE-I or ARB with CKD. No heparin for now with anemia and pending repeat H/H. Will recheck trop and H/H at 12 noon today. Echo pending today. Further recommendations post echo and pending clinical course. Nephrology saw patient see consult note re: vascular to see to evaluate AV access. See below. Elevated troponin (9/24/2018)- see above. Will recheck serial troponin at 12 noon. Echo pending. Possible demand in setting of anemia, MO/CKD, acute resp failure and chronic systolic HF. Will add low dose imdur Anemia- ? Dilution from IVF bolus, Will recheck H/H at 12 noon and reconsider transfusion if needed. Need to keep Hgb >8.0 for CAD. Also need to consider heparin gtt as well post repeat H/H. Thank you very much for this referral. We appreciate the opportunity to participate in this patient's care. We will follow along with above stated plan. [place] NP Consulting MD: Dr. Maikel Conner

## 2018-09-24 NOTE — ED NOTES
Pt arrived via EMS without personal information. After some time on BiPAP pt was able to give his SSN. New pt account started and information placed on that file.

## 2018-09-24 NOTE — ED TRIAGE NOTES
Pt came in via [facility] with respiratory distress. EMS started pt on CPAP; O2 sat only 77%. Pt arived, placed in resuscitation room and placed on BiPAP. See rapid response notes for further details

## 2018-09-24 NOTE — ED PROVIDER NOTES
HPI Comments: Presents with EMS with acute respiratory failure. EMS reports they were called out for chest pain patient to be tachypneic and hypoxic in the 60s. They placed him on CPAP and got his sats up to the 70s. Patient lives alone and there is no family. Initially entered as Yogesh Sinning. Patient denies chest pain but other than that he is very dyspneic and I am unable to get a complete review of systems. He has a history of CHF per EMS. Patient is a 72 y.o. male presenting with shortness of breath. The history is provided by the EMS personnel and the patient. The history is limited by the condition of the patient. Shortness of Breath This is a new problem. The problem occurs continuously. The current episode started 1 to 2 hours ago. The problem has been rapidly worsening. Pertinent negatives include no chest pain. Associated symptoms comments: Difficult to obtain Percell Brakeman It is unknown what precipitated the problem. He has had prior hospitalizations. He has had prior ED visits. Associated medical issues include heart failure. Past Medical History:  
Diagnosis Date  Abnormal CT scan, chest 12/27/2015  Acute CHF ([address] 12/26/2015  Acute systolic congestive heart failure ([address] 12/30/2015  MO (acute kidney injury) ([address] 7/29/2012  Anemia of chronic disease 9/6/2018  Bilateral pleural effusion 12/27/2015  Chest pain 12/26/2015  Depression  DM (diabetes mellitus), type 2 ([address] 7/29/2012  Encephalopathy due to infection 1/7/2017  
 HTN (hypertension) 7/29/2012  Hypoglycemia 7/29/2012  
 NSTEMI (non-ST elevated myocardial infarction) ([address] 1/5/2017  Tobacco use 12/27/2015 Past Surgical History:  
Procedure Laterality Date  HX ORTHOPAEDIC Family History:  
Problem Relation Age of Onset  Diabetes Mother  Kidney Disease Mother  Diabetes Father  Kidney Disease Father  Kidney Disease Sister Social History Social History  Marital status: SINGLE Spouse name: N/A  
 Number of children: N/A  
 Years of education: N/A Occupational History  retired brick layer Social History Main Topics  Smoking status: Current Every Day Smoker Packs/day: 0.50 Years: 45.00 Types: Cigarettes  Smokeless tobacco: Never Used  Alcohol use No  
 Drug use: Yes Special: Prescription  Sexual activity: No  
 
Other Topics Concern  Not on file Social History Narrative ALLERGIES: Lortab [hydrocodone-acetaminophen] Review of Systems Unable to perform ROS: Severe respiratory distress Respiratory: Positive for shortness of breath. Cardiovascular: Negative for chest pain. Vitals:  
 09/24/18 7877 09/24/18 0301 09/24/18 0310 BP:  145/84 153/86 Pulse:  (!) 126 (!) 117 Resp:  24 (!) 35 SpO2:  99% 100% Weight: 95.3 kg (210 lb) Physical Exam  
Constitutional: He appears well-developed and well-nourished. He appears listless. He appears distressed. HENT:  
Head: Normocephalic and atraumatic. Neck: Normal range of motion. Neck supple. Cardiovascular: Regular rhythm. Tachycardia present. Pulmonary/Chest: He is in respiratory distress. He has rales. rhonchi Abdominal: Soft. He exhibits no distension. There is no tenderness. There is no rebound. Musculoskeletal: He exhibits no edema. Neurological: He appears listless. Skin: Skin is warm. No rash noted. He is diaphoretic. No erythema. Psychiatric: He has a normal mood and affect. Thought content normal.  
Nursing note and vitals reviewed. MDM Number of Diagnoses or Management Options Diagnosis management comments: Pt improved with Bipap. His ABG initially 7.18 and he was given bicarbonate and a liter of fluids. On BiPAP his speech improved significantly.   His chest x-rays consistent with a right lower lobe pneumonia and he was given hospital acquired pneumonia antibiotics as a review of his chart shows an admission for acute renal failure from 9/5-9/10. Patient was gradually able to speak more and looked much better. He denies any recent cough or fever. I checked on him multiple times. I reviewed his chart and he has an EF of 45% and most recent echo. Had a heart catheter in 2015 with diffuse coronary artery disease. His EKG shows diffuse ST depression with no ST elevation and sinus tachycardia he was given aspirin again denied chest pain. He was not given 30 cc per KG of fluid due to his history of CHF volume overload. His initial lactic troponin and an ABG were done under his Lizzie Bis Oneal name but are as follows. POC lactic was 5.4, POC troponin was 0.07, ABG 7.185/35.8/133.9/13.2 D/w intensivist for admission 
 
=================================================================== This patient is critically ill and there is a high probability of of imminent or life threatening deterioration in the patient's condition without immediate management. The nature of the patient's clinical problem is: Acute resp failure I have spent 45 minutes in direct patient care, documentation, review of labs/xrays/old records, discussion with Family, Staff, Colleague, Nursing . The time involved in the performance of separately reportable procedures was not counted toward critical care time. La Davalos MD; 9/24/2018 @4:06 AM 
=================================================================== Amount and/or Complexity of Data Reviewed Clinical lab tests: ordered and reviewed Decide to obtain previous medical records or to obtain history from someone other than the patient: yes (Available upon EMS arrival) Review and summarize past medical records: yes Discuss the patient with other providers: yes Independent visualization of images, tracings, or specimens: yes Risk of Complications, Morbidity, and/or Mortality Presenting problems: high Diagnostic procedures: high Management options: high Critical Care Total time providing critical care: 30-74 minutes Patient Progress Patient progress: improved ED Course Procedures

## 2018-09-24 NOTE — PROGRESS NOTES
Patient was tolerating being off Bipap and on Nasal Cannula until getting on the Bedpan. Patient's o2 sat dropped to 85%, he was tachypneic and not recovering with an increase in o2. Patient placed back on Bipap and patient is resting/breathing comfortably.

## 2018-09-24 NOTE — PROGRESS NOTES
Initial visit was made, prayer, emotional support and a spiritual presence were provided.  card was left with the patient. Denys De La O

## 2018-09-24 NOTE — IP AVS SNAPSHOT
[place] [address] [facility] 
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 
[phone] Terell Stone NP On 10/3/2018 2  [facility],4Th Floor [facility] [address] 
[phone] Harrison Velez MD On 10/9/2018 2:30 PM [address] [place] [address] 
[phone] Your Scheduled Appointments Friday October 05, 2018  2:40 PM EDT Office Visit with Charan Capone MD  
[address] [place] ([address]) [address] [facility] [address]  
[phone] Tuesday October 09, 2018  2:30 PM EDT HOSPITAL FOLLOW-UP with Harrison Velez, [address] ([address] [facility]) [address] 
[address] [facility] 81  
[phone] Discharge Orders None A check michael indicates which time of day the medication should be taken. My Medications START taking these medications Instructions Each Dose to Equal  
 Morning Noon Evening Bedtime  
 glucose blood VI test strips strip Commonly known as:  ASCENSIA AUTODISC VI, ONE TOUCH ULTRA TEST VI  
   
 As directed  
     
   
   
   
  
 insulin lispro 100 unit/mL injection Commonly known as:  HUMALOG Your next dose is:  As ordered For Blood Sugar (mg/dL) of:  Less than 150 = 0 units  150 -199 = 2 units 200 -249 = 4 units 250 -299 = 6 units 300 -349 = 8 units Insulin Syringe-Needle U-100 1 mL 30 gauge x 5/16 Syrg Commonly known as:  INSULIN SYRINGE As directed  
     
   
   
   
  
 isosorbide mononitrate ER 30 mg tablet Commonly known as:  IMDUR Start taking on:  9/29/2018 Your last dose was:  09/28/18 am  
Your next dose is:  09/29/18 am  
   
 Take 1 Tab by mouth daily. 30 mg Lancets Misc As directed CONTINUE taking these medications Instructions Each Dose to Equal  
 Morning Noon Evening Bedtime  
 amLODIPine 5 mg tablet Commonly known as:  Marleny Patel Your next dose is:  Resume home schedule Take 2 Tabs by mouth daily. 10 mg  
    
  
   
   
   
  
 aspirin 81 mg chewable tablet Your last dose was:  09/28/18 am  
Your next dose is:  09/29/18 am  
   
 Take 1 Tab by mouth daily (after breakfast). 81 mg  
    
  
   
   
   
  
 atorvastatin 80 mg tablet Commonly known as:  LIPITOR Your last dose was:  09/28/18 am  
Your next dose is:  09/29/18 am  
   
 Take 1 Tab by mouth daily. 80 mg  
    
  
   
   
   
  
 metoprolol succinate 100 mg tablet Commonly known as:  TOPROL-XL Your last dose was:  09/28/18 am  
Your next dose is:  09/29/18 am  
   
 Take 1 Tab by mouth daily. 100 mg NITROSTAT 0.4 mg SL tablet Generic drug:  nitroglycerin Your next dose is:  Per as needed schedule  
   
 by SubLINGual route every five (5) minutes as needed for Chest Pain.  
     
   
   
   
  
 sodium bicarbonate 650 mg tablet Your last dose was:  09/28/18 am  
Your next dose is:  09/28/18 with next meal  
   
 Take 1 Tab by mouth three (3) times daily (with meals). 650 mg  
    
  
   
  
   
  
   
  
  
STOP taking these medications   
 glipiZIDE SR 2.5 mg CR tablet Commonly known as:  GLUCOTROL XL  
   
  
 hydrALAZINE 50 mg tablet Commonly known as:  APRESOLINE Where to Get Your Medications Information on where to get these meds will be given to you by the nurse or doctor. ! Ask your nurse or doctor about these medications  
  glucose blood VI test strips strip  
 insulin lispro 100 unit/mL injection Insulin Syringe-Needle U-100 1 mL 30 gauge x 5/16 Syrg  
 isosorbide mononitrate ER 30 mg tablet Lancets Misc Discharge Instructions DISCHARGE SUMMARY from Nurse PATIENT INSTRUCTIONS: 
 
After general anesthesia or intravenous sedation, for 24 hours or while taking prescription Narcotics: · Limit your activities · Do not drive and operate hazardous machinery · Do not make important personal or business decisions · Do  not drink alcoholic beverages · If you have not urinated within 8 hours after discharge, please contact your surgeon on call. What to do at Home: 
Recommended activity: Activity as tolerated. If you experience any of the following symptoms temp > 101.5, unrelieved pain, nausea or vomiting, shortness of breath or fatigue not relieved with rest, please follow up with MD. 
 
*  Please give a list of your current medications to your Primary Care Provider. *  Please update this list whenever your medications are discontinued, doses are 
    changed, or new medications (including over-the-counter products) are added. *  Please carry medication information at all times in case of emergency situations. These are general instructions for a healthy lifestyle: No smoking/ No tobacco products/ Avoid exposure to second hand smoke Surgeon General's Warning:  Quitting smoking now greatly reduces serious risk to your health. Obesity, smoking, and sedentary lifestyle greatly increases your risk for illness A healthy diet, regular physical exercise & weight monitoring are important for maintaining a healthy lifestyle You may be retaining fluid if you have a history of heart failure or if you experience any of the following symptoms:  Weight gain of 3 pounds or more overnight or 5 pounds in a week, increased swelling in our hands or feet or shortness of breath while lying flat in bed. Please call your doctor as soon as you notice any of these symptoms; do not wait until your next office visit. Recognize signs and symptoms of STROKE: 
 
F-face looks uneven A-arms unable to move or move unevenly S-speech slurred or non-existent T-time-call 911 as soon as signs and symptoms begin-DO NOT go Back to bed or wait to see if you get better-TIME IS BRAIN. Warning Signs of HEART ATTACK Call 911 if you have these symptoms: 
? Chest discomfort. Most heart attacks involve discomfort in the center of the chest that lasts more than a few minutes, or that goes away and comes back. It can feel like uncomfortable pressure, squeezing, fullness, or pain. ? Discomfort in other areas of the upper body. Symptoms can include pain or discomfort in one or both arms, the back, neck, jaw, or stomach. ? Shortness of breath with or without chest discomfort. ? Other signs may include breaking out in a cold sweat, nausea, or lightheadedness. Don't wait more than five minutes to call [address]! Fast action can save your life.  Calling 911 is almost always the fastest way to get lifesaving treatment. Emergency Medical Services staff can begin treatment when they arrive  up to an hour sooner than if someone gets to the hospital by car. The discharge information has been reviewed with the patient. The patient verbalized understanding. Discharge medications reviewed with the patient and appropriate educational materials and side effects teaching were provided. Pneumonia: Care Instructions Your Care Instructions Pneumonia is an infection of the lungs. Most cases are caused by infections from bacteria or viruses. Pneumonia may be mild or very severe. If it is caused by bacteria, you will be treated with antibiotics. It may take a few weeks to a few months to recover fully from pneumonia, depending on how sick you were and whether your overall health is good. Follow-up care is a key part of your treatment and safety. Be sure to make and go to all appointments, and call your doctor if you are having problems. It's also a good idea to know your test results and keep a list of the medicines you take. How can you care for yourself at home? · Take your antibiotics exactly as directed. Do not stop taking the medicine just because you are feeling better. You need to take the full course of antibiotics. · Take your medicines exactly as prescribed. Call your doctor if you think you are having a problem with your medicine. · Get plenty of rest and sleep. You may feel weak and tired for a while, but your energy level will improve with time. · To prevent dehydration, drink plenty of fluids, enough so that your urine is light yellow or clear like water. Choose water and other caffeine-free clear liquids until you feel better. If you have kidney, heart, or liver disease and have to limit fluids, talk with your doctor before you increase the amount of fluids you drink.  
· Take care of your cough so you can rest. A cough that brings up mucus from your lungs is common with pneumonia. It is one way your body gets rid of the infection. But if coughing keeps you from resting or causes severe fatigue and chest-wall pain, talk to your doctor. He or she may suggest that you take a medicine to reduce the cough. · Use a vaporizer or humidifier to add moisture to your bedroom. Follow the directions for cleaning the machine. · Do not smoke or allow others to smoke around you. Smoke will make your cough last longer. If you need help quitting, talk to your doctor about stop-smoking programs and medicines. These can increase your chances of quitting for good. · Take an over-the-counter pain medicine, such as acetaminophen (Tylenol), ibuprofen (Advil, Motrin), or naproxen (Aleve). Read and follow all instructions on the label. · Do not take two or more pain medicines at the same time unless the doctor told you to. Many pain medicines have acetaminophen, which is Tylenol. Too much acetaminophen (Tylenol) can be harmful. · If you were given a spirometer to measure how well your lungs are working, use it as instructed. This can help your doctor tell how your recovery is going. · To prevent pneumonia in the future, talk to your doctor about getting a flu vaccine (once a year) and a pneumococcal vaccine (one time only for most people). When should you call for help? Call 911 anytime you think you may need emergency care. For example, call if: 
  · You have severe trouble breathing.  
 Call your doctor now or seek immediate medical care if: 
  · You cough up dark brown or bloody mucus (sputum).  
  · You have new or worse trouble breathing.  
  · You are dizzy or lightheaded, or you feel like you may faint.  
 Watch closely for changes in your health, and be sure to contact your doctor if: 
  · You have a new or higher fever.  
  · You are coughing more deeply or more often.  
  · You are not getting better after 2 days (48 hours).   · You do not get better as expected. Where can you learn more? Go to http://ulises-jared.info/. Enter [id number] in the search box to learn more about \"Pneumonia: Care Instructions. \" Current as of: December 6, 2017 Content Version: 11.7 © 5857-5609 [employer]. Care instructions adapted under license by [employer] (which disclaims liability or warranty for this information). If you have questions about a medical condition or this instruction, always ask your healthcare professional. [address] any warranty or liability for your use of this information. Chronic Kidney Disease: Care Instructions Your Care Instructions Chronic kidney disease happens when your kidneys don't work as well as they should. Your kidneys have a few important jobs. They remove waste from your blood. This waste leaves your body in your urine. They also balance your body's fluids and chemicals. When your kidneys don't work well, extra waste and fluid can build up. This can poison the body and sometimes cause death. The most common causes of this disease are diabetes and high blood pressure. In some cases, the disease develops in 2 to 3 months. But it usually develops over many years. If you take medicine and make healthy changes to your lifestyle, you may be able to prevent the disease from getting worse. But if your kidney damage gets worse, you may need dialysis or a kidney transplant. Dialysis uses a machine to filter waste from the blood. A transplant is surgery to give you a healthy kidney from another person. Follow-up care is a key part of your treatment and safety. Be sure to make and go to all appointments, and call your doctor if you are having problems. It's also a good idea to know your test results and keep a list of the medicines you take. How can you care for yourself at home? 
 Treatments and appointments   · Be safe with medicines. Take your medicines exactly as prescribed. Call your doctor if you have any problems with your medicine. You also may take medicine to control your blood pressure or to treat diabetes. Many people who have diabetes take blood pressure medicine.  
  · If you have diabetes, do your best to keep your blood sugar in your target range. You may do this by eating healthy food and exercising. You may also take medicines.  
  · Go to your dialysis appointments if you have this treatment.  
  · Do not take ibuprofen (Advil, Motrin), naproxen (Aleve), or similar medicines, unless your doctor tells you to. These may make the disease worse.  
  · Do not take any vitamins, over-the-counter medicines, or herbal products without talking to your doctor first.  
  · Do not smoke or use other tobacco products. Smoking can reduce blood flow to the kidneys. If you need help quitting, talk to your doctor about stop-smoking programs and medicines. These can increase your chances of quitting for good.  
  · Do not drink alcohol or use illegal drugs.  
  · Talk to your doctor about an exercise plan. Exercise helps lower your blood pressure. It also makes you feel better.  
  · If you have an advance directive, let your doctor know. It may include a living will and a durable power of  for health care. If you don't have one, you may want to prepare one. It lets your doctor and loved ones know your health care wishes if you become unable to speak for yourself. Diet 
  · Talk to a registered dietitian. He or she can help you make a meal plan that is right for you. Most people with kidney disease need to limit salt (sodium), fluids, and protein. Some also have to limit potassium and phosphorus.  
  · You may have to give up many foods you like.  But try to focus on the fact that this will help you stay healthy for as long as possible.  
  · If you have a hard time eating enough, talk to your doctor or dietitian about ways to add calories to your diet.  
  · Your diet may change as your disease changes. See your doctor for regular testing. And work with a dietitian to change your diet as needed. When should you call for help? Call 911 anytime you think you may need emergency care. For example, call if: 
  · You passed out (lost consciousness).  
 Call your doctor now or seek immediate medical care if: 
  · You have less urine than normal or no urine.  
  · You have trouble urinating or can urinate only very small amounts.  
  · You are confused or have trouble thinking clearly.  
  · You feel weaker or more tired than usual.  
  · You are very thirsty, lightheaded, or dizzy.  
  · You have nausea and vomiting.  
  · You have new swelling of your arms or feet, or your swelling is worse.  
  · You have blood in your urine.  
  · You have new or worse trouble breathing.  
 Watch closely for changes in your health, and be sure to contact your doctor if: 
  · You have any problems with your medicine or other treatment. Where can you learn more? Go to http://ulises-jared.info/. Enter N276 in the search box to learn more about \"Chronic Kidney Disease: Care Instructions. \" Current as of: May 12, 2017 Content Version: 11.7 © 7192-3427 [employer]. Care instructions adapted under license by [employer] (which disclaims liability or warranty for this information). If you have questions about a medical condition or this instruction, always ask your healthcare professional. [place] [address] any warranty or liability for your use of this information. ___________________________________________________________________________________________________________________________________ Introducing [facility] & HEALTH SERVICES! Dear Annle Dunbar: Thank you for requesting a MyChart account.   Our records indicate that you already have an active i-nexus account. You can access your account anytime at [email or web address] Did you know that you can access your hospital and ER discharge instructions at any time in i-nexus? You can also review all of your test results from your hospital stay or ER visit. Additional Information If you have questions, please visit the Frequently Asked Questions section of the i-nexus website at [email or web address] Remember, i-nexus is NOT to be used for urgent needs. For medical emergencies, dial 911. Now available from your iPhone and Android! Introducing Paolo Francis As a [facility] patient, I wanted to make you aware of our electronic visit tool called Paolo Francis. Exhibia 24/The Echo Nest allows you to connect within minutes with a medical provider 24 hours a day, seven days a week via a mobile device or tablet or logging into a secure website from your computer. You can access Paolo Francis from anywhere in the [place]. A virtual visit might be right for you when you have a simple condition and feel like you just dont want to get out of bed, or cant get away from work for an appointment, when your regular SageJoinMe@ provider is not available (evenings, weekends or holidays), or when youre out of town and need minor care. Electronic visits cost only $49 and if the Exhibia 24/The Echo Nest provider determines a prescription is needed to treat your condition, one can be electronically transmitted to a nearby pharmacy*. Please take a moment to enroll today if you have not already done so. The enrollment process is free and takes just a few minutes. To enroll, please download the "[employer]"/The Echo Nest teresa to your tablet or phone, or visit [email or web address] to enroll on your computer.    
And, as an [address] [place] [address] patient with a [employer] account, the results of your visits will be scanned into your electronic medical record and your primary care provider will be able to view the scanned results. We urge you to continue to see your regular [facility] provider for your ongoing medical care. And while your primary care provider may not be the one available when you seek a Paolo Chaparrofin virtual visit, the peace of mind you get from getting a real diagnosis real time can be priceless. For more information on NWIXmarshalfin, view our Frequently Asked Questions (FAQs) at [email or web address]. Sincerely, 
 
Huber High MD 
Chief Medical Officer [address]res *:  certain medications cannot be prescribed via Dnevnik Providers Seen During Your Hospitalization Provider Specialty Primary office phone La Davalos MD Emergency Medicine [phone] Juan Diego Knott MD Pulmonary Disease [phone] Marina Godfrey MD Internal Medicine [phone] Your Primary Care Physician (PCP) Primary Care Physician Office Phone Office Fax Morgan Alfred [phone] You are allergic to the following Allergen Reactions Lortab (Hydrocodone-Acetaminophen) Itching Recent Documentation Height Weight BMI Smoking Status 1.854 m 93.5 kg 27.2 kg/m2 Current Every Day Smoker Emergency Contacts Name Discharge Info Relation Home Work Mobile Ciro Bach  Child [2] [phone] Chantale Greco  Daughter [21] [phone] Patient Belongings The following personal items are in your possession at time of discharge: 
     Visual Aid: None             Clothing: With patient Please provide this summary of care documentation to your next provider. Signatures-by signing, you are acknowledging that this After Visit Summary has been reviewed with you and you have received a copy. Patient Signature:  ____________________________________________________________ Date:  ____________________________________________________________  
  
Mickeal Kelp Provider Signature:  ____________________________________________________________ Date:  ____________________________________________________________

## 2018-09-24 NOTE — PROGRESS NOTES
Interdisciplinary team rounds were held 9/24/2018 with the following team members:Nursing, Nurse Practitioner, Nutrition, Palliative Care, Pastoral Care, Pharmacy, Physical Therapy, Physician, Respiratory Therapy, Speech Therapy and Clinical Coordinator and the patient. Plan of care discussed. See clinical pathway and/or care plan for interventions and desired outcomes.

## 2018-09-24 NOTE — H&P
HISTORY AND PHYSICAL Sridevi Baltazar 9/24/2018 Date of Admission:  9/24/2018 The patient's chart is reviewed and the patient is discussed with the staff. Subjective:  
 
Patient is a 72 y.o.  male presents with acute hypoxemic respiratory failure. The has h/o of cardiomyopathy with EF 30% and CKD stage 4-5 with recent admission early September for MO. He was gently hydrated and discharged and told to stay hydrated. He notes increased shortness of breath starting today. Normally able to walk 2 block before getting short of breath, but today was short of breath going to front porch. Stayed on front porch with fan blowing in his face to help him breath all day. Had minimal cough, non-productive, rarely had clear sputum. Denies chest pain, denies chest pressure even with exertion. Denies fevers or chills. No sick contacts. Swelling to left leg is worse, right leg has BKA. Was placed on CPAP by EMS, switched to BiPAP by ED, given 1L NS and then given Vanc and Zosyn for HAP with recent hospitalization. He is more alert since being in ED, states his breathing is improved slightly, but still short of breath. Review of Systems A comprehensive review of systems was negative. Patient Active Problem List  
Diagnosis Code  
 HTN (hypertension) I10  
 Chest pain R07.9  Bilateral pleural effusion J90  
 Tobacco use Z72.0  Abnormal CT scan, chest R93.8  Chronic systolic congestive heart failure (HCC) I50.22  
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  
  Type 2 diabetes mellitus with complication, without long-term current use of insulin (HCC) E11.8  Coronary artery disease involving native coronary artery of native heart without angina pectoris I25.10  Debility R53.81  
 Glaucoma syndrome H40.9  Onychomycosis B35.1  MO (acute kidney injury) ([facility] N17.9  Anemia of chronic disease D63.8  Acute hypoxemic respiratory failure ([facility]) J96.01 Prior to Admission Medications Prescriptions Last Dose Informant Patient Reported? Taking? amLODIPine (NORVASC) 5 mg tablet 9/24/2018 at Unknown time  No Yes Sig: Take 2 Tabs by mouth daily. aspirin 81 mg chewable tablet 9/24/2018 at Unknown time  No Yes Sig: Take 1 Tab by mouth daily (after breakfast). atorvastatin (LIPITOR) 80 mg tablet 9/24/2018 at Unknown time  No Yes Sig: Take 1 Tab by mouth daily. glipiZIDE SR (GLUCOTROL XL) 2.5 mg CR tablet 9/24/2018 at Unknown time  No Yes Sig: take 1 tablet by mouth once daily  
hydrALAZINE (APRESOLINE) 50 mg tablet 9/24/2018 at Unknown time  No Yes Sig: Take 1 Tab by mouth three (3) times daily. metoprolol succinate (TOPROL-XL) 100 mg tablet 9/24/2018 at Unknown time  No Yes Sig: Take 1 Tab by mouth daily. nitroglycerin (NITROSTAT) 0.4 mg SL tablet 9/24/2018 at Unknown time  Yes Yes Sig: by SubLINGual route every five (5) minutes as needed for Chest Pain.  
sodium bicarbonate 650 mg tablet 9/24/2018 at Unknown time  No Yes Sig: Take 1 Tab by mouth three (3) times daily (with meals). Facility-Administered Medications: None Past Medical History:  
Diagnosis Date  Abnormal CT scan, chest 12/27/2015  Acute CHF ([facility] 12/26/2015  Acute systolic congestive heart failure ([facility] 12/30/2015  MO (acute kidney injury) ([facility] 7/29/2012  Anemia of chronic disease 9/6/2018  Bilateral pleural effusion 12/27/2015  Chest pain 12/26/2015  Depression  DM (diabetes mellitus), type 2 ([facility] 7/29/2012  Encephalopathy due to infection 1/7/2017  
 HTN (hypertension) 7/29/2012  Hypoglycemia 7/29/2012  
 NSTEMI (non-ST elevated myocardial infarction) ([facility] 1/5/2017  Tobacco use 12/27/2015 Past Surgical History:  
Procedure Laterality Date  HX ORTHOPAEDIC Social History Social History  Marital status: SINGLE Spouse name: N/A  
 Number of children: N/A  
 Years of education: N/A Occupational History  retired brick layer Social History Main Topics  Smoking status: Current Every Day Smoker Packs/day: 0.50 Years: 45.00 Types: Cigarettes  Smokeless tobacco: Never Used  Alcohol use No  
 Drug use: Yes Special: Prescription  Sexual activity: No  
 
Other Topics Concern  Not on file Social History Narrative Family History Problem Relation Age of Onset  Diabetes Mother  Kidney Disease Mother  Diabetes Father  Kidney Disease Father  Kidney Disease Sister Allergies Allergen Reactions  Lortab [Hydrocodone-Acetaminophen] Itching Current Facility-Administered Medications Medication Dose Route Frequency  vancomycin (VANCOCIN) 2000 mg in  ml infusion  2,000 mg IntraVENous ONCE Current Outpatient Prescriptions Medication Sig  
 amLODIPine (NORVASC) 5 mg tablet Take 2 Tabs by mouth daily.  hydrALAZINE (APRESOLINE) 50 mg tablet Take 1 Tab by mouth three (3) times daily.  sodium bicarbonate 650 mg tablet Take 1 Tab by mouth three (3) times daily (with meals).  glipiZIDE SR (GLUCOTROL XL) 2.5 mg CR tablet take 1 tablet by mouth once daily  metoprolol succinate (TOPROL-XL) 100 mg tablet Take 1 Tab by mouth daily.  nitroglycerin (NITROSTAT) 0.4 mg SL tablet by SubLINGual route every five (5) minutes as needed for Chest Pain.  atorvastatin (LIPITOR) 80 mg tablet Take 1 Tab by mouth daily.  aspirin 81 mg chewable tablet Take 1 Tab by mouth daily (after breakfast). Objective: Vitals:  
 09/24/18 5065 09/24/18 8533 09/24/18 0401 09/24/18 7970 BP: 126/80 135/75  132/73 Pulse: (!) 112 (!) 115  (!) 103 Resp: 30 28  20 Temp:  97.9 °F (36.6 °C) SpO2: 99% 98% 98% 97% Weight: PHYSICAL EXAM  
 
Constitutional:  the patient is well developed and in no acute distress EENMT:  Sclera clear, pupils equal, oral mucosa moist 
Respiratory: crackles right, left clear Cardiovascular:  RRR without M,G,R 
Gastrointestinal: soft and non-tender; with positive bowel sounds. Musculoskeletal: warm without cyanosis. There is 3+ lower leg edema to left leg, right BKA with prosthetic Skin:  no jaundice or rashes, no wounds Neurologic: no gross neuro deficits Psychiatric:  alert and oriented x 3 CXR: R sided infiltrate, prominent vasculature bilaterally Recent Labs  
   09/24/18 
 0259 WBC  11.6* HGB  9.7* HCT  32.1*  
PLT  338 INR  1.1 Recent Labs  
   09/24/18 
 0259 NA  139  
K  4.1 CL  108* GLU  238* CO2  17* BUN  45* CREA  4.14* MG  1.9  
CA  7.7* ALB  3.0* TBILI  0.5 ALT  16 SGOT  23 Recent Labs  
   09/24/18 
 1546 PH  7.33* PCO2  32* PO2  116* HCO3  17* No results for input(s): LCAD, LAC in the last 72 hours. Assessment:  (Medical Decision Making) Hospital Problems  Date Reviewed: 10/5/2017 Codes Class Noted POA Acute hypoxemic respiratory failure ([facility] ICD-10-CM: J96.01 
ICD-9-CM: 518.81  9/24/2018 Unknown MO (acute kidney injury) ([facility] ICD-10-CM: N17.9 ICD-9-CM: 584.9  9/5/2018 Yes Type 2 diabetes mellitus with complication, without long-term current use of insulin (HCC) ICD-10-CM: E11.8 ICD-9-CM: 250.90  4/7/2017 Yes S/P BKA (below knee amputation) unilateral (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  
  EF 35-40% on 2015 Echo Tobacco use (Chronic) ICD-10-CM: Z72.0 ICD-9-CM: 305.1  12/27/2015 Yes HTN (hypertension) (Chronic) ICD-10-CM: I10 
ICD-9-CM: 401.9  7/29/2012 Yes 73 y/o AAM w h/o of cardiomyopathy with EF 30% and CKD stage 4-5 with recent admission early September for MO. He was gently hydrated and discharged and told to stay hydrated. He presents with acute hypoxemic respiratory failure on BPAP with R>L infiltrates, mild cough. PNA vs pulmonary edema. PE could also be considered though doubt with infiltrates. Plan:  (Medical Decision Making) --Will admit for further medical management --BiPAP for now, wean as tolerated 
--Cont Vanc, Zosyn 
--cultures pending --Lasix 100mg for possible volume overload; cardiorenal syndrome; strict I/O 
--Cardiology and nephrology consult 
--Trend troponin, lactate normal 
--Doppler left leg 
--TTE 
--Continue BB, hold amlodipine for now in favor of diuresis attempts 
--hold ace with MO on CKD 
--Continue aspirin and statin Full Code confirmed with patient Total critical care time spent reviewing chart, time with patient and family was 45 minutes today. More than 50% of the time documented was spent in face-to-face contact with the patient and in the care of the patient on the floor/unit where the patient is located.  
 
Francesca iRos MD

## 2018-09-24 NOTE — PROGRESS NOTES
Dual skin assessment performed with Dilan Horan RN . Pt noted to have R thumb amputated and R BKA. Scattered scaring present. No open wounds. Allevyn placed to sacrum. Pt bathed. Vs and cardiac monitoring in place. RT at bedside for Bipap.

## 2018-09-25 NOTE — PROGRESS NOTES
[facility] CARDIOLOGY PROGRESS NOTE 
      
 
9/25/2018 4:36 PM 
 
Admit Date: 9/24/2018 Subjective: 72 yom who presented with PNA found to have NSTEMI as well, echo shows reduced function 30-35% down from 45-50% in 1/2017. Troponin is elevated. Has CKD and wants to avoid HD (in fact refuses HD). Has poor urine output with high Cr. 
 
ROS: 
Cardiovascular:  As noted above Objective:  
  
Vitals:  
 09/25/18 1357 09/25/18 1359 09/25/18 1459 09/25/18 1513 BP:  146/72 135/77 Pulse:  89 85 Resp:  24 23 Temp:    98.6 °F (37 °C) SpO2: 96% 97% 96% Weight:      
Height:      
 
 
Physical Exam: 
General: Well Developed, Well Nourished, No Acute Distress Neck: supple, no JVD Heart: S1S2 with RRR without murmurs or gallops Lungs: Clear throughout auscultation bilaterally without adventitious sounds Abd: soft, nontender, nondistended, with good bowel sounds Ext: no edema bilaterally Right groin: clean, dry, and intact without bruits, hematoma, or bleeding Skin: warm and dry Data Review:  
Recent Labs  
   09/25/18 
 3209  09/25/18 
 9357  09/25/18 
 0155   09/24/18 
 7490  09/24/18 
 0259 NA   --   142   --    --   141  139 K   --   4.2   --    --   4.4  4.1 MG   --    --    --    --   1.8  1.9 BUN   --   47*   --    --   46*  45* CREA   --   4.14*   --    --   3.64*  4.14* GLU   --   149*   --    --   200*  238* WBC   --    --    --    --   12.2*  11.6* HGB  10.0*   --   7.5*   < >  7.4*  9.7* HCT  31.5*   --   23.5*   < >  23.5*  32.1*  
PLT   --    --    --    --   212  338 INR   --    --    --    --    --   1.1  
 < > = values in this interval not displayed. Recent Labs  
   09/24/18 
 1342  09/24/18 
 9352 TROIQ  28.40*  7.34* Assessment/Plan:  
 
Principal Problem: 
  Acute hypoxemic respiratory failure ([facility] (9/24/2018) Active Problems: 
  HTN (hypertension) (7/29/2012) Tobacco use (12/27/2015) Chronic systolic congestive heart failure ([facility] (12/30/2015) Overview: EF 35-40% on 2015 Echo 
 
  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 
2) sCHF - on metop xl and IMDUR will add hydralazine as BP tolerates, most likely will need HD to remove fluid 3) PNA - per primary team 
4) MO/CKD - per nephrology Herman Nicole MD 
9/25/2018 4:36 PM

## 2018-09-25 NOTE — PROGRESS NOTES
Upper extremity vein mapping studies completed, though study was done in ICU with \"severe electrical interference. \" Patient has stated to Intensivist, Palliative and Hospitalist services that he does not wish to pursue dialysis, has reiterated this again to me just now. I have assured him that we respect his decision and that we will gladly make our services available to him should he change his mind. No indication for surgical intervention by vascular. We will gladly be available if necessary but will sign off for now. Tammy Johnson PA-C Physician Assistant with [facility] Terrie Carrel.  Manohar Bynum MD / Dee Mahajan MD

## 2018-09-25 NOTE — CONSULTS
Palliative Care Patient: Charlee Thompson MRN: [id number]  SSN: [id number] YOB: 1945  Age: 72 y.o. Sex: male Date of Request: 9/25/18 Date of Consult:  9/25/2018 Reason for Consult:  goals of care Requesting Physician: Dr. Radha Samayoa Assessment/Plan:  
 
Principal Diagnosis: Dyspnea  R06.00 Additional Diagnoses: · Debility, Unspecified  R53.81 · Fatigue, Lethargy  R53.83 
· Counseling, Encounter for Medical Advice  Z71.9 
· Encounter for Palliative Care  Z51.5 Palliative Performance Scale (PPS): PPS: 40 Medical Decision Making:  
Reviewed and summarized notes from admission to present. Discussed case with appropriate providers: DEJA Givens, Chaplain Wilda Wolfe Reviewed laboratory and x-ray data from admission to present. Mr. Zakia Colón is resting in bed, no family at bedside. Per [facility], the pt has just been told that, due to his CKD 4-5, going to the cath lab for cardiac work-up will mean that he'll progress to ESRD and need to be on HD from now on. The pt tells me that he does not intend to go on HD, as he has seen its effect on two family members. He says that he's tired, and tired of suffering, that he just wants to go home. He begins to cry. I discuss hospice as a service that will help him meet his goal of staying at home with the most comfort possible. I ask him if he would like to talk with [facility]. Mr. Zakia Colón responded, \"You can't help me; nobody can help me. I just want to be left the hell alone\". Linda Gallagher is aware, and will come talk with the patient. 11:45 - The pt's dtr Xavier Flores has arrived. Her responses to her father's responses are very [id number]. She seems to be used to dealing with his changing opinions. She reviews with him the pros and cons of HD.   We discuss wit the pt that his options are limited, and that if he opts out of HD or the cardiac work-up, his life is likely to be vitaly Tubbs points out that the pt's mother lived for 12 years on HD. The pt denies fear of the procedures, saying \"I just don't want it\". [address] Per [facility], pt is to transfer to floor. Per Dr. Marleni Sapp note, unless the pt decides to opt for HD, code status should be addressed. Will continue to follow. Will discuss findings with members of the interdisciplinary team.   
 
Thank you for this referral.    
 
   
. 
 
Subjective:  
 
History obtained from:  Patient, Care Provider and Chart Chief Complaint: Great sadness concerning his condition. History of Present Illness:  Mr. Luisito Ochoa is a 73 yo M with a PMH of cardiomyopathy (EF 30%), CKD (stage 4-5), and other conditions listed below who presented to the ED on 9/24/18 with c/o of acute hypoxemic respiratory failure. A cardiac work-up is recommended, but it would put the patient into ESRD, requiring lifelong HD. The pt is being followed by nephrology, cardiology, and vascular. The pt was hospitalized on 9/5/18 for MO . Advance Directive: No      
Code Status:  Full Code Health Care Power of : No - Patient does not have a [address] Past Medical History:  
Diagnosis Date  Abnormal CT scan, chest 12/27/2015  Acute CHF ([address] 12/26/2015  Acute systolic congestive heart failure ([address] 12/30/2015  MO (acute kidney injury) ([address] 7/29/2012  Anemia of chronic disease 9/6/2018  Bilateral pleural effusion 12/27/2015  Chest pain 12/26/2015  Depression  DM (diabetes mellitus), type 2 ([address] 7/29/2012  Encephalopathy due to infection 1/7/2017  
 HTN (hypertension) 7/29/2012  Hypoglycemia 7/29/2012  
 NSTEMI (non-ST elevated myocardial infarction) ([address] 1/5/2017  Tobacco use 12/27/2015 Past Surgical History:  
Procedure Laterality Date  HX ORTHOPAEDIC Family History Problem Relation Age of Onset  Diabetes Mother  Kidney Disease Mother  Diabetes Father  Kidney Disease Father  Kidney Disease Sister Social History Substance Use Topics  Smoking status: Current Every Day Smoker Packs/day: 0.50 Years: 45.00 Types: Cigarettes  Smokeless tobacco: Never Used  Alcohol use No  
 
Prior to Admission medications Medication Sig Start Date End Date Taking? Authorizing Provider  
amLODIPine (NORVASC) 5 mg tablet Take 2 Tabs by mouth daily. 9/10/18  Yes Katie Curran MD  
hydrALAZINE (APRESOLINE) 50 mg tablet Take 1 Tab by mouth three (3) times daily. 9/10/18  Yes Katie Curran MD  
sodium bicarbonate 650 mg tablet Take 1 Tab by mouth three (3) times daily (with meals). 9/10/18  Yes Katie Curran MD  
glipiZIDE SR (GLUCOTROL XL) 2.5 mg CR tablet take 1 tablet by mouth once daily 8/27/18  Yes Maxwell Raygoza MD  
metoprolol succinate (TOPROL-XL) 100 mg tablet Take 1 Tab by mouth daily. 4/26/17  Yes Allyn Cantu MD  
nitroglycerin (NITROSTAT) 0.4 mg SL tablet by SubLINGual route every five (5) minutes as needed for Chest Pain. Yes Historical Provider  
atorvastatin (LIPITOR) 80 mg tablet Take 1 Tab by mouth daily. 9/19/16  Yes Allyn Cantu MD  
aspirin 81 mg chewable tablet Take 1 Tab by mouth daily (after breakfast). 12/30/15  Yes Reshma Pérez NP Allergies Allergen Reactions  Lortab [Hydrocodone-Acetaminophen] Itching Review of Systems: A comprehensive review of systems was negative except for:  
Respiratory: Positive for dyspnea. Cardiovascular: Positive for heart failure. Genitourinary: Positive for CKD 4-5. Behavioral/Psychiatric: Positive for sadness and grief related to declining health. Objective:  
 
Visit Vitals  BP (!) 168/93  Pulse 90  Temp 99.1 °F (37.3 °C)  Resp 25  
 Ht 6' 1\" (1.854 m)  Wt 194 lb 4.8 oz (88.1 kg)  SpO2 94%  BMI 25.63 kg/m2 Physical Exam: 
 
General:  Cooperative. No acute distress. Eyes:  Conjunctivae/corneas clear Nose: Nares normal. Septum midline. Neck: Supple, symmetrical, trachea midline, no JVD Lungs:   Clear to auscultation bilaterally, unlabored Heart:  Regular rate and rhythm, no murmur Abdomen:   Soft, non-tender, non-distended Extremities: Normal, atraumatic, no cyanosis or edema Skin: Skin color, texture, turgor normal. No rash or lesions. Neurologic: Nonfocal  
Psych: Alert and oriented Assessment:  
 
Hospital Problems  Date Reviewed: 10/5/2017 Codes Class Noted POA * (Principal)Acute hypoxemic respiratory failure ([facility] ICD-10-CM: J96.01 
ICD-9-CM: 518.81  9/24/2018 Unknown Pneumonia due to infectious organism ICD-10-CM: J18.9 ICD-9-CM: 136.9, 484.8  9/24/2018 Unknown EKG, abnormal ICD-10-CM: R94.31 
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  
  EF 35-40% on 2015 Echo Tobacco use (Chronic) ICD-10-CM: Z72.0 ICD-9-CM: 305.1  12/27/2015 Yes HTN (hypertension) (Chronic) ICD-10-CM: I10 
ICD-9-CM: 401.9  7/29/2012 Yes Signed By: Isabel Hodges NP September 25, 2018

## 2018-09-25 NOTE — CONSULTS
Consult Patient: Lamont Renee MRN: [id number]  SSN: [id number] YOB: 1945  Age: 72 y.o. Sex: male Subjective:  
  
Lamont Renee is a 72 y.o. male with a PMH of DM, CAD s/p STEMI with depressed EF, CKD IV with nephrotic range proteinuria who presented with acute onset SOB. Of note, he was recently admitted earlier this month and was found to have progression of his underlying kidney to a baseline Cr of 3.5-4.0. On this admission, he was found to have a newly depressed EF and NSTEMI with a troponin of 28 this morning. He was given a dose of Lasix 100mg IV yesterday and had >3L of UOP. This morning he states that he is breathing easier and denies chest pain, fever or chills. Past Medical History:  
Diagnosis Date  Abnormal CT scan, chest 12/27/2015  Acute CHF ([address] 12/26/2015  Acute systolic congestive heart failure ([address] 12/30/2015  MO (acute kidney injury) ([address] 7/29/2012  Anemia of chronic disease 9/6/2018  Bilateral pleural effusion 12/27/2015  Chest pain 12/26/2015  Depression  DM (diabetes mellitus), type 2 ([address] 7/29/2012  Encephalopathy due to infection 1/7/2017  
 HTN (hypertension) 7/29/2012  Hypoglycemia 7/29/2012  
 NSTEMI (non-ST elevated myocardial infarction) ([facility] 1/5/2017  Tobacco use 12/27/2015 Past Surgical History:  
Procedure Laterality Date  HX ORTHOPAEDIC Family History Problem Relation Age of Onset  Diabetes Mother  Kidney Disease Mother  Diabetes Father  Kidney Disease Father  Kidney Disease Sister Social History Substance Use Topics  Smoking status: Current Every Day Smoker Packs/day: 0.50 Years: 45.00 Types: Cigarettes  Smokeless tobacco: Never Used  Alcohol use No  
  
Current Facility-Administered Medications Medication Dose Route Frequency Provider Last Rate Last Dose  insulin lispro (HUMALOG) injection   SubCUTAneous AC&HS Milli HILARIO Owen [place]   2 Units at 09/25/18 1583  aspirin chewable tablet 81 mg  81 mg Oral DAILY AFTER BREAKFAST Daniel Steele MD   81 mg at 09/25/18 0813  
 atorvastatin (LIPITOR) tablet 80 mg  80 mg Oral DAILY Daniel Steele MD   80 mg at 09/25/18 9855  
 metoprolol succinate (TOPROL-XL) tablet 100 mg  100 mg Oral DAILY Daniel Steele MD   100 mg at 09/25/18 5784  
 sodium bicarbonate tablet 650 mg  650 mg Oral TID WITH MEALS Daniel Steele MD   650 mg at 09/25/18 0642  sodium chloride (NS) flush 5-10 mL  5-10 mL IntraVENous Q8H Daniel Steele MD   10 mL at 09/25/18 0611  
 sodium chloride (NS) flush 5-10 mL  5-10 mL IntraVENous PRN Daniel Steele MD      
 acetaminophen (TYLENOL) tablet 650 mg  650 mg Oral Q4H PRN Daniel Steele MD      
 piperacillin-tazobactam (ZOSYN) 4.5 g in 0.9% sodium chloride (MBP/ADV) 100 mL  4.5 g IntraVENous Q12H Danile Steele MD 25 mL/hr at 09/25/18 0736 4.5 g at 09/25/18 4079  furosemide (LASIX) injection 100 mg  100 mg IntraVENous Q12H Daniel Steele MD   Stopped at 09/25/18 0900  hydrALAZINE (APRESOLINE) 20 mg/mL injection 20 mg  20 mg IntraVENous Q6H PRN Daniel Steele MD      
 vancomycin intermittent dosing place castañeda   Other Rx Dosing/Monitoring Daniel Steele MD      
 isosorbide mononitrate ER (IMDUR) tablet 30 mg  30 mg Oral DAILY Blondtd Means, NP   30 mg at 09/25/18 3742  
 heparin 25,000 units in dextrose 500 mL infusion  12-25 Units/kg/hr IntraVENous TITRATE Geoff Means, NP 22.3 mL/hr at 09/25/18 0704 12 Units/kg/hr at 09/25/18 0704  
 0.9% sodium chloride infusion 250 mL  250 mL IntraVENous PRN Nan Nguyễn NP Allergies Allergen Reactions  Lortab [Hydrocodone-Acetaminophen] Itching Review of Systems: 
14pt ROS negative other than HPI Objective:  
 
Vitals:  
 09/25/18 0600 09/25/18 0610 09/25/18 0741 09/25/18 1574 BP: 148/78   164/88 Pulse: 90   93 Resp: 19 Temp:   99.1 °F (37.3 °C) SpO2: (!) 86% Weight:  88.1 kg (194 lb 4.8 oz) Height:      
  
 
Physical Exam: V/S and I/O reviewed GEN: WDWN in NAD, AOX3 HEENT: NCAT, EOMI 
CV: RRR no m/r/g Lungs; CTAB no w/r/r Abd: S/NT/ND +BS Ext: R BKA, LLE w/ trace edema Neuro: CN II-XII grossly intact Psych- tearful this morning but appropriate Assessment:  
 
Hospital Problems  Date Reviewed: 10/5/2017 Codes Class Noted POA * (Principal)Acute hypoxemic respiratory failure ([facility] ICD-10-CM: J96.01 
ICD-9-CM: 518.81  9/24/2018 Unknown Pneumonia due to infectious organism ICD-10-CM: J18.9 ICD-9-CM: 136.9, 484.8  9/24/2018 Unknown EKG, abnormal ICD-10-CM: R94.31 
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  
  EF 35-40% on 2015 Echo Tobacco use (Chronic) ICD-10-CM: Z72.0 ICD-9-CM: 305.1  12/27/2015 Yes HTN (hypertension) (Chronic) ICD-10-CM: I10 
ICD-9-CM: 401.9  7/29/2012 Yes Plan:  
 
1) MO on CKD- likely not that far off from baseline with a small bump in Cr with diuresis yesterday.   Patient needs to go to cath lab, however given his advanced CKD, he is at risk for SKIP and progression to ESRD. Discussed with patient at bedside today. He is hesitant to start dialysis. 2) NSTEMI- on heparin gtt. as above risk/benetits of pursuing cath in the setting of CKD. 3) Anemia- trending H/H 
 
4) Acidosis- on NaHCO3 tablets. Signed By: Karen Sheppard MD   
 September 25, 2018

## 2018-09-25 NOTE — PROGRESS NOTES
Pt admitted from ICU. He is alert and oriented. rr are even and unlabored. Lung sounds are course. No distress noted. O2 in place abd is soft bowel sounds are active. He has amputation to rt thumb, rt BKA. Old scars noted. Heparin infusing. No increased bleeding or bruising noted. Safety measures in place will continue to monitor.

## 2018-09-25 NOTE — PROGRESS NOTES
Spoke to Cardiology NP about patient's Hgb <8, new order received to transfuse 1 unit PRBCs. Consent signed by patient and request sent to blood bank.

## 2018-09-25 NOTE — PROGRESS NOTES
A follow up visit was made to the patient. Emotional support, spiritual presence and  
prayer were provided.  provided active listening and discussion with the patient about his health concerns.  had a number of consultations  with the patient's nurse, the nurse practitioner  and a palliative care nurse about my meeting with the patient and their input. Wilda De La O

## 2018-09-25 NOTE — PROGRESS NOTES
Physical Therapy Note: 
 
Participated in interdisciplinary rounds in ICU/CCU and chart reviewed. Patient is experiencing decrease in function from baseline. Patient would benefit from skilled acute therapy to increase independence with self care/ADLs, strength, endurance, and functional mobility. Recommend PT/OT consult when medically stable and MD agrees.  
 
Thank you for your consideration, 
Clara Gar, PT, DPT

## 2018-09-25 NOTE — PROGRESS NOTES
[facility] alert and oriented, no pain. Signed blood consent form. Awaiting lab to type and cross.

## 2018-09-25 NOTE — PROGRESS NOTES
Patient began very tearful while Nephrologist was speaking to him concerning dialysis. The patient states \"I have to decide if I'm going to do all of this and keep being tired or just be done. \" Therapeutic presence provided for patient. Palliative care has been consulted and  aware of situation.

## 2018-09-25 NOTE — CONSULTS
Hospitalist Consult Note Admit Date:  2018  2:51 AM  
Name:  John Oliver Age:  72 y.o. 
:  1945 MRN:  [id number] PCP:  Charan Capone MD 
Treatment Team: Attending Provider: Sandie Kuhn MD; Physician: Sandie Kuhn MD; Consulting Provider: Kwame Smith MD; Consulting Provider: Kai Severance, MD; Utilization Review: Deric Ibrahim RN; Care Manager: Henry Pope RN; Consulting Provider: Lauris Rubinstein, MD; Consulting Provider: Enmanuel Miner NP 
CC: consult to assume care: HPI:  
As per prior Notes: \"Pt is a 73 yo [facility] [place] male with a history of ICM, HTN,DM2, CAD, and CKD. Pt was recently admitted for MO requiring hydration. He developed shortness of breath on  and EMS was summoned. He was placed on CPAP and switched to BiPAP in the ER. He was admitted to the ICU and transitioned to HF NC. Pt's Cr worsened and nephrology was consulted. Cardiology was also consulted for elevated troponin. Pt's troponin peaked at 28.4 with recommendations for LHC with intervention as needed. Nephrology discussed possible need for HD after LHC given worsening Cr. Pt refused dialysis and palliative care was consulted. \" 
 
 
 
2018- seen - breathing is better - states dialysis was not good for his mom or other family members so he will not be getting it. 10 systems reviewed and negative except as noted in HPI. Past Medical History:  
Diagnosis Date  Abnormal CT scan, chest 2015  Acute CHF ([address] 2015  Acute systolic congestive heart failure ([address] 2015  MO (acute kidney injury) ([address] 2012  Anemia of chronic disease 2018  Bilateral pleural effusion 2015  Chest pain 2015  Depression  DM (diabetes mellitus), type 2 ([address] 2012  Encephalopathy due to infection 2017  
 HTN (hypertension) 2012  Hypoglycemia 2012  NSTEMI (non-ST elevated myocardial infarction) ([facility] 1/5/2017  Tobacco use 12/27/2015 Past Surgical History:  
Procedure Laterality Date  HX ORTHOPAEDIC Current Facility-Administered Medications Medication Dose Route Frequency  insulin lispro (HUMALOG) injection   SubCUTAneous AC&HS  [START ON 9/26/2018] glipiZIDE SR (GLUCOTROL XL) tablet 2.5 mg  2.5 mg Oral ACB  aspirin chewable tablet 81 mg  81 mg Oral DAILY AFTER BREAKFAST  atorvastatin (LIPITOR) tablet 80 mg  80 mg Oral DAILY  metoprolol succinate (TOPROL-XL) tablet 100 mg  100 mg Oral DAILY  sodium bicarbonate tablet 650 mg  650 mg Oral TID WITH MEALS  sodium chloride (NS) flush 5-10 mL  5-10 mL IntraVENous Q8H  
 sodium chloride (NS) flush 5-10 mL  5-10 mL IntraVENous PRN  
 acetaminophen (TYLENOL) tablet 650 mg  650 mg Oral Q4H PRN  
 hydrALAZINE (APRESOLINE) 20 mg/mL injection 20 mg  20 mg IntraVENous Q6H PRN  
 isosorbide mononitrate ER (IMDUR) tablet 30 mg  30 mg Oral DAILY  heparin 25,000 units in dextrose 500 mL infusion  12-25 Units/kg/hr IntraVENous TITRATE  
 0.9% sodium chloride infusion 250 mL  250 mL IntraVENous PRN Allergies Allergen Reactions  Lortab [Hydrocodone-Acetaminophen] Itching Social History Substance Use Topics  Smoking status: Current Every Day Smoker Packs/day: 0.50 Years: 45.00 Types: Cigarettes  Smokeless tobacco: Never Used  Alcohol use No  
  
Family History Problem Relation Age of Onset  Diabetes Mother  Kidney Disease Mother  Diabetes Father  Kidney Disease Father  Kidney Disease Sister Immunization History Administered Date(s) Administered  DTAP Vaccine 06/03/2010  TB Skin Test (PPD) Intradermal 01/08/2017, 09/06/2018 Objective:  
Patient Vitals for the past 24 hrs: 
 Temp Pulse Resp BP SpO2  
09/25/18 1357 - - - - 96 %  
09/25/18 1259 - 89 26 140/74 97 % 09/25/18 1231 98.5 °F (36.9 °C) - - - -  
 09/25/18 1229 - 91 25 144/80 95 % 09/25/18 1200 - 86 21 142/78 97 % 09/25/18 1129 - 90 17 168/84 94 % 09/25/18 1059 - 85 (!) 37 143/79 94 % 09/25/18 1038 - - - - 95 % 09/25/18 1029 - 84 22 140/78 98 %  
09/25/18 0959 - 87 24 159/84 94 % 09/25/18 0929 - 87 22 150/84 (!) 88 %  
09/25/18 0859 - 90 25 (!) 168/93 94 % 09/25/18 0829 - 96 23 (!) 174/100 94 % 09/25/18 0821 - 88 22 164/88 92 %  
09/25/18 0700 99.1 °F (37.3 °C) 91 18 154/83 94 % 09/25/18 0600 - 90 19 148/78 (!) 86 %  
09/25/18 0529 - 88 20 140/75 93 % 09/25/18 0451 - 86 22 133/68 96 %  
09/25/18 0436 98 °F (36.7 °C) 89 16 138/74 95 % 09/25/18 0429 - 88 21 138/74 97 % 09/25/18 0400 - 88 18 142/71 95 % 09/25/18 0329 - 88 18 129/73 (!) 79 %  
09/25/18 0300 - 87 19 137/77 95 % 09/25/18 0229 - 87 17 150/80 93 % 09/25/18 0200 - 88 25 127/73 97 % 09/25/18 0129 - 87 19 144/79 98 %  
09/25/18 0059 - 87 15 149/81 98 %  
09/25/18 0029 - 86 20 132/75 91 %  
09/24/18 2359 - 84 17 134/73 95 % 09/24/18 2329 98.6 °F (37 °C) 90 20 135/77 94 % 09/24/18 2300 - 87 19 139/75 95 % 09/24/18 2229 - 88 21 139/76 90 % 09/24/18 2159 - 94 26 132/74 93 % 09/24/18 2129 - 90 19 138/81 93 % 09/24/18 2059 - 87 23 134/77 95 % 09/24/18 2029 - 91 16 141/78 92 %  
09/24/18 1959 - 92 20 146/82 93 % 09/24/18 1940 - - - - 93 % 09/24/18 1905 98.6 °F (37 °C) 94 23 142/81 92 %  
09/24/18 1859 - (!) 102 28 142/81 94 % 09/24/18 1829 - 92 25 135/80 94 % 09/24/18 1759 - 89 24 133/79 96 %  
09/24/18 1729 - 90 28 136/85 95 % 09/24/18 1659 - 94 20 140/85 94 % 09/24/18 1629 - 88 21 145/83 95 % 09/24/18 1615 - - - - 95 % 09/24/18 1559 - 98 30 137/80 (!) 89 % 09/24/18 1529 - 92 26 136/79 97 % Oxygen Therapy O2 Sat (%): 96 % (09/25/18 1357) Pulse via Oximetry: 92 beats per minute (09/25/18 1357) O2 Device: Nasal cannula (09/25/18 1357) O2 Flow Rate (L/min): 3 l/min (09/25/18 1357) FIO2 (%): 60 % (09/24/18 1053) Intake/Output Summary (Last 24 hours) at 09/25/18 1502 Last data filed at 09/25/18 1232 Gross per 24 hour Intake          1119.25 ml Output             4200 ml Net         -3080.75 ml Physical Exam: 
General:    Well nourished. Alert. Eyes:   Normal sclera. Extraocular movements intact. ENT:  Normocephalic, atraumatic. Moist mucous membranes CV:   RRR. No murmur, rub, or gallop. Lungs:  CTAB. No wheezing, rhonchi, or rales. Abdomen: Soft, nontender, nondistended. Bowel sounds normal.  
Extremities: Warm and dry. No cyanosis or edema. Neurologic: CN II-XII grossly intact. Sensation intact. Skin:     No rashes or jaundice. Psych:  Normal mood and affect. I reviewed the labs, imaging, EKGs, telemetry, and other studies done this admission. Data Review:  
Recent Results (from the past 24 hour(s)) HGB & HCT Collection Time: 09/24/18  8:19 PM  
Result Value Ref Range HGB 7.8 (L) 13.6 - 17.2 g/dL HCT 24.5 (L) 41.1 - 50.3 % PTT Collection Time: 09/24/18  8:19 PM  
Result Value Ref Range aPTT 101.4 (HH) 23.2 - 35.3 SEC  
TYPE + CROSSMATCH Collection Time: 09/25/18  1:52 AM  
Result Value Ref Range Crossmatch Expiration 09/28/2018 ABO/Rh(D) B POSITIVE Antibody screen NEG Unit number [id number] Blood component type RC LR Unit division 00 Status of unit ISSUED Crossmatch result Compatible HGB & HCT Collection Time: 09/25/18  1:55 AM  
Result Value Ref Range HGB 7.5 (L) 13.6 - 17.2 g/dL HCT 23.5 (L) 41.1 - 50.3 % METABOLIC PANEL, BASIC Collection Time: 09/25/18  4:16 AM  
Result Value Ref Range Sodium 142 136 - 145 mmol/L Potassium 4.2 3.5 - 5.1 mmol/L Chloride 112 (H) 98 - 107 mmol/L  
 CO2 19 (L) 21 - 32 mmol/L Anion gap 11 7 - 16 mmol/L Glucose 149 (H) 65 - 100 mg/dL BUN 47 (H) 8 - 23 MG/DL Creatinine 4.14 (H) 0.8 - 1.5 MG/DL  
 GFR est AA 18 (L) >60 ml/min/1.73m2 GFR est non-AA 15 (L) >60 ml/min/1.73m2 Calcium 7.2 (L) 8.3 - 10.4 MG/DL Danica Canner Collection Time: 09/25/18  4:16 AM  
Result Value Ref Range Vancomycin, random 14.5 UG/ML  
PTT Collection Time: 09/25/18  4:16 AM  
Result Value Ref Range aPTT 105.0 (HH) 23.2 - 35.3 SEC  
GLUCOSE, POC Collection Time: 09/25/18  8:08 AM  
Result Value Ref Range Glucose (POC) 157 (H) 65 - 100 mg/dL HGB & HCT Collection Time: 09/25/18  8:36 AM  
Result Value Ref Range HGB 10.0 (L) 13.6 - 17.2 g/dL HCT 31.5 (L) 41.1 - 50.3 % GLUCOSE, POC Collection Time: 09/25/18 12:11 PM  
Result Value Ref Range Glucose (POC) 150 (H) 65 - 100 mg/dL All Micro Results Procedure Component Value Units Date/Time CULTURE, BLOOD [[id number]] Collected:  09/24/18 0259 Order Status:  Completed Specimen:  Blood from Blood Updated:  09/25/18 1134 Special Requests: --     
  RIGHT 
HAND Culture result: NO GROWTH 1 DAY     
 CULTURE, BLOOD [[id number]] Collected:  09/24/18 0259 Order Status:  Completed Specimen:  Blood from Blood Updated:  09/25/18 7670 Special Requests: --     
  LEFT 
HAND 
  
  GRAM STAIN      
  GRAM POS COCCI IN CLUSTERS  
   ANAEROBIC BOTTLE POSITIVE RESULTS VERIFIED, PHONED TO AND READ BACK BY JABARI NGUYEN RN AT 2550 ON 9/25/18  SKD Culture result:      
  CULTURE IN PROGRESS,FURTHER UPDATES TO FOLLOW  
        
  SA target DNA sequence not detected within the sample. Test performed by PCR INFLUENZA A & B AG (RAPID TEST) [[id number]] Collected:  09/24/18 4071 Order Status:  Completed Specimen:  Nasopharyngeal from Nasal washing Updated:  09/24/18 0820 Influenza A Ag NEGATIVE      
   NEGATIVE FOR THE PRESENCE OF INFLUENZA A ANTIGEN 
INFECTION DUE TO INFLUENZA A CANNOT BE RULED OUT. BECAUSE THE ANTIGEN PRESENT IN THE SAMPLE MAY BE BELOW 
THE DETECTION LIMIT OF THE TEST. A NEGATIVE TEST IS PRESUMPTIVE AND IT IS RECOMMENDED THAT THESE RESULTS BE CONFIRMED BY VIRAL CULTURE OR AN FDA-CLEARED INFLUENZA A AND B MOLECULAR ASSAY. Influenza B Ag NEGATIVE      
   NEGATIVE FOR THE PRESENCE OF INFLUENZA B ANTIGEN 
INFECTION DUE TO INFLUENZA B CANNOT BE RULED OUT. BECAUSE THE ANTIGEN PRESENT IN THE SAMPLE MAY BE BELOW 
THE DETECTION LIMIT OF THE TEST. A NEGATIVE TEST IS PRESUMPTIVE AND IT IS RECOMMENDED THAT THESE RESULTS BE CONFIRMED BY VIRAL CULTURE OR AN FDA-CLEARED INFLUENZA A AND B MOLECULAR ASSAY. Source NASOPHARYNGEAL     
 CULTURE, RESPIRATORY/SPUTUM/BRONCH Cleta Brooking STAIN [[id number]] Order Status:  Sent Specimen:  Sputum from Sputum Other Studies: Xr Chest Sngl V Result Date: 9/25/2018 CHEST RADIOGRAPH, 1 views, 9/25/2018 History: Hypoxemia. History of CHF. Technique: Portable frontal view of the chest. Comparison: Chest radiograph 9/24/2018 Findings: The cardiac silhouette is not significantly enlarged given AP technique. Lungs are expanded without pneumothorax. Prior diffuse right-sided infiltrate appears slightly improved in the right lung base. No evolving pleural effusion is seen. IMPRESSION: 1. Some improvement in prior diffuse right-sided infiltrate. Xr Chest [facility] Result Date: 9/24/2018 EXAM:  TEMPORARY INDICATION:  Respiratory failure COMPARISON:  9/5/2018 FINDINGS: A portable AP radiograph of the chest was obtained at 0245 hours. The patient is on a cardiac monitor. Right lower lobe airspace disease. The cardiac and mediastinal contours and pulmonary vascularity are normal.  The bones and soft tissues are grossly within normal limits. IMPRESSION: Right lower lobe pneumonia. Duplex Upper Ext Vein Map Bilat Result Date: 9/25/2018 TITLE: Upper extremity vein mapping ultrasound examination INDICATION: Intensive care unit patient. Hypertension. Congestive heart failure. Acute kidney injury. Preoperative planning for hemodialysis access surgery. TECHNIQUE: Grayscale, Color, and Spectral Doppler interrogation performed. Note: Limited examination due to likely interference. COMPARISON: None. FINDINGS: Vein diameters are recorded on the PACs system. The right internal jugular vein is not visualized. The right  brachiocephalic vein is probably patent. The right subclavian vein is probably patent. The left internal jugular vein is probably patent. The left brachiocephalic vein is probably patent. The left subclavian vein is patent. Right upper extremity: Cephalic vein is extremely small diameter but patent in the arm and forearm. Radial artery is patent with a velocity of 70 cm/sec. Brachial artery is patent with a velocity of 57 cm/sec. The paired brachial veins are patent. Basilic vein is small diameter and patent. Left upper extremity:  Cephalic vein is patent centrally but demonstrates echogenic thrombus in the distal arm. Peripheral IV in the cephalic vein near the elbow. Small diameter patent cephalic vein in the forearm. Radial artery is patent with a velocity of 59 cm/sec. Brachial artery is patent with velocity of 71 cm/sec. The paired brachial veins are patent. Basilic vein is small diameter and patent. IMPRESSION:  Examination is somewhat limited due to electrical interference affecting Doppler interrogation. . Thrombus in the left cephalic vein. Duplex Lower Ext Venous Left Result Date: 9/24/2018 HISTORY: Left lower extremity swelling and pain Exam: Left lower extremity ultrasound Technique: Realtime grayscale and color Doppler imaging is performed in the longitudinal and transverse planes. FINDINGS: There is normal flow, augmentation, and compressibility within the deep venous system from the groin to popliteal fossa.  Posterior tibial vein and peroneal vein are also normal. No Baker's cyst. IMPRESSIONS: No evidence of deep venous thrombosis within the left lower extremity. Assessment and Plan:  
 
Hospital Problems as of 9/25/2018  Date Reviewed: 9/25/2018 Codes Class Noted - Resolved POA * (Principal)Acute hypoxemic respiratory failure ([facility] ICD-10-CM: J96.01 
ICD-9-CM: 518.81  9/24/2018 - Present Unknown Pneumonia due to infectious organism ICD-10-CM: J18.9 ICD-9-CM: 136.9, 484.8  9/24/2018 - Present Unknown EKG, abnormal ICD-10-CM: R94.31 
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  
   
 CKD (chronic kidney disease) stage 4, GFR 15-29 ml/min ([facility]) ICD-10-CM: N18.4 ICD-9-CM: 585.4  8/11/2016 - Present Yes Chronic systolic congestive heart failure (HCC) (Chronic) ICD-10-CM: I42.29 ICD-9-CM: 428.22, 428.0  12/30/2015 - Present Yes Overview Signed 1/6/2017  8:59 AM by Maximo Gregory MD  
  EF 35-40% on 2015 Echo Tobacco use (Chronic) ICD-10-CM: Z72.0 ICD-9-CM: 305.1  12/27/2015 - Present Yes HTN (hypertension) (Chronic) ICD-10-CM: I10 
ICD-9-CM: 401.9  7/29/2012 - Present Yes PLAN: 
· Hypoxic resp failure secondary to volume overload and chf- continue O2 and diuresis- pulm following · NSTEMI- for medical mgt given refusal for hd · Pneumonia- not likely per pulm - abx stopped. .. · chf- continue appropriate meds and diuresis- per cardiology · Acute on chronic kidney injury- does meds for renal function avoid nephrotoxins · dvt ppx- heparin · Plan for dc when medically stable · We will follow Signed: 
Gisselle Ramirez MD

## 2018-09-25 NOTE — PROGRESS NOTES
Interdisciplinary team rounds were held 9/25/2018 with the following team members:Nursing, Nurse Practitioner, Nutrition, Palliative Care, Pastoral Care, Pharmacy, Physical Therapy, Physician, Respiratory Therapy, Speech Therapy and Clinical Coordinator and the patient. Plan of care discussed. See clinical pathway and/or care plan for interventions and desired outcomes.

## 2018-09-25 NOTE — PROGRESS NOTES
Critical Care Daily Progress Note: 9/25/2018 Blanca Dear Admission Date: 9/24/2018 The patient's chart is reviewed and the patient is discussed with the staff. Pt is a 73 yo [place] American male with a history of ICM, HTN,DM2, CAD, and CKD. Pt was recently admitted for MO requiring hydration. He developed shortness of breath on 9/23 and EMS was summoned. He was placed on CPAP and switched to BiPAP in the ER. He was admitted to the ICU and transitioned to HF NC. Pt's Cr worsened and nephrology was consulted. Cardiology was also consulted for elevated troponin. Pt's troponin peaked at 28.4 with recommendations for LHC with intervention as needed. Nephrology discussed possible need for HD after LHC given worsening Cr. Pt refused dialysis and palliative care was consulted. Subjective:  
 
Pt lying in bed on 6L sat 98%. O2 reduced to 4L. Pt is tearful throughout interview. He is coughing up blood tinged sputum. He denies dyspnea. Current Facility-Administered Medications Medication Dose Route Frequency  insulin lispro (HUMALOG) injection   SubCUTAneous AC&HS  vancomycin (VANCOCIN) 1,000 mg in 0.9% sodium chloride (MBP/ADV) 250 mL  1,000 mg IntraVENous ONCE  
 aspirin chewable tablet 81 mg  81 mg Oral DAILY AFTER BREAKFAST  atorvastatin (LIPITOR) tablet 80 mg  80 mg Oral DAILY  metoprolol succinate (TOPROL-XL) tablet 100 mg  100 mg Oral DAILY  sodium bicarbonate tablet 650 mg  650 mg Oral TID WITH MEALS  sodium chloride (NS) flush 5-10 mL  5-10 mL IntraVENous Q8H  
 sodium chloride (NS) flush 5-10 mL  5-10 mL IntraVENous PRN  
 acetaminophen (TYLENOL) tablet 650 mg  650 mg Oral Q4H PRN  piperacillin-tazobactam (ZOSYN) 4.5 g in 0.9% sodium chloride (MBP/ADV) 100 mL  4.5 g IntraVENous Q12H  furosemide (LASIX) injection 100 mg  100 mg IntraVENous Q12H  hydrALAZINE (APRESOLINE) 20 mg/mL injection 20 mg  20 mg IntraVENous Q6H PRN  
  vancomycin intermittent dosing place castañeda   Other Rx Dosing/Monitoring  isosorbide mononitrate ER (IMDUR) tablet 30 mg  30 mg Oral DAILY  heparin 25,000 units in dextrose 500 mL infusion  12-25 Units/kg/hr IntraVENous TITRATE  
 0.9% sodium chloride infusion 250 mL  250 mL IntraVENous PRN Review of Systems 
+blood tinged sputum 
+fatigue Constitutional:  negative for fever, chills, sweats Cardiovascular:  negative for chest pain, palpitations, syncope, edema Gastrointestinal:  negative for dysphagia, reflux, vomiting, diarrhea, abdominal pain, or melena Neurologic:  negative for focal weakness, numbness, headache Objective:  
 
Vitals:  
 09/25/18 3056 09/25/18 8330 09/25/18 9309 09/25/18 0780 BP:  164/88 (!) 174/100 (!) 168/93 Pulse:  88 96 90 Resp:  22 23 25 Temp: 99.1 °F (37.3 °C) SpO2:  92% 94% 94% Weight:      
Height:      
 
 
Intake and Output:  
09/23 1901 - 09/25 0700 In: 676 [P.O.:150; I.V.:526] Out: 8752 [CNQBM:7038] 09/25 0701 - 09/25 1900 In: 443.3 Out: 775 [Urine:775] Physical Exam:         
Constitutional:  the patient is well developed and in no acute distress, on 4L HF 
EENMT:  Sclera clear, pupils equal, oral mucosa moist 
Respiratory: fairly clear anteriorly. Cardiovascular:  RRR without M,G,R 
Gastrointestinal: soft and non-tender; with positive bowel sounds. Musculoskeletal: warm without cyanosis. There is no L lower leg edema, R BKA. Skin:  no jaundice or rashes Neurologic: no gross neuro deficits Psychiatric:  alert and oriented x 3 LINES: 
Peripheral R hand, L AC, garibay DRIPS: 
Heparin gtt CXR:  
 
 
LAB Recent Labs  
   09/25/18 
 4411 GLUCPOC  157* Recent Labs  
   09/25/18 
 7791  09/25/18 
 0155  09/24/18 
 2019   09/24/18 
 6076  09/24/18 
 4858 WBC   --    --    --    --   12.2*  11.6* HGB  10.0*  7.5*  7.8*   < >  7.4*  9.7* HCT  31.5*  23.5*  24.5*   < >  23.5*  32.1*  
 PLT   --    --    --    --   212  338 INR   --    --    --    --    --   1.1  
 < > = values in this interval not displayed. Recent Labs  
   09/25/18 
 0416  09/24/18 
 1342  09/24/18 
 7239  09/24/18 
 0259 NA  142   --   141  139  
K  4.2   --   4.4  4.1 CL  112*   --   111*  108* CO2  19*   --   21  17* GLU  149*   --   200*  238* BUN  47*   --   46*  45* CREA  4.14*   --   3.64*  4.14* MG   --    --   1.8  1.9 PHOS   --    --   3.5   --   
CA  7.2*   --   6.9*  7.7*  
TROIQ   --   28.40*  7.34*   --   
ALB   --    --   2.3*  3.0* TBILI   --    --   0.4  0.5 ALT   --    --   16  16 SGOT   --    --   65*  23 Recent Labs  
   09/24/18 
 2462 PH  7.33* PCO2  32* PO2  116* HCO3  17* No results for input(s): LCAD, LAC in the last 72 hours. Assessment:  (Medical Decision Making) Hospital Problems  Date Reviewed: 9/25/2018 Codes Class Noted POA * (Principal)Acute hypoxemic respiratory failure ([facility] ICD-10-CM: J96.01 
ICD-9-CM: 518.81  9/24/2018 Unknown Wean O2 as tolerated Pneumonia due to infectious organism ICD-10-CM: J18.9 ICD-9-CM: 136.9, 484.8  9/24/2018 Unknown Continue vanc and zosyn EKG, abnormal ICD-10-CM: R94.31 
ICD-9-CM: 794.31  9/24/2018 Unknown Secondary to MI Elevated troponin ICD-10-CM: R74.8 ICD-9-CM: 790.6  9/24/2018 Unknown Cardiology following, pt refusing dialysis which would prohibit [facility], cardiology notified MO (acute kidney injury) ([facility] ICD-10-CM: N17.9 ICD-9-CM: 584.9  9/5/2018 Yes On CKD Type 2 diabetes mellitus with complication, without long-term current use of insulin (HCC) ICD-10-CM: E11.8 ICD-9-CM: 250.90  4/7/2017 Yes SSI  
 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  
  EF 35-40% on 2015 Echo Lasix ordered but Cr worse, ? Continue dose BID Tobacco use (Chronic) ICD-10-CM: Z72.0 ICD-9-CM: 305.1  12/27/2015 Yes Cessation discussed HTN (hypertension) (Chronic) ICD-10-CM: I10 
ICD-9-CM: 401.9  7/29/2012 Yes Controlled Plan:  (Medical Decision Making)  
 
--wean O2 as tolerated 
--continue Heparin gtt 
--continue zosyn/vanc-day 2 
--BC x2: NGTD 
--cardiology made aware of pt's decision to avoid HD, asked them to make adjustments as needed for medication management of MI More than 50% of the time documented was spent in face-to-face contact with the patient and in the care of the patient on the floor/unit where the patient is located. YAIMA Marks I have spoken with and examined the patient. I agree with the above assessment and plan as documented. Mr. Luisito Ochoa has determined that he does not want HD and thus cath is discouraged given high risk for this. Palliative care has confirmed this. Thus medical management of ACS is ongoing with heparin drip. CXR most suspicious for pulmonary edema. Gen: pleasant on 3lpm of O2 Lungs:  CTA with occasional wheeze Heart:  RRR with no Murmur/Rubs/Gallops Abd: +BS Ext: no edema 
 
--continue aspirin, heparin, metoprolol, imdur, lasix 
--d/c antibiotics --sliding scale insulin 
--dose lasix prn. UOP still high after lasix 100mg x1 yesterday. --may transfer to floor today. Needs further discussion of code status in light of acute and chronic conditions. --consult hospitalist to assume tomorrow. Spoke with Dr. Regina Rey who is agreement. We will follow for respiratory issues. Flores Corona MD

## 2018-09-26 PROBLEM — Z89.519 S/P BKA (BELOW KNEE AMPUTATION) UNILATERAL (HCC): Chronic | Status: ACTIVE | Noted: 2017-02-13

## 2018-09-26 PROBLEM — E11.8 TYPE 2 DIABETES MELLITUS WITH COMPLICATION, WITHOUT LONG-TERM CURRENT USE OF INSULIN (HCC): Chronic | Status: ACTIVE | Noted: 2017-04-07

## 2018-09-26 PROBLEM — J96.01 ACUTE HYPOXEMIC RESPIRATORY FAILURE (HCC): Status: RESOLVED | Noted: 2018-01-01 | Resolved: 2018-01-01

## 2018-09-26 PROBLEM — I70.261 ATHEROSCLEROSIS OF NATIVE ARTERY OF RIGHT LOWER EXTREMITY WITH GANGRENE (HCC): Chronic | Status: ACTIVE | Noted: 2017-01-17

## 2018-09-26 PROBLEM — R78.81 BACTEREMIA: Status: ACTIVE | Noted: 2018-01-01

## 2018-09-26 NOTE — PROGRESS NOTES
Carrol Mei Admission Date: 9/24/2018 Daily Progress Note: 9/26/2018 Pt is a 71 yo [facility] American male with a history of ICM, HTN,DM2, CAD, and CKD. Pt was recently admitted for MO requiring hydration. He developed shortness of breath on 9/23 and EMS was summoned. He was placed on CPAP and switched to BiPAP in the ER. He was admitted to the ICU and transitioned to HF NC. Pt's Cr worsened and nephrology was consulted. Cardiology was also consulted for elevated troponin. Pt's troponin peaked at 28.4 with recommendations for LHC with intervention as needed. Nephrology discussed possible need for HD after LHC given worsening Cr. Pt refused dialysis and palliative care was consulted. Subjective:  
 
Now on RA, not wearing O2 Comfortable and denies having Chest pain Review of Systems Constitutional: negative for fevers, chills and sweats Respiratory: negative for cough or dyspnea on exertion Cardiovascular: negative for chest pain, chest pressure/discomfort, irregular heart beats Current Facility-Administered Medications Medication Dose Route Frequency  insulin lispro (HUMALOG) injection   SubCUTAneous AC&HS  
 glipiZIDE SR (GLUCOTROL XL) tablet 2.5 mg  2.5 mg Oral ACB  aspirin chewable tablet 81 mg  81 mg Oral DAILY AFTER BREAKFAST  atorvastatin (LIPITOR) tablet 80 mg  80 mg Oral DAILY  metoprolol succinate (TOPROL-XL) tablet 100 mg  100 mg Oral DAILY  sodium bicarbonate tablet 650 mg  650 mg Oral TID WITH MEALS  sodium chloride (NS) flush 5-10 mL  5-10 mL IntraVENous Q8H  
 sodium chloride (NS) flush 5-10 mL  5-10 mL IntraVENous PRN  
 acetaminophen (TYLENOL) tablet 650 mg  650 mg Oral Q4H PRN  
 hydrALAZINE (APRESOLINE) 20 mg/mL injection 20 mg  20 mg IntraVENous Q6H PRN  
 isosorbide mononitrate ER (IMDUR) tablet 30 mg  30 mg Oral DAILY  heparin 25,000 units in dextrose 500 mL infusion  12-25 Units/kg/hr IntraVENous TITRATE  
 0.9% sodium chloride infusion 250 mL  250 mL IntraVENous PRN Objective:  
 
Vitals:  
 09/25/18 2345 09/26/18 0319 09/26/18 0339 09/26/18 0730 BP: 150/78 144/72  (!) 175/99 Pulse: 88 93  90 Resp: 20 20  20 Temp: 99.1 °F (37.3 °C) 99 °F (37.2 °C)  98.2 °F (36.8 °C) SpO2: 93% 94% 90% 90% Weight:  200 lb 6.4 oz (90.9 kg) Height:      
 
Intake and Output:  
09/24 1901 - 09/26 0700 In: 5 [P.O.:150; I.V.:715.7] Out: 4750 [VZHRT:0573] Physical Exam:         
Constitutional: the patient is well develop, on RA HEENT: Sclera clear, pupils equal, oral mucosa moist 
Lungs: clear anteriorly Cardiovascular: RRR without M,G,R 
Abd/GI: soft and non-tender; with positive bowel sounds. + BM Ext: warm without cyanosis. There is no lower leg edema L, R BKA Skin: no jaundice or rashes, no wounds Neuro: no gross neuro deficits, A & O X 3 Lines/Drains: peripheral IVs, garibay Heparin drip Hitesh Apodaca CHEST XRAY:  
 
 
LAB Recent Labs  
   09/26/18 
 0547  09/25/18 
 1801  09/25/18 
 3906   09/24/18 
 1319  09/24/18 
 3112 WBC  6.8   --    --    --   12.2*  11.6* HGB  8.7*  9.1*  10.0*   < >  7.4*  9.7* HCT  27.0*  28.5*  31.5*   < >  23.5*  32.1*  
PLT  223   --    --    --   212  338  
 < > = values in this interval not displayed. Recent Labs  
   09/25/18 
 0416  09/24/18 
 1342  09/24/18 
 1818  09/24/18 
 0259 NA  142   --   141  139  
K  4.2   --   4.4  4.1 CL  112*   --   111*  108* CO2  19*   --   21  17* GLU  149*   --   200*  238* BUN  47*   --   46*  45* CREA  4.14*   --   3.64*  4.14* MG   --    --   1.8  1.9 PHOS   --    --   3.5   --   
TROIQ   --   28.40*  7.34*   --   
INR   --    --    --   1.1 Recent Labs  
   09/24/18 
 9459 PH  7.33* PCO2  32* PO2  116* HCO3  17*  
 
9/24 GRAM STAIN ANAEROBIC BOTTLE POSITIVE   Preliminary GRAM STAIN    Preliminary CRITICAL RESULT NOT CALLED DUE TO PREVIOUS NOTIFICATION OF CRITICAL RESULT WITHIN THE LAST 24 HOURS. Culture result: STAPHYLOCOCCUS SPECIES (A)   Preliminary Culture result:    Preliminary CULTURE IN PROGRESS,FURTHER UPDATES TO FOLLOW Assessment:  
 
Patient Active Problem List  
Diagnosis Code  
 HTN (hypertension) I10  
 Chest pain R07.9  Bilateral pleural effusion J90  
 Tobacco use Z72.0  Abnormal CT scan, chest R93.8  Chronic systolic congestive heart failure (HCC) I50.22  
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  
 Glaucoma syndrome H40.9  Onychomycosis B35.1  MO (acute kidney injury) ([facility] N17.9  Anemia of chronic disease D63.8  Pneumonia due to infectious organism J18.9  EKG, abnormal R94.31  
 Elevated troponin R74.8 Plan Hospital Problems  Date Reviewed: 9/26/2018 Codes Class Noted POA Pneumonia due to infectious organism ICD-10-CM: J18.9 ICD-9-CM: 136.9, 484.8  9/24/2018 Yes On vancomycin, not wearing O2 EKG, abnormal ICD-10-CM: R94.31 
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  
  EF 35-40% on 2015 Echo Tobacco use (Chronic) ICD-10-CM: Z72.0 ICD-9-CM: 305.1  12/27/2015 Yes HTN (hypertension) (Chronic) ICD-10-CM: I10 
ICD-9-CM: 401.9  7/29/2012 Yes  
   
  
 
-- BMP pending 
-- no plans for LHC or HD. On heparin drip 
-- vancomycin for PNA. + bacteremia  
-- assess O2 needs -- PC following, remains full code Cornell Tomlinson, NP-C More than 50% of time documented was spent in face-to-face contact with the patient and in the care of the patient on the floor/unit where the patient is located. Lungs:  clear Heart:  RRR with no Murmur/Rubs/Gallops Additional Comments:  Stable from pulmonary stand point, will sing off, appreciate hospitalist assistance ! I have spoken with and examined the patient. I agree with the above assessment and plan as documented.  
 
Devonte Cortés MD

## 2018-09-26 NOTE — PROGRESS NOTES
Problem: Mobility Impaired (Adult and Pediatric) Goal: *Acute Goals and Plan of Care (Insert Text) LTG: 
(1.)Mr. Camilla Gramajo will move from supine to sit and sit to supine , scoot up and down and roll side to side with INDEPENDENT within 7 treatment day(s) from flat surface without handrail. (2.)Mr. Camilla Gramajo will transfer from bed to chair and chair to bed with MODIFIED INDEPENDENT using the least restrictive device within 7 treatment day(s). (3.)Mr. Camilla Gramajo will ambulate with MODIFIED INDEPENDENCE for 30+ feet with the least restrictive device within 7 treatment day(s). (4.)Mr. Camilla Gramajo will be independent in HEP for B LE strengthening and mobility within 7 treatment days. ____________________________________________________________________________________________ PHYSICAL THERAPY: Initial Assessment, AM 9/26/2018 INPATIENT: Hospital Day: 3 Payor: CARE IMPROVEMENT PLUS / [place] / Product Type: [employer] Care Medicare /  
  
NAME/AGE/GENDER: Tho Mitchell is a 72 y.o. male PRIMARY DIAGNOSIS: Acute hypoxemic respiratory failure ([address] Acute hypoxemic respiratory failure ([address] Acute hypoxemic respiratory failure ([address] ICD-10: Treatment Diagnosis:  
 · Generalized Muscle Weakness (M62.81) · Difficulty in walking, Not elsewhere classified (R26.2) Precaution/Allergies: 
Lortab [hydrocodone-acetaminophen] ASSESSMENT:  
 
Mr. Camilla Gramajo presents with decreased bed mobility, transfers, ambulation, balance, activity tolerance and overall general functional mobility s/p hospital admission with ARF. Pt A & O x 4, on room air on arrival, n.c running at 3 L in bed. Pt states he removed n.c, O2 stats on room air at 89% supine, educated on O2 need, pt continued to decline use, notified RN.  Per PT order, pt only allowed to amb short distance bed to chair/bathroom due to cardiac status/precautions; pt noted to have L LE DVT, RN gave clearance for assessment and mobility. Pt independent with bed mobility, donned R prothesis with SBA. Pt required CGA to MIN A for sit to stand transfers and ambulation to bathroom. Pt independent in toileting needs, requested to wash up while sitting in bathroom. Pt assist with CGA for bathing, demo fair to good static standing balance for cleaning, holding grab bar for support in bathroom. Pt returned to sitting EOB, declined to return to supine, placed tray in front of pt and RN aware. Pt O2 stats 88% after mobility, continued to decline use of O2; appears slightly SOB, notified RN who states would call RT. PT to follow for acute care needs. Pt most likely functioning close to baseline level of activity. Pt very particular in how he performs tasks and mobility. Discharge needs will continue to be assessed as pt progresses with activity. This section established at most recent assessment PROBLEM LIST (Impairments causing functional limitations): 1. Decreased Strength 2. Decreased ADL/Functional Activities 3. Decreased Transfer Abilities 4. Decreased Ambulation Ability/Technique 5. Decreased Balance 6. Decreased Activity Tolerance 7. Increased Fatigue 8. Increased Shortness of Breath 9. Decreased [place] with Home Exercise Program 
 INTERVENTIONS PLANNED: (Benefits and precautions of physical therapy have been discussed with the patient.) 1. Balance Exercise 2. Bed Mobility 3. Family Education 4. Gait Training 5. Home Exercise Program (HEP) 6. Therapeutic Activites 7. Therapeutic Exercise/Strengthening 8. Transfer Training 9. Group Therapy TREATMENT PLAN: Frequency/Duration: 3 times a week for duration of hospital stay Rehabilitation Potential For Stated Goals: Fair RECOMMENDED REHABILITATION/EQUIPMENT: (at time of discharge pending progress): Due to the probability of continued deficits (see above) this patient will likely need continued skilled physical therapy after discharge. Equipment:  
? None at this time HISTORY:  
History of Present Injury/Illness (Reason for Referral): 
Patient is a 72 y.o.  male presents with acute hypoxemic respiratory failure. The has h/o of cardiomyopathy with EF 30% and CKD stage 4-5 with recent admission early September for MO. He was gently hydrated and discharged and told to stay hydrated. He notes increased shortness of breath starting today. Normally able to walk 2 block before getting short of breath, but today was short of breath going to front porch. Stayed on front porch with fan blowing in his face to help him breath all day. Had minimal cough, non-productive, rarely had clear sputum. Denies chest pain, denies chest pressure even with exertion. Denies fevers or chills. No sick contacts. Swelling to left leg is worse, right leg has BKA. Was placed on CPAP by EMS, switched to BiPAP by ED, given 1L NS and then given Vanc and Zosyn for HAP with recent hospitalization. He is more alert since being in ED, states his breathing is improved slightly, but still short of breath. Past Medical History/Comorbidities:  
Mr. Zo Hyatt  has a past medical history of Abnormal CT scan, chest (12/27/2015); Acute CHF ([address] (12/26/2015); Acute systolic congestive heart failure ([address] (12/30/2015); MO (acute kidney injury) ([address] (7/29/2012); Anemia of chronic disease (9/6/2018); Bilateral pleural effusion (12/27/2015); Chest pain (12/26/2015); Depression; DM (diabetes mellitus), type 2 ([address] (7/29/2012); Encephalopathy due to infection (1/7/2017); HTN (hypertension) (7/29/2012); Hypoglycemia (7/29/2012); NSTEMI (non-ST elevated myocardial infarction) ([address] (1/5/2017); and Tobacco use (12/27/2015). He also has no past medical history of Arrhythmia; Arthritis; Asthma; Autoimmune disease ([address] Calculus of kidney; Cancer ([address] Chronic pain; COPD; DEMENTIA; Gastrointestinal disorder; GERD (gastroesophageal reflux disease);  Headache; Hypercholesterolemia; Liver disease; Other ill-defined conditions(799.89); Psychiatric disorder; Psychotic disorder; PUD (peptic ulcer disease); Seizures ([facility] Sleep disorder; Stroke [facility]); Thromboembolus ([facility] Thyroid disease; or Trauma. Mr. Natalee Heard  has a past surgical history that includes hx orthopaedic. Social History/Living Environment:  
Home Environment: Apartment # Steps to Enter: 0 Wheelchair Ramp: Yes One/Two Story Residence: One story Living Alone: Yes Support Systems: Child(elpidio), Family member(s) Patient Expects to be Discharged to[de-identified] Private residence Current DME Used/Available at Home: [employer], straight, Aleksandr Mcardle Tub or Shower Type: Shower Prior Level of Function/Work/Activity: 
Lives alone; apartment with w/c ramp; pt R BKA with prothesis, relates indep ADLs and ambulation, room air at home Personal Factors:   
      Sex:  male Age:  72 y.o. Overall Behavior:  Somewhat agreeable Number of Personal Factors/Comorbidities that affect the Plan of Care: 
DM, smoker, CHF  3+: HIGH COMPLEXITY EXAMINATION:  
Most Recent Physical Functioning:  
Gross Assessment: 
AROM: Generally decreased, functional 
Strength: Generally decreased, functional 
Coordination: Generally decreased, functional 
         
  
Posture: 
Posture (WDL): Exceptions to [facility] Posture Assessment: Rounded shoulders Balance: 
Sitting: Intact Standing: Impaired Standing - Static: Fair Standing - Dynamic : Fair Bed Mobility: 
Rolling: Independent Supine to Sit: Independent Scooting: Independent Wheelchair Mobility: 
  
Transfers: 
Sit to Stand: Contact guard assistance Stand to Sit: Contact guard assistance Gait: 
  
Base of Support: Center of gravity altered; Widened Speed/Radha: Slow Step Length: Left shortened;Right shortened Gait Abnormalities: Decreased step clearance;Trunk sway increased Distance (ft): 10 Feet (ft) (from bed to bathroom, back to bed; R BKA w/ prothesis) Assistive Device:  (pushing IV pole, CGA on L side, holding furniture) Ambulation - Level of Assistance: Contact guard assistance;Minimal assistance Interventions: Safety awareness training;Verbal cues Body Structures Involved: 1. Heart 2. Lungs 3. Muscles Body Functions Affected: 1. Cardio 2. Respiratory 3. Movement Related Activities and Participation Affected: 1. General Tasks and Demands 2. Mobility 3. Self Care Number of elements that affect the Plan of Care: 4+: HIGH COMPLEXITY CLINICAL PRESENTATION:  
Presentation: Evolving clinical presentation with changing clinical characteristics: MODERATE COMPLEXITY CLINICAL DECISION MAKING:  
[facility] AM-PAC 6 Clicks Basic Mobility Inpatient Short Form How much difficulty does the patient currently have. .. Unable A Lot A Little None 1. Turning over in bed (including adjusting bedclothes, sheets and blankets)? [] 1   [] 2   [] 3   [x] 4  
2. Sitting down on and standing up from a chair with arms ( e.g., wheelchair, bedside commode, etc.)   [] 1   [] 2   [x] 3   [] 4  
3. Moving from lying on back to sitting on the side of the bed? [] 1   [] 2   [] 3   [x] 4 How much help from another person does the patient currently need. .. Total A Lot A Little None 4. Moving to and from a bed to a chair (including a wheelchair)? [] 1   [] 2   [x] 3   [] 4  
5. Need to walk in hospital room? [] 1   [] 2   [x] 3   [] 4  
6. Climbing 3-5 steps with a railing? [] 1   [] 2   [x] 3   [] 4  
© 2007, Trustees of [facility], under license to [employer]. All rights reserved Score:  Initial: 20 Most Recent: X (Date: -- ) Interpretation of Tool:  Represents activities that are increasingly more difficult (i.e. Bed mobility, Transfers, Gait). Score 24 23 22-20 19-15 14-10 9-7 6 Modifier CH CI CJ CK CL CM CN   
 
? Mobility - Walking and Moving Around:  - CURRENT STATUS: CJ - 20%-39% impaired, limited or restricted  - GOAL STATUS: CI - 1%-19% impaired, limited or restricted  - D/C STATUS:  ---------------To be determined--------------- Payor: CARE IMPROVEMENT PLUS [place] CARE [place]PR[place] / Product Type: Managed Care Medicare /   
 
Medical Necessity:    
· Patient is expected to demonstrate progress in strength, range of motion, balance and coordination to decrease assistance required with overall functional mobility, transfers, ambulation. · Patient demonstrates good rehab potential due to higher previous functional level. Reason for Services/Other Comments: 
· Patient continues to require present interventions due to patient's inability to perform transfers and ambulation safely and effectively. Use of outcome tool(s) and clinical judgement create a POC that gives a: Clear prediction of patient's progress: LOW COMPLEXITY  
  
 
 
 
TREATMENT:  
(In addition to Assessment/Re-Assessment sessions the following treatments were rendered) Pre-treatment Symptoms/Complaints: \"If they would just hand me my leg I can get around\" Pain: Initial:  
Pain Intensity 1: 0  Post Session:  0/10 post session sitting EOB Therapeutic Activity: (    23 min): Therapeutic activities including toilet transfers, static and dynamic standing balance, assist with bathing and gown change, breathing techniques and ambulation on level ground to improve mobility, strength, balance and coordination. Required minimal Safety awareness training;Verbal cues to promote static and dynamic balance in standing and promote coordination of bilateral, lower extremity(s). Braces/Orthotics/Lines/Etc:  
· IV 
· garibay catheter · O2 Device: Room air (nc in bed on arrival; does not wish to put on) Treatment/Session Assessment:   
· Response to Treatment:  Tolerated well; very particular · Interdisciplinary Collaboration:  
o Physical Therapist 
 o Registered Nurse · After treatment position/precautions:  
o Bed/Chair-wheels locked 
o Bed in low position 
o Call light within reach 
o RN notified 
o sitting EOB · Compliance with Program/Exercises: Will assess as treatment progresses. · Recommendations/Intent for next treatment session: \"Next visit will focus on advancements to more challenging activities\". Total Treatment Duration: PT Patient Time In/Time Out Time In: 8099 Time Out: 1058 Daren Arguelles PT

## 2018-09-26 NOTE — PROGRESS NOTES
[facility] Nephrology Progress Note Follow-Up on: MO/CKD ROS: 
Gen - no fever, no chills, appetite unchanged CV - no chest pain, no palpitation Lung - shortness of breath better, no cough Abd - no tenderness, no nausea/vomiting, no diarrhea Ext - no edema Exam: 
Vitals:  
 09/25/18 2345 09/26/18 0319 09/26/18 4804 09/26/18 0730 BP: 150/78 144/72  (!) 175/99 Pulse: 88 93  90 Resp: 20 20  20 Temp: 99.1 °F (37.3 °C) 99 °F (37.2 °C)  98.2 °F (36.8 °C) SpO2: 93% 94% 90% 90% Weight:  90.9 kg (200 lb 6.4 oz) Height:      
 
 
 
Intake/Output Summary (Last 24 hours) at 09/26/18 1007 Last data filed at 09/26/18 0212 Gross per 24 hour Intake           451.82 ml Output             1850 ml Net         -1398.18 ml Wt Readings from Last 3 Encounters:  
09/26/18 90.9 kg (200 lb 6.4 oz) 09/05/18 95.3 kg (210 lb) 10/05/17 94.8 kg (209 lb) GEN - in no distress CV - regular, no murmur, no rub Lung - clear bilaterally Abd - soft, nontender Ext - no edema Recent Labs  
   09/26/18 
 0547  09/25/18 
 1801  09/25/18 
 3817   09/24/18 
 5501  09/24/18 
 2852 WBC  6.8   --    --    --   12.2*  11.6* HGB  8.7*  9.1*  10.0*   < >  7.4*  9.7* HCT  27.0*  28.5*  31.5*   < >  23.5*  32.1*  
PLT  223   --    --    --   212  338 INR   --    --    --    --    --   1.1  
 < > = values in this interval not displayed. Recent Labs  
   09/26/18 
 0547  09/25/18 
 0416  09/24/18 
 8440  09/24/18 
 0259 NA  144  142  141  139  
K  4.0  4.2  4.4  4.1 CL  109*  112*  111*  108* CO2  24  19*  21  17* BUN  47*  47*  46*  45* CREA  4.34*  4.14*  3.64*  4.14* CA  7.5*  7.2*  6.9*  7.7*  
GLU  104*  149*  200*  238* MG   --    --   1.8  1.9 PHOS   --    --   3.5   -- Assessment / Plan: Active Problems: 
  HTN (hypertension) (7/29/2012) Tobacco use (12/27/2015) Chronic systolic congestive heart failure ([facility] (12/30/2015) Overview: EF 35-40% on 2015 Echo 
 
  CKD (chronic kidney disease) stage 4, GFR 15-29 ml/min ([facility]) (8/11/2016) NSTEMI (non-ST elevated myocardial infarction) (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 MO/CKD - Based on his last hospitalization in early 9/18, his baseline Cr probably upper 3's with progression of CKD - Current Cr slightly higher after diuresis - Pt very reluctant about HD, therefore [facility] refused 2. SOB - improved 3. Elevated troponin 4. GPC bacteremia - source?? 
5. PNA

## 2018-09-26 NOTE — PROGRESS NOTES
Progress Note Patient: Nahed Castillo MRN: [id number]  SSN: [id number] YOB: 1945  Age: 72 y.o. Sex: male Admit Date: 9/24/2018 LOS: 2 days Subjective:  
Cc: \" I feel weak\" Patient examined at bedside. He was noted in no distress. He has been eating, denies acute problems today. Wish to go home soon. Objective:  
 
Vitals:  
 09/25/18 2345 09/26/18 0319 09/26/18 0339 09/26/18 0730 BP: 150/78 144/72  (!) 175/99 Pulse: 88 93  90 Resp: 20 20  20 Temp: 99.1 °F (37.3 °C) 99 °F (37.2 °C)  98.2 °F (36.8 °C) SpO2: 93% 94% 90% 90% Weight:  90.9 kg (200 lb 6.4 oz) Height:      
  
 
Intake and Output: 
Current Shift:   
Last three shifts: 09/24 1901 - 09/26 0700 In: 5 [P.O.:150; I.V.:715.7] Out: 4750 [TNVOT:4549] Physical Exam:  
GENERAL: alert, cooperative, no distress, appears stated age EYE: negative LYMPHATIC: Cervical, supraclavicular, and axillary nodes normal.  
THROAT & NECK: normal and no erythema or exudates noted. LUNG: clear to auscultation bilaterally HEART: regular rate and rhythm, S1, S2 normal, no murmur, click, rub or gallop ABDOMEN: soft, non-tender. Bowel sounds normal. No masses,  no organomegaly EXTREMITIES: Right AKA with prosthesis, no left leg edema, atraumatic SKIN: Normal. 
NEUROLOGIC: negative PSYCHIATRIC: non focal 
 
Lab/Data Review: All lab results for the last 24 hours reviewed. Assessment:  
 
Active Problems: 
  HTN (hypertension) (7/29/2012) Tobacco use (12/27/2015) Chronic systolic congestive heart failure ([facility] (12/30/2015) Overview: EF 35-40% on 2015 Echo 
 
  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  
 
· Hypoxic resp failure secondary to volume overload and acute on chronic chf: continue O2, home meds - monitor electrolytes daily · NSTEMI- for medical mgt given refusal for cardiac cath - cardiology signed off-  DC heparin gtt today after completing 48 hrs  - currently on asa and lipitor · Pneumonia- not likely per pulm - abx stopped · Acute on chronic kidney injury:  avoid nephrotoxins - he refuses HD - keep garibay cath for now, may DC tomorrow - monitor · DM: on humalog SS - DC glipizide XL in view of CKD and risk for hypoglycemia · Dvt ppx- heparin Code status: Full Disposition: home once medically stable, possible Friday Signed By: Ana María Dexter MD   
 September 26, 2018

## 2018-09-26 NOTE — PROGRESS NOTES
RN stated 02 sat was low. 02 sat  = 86% on room air. Replaced 02 at 2. Not ambulated at present time secondary to shortness of breath.

## 2018-09-26 NOTE — PROGRESS NOTES
Patient remains in stable condition with respirations even/unlabored. No acute distress noted. No needs noted or voiced at this time. Safety measures in place. Patient refuses to wear oxygen. Call light remains within reach. Preparing to give report to oncoming shift.

## 2018-09-26 NOTE — PROGRESS NOTES
CELINA recevied from [address]. Patient remains in stable condition with respirations even/unlabored. No acute distress noted at this time. Heparin verified with off going nurse, PTT value obtained and rate changed with off going nurse. Call light remains within reach, patient encouraged to call nurse prn assist. Will continue to monitor per policy.

## 2018-09-26 NOTE — PROGRESS NOTES
Patient is resting in bed with family at bedside. Respirations are even and unlabored. Heparin drip infusing at 12 units/kg/hr. Patient denies any pain. Call light within reach

## 2018-09-26 NOTE — PROGRESS NOTES
Patient is refusing to wear oxygen today. Nurse and tech have both attempted to explain the importance of oxygen but patient states, \"I don't need it. \" Oxygen has been replaced in patient's nose and patient again removes the nasal cannula. Will continue to monitor.

## 2018-09-26 NOTE — PROGRESS NOTES
Spiritual Care Visit, Follow Up visit. Visited with patient at bedside. Patient was awake and alert, but not very forthcoming in his talking. Prayed for patient's healing and health. Visit by Najma Roberson, Staff .  Reece, Th.B., B.A.

## 2018-09-26 NOTE — PROGRESS NOTES
Palliative Care Progress Note Patient: Anna Marie Thakur MRN: [id number]  SSN: [id number] YOB: 1945  Age: 72 y.o. Sex: male Assessment/Plan: Chief Complaint/Interval History: Fatigue Principal Diagnosis: · Fatigue, Lethargy  R53.83 Additional Diagnoses: · Debility, Unspecified  R53.81 · Dyspnea  R06.00 
· Counseling, Encounter for Medical Advice  Z71.9 
· Encounter for Palliative Care  Z51.5 Palliative Performance Scale (PPS) PPS: 40 Medical Decision Making:  
Reviewed and summarized notes over previous 24 hours. Discussed case with appropriate providers. Reviewed laboratory and x-ray data. Patient resting in bed, no family present. Patient with flat affect and not very talkative today. He says that plan is to not intervene on heart and kidneys \"until it's broken\". Reiterated that interventions are recommended. Clarified patient's above statement- he says that he would have dialysis \"if I have to\". Further clarified that he would rather have dialysis than die. He says that some people don't mind dying, but they don't want to. He identifies with this. Discussed code status: CPR and mechanical ventilation. Patient states he has never thought about these interventions before. He has known people that were intubated \"until they were disconnected\". But he is unsure how he feels about the ventilator at all. Encouraged him to consider the burdens compared to the benefits of interventions. His goal is to live, \"I've got a lot more I want to do\". He says that he wants to go hunting and fishing, and spend time with his grandchildren. Upon further questioning, he has not been hunting or fishing in a year. He feels that this is possible again if someone took him. Through discussion, also talked about HCPOA. Patient is not . He has 12 children. \"7 or 8\" live locally, the others are out of state.   He says he does not want anyone to make decisions for him, he wants to make them for himself. Counseled on role of HCPOA, that the agent would be the person who would best honor patient's decisions. Patient says he will think about this and talk \"on another day\". Patient less talkative at this point. Will follow intermittently and continue discussions. Will discuss findings with members of the interdisciplinary team.   
 
  
More than 50% of this 35 minute visit was spent counseling and coordination of care as outlined above. Subjective:  
 
Review of Systems: A comprehensive review of systems was negative except for:  
Constitutional: Positive for fatigue. Respiratory: Positive for dyspnea. Objective:  
 
Visit Vitals  /90  Pulse 88  Temp 98.1 °F (36.7 °C)  Resp 21  
 Ht 6' 1\" (1.854 m)  Wt 200 lb 6.4 oz (90.9 kg)  SpO2 95%  BMI 26.44 kg/m2 Physical Exam: 
 
General:  Debilitated appearing. Cooperative. No acute distress. Eyes:  Conjunctivae/corneas clear. Nose: Nares normal. Septum midline. Neck: Supple, symmetrical, trachea midline. Lungs:   Clear to auscultation bilaterally, unlabored. Heart:  Regular rate and rhythm. Abdomen:   Soft, non-tender, non-distended. Extremities: Right BKA with prosthesis in place. Skin: Skin color, texture, turgor normal. No rash. Neurologic: Nonfocal.  
Psych: Alert and oriented. Signed By: Kathleen Wolfe NP September 26, 2018

## 2018-09-26 NOTE — PROGRESS NOTES
[facility] CARDIOLOGY PROGRESS NOTE 
      
 
9/26/2018 8:40 AM 
 
Admit Date: 9/24/2018 Subjective: No cp or inc sob ROS: 
Cardiovascular:  As noted above Objective:  
  
Vitals:  
 09/25/18 2345 09/26/18 0319 09/26/18 0339 09/26/18 0730 BP: 150/78 144/72  (!) 175/99 Pulse: 88 93  90 Resp: 20 20  20 Temp: 99.1 °F (37.3 °C) 99 °F (37.2 °C)  98.2 °F (36.8 °C) SpO2: 93% 94% 90% 90% Weight:  90.9 kg (200 lb 6.4 oz) Height:      
 
 
Physical Exam: 
General-No Acute Distress, /tearful, depresed Neck- supple, no JVD 
CV- regular rate and rhythm, soft gallop Lung- clear bilaterally, few wheezes Abd- soft, nontender, nondistended Ext- no edema bilaterally. Skin- warm and dry Data Review:  
Recent Labs  
   09/26/18 
 0547  09/25/18 
 1801   09/25/18 
 0416   09/24/18 
 1342  09/24/18 
 0287  09/24/18 
 0259 NA   --    --    --   142   --    --   141  139 K   --    --    --   4.2   --    --   4.4  4.1 MG   --    --    --    --    --    --   1.8  1.9 BUN   --    --    --   47*   --    --   46*  45* CREA   --    --    --   4.14*   --    --   3.64*  4.14* GLU   --    --    --   149*   --    --   200*  238* WBC  6.8   --    --    --    --    --   12.2*  11.6* HGB  8.7*  9.1*   < >   --    < >  8.2*  7.4*  9.7* HCT  27.0*  28.5*   < >   --    < >  25.9*  23.5*  32.1*  
PLT  223   --    --    --    --    --   212  338 INR   --    --    --    --    --    --    --   1.1  
TROIQ   --    --    --    --    --   28.40*  7.34*   --   
 < > = values in this interval not displayed. Assessment/Plan: Active Problems: 
  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 
   
/// He needs dialysis then a heart cath. Unfortunately he wants neither at this time . Will be in the background unless he changes his mind.  
 
 
 
 
Meghan Braswell MD 
9/26/2018 8:40 AM

## 2018-09-27 NOTE — PROGRESS NOTES
CELINA recevied from [facility]. Patient remains in stable condition with respirations even/unlabored. No acute distress noted at this time.  bag draining to gravity at bedside, clear yellow urine noted. Patient wearing prosthesis and preparing to get up with assist to restroom. CNA at bedside. Call light remains within reach, patient encouraged to call nurse prn assist. Will continue to monitor per policy.

## 2018-09-27 NOTE — PROGRESS NOTES
Patient has pulled out all three of his IV's. No bleeding noted. Tips were all intact. Patient states, \"I didn't need them, they weren't using them anyway. \" When asked about getting a new IV, patient states, \"What for? I'm going home tomorrow. I don't need no IV. \" Patient refuses new IV at this time. Will continue to monitor.

## 2018-09-27 NOTE — PROGRESS NOTES
Patient resting quietly in bed, no c/o discomfort. Occasional blood tinged sputum, states it is from using oxygen. Refuses to use oxygen at this time. Call light in reach, will continue to monitor.

## 2018-09-27 NOTE — PROGRESS NOTES
Palliative Care Progress Note Patient: Sridevi Baltazar MRN: [id number]  SSN: [id number] YOB: 1945  Age: 72 y.o. Sex: male Assessment/Plan: Chief Complaint/Interval History: Fatigue Principal Diagnosis: · Fatigue, Lethargy  R53.83 Additional Diagnoses: · Debility, Unspecified  R53.81 · Dyspnea  R06.00 
· Counseling, Encounter for Medical Advice  Z71.9 
· Encounter for Palliative Care  Z51.5 Palliative Performance Scale (PPS) PPS: 40 Medical Decision Making:  
Reviewed and summarized notes over previous 24 hours. Discussed case with appropriate providers. Reviewed laboratory and x-ray data. Patient resting in bed, no family present. Pt denies pain, SOB, N/V, and other discomforts. When asked about his plans for HD, the pt reversed what he said yesterday, and said that he does not plan to start dialysis. When asked what will happen without HD, he says \"I'll die, sooner or later\". When I told him that without HD, it would likely be sooner rather than later, he just shrugged. He said, \"That's just the way it; nothing can help\". When I suggested that he could help it by starting HD, he disagreed. I talked about changing his code status to DNR based on his HF and kidney failure, but the pt was not interested in discussing this. I asked him if he would like to make his daughter Sandy Burrows or another family member his [de-identified], but he responded by expressing bitterness toward his ex-significant other. According to the nurse's note, the pt pulled out three IVs today and refused the insertion of a new one. Recommendation: In my view, the pt would benefit from seeing a therapist as an outpatient, to help deal with this change in his health and to develop some coping skills. We will check with the pt again tomorrow to see if he has again changed his mind about HD.    
 
 
Will discuss findings with members of the interdisciplinary team.   
 
  
 More than 50% of this 35 minute visit was spent counseling and coordination of care as outlined above. Subjective:  
 
Review of Systems: A comprehensive review of systems was negative except for:  
Constitutional: Positive for fatigue. Respiratory: Positive for dyspnea. Psychological: Difficulty coping. Objective:  
 
Visit Vitals  /82  Pulse 81  Temp 97.8 °F (36.6 °C)  Resp 17  Ht 6' 1\" (1.854 m)  Wt 206 lb 3.2 oz (93.5 kg)  SpO2 95%  BMI 27.2 kg/m2 Physical Exam: 
 
General:  Debilitated appearing. Cooperative. No acute distress. Eyes:  Conjunctivae/corneas clear. Nose: Nares normal. Septum midline. Neck: Supple, symmetrical, trachea midline. Lungs:   Clear to auscultation bilaterally, unlabored. Heart:  Regular rate and rhythm. Abdomen:   Soft, non-tender, non-distended. Extremities: Right BKA with prosthesis in place. Skin: Skin color, texture, turgor normal. No rash. Neurologic: Nonfocal.  
Psych: Alert and oriented. Signed By: Jan Santiago NP September 27, 2018

## 2018-09-27 NOTE — PROGRESS NOTES
Patient resting quietly in bed, no c/o discomfort. Continues to refuse oxygen. Will continue to monitor.

## 2018-09-27 NOTE — PROGRESS NOTES
Problem: Mobility Impaired (Adult and Pediatric) Goal: *Acute Goals and Plan of Care (Insert Text) LTG: 
(1.)Mr. Kay Prince will move from supine to sit and sit to supine , scoot up and down and roll side to side with INDEPENDENT within 7 treatment day(s) from flat surface without handrail. (2.)Mr. Kay Prince will transfer from bed to chair and chair to bed with MODIFIED INDEPENDENT using the least restrictive device within 7 treatment day(s). (3.)Mr. Kay Prince will ambulate with MODIFIED INDEPENDENCE for 30+ feet with the least restrictive device within 7 treatment day(s). (4.)Mr. Kay Prince will be independent in HEP for B LE strengthening and mobility within 7 treatment days. ____________________________________________________________________________________________ PHYSICAL THERAPY: Daily Note, Treatment Day: 1st, PM 9/27/2018 INPATIENT: Hospital Day: 4 Payor: CARE IMPROVEMENT PLUS / [place] IMPR[place] / Product Type: smsPREP Care Medicare /  
  
NAME/AGE/GENDER: Amberly Mills is a 72 y.o. male PRIMARY DIAGNOSIS: Acute hypoxemic respiratory failure ([address] Acute hypoxemic respiratory failure ([address] Acute hypoxemic respiratory failure ([address] ICD-10: Treatment Diagnosis:  
 · Generalized Muscle Weakness (M62.81) · Difficulty in walking, Not elsewhere classified (R26.2) Precaution/Allergies: 
Lortab [hydrocodone-acetaminophen] ASSESSMENT:  
 
Mr. Kay Prince presents supine and with a little encouragement agrees to getting up as he wants to shave and brush his teeth. He sat up and donned his prothesis independently. He stood into walker, walked to the sink, shaved and brushed his teeth in standing with SBA only. He did not want to walk further saying he is just sore all over. He should be safe to return home tomorrow as planned. This section established at most recent assessment PROBLEM LIST (Impairments causing functional limitations): 1. Decreased Strength 2. Decreased ADL/Functional Activities 3. Decreased Transfer Abilities 4. Decreased Ambulation Ability/Technique 5. Decreased Balance 6. Decreased Activity Tolerance 7. Increased Fatigue 8. Increased Shortness of Breath 9. Decreased [place] with Home Exercise Program 
 INTERVENTIONS PLANNED: (Benefits and precautions of physical therapy have been discussed with the patient.) 1. Balance Exercise 2. Bed Mobility 3. Family Education 4. Gait Training 5. Home Exercise Program (HEP) 6. Therapeutic Activites 7. Therapeutic Exercise/Strengthening 8. Transfer Training 9. Group Therapy TREATMENT PLAN: Frequency/Duration: 3 times a week for duration of hospital stay Rehabilitation Potential For Stated Goals: Fair RECOMMENDED REHABILITATION/EQUIPMENT: (at time of discharge pending progress): Due to the probability of continued deficits (see above) this patient will likely need continued skilled physical therapy after discharge. Equipment:  
? None at this time HISTORY:  
History of Present Injury/Illness (Reason for Referral): 
Patient is a 72 y.o.  male presents with acute hypoxemic respiratory failure. The has h/o of cardiomyopathy with EF 30% and CKD stage 4-5 with recent admission early September for MO. He was gently hydrated and discharged and told to stay hydrated. He notes increased shortness of breath starting today. Normally able to walk 2 block before getting short of breath, but today was short of breath going to front porch. Stayed on front porch with fan blowing in his face to help him breath all day. Had minimal cough, non-productive, rarely had clear sputum. Denies chest pain, denies chest pressure even with exertion. Denies fevers or chills. No sick contacts. Swelling to left leg is worse, right leg has BKA.  Was placed on CPAP by EMS, switched to BiPAP by ED, given 1L NS and then given Vanc and Zosyn for HAP with recent hospitalization. He is more alert since being in ED, states his breathing is improved slightly, but still short of breath. Past Medical History/Comorbidities:  
Mr. Val Mondragon  has a past medical history of Abnormal CT scan, chest (12/27/2015); Acute CHF ([address] (12/26/2015); Acute systolic congestive heart failure ([address] (12/30/2015); MO (acute kidney injury) ([address] (7/29/2012); Anemia of chronic disease (9/6/2018); Bilateral pleural effusion (12/27/2015); Chest pain (12/26/2015); Depression; DM (diabetes mellitus), type 2 ([address] (7/29/2012); Encephalopathy due to infection (1/7/2017); HTN (hypertension) (7/29/2012); Hypoglycemia (7/29/2012); NSTEMI (non-ST elevated myocardial infarction) ([address] (1/5/2017); and Tobacco use (12/27/2015). He also has no past medical history of Arrhythmia; Arthritis; Asthma; Autoimmune disease ([address] Calculus of kidney; Cancer ([address] Chronic pain; COPD; DEMENTIA; Gastrointestinal disorder; GERD (gastroesophageal reflux disease); Headache; Hypercholesterolemia; Liver disease; Other ill-defined conditions(799.89); Psychiatric disorder; Psychotic disorder; PUD (peptic ulcer disease); Seizures ([address] Sleep disorder; Stroke [facility]); Thromboembolus ([address] Thyroid disease; or Trauma. Mr. Val Mondragon  has a past surgical history that includes hx orthopaedic. Social History/Living Environment:  
Home Environment: Apartment # Steps to Enter: 0 Wheelchair Ramp: Yes One/Two Story Residence: One story Living Alone: Yes Support Systems: Child(elpidio), Family member(s) Patient Expects to be Discharged to[de-identified] Private residence Current DME Used/Available at Home: [employer], straight, Kendrick Francisco Tub or Shower Type: Shower Prior Level of Function/Work/Activity: 
Lives alone; apartment with w/c ramp; pt R BKA with prothesis, relates indep ADLs and ambulation, room air at home Personal Factors:   
      Sex:  male Age:  72 y.o. Overall Behavior:  Somewhat agreeable Number of Personal Factors/Comorbidities that affect the Plan of Care: 
DM, smoker, CHF  3+: HIGH COMPLEXITY EXAMINATION:  
Most Recent Physical Functioning:  
Gross Assessment: 
  
         
  
Posture: 
  
Balance: 
  Bed Mobility: 
Supine to Sit: Independent Scooting: Independent Wheelchair Mobility: 
  
Transfers: 
Sit to Stand: Stand-by assistance Stand to Sit: Stand-by assistance Gait: 
  
Distance (ft): 10 Feet (ft) Assistive Device: Prosthetic device; Walker, rolling Ambulation - Level of Assistance: Stand-by assistance Body Structures Involved: 1. Heart 2. Lungs 3. Muscles Body Functions Affected: 1. Cardio 2. Respiratory 3. Movement Related Activities and Participation Affected: 1. General Tasks and Demands 2. Mobility 3. Self Care Number of elements that affect the Plan of Care: 4+: HIGH COMPLEXITY CLINICAL PRESENTATION:  
Presentation: Evolving clinical presentation with changing clinical characteristics: MODERATE COMPLEXITY CLINICAL DECISION MAKING:  
[employer] AM-[employer]AC 6 Clicks Basic Mobility Inpatient Short Form How much difficulty does the patient currently have. .. Unable A Lot A Little None 1. Turning over in bed (including adjusting bedclothes, sheets and blankets)? [] 1   [] 2   [] 3   [x] 4  
2. Sitting down on and standing up from a chair with arms ( e.g., wheelchair, bedside commode, etc.)   [] 1   [] 2   [x] 3   [] 4  
3. Moving from lying on back to sitting on the side of the bed? [] 1   [] 2   [] 3   [x] 4 How much help from another person does the patient currently need. .. Total A Lot A Little None 4. Moving to and from a bed to a chair (including a wheelchair)? [] 1   [] 2   [x] 3   [] 4  
5. Need to walk in hospital room? [] 1   [] 2   [x] 3   [] 4  
6. Climbing 3-5 steps with a railing? [] 1   [] 2   [x] 3   [] 4  
© 2007, Trustees of [facility] [employer], under license to [employer]. All rights reserved Score:  Initial: 20 Most Recent: X (Date: -- ) Interpretation of Tool:  Represents activities that are increasingly more difficult (i.e. Bed mobility, Transfers, Gait). Score 24 23 22-20 19-15 14-10 9-7 6 Modifier CH CI CJ CK CL CM CN   
 
? Mobility - Walking and Moving Around:  
  - CURRENT STATUS: CJ - 20%-39% impaired, limited or restricted  - GOAL STATUS: CI - 1%-19% impaired, limited or restricted  - D/C STATUS:  ---------------To be determined--------------- Payor: CARE IMPROVEMENT PLUS / [place] CARE IMPROVEMENT [place]US / Product Type: [employer] Care Medicare /   
 
Medical Necessity:    
· Patient is expected to demonstrate progress in strength, range of motion, balance and coordination to decrease assistance required with overall functional mobility, transfers, ambulation. · Patient demonstrates good rehab potential due to higher previous functional level. Reason for Services/Other Comments: 
· Patient continues to require present interventions due to patient's inability to perform transfers and ambulation safely and effectively. Use of outcome tool(s) and clinical judgement create a POC that gives a: Clear prediction of patient's progress: LOW COMPLEXITY  
  
 
 
 
TREATMENT:  
(In addition to Assessment/Re-Assessment sessions the following treatments were rendered) Pre-treatment Symptoms/Complaints:  \"I need to shave\" Pain: Initial:  
Pain Intensity 1: 3 Pain Location 1:  (general ache in all extremities)  Post Session:  0/10 post session sitting EOB Therapeutic Activity: (    23 min): Therapeutic activities including bed transfers, gait with walker and prothesis, static and dynamic standing balance, to improve mobility, strength, balance and coordination. Required minimal   to promote static and dynamic balance in standing and promote coordination of bilateral, lower extremity(s) with use of the walker only and no assist from me for this level of activity. Braces/Orthotics/Lines/Etc:  
· gariaby catheter Treatment/Session Assessment:   
· Response to Treatment:  Tolerated well; very particular · Interdisciplinary Collaboration:  
o Physical Therapy Assistant 
o Registered Nurse · After treatment position/precautions:  
o Bed/Chair-wheels locked 
o Bed in low position 
o Call light within reach 
o RN notified 
o sitting EOB · Compliance with Program/Exercises: fair · Recommendations/Intent for next treatment session: \"Next visit will focus on increasing gait distance as able\". Total Treatment Duration: PT Patient Time In/Time Out Time In: 1959 Time Out: 1340 Obdulia Arizmendi, PTA

## 2018-09-27 NOTE — PROGRESS NOTES
Progress Note Patient: Charlee Thompson MRN: [id number]  SSN: [id number] YOB: 1945  Age: 72 y.o. Sex: male Admit Date: 9/24/2018 LOS: 3 days Subjective:  
Cc: \" I feel better\" Patient examined at bedside. He was noted in no distress. He is eager to go home. I explained his cr is still elevated but improving. Denies chest pain, sob. Objective:  
 
Vitals:  
 09/26/18 2023 09/26/18 2355 09/27/18 2283 09/27/18 1946 BP: 165/83 160/80 156/77 (!) 157/92 Pulse: 89 88 81 84 Resp: 20 20 20 17 Temp: 98.2 °F (36.8 °C) 98.6 °F (37 °C) 98.6 °F (37 °C) 98.4 °F (36.9 °C) SpO2: 92% 92% 91% 94% Weight:   93.5 kg (206 lb 3.2 oz) Height:      
  
 
Intake and Output: 
Current Shift:   
Last three shifts: 09/25 1901 - 09/27 0700 In: 1273.4 [P.O.:720; I.V.:553.4] Out: 2750 [Urine:2750] Physical Exam:  
GENERAL: alert, cooperative, no distress, appears stated age EYE: negative LYMPHATIC: Cervical, supraclavicular, and axillary nodes normal.  
THROAT & NECK: normal and no erythema or exudates noted. LUNG: clear to auscultation bilaterally HEART: regular rate and rhythm, S1, S2 normal, no murmur, click, rub or gallop ABDOMEN: soft, non-tender. Bowel sounds normal. No masses,  no organomegaly EXTREMITIES: Right AKA with prosthesis, no left leg edema, atraumatic SKIN: Normal. 
NEUROLOGIC: negative PSYCHIATRIC: non focal 
 
Lab/Data Review: All lab results for the last 24 hours reviewed. Assessment:  
 
Active Problems: 
  HTN (hypertension) (7/29/2012) Tobacco use (12/27/2015) Chronic systolic congestive heart failure ([address] (12/30/2015) Overview: EF 35-40% on 2015 Echo 
 
  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  
 
· Hypoxic resp failure secondary to volume overload and acute on chronic chf: continue O2, home meds - monitor electrolytes daily · NSTEMI- for medical mgt given refusal for cardiac cath - cardiology signed off-  Off heparin gtt- on asa and lipitor · Pneumonia- not likely per pulm - abx stopped · 2/2 Staph epidermidis in blood: likely contaminant, he has no fever, no leukocytosis, off antibiotics · Acute on chronic kidney injury:  avoid nephrotoxins - he refuses HD - keep garibay cath and DC today - Nephrology F/U - Check chemistry daily · DM: on humalog SS -  glipizide XL discontinued in view of CKD and risk for hypoglycemia · Dvt ppx- heparin Code status: Full Disposition: home tomorrow Friday Signed By: Tonja Campa MD   
 September 27, 2018

## 2018-09-28 NOTE — DISCHARGE INSTRUCTIONS
DISCHARGE SUMMARY from Nurse    PATIENT INSTRUCTIONS:    After general anesthesia or intravenous sedation, for 24 hours or while taking prescription Narcotics:  · Limit your activities  · Do not drive and operate hazardous machinery  · Do not make important personal or business decisions  · Do  not drink alcoholic beverages  · If you have not urinated within 8 hours after discharge, please contact your surgeon on call. What to do at Home:  Recommended activity: Activity as tolerated. If you experience any of the following symptoms temp > 101.5, unrelieved pain, nausea or vomiting, shortness of breath or fatigue not relieved with rest, please follow up with MD.    *  Please give a list of your current medications to your Primary Care Provider. *  Please update this list whenever your medications are discontinued, doses are      changed, or new medications (including over-the-counter products) are added. *  Please carry medication information at all times in case of emergency situations. These are general instructions for a healthy lifestyle:    No smoking/ No tobacco products/ Avoid exposure to second hand smoke  Surgeon General's Warning:  Quitting smoking now greatly reduces serious risk to your health. Obesity, smoking, and sedentary lifestyle greatly increases your risk for illness    A healthy diet, regular physical exercise & weight monitoring are important for maintaining a healthy lifestyle    You may be retaining fluid if you have a history of heart failure or if you experience any of the following symptoms:  Weight gain of 3 pounds or more overnight or 5 pounds in a week, increased swelling in our hands or feet or shortness of breath while lying flat in bed. Please call your doctor as soon as you notice any of these symptoms; do not wait until your next office visit.     Recognize signs and symptoms of STROKE:    F-face looks uneven    A-arms unable to move or move unevenly    S-speech slurred or non-existent    T-time-call 911 as soon as signs and symptoms begin-DO NOT go       Back to bed or wait to see if you get better-TIME IS BRAIN. Warning Signs of HEART ATTACK     Call 911 if you have these symptoms:   Chest discomfort. Most heart attacks involve discomfort in the center of the chest that lasts more than a few minutes, or that goes away and comes back. It can feel like uncomfortable pressure, squeezing, fullness, or pain.  Discomfort in other areas of the upper body. Symptoms can include pain or discomfort in one or both arms, the back, neck, jaw, or stomach.  Shortness of breath with or without chest discomfort.  Other signs may include breaking out in a cold sweat, nausea, or lightheadedness. Don't wait more than five minutes to call 911 - MINUTES MATTER! Fast action can save your life. Calling 911 is almost always the fastest way to get lifesaving treatment. Emergency Medical Services staff can begin treatment when they arrive -- up to an hour sooner than if someone gets to the hospital by car. The discharge information has been reviewed with the patient. The patient verbalized understanding. Discharge medications reviewed with the patient and appropriate educational materials and side effects teaching were provided. Pneumonia: Care Instructions  Your Care Instructions    Pneumonia is an infection of the lungs. Most cases are caused by infections from bacteria or viruses. Pneumonia may be mild or very severe. If it is caused by bacteria, you will be treated with antibiotics. It may take a few weeks to a few months to recover fully from pneumonia, depending on how sick you were and whether your overall health is good. Follow-up care is a key part of your treatment and safety. Be sure to make and go to all appointments, and call your doctor if you are having problems.  It's also a good idea to know your test results and keep a list of the medicines you take.  How can you care for yourself at home? · Take your antibiotics exactly as directed. Do not stop taking the medicine just because you are feeling better. You need to take the full course of antibiotics. · Take your medicines exactly as prescribed. Call your doctor if you think you are having a problem with your medicine. · Get plenty of rest and sleep. You may feel weak and tired for a while, but your energy level will improve with time. · To prevent dehydration, drink plenty of fluids, enough so that your urine is light yellow or clear like water. Choose water and other caffeine-free clear liquids until you feel better. If you have kidney, heart, or liver disease and have to limit fluids, talk with your doctor before you increase the amount of fluids you drink. · Take care of your cough so you can rest. A cough that brings up mucus from your lungs is common with pneumonia. It is one way your body gets rid of the infection. But if coughing keeps you from resting or causes severe fatigue and chest-wall pain, talk to your doctor. He or she may suggest that you take a medicine to reduce the cough. · Use a vaporizer or humidifier to add moisture to your bedroom. Follow the directions for cleaning the machine. · Do not smoke or allow others to smoke around you. Smoke will make your cough last longer. If you need help quitting, talk to your doctor about stop-smoking programs and medicines. These can increase your chances of quitting for good. · Take an over-the-counter pain medicine, such as acetaminophen (Tylenol), ibuprofen (Advil, Motrin), or naproxen (Aleve). Read and follow all instructions on the label. · Do not take two or more pain medicines at the same time unless the doctor told you to. Many pain medicines have acetaminophen, which is Tylenol. Too much acetaminophen (Tylenol) can be harmful. · If you were given a spirometer to measure how well your lungs are working, use it as instructed.  This can help your doctor tell how your recovery is going. · To prevent pneumonia in the future, talk to your doctor about getting a flu vaccine (once a year) and a pneumococcal vaccine (one time only for most people). When should you call for help? Call 911 anytime you think you may need emergency care. For example, call if:    · You have severe trouble breathing.    Call your doctor now or seek immediate medical care if:    · You cough up dark brown or bloody mucus (sputum).     · You have new or worse trouble breathing.     · You are dizzy or lightheaded, or you feel like you may faint.    Watch closely for changes in your health, and be sure to contact your doctor if:    · You have a new or higher fever.     · You are coughing more deeply or more often.     · You are not getting better after 2 days (48 hours).     · You do not get better as expected. Where can you learn more? Go to http://ulises-jared.info/. Enter [id number] in the search box to learn more about \"Pneumonia: Care Instructions. \"  Current as of: December 6, 2017  Content Version: 11.7  © 9397-8010 [employer]. Care instructions adapted under license by [employer] (which disclaims liability or warranty for this information). If you have questions about a medical condition or this instruction, always ask your healthcare professional. [place] [address] any warranty or liability for your use of this information. Chronic Kidney Disease: Care Instructions  Your Care Instructions    Chronic kidney disease happens when your kidneys don't work as well as they should. Your kidneys have a few important jobs. They remove waste from your blood. This waste leaves your body in your urine. They also balance your body's fluids and chemicals. When your kidneys don't work well, extra waste and fluid can build up. This can poison the body and sometimes cause death.   The most common causes of this disease are diabetes and high blood pressure. In some cases, the disease develops in 2 to 3 months. But it usually develops over many years. If you take medicine and make healthy changes to your lifestyle, you may be able to prevent the disease from getting worse. But if your kidney damage gets worse, you may need dialysis or a kidney transplant. Dialysis uses a machine to filter waste from the blood. A transplant is surgery to give you a healthy kidney from another person. Follow-up care is a key part of your treatment and safety. Be sure to make and go to all appointments, and call your doctor if you are having problems. It's also a good idea to know your test results and keep a list of the medicines you take. How can you care for yourself at home?   Treatments and appointments    · Be safe with medicines. Take your medicines exactly as prescribed. Call your doctor if you have any problems with your medicine. You also may take medicine to control your blood pressure or to treat diabetes. Many people who have diabetes take blood pressure medicine.     · If you have diabetes, do your best to keep your blood sugar in your target range. You may do this by eating healthy food and exercising. You may also take medicines.     · Go to your dialysis appointments if you have this treatment.     · Do not take ibuprofen (Advil, Motrin), naproxen (Aleve), or similar medicines, unless your doctor tells you to. These may make the disease worse.     · Do not take any vitamins, over-the-counter medicines, or herbal products without talking to your doctor first.     · Do not smoke or use other tobacco products. Smoking can reduce blood flow to the kidneys. If you need help quitting, talk to your doctor about stop-smoking programs and medicines. These can increase your chances of quitting for good.     · Do not drink alcohol or use illegal drugs.     · Talk to your doctor about an exercise plan. Exercise helps lower your blood pressure. It also makes you feel better.     · If you have an advance directive, let your doctor know. It may include a living will and a durable power of  for health care. If you don't have one, you may want to prepare one. It lets your doctor and loved ones know your health care wishes if you become unable to speak for yourself. Diet    · Talk to a registered dietitian. He or she can help you make a meal plan that is right for you. Most people with kidney disease need to limit salt (sodium), fluids, and protein. Some also have to limit potassium and phosphorus.     · You may have to give up many foods you like. But try to focus on the fact that this will help you stay healthy for as long as possible.     · If you have a hard time eating enough, talk to your doctor or dietitian about ways to add calories to your diet.     · Your diet may change as your disease changes. See your doctor for regular testing. And work with a dietitian to change your diet as needed. When should you call for help? Call 911 anytime you think you may need emergency care. For example, call if:    · You passed out (lost consciousness).    Call your doctor now or seek immediate medical care if:    · You have less urine than normal or no urine.     · You have trouble urinating or can urinate only very small amounts.     · You are confused or have trouble thinking clearly.     · You feel weaker or more tired than usual.     · You are very thirsty, lightheaded, or dizzy.     · You have nausea and vomiting.     · You have new swelling of your arms or feet, or your swelling is worse.     · You have blood in your urine.     · You have new or worse trouble breathing.    Watch closely for changes in your health, and be sure to contact your doctor if:    · You have any problems with your medicine or other treatment. Where can you learn more? Go to http://ulises-jared.info/.   Enter N276 in the search box to learn more about \"Chronic Kidney Disease: Care Instructions. \"  Current as of: May 12, 2017  Content Version: 11.7  © 2006-2018 [facility]. Care instructions adapted under license by [employer] (which disclaims liability or warranty for this information). If you have questions about a medical condition or this instruction, always ask your healthcare professional. [address] any warranty or liability for your use of this information.     ___________________________________________________________________________________________________________________________________

## 2018-09-28 NOTE — PROGRESS NOTES
Patient is discharging home today. He is independent in all ADLs at baseline. He ambulates with a prosthetic leg and rolling walker. He also has a wheelchair available in the home. Patient denies the need for home health follow-up. He has family nearby and they are able to help him to get anything he needs. Patient states, \"All I want is to go home. \" Case Management will remain available to assist as needed. Care Management Interventions PCP Verified by CM: Yes (confirms Dr. Loreatha Canavan) Mode of Transport at Discharge: Other (see comment) Transition of Care Consult (CM Consult): Discharge Planning Discharge Durable Medical Equipment: No (w/c, rolling walker, prosethic leg currenlty) Physical Therapy Consult: Yes Occupational Therapy Consult: Yes Speech Therapy Consult: No 
Current Support Network: Own Home, Lives Alone (pt has 12 children, 8 that live close by. SonRosalinda and daughter , April  are main caregivers. Encouraged pt to complete HCPOA with . ) Confirm Follow Up Transport: Family Plan discussed with Pt/Family/Caregiver: Yes Freedom of Choice Offered: Yes Discharge Location Discharge Placement: Home

## 2018-09-28 NOTE — PROGRESS NOTES
Patient is lying in bed. Assessment completed. Respirations are even and unlabored. Patient denies any pain. No distress at this time. Bed is low, locked and  Call light within reach.

## 2018-09-28 NOTE — PROGRESS NOTES
Discharge instructions, follow up information, medication list, and prescription information provided and explained to the pt. While going over medication list and prescriptions. Patient had already removed IVs and remote telemetry per primary RN. Opportunity for questions provided. Patient listened to instructions, states, \"Just want to go home\". Patient states his daughter is coming to pick patient up. Instructed to call once ready to leave.

## 2018-09-29 NOTE — PROGRESS NOTES
Pt discharged home via private car. He is alert and oriented. He is leaving with all his belongings. He verbalized understanding of all discharge instructions and follow up appointments.

## 2018-09-29 NOTE — DISCHARGE SUMMARY
Discharge Summary Patient: Berenice Raman MRN: [id number]  SSN: [id number] YOB: 1945  Age: 72 y.o. Sex: male Admit Date: 9/24/2018 Discharge Date: 9/28/2018 Admission Diagnoses: Acute hypoxemic respiratory failure ([facility] Discharge Diagnoses:  
Problem List as of 9/28/2018  Date Reviewed: 9/26/2018 Codes Class Noted - Resolved Bacteremia ICD-10-CM: R78.81 ICD-9-CM: 790.7  9/26/2018 - Present Pneumonia due to infectious organism ICD-10-CM: J18.9 ICD-9-CM: 136.9, 484.8  9/24/2018 - Present EKG, abnormal ICD-10-CM: R94.31 
ICD-9-CM: 794.31  9/24/2018 - Present Elevated troponin ICD-10-CM: R74.8 ICD-9-CM: 790.6  9/24/2018 - Present Anemia of chronic disease (Chronic) ICD-10-CM: D63.8 ICD-9-CM: 285.29  9/6/2018 - Present MO (acute kidney injury) ([facility] ICD-10-CM: N17.9 ICD-9-CM: 584.9  9/5/2018 - Present Onychomycosis ICD-10-CM: B35.1 ICD-9-CM: 110.1  10/5/2017 - Present Glaucoma syndrome ICD-10-CM: H40.9 ICD-9-CM: 365.9  4/21/2017 - Present Type 2 diabetes mellitus with complication, without long-term current use of insulin (HCC) (Chronic) ICD-10-CM: E11.8 ICD-9-CM: 250.90  4/7/2017 - Present Coronary artery disease involving native coronary artery of native heart without angina pectoris ICD-10-CM: I25.10 ICD-9-CM: 414.01  4/7/2017 - Present Debility ICD-10-CM: R53.81 ICD-9-CM: 799.3  4/7/2017 - Present S/P BKA (below knee amputation) unilateral (HCC) (Chronic) ICD-10-CM: J12.097 ICD-9-CM: V49.75  2/13/2017 - Present 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 (HCC) (Chronic) ICD-10-CM: Z81.710 ICD-9-CM: 440.24  1/17/2017 - Present Type 2 diabetes mellitus with right diabetic foot infection ([facility] ICD-10-CM: E11.628, L08.9 ICD-9-CM: 250.80, 686.9  1/5/2017 - Present NSTEMI (non-ST elevated myocardial infarction) ([facility] ICD-10-CM: I21.4 ICD-9-CM: 410.70  1/5/2017 - Present CKD (chronic kidney disease) stage 4, GFR 15-29 ml/min (HCC) (Chronic) ICD-10-CM: N18.4 ICD-9-CM: 585.4  8/11/2016 - Present Cardiomyopathy ([facility] ICD-10-CM: I42.9 ICD-9-CM: 425.4  5/5/2016 - Present Chronic systolic congestive heart failure (HCC) (Chronic) ICD-10-CM: R58.85 ICD-9-CM: 428.22, 428.0  12/30/2015 - Present Overview Signed 1/6/2017  8:59 AM by Lisbet Mendoza MD  
  EF 35-40% on 2015 Echo Bilateral pleural effusion ICD-10-CM: J90 ICD-9-CM: 511.9  12/27/2015 - Present Tobacco use (Chronic) ICD-10-CM: Z72.0 ICD-9-CM: 305.1  12/27/2015 - Present Abnormal CT scan, chest ICD-10-CM: R93.8 ICD-9-CM: 793.2  12/27/2015 - Present Chest pain ICD-10-CM: R07.9 ICD-9-CM: 786.50  12/26/2015 - Present HTN (hypertension) (Chronic) ICD-10-CM: I10 
ICD-9-CM: 401.9  7/29/2012 - Present * (Principal)RESOLVED: Acute hypoxemic respiratory failure ([facility] ICD-10-CM: J96.01 
ICD-9-CM: 518.81  9/24/2018 - 9/26/2018 RESOLVED: Sepsis due to cellulitis ([facility] ICD-10-CM: L03.90, A41.9 ICD-9-CM: 682.9, 995.91  1/9/2017 - 4/7/2017 RESOLVED: Encephalopathy due to infection ICD-10-CM: G93.49, B99.9 ICD-9-CM: 348.39, 136.9  1/7/2017 - 1/9/2017 RESOLVED: Acute renal failure with tubular necrosis (HCC) ICD-10-CM: N17.0 ICD-9-CM: 584.5  1/6/2017 - 4/7/2017 RESOLVED: Hypomagnesemia ICD-10-CM: N56.65 
ICD-9-CM: 275.2  7/30/2012 - 12/27/2015 RESOLVED: Lactic acidosis ICD-10-CM: E87.2 ICD-9-CM: 276.2  7/29/2012 - 12/27/2015 RESOLVED: Type 2 diabetes mellitus with hyperglycemia (HCC) ICD-10-CM: E11.65 ICD-9-CM: 250.00  7/29/2012 - 4/7/2017 RESOLVED: Hypoglycemia ICD-10-CM: E16.2 ICD-9-CM: 251.2  7/29/2012 - 12/27/2015 Discharge Condition: [facility] Hospital Course: Mr Kristofer Desouza is a 71 yo [facility] American male with a history of ICM, HTN,DM2, CAD, and CKD, who was admitted for acute hypoxic respiratory failure secondary to volume overload/ acute on chronic systolic heart failure. He was placed on CPAP and switched to BiPAP in the ER. He was admitted to the ICU. Patient's Cr worsened and nephrology was consulted. Cardiology was also consulted for elevated troponin up to 28, with recommendations for LHC in light of NSTEMI. Nephrology discussed possible need for HD after LHC given worsening Cr. The patient refused both procedures and medical management was provided. Pain and palliative was on board. Fortunately, his clinical status and kidney function improved after IVF hydration, heart medications and a short trial on heparin drip. He tolerated PT/OT and there were no telemetry events. The patient will be seen as outpatient at cardiology and renal clinic. He is stable enough to be discharge on po medications. Physical Exam:  
GENERAL: alert, cooperative, no distress, appears stated age EYE: negative LYMPHATIC: Cervical, supraclavicular, and axillary nodes normal.  
THROAT & NECK: normal and no erythema or exudates noted. LUNG: clear to auscultation bilaterally HEART: regular rate and rhythm, S1, S2 normal, no murmur, click, rub or gallop ABDOMEN: soft, non-tender. Bowel sounds normal. No masses,  no organomegaly EXTREMITIES: Right AKA with prosthesis, no left leg edema, atraumatic SKIN: Normal. 
NEUROLOGIC: negative PSYCHIATRIC: non focal 
 
Consults: Cardiology, Nephrology, Physical Medicine and Rehabilitation and Pulmonary/Critical Care, pain and palliative. Significant Diagnostic Studies: see dc summary note Disposition: home Discharge Medications: No current facility-administered medications for this encounter.   
 
Current Outpatient Prescriptions:  
  [START ON 9/29/2018] isosorbide mononitrate ER (IMDUR) 30 mg tablet, Take 1 Tab by mouth daily. , Disp: 30 Tab, Rfl: 0 
  insulin lispro (HUMALOG) 100 unit/mL injection, For Blood Sugar (mg/dL) of:    Less than 150 =   0 units          150 -199 =   2 units 200 -249 =   4 units 250 -299 =   6 units 300 -349 =   8 units, Disp: 1 Vial, Rfl: 1 
  Insulin Syringe-Needle U-100 (INSULIN SYRINGE) 1 mL 30 gauge x 5/16 syrg, As directed, Disp: 200 Syringe, Rfl: 0 
  Lancets misc, As directed, Disp: 1 Each, Rfl: 11 
  glucose blood VI test strips (ASCENSIA AUTODISC VI, ONE TOUCH ULTRA TEST VI) strip, As directed, Disp: 200 Strip, Rfl: 11 
  amLODIPine (NORVASC) 5 mg tablet, Take 2 Tabs by mouth daily. , Disp: 30 Tab, Rfl: 0 
  sodium bicarbonate 650 mg tablet, Take 1 Tab by mouth three (3) times daily (with meals). , Disp: 90 Tab, Rfl: 0 
  metoprolol succinate (TOPROL-XL) 100 mg tablet, Take 1 Tab by mouth daily. , Disp: 90 Tab, Rfl: 3 
  nitroglycerin (NITROSTAT) 0.4 mg SL tablet, by SubLINGual route every five (5) minutes as needed for Chest Pain., Disp: , Rfl:  
  atorvastatin (LIPITOR) 80 mg tablet, Take 1 Tab by mouth daily. , Disp: 30 Tab, Rfl: 6 
  aspirin 81 mg chewable tablet, Take 1 Tab by mouth daily (after breakfast). , Disp: 30 Tab, Rfl: 11 Activity: Activity as tolerated Diet: Renal Diet Wound Care: None needed Follow-up Appointments Procedures  FOLLOW UP VISIT Appointment in: One Week pcp  
  pcp Standing Status:   Standing Number of Occurrences:   1 Order Specific Question:   Appointment in Answer: One Week  FOLLOW UP VISIT Appointment in: Two Weeks Cardiology Cardiology Standing Status:   Standing Number of Occurrences:   1 Standing Expiration Date:   9/29/2018 Order Specific Question:   Appointment in Answer: Two Weeks Order Specific Question:   Appt Date: Answer:   10/9/2018 Order Specific Question:   Appt Time: Answer:   2:30 PM  
  Order Specific Question:   Office Contact: Answer:   [phone] Order Specific Question:   What provider will pt be seeing: Answer:   Dr. Luba Councilman  FOLLOW UP VISIT Appointment in: Two Weeks Nephrology Nephrology Standing Status:   Standing Number of Occurrences:   1 Standing Expiration Date:   9/29/2018 Order Specific Question:   Appointment in Answer: Two Weeks Order Specific Question:   Appt Date: Answer:   10/3/2018 Order Specific Question:   Appt Time: Answer:   2:00 PM  
  Order Specific Question:   Office Contact: Answer:   [phone] Order Specific Question:   What provider will pt be seeing: Answer:   CHARLEEN Yancey Gave  FOLLOW UP VISIT Appointment in: Other (Specify) Please set up hospital follow up appointment with [facility] in 4 weeks for patient. [phone] Please set up hospital follow up appointment with [facility] in 4 weeks for patient. [phone] Standing Status:   Standing Number of Occurrences:   1 Order Specific Question:   Appointment in Answer: Other (Specify) Signed By: Sid Ryan MD   
 September 28, 2018

## 2018-10-02 NOTE — PROGRESS NOTES
Transition of Care Discharge Follow-up Questionnaire Date/Time of JENNI Outreach: 10/1/18 11:32 AM  
What was the patient hospitalized for? Patient was hospitalized for Acute hypoxemic respiratory failure Does the patient understand his/her diagnosis and/or treatment and what happened during the hospitalization? CM Assessed Risk for Readmission: 
 
 
 
Patient stated Risk for Readmission: 
 
 Spoke to Patient who consented to [facility] outreach, and verbalized understanding of treatment and diagnosis. Risk for Readmission completed and assessed and Care Coordinator assessed risk would be due to diagnosis. Patient denies he will readmit. Did the patient receive discharge instructions? Patient states d/c instructions received. Review any discharge instructions (see notes in [facility]). Ask patient if they understand these. Do they have any questions? Patient discussed discharge plan and instruction as Patient understood it to be with Care Coordinator. Which I have documented in the pertinent areas throughout this progress note. Were home services ordered (nursing, PT, OT, ST, etc.)? No HH ordered at d/c. If so, has the first visit occurred? If not, why? (Assist with coordination of services if necessary.) N/A Was any DME ordered? No DME ordered at d/c. If so, has it been received? If not, why?  (Assist with coordination of arranging DME orders if necessary.) N/A Complete a review of all medications (new, continued and discontinued meds per the D/C instructions and medication tab in [facility]). Review of all meds completed with Patient and Care Coordinator. Diabetic test strips, Syringe, Humalog, Imdur prescribed at d/c. Were all new prescriptions filled? If not, why?  (Assist with obtainment of medications if necessary.) Yes.   
Does the patient understand the purpose and dosing instructions for all medications? (If patient has questions, provide explanation and education.) Indicated by Patient to Care Coordinator, the purpose and dosing instructions for all medications were understood. Does the patient have any problems in performing ADLs? (If patient is unable to perform ADLs  what is the limiting factor(s)? Do they have a support system that can assist? If no support system is present, discuss possible assistance that they may be able to obtain.) Patient states he is independent with all ADLs. Does the patient have all follow-up appointments scheduled? 7 day f/up with PCP? 
 
7-14 day f/up with specialist? 
 
If f/up has not been made  what actions has the care coordinator made to accomplish this? Has transportation been arranged? [facility] Pulmonary follow-up should be within 7 days of discharge; all others should have PCP follow-up within 7 days of discharge; follow-ups with other specialists as appropriate or ordered.) PCP f/u appt. 10/5 at 24pm. 
 
NP f/u appt. 10/3 at 2pm.  
 
Cardiology f/u appt. 230pm. 
 
Please note per patient daughter transports to all appointments, as well as schedules, supports and assists with care management also. Schedule next appointment with KIERAN GARCIA Coordinator or refer to RN Case Manager/  per the workflow guidelines. Care Coordinator to f/u 10/10. Any other questions or concerns expressed by the patient? Gratitude stated and no further questions asked or needs identified. JENNI Call Completed By:  
Rodríguez Lainez LPN/ Care Coordinator DNDBTS:[phone] Lisa [address] [place] [address] 
[email or web address] This note will not be viewable in [address]

## 2018-10-05 PROBLEM — J81.1 PULMONARY EDEMA: Status: ACTIVE | Noted: 2018-01-01

## 2018-10-05 PROBLEM — J96.01 ACUTE RESPIRATORY FAILURE WITH HYPOXEMIA (HCC): Status: ACTIVE | Noted: 2018-01-01

## 2018-10-05 PROBLEM — E11.621 DIABETIC ULCER OF LEFT MIDFOOT ASSOCIATED WITH TYPE 2 DIABETES MELLITUS (HCC): Status: ACTIVE | Noted: 2018-01-01

## 2018-10-05 PROBLEM — A41.9 SEPSIS (HCC): Status: ACTIVE | Noted: 2018-01-01

## 2018-10-05 PROBLEM — R00.0 TACHYCARDIA: Status: ACTIVE | Noted: 2018-01-01

## 2018-10-05 PROBLEM — L03.116 CELLULITIS OF LEFT FOOT: Status: ACTIVE | Noted: 2018-01-01

## 2018-10-05 PROBLEM — D72.829 LEUKOCYTOSIS: Status: ACTIVE | Noted: 2018-01-01

## 2018-10-05 PROBLEM — L97.429 DIABETIC ULCER OF LEFT MIDFOOT ASSOCIATED WITH TYPE 2 DIABETES MELLITUS (HCC): Status: ACTIVE | Noted: 2018-01-01

## 2018-10-05 NOTE — ED PROVIDER NOTES
[address] Emergency Department Manuel Thibodeaux is a 72 y.o. male seen on 10/5/2018 at 8:57 AM in the [facility] EMERGENCY DEPT in room ER03/03. Chief Complaint Patient presents with  Shortness of Breath HPI: HPI Comments: 27-year-old male presents to the emergency department shortness of breath Patient was admitted couple weeks ago in the hospital for several days. Had elevated troponins. Elevated creatinine. Heart catheter was recommended but patient was concerned about kidney function did not want dialysis so Was never done This morning patient increased shortness of breath. He refuses nasal cannula stating that it irritates his nose but had a breathing treatment On arrival he states his breathing is improved. He still appears to. He is noted to be hypoxic. He is not febrile. He is not hypotensive. He has chest pain at times none at this point. He is dyspneic that is worse with exertion. Very limited exercise tolerance at this time He lives home alone. He has children and family members nearby. Review of Systems: Review of Systems Constitutional: Positive for malaise/fatigue. Negative for chills, diaphoresis, fever and weight loss. HENT: Negative. Respiratory: Positive for shortness of breath. Cardiovascular: Positive for chest pain and orthopnea. Genitourinary: Negative. Musculoskeletal: Negative. Skin: Negative. Neurological: Negative. Psychiatric/Behavioral: Negative. Past Medical History: Primary Care Doctor: Charly Olivo MD  
 
Past Medical History:  
Diagnosis Date  Abnormal CT scan, chest 12/27/2015  Acute CHF ([address] 12/26/2015  Acute systolic congestive heart failure ([address] 12/30/2015  MO (acute kidney injury) ([address] 7/29/2012  Anemia of chronic disease 9/6/2018  Bilateral pleural effusion 12/27/2015  Chest pain 12/26/2015  Depression  DM (diabetes mellitus), type 2 ([address] 7/29/2012  Encephalopathy due to infection 1/7/2017  
 HTN (hypertension) 7/29/2012  Hypoglycemia 7/29/2012  
 NSTEMI (non-ST elevated myocardial infarction) [address] 1/5/2017  Tobacco use 12/27/2015 Past Surgical History:  
Procedure Laterality Date  HX ORTHOPAEDIC Social History Social History  Marital status: SINGLE Spouse name: N/A  
 Number of children: N/A  
 Years of education: N/A Occupational History  retired brick layer Social History Main Topics  Smoking status: Current Every Day Smoker Packs/day: 0.50 Years: 45.00 Types: Cigarettes  Smokeless tobacco: Never Used  Alcohol use No  
 Drug use: Yes Special: Prescription  Sexual activity: No  
 
Other Topics Concern  None Social History Narrative Previous Medications AMLODIPINE (NORVASC) 5 MG TABLET    Take 2 Tabs by mouth daily. ASPIRIN 81 MG CHEWABLE TABLET    Take 1 Tab by mouth daily (after breakfast). ATORVASTATIN (LIPITOR) 80 MG TABLET    Take 1 Tab by mouth daily. GLUCOSE BLOOD VI TEST STRIPS (ASCENSIA AUTODISC VI, ONE TOUCH ULTRA TEST VI) STRIP    As directed INSULIN LISPRO (HUMALOG) 100 UNIT/ML INJECTION    For Blood Sugar (mg/dL) of:    
Less than 150 =   0 units          
150 -199 =   2 units 200 -249 =   4 units 250 -299 =   6 units 300 -349 =   8 units INSULIN SYRINGE-NEEDLE U-100 (INSULIN SYRINGE) 1 ML 30 GAUGE X 5/16 SYRG    As directed ISOSORBIDE MONONITRATE ER (IMDUR) 30 MG TABLET    Take 1 Tab by mouth daily. LANCETS MISC    As directed METOPROLOL SUCCINATE (TOPROL-XL) 100 MG TABLET    Take 1 Tab by mouth daily. NITROGLYCERIN (NITROSTAT) 0.4 MG SL TABLET    by SubLINGual route every five (5) minutes as needed for Chest Pain. SODIUM BICARBONATE 650 MG TABLET    Take 1 Tab by mouth three (3) times daily (with meals). Allergies Allergen Reactions  Lortab [Hydrocodone-Acetaminophen] Itching Physical Exam:  Nursing documentation reviewed. Vitals:  
 10/05/18 0858 10/05/18 0900 10/05/18 0953 10/05/18 1059 BP: 125/69  122/68 125/66 Pulse: (!) 104 (!) 103 (!) 104 (!) 101 Resp: (!) 34 21 27 26 Temp:      
SpO2:  (!) 86% (!) 82% 90% Vital signs were reviewed. Physical Exam  
Constitutional: He is oriented to person, place, and time. He appears well-developed and well-nourished. HENT:  
Head: Normocephalic and atraumatic. Eyes: EOM are normal. Pupils are equal, round, and reactive to light.  
cconjunctiva pale Cardiovascular: Normal rate and regular rhythm. Pulmonary/Chest: No respiratory distress. He has wheezes. He has rales. Abdominal: Bowel sounds are normal. He exhibits no distension. There is no tenderness. There is no rebound. Musculoskeletal: Normal range of motion. He exhibits no edema or tenderness. Neurological: He is alert and oriented to person, place, and time. No cranial nerve deficit. Skin: Skin is warm and dry. Psychiatric: He has a normal mood and affect. His behavior is normal.  
Nursing note and vitals reviewed. Medical Decision Making: MDM Number of Diagnoses or Management Options Diagnosis management comments: 82-year-old male with coronary disease kidney injury worsening shortness of breath and dyspnea He does not will be on dialysis. States that he doesn't want to hurt. Doesn't want to have some treatments that would provide temporary improvement but also states that he wants everything done in order to keep him alive a few more days Labs are obtained. Chest x-ray shows pulm edema. BNP is minimally elevated. Troponin is slightly elevated as well however I think it's coming down from his N STEMI last month EKG is abnormal with subendocardial injury Amount and/or Complexity of Data Reviewed Clinical lab tests: ordered and reviewed Tests in the radiology section of CPT®: ordered and reviewed Tests in the medicine section of CPT®: ordered and reviewed Review and summarize past medical records: yes Discuss the patient with other providers: yes Independent visualization of images, tracings, or specimens: yes Risk of Complications, Morbidity, and/or Mortality Presenting problems: high Diagnostic procedures: minimal 
Management options: low 
 
  
______________________________________________________________________ 
ED Evaluation Labs:  
Recent Results (from the past 12 hour(s)) EKG, 12 LEAD, INITIAL Collection Time: 10/05/18  8:56 AM  
Result Value Ref Range Ventricular Rate 107 BPM  
 Atrial Rate 107 BPM  
 P-R Interval 112 ms QRS Duration 84 ms Q-T Interval 346 ms  
 QTC Calculation (Bezet) 461 ms Calculated P Axis 55 degrees Calculated R Axis 65 degrees Calculated T Axis -119 degrees Diagnosis    
  !! AGE AND GENDER SPECIFIC ECG ANALYSIS !! Sinus tachycardia Septal infarct , age undetermined Marked ST abnormality, possible inferior subendocardial injury Abnormal ECG 
  
CBC WITH AUTOMATED DIFF Collection Time: 10/05/18  9:01 AM  
Result Value Ref Range WBC 15.5 (H) 4.3 - 11.1 K/uL  
 RBC 3.06 (L) 4.23 - 5.6 M/uL HGB 8.8 (L) 13.6 - 17.2 g/dL HCT 28.5 (L) 41.1 - 50.3 % MCV 93.1 79.6 - 97.8 FL  
 MCH 28.8 26.1 - 32.9 PG  
 MCHC 30.9 (L) 31.4 - 35.0 g/dL  
 RDW 12.8 % PLATELET 877 383 - 919 K/uL MPV 10.8 9.4 - 12.3 FL ABSOLUTE NRBC 0.00 0.0 - 0.2 K/uL  
 DF AUTOMATED NEUTROPHILS 92 (H) 43 - 78 % LYMPHOCYTES 3 (L) 13 - 44 % MONOCYTES 4 4.0 - 12.0 % EOSINOPHILS 0 (L) 0.5 - 7.8 % BASOPHILS 0 0.0 - 2.0 % IMMATURE GRANULOCYTES 1 0.0 - 5.0 %  
 ABS. NEUTROPHILS 14.2 (H) 1.7 - 8.2 K/UL  
 ABS. LYMPHOCYTES 0.5 0.5 - 4.6 K/UL  
 ABS. MONOCYTES 0.7 0.1 - 1.3 K/UL  
 ABS. EOSINOPHILS 0.0 0.0 - 0.8 K/UL  
 ABS. BASOPHILS 0.0 0.0 - 0.2 K/UL  
 ABS. IMM. GRANS. 0.1 0.0 - 0.5 K/UL METABOLIC PANEL, COMPREHENSIVE  
 Collection Time: 10/05/18  9:01 AM  
Result Value Ref Range Sodium 140 136 - 145 mmol/L Potassium 5.5 (H) 3.5 - 5.1 mmol/L Chloride 111 (H) 98 - 107 mmol/L  
 CO2 18 (L) 21 - 32 mmol/L Anion gap 11 7 - 16 mmol/L Glucose 261 (H) 65 - 100 mg/dL BUN 53 (H) 8 - 23 MG/DL Creatinine 4.84 (H) 0.8 - 1.5 MG/DL  
 GFR est AA 15 (L) >60 ml/min/1.73m2 GFR est non-AA 13 (L) >60 ml/min/1.73m2 Calcium 8.3 8.3 - 10.4 MG/DL Bilirubin, total 0.7 0.2 - 1.1 MG/DL  
 ALT (SGPT) 16 12 - 65 U/L  
 AST (SGOT) 36 15 - 37 U/L Alk. phosphatase 71 50 - 136 U/L Protein, total 7.8 6.3 - 8.2 g/dL Albumin 2.6 (L) 3.2 - 4.6 g/dL Globulin 5.2 (H) 2.3 - 3.5 g/dL A-G Ratio 0.5 (L) 1.2 - 3.5 BNP Collection Time: 10/05/18  9:01 AM  
Result Value Ref Range  pg/mL POC LACTIC ACID Collection Time: 10/05/18  9:05 AM  
Result Value Ref Range Lactic Acid (POC) 1.7 0.5 - 1.9 mmol/L  
POC TROPONIN-I Collection Time: 10/05/18  9:22 AM  
Result Value Ref Range Troponin-I (POC) 0.54 (H) 0.02 - 0.05 ng/ml Labs were reviewed and interpreted by me. Radiology studies performed: XR CHEST PORT Final Result IMPRESSION: Bilateral airspace disease in a pattern suggestive of pulmonary  
edema, though underlying infection not excluded. Janet Rooney Radiographs were visualized by me Medications  
furosemide (LASIX) injection 80 mg (not administered)  
 
 
================================================================== 
ASSESSMENT: 68-year-old male with kidney injury elevated troponin or see shortness of breath bilateral pulmonary edema decreased exercise tolerance PLAN: Admit 
_____________________________________________________________________ Condition:  Guarded Disposition:  Admit Diagnosis:  Acute kidney injury, pulmonary edema Marsha Wilkins MD; 10/5/2018 @8:57 AM=========================================== 
 
ED Course

## 2018-10-05 NOTE — IP AVS SNAPSHOT
[address] 
[phone] Patient: Nicko Rothman MRN: [id number] About your hospitalization You were admitted on:  October 5, 2018 You last received care in the:  [facility] 8 MED SURG You were discharged on:  October 11, 2018 Why you were hospitalized Your primary diagnosis was:  Acute Respiratory Failure With Hypoxemia (Hcc) Your diagnoses also included:  Acute Pulmonary Edema (Hcc), Htn (Hypertension), Ckd (Chronic Kidney Disease) Stage 4, Gfr 15-29 Ml/Min (Hcc), Type 2 Diabetes Mellitus With Complication, Without Long-Term Current Use Of Insulin (Hcc), S/P Bka (Below Knee Amputation) Unilateral (Hcc), Chronic Systolic Congestive Heart Failure (Hcc), Diabetic Ulcer Of Left Midfoot Associated With Type 2 Diabetes Mellitus (Hcc), Cellulitis Of Left Foot, Nstemi (Non-St Elevated Myocardial Infarction) (Formerly [facility]), Sepsis Due To Cellulitis (Hcc), Acute Renal Failure Superimposed On Stage 4 Chronic Kidney Disease (Hcc), Anemia Of Chronic Disease, Metabolic Acidosis, Stage 5 Chronic Kidney Disease Not On Chronic Dialysis (Hcc), Acute Rhinitis, Acute Osteomyelitis Of Metatarsal Bone, Left (Formerly [facility]), Osteomyelitis Of Left Foot (Formerly [facility]) Follow-up Information Follow up With Details Comments Contact Info Jono Vanegas MD [facility]/2018 [facility] [facility] [address] 
[phone] Esthela Kim NP On 10/30/2018 2:30  [facility] [place] [facility] [address] 
[phone] Poncho Kelley NP On 11/9/2018 2:40  [facility] [address] [place] [address] 
[phone] Alf Bosch MD On 11/6/2018 follow-up osteomyelitis, left 5th MT head 8  [facility] 340 [facility] [address] 
[phone] Your Scheduled Appointments  Thursday October 18, 2018 10:00 AM EDT  
 Office Visit with [address] [place] [address] ([address]) 2500 [facility] [address]  
[phone] Tuesday November 06, 2018  8:00 AM EST  
ESTABLISHED PATIENT with Yordan Sam MD  
[facility] VASCULAR SURGERY ([facility]) [address] [address]  
[phone] Discharge Orders None A check michael indicates which time of day the medication should be taken. My Medications START taking these medications Instructions Each Dose to Equal  
 Morning Noon Evening Bedtime  
 cephALEXin 500 mg capsule Commonly known as:  Earnesteen Andriy Your last dose was:  10/11/18 9am  
Your next dose is:  10/11/18 4pm  
   
 Take 1 Cap by mouth every eight (8) hours for 28 days. Indications: Bone Infections, Skin and Skin Structure Infection 500 mg  
    
8am  
   
   
4pm  
   
12midnight  
  
 furosemide 80 mg tablet Commonly known as:  LASIX Your last dose was:  10/11/18 am  
Your next dose is:  10/11/18 4pm  
   
 Take 1 Tab by mouth two (2) times a day. Indications: Pulmonary Edema due to Chronic Heart Failure 80 mg  
    
  
   
   
  
   
  
 minocycline 100 mg capsule Commonly known as:  Tonna Katrina Your last dose was:  10/11/18 9am  
Your next dose is:   10/11/18 9pm  
   
 Take 1 Cap by mouth every twelve (12) hours for 28 days. Indications: Skin and Skin Structure Infection, Osteomyelitis of foot 100 mg CONTINUE taking these medications Instructions Each Dose to Equal  
 Morning Noon Evening Bedtime  
 amLODIPine 5 mg tablet Commonly known as:  Fred Mower Your next dose is:  10/12/18 am  
   
 Take 2 Tabs by mouth daily. 10 mg  
    
  
   
   
   
  
 aspirin 81 mg chewable tablet Your last dose was:  10/11/18 am  
Your next dose is:  10/12/18 am  
   
 Take 1 Tab by mouth daily (after breakfast). 81 mg  
    
  
   
   
   
  
 atorvastatin 80 mg tablet Commonly known as:  LIPITOR Your last dose was:  10/11/18 am  
Your next dose is:  10/12/18 am  
   
 Take 1 Tab by mouth daily. 80 mg  
    
  
   
   
   
  
 glucose blood VI test strips strip Commonly known as:  ASCENSIA AUTODISC VI, ONE TOUCH ULTRA TEST VI  
   
 As directed  
     
   
   
   
  
 insulin lispro 100 unit/mL injection Commonly known as:  HUMALOG Your next dose is:  Before next meal  
   
 For Blood Sugar (mg/dL) of:  Less than 150 = 0 units  150 -199 = 2 units 200 -249 = 4 units 250 -299 = 6 units 300 -349 = 8 units Insulin Syringe-Needle U-100 1 mL 30 gauge x 5/16 Syrg Commonly known as:  INSULIN SYRINGE As directed  
     
   
   
   
  
 isosorbide mononitrate ER 30 mg tablet Commonly known as:  IMDUR Your last dose was:  10/11/18 am  
Your next dose is:  10/12/18 am  
   
 Take 1 Tab by mouth daily. 30 mg Lancets Misc As directed  
     
   
   
   
  
 metoprolol succinate 100 mg tablet Commonly known as:  TOPROL-XL Your last dose was:  10/11/18 am  
Your next dose is:  10/12/18 am  
   
 Take 1 Tab by mouth daily. 100 mg NITROSTAT 0.4 mg SL tablet Generic drug:  nitroglycerin Your next dose is:  Per as needed  
   
 by SubLINGual route every five (5) minutes as needed for Chest Pain.  
     
   
   
   
  
 sodium bicarbonate 650 mg tablet Your last dose was:  10/11/18 12pm  
Your next dose is:  10/11/18 5pm  
   
 Take 1 Tab by mouth three (3) times daily (with meals). 650 mg Where to Get Your Medications Information on where to get these meds will be given to you by the nurse or doctor. ! Ask your nurse or doctor about these medications  
  cephALEXin 500 mg capsule  
 furosemide 80 mg tablet minocycline 100 mg capsule Discharge Instructions DISCHARGE SUMMARY from Nurse PATIENT INSTRUCTIONS: 
 
After general anesthesia or intravenous sedation, for 24 hours or while taking prescription Narcotics: · Limit your activities · Do not drive and operate hazardous machinery · Do not make important personal or business decisions · Do  not drink alcoholic beverages · If you have not urinated within 8 hours after discharge, please contact your surgeon on call. What to do at Home: 
Recommended activity: Activity as tolerated. If you experience any of the following symptoms temp > 100.5, unrelieved pain, nausea or vomiting, shortness of breath or fatigue not relieved with rest, please follow up with MD. 
 
 
Recognize signs and symptoms of STROKE: 
 
 F-face looks uneven A-arms unable to move or move unevenly S-speech slurred or non-existent T-time-call 911 as soon as signs and symptoms begin-DO NOT go Back to bed or wait to see if you get better-TIME IS BRAIN. Warning Signs of HEART ATTACK Call 911 if you have these symptoms: 
? Chest discomfort. Most heart attacks involve discomfort in the center of the chest that lasts more than a few minutes, or that goes away and comes back. It can feel like uncomfortable pressure, squeezing, fullness, or pain. ? Discomfort in other areas of the upper body. Symptoms can include pain or discomfort in one or both arms, the back, neck, jaw, or stomach. ? Shortness of breath with or without chest discomfort. ? Other signs may include breaking out in a cold sweat, nausea, or lightheadedness. Don't wait more than five minutes to call [address]! Fast action can save your life. Calling 911 is almost always the fastest way to get lifesaving treatment. Emergency Medical Services staff can begin treatment when they arrive  up to an hour sooner than if someone gets to the hospital by car. The discharge information has been reviewed with the patient. The patient verbalized understanding. Discharge medications reviewed with the patient and appropriate educational materials and side effects teaching were provided. Osteomyelitis: Care Instructions Your Care Instructions Osteomyelitis (say \"fo-hjzj-fq-cp-bm-AP-tus\") is a bone infection. It is caused by bacteria. The bacteria can infect the bone where it has been injured, or they can be carried through the blood from another area in the body. Osteomyelitis can be a short- or long-term problem. It is treated with antibiotics. You may get the antibiotics as pills or through a needle in a vein (IV).  You will probably get treatment in the hospital at first. The type of treatment depends on the type of bacteria causing the infection, the bones affected, and how bad the infection is. Sometimes people need surgery to drain pus from bone or to fix damaged bone. Short-term osteomyelitis that is treated right away usually can be cured. But the long-term form sometimes comes back after treatment. You can help your chances of stopping the infection by taking your medicines as directed. Follow-up care is a key part of your treatment and safety. Be sure to make and go to all appointments, and call your doctor if you are having problems. It's also a good idea to know your test results and keep a list of the medicines you take. How can you care for yourself at home? · Take your antibiotics as directed. Do not stop taking them just because you feel better. You need to take the full course of antibiotics. · Take pain medicines exactly as directed. ¨ If the doctor gave you a prescription medicine for pain, take it as prescribed. ¨ If you are not taking a prescription pain medicine, ask your doctor if you can take an over-the-counter medicine. · Do mild exercise and stretching if your doctor says it is okay. This can help keep your bones and muscles healthy. Avoid strenuous work or exercise until your doctor says you can do it. · Consider physical therapy if your doctor suggests it. Physical therapy may help you have a normal range of movement. · Do not smoke. Smoking can slow healing of the infection. If you need help quitting, talk to your doctor about stop-smoking programs and medicines. These can increase your chances of quitting for good. When should you call for help? Call 911 anytime you think you may need emergency care. For example, call if: 
  · You have severe bone pain.  
 Call your doctor now or seek immediate medical care if: 
  · You continue to have bone pain.  
  · You have signs of infection, such as: 
¨ Increased pain, swelling, warmth, or redness. ¨ Red streaks leading from a wound. ¨ Pus draining from a wound. ¨ A fever.  Watch closely for changes in your health, and be sure to contact your doctor if: 
  · You do not get better as expected. Where can you learn more? Go to http://ulises-jared.info/. Enter T018 in the search box to learn more about \"Osteomyelitis: Care Instructions. \" Current as of: November 21, 2017 Content Version: 11.8 © 2640-9993 [employer]. Care instructions adapted under license by [employer] (which disclaims liability or warranty for this information). If you have questions about a medical condition or this instruction, always ask your healthcare professional. [place] [address] any warranty or liability for your use of this information. Sepsis: Care Instructions Your Care Instructions Sepsis is an intense reaction to an infection. It can cause deadly damage to the body and lead to a dangerously low blood pressure. You may have inflammation across large areas of your body. It can damage tissue and even go deep into your organs. Infections that can lead to sepsis include: · A skin infection such as from a cut. · A lung infection like pneumonia. · A kidney infection. · A gut infection such as E. coli. It's important to care for yourself and try to avoid infections so that you don't get sepsis again. Follow-up care is a key part of your treatment and safety. Be sure to make and go to all appointments, and call your doctor if you are having problems. It's also a good idea to know your test results and keep a list of the medicines you take. How can you care for yourself at home? · If your doctor prescribed antibiotics, take them as directed. Do not stop taking them just because you feel better. You need to take the full course of antibiotics. · Help prevent infections that could lead to sepsis: ¨ Try to avoid colds and flu.  If you must be around people who have a cold or the flu, wash your hands often. And get a flu vaccine every year. ¨ Get a pneumococcal vaccine shot (to prevent pneumonia, meningitis, and other infections). If you have had one before, ask your doctor if you need another dose. ¨ Clean any wounds or scrapes. · Do not smoke or use other tobacco products. When you quit smoking, you are less likely to get a cold, the flu, bronchitis, and pneumonia. If you need help quitting, talk to your doctor about stop-smoking programs and medicines. These can increase your chances of quitting for good. · To prevent dehydration, drink plenty of fluids. Choose water and other caffeine-free clear liquids until you feel better. If you have kidney, heart, or liver disease and have to limit fluids, talk with your doctor before you increase the amount of fluids you drink. · Eat a healthy diet. Include fruits, vegetables, and whole grains in your diet every day. · If your doctor recommends it, try doing some physical activity. Walking is a good choice. Bit by bit, increase the amount you walk every day. When should you call for help? [id number] anytime you think you may need emergency care. For example, call if: 
  · You passed out (lost consciousness).  
 Call your doctor now or seek immediate medical care if: 
  · You have symptoms of sepsis. These may include: ¨ Shortness of breath. ¨ A fast heart rate. ¨ Cool, pale, or clammy skin. ¨ Feeling confused.  
  · You are dizzy or lightheaded, or you feel like you may faint.  
  · You have a fever or chills.  
 Watch closely for changes in your health, and be sure to contact your doctor if: 
  · You do not get better as expected. Where can you learn more? Go to http://ulises-jared.info/. Enter G239 in the search box to learn more about \"Sepsis: Care Instructions. \" Current as of: November 20, 2017 Content Version: 11.8 © 8502-1279 Healthwise[employer].  Care instructions adapted under license by [address] (which disclaims liability or warranty for this information). If you have questions about a medical condition or this instruction, always ask your healthcare professional. [address] any warranty or liability for your use of this information. ___________________________________________________________________________________________________________________________________ Bandspeed Announcement We are excited to announce that we are making your provider's discharge notes available to you in Bandspeed. You will see these notes when they are completed and signed by the physician that discharged you from your recent hospital stay. If you have any questions or concerns about any information you see in Bandspeed, please call the Health Information Department where you were seen or reach out to your Primary Care Provider for more information about your plan of care. Introducing [facility]! Dear Satya Carreon: Thank you for requesting a Bandspeed account. Our records indicate that you already have an active Bandspeed account. You can access your account anytime at [email or web address] Did you know that you can access your hospital and ER discharge instructions at any time in Bandspeed? You can also review all of your test results from your hospital stay or ER visit. Additional Information If you have questions, please visit the Frequently Asked Questions section of the Bandspeed website at [email or web address] Remember, Bandspeed is NOT to be used for urgent needs. For medical emergencies, dial 911. Now available from your iPhone and Android! Introducing Paolo Francis As a [facility] patient, I wanted to make you aware of our electronic visit tool called Paolo Francis. [facility] 24/7 allows you to connect within minutes with a medical provider 24 hours a day, seven days a week via a mobile device or tablet or logging into a secure website from your computer. You can access [employer] from anywhere in the [place]. A virtual visit might be right for you when you have a simple condition and feel like you just dont want to get out of bed, or cant get away from work for an appointment, when your regular [facility] provider is not available (evenings, weekends or holidays), or when youre out of town and need minor care. Electronic visits cost only $49 and if the [employer] 24/DWNLD provider determines a prescription is needed to treat your condition, one can be electronically transmitted to a nearby pharmacy*. Please take a moment to enroll today if you have not already done so. The enrollment process is free and takes just a few minutes. To enroll, please download the MyAcademicProgram teresa to your tablet or phone, or visit [email or web address] to enroll on your computer. And, as an [address] [place] [address] patient with a [employer] account, the results of your visits will be scanned into your electronic medical record and your primary care provider will be able to view the scanned results. We urge you to continue to see your regular [facility] provider for your ongoing medical care. And while your primary care provider may not be the one available when you seek a Paolo Francis virtual visit, the peace of mind you get from getting a real diagnosis real time can be priceless. For more information on Paolo Federicomarshalfin, view our Frequently Asked Questions (FAQs) at [email or web address]. Sincerely, 
 
Anthony Jacobs MD 
Chief Medical Officer Bam Andres *:  certain medications cannot be prescribed via Paolo 60moshari Providers Seen During Your Hospitalization Provider Specialty Primary office phone Jose Lay MD Emergency Medicine [phone] [place] , [address] [facility] Internal Medicine [phone] Immunizations Administered for This Admission Name Date  
 TB Skin Test (PPD) Intradermal  Deferred (), 10/5/2018 Your Primary Care Physician (PCP) Primary Care Physician Office Phone Office Fax Karthik Ma [phone] You are allergic to the following Allergen Reactions Lortab (Hydrocodone-Acetaminophen) Itching Recent Documentation Height Weight BMI Smoking Status 1.854 m 84.4 kg 24.54 kg/m2 Current Every Day Smoker Emergency Contacts Name Discharge Info Relation Home Work Mobile Elvira Lopez  Child [2] [phone] Elvira Lopez  Child [2] [phone] Chantale Greco  Daughter [21] [phone] Patient Belongings The following personal items are in your possession at time of discharge: 
  Dental Appliances: None         Home Medications: None      Clothing: Belt, Pants, Shirt    Other [facility] Setting Please provide this summary of care documentation to your next provider. Signatures-by signing, you are acknowledging that this After Visit Summary has been reviewed with you and you have received a copy. Patient Signature:  ____________________________________________________________ Date:  ____________________________________________________________  
  
Celester Rota Provider Signature:  ____________________________________________________________ Date:  ____________________________________________________________

## 2018-10-05 NOTE — H&P
HOSPITALIST H&P 
NAME:  Candy Landrum Age:  72 y.o. 
:   1945 MRN:   [id number] PCP: Sangeetha Godfrey MD 
Consulting MD: Treatment Team: Attending Provider: Mahamed Crook MD; Primary Nurse: Darion Goss RN; Utilization Review: Denis Denis, DEJA 
HPI:  
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 He seemsvery limited in understanding his underlying conditions and seriousness of them. Upon call from Dr. Heidy Mitchell, ED physician, Mr. Shilo Govea was refusing nasal cannula oxygen despite being short of breath and oxygen saturation of mid 80s. He was refusing due to the oxygen 'dries out my nose.' He reports his breathing worsens with minimal exertion (walking 10-25 feet) and lying flat. L chest pain aggravated by same things. He had an NSTEMI last hospitalization with troponin peaking at 28.4 on  (though was not checked after peak). He was recommended for L heart cath at the time, but he refused due to possibly needing dialysis afterward due to his CKD. He started coughing a day or so ago. Currently productive of yellow sputum that is blood tinged. He states his L foot started hurting during the end of last hospitalization and has worsened. Difficulty bearing weight due to the pain. Has ulcer on lateral side just proximal to 5th MTP joint. Notably, this is his third hospitalization since 18. In the ED, CXR shows bilateral infiltrates/pulmonary edema, Cr of 4.84 with K of 5.5, O2 sats as low as 82% on room air, WBC count of 15.5K, and tachycardia.  
 
Hospitalist service asked to admit for further evaluation of hypoxic respiratory failure, worsening renal function, and chest pain. Complete ROS done and is as stated in HPI or otherwise negative Past Medical History:  
Diagnosis Date  Abnormal CT scan, chest 12/27/2015  Acute CHF ([facility] 12/26/2015  Acute systolic congestive heart failure ([facility] 12/30/2015  MO (acute kidney injury) ([facility] 7/29/2012  Anemia of chronic disease 9/6/2018  Bilateral pleural effusion 12/27/2015  Chest pain 12/26/2015  Depression  DM (diabetes mellitus), type 2 ([facility] 7/29/2012  Encephalopathy due to infection 1/7/2017  
 HTN (hypertension) 7/29/2012  Hypoglycemia 7/29/2012  
 NSTEMI (non-ST elevated myocardial infarction) ([facility] 1/5/2017  Tobacco use 12/27/2015 Past Surgical History:  
Procedure Laterality Date  HX ORTHOPAEDIC Prior to Admission Medications Prescriptions Last Dose Informant Patient Reported? Taking? Insulin Syringe-Needle U-100 (INSULIN SYRINGE) 1 mL 30 gauge x 5/16 syrg   No No  
Sig: As directed Lancets misc   No No  
Sig: As directed  
amLODIPine (NORVASC) 5 mg tablet   No No  
Sig: Take 2 Tabs by mouth daily. aspirin 81 mg chewable tablet   No No  
Sig: Take 1 Tab by mouth daily (after breakfast). atorvastatin (LIPITOR) 80 mg tablet   No No  
Sig: Take 1 Tab by mouth daily. glucose blood VI test strips (ASCENSIA AUTODISC VI, ONE TOUCH ULTRA TEST VI) strip   No No  
Sig: As directed  
insulin lispro (HUMALOG) 100 unit/mL injection   No No  
Sig: For Blood Sugar (mg/dL) of:    
Less than 150 =   0 units          
150 -199 =   2 units 200 -249 =   4 units 250 -299 =   6 units 300 -349 =   8 units  
isosorbide mononitrate ER (IMDUR) 30 mg tablet   No No  
Sig: Take 1 Tab by mouth daily. metoprolol succinate (TOPROL-XL) 100 mg tablet   No No  
Sig: Take 1 Tab by mouth daily. nitroglycerin (NITROSTAT) 0.4 mg SL tablet   Yes No  
Sig: by SubLINGual route every five (5) minutes as needed for Chest Pain. sodium bicarbonate 650 mg tablet   No No  
Sig: Take 1 Tab by mouth three (3) times daily (with meals). Facility-Administered Medications: None Allergies Allergen Reactions  Lortab [Hydrocodone-Acetaminophen] Itching Social History Substance Use Topics  Smoking status: Current Every Day Smoker Packs/day: 0.50 Years: 45.00 Types: Cigarettes  Smokeless tobacco: Never Used  Alcohol use No  
  
Family History Problem Relation Age of Onset  Diabetes Mother  Kidney Disease Mother  Diabetes Father  Kidney Disease Father  Kidney Disease Sister Objective:  
 
Visit Vitals  /73  Pulse 94  Temp 98.4 °F (36.9 °C)  Resp 22  
 Ht 6' 1\" (1.854 m)  Wt 93.4 kg (206 lb)  SpO2 94%  BMI 27.18 kg/m2 Temp (24hrs), Av.4 °F (36.9 °C), Min:98.4 °F (36.9 °C), Max:98.4 °F (36.9 °C) Patient Vitals for the past 24 hrs: 
 Temp Pulse Resp BP SpO2  
10/05/18 1258 - 94 22 - 94 % 10/05/18 1221 - (!) 103 - 127/73 -  
10/05/18 1158 - (!) 101 - 127/73 (!) 87 % 10/05/18 1059 - (!) 101 26 125/66 90 % 10/05/18 0953 - (!) 104 27 122/68 (!) 82 % 10/05/18 0900 - (!) 103 21 - (!) 86 % 10/05/18 0858 - (!) 104 (!) 34 125/69 -  
10/05/18 0848 98.4 °F (36.9 °C) (!) 111 18 122/70 (!) 86 % 10/05/18 0847 - - - 122/70 - Oxygen Therapy O2 Sat (%): 94 % (10/05/18 1258) Pulse via Oximetry: 94 beats per minute (10/05/18 125) O2 Device: Nasal cannula (10/05/18 1258) O2 Flow Rate (L/min): 3 l/min (10/05/18 125) Physical Exam: 
General:    Alert, cooperative, no distress, chronically ill appearing Head:   Normocephalic, without obvious abnormality, atraumatic. Nose:  Nares normal. No drainage or sinus tenderness. Lungs:   Rales bilaterally, cough productive of yellow/blood-tinged mucous Heart:   Slightly tachycardic Abdomen:   Soft, non-tender. Not distended.   Bowel sounds normal.  
 Extremities: L leg with 2+ pitting edema, L foot with ulcer just proximal to 5th MTP with fluctuence noted and surrounding erythema, R BKA, R thumb amputation Skin:     Texture, turgor normal. No rashes or lesions. Not Jaundiced Neurologic: Alert and oriented x 3, no focal deficits, he is a poor historian overall Data Review:  
Recent Results (from the past 24 hour(s)) EKG, 12 LEAD, INITIAL Collection Time: 10/05/18  8:56 AM  
Result Value Ref Range Ventricular Rate 107 BPM  
 Atrial Rate 107 BPM  
 P-R Interval 112 ms QRS Duration 84 ms Q-T Interval 346 ms  
 QTC Calculation (Bezet) 461 ms Calculated P Axis 55 degrees Calculated R Axis 65 degrees Calculated T Axis -119 degrees Diagnosis    
  !! AGE AND GENDER SPECIFIC ECG ANALYSIS !! Sinus tachycardia Septal infarct (cited on or before 24-SEP-2018) Marked ST abnormality, possible inferior subendocardial injury Abnormal ECG When compared with ECG of 24-SEP-2018 02:49, 
Serial changes of Septal infarct Present Confirmed by SEEMA CRAVEN (), Hao Tomlinson ([id number]) on 10/5/2018 12:07:38 PM 
  
CBC WITH AUTOMATED DIFF Collection Time: 10/05/18  9:01 AM  
Result Value Ref Range WBC 15.5 (H) 4.3 - 11.1 K/uL  
 RBC 3.06 (L) 4.23 - 5.6 M/uL HGB 8.8 (L) 13.6 - 17.2 g/dL HCT 28.5 (L) 41.1 - 50.3 % MCV 93.1 79.6 - 97.8 FL  
 MCH 28.8 26.1 - 32.9 PG  
 MCHC 30.9 (L) 31.4 - 35.0 g/dL  
 RDW 12.8 % PLATELET 078 223 - 659 K/uL MPV 10.8 9.4 - 12.3 FL ABSOLUTE NRBC 0.00 0.0 - 0.2 K/uL  
 DF AUTOMATED NEUTROPHILS 92 (H) 43 - 78 % LYMPHOCYTES 3 (L) 13 - 44 % MONOCYTES 4 4.0 - 12.0 % EOSINOPHILS 0 (L) 0.5 - 7.8 % BASOPHILS 0 0.0 - 2.0 % IMMATURE GRANULOCYTES 1 0.0 - 5.0 %  
 ABS. NEUTROPHILS 14.2 (H) 1.7 - 8.2 K/UL  
 ABS. LYMPHOCYTES 0.5 0.5 - 4.6 K/UL  
 ABS. MONOCYTES 0.7 0.1 - 1.3 K/UL  
 ABS. EOSINOPHILS 0.0 0.0 - 0.8 K/UL  
 ABS. BASOPHILS 0.0 0.0 - 0.2 K/UL  
 ABS. IMM. GRANS. 0.1 0.0 - 0.5 K/UL METABOLIC PANEL, COMPREHENSIVE Collection Time: 10/05/18  9:01 AM  
Result Value Ref Range Sodium 140 136 - 145 mmol/L Potassium 5.5 (H) 3.5 - 5.1 mmol/L Chloride 111 (H) 98 - 107 mmol/L  
 CO2 18 (L) 21 - 32 mmol/L Anion gap 11 7 - 16 mmol/L Glucose 261 (H) 65 - 100 mg/dL BUN 53 (H) 8 - 23 MG/DL Creatinine 4.84 (H) 0.8 - 1.5 MG/DL  
 GFR est AA 15 (L) >60 ml/min/1.73m2 GFR est non-AA 13 (L) >60 ml/min/1.73m2 Calcium 8.3 8.3 - 10.4 MG/DL Bilirubin, total 0.7 0.2 - 1.1 MG/DL  
 ALT (SGPT) 16 12 - 65 U/L  
 AST (SGOT) 36 15 - 37 U/L Alk. phosphatase 71 50 - 136 U/L Protein, total 7.8 6.3 - 8.2 g/dL Albumin 2.6 (L) 3.2 - 4.6 g/dL Globulin 5.2 (H) 2.3 - 3.5 g/dL A-G Ratio 0.5 (L) 1.2 - 3.5 BNP Collection Time: 10/05/18  9:01 AM  
Result Value Ref Range  pg/mL POC LACTIC ACID Collection Time: 10/05/18  9:05 AM  
Result Value Ref Range Lactic Acid (POC) 1.7 0.5 - 1.9 mmol/L  
POC TROPONIN-I Collection Time: 10/05/18  9:22 AM  
Result Value Ref Range Troponin-I (POC) 0.54 (H) 0.02 - 0.05 ng/ml Imaging [facility] XR CHEST PORT Final Result IMPRESSION: Bilateral airspace disease in a pattern suggestive of pulmonary  
edema, though underlying infection not excluded. [facility] Assessment and Plan: Active Hospital Problems Diagnosis Date Noted  Pulmonary edema 10/05/2018  Acute respiratory failure with hypoxemia ([facility] 10/05/2018  Leukocytosis 10/05/2018  Tachycardia 10/05/2018  Diabetic ulcer of left midfoot associated with type 2 diabetes mellitus ([facility] 10/05/2018  Cellulitis of left foot 10/05/2018  Type 2 diabetes mellitus with complication, without long-term current use of insulin ([facility] 04/07/2017  S/P BKA (below knee amputation) unilateral ([facility] 02/13/2017  CKD (chronic kidney disease) stage 4, GFR 15-29 ml/min ([facility] 08/11/2016  Chronic systolic congestive heart failure ([facility] 12/30/2015 EF 35-40% on 2015 Echo 
  
 HTN (hypertension) 07/29/2012 PLAN 
·  Admit to remote telemetry bed for acute hypoxic respiratory failure/pulm edema/chest pain. · Sepsis- meets criteria due to tachycardia, leukocytosis, and likely foot infection. · Hypoxic respiratory failure with pulmonary edema- start lasix 60 mg IV q 12 hours (given 80 mg in ED. · Volume overload likely combination of systolic CHF and worsening renal function. · Possibly underlying pneumonia- start vanc and zosyn due to recent hospitalizations. · Check procalcitonin and sputum culture. · Chest pain/elevated trop/recent NSTEMI- consult cardiology as he states he will consider L heart cath. · Trend troponin. Continue aspirin, beta blocker, isosorbide. · MO on CKD 4 with hyperkalemia- consult nephro due to volume overload. Suspect he is going to need dialysis at some point. · Give calcium gluconate, sodium bicarb IV x 1, and Kayexalate x 1.   
· Repeat BMP at 4 PM. · L foot ulcer with surrounding cellulitis- vanc/zosyn. · Consult vascular surgery to evaluate for possible I&D due to area of fluctuance. · NPO for now. · Palliative Care consult ordered by Dr. Leila Langston. Code Status: Full Surrogate decision maker- states he has 12 children and no HCPOA. He does state that he would [facility] [place] and [place]. Anticipated discharge: 4 days pending clinical course Signed By: Jeremi Miranda MD   
 October 5, 2018

## 2018-10-05 NOTE — PROGRESS NOTES
Initial visit to assess pt's spiritual needs. Pt was sleeping; left a card.  
 
 
Chaplain Maryjane Landin MDiv,ThM,PhD

## 2018-10-05 NOTE — PROGRESS NOTES
Pharmacokinetic Consult to Pharmacist 
 
Ziyad Elgin is a 72 y.o. male being treated for sepsis secondary to foot infection and possible pneumonia with vancomycin and pip/tazo. Height: 6' 1\" (185.4 cm)  Weight: 93.4 kg (206 lb) Lab Results Component Value Date/Time BUN 53 (H) 10/05/2018 09:01 AM  
 Creatinine 4.84 (H) 10/05/2018 09:01 AM  
 WBC 15.5 (H) 10/05/2018 09:01 AM  
 Procalcitonin 0.6 10/05/2018 09:00 AM  
 Lactic acid 8.3 (H) 07/29/2012 03:15 PM  
 Lactic Acid (POC) 1.7 10/05/2018 09:05 AM  
  
Estimated Creatinine Clearance: 15.8 mL/min (based on Cr of 4.84). CULTURES: 
10/5  Blood Cx: In process Sputum cx: In process Day 1 of vancomycin. Goal trough is 15-20. Vancomycin dose initiated at 2500 mg IV x 1. Will dose intermittently in this patient with MO on CKD. Levels will be ordered as clinically indicated. Pharmacy will continue to follow. Please call with any questions. Thank you, Gregory Elena, PharmD Clinical Pharmacist 
[phone]

## 2018-10-05 NOTE — PROGRESS NOTES
Admission database completed. Patient states EMS brought home medications,  Only belongings in room are pants, shirts, belt, wallet, cell phone, and prosthetic leg. Called ED and they state patient's belongings were sent - they state clothing and wallet in belonging bag, patient had cell phone, and his prosthetic leg.

## 2018-10-05 NOTE — ED NOTES
TRANSFER - OUT REPORT: 
 
Verbal report given to Pierce Seay RN(name) on David Pappas  being transferred to [facility](unit) for routine progression of care Report consisted of patients Situation, Background, Assessment and  
Recommendations(SBAR). Information from the following report(s) SBAR, OR Summary and Recent Results was reviewed with the receiving nurse. Lines:  
Peripheral IV 10/05/18 Right Hand (Active) Site Assessment Clean, dry, & intact 10/5/2018  9:07 AM  
Phlebitis Assessment 0 10/5/2018  9:07 AM  
Infiltration Assessment 0 10/5/2018  9:07 AM  
Dressing Status Clean, dry, & intact 10/5/2018  9:07 AM  
  
 
Opportunity for questions and clarification was provided. Patient transported with: 
 O2 @ 3 liters

## 2018-10-05 NOTE — CONSULTS
Palliative Care Patient: Mg Renee MRN: [id number]  SSN: [id number] YOB: 1945  Age: 72 y.o. Sex: male Date of Request: 10/5/2018 Date of Consult:  10/5/2018 Reason for Consult:  goals of care Requesting Physician: Dr Huong Nicholson Assessment/Plan:  
 
Principal Diagnosis:   
Debility, Unspecified  R53.81 Additional Diagnoses: · Dyspnea  R06.00 
· Edema  R60.9 · Fatigue, Lethargy  R53.83 
· Pain, limb  M79.609 · Counseling, Encounter for Medical Advice  Z71.9 
· Encounter for Palliative Care  Z51.5 Palliative Performance Scale (PPS): PPS: 60 Medical Decision Making:  
Reviewed and summarized notes from admission to present, as well as recent PC notes from last hospitalization Discussed case with appropriate providers Reviewed laboratory and x-ray data from admission to present Pt resting in bed, no distress noted. No family at bedside. Pt known to PC from last admission. He has been resistant to dialysis in the past.  He tells me today that he is reconsidering. He stated \"Im tired of coming here and I'm tired of feeling bad. \"  He is stated \"I don't want to do dialysis but I will if I have to. \"  He wants to feel well enough to go hunting and fishing again. He loves the outdoors and has not been able to get out for some time. He spoke of his 12 children, and multiple grandchildren and great grandchildren. He stated \"I've got so many I've lost count. \"   He joked that they aggravate him but also stated he can't imagine his life without them. Assured him of our ongoing care. Will continue to follow. Will discuss findings with members of the interdisciplinary team.   
 
Thank you for this referral.    
 
  
. 
 
Subjective:  
 
History obtained from:  Patient and Chart Chief Complaint: Dyspnea History of Present Illness:  Mr Carlos Woodward is a 73 yo AA male with PMH of CHF, CKD, DM, HTN, PVD, and other conditions listed below, who presented to the ER from home on 10/5/2018 with c/o increasing dyspnea for several days, intermittent chest pain, and worsening left foot pain. He reported associated productive cough with blood tinged sputum. He denied fevers. Work up revealed hypoxia, bilateral infiltrates/pulmonary edema on CXR, Creatinine of 4.84, K of 5.5, and WBC count of 15.5. Pt will be admitted for further management. Pt reports he is feeling a little better after a nebulizer treatment. Advance Directive: No      
Code Status:  Prior Health Care Power of : No - Patient does not have a [address] Past Medical History:  
Diagnosis Date  Abnormal CT scan, chest 12/27/2015  Acute CHF ([facility] 12/26/2015  Acute systolic congestive heart failure ([facility] 12/30/2015  MO (acute kidney injury) ([facility] 7/29/2012  Anemia of chronic disease 9/6/2018  Bilateral pleural effusion 12/27/2015  Chest pain 12/26/2015  Depression  DM (diabetes mellitus), type 2 ([facility] 7/29/2012  Encephalopathy due to infection 1/7/2017  
 HTN (hypertension) 7/29/2012  Hypoglycemia 7/29/2012  
 NSTEMI (non-ST elevated myocardial infarction) ([facility] 1/5/2017  Tobacco use 12/27/2015 Past Surgical History:  
Procedure Laterality Date  HX ORTHOPAEDIC Family History Problem Relation Age of Onset  Diabetes Mother  Kidney Disease Mother  Diabetes Father  Kidney Disease Father  Kidney Disease Sister Social History Substance Use Topics  Smoking status: Current Every Day Smoker Packs/day: 0.50 Years: 45.00 Types: Cigarettes  Smokeless tobacco: Never Used  Alcohol use No  
 
Prior to Admission medications Medication Sig Start Date End Date Taking? Authorizing Provider  
isosorbide mononitrate ER (IMDUR) 30 mg tablet Take 1 Tab by mouth daily.  9/29/18  Yes Brenna Osuna MD  
insulin lispro (HUMALOG) 100 unit/mL injection For Blood Sugar (mg/dL) of:    
 Less than 150 =   0 units          
150 -199 =   2 units 200 -249 =   4 units 250 -299 =   6 units 300 -349 =   8 units 9/28/18  Yes Chula Ogden MD  
Insulin Syringe-Needle U-100 (INSULIN SYRINGE) 1 mL 30 gauge x 5/16 syrg As directed 9/28/18  Yes Chula Ogden MD  
Lancets misc As directed 9/28/18  Yes Chula Ogden MD  
glucose blood VI test strips (ASCENSIA AUTODISC VI, ONE TOUCH ULTRA TEST VI) strip As directed 9/28/18  Yes Chula Ogden MD  
amLODIPine (NORVASC) 5 mg tablet Take 2 Tabs by mouth daily. 9/10/18  Yes Rylee Byrd MD  
sodium bicarbonate 650 mg tablet Take 1 Tab by mouth three (3) times daily (with meals). 9/10/18  Yes Rylee Byrd MD  
metoprolol succinate (TOPROL-XL) 100 mg tablet Take 1 Tab by mouth daily. 4/26/17  Yes Francie Boudreaux MD  
nitroglycerin (NITROSTAT) 0.4 mg SL tablet by SubLINGual route every five (5) minutes as needed for Chest Pain. Yes Historical Provider  
atorvastatin (LIPITOR) 80 mg tablet Take 1 Tab by mouth daily. 9/19/16  Yes Francie Boudreaux MD  
aspirin 81 mg chewable tablet Take 1 Tab by mouth daily (after breakfast). 12/30/15  Yes Leslee Núñez NP Allergies Allergen Reactions  Lortab [Hydrocodone-Acetaminophen] Itching Review of Systems: A comprehensive review of systems was negative except for:  
Constitutional: Positive for fatigue. Respiratory: Positive for improving dyspnea MS: Positive for left foot pain Objective:  
 
Visit Vitals  /73  Pulse 94  Temp 98.4 °F (36.9 °C)  Resp 22  
 Ht 6' 1\" (1.854 m)  Wt 206 lb (93.4 kg)  SpO2 94%  BMI 27.18 kg/m2 Physical Exam: 
 
General:  Cooperative. Debilitated. No acute distress. Eyes:  Conjunctivae/corneas clear Nose: Nares normal. Septum midline. Neck: Supple, symmetrical, trachea midline Lungs:   Coarse bilaterally, unlabored Heart:  Regular rate and rhythm Abdomen:   Soft, non-tender, non-distended Extremities: Left leg with pitting edema. Right BKA, right thumb amputation Skin: Skin color, texture, turgor normal. Ulcer to left foot Neurologic: Nonfocal  
Psych: Alert and oriented Assessment:  
 
Hospital Problems  Date Reviewed: 9/26/2018 Codes Class Noted POA Pulmonary edema ICD-10-CM: J81.1 ICD-9-CM: 106  10/5/2018 Yes * (Principal)Acute respiratory failure with hypoxemia ([facility] ICD-10-CM: J96.01 
ICD-9-CM: 518.81  10/5/2018 Yes Leukocytosis ICD-10-CM: M06.900 ICD-9-CM: 288.60  10/5/2018 Yes Tachycardia ICD-10-CM: R00.0 ICD-9-CM: 785.0  10/5/2018 Yes Diabetic ulcer of left midfoot associated with type 2 diabetes mellitus ([facility] ICD-10-CM: E11.621, J24.787 ICD-9-CM: 250.80, 707.14  10/5/2018 Yes Cellulitis of left foot ICD-10-CM: L03.116 ICD-9-CM: 682.7  10/5/2018 Yes Type 2 diabetes mellitus with complication, without long-term current use of insulin (HCC) (Chronic) ICD-10-CM: E11.8 ICD-9-CM: 250.90  4/7/2017 Yes S/P BKA (below knee amputation) unilateral (HCC) (Chronic) ICD-10-CM: L96.538 ICD-9-CM: V49.75  2/13/2017 Yes CKD (chronic kidney disease) stage 4, GFR 15-29 ml/min (HCC) (Chronic) ICD-10-CM: N18.4 ICD-9-CM: 585.4  8/11/2016 Yes Chronic systolic congestive heart failure (HCC) (Chronic) ICD-10-CM: Y99.59 ICD-9-CM: 428.22, 428.0  12/30/2015 Yes Overview Signed 1/6/2017  8:59 AM by July Maddox MD  
  EF 35-40% on 2015 Echo HTN (hypertension) (Chronic) ICD-10-CM: I10 
ICD-9-CM: 401.9  7/29/2012 Yes Signed By: Lenora Schwartz NP October 5, 2018

## 2018-10-05 NOTE — CONSULTS
[address] [facility], [address] [facility] 
[phone] History and Physical / Surgical Consultation Charlee Thompson    Admit date: 10/5/2018 MRN: [id number]     : 1945     Age: 72 y.o.       
 
10/5/2018 4:22 PM 
 
Subjective/HPI:  
This patient is a 72 y.o. black male seen at the request of Storm Geronimo MD and evaluated for left lateral foot wound. The patient was alone in his room but provides an adequate history. He is known to our practice most recently from a consult 2018 for permanent dialysis access during his last hospitalization (-2018) and also from right LE arteriogram (2017, Ria Russell MD). PMH with CAD and NSTEMI last admission, sCHF (EF 30-35%), DM2, CKD4 and hospitalization x2 September with MO, PAD, s/p right BKA (2017, Sarah Mustafa MD) for gangrenous right foot and ongoing tobacco use disorder, he presented to the ED this AM with worsening dyspnea and was admitted for work-up for acute hypoxic respiratory failure / pulmonary edema / chest pain. There was concern for sepsis and potential foot infection, and we were consulted. Today the patient states his foot began to hurt during his last admission and progressed at discharge once he became more mobile. He admits to drainage from wound and skin hypersensitivity to touch. He denies fever, chills or lancinating pain. WBC 15.5. Patient's past medical, surgical, family and social histories were reviewed as noted here and below. Review of Systems A comprehensive review of systems was negative except for that written in the HPI. Past Medical History:  
Diagnosis Date  Abnormal CT scan, chest 2015  Acute CHF ([address] 2015  Acute systolic congestive heart failure ([address] 2015  MO (acute kidney injury) ([facility] 2012  Anemia of chronic disease 2018  Bilateral pleural effusion 2015  Chest pain 2015  Depression  DM (diabetes mellitus), type 2 ([facility] 7/29/2012  Encephalopathy due to infection 1/7/2017  
 HTN (hypertension) 7/29/2012  Hypoglycemia 7/29/2012  
 NSTEMI (non-ST elevated myocardial infarction) ([facility] 1/5/2017  Tobacco use 12/27/2015 Past Surgical History:  
Procedure Laterality Date  HX ORTHOPAEDIC Allergies Allergen Reactions  Lortab [Hydrocodone-Acetaminophen] Itching Social History Substance Use Topics  Smoking status: Current Every Day Smoker Packs/day: 0.50 Years: 45.00 Types: Cigarettes  Smokeless tobacco: Never Used  Alcohol use No  
  
Family History Problem Relation Age of Onset  Diabetes Mother  Kidney Disease Mother  Diabetes Father  Kidney Disease Father  Kidney Disease Sister Prior to Admission Medications Prescriptions Last Dose Informant Patient Reported? Taking? Insulin Syringe-Needle U-100 (INSULIN SYRINGE) 1 mL 30 gauge x 5/16 syrg   No Yes Sig: As directed Lancets misc   No Yes Sig: As directed  
amLODIPine (NORVASC) 5 mg tablet   No Yes Sig: Take 2 Tabs by mouth daily. aspirin 81 mg chewable tablet   No Yes Sig: Take 1 Tab by mouth daily (after breakfast). atorvastatin (LIPITOR) 80 mg tablet   No Yes Sig: Take 1 Tab by mouth daily. glucose blood VI test strips (ASCENSIA AUTODISC VI, ONE TOUCH ULTRA TEST VI) strip   No Yes Sig: As directed  
insulin lispro (HUMALOG) 100 unit/mL injection   No Yes Sig: For Blood Sugar (mg/dL) of:    
Less than 150 =   0 units          
150 -199 =   2 units 200 -249 =   4 units 250 -299 =   6 units 300 -349 =   8 units  
isosorbide mononitrate ER (IMDUR) 30 mg tablet   No Yes Sig: Take 1 Tab by mouth daily. metoprolol succinate (TOPROL-XL) 100 mg tablet   No Yes Sig: Take 1 Tab by mouth daily. nitroglycerin (NITROSTAT) 0.4 mg SL tablet   Yes Yes Sig: by SubLINGual route every five (5) minutes as needed for Chest Pain. sodium bicarbonate 650 mg tablet   No Yes Sig: Take 1 Tab by mouth three (3) times daily (with meals). Facility-Administered Medications: None Current Facility-Administered Medications Medication Dose Route Frequency  piperacillin-tazobactam (ZOSYN) 4.5 g in 0.9% sodium chloride (MBP/ADV) 100 mL  4.5 g IntraVENous Q12H  
 [START ON 10/6/2018] aspirin chewable tablet 81 mg  81 mg Oral DAILY AFTER BREAKFAST  [START ON 10/6/2018] atorvastatin (LIPITOR) tablet 80 mg  80 mg Oral DAILY  [START ON 10/6/2018] isosorbide mononitrate ER (IMDUR) tablet 30 mg  30 mg Oral DAILY  [START ON 10/6/2018] metoprolol succinate (TOPROL-XL) tablet 100 mg  100 mg Oral DAILY  nitroglycerin (NITROSTAT) tablet 0.4 mg  0.4 mg SubLINGual Q5MIN PRN  
 sodium bicarbonate tablet 650 mg  650 mg Oral TID WITH MEALS  sodium chloride (NS) flush 5-10 mL  5-10 mL IntraVENous Q8H  
 sodium chloride (NS) flush 5-10 mL  5-10 mL IntraVENous PRN  
 tuberculin injection 5 Units  5 Units IntraDERMal ONCE  
 acetaminophen (TYLENOL) tablet 650 mg  650 mg Oral Q4H PRN  
 heparin (porcine) injection 5,000 Units  5,000 Units SubCUTAneous Q12H  calcium gluconate 1 g in 0.9% sodium chloride 100 mL IVPB  1 g IntraVENous ONCE  
 sodium polystyrene (KAYEXALATE) 15 gram/60 mL oral suspension 15 g  15 g Oral NOW  vancomycin (VANCOCIN) 2500 mg in  mL infusion  2,500 mg IntraVENous ONCE  
 furosemide (LASIX) injection 60 mg  60 mg IntraVENous BID  Vancomycin intermittent dosing placeholder   Other Rx Dosing/Monitoring  insulin lispro (HUMALOG) injection   SubCUTAneous AC&HS Objective:  
 
Vitals:  
 10/05/18 1158 10/05/18 1221 10/05/18 1258 10/05/18 1455 BP: 127/73 127/73  114/69 Pulse: (!) 101 (!) 103 94 89 Resp:   22 18 Temp:    97.5 °F (36.4 °C) SpO2: (!) 87%  94% 94% Weight:      
Height:      
 
 
Physical Exam:  
General well-developed, well-nourished and in no acute distress Skin  (see below for left foot) warm and moist with texture appropriate for age; no jaundice or rashes HEENT normocephalic, extraocular muscles intact without nystagmus, oral mucosa moist 
Neck  supple without JVD or bruits; trachea midline Lungs  O2 by nasal cannula, respirations unlabored, lungs clear to auscultation bilaterally Heart  regular rate and rhythm Abdomen soft, protuberant but non-distended, non-tender; bowel sounds normoactive Extremities warm without cyanosis; L foot mild edema with erythema at lateral dorsum, peeling skin, well-circumscribed oval lesion <1.5cm dry with dark brown-red discoloration, nails thick with mycotic involvement Pulses  2+ palpable and symmetric at radial, brachial and popliteal; left PT > DP due to edema, DP, PT and peroneal signals with Doppler Neurological alert and oriented; no gross sensorimotor deficits Data Review Recent Labs 10/05/18 
 0901 WBC  15.5* HGB  8.8* HCT  28.5*  
PLT  301 Recent Labs 10/05/18   10/05/18 
 0901 NA   --   140 K   --   5.5*  
CL   --   111* CO2   --   18* GLU   --   261* BUN   --   53* CREA   --   4.84* TROIQ  1.56*   --   
 
 
Assessment / Plan:  
 
Hospital Problems  Date Reviewed: 9/26/2018 Codes Class Noted POA Pulmonary edema ICD-10-CM: J81.1 ICD-9-CM: 996  10/5/2018 Yes * (Principal)Acute respiratory failure with hypoxemia ([facility] ICD-10-CM: J96.01 
ICD-9-CM: 518.81  10/5/2018 Yes Leukocytosis ICD-10-CM: Y43.612 ICD-9-CM: 288.60  10/5/2018 Yes Diabetic ulcer of left midfoot associated with type 2 diabetes mellitus ([facility] ICD-10-CM: E11.621, A34.834 ICD-9-CM: 250.80, 707.14  10/5/2018 Yes Cellulitis of left foot ICD-10-CM: L03.116 ICD-9-CM: 682.7  10/5/2018 Yes Sepsis ([facility] ICD-10-CM: A41.9 ICD-9-CM: 038.9, 995.91  10/5/2018 Yes  Type 2 diabetes mellitus with complication, without long-term current use of insulin (HCC) (Chronic) ICD-10-CM: E11.8 ICD-9-CM: 250.90  4/7/2017 Yes S/P BKA (below knee amputation) unilateral (HCC) (Chronic) ICD-10-CM: D10.477 ICD-9-CM: V49.75  2/13/2017 Yes NSTEMI (non-ST elevated myocardial infarction) ([facility] ICD-10-CM: I21.4 ICD-9-CM: 410.70  1/5/2017 Yes CKD (chronic kidney disease) stage 4, GFR 15-29 ml/min (HCC) (Chronic) ICD-10-CM: N18.4 ICD-9-CM: 585.4  8/11/2016 Yes Chronic systolic congestive heart failure (HCC) (Chronic) ICD-10-CM: L20.33 ICD-9-CM: 428.22, 428.0  12/30/2015 Yes Overview Signed 1/6/2017  8:59 AM by Jese Buckley MD  
  EF 35-40% on 2015 Echo HTN (hypertension) (Chronic) ICD-10-CM: I10 
ICD-9-CM: 401.9  7/29/2012 Yes Nicko Rothman is a 72 y.o. male with complex medical issues and diabetic foot ulcer on his remaining lower extremity. Patient was discussed with vascular surgeon on call, Esvin Mariano MD.  
- lower extremity arterial duplex with MAGGIE ordered - wound care consult With poor respiratory status and worsening renal function, he is not a candidate for arteriogram at this time. As his acute issues are stabilized, he may potentially be appropriate for debridement early next week. Dr. Luzma Gaeg will evaluate the patient, review the chart and imaging and will develop further plan. Thank you very much for this referral. We appreciate the opportunity to participate in this patient's care. We will follow along with above stated plan. YAIMA Pittman-RIP Physician Assistant with [facility] Vascular Surgery [place].  Luzma Gage MD / Yany Auguste MD

## 2018-10-05 NOTE — IP AVS SNAPSHOT
[address] 
[phone] Patient: Blanca Dear MRN: [id number] FI/3/1413 A check michael indicates which time of day the medication should be taken. My Medications START taking these medications Instructions Each Dose to Equal  
 Morning Noon Evening Bedtime  
 cephALEXin 500 mg capsule Commonly known as:  Meli Flakes Your last dose was:  10/11/18 9am  
Your next dose is:  10/11/18 4pm  
   
 Take 1 Cap by mouth every eight (8) hours for 28 days. Indications: Bone Infections, Skin and Skin Structure Infection 500 mg  
    
8am  
   
   
4pm  
   
12midnight  
  
 furosemide 80 mg tablet Commonly known as:  LASIX Your last dose was:  10/11/18 am  
Your next dose is:  10/11/18 4pm  
   
 Take 1 Tab by mouth two (2) times a day. Indications: Pulmonary Edema due to Chronic Heart Failure 80 mg  
    
  
   
   
  
   
  
 minocycline 100 mg capsule Commonly known as:  Rosebud Rosin Your last dose was:  10/11/18 9am  
Your next dose is:   10/11/18 9pm  
   
 Take 1 Cap by mouth every twelve (12) hours for 28 days. Indications: Skin and Skin Structure Infection, Osteomyelitis of foot 100 mg CONTINUE taking these medications Instructions Each Dose to Equal  
 Morning Noon Evening Bedtime  
 amLODIPine 5 mg tablet Commonly known as:  Nona Paget Your next dose is:  10/12/18 am  
   
 Take 2 Tabs by mouth daily. 10 mg  
    
  
   
   
   
  
 aspirin 81 mg chewable tablet Your last dose was:  10/11/18 am  
Your next dose is:  10/12/18 am  
   
 Take 1 Tab by mouth daily (after breakfast). 81 mg  
    
  
   
   
   
  
 atorvastatin 80 mg tablet Commonly known as:  LIPITOR Your last dose was:  10/11/18 am  
Your next dose is:  10/12/18 am  
   
 Take 1 Tab by mouth daily. 80 mg  
    
  
   
   
   
  
 glucose blood VI test strips strip Commonly known as:  ASCENSIA AUTODISC VI, ONE TOUCH ULTRA TEST VI  
   
 As directed  
     
   
   
   
  
 insulin lispro 100 unit/mL injection Commonly known as:  HUMALOG Your next dose is:  Before next meal  
   
 For Blood Sugar (mg/dL) of:  Less than 150 = 0 units  150 -199 = 2 units 200 -249 = 4 units 250 -299 = 6 units 300 -349 = 8 units Insulin Syringe-Needle U-100 1 mL 30 gauge x 5/16 Syrg Commonly known as:  INSULIN SYRINGE As directed  
     
   
   
   
  
 isosorbide mononitrate ER 30 mg tablet Commonly known as:  IMDUR Your last dose was:  10/11/18 am  
Your next dose is:  10/12/18 am  
   
 Take 1 Tab by mouth daily. 30 mg Lancets Misc As directed  
     
   
   
   
  
 metoprolol succinate 100 mg tablet Commonly known as:  TOPROL-XL Your last dose was:  10/11/18 am  
Your next dose is:  10/12/18 am  
   
 Take 1 Tab by mouth daily. 100 mg NITROSTAT 0.4 mg SL tablet Generic drug:  nitroglycerin Your next dose is:  Per as needed  
   
 by SubLINGual route every five (5) minutes as needed for Chest Pain.  
     
   
   
   
  
 sodium bicarbonate 650 mg tablet Your last dose was:  10/11/18 12pm  
Your next dose is:  10/11/18 5pm  
   
 Take 1 Tab by mouth three (3) times daily (with meals). 650 mg Where to Get Your Medications Information on where to get these meds will be given to you by the nurse or doctor. ! Ask your nurse or doctor about these medications  
  cephALEXin 500 mg capsule  
 furosemide 80 mg tablet  
 minocycline 100 mg capsule

## 2018-10-05 NOTE — PROGRESS NOTES
Patient arrives to room 825 at this time from ER. Settled into bed with siderails up x2 and callbell in reach

## 2018-10-05 NOTE — PROGRESS NOTES
Problem: Pressure Injury - Risk of 
Goal: *Prevention of pressure injury Document Seun Scale and appropriate interventions in the flowsheet. Outcome: Progressing Towards Goal 
Pressure Injury Interventions: 
  
 
Moisture Interventions: Maintain skin hydration (lotion/cream) Activity Interventions: Pressure redistribution bed/mattress(bed type) Mobility Interventions: Pressure redistribution bed/mattress (bed type) Nutrition Interventions: Document food/fluid/supplement intake Friction and Shear Interventions: Lift sheet

## 2018-10-05 NOTE — CONSULTS
[facility] Cardiology Consult Date of  Admission: 10/5/2018  8:46 AM  
 
Primary Care Physician: Dr Josseline Smith Primary Cardiologist: Dr Anastasia Valdez Referring Physician: Dr Margarito Corcoran Consulting Physician: Dr Anastasia Valdez 
 
CC/Reason for consult: NSTEMI Sharie Romberg is a 72 y.o. male admitted for Acute respiratory failure with hypoxemia ([facility] Pulmonary edema. He has a h/o DM, tobacco use less than 1/2 ppd, htn, CKD, CAD w LHC  w high grade stenosis of the L circ treated medically, and ICM w echo 9-24-18 w EF 30-35%, mild LAE, mod MR. NO f/h of CAD. He was admitted two weeks ago with respiratory failure, renal failure and troponin elevated at 28 w further decreased EF. He refused dialysis and LHC and was treated medically. He states he was compliant with meds but has had increased dyspnea at rest w L sided chest pressure radiating w sharp pains to L neck, 6-7/10, occurring at rest, without any alleviating factors, lasting a few minutes. Pain w nausea and diaphoresis. He has orthopnea and LE edema. He presented to the ER with dyspnea, O2 sat in the 80's, hgb 8.8, K 5.5, cr 4.84, /94, EKG ST w rate 107 w inferolateral st depression. He is CP free at this time. Nephrology pending. Patient Active Problem List  
Diagnosis Code  
 HTN (hypertension) I10  
 Chest pain R07.9  Bilateral pleural effusion J90  
 Tobacco use Z72.0  Abnormal CT scan, chest R93.89  Chronic systolic congestive heart failure (HCC) I50.22  
 Cardiomyopathy (HCC) I42.9  CKD (chronic kidney disease) stage 4, GFR 15-29 ml/min ([facility]) N18.4  Type 2 diabetes mellitus with right diabetic foot infection (HCC) E11.628, L08.9  NSTEMI (non-ST elevated myocardial infarction) ([facility] I21.4  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 ([facility] I70.261  S/P BKA (below knee amputation) unilateral ([facility] Z89.519  Type 2 diabetes mellitus with complication, without long-term current use of insulin (HCC) E11.8  Coronary artery disease involving native coronary artery of native heart without angina pectoris I25.10  Debility R53.81  
 Glaucoma syndrome H40.9  Onychomycosis B35.1  MO (acute kidney injury) ([facility] N17.9  Anemia of chronic disease D63.8  Pneumonia due to infectious organism J18.9  EKG, abnormal R94.31  
 Elevated troponin R74.8  Bacteremia R78.81  
 Pulmonary edema J81.1  Acute respiratory failure with hypoxemia ([facility]) J96.01  
 Leukocytosis D72.829  
 Tachycardia R00.0  Diabetic ulcer of left midfoot associated with type 2 diabetes mellitus (HCC) E11.621, L97.429  
 Cellulitis of left foot L03.116 Past Medical History:  
Diagnosis Date  Abnormal CT scan, chest 12/27/2015  Acute CHF ([facility] 12/26/2015  Acute systolic congestive heart failure ([facility] 12/30/2015  MO (acute kidney injury) ([facility] 7/29/2012  Anemia of chronic disease 9/6/2018  Bilateral pleural effusion 12/27/2015  Chest pain 12/26/2015  Depression  DM (diabetes mellitus), type 2 ([address] 7/29/2012  Encephalopathy due to infection 1/7/2017  
 HTN (hypertension) 7/29/2012  Hypoglycemia 7/29/2012  
 NSTEMI (non-ST elevated myocardial infarction) ([facility] 1/5/2017  Tobacco use 12/27/2015 Past Surgical History:  
Procedure Laterality Date  HX ORTHOPAEDIC Allergies Allergen Reactions  Lortab [Hydrocodone-Acetaminophen] Itching Family History Problem Relation Age of Onset  Diabetes Mother  Kidney Disease Mother  Diabetes Father  Kidney Disease Father  Kidney Disease Sister Social History Substance Use Topics  Smoking status: Current Every Day Smoker Packs/day: 0.50 Years: 45.00 Types: Cigarettes  Smokeless tobacco: Never Used  Alcohol use No  
  
 
Current Facility-Administered Medications Medication Dose Route Frequency  piperacillin-tazobactam (ZOSYN) 4.5 g in 0.9% sodium chloride (MBP/ADV) 100 mL  4.5 g IntraVENous Q12H  
 [START ON 10/6/2018] amLODIPine (NORVASC) tablet 10 mg  10 mg Oral DAILY  [START ON 10/6/2018] aspirin chewable tablet 81 mg  81 mg Oral DAILY AFTER BREAKFAST  [START ON 10/6/2018] atorvastatin (LIPITOR) tablet 80 mg  80 mg Oral DAILY  [START ON 10/6/2018] isosorbide mononitrate ER (IMDUR) tablet 30 mg  30 mg Oral DAILY  [START ON 10/6/2018] metoprolol succinate (TOPROL-XL) tablet 100 mg  100 mg Oral DAILY  nitroglycerin (NITROSTAT) tablet 0.4 mg  0.4 mg SubLINGual Q5MIN PRN  
 sodium bicarbonate tablet 650 mg  650 mg Oral TID WITH MEALS  sodium chloride (NS) flush 5-10 mL  5-10 mL IntraVENous Q8H  
 sodium chloride (NS) flush 5-10 mL  5-10 mL IntraVENous PRN  
 tuberculin injection 5 Units  5 Units IntraDERMal ONCE  
 acetaminophen (TYLENOL) tablet 650 mg  650 mg Oral Q4H PRN  
 heparin (porcine) injection 5,000 Units  5,000 Units SubCUTAneous Q12H  calcium gluconate 1 g in 0.9% sodium chloride 100 mL IVPB  1 g IntraVENous ONCE  
 sodium polystyrene (KAYEXALATE) 15 gram/60 mL oral suspension 15 g  15 g Oral NOW  vancomycin (VANCOCIN) 2500 mg in  mL infusion  2,500 mg IntraVENous ONCE  
 furosemide (LASIX) injection 60 mg  60 mg IntraVENous BID  Vancomycin intermittent dosing placeholder   Other Rx Dosing/Monitoring Review of Symptoms: 
General: no weight change,  no weakness, fever or chills Skin: no rashes, lumps, or other skin changes HEENT: no headache, dizziness, lightheadedness, vision changes, hearing changes, tinnitus, vertigo, sinus pressure/pain, bleeding gums, sore throat, or hoarseness Neck: no swollen glands, goiter, pain or stiffness Respiratory: + cough, sputum, hemoptysis, + dyspnea, wheezing Cardiovascular: + as per HPI Gastrointestinal: no GERD, constipation, diarrhea, liver problems, or h/o GI bleed Urinary: no frequency, urgency , hematuria, burning/pain with urination, recent flank pain, polyuria, nocturia, or difficulty urinating Peripheral Vascular: no claudication, leg cramps, prior DVTs, swelling of calves, legs, or feet, color change, or swelling with redness or tenderness Musculoskeletal: + pain all over Psychiatric: no depression or excessive stress Neurological: no sensory or motor loss, seizures, syncope, tremors, numbness, no dementia Hematologic: + anemia Endocrine: no thyroid problems, heat or cold intolerance, excessive sweating, polyuria, polydipsia, no diabetes. Physical Exam 
Vitals:  
 10/05/18 1059 10/05/18 1158 10/05/18 1221 10/05/18 1258 BP: 125/66 127/73 127/73 Pulse: (!) 101 (!) 101 (!) 103 94 Resp: 26   22 Temp:      
SpO2: 90% (!) 87%  94% Weight:      
Height:      
 
 
Physical Exam: 
General: Well Developed, Well Nourished, No Acute Distress, 3L O2 by NC  
HEENT: pupils equal and round, no abnormalities noted Neck: supple, no JVD Heart: S1S2 with RRR Lungs: B crackles Abd: soft, nontender, nondistended, with good bowel sounds Ext: warm, 2+ B edema Skin: warm and dry Psychiatric: Normal mood and affect Neurologic: Alert and oriented X 3 Cardiographics ECG: ST w rate 107 w inferolateral st depression Labs:  
Recent Labs 10/05/18 
 0901 NA  140  
K  5.5*  
BUN  53* CREA  4.84* GLU  261* WBC  15.5* HGB  8.8* HCT  28.5*  
PLT  301 Assessment/Plan: 
 
 Assessment:  
Acute respiratory failure with hypoxemia (HCC) (10/5/2018)- 3L O2 and diuresis HTN (hypertension) (7/29/2012)- norvasc, toprol Chronic systolic congestive heart failure (- 30-35% w mod MR, cont IV lasix, BB, no ACE/ARB due to CKD CKD (chronic kidney disease) stage 4, GFR 15-29 ml/min [facility])- Nephrology pending, consider [facility] after dialysis started NSTEMI (non-ST elevated myocardial infarction) ([facility] (1/5/2017)- stenosis of L circ in 2015, refused LHC and dialysis two weeks ago, now continued CP, troponin actually trended down but inferolateral ischemic EKG changes. Cont ASA, statin, BB, no ACE/ARB due to CKD, consider LHC if dialysis started. S/P BKA (below knee amputation) unilateral ([facility] (2/13/2017)- stable Type 2 diabetes mellitus with complication, without long-term current use of insulin ([facility] (4/7/2017) Pulmonary edema (10/5/2018)- EF 30-35% w mod MR, may need dialysis to manage volume, assess response to IV lasix Leukocytosis (10/5/2018)- Antibiotics, vanc/zosyn, cultures pending Diabetic ulcer of left midfoot associated with type 2 diabetes mellitus ([facility] (10/5/2018)- Per primary Thank you very much for this referral. We appreciate the opportunity to participate in this patient's care. We will follow along with above stated plan. Hermila Clemens PA-C Consulting MD: [place]

## 2018-10-05 NOTE — CONSULTS
[facility] Consultation Admission Date: 
10/5/2018 Admission Diagnosis: 
Acute respiratory failure with hypoxemia ([address] Pulmonary edema History of Present Illness: 
Pt is a 71 yo AAM with progressive CKD, frequent hospitalizations in past month, admitted again for SOB. During his last hospitalization just a little over a week ago, he had elevated troponins but heart cath deferred as patient was very reluctant to start dialysis which was a high possibility if heart cath was done. He presents back with essentially same issue, Cr now about 4.8. Past Medical History:  
Diagnosis Date  Abnormal CT scan, chest 12/27/2015  Acute CHF ([address] 12/26/2015  Acute systolic congestive heart failure ([address] 12/30/2015  MO (acute kidney injury) ([address] 7/29/2012  Anemia of chronic disease 9/6/2018  Bilateral pleural effusion 12/27/2015  Chest pain 12/26/2015  Depression  DM (diabetes mellitus), type 2 ([address] 7/29/2012  Encephalopathy due to infection 1/7/2017  
 HTN (hypertension) 7/29/2012  Hypoglycemia 7/29/2012  
 NSTEMI (non-ST elevated myocardial infarction) ([address] 1/5/2017  Tobacco use 12/27/2015 Past Surgical History:  
Procedure Laterality Date  HX ORTHOPAEDIC Current Facility-Administered Medications Medication Dose Route Frequency  piperacillin-tazobactam (ZOSYN) 4.5 g in 0.9% sodium chloride (MBP/ADV) 100 mL  4.5 g IntraVENous Q12H  
 [START ON 10/6/2018] amLODIPine (NORVASC) tablet 10 mg  10 mg Oral DAILY  [START ON 10/6/2018] aspirin chewable tablet 81 mg  81 mg Oral DAILY AFTER BREAKFAST  [START ON 10/6/2018] atorvastatin (LIPITOR) tablet 80 mg  80 mg Oral DAILY  [START ON 10/6/2018] isosorbide mononitrate ER (IMDUR) tablet 30 mg  30 mg Oral DAILY  [START ON 10/6/2018] metoprolol succinate (TOPROL-XL) tablet 100 mg  100 mg Oral DAILY  nitroglycerin (NITROSTAT) tablet 0.4 mg  0.4 mg SubLINGual Q5MIN PRN  
  sodium bicarbonate tablet 650 mg  650 mg Oral TID WITH MEALS  sodium chloride (NS) flush 5-10 mL  5-10 mL IntraVENous Q8H  
 sodium chloride (NS) flush 5-10 mL  5-10 mL IntraVENous PRN  
 tuberculin injection 5 Units  5 Units IntraDERMal ONCE  
 acetaminophen (TYLENOL) tablet 650 mg  650 mg Oral Q4H PRN  
 heparin (porcine) injection 5,000 Units  5,000 Units SubCUTAneous Q12H  calcium gluconate 1 g in 0.9% sodium chloride 100 mL IVPB  1 g IntraVENous ONCE  
 sodium polystyrene (KAYEXALATE) 15 gram/60 mL oral suspension 15 g  15 g Oral NOW  vancomycin (VANCOCIN) 2500 mg in  mL infusion  2,500 mg IntraVENous ONCE  
 furosemide (LASIX) injection 60 mg  60 mg IntraVENous BID  Vancomycin intermittent dosing placeholder   Other Rx Dosing/Monitoring Allergies Allergen Reactions  Lortab [Hydrocodone-Acetaminophen] Itching Social History Substance Use Topics  Smoking status: Current Every Day Smoker Packs/day: 0.50 Years: 45.00 Types: Cigarettes  Smokeless tobacco: Never Used  Alcohol use No  
  
Family History Problem Relation Age of Onset  Diabetes Mother  Kidney Disease Mother  Diabetes Father  Kidney Disease Father  Kidney Disease Sister Review of Systems: 
Gen - no fever, appetite unchanged CV - no chest pain, no palpitation Lung - + shortness of breath, no cough Abd - no tenderness, no nausea/vomiting, no diarrhea Ext - + edema Musculoskeletal - no joint pain Neurologic - no headaches, no dizziness Skin - no rashes, no purpura Genitourinary - no change in urine output Objective: 
Vitals:  
 10/05/18 1158 10/05/18 1221 10/05/18 1258 10/05/18 1455 BP: 127/73 127/73  114/69 Pulse: (!) 101 (!) 103 94 89 Resp:   22 18 Temp:    97.5 °F (36.4 °C) SpO2: (!) 87%  94% 94% Weight:      
Height:      
 
No intake or output data in the 24 hours ending 10/05/18 1517 Wt Readings from Last 3 Encounters: 10/05/18 93.4 kg (206 lb)  
09/28/18 93.5 kg (206 lb 3.2 oz) 09/05/18 95.3 kg (210 lb) GEN - in no distress, alert and oriented Neck - no JVD 
CV - regular, no murmur, no rub Lung - basilar rales bilaterally, lungs expand symmetrically Chest wall - normal appearance Abd - soft, nontender, bowel sounds present Ext - right BKA, 1+ edema Neurologic - nonfocal 
Genitourinary - bladder nonpalpable Skin - no rashes, no purpura Data Review:  
 
Recent Labs 10/05/18 
 0901 WBC  15.5* HGB  8.8* HCT  28.5*  
PLT  301 Recent Labs 10/05/18 
 0901 NA  140  
K  5.5*  
CL  111* CO2  18* BUN  53* CREA  4.84* CA  8.3 GLU  261* Assessment:  
 
Principal Problem: 
  Acute respiratory failure with hypoxemia ([address] (10/5/2018) Active Problems: 
  HTN (hypertension) (7/29/2012) Chronic systolic congestive heart failure ([address] (12/30/2015) Overview: EF 35-40% on 2015 Echo 
 
  CKD (chronic kidney disease) stage 4, GFR 15-29 ml/min ([facility]) (8/11/2016) NSTEMI (non-ST elevated myocardial infarction) ([address] (1/5/2017) S/P BKA (below knee amputation) unilateral ([address] (2/13/2017) Type 2 diabetes mellitus with complication, without long-term current use of insulin ([address] (4/7/2017) Pulmonary edema (10/5/2018) Leukocytosis (10/5/2018) Diabetic ulcer of left midfoot associated with type 2 diabetes mellitus ([address] (10/5/2018) Cellulitis of left foot (10/5/2018) Plan: 1. MO/CKD - Vs progression of underlying CKD - Previous baseline Cr presumed to be upper 3's - Admission Cr 4.8 
- Agree with diuresis - If condition not much better or if renal function worse, we talked about likely HD next week 2. SOB 3. CHF - elevated troponin last admission - Could consider heart cath if we start HD this hospitalization 4. Possible left foot infection - Abx 5.  DM

## 2018-10-05 NOTE — ED TRIAGE NOTES
Pt arrives per EMS for complaints of shortness of breath ongoing since going home from hospital. Pt found 88% on RA but refused NC by EMS. Given duoneb treatment and pt did fine with mask. Family told EMS they were told either pt would need hospice or dialysis. Family has not made decision yet.

## 2018-10-05 NOTE — PROGRESS NOTES
Shift report given. Patient resting in bed with siderails up x2 and callbell in reach. No requests or concerns voiced at this time

## 2018-10-06 PROBLEM — N17.9 ACUTE RENAL FAILURE SUPERIMPOSED ON STAGE 5 CHRONIC KIDNEY DISEASE, NOT ON CHRONIC DIALYSIS (HCC): Status: ACTIVE | Noted: 2018-01-01

## 2018-10-06 PROBLEM — J18.9 SEPSIS DUE TO PNEUMONIA (HCC): Status: ACTIVE | Noted: 2018-01-01

## 2018-10-06 PROBLEM — J81.0 ACUTE PULMONARY EDEMA (HCC): Status: ACTIVE | Noted: 2018-01-01

## 2018-10-06 PROBLEM — N18.4 ACUTE RENAL FAILURE SUPERIMPOSED ON STAGE 4 CHRONIC KIDNEY DISEASE (HCC): Status: ACTIVE | Noted: 2018-01-01

## 2018-10-06 PROBLEM — A41.9 SEPSIS DUE TO PNEUMONIA (HCC): Status: ACTIVE | Noted: 2018-01-01

## 2018-10-06 PROBLEM — L03.90 SEPSIS DUE TO CELLULITIS (HCC): Status: ACTIVE | Noted: 2018-01-01

## 2018-10-06 PROBLEM — I21.4 NSTEMI (NON-ST ELEVATED MYOCARDIAL INFARCTION) (HCC): Status: ACTIVE | Noted: 2018-01-01

## 2018-10-06 PROBLEM — N18.5 ACUTE RENAL FAILURE SUPERIMPOSED ON STAGE 5 CHRONIC KIDNEY DISEASE, NOT ON CHRONIC DIALYSIS (HCC): Status: ACTIVE | Noted: 2018-01-01

## 2018-10-06 NOTE — PROGRESS NOTES
CELINA recevied from [address]. Patient remains in stable condition with respirations even/unlabored. No acute distress noted at this time. Deb Andthomas and R. Thumb amputation noted. Oxygen noted to nasal cannula. Call light remains within reach, patient encouraged to call nurse prn assist. Will continue to monitor per policy.

## 2018-10-06 NOTE — PROGRESS NOTES
[address] Nephrology Progress Note Follow-Up on: CKD ROS: 
Gen - no fever, no chills, appetite unchanged CV - no chest pain, no palpitation Lung - shortness of breath better, no cough Abd - no tenderness, no nausea/vomiting, no diarrhea Ext - no edema Exam: 
Vitals:  
 10/05/18 8450 10/05/18 2247 10/06/18 0315 10/06/18 8504 BP: (!) 148/93 146/73 128/76 127/72 Pulse: 98 (!) 106 100 99 Resp: 18 20 18 18 Temp: 97.7 °F (36.5 °C) 98.1 °F (36.7 °C) 97.5 °F (36.4 °C) 97.9 °F (36.6 °C) SpO2: 92% 92% 91% 92% Weight:      
Height:      
 
 
 
Intake/Output Summary (Last 24 hours) at 10/06/18 1007 Last data filed at 10/06/18 0900 Gross per 24 hour Intake              940 ml Output             1525 ml Net             -585 ml Wt Readings from Last 3 Encounters:  
10/05/18 93.4 kg (206 lb)  
09/28/18 93.5 kg (206 lb 3.2 oz) 09/05/18 95.3 kg (210 lb) GEN - in no distress CV - regular, no murmur, no rub Lung - clear bilaterally Abd - soft, nontender Ext - no edema Recent Labs 10/06/18 
 2887  10/05/18 
 0901 WBC  9.2  15.5* HGB  7.3*  8.8* HCT  23.9*  28.5* PLT  276  301 Recent Labs 10/06/18 
 2418  10/05/18 
 1624  10/05/18 
 0901 NA  141  142  140  
K  4.0  4.6  5.5*  
CL  111*  112*  111* CO2  19*  21  18* BUN  51*  54*  53* CREA  5.03*  5.14*  4.84* CA  7.8*  8.1*  8.3 GLU  128*  208*  261* Assessment / Plan: 
Principal Problem: 
  Acute respiratory failure with hypoxemia ([facility] (10/5/2018) Active Problems: 
  HTN (hypertension) (7/29/2012) Chronic systolic congestive heart failure ([facility] (12/30/2015) Overview: EF 35-40% on 2015 Echo 
 
  CKD (chronic kidney disease) stage 4, GFR 15-29 ml/min ([facility]) (8/11/2016) NSTEMI (non-ST elevated myocardial infarction) ([facility] (10/5/2018) S/P BKA (below knee amputation) unilateral ([facility] (2/13/2017)   Type 2 diabetes mellitus with complication, without long-term current use of insulin ([facility] (4/7/2017) Anemia of chronic disease (9/6/2018) Acute pulmonary edema ([address] (10/5/2018) Sepsis due to pneumonia ([facility] (10/5/2018) Diabetic ulcer of left midfoot associated with type 2 diabetes mellitus ([facility] (10/5/2018) Cellulitis of left foot (10/5/2018) Sepsis due to cellulitis ([facility] (10/5/2018) Acute renal failure superimposed on stage 4 chronic kidney disease ([facility] (10/6/2018) 1. MO/CKD - Vs progression of underlying CKD - Previous baseline Cr presumed to be upper 3's - Admission Cr 4.8 
- Diuresing well, Cr remains around 5, symptoms already much improved, off oxygen today, transition to PO Lasix - May not need dialysis here 2. SOB - much better 3. CHF - elevated troponin last admission 4. Possible left foot infection - Abx 5.  DM

## 2018-10-06 NOTE — PROGRESS NOTES
[facility] CARDIOLOGY PROGRESS NOTE 
      
 
10/6/2018 10:04 AM 
 
Admit Date: 10/5/2018 Subjective:  
Resting comfortably. No complaints. Speaks very little. ROS: 
GEN:  No fever or chills Cardiovascular:  As noted above:no CP. Pulmonary:  As noted above:SOB improved. Neuro:  No new focal motor or sensory loss Objective:  
  
Vitals:  
 10/05/18 1937 10/05/18 2247 10/06/18 0315 10/06/18 0847 BP: (!) 148/93 146/73 128/76 127/72 Pulse: 98 (!) 106 100 99 Resp: 18 20 18 18 Temp: 97.7 °F (36.5 °C) 98.1 °F (36.7 °C) 97.5 °F (36.4 °C) 97.9 °F (36.6 °C) SpO2: 92% 92% 91% 92% Weight:      
Height:      
 
 
Physical Exam: 
General-no distress Neck- supple, no JVD 
CV- regular rate and rhythm no MRG Lung- clear bilaterally Abd- soft, nontender, nondistended Ext- no edema bilaterally. Skin- warm and dry Psychiatric:  Normal mood and affect. Neurologic:  Alert and oriented X 3 Data Review:  
Recent Labs 10/06/18 
 9522  10/05/18 
 1624  10/05/18 
 0901 NA  141  142  140  
K  4.0  4.6  5.5*  
BUN  51*  54*  53* CREA  5.03*  5.14*  4.84* GLU  128*  208*  261* WBC  9.2   --   15.5* HGB  7.3*   --   8.8* HCT  23.9*   --   28.5* PLT  276   --   301 TELEMETRY:  NSR Assessment/Plan:  
 
Principal Problem: 
  Acute respiratory failure with hypoxemia ([facility] (10/5/2018):per Pulmonary Medicine. Active Problems: 
  HTN (hypertension) (7/29/2012) Chronic systolic congestive heart failure ([address] (12/30/2015): Consider C after HD started. Overview: EF 35-40% on 2015 Echo 
 
  CKD (chronic kidney disease) stage 4, GFR 15-29 ml/min ([facility]) (8/11/2016): Worse. Followed by Nephrology. NSTEMI (non-ST elevated myocardial infarction) ([address] (10/5/2018):no CP . Consider LHC if hemodialysis started. S/P BKA (below knee amputation) unilateral ([facility] (2/13/2017)   Type 2 diabetes mellitus with complication, without long-term current use of insulin ([facility] (4/7/2017) Anemia of chronic disease (9/6/2018) Acute pulmonary edema (HCC) (10/5/2018):improved with iv Lasix. Sepsis due to pneumonia [facility]) (10/5/2018):per primary team 
 
  Diabetic ulcer of left midfoot associated with type 2 diabetes mellitus ([facility] (10/5/2018) Cellulitis of left foot (10/5/2018):per primary team. 
 
  Sepsis due to cellulitis ([facility] (10/5/2018):per primary team. 
 
  Acute renal failure superimposed on stage 4 chronic kidney disease ([facility] (10/6/2018): Nephrology following.  
 
 
 
 
 
 
 
Mittie Sacks, MD 
10/6/2018 10:04 AM

## 2018-10-06 NOTE — PROGRESS NOTES
Hospitalist Progress Note Admit Date:  10/5/2018  8:46 AM  
Name:  Irene Stephen Age:  72 y.o. 
:  1945 MRN:  [id number] PCP:  Sherly Escalona MD 
Treatment Team: Attending Provider: Ester Silva. [facility] [place], MD; Utilization Review: Mike Urrutia RN; Consulting Provider: Jonathon Garcia NP; Consulting Provider: Saravanan Mills MD; Consulting Provider: Jessica Tariq MD; Consulting Provider: Alberta Coates MD 
 
Subjective:  
Radha Grande is a 73 yo M with history of systolic CHF, stage 4 CKD, history of R BKA due to foot infection, chronic LV systolic CHF with LVEF of 30-35%, and recent NSTEMI last hospitalization (-/18), who presented to the ED with worsening shortness of breath x2 days, intermittent L chest pain, and worsening L foot pain. oxygen saturation was in mid 80s. He had productive cough with concern for PNA so vanc/zosyn started. ulcer on lateral side just proximal to 5th MTP joint with surrounding cellulitis. He had an NSTEMI last hospitalization with troponin peaking at 28.4 on  (though was not checked after peak). He was recommended for L heart cath at the time, but he refused due to possibly needing dialysis afterward due to his CKD. Notably, this is his third hospitalization since 18. In the ED, CXR showed bilateral infiltrates/pulmonary edema, Cr of 4.84 with K of 5.5, O2 sats as low as 82% on room air, WBC count of 15.5K, and tachycardia. 10/ - not very interested in talking this morning. denies CP, SOB, cough currently. Is urinating, 1500 out Objective:  
Patient Vitals for the past 24 hrs: 
 Temp Pulse Resp BP SpO2  
10/06/18 0749 97.9 °F (36.6 °C) 99 18 127/72 92 % 10/06/18 0315 97.5 °F (36.4 °C) 100 18 128/76 91 % 10/05/18 2247 98.1 °F (36.7 °C) (!) 106 20 146/73 92 % 10/05/18 1937 97.7 °F (36.5 °C) 98 18 (!) 148/93 92 % 10/05/18 1455 97.5 °F (36.4 °C) 89 18 114/69 94 % 10/05/18 1258 - 94 22 - 94 % 10/05/18 1221 - (!) 103 - 127/73 -  
10/05/18 1158 - (!) 101 - 127/73 (!) 87 % 10/05/18 1059 - (!) 101 26 125/66 90 % 10/05/18 0953 - (!) 104 27 122/68 (!) 82 % Oxygen Therapy O2 Sat (%): 92 % (10/06/18 0749) Pulse via Oximetry: (!) 2 beats per minute (10/05/18 1410) O2 Device: Nasal cannula (10/05/18 1410) O2 Flow Rate (L/min): 3 l/min (10/05/18 1258) Intake/Output Summary (Last 24 hours) at 10/06/18 6366 Last data filed at 10/06/18 0211 Gross per 24 hour Intake              400 ml Output             1525 ml Net            -1125 ml General:    Well nourished. Alert. CV:   RRR. No murmur, rub, or gallop. Lungs:   Basilar rales Extremities: Warm and dry. No cyanosis or edema. R BKA. Ulcer lateral aspect L foot with some mild surrounding erythema Skin:     No rashes or jaundice. Neuro:  No gross focal deficits Data Review: 
I have reviewed all labs, meds, telemetry events, and studies from the last 24 hours: 
 
Recent Results (from the past 24 hour(s)) POC TROPONIN-I Collection Time: 10/05/18  9:22 AM  
Result Value Ref Range Troponin-I (POC) 0.54 (H) 0.02 - 0.05 ng/ml POC TROPONIN-I Collection Time: 10/05/18  1:15 PM  
Result Value Ref Range Troponin-I (POC) 0.67 (H) 0.02 - 0.05 ng/ml TROPONIN I Collection Time: 10/05/18  2:44 PM  
Result Value Ref Range Troponin-I, Qt. 1.56 (HH) 0.02 - 0.05 NG/ML  
GLUCOSE, POC Collection Time: 10/05/18  3:02 PM  
Result Value Ref Range Glucose (POC) 238 (H) 65 - 100 mg/dL TROPONIN I Collection Time: 10/05/18  4:24 PM  
Result Value Ref Range Troponin-I, Qt. 1.77 (HH) 0.02 - 4.41 NG/ML  
METABOLIC PANEL, BASIC Collection Time: 10/05/18  4:24 PM  
Result Value Ref Range Sodium 142 136 - 145 mmol/L Potassium 4.6 3.5 - 5.1 mmol/L Chloride 112 (H) 98 - 107 mmol/L  
 CO2 21 21 - 32 mmol/L Anion gap 9 7 - 16 mmol/L Glucose 208 (H) 65 - 100 mg/dL  BUN 54 (H) 8 - 23 MG/DL  
 Creatinine 5.14 (H) 0.8 - 1.5 MG/DL  
 GFR est AA 14 (L) >60 ml/min/1.73m2 GFR est non-AA 12 (L) >60 ml/min/1.73m2 Calcium 8.1 (L) 8.3 - 10.4 MG/DL  
TROPONIN I Collection Time: 10/05/18  7:23 PM  
Result Value Ref Range Troponin-I, Qt. 1.77 (HH) 0.02 - 0.05 NG/ML  
GLUCOSE, POC Collection Time: 10/05/18  9:07 PM  
Result Value Ref Range Glucose (POC) 222 (H) 65 - 100 mg/dL GLUCOSE, POC Collection Time: 10/06/18  5:16 AM  
Result Value Ref Range Glucose (POC) 76 65 - 100 mg/dL METABOLIC PANEL, BASIC Collection Time: 10/06/18  6:16 AM  
Result Value Ref Range Sodium 141 136 - 145 mmol/L Potassium 4.0 3.5 - 5.1 mmol/L Chloride 111 (H) 98 - 107 mmol/L  
 CO2 19 (L) 21 - 32 mmol/L Anion gap 11 7 - 16 mmol/L Glucose 128 (H) 65 - 100 mg/dL BUN 51 (H) 8 - 23 MG/DL Creatinine 5.03 (H) 0.8 - 1.5 MG/DL  
 GFR est AA 15 (L) >60 ml/min/1.73m2 GFR est non-AA 12 (L) >60 ml/min/1.73m2 Calcium 7.8 (L) 8.3 - 10.4 MG/DL  
CBC WITH AUTOMATED DIFF Collection Time: 10/06/18  6:16 AM  
Result Value Ref Range WBC 9.2 4.3 - 11.1 K/uL  
 RBC 2.56 (L) 4.23 - 5.6 M/uL HGB 7.3 (L) 13.6 - 17.2 g/dL HCT 23.9 (L) 41.1 - 50.3 % MCV 93.4 79.6 - 97.8 FL  
 MCH 28.5 26.1 - 32.9 PG  
 MCHC 30.5 (L) 31.4 - 35.0 g/dL  
 RDW 12.7 % PLATELET 460 244 - 627 K/uL MPV 10.6 9.4 - 12.3 FL ABSOLUTE NRBC 0.00 0.0 - 0.2 K/uL  
 DF AUTOMATED NEUTROPHILS 86 (H) 43 - 78 % LYMPHOCYTES 5 (L) 13 - 44 % MONOCYTES 5 4.0 - 12.0 % EOSINOPHILS 4 0.5 - 7.8 % BASOPHILS 0 0.0 - 2.0 % IMMATURE GRANULOCYTES 1 0.0 - 5.0 %  
 ABS. NEUTROPHILS 7.9 1.7 - 8.2 K/UL  
 ABS. LYMPHOCYTES 0.4 (L) 0.5 - 4.6 K/UL  
 ABS. MONOCYTES 0.5 0.1 - 1.3 K/UL  
 ABS. EOSINOPHILS 0.4 0.0 - 0.8 K/UL  
 ABS. BASOPHILS 0.0 0.0 - 0.2 K/UL  
 ABS. IMM. GRANS. 0.1 0.0 - 0.5 K/UL All Micro Results Procedure Component Value Units Date/Time CULTURE, BLOOD [[id number]] Collected:  10/05/18 8677 Order Status:  Completed Specimen:  Blood from Blood Updated:  10/05/18 1347 CULTURE, BLOOD [[id number]] Collected:  10/05/18 1254 Order Status:  Completed Specimen:  Blood from Blood Updated:  10/05/18 1347 CULTURE, RESPIRATORY/SPUTUM/BRONCH Iris Berny STAIN [[id number]] Order Status:  Sent Specimen:  Sputum from Sputum No results found for this visit on 10/05/18. Current Meds: 
Current Facility-Administered Medications Medication Dose Route Frequency  piperacillin-tazobactam (ZOSYN) 4.5 g in 0.9% sodium chloride (MBP/ADV) 100 mL  4.5 g IntraVENous Q12H  aspirin chewable tablet 81 mg  81 mg Oral DAILY AFTER BREAKFAST  atorvastatin (LIPITOR) tablet 80 mg  80 mg Oral DAILY  isosorbide mononitrate ER (IMDUR) tablet 30 mg  30 mg Oral DAILY  metoprolol succinate (TOPROL-XL) tablet 100 mg  100 mg Oral DAILY  nitroglycerin (NITROSTAT) tablet 0.4 mg  0.4 mg SubLINGual Q5MIN PRN  
 sodium bicarbonate tablet 650 mg  650 mg Oral TID WITH MEALS  sodium chloride (NS) flush 5-10 mL  5-10 mL IntraVENous Q8H  
 sodium chloride (NS) flush 5-10 mL  5-10 mL IntraVENous PRN  
 tuberculin injection 5 Units  5 Units IntraDERMal ONCE  
 acetaminophen (TYLENOL) tablet 650 mg  650 mg Oral Q4H PRN  
 heparin (porcine) injection 5,000 Units  5,000 Units SubCUTAneous Q12H  furosemide (LASIX) injection 60 mg  60 mg IntraVENous BID  Vancomycin intermittent dosing placeholder   Other Rx Dosing/Monitoring  insulin lispro (HUMALOG) injection   SubCUTAneous AC&HS Other Studies (last 24 hours): 
Duplex Low Ext Arteries With Nell Litten Result Date: 10/6/2018 Lower extremity arterial duplex and ankle brachial indices. INDICATION: Diabetic foot ulcer. COMPARISON: None. FINDINGS: The ankle-brachial indices could not be obtained because of noncompressible vessels. The left the digital waveform shows a dampened waveform.  The right was not performed. The duplex waveforms of the right lower extremity show triphasic waveform at the common femoral artery. The profundus femoris artery is patent. The SFA is patent. Popliteal artery shows biphasic waveform. The peroneal artery posterior tibial artery and PAMELA are all patent. The left lower extremity shows a occluded peroneal artery. The PTA and PAMELA are patent. IMPRESSION: 1. Right lower extremity: No significant major vessel arterial disease. Microvascular disease. Left lower extremity: Occlusion of peroneal artery. No other significant major artery disease. Significant microvascular disease. Assessment and Plan:  
 
Hospital Problems as of 10/6/2018  Date Reviewed: 9/26/2018 Codes Class Noted - Resolved POA Pulmonary edema ICD-10-CM: J81.1 ICD-9-CM: 641  10/5/2018 - Present Yes * (Principal)Acute respiratory failure with hypoxemia ([facility] ICD-10-CM: J96.01 
ICD-9-CM: 518.81  10/5/2018 - Present Yes Leukocytosis ICD-10-CM: T98.635 ICD-9-CM: 288.60  10/5/2018 - Present Yes Tachycardia ICD-10-CM: R00.0 ICD-9-CM: 785.0  10/5/2018 - Present Diabetic ulcer of left midfoot associated with type 2 diabetes mellitus ([facility] ICD-10-CM: E11.621, H73.202 ICD-9-CM: 250.80, 707.14  10/5/2018 - Present Yes Cellulitis of left foot ICD-10-CM: L03.116 ICD-9-CM: 682.7  10/5/2018 - Present Yes Sepsis ([facility] ICD-10-CM: A41.9 ICD-9-CM: 038.9, 995.91  10/5/2018 - Present Yes Type 2 diabetes mellitus with complication, without long-term current use of insulin (HCC) (Chronic) ICD-10-CM: E11.8 ICD-9-CM: 250.90  4/7/2017 - Present Yes S/P BKA (below knee amputation) unilateral (HCC) (Chronic) ICD-10-CM: Y67.326 ICD-9-CM: V49.75  2/13/2017 - Present Yes NSTEMI (non-ST elevated myocardial infarction) ([facility] ICD-10-CM: I21.4 ICD-9-CM: 410.70  1/5/2017 - Present Yes  
   
 CKD (chronic kidney disease) stage 4, GFR 15-29 ml/min ([facility]) (Chronic) ICD-10-CM: N18.4 ICD-9-CM: 585.4  8/11/2016 - Present Yes Chronic systolic congestive heart failure (HCC) (Chronic) ICD-10-CM: S97.86 ICD-9-CM: 428.22, 428.0  12/30/2015 - Present Yes Overview Signed 1/6/2017  8:59 AM by Vasile Magallanes MD  
  EF 35-40% on 2015 Echo HTN (hypertension) (Chronic) ICD-10-CM: I10 
ICD-9-CM: 401.9  7/29/2012 - Present Yes Plan: Hypoxic respiratory failure with pulmonary edema, PNA - 
· Cont lasix IV · Cont vanc and zosyn for PNA. recent hospitalizations. Chest pain/elevated trop/recent NSTEMI-  
· cardiology following. · May need LHC but would need to be on HD · Continue aspirin, statin, beta blocker, isosorbide. MO on CKD 4 with hyperkalemia-  
· Nephrology following, possible he may need HD · Cont bicarb for acidosis L foot ulcer with surrounding cellulitis-  
· vasc surg following · MAGGIE pending · Wound care · Cont vanc/zosyn · May need surgery Goals of Care - 
· PC following Anemia -  
· CKD, chronic disease · Defer transfusion to specialists DC planning/Dispo:  PPD ordered. CM consulted. Needs PT/OT orders Monday Diet:  DIET RENAL 
DVT ppx:  heparin Signed: 
Vasile Magallanes MD

## 2018-10-06 NOTE — PROGRESS NOTES
Visit with patient to build rapport with . Patient is calm. Receptive to  presence. Encouraged and assured patient of our continued care. Maria M Smith,  Staff  C: [phone]  /  [email or web address] Behavioral Health HospitalE-Drive AutosBlue Mountain Hospital, Inc.

## 2018-10-06 NOTE — PROGRESS NOTES
Pharmacokinetic Consult to Pharmacist 
 
Candy Diallo is a 72 y.o. male being treated for sepsis secondary to foot infection and possible pneumonia with vancomycin and pip/tazo. Height: 6' 1\" (185.4 cm)  Weight: 93.4 kg (206 lb) Lab Results Component Value Date/Time BUN 51 (H) 10/06/2018 06:16 AM  
 Creatinine 5.03 (H) 10/06/2018 06:16 AM  
 WBC 9.2 10/06/2018 06:16 AM  
 Procalcitonin 0.6 10/05/2018 09:00 AM  
 Lactic acid 8.3 (H) 07/29/2012 03:15 PM  
 Lactic Acid (POC) 1.7 10/05/2018 09:05 AM  
  
Estimated Creatinine Clearance: 15.2 mL/min (based on Cr of 5.03). CULTURES: 
10/5  Blood Cx:  NGTD, prelim Lab Results Component Value Date/Time Vancomycin, random 22.9 10/06/2018 04:31 PM  
   
 
Day 2 of vancomycin. Goal trough is 15-20. Random level this afternoon is 22.9. Will schedule another dose of 1250 mg IV x 1 to be given tomorrow AM. Continue to dose intermittently in this patient with MO on CKD. Levels will be ordered as clinically indicated. Pharmacy will continue to follow. Please call with any questions. Thank you, Jamaal Hemrosillo, PharmD Clinical Pharmacist 
[phone]

## 2018-10-06 NOTE — PROGRESS NOTES
Patient resting in bed alert and oriented, cooperative with care, breathing even and unlabored, no visual s/s of pain or distress, call light within reach safety measures in place.

## 2018-10-06 NOTE — PROGRESS NOTES
Patient states he feels \"funny. \" He says he just feels \"like I fell into a hole. My vision is funny and I'm weak. I can't breath. \" Patient VSS and blood glucose is 119. MD consulted. Will hold evening dose of lasix for relative hypotension. MD believes he feels weak because his blood pressure is a little low. Patient advised to eat his dinner. Patient states, \"You got something to make me eat? I eat one or two bites and then I'm full. Maybe some Rakel Grime, that would make me want to eat. \" 
 
Will continue to monitor. Temp: 98 °F (36.7 °C) (10/06/18 1613) Pulse (Heart Rate): 85 (10/06/18 1613) Resp Rate: 18 (10/06/18 1613) BP: 103/62 (10/06/18 1613) O2 Sat (%): 94 % (10/06/18 1613) Weight: 93.4 kg (206 lb) (10/05/18 0848)

## 2018-10-07 PROBLEM — E87.20 METABOLIC ACIDOSIS: Status: RESOLVED | Noted: 2018-01-01 | Resolved: 2018-01-01

## 2018-10-07 NOTE — PROGRESS NOTES
Problem: Falls - Risk of 
Goal: *Absence of Falls Document Elena Kuhn Fall Risk and appropriate interventions in the flowsheet. Outcome: Progressing Towards Goal 
Fall Risk Interventions: 
Mobility Interventions: Patient to call before getting OOB Medication Interventions: Evaluate medications/consider consulting pharmacy Elimination Interventions: Call light in reach

## 2018-10-07 NOTE — PROGRESS NOTES
Problem: Pressure Injury - Risk of 
Goal: *Prevention of pressure injury Document Seun Scale and appropriate interventions in the flowsheet. Outcome: Progressing Towards Goal 
Pressure Injury Interventions: 
Sensory Interventions: Check visual cues for pain, Keep linens dry and wrinkle-free, Pressure redistribution bed/mattress (bed type) Moisture Interventions: Absorbent underpads Activity Interventions: Pressure redistribution bed/mattress(bed type) Mobility Interventions: Pressure redistribution bed/mattress (bed type), PT/OT evaluation Nutrition Interventions: Document food/fluid/supplement intake Friction and Shear Interventions: Lift sheet

## 2018-10-07 NOTE — PROGRESS NOTES
[facility] Nephrology Progress Note Follow-Up on: CKD ROS: 
Gen - no fever, no chills, appetite unchanged CV - no chest pain, no palpitation Lung - shortness of breath better, no cough Abd - no tenderness, no nausea/vomiting, no diarrhea Ext - edema better Exam: 
Vitals:  
 10/06/18 2313 10/07/18 0051 10/07/18 0300 10/07/18 8449 BP: 120/70  119/71 128/77 Pulse: 86  82 80 Resp: 18 20 18 Temp: 98.1 °F (36.7 °C)  98.5 °F (36.9 °C) 98.2 °F (36.8 °C) SpO2: 93% 94% 92% 95% Weight:   95.4 kg (210 lb 4.8 oz) Height:      
 
 
 
Intake/Output Summary (Last 24 hours) at 10/07/18 1018 Last data filed at 10/07/18 2687 Gross per 24 hour Intake              900 ml Output             1575 ml Net             -675 ml Wt Readings from Last 3 Encounters:  
10/07/18 95.4 kg (210 lb 4.8 oz) 09/28/18 93.5 kg (206 lb 3.2 oz) 09/05/18 95.3 kg (210 lb) GEN - in no distress CV - regular, no murmur, no rub Lung - clear bilaterally Abd - soft, nontender Ext - trace edema, right BKA Recent Labs 10/07/18 
 2914  10/06/18 
 1216  10/05/18 
 0901 WBC  7.3  9.2  15.5* HGB  7.7*  7.3*  8.8* HCT  25.1*  23.9*  28.5* PLT  298  276  301 Recent Labs 10/07/18 
 1702  10/06/18 
 1570  10/05/18 
 1624 NA  143  141  142  
K  3.8  4.0  4.6 CL  110*  111*  112* CO2  22  19*  21 BUN  48*  51*  54* CREA  5.27*  5.03*  5.14* CA  7.8*  7.8*  8.1*  
GLU  97  128*  208* Assessment / Plan: 
Principal Problem: 
  Acute respiratory failure with hypoxemia ([facility] (10/5/2018) Active Problems: 
  HTN (hypertension) (7/29/2012) Chronic systolic congestive heart failure ([facility] (12/30/2015) Overview: EF 35-40% on 2015 Echo 
 
  CKD (chronic kidney disease) stage 4, GFR 15-29 ml/min ([facility]) (8/11/2016) NSTEMI (non-ST elevated myocardial infarction) ([facility] (10/5/2018) S/P BKA (below knee amputation) unilateral ([facility] (2/13/2017) Type 2 diabetes mellitus with complication, without long-term current use of insulin ([facility] (4/7/2017) Anemia of chronic disease (9/6/2018) Acute pulmonary edema ([address] (10/5/2018) Sepsis due to pneumonia ([address] (10/5/2018) Diabetic ulcer of left midfoot associated with type 2 diabetes mellitus ([address] (10/5/2018) Cellulitis of left foot (10/5/2018) Sepsis due to cellulitis ([address] (10/5/2018) Acute renal failure superimposed on stage 4 chronic kidney disease ([address] (10/6/2018) 1. MO/CKD - Versus progression of underlying CKD - Previous baseline Cr presumed to be upper 3's - Admission Cr 4.8 
- Diuresing well, Cr remains low 5's, symptoms already much improved, off oxygen, transitioned to PO Lasix on 10/6 
- May not need dialysis here but will need close F/U with us and with Vascular Surgery to establish AV access. Will repeat vein mapping tomorrow and previous study sub-optimal 
2. SOB - much better 3. CHF - elevated troponin last admission 4. Possible left foot infection - Abx 5.  DM

## 2018-10-07 NOTE — PROGRESS NOTES
CELINA recevied from [facility]. Patient remains in stable condition with respirations even/unlabored. No acute distress noted at this time. Patient laying in bed with eyes closed at this time. Call light remains within reach, patient encouraged to call nurse prn assist. Will continue to monitor per policy.

## 2018-10-07 NOTE — PROGRESS NOTES
[facility] CARDIOLOGY PROGRESS NOTE 
      
 
10/7/2018 10:04 AM 
 
Admit Date: 10/5/2018 Subjective: No complaints. ROS: 
Cardiovascular:  As noted above:no CP or palpitations. Pulmonary :no SOB. Neuro:no focal deficits. Objective:  
  
Vitals:  
 10/06/18 2313 10/07/18 0051 10/07/18 0300 10/07/18 6445 BP: 120/70  119/71 128/77 Pulse: 86  82 80 Resp: 18 20 18 Temp: 98.1 °F (36.7 °C)  98.5 °F (36.9 °C) 98.2 °F (36.8 °C) SpO2: 93% 94% 92% 95% Weight:   95.4 kg (210 lb 4.8 oz) Height:      
 
Telemetry :NSR Physical Exam: 
General-No Acute Distress Neck- supple, no JVD 
CV- regular rate and rhythm no MRG Lung- clear bilaterally Abd- soft, nontender, nondistended Ext- RBKA ,Left pedal  edema . Skin- warm and dry Neuro:alert and oriented Data Review:  
Recent Labs 10/07/18 
 6457  10/06/18 
 6997 NA  143  141  
K  3.8  4.0  
BUN  48*  51* CREA  5.27*  5.03* GLU  97  128* WBC  7.3  9.2 HGB  7.7*  7.3* HCT  25.1*  23.9*  
PLT  298  276 Assessment/Plan:  
 
Principal Problem: 
  Acute respiratory failure with hypoxemia ([facility] (10/5/2018): Improved. Followed by Pulmonary medicine. Active Problems: 
  HTN (hypertension) (7/29/2012):stable on current medications. NSTEMI (non-ST elevated myocardial infarction) ([facility] (10/5/2018):No chest pain. LHC if on hemodialysis or unstable. Continue ASA,Lopressor,Imdur,and statin. We are adding little at this point. Will sign off and be on standby as needed. S/P BKA (below knee amputation) unilateral ([facility] (2/13/2017) Type 2 diabetes mellitus with complication, without long-term current use of insulin ([facility] (4/7/2017) Anemia of chronic disease (9/6/2018) Acute pulmonary edema (HCC) (10/5/2018):clinically improved.  
 
  Sepsis due to pneumonia ([facility] (10/5/2018):per primary team. 
 
  Diabetic ulcer of left midfoot associated with type 2 diabetes mellitus ([facility] (10/5/2018):per primary team. 
 
 Cellulitis of left foot (10/5/2018):per primary team. 
 
  Sepsis due to cellulitis ([facility] (10/5/2018):per primary team. 
 
  Acute renal failure superimposed on stage 4 chronic kidney disease ([facility] (10/6/2018): Followed by Nephrology.  
 
 
 
 
 
 
 
Galdino Tovar MD

## 2018-10-07 NOTE — PROGRESS NOTES
Patient remains in stable condition with respirations even/unlabored. No acute distress noted. No needs noted or voiced at this time. Safety measures in place. Patient laying in bed, urinal at the bedside. Call light remains within reach. Preparing to give report to oncoming shift.

## 2018-10-07 NOTE — PROGRESS NOTES
Hospitalist Progress Note Admit Date:  10/5/2018  8:46 AM  
Name:  Mg Renee Age:  72 y.o. 
:  1945 MRN:  [id number] PCP:  Chris Frye MD 
Treatment Team: Attending Provider: Lb Su. Eder Dave MD; Utilization Review: Kamran Deutsch RN; Consulting Provider: Luis Alberto Carrasquillo NP; Consulting Provider: Bozena Vázquez MD; Consulting Provider: Diana Montanez MD; Consulting Provider: Jenny Blanca MD; Utilization Review: Connor Bonilla RN Subjective:  
Lilia Arce is a 73 yo M with history of systolic CHF, stage 4 CKD, history of R BKA due to foot infection, chronic LV systolic CHF with LVEF of 30-35%, and recent NSTEMI last hospitalization (-/18), who presented to the ED with worsening shortness of breath x2 days, intermittent L chest pain, and worsening L foot pain. oxygen saturation was in mid 80s. He had productive cough with concern for PNA so vanc/zosyn started. ulcer on lateral side just proximal to 5th MTP joint with surrounding cellulitis. He had an NSTEMI last hospitalization with troponin peaking at 28.4 on  (though was not checked after peak). He was recommended for L heart cath at the time, but he refused due to possibly needing dialysis afterward due to his CKD. Notably, this is his third hospitalization since 18. In the ED, CXR showed bilateral infiltrates/pulmonary edema, Cr of 4.84 with K of 5.5, O2 sats as low as 82% on room air, WBC count of 15.5K, and tachycardia. Troponin elevated to 1.77. . Was admitted with hypoxia due to pulm edema with possible HAP. Started on IV lasix and vanc/zosyn. Vascular surgery, nephrology, palliative care, and cardiology consulted. 10/7 - pt again not talkative but denies complaints. Denies SOB, CP, cough. No fevers. Objective:  
 
Patient Vitals for the past 24 hrs: 
 Temp Pulse Resp BP SpO2  
10/07/18 0734 98.2 °F (36.8 °C) 80 18 128/77 95 % 10/07/18 0300 98.5 °F (36.9 °C) 82 20 119/71 92 % 10/07/18 0051 - - - - 94 % 10/06/18 2313 98.1 °F (36.7 °C) 86 18 120/70 93 % 10/06/18 1910 99 °F (37.2 °C) 87 22 126/75 94 % 10/06/18 1613 98 °F (36.7 °C) 85 18 103/62 94 % 10/06/18 1150 98 °F (36.7 °C) 80 18 108/63 92 % Oxygen Therapy O2 Sat (%): 95 % (10/07/18 0734) Pulse via Oximetry: 83 beats per minute (10/07/18 0051) O2 Device: Room air (10/07/18 0735) O2 Flow Rate (L/min): 3 l/min (10/05/18 1258) Intake/Output Summary (Last 24 hours) at 10/07/18 6560 Last data filed at 10/07/18 0300 Gross per 24 hour Intake              780 ml Output             1575 ml Net             -795 ml General:    Well nourished. Alert. CV:   RRR. No murmur, rub, or gallop. Lungs:   Basilar rales Extremities: Warm and dry. No cyanosis or edema. R BKA. Ulcer lateral aspect L foot with some mild surrounding erythema Skin:     No rashes or jaundice. Neuro:  No gross focal deficits Data Review: 
I have reviewed all labs, meds, telemetry events, and studies from the last 24 hours: 
 
Recent Results (from the past 24 hour(s)) GLUCOSE, POC Collection Time: 10/06/18 11:34 AM  
Result Value Ref Range Glucose (POC) 147 (H) 65 - 100 mg/dL GLUCOSE, POC Collection Time: 10/06/18  3:57 PM  
Result Value Ref Range Glucose (POC) 124 (H) 65 - 100 mg/dL Wisam Beaulieu Collection Time: 10/06/18  4:31 PM  
Result Value Ref Range Vancomycin, random 22.9 UG/ML  
GLUCOSE, POC Collection Time: 10/06/18  5:03 PM  
Result Value Ref Range Glucose (POC) 119 (H) 65 - 100 mg/dL GLUCOSE, POC Collection Time: 10/06/18  8:38 PM  
Result Value Ref Range Glucose (POC) 193 (H) 65 - 100 mg/dL GLUCOSE, POC Collection Time: 10/07/18  5:06 AM  
Result Value Ref Range Glucose (POC) 118 (H) 65 - 100 mg/dL METABOLIC PANEL, BASIC Collection Time: 10/07/18  6:19 AM  
Result Value Ref Range  Sodium 143 136 - 145 mmol/L  
 Potassium 3.8 3.5 - 5.1 mmol/L Chloride 110 (H) 98 - 107 mmol/L  
 CO2 22 21 - 32 mmol/L Anion gap 11 7 - 16 mmol/L Glucose 97 65 - 100 mg/dL BUN 48 (H) 8 - 23 MG/DL Creatinine 5.27 (H) 0.8 - 1.5 MG/DL  
 GFR est AA 14 (L) >60 ml/min/1.73m2 GFR est non-AA 12 (L) >60 ml/min/1.73m2 Calcium 7.8 (L) 8.3 - 10.4 MG/DL  
CBC WITH AUTOMATED DIFF Collection Time: 10/07/18  6:19 AM  
Result Value Ref Range WBC 7.3 4.3 - 11.1 K/uL  
 RBC 2.69 (L) 4.23 - 5.6 M/uL HGB 7.7 (L) 13.6 - 17.2 g/dL HCT 25.1 (L) 41.1 - 50.3 % MCV 93.3 79.6 - 97.8 FL  
 MCH 28.6 26.1 - 32.9 PG  
 MCHC 30.7 (L) 31.4 - 35.0 g/dL  
 RDW 12.9 % PLATELET 875 384 - 008 K/uL MPV 10.7 9.4 - 12.3 FL ABSOLUTE NRBC 0.00 0.0 - 0.2 K/uL  
 DF AUTOMATED NEUTROPHILS 69 43 - 78 % LYMPHOCYTES 13 13 - 44 % MONOCYTES 8 4.0 - 12.0 % EOSINOPHILS 8 (H) 0.5 - 7.8 % BASOPHILS 1 0.0 - 2.0 % IMMATURE GRANULOCYTES 1 0.0 - 5.0 %  
 ABS. NEUTROPHILS 5.0 1.7 - 8.2 K/UL  
 ABS. LYMPHOCYTES 0.9 0.5 - 4.6 K/UL  
 ABS. MONOCYTES 0.6 0.1 - 1.3 K/UL  
 ABS. EOSINOPHILS 0.6 0.0 - 0.8 K/UL  
 ABS. BASOPHILS 0.0 0.0 - 0.2 K/UL  
 ABS. IMM. GRANS. 0.1 0.0 - 0.5 K/UL All Micro Results Procedure Component Value Units Date/Time CULTURE, BLOOD [[id number]] Collected:  10/05/18 1254 Order Status:  Completed Specimen:  Blood from Blood Updated:  10/06/18 1059 Special Requests: --     
  RIGHT 
HAND Culture result: NO GROWTH AFTER 21 HOURS     
 CULTURE, BLOOD [[id number]] Collected:  10/05/18 1309 Order Status:  Completed Specimen:  Blood from Blood Updated:  10/06/18 1054 Special Requests: --     
  RIGHT 
HAND Culture result: NO GROWTH AFTER 21 HOURS     
 CULTURE, RESPIRATORY/SPUTUM/BRONCH Waynette Boyers STAIN [[id number]] Order Status:  Canceled Specimen:  Sputum from Sputum No results found for this visit on 10/05/18. Current Meds: 
Current Facility-Administered Medications Medication Dose Route Frequency  bisacodyl (DULCOLAX) suppository 10 mg  10 mg Rectal DAILY PRN  
 alum-mag hydroxide-simeth (MYLANTA) oral suspension 30 mL  30 mL Oral Q4H PRN  
 bisacodyl (DULCOLAX) tablet 5 mg  5 mg Oral DAILY PRN  
 hydrALAZINE (APRESOLINE) 20 mg/mL injection 20 mg  20 mg IntraVENous Q4H PRN  
 zolpidem (AMBIEN) tablet 5 mg  5 mg Oral QHS PRN  polyethylene glycol (MIRALAX) packet 17 g  17 g Oral DAILY PRN  
 ondansetron (ZOFRAN) injection 4 mg  4 mg IntraVENous Q4H PRN  
 furosemide (LASIX) tablet 80 mg  80 mg Oral BID  piperacillin-tazobactam (ZOSYN) 4.5 g in 0.9% sodium chloride (MBP/ADV) 100 mL  4.5 g IntraVENous Q12H  aspirin chewable tablet 81 mg  81 mg Oral DAILY AFTER BREAKFAST  atorvastatin (LIPITOR) tablet 80 mg  80 mg Oral DAILY  isosorbide mononitrate ER (IMDUR) tablet 30 mg  30 mg Oral DAILY  metoprolol succinate (TOPROL-XL) tablet 100 mg  100 mg Oral DAILY  nitroglycerin (NITROSTAT) tablet 0.4 mg  0.4 mg SubLINGual Q5MIN PRN  
 sodium bicarbonate tablet 650 mg  650 mg Oral TID WITH MEALS  sodium chloride (NS) flush 5-10 mL  5-10 mL IntraVENous Q8H  
 sodium chloride (NS) flush 5-10 mL  5-10 mL IntraVENous PRN  
 acetaminophen (TYLENOL) tablet 650 mg  650 mg Oral Q4H PRN  
 heparin (porcine) injection 5,000 Units  5,000 Units SubCUTAneous Q12H  Vancomycin intermittent dosing placeholder   Other Rx Dosing/Monitoring  insulin lispro (HUMALOG) injection   SubCUTAneous AC&HS Other Studies (last 24 hours): No results found. Assessment and Plan:  
 
Hospital Problems as of 10/7/2018  Date Reviewed: 9/26/2018 Codes Class Noted - Resolved POA Acute renal failure superimposed on stage 4 chronic kidney disease ([facility] ICD-10-CM: N17.9, N18.4 ICD-9-CM: 584.9, 585.4  10/6/2018 - Present Yes NSTEMI (non-ST elevated myocardial infarction) ([facility] ICD-10-CM: I21.4 ICD-9-CM: 410.70  10/5/2018 - Present Yes Acute pulmonary edema (HCC) ICD-10-CM: J81.0 ICD-9-CM: 518.4  10/5/2018 - Present Yes * (Principal)Acute respiratory failure with hypoxemia ([facility] ICD-10-CM: J96.01 
ICD-9-CM: 518.81  10/5/2018 - Present Yes Sepsis due to pneumonia [facility]) ICD-10-CM: J18.9, A41.9 ICD-9-CM: 627, 995.91  10/5/2018 - Present Yes Tachycardia ICD-10-CM: R00.0 ICD-9-CM: 785.0  10/5/2018 - Present Diabetic ulcer of left midfoot associated with type 2 diabetes mellitus ([facility] ICD-10-CM: E11.621, T47.671 ICD-9-CM: 250.80, 707.14  10/5/2018 - Present Yes Cellulitis of left foot ICD-10-CM: L03.116 ICD-9-CM: 682.7  10/5/2018 - Present Yes Sepsis due to cellulitis ([facility] ICD-10-CM: L03.90, A41.9 ICD-9-CM: 682.9, 995.91  10/5/2018 - Present Yes Anemia of chronic disease (Chronic) ICD-10-CM: D63.8 ICD-9-CM: 285.29  9/6/2018 - Present Yes Type 2 diabetes mellitus with complication, without long-term current use of insulin (HCC) (Chronic) ICD-10-CM: E11.8 ICD-9-CM: 250.90  4/7/2017 - Present Yes S/P BKA (below knee amputation) unilateral (HCC) (Chronic) ICD-10-CM: F03.687 ICD-9-CM: V49.75  2/13/2017 - Present Yes  
   
 CKD (chronic kidney disease) stage 4, GFR 15-29 ml/min (HCC) (Chronic) ICD-10-CM: N18.4 ICD-9-CM: 585.4  8/11/2016 - Present Yes Chronic systolic congestive heart failure (HCC) (Chronic) ICD-10-CM: M77.09 ICD-9-CM: 428.22, 428.0  12/30/2015 - Present Yes Overview Signed 1/6/2017  8:59 AM by Guanakito Pepe MD  
  EF 35-40% on 2015 Echo HTN (hypertension) (Chronic) ICD-10-CM: I10 
ICD-9-CM: 401.9  7/29/2012 - Present Yes RESOLVED: Metabolic acidosis VHY-00-LW: E87.2 ICD-9-CM: 276.2  10/5/2018 - 10/7/2018 Yes Plan: Hypoxic respiratory failure with pulmonary edema, PNA - 
· Now on PO lasix. Good response. · Cont vanc and zosyn for PNA. recent hospitalizations. Recheck CXR in AM  
 
Goals of Care - 
· PC following L foot ulcer with surrounding cellulitis-  
· vasc surg following · MAGGIE with microvascular disease, occlusion of L peroneal artery. Defer to Vasc Surg · Wound care · Cont vanc/zosyn · May need surgery Chest pain/elevated trop/recent NSTEMI-  
· cardiology following · Medical management; avoiding LHC due to CKD unless pt goes on HD · Continue aspirin, statin, BB, isosorbide MO on CKD 4-  
· Nephrology following · Acidosis resolved, on bicarb; defer to nephrology on long term need Anemia -  
· CKD, chronic disease · Defer transfusion to specialists DC planning/Dispo:  PPD ordered. CM consulted. Needs PT/OT orders Monday Diet:  DIET RENAL 
DVT ppx:  Heparin High risk of decompensation Signed: 
Paula Wilkinson MD

## 2018-10-08 PROBLEM — N18.5 STAGE 5 CHRONIC KIDNEY DISEASE NOT ON CHRONIC DIALYSIS (HCC): Status: ACTIVE | Noted: 2018-01-01

## 2018-10-08 PROBLEM — N18.4 ACUTE RENAL FAILURE SUPERIMPOSED ON STAGE 4 CHRONIC KIDNEY DISEASE (HCC): Status: RESOLVED | Noted: 2018-01-01 | Resolved: 2018-01-01

## 2018-10-08 PROBLEM — N17.9 ACUTE RENAL FAILURE SUPERIMPOSED ON STAGE 4 CHRONIC KIDNEY DISEASE (HCC): Status: RESOLVED | Noted: 2018-01-01 | Resolved: 2018-01-01

## 2018-10-08 NOTE — PROGRESS NOTES
[facility] Nephrology Progress Note Follow-Up on: CKD Patient seen and examined. No new c/o. Denies sob, cp, n/v. Feeling ok. Good uop. Renal function stable. ROS: 
Gen - no fever, no chills, appetite unchanged CV - no chest pain, no palpitation Lung - shortness of breath better, no cough Abd - no tenderness, no nausea/vomiting, no diarrhea Ext - edema better Exam: 
Vitals:  
 10/07/18 2954 10/08/18 8754 10/08/18 6822 10/08/18 0720 BP: 144/81 143/79  160/81 Pulse: 85 92  85 Resp: 18 18 18 Temp: 98.2 °F (36.8 °C) 97.8 °F (36.6 °C)  98.4 °F (36.9 °C) SpO2: 96% 94%  94% Weight:   93.6 kg (206 lb 6.4 oz) Height:      
 
 
 
Intake/Output Summary (Last 24 hours) at 10/08/18 1003 Last data filed at 10/08/18 0470 Gross per 24 hour Intake              560 ml Output             2200 ml Net            -1640 ml Wt Readings from Last 3 Encounters:  
10/08/18 93.6 kg (206 lb 6.4 oz) 09/28/18 93.5 kg (206 lb 3.2 oz) 09/05/18 95.3 kg (210 lb) GEN - in no distress CV - regular, no murmur, no rub Lung - clear bilaterally Abd - soft, nontender Ext - trace edema, right BKA Recent Labs 10/08/18 
 3850  10/07/18 
 8003  10/06/18 
 1216 WBC   --   7.3  9.2 HGB  7.5*  7.7*  7.3* HCT  24.5*  25.1*  23.9*  
PLT   --   298  276 Recent Labs 10/08/18 
 5930  10/07/18 
 5940  10/06/18 
 8209 NA  142  143  141  
K  3.8  3.8  4.0  
CL  108*  110*  111* CO2  23  22  19* BUN  47*  48*  51* CREA  5.23*  5.27*  5.03* CA  8.0*  7.8*  7.8*  
GLU  86  97  128* Assessment / Plan: 
Principal Problem: 
  Acute respiratory failure with hypoxemia ([facility] (10/5/2018) Active Problems: 
  HTN (hypertension) (7/29/2012) Chronic systolic congestive heart failure ([facility] (12/30/2015) Overview: EF 35-40% on 2015 Echo 
 
  CKD (chronic kidney disease) stage 4, GFR 15-29 ml/min ([facility]) (8/11/2016) NSTEMI (non-ST elevated myocardial infarction) ([address] (10/5/2018) S/P BKA (below knee amputation) unilateral ([address] (2/13/2017) Type 2 diabetes mellitus with complication, without long-term current use of insulin ([address] (4/7/2017) Anemia of chronic disease (9/6/2018) Acute pulmonary edema ([address] (10/5/2018) Sepsis due to pneumonia ([address] (10/5/2018) Diabetic ulcer of left midfoot associated with type 2 diabetes mellitus ([address] (10/5/2018) Cellulitis of left foot (10/5/2018) Sepsis due to cellulitis ([address] (10/5/2018) Stage 5 chronic kidney disease not on chronic dialysis ([address] (10/8/2018) Assessment and Plan:  
MO/CKD stage 4 - Versus progression of underlying CKD with recent NSTEMI 
- Previous baseline Cr presumed to be upper 3's - Admission Cr 4.8 
- Diuresing well, Cr remains low 5's, symptoms already much improved, off oxygen, transitioned to PO Lasix on 10/6 
- May not need dialysis here but will need close F/U with us and with Vascular Surgery to establish AV access. Repeat vein mapping ordered as previous study sub-optimal 
 
SOB/volume overload - much better CAD/CHF  
- NSTEMI last admission 9/24-9/28/18 
- LVEF 30-35% - plan [facility] when he is on HD 
 
PVD 
- s/p right BKA - Left foot ulcer/cellulitis - Abx DM 
- per primary team 
 
Metabolic acidosis - po bicarbonate Anemia - check iron stores 
- start epogen

## 2018-10-08 NOTE — PROGRESS NOTES
Patient sitting up to side of bed, no c/o discomfort. Occasional non-productive cough. Will continue to monitor.

## 2018-10-08 NOTE — PROGRESS NOTES
Problem: Interdisciplinary Rounds Goal: Interdisciplinary Rounds Interdisciplinary team rounds were held 10/8/2018 with the following team members:Care Management, Physical Therapy, Physician and Clinical Coordinator and the patient. Plan of care discussed. See clinical pathway and/or care plan for interventions and desired outcomes.

## 2018-10-08 NOTE — PROGRESS NOTES
Received report from Tricia, [facility]. Patient awake in bed. Respirations present. No signs of distress at this time. Bed low and locked. Call light within reach. Will continue to monitor.

## 2018-10-08 NOTE — PROGRESS NOTES
Hospitalist Progress Note Admit Date:  10/5/2018  8:46 AM  
Name:  Blanca Dear Age:  72 y.o. 
:  1945 MRN:  [id number] PCP:  Maxwell Raygoza MD 
Treatment Team: Attending Provider: Yuri Hunt. Renée Hernandez MD; Utilization Review: Daryn Hitchcock RN; Consulting Provider: Anthony Ye NP; Consulting Provider: Dominga Larose MD; Consulting Provider: No Alves MD 
 
Subjective:  
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 oxygen saturation was in mid 80s. He had productive cough with concern for PNA so vanc/zosyn started. ulcer on lateral side just proximal to 5th MTP joint with surrounding cellulitis. He had an NSTEMI last hospitalization with troponin peaking at 28.4 on  (though was not checked after peak). He was recommended for L heart cath at the time, but he refused due to possibly needing dialysis afterward due to his CKD. Notably, this is his third hospitalization since 18. In the ED, CXR showed bilateral infiltrates/pulmonary edema, Cr of 4.84 with K of 5.5, O2 sats as low as 82% on room air, WBC count of 15.5K, and tachycardia. Troponin elevated to 1.77. . Was admitted with hypoxia due to pulm edema with possible HAP. Started on IV lasix and vanc/zosyn. Vascular surgery, nephrology, palliative care, and cardiology consulted. 10/7 - pt again not talkative but denies complaints. Denies SOB, CP, cough. 10/8 -  Pt c/o dry cough today, rhinorrhea. Denies SOB, CP. Repeat CXR pending. No fevers. Objective:  
 
Patient Vitals for the past 24 hrs: 
 Temp Pulse Resp BP SpO2  
10/08/18 0720 98.4 °F (36.9 °C) 85 18 160/81 94 % 10/08/18 0316 97.8 °F (36.6 °C) 92 18 143/79 94 % 10/07/18 2301 98.2 °F (36.8 °C) 85 18 144/81 96 % 10/07/18 2045 - 83 - - -  
10/07/18 1914 98 °F (36.7 °C) 83 18 148/77 95 % 10/07/18 1519 97.7 °F (36.5 °C) 80 19 140/74 92 % 10/07/18 1133 98 °F (36.7 °C) 76 18 138/65 90 % Oxygen Therapy O2 Sat (%): 94 % (10/08/18 0720) Pulse via Oximetry: 83 beats per minute (10/07/18 0051) O2 Device: Room air (10/07/18 1407) O2 Flow Rate (L/min): 3 l/min (10/05/18 1258) Intake/Output Summary (Last 24 hours) at 10/08/18 0915 Last data filed at 10/08/18 2063 Gross per 24 hour Intake              680 ml Output             2200 ml Net            -1520 ml General:    Well nourished. Alert. CV:   RRR. No murmur, rub, or gallop. Lungs:   Basilar rales Extremities: Warm and dry. No cyanosis or edema. R BKA. Ulcer lateral aspect L foot with some mild surrounding erythema Skin:     No rashes or jaundice. Neuro:  No gross focal deficits Data Review: 
I have reviewed all labs, meds, telemetry events, and studies from the last 24 hours: 
 
Recent Results (from the past 24 hour(s)) GLUCOSE, POC Collection Time: 10/07/18 11:26 AM  
Result Value Ref Range Glucose (POC) 101 (H) 65 - 100 mg/dL PLEASE READ & DOCUMENT PPD TEST IN 48 HRS Collection Time: 10/07/18  2:06 PM  
Result Value Ref Range PPD Negative Negative  
 mm Induration 0.0 mm GLUCOSE, POC Collection Time: 10/07/18  3:58 PM  
Result Value Ref Range Glucose (POC) 194 (H) 65 - 100 mg/dL GLUCOSE, POC Collection Time: 10/07/18  8:01 PM  
Result Value Ref Range Glucose (POC) 207 (H) 65 - 100 mg/dL METABOLIC PANEL, BASIC Collection Time: 10/08/18  5:24 AM  
Result Value Ref Range Sodium 142 136 - 145 mmol/L Potassium 3.8 3.5 - 5.1 mmol/L Chloride 108 (H) 98 - 107 mmol/L  
 CO2 23 21 - 32 mmol/L Anion gap 11 7 - 16 mmol/L Glucose 86 65 - 100 mg/dL  BUN 47 (H) 8 - 23 MG/DL  
 Creatinine 5.23 (H) 0.8 - 1.5 MG/DL  
 GFR est AA 14 (L) >60 ml/min/1.73m2 GFR est non-AA 12 (L) >60 ml/min/1.73m2 Calcium 8.0 (L) 8.3 - 10.4 MG/DL  
HGB & HCT Collection Time: 10/08/18  5:24 AM  
Result Value Ref Range HGB 7.5 (L) 13.6 - 17.2 g/dL HCT 24.5 (L) 41.1 - 50.3 % GLUCOSE, POC Collection Time: 10/08/18  5:32 AM  
Result Value Ref Range Glucose (POC) 96 65 - 100 mg/dL All Micro Results Procedure Component Value Units Date/Time CULTURE, BLOOD [[id number]] Collected:  10/05/18 1254 Order Status:  Completed Specimen:  Blood from Blood Updated:  10/07/18 9053 Special Requests: --     
  RIGHT 
HAND Culture result: NO GROWTH 2 DAYS     
 CULTURE, BLOOD [[id number]] Collected:  10/05/18 1309 Order Status:  Completed Specimen:  Blood from Blood Updated:  10/07/18 5051 Special Requests: --     
  RIGHT 
HAND Culture result: NO GROWTH 2 DAYS     
 CULTURE, RESPIRATORY/SPUTUM/BRONCH [place] STAIN [[id number]] Order Status:  Canceled Specimen:  Sputum from Sputum No results found for this visit on 10/05/18. Current Meds: 
Current Facility-Administered Medications Medication Dose Route Frequency  bisacodyl (DULCOLAX) suppository 10 mg  10 mg Rectal DAILY PRN  
 alum-mag hydroxide-simeth (MYLANTA) oral suspension 30 mL  30 mL Oral Q4H PRN  
 bisacodyl (DULCOLAX) tablet 5 mg  5 mg Oral DAILY PRN  
 hydrALAZINE (APRESOLINE) 20 mg/mL injection 20 mg  20 mg IntraVENous Q4H PRN  
 zolpidem (AMBIEN) tablet 5 mg  5 mg Oral QHS PRN  polyethylene glycol (MIRALAX) packet 17 g  17 g Oral DAILY PRN  
 ondansetron (ZOFRAN) injection 4 mg  4 mg IntraVENous Q4H PRN  
 furosemide (LASIX) tablet 80 mg  80 mg Oral BID  piperacillin-tazobactam (ZOSYN) 4.5 g in 0.9% sodium chloride (MBP/ADV) 100 mL  4.5 g IntraVENous Q12H  aspirin chewable tablet 81 mg  81 mg Oral DAILY AFTER BREAKFAST  atorvastatin (LIPITOR) tablet 80 mg  80 mg Oral DAILY  isosorbide mononitrate ER (IMDUR) tablet 30 mg  30 mg Oral DAILY  metoprolol succinate (TOPROL-XL) tablet 100 mg  100 mg Oral DAILY  nitroglycerin (NITROSTAT) tablet 0.4 mg  0.4 mg SubLINGual Q5MIN PRN  
 sodium bicarbonate tablet 650 mg  650 mg Oral TID WITH MEALS  sodium chloride (NS) flush 5-10 mL  5-10 mL IntraVENous Q8H  
 sodium chloride (NS) flush 5-10 mL  5-10 mL IntraVENous PRN  
 acetaminophen (TYLENOL) tablet 650 mg  650 mg Oral Q4H PRN  
 heparin (porcine) injection 5,000 Units  5,000 Units SubCUTAneous Q12H  Vancomycin intermittent dosing placeholder   Other Rx Dosing/Monitoring  insulin lispro (HUMALOG) injection   SubCUTAneous AC&HS Other Studies (last 24 hours): No results found. Assessment and Plan:  
 
Hospital Problems as of 10/8/2018  Date Reviewed: 9/26/2018 Codes Class Noted - Resolved POA Stage 5 chronic kidney disease not on chronic dialysis [facility]) ICD-10-CM: N18.5 ICD-9-CM: 585.5  10/8/2018 - Present Yes NSTEMI (non-ST elevated myocardial infarction) ([facility] ICD-10-CM: I21.4 ICD-9-CM: 410.70  10/5/2018 - Present Yes Acute pulmonary edema (HCC) ICD-10-CM: J81.0 ICD-9-CM: 518.4  10/5/2018 - Present Yes * (Principal)Acute respiratory failure with hypoxemia ([facility] ICD-10-CM: J96.01 
ICD-9-CM: 518.81  10/5/2018 - Present Yes Sepsis due to pneumonia [facility]) ICD-10-CM: J18.9, A41.9 ICD-9-CM: 100, 995.91  10/5/2018 - Present Yes Tachycardia ICD-10-CM: R00.0 ICD-9-CM: 785.0  10/5/2018 - Present Diabetic ulcer of left midfoot associated with type 2 diabetes mellitus ([facility] ICD-10-CM: E11.621, G78.851 ICD-9-CM: 250.80, 707.14  10/5/2018 - Present Yes Cellulitis of left foot ICD-10-CM: L03.116 ICD-9-CM: 682.7  10/5/2018 - Present Yes Sepsis due to cellulitis ([facility] ICD-10-CM: L03.90, A41.9 ICD-9-CM: 682.9, 995.91  10/5/2018 - Present Yes Anemia of chronic disease (Chronic) ICD-10-CM: D63.8 ICD-9-CM: 285.29  9/6/2018 - Present Yes Type 2 diabetes mellitus with complication, without long-term current use of insulin (HCC) (Chronic) ICD-10-CM: E11.8 ICD-9-CM: 250.90  4/7/2017 - Present Yes S/P BKA (below knee amputation) unilateral (HCC) (Chronic) ICD-10-CM: T44.023 ICD-9-CM: V49.75  2/13/2017 - Present Yes  
   
 CKD (chronic kidney disease) stage 4, GFR 15-29 ml/min (HCC) (Chronic) ICD-10-CM: N18.4 ICD-9-CM: 585.4  8/11/2016 - Present Yes Chronic systolic congestive heart failure (HCC) (Chronic) ICD-10-CM: D88.34 ICD-9-CM: 428.22, 428.0  12/30/2015 - Present Yes Overview Signed 1/6/2017  8:59 AM by Stone Richardson MD  
  EF 35-40% on 2015 Echo HTN (hypertension) (Chronic) ICD-10-CM: I10 
ICD-9-CM: 401.9  7/29/2012 - Present Yes RESOLVED: Acute renal failure superimposed on stage 4 chronic kidney disease ([facility] ICD-10-CM: N17.9, N18.4 ICD-9-CM: 584.9, 585.4  10/6/2018 - 10/8/2018 Yes RESOLVED: Metabolic acidosis [facility]K: E87.2 ICD-9-CM: 276.2  10/5/2018 - 10/7/2018 Yes Plan: Hypoxic respiratory failure with pulmonary edema, PNA - 
· On RA now · on PO lasix · Cont vanc and zosyn for PNA. recent hospitalizations. Recheck CXR today Goals of Care - 
· PC has seen, pt doesn't seem interested in hospice currently L foot ulcer with surrounding cellulitis-  
· vasc surg following · MAGGIE with microvascular disease, occlusion of L peroneal artery. Defer to Vasc Surg · Wound care · Cont vanc/zosyn · May need surgery Chest pain/elevated trop/recent NSTEMI-  
· Medical management. cardiology signed off. Consider LHC in future if on HD · Continue aspirin, statin, BB, isosorbide MO on CKD 4-  
· Nephrology following · Cr stable since admit; likely CKD 5 now · Acidosis resolved, on bicarb. Monitor · Vein mapping today Anemia -  
· CKD, chronic disease · Defer transfusion to specialists · Daily H/H 
 
DC planning/Dispo:  PPD ordered. CM consulted. Needs PT/OT orders Monday Diet:  DIET RENAL 
DVT ppx:  Heparin High risk of decompensation Signed: 
Court Mayer MD

## 2018-10-08 NOTE — PROGRESS NOTES
Palliative Care Progress Note Patient: Sridevi Baltazar MRN: [id number]  SSN: [id number] YOB: 1945  Age: 72 y.o. Sex: male Assessment/Plan: Chief Complaint/Interval History: Fatigue Principal Diagnosis: · Debility, Unspecified  R53.81 Additional Diagnoses: · Fatigue, Lethargy  R53.83 
· Counseling, Encounter for Medical Advice  Z71.9 
· Encounter for Palliative Care  Z51.5 Palliative Performance Scale (PPS) PPS: 60 Medical Decision Making:  
Reviewed and summarized notes and events over previous 24 hours. Discussed case with appropriate providers. Reviewed laboratory and x-ray data. Patient sitting on side of bed, no distress noted and no visitors present. Patient is feeling ok, fatigued from recently working with PT and OT. He also comments that he had not fully recovered from his prior hospitalization. He talks about his potential future need for dialysis, but no plans right now. Even though he had previously stated he would never do dialysis, he says \"whatever the doctor says\" at this time. He dreads the logistical concerns of dialysis- he does have Medicaid, which could possibly help with transportation. Reviewed that patient has 12 children. Discussed HCPOA, which was discussed with patient last admission. He does not want to complete because 1. It would cause tension between his children and 2. He says his children know what he would want. Reminded patient that in [facility], all 12 children would have input into medical decisions. Patient says they would be able to come to an agreement on what was best for patient. Will continue to follow intermittently. Will discuss findings with members of the interdisciplinary team.   
 
  
More than 50% of this 25 minute visit was spent counseling and coordination of care as outlined above. Subjective:  
 
Review of Systems: A comprehensive review of systems was negative except for: Constitutional: Positive for fatigue. Respiratory: Positive for dyspnea on exertion. Objective:  
 
Visit Vitals  /75 (BP 1 Location: Left arm, BP Patient Position: Sitting)  Pulse 98  Temp 98.4 °F (36.9 °C)  Resp 18  Ht 6' 1\" (1.854 m)  Wt 206 lb 6.4 oz (93.6 kg)  SpO2 96%  BMI 27.23 kg/m2 Physical Exam: 
 
General:  Cooperative. No acute distress. Eyes:  Conjunctivae/corneas clear Nose: Nares normal. Septum midline. Neck: Supple, symmetrical, trachea midline. Lungs:   Faint crackles bilaterally, unlabored. Heart:  Regular rate and rhythm. Abdomen:   Soft, non-tender, non-distended. Extremities: Normal, atraumatic, no cyanosis or edema. Right BKA, right thumb amputation. Skin: Skin color, texture, turgor normal. No rash. Neurologic: Nonfocal  
Psych: Alert and oriented Signed By: Asuncion Nolan NP October 8, 2018

## 2018-10-08 NOTE — PROGRESS NOTES
Care Management Interventions PCP Verified by CM: Yes Transition of Care Consult (CM Consult): Home Health [address]: Yes Physical Therapy Consult: Yes Occupational Therapy Consult: Yes Current Support Network: Lives Alone Confirm Follow Up Transport: Family Plan discussed with Pt/Family/Caregiver: Yes Freedom of Choice Offered: Yes Discharge Location Discharge Placement: Home with home health Patient lives alone. Prior to feeling so sick and sob patient was ambulating with a walking stick. Patient complained that for the past month he has no energy and that he's in and out of the hospital He's independent with ADLs. His children and grandchildren transport patient to [facility]. No home 02. Verified pcp. Patient went to rehab last year after his amputation and doesn't want to return. He's agreeable to [facility]. Verified pcp. CM following.

## 2018-10-08 NOTE — PROGRESS NOTES
Problem: Mobility Impaired (Adult and Pediatric) Goal: *Acute Goals and Plan of Care (Insert Text) Discharge Goals: 
(1.)Mr. Alesia Espinoza will move from supine to sit and sit to supine  with INDEPENDENT within 7 treatment day(s). (2.)Mr. Alesia Espinoza will transfer from bed to chair and chair to bed with MODIFIED INDEPENDENT using the least restrictive device within 7 treatment day(s). (3.)Mr. Alesia Espinoza will ambulate with MODIFIED INDEPENDENCE for 500+ feet with the least restrictive device within 7 treatment day(s). PHYSICAL THERAPY: Initial Assessment, Treatment Day: Day of Assessment, PM 10/8/2018 INPATIENT: Hospital Day: 4 Payor: CARE IMPROVEMENT PLUS [place]ARE [place] / Product Type: Managed Care Medicare /  
  
NAME/AGE/GENDER: Jacquelyn Goltz is a 72 y.o. male PRIMARY DIAGNOSIS: Acute respiratory failure with hypoxemia ([facility] [address] Pulmonary edema Acute respiratory failure with hypoxemia (HCC) Acute respiratory failure with hypoxemia (HCC) ICD-10: Treatment Diagnosis:  
 · Generalized Muscle Weakness (M62.81) · Difficulty in walking, Not elsewhere classified (R26.2) · History of falling (Z91.81) Precaution/Allergies: 
Lortab [hydrocodone-acetaminophen] ASSESSMENT:  
 
Mr. Alesia Espinoza is sitting EOB upon contact finishing working with OT and agreeable to PT evaluation and treatment this afternoon. Pt donned prosthesis independently with OT. Pt lives alone in 1 story home with ramp to enter. Pt reports he uses SPC or walker for gait, independent with ADLs, and 1 fall reported. Pt demonstrates good static sitting balance. Pt performed STS to RW with CGA and ambulated in room with RW and CGA. Pt furthered gait distance ambulating another 250 ft in hallway with RW and CGA. Pt with gait speed fluctuations and increased trunk sway. PT provided cues for walker management and safety as pt lets walker out far away from body at times.  Pt returned to room and left sitting EOB with all needs met and within reach with PCT at bedside. Jacob Hodges will benefit from skilled PT (medically necessary) to address decreased strength, decreased balance, decreased functional tolerance, decreased cardiopulmonary endurance affecting participation in basic ADLs and functional tasks. This section established at most recent assessment PROBLEM LIST (Impairments causing functional limitations): 1. Decreased Strength 2. Decreased ADL/Functional Activities 3. Decreased Transfer Abilities 4. Decreased Ambulation Ability/Technique 5. Decreased Balance 6. Decreased Activity Tolerance 7. Decreased Pacing Skills 8. Increased Fatigue 9. Increased Shortness of Breath 10. Decreased [place] with Home Exercise Program 
 INTERVENTIONS PLANNED: (Benefits and precautions of physical therapy have been discussed with the patient.) 1. Balance Exercise 2. Bed Mobility 3. Family Education 4. Gait Training 5. Home Exercise Program (HEP) 6. Neuromuscular Re-education/Strengthening 7. Therapeutic Activites 8. Therapeutic Exercise/Strengthening 9. Transfer Training TREATMENT PLAN: Frequency/Duration: 3 times a week for duration of hospital stay Rehabilitation Potential For Stated Goals: Good RECOMMENDED REHABILITATION/EQUIPMENT: (at time of discharge pending progress): Due to the probability of continued deficits (see above) this patient will likely need continued skilled physical therapy after discharge. Equipment:  
? None at this time HISTORY:  
History of Present Injury/Illness (Reason for Referral): 
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   He seemsvery limited in understanding his underlying conditions and seriousness of them. Upon call from Dr. Miles Cornejo, ED physician, Mr. Adrian Ordonez was refusing nasal cannula oxygen despite being short of breath and oxygen saturation of mid 80s. He was refusing due to the oxygen 'dries out my nose.' 
  He reports his breathing worsens with minimal exertion (walking 10-25 feet) and lying flat. L chest pain aggravated by same things. He had an NSTEMI last hospitalization with troponin peaking at 28.4 on 9/24 (though was not checked after peak). He was recommended for L heart cath at the time, but he refused due to possibly needing dialysis afterward due to his CKD. 
  
He started coughing a day or so ago. Currently productive of yellow sputum that is blood tinged. 
  
He states his L foot started hurting during the end of last hospitalization and has worsened. Difficulty bearing weight due to the pain. Has ulcer on lateral side just proximal to 5th MTP joint. Notably, this is his third hospitalization since 9/5/18.  
  
In the ED, CXR shows bilateral infiltrates/pulmonary edema, Cr of 4.84 with K of 5.5, O2 sats as low as 82% on room air, WBC count of 15.5K, and tachycardia. 
  
Hospitalist service asked to admit for further evaluation of hypoxic respiratory failure, worsening renal function, and chest pain. Past Medical History/Comorbidities:  
Mr. Adrian Ordonez  has a past medical history of Abnormal CT scan, chest (12/27/2015); Acute CHF ([address] (12/26/2015); Acute systolic congestive heart failure ([address] (12/30/2015); MO (acute kidney injury) ([address] (7/29/2012); Anemia of chronic disease (9/6/2018); Bilateral pleural effusion (12/27/2015); Chest pain (12/26/2015); Depression; DM (diabetes mellitus), type 2 ([address] (7/29/2012); Encephalopathy due to infection (1/7/2017); HTN (hypertension) (7/29/2012);  Hypoglycemia (7/29/2012); NSTEMI (non-ST elevated myocardial infarction) ([address] (1/5/2017); and Tobacco use (12/27/2015). He also has no past medical history of Arrhythmia; Arthritis; Asthma; Autoimmune disease ([facility] [address] Calculus of kidney; Cancer ([facility] [address] Chronic pain; COPD; DEMENTIA; Gastrointestinal disorder; GERD (gastroesophageal reflux disease); Headache; Hypercholesterolemia; Liver disease; Other ill-defined conditions(799.89); Psychiatric disorder; Psychotic disorder ([facility] [address] PUD (peptic ulcer disease); Seizures ([facility] [address] Sleep disorder; Stroke [facility]); Thromboembolus ([facility] [address] Thyroid disease; or Trauma. Mr. Shilo Govea  has a past surgical history that includes hx orthopaedic. Social History/Living Environment:  
Home Environment: Private residence Wheelchair Ramp: Yes One/Two Story Residence: One story Living Alone: Yes Support Systems: Child(elpidio) Patient Expects to be Discharged to[de-identified] Private residence Current DME Used/Available at Home: [employer], [address] Prior Level of Function/Work/Activity: 
Lives alone, use of SPC/walker for gait, indep with ADLs, children support system Number of Personal Factors/Comorbidities that affect the Plan of Care: 3+: HIGH COMPLEXITY EXAMINATION:  
Most Recent Physical Functioning:  
Gross Assessment: 
AROM: Within functional limits Strength: Generally decreased, functional 
Coordination: Generally decreased, functional 
         
  
Posture: 
  
Balance: 
Sitting: Intact; Without support Standing: Impaired;Pull to stand; With support Bed Mobility: 
  
Wheelchair Mobility: 
  
Transfers: 
Sit to Stand: Contact guard assistance Stand to Sit: Contact guard assistance Gait: 
  
Base of Support: Narrowed; Shift to left Speed/Radha: Fluctuations Step Length: Left shortened;Right shortened Gait Abnormalities: Decreased step clearance;Trunk sway increased Distance (ft): 250 Feet (ft) Assistive Device: Walker, rolling Ambulation - Level of Assistance: Contact guard assistance Interventions: Safety awareness training; Tactile cues; Verbal cues Body Structures Involved: 1. Heart 2. Lungs 3. Bones 4. Muscles Body Functions Affected: 1. Cardio 2. Respiratory 3. Neuromusculoskeletal 
4. Movement Related Activities and Participation Affected: 1. General Tasks and Demands 2. Mobility 3. Self Care 4. Domestic Life 5. Interpersonal Interactions and Relationships 6. Community, Social and [place] Number of elements that affect the Plan of Care: 4+: HIGH COMPLEXITY CLINICAL PRESENTATION:  
Presentation: Evolving clinical presentation with changing clinical characteristics: MODERATE COMPLEXITY CLINICAL DECISION MAKING:  
[facility] AM-PAC 6 Clicks Basic Mobility Inpatient Short Form How much difficulty does the patient currently have. .. Unable A Lot A Little None 1. Turning over in bed (including adjusting bedclothes, sheets and blankets)? [] 1   [] 2   [] 3   [x] 4  
2. Sitting down on and standing up from a chair with arms ( e.g., wheelchair, bedside commode, etc.)   [] 1   [] 2   [x] 3   [] 4  
3. Moving from lying on back to sitting on the side of the bed? [] 1   [] 2   [] 3   [x] 4 How much help from another person does the patient currently need. .. Total A Lot A Little None 4. Moving to and from a bed to a chair (including a wheelchair)? [] 1   [] 2   [x] 3   [] 4  
5. Need to walk in hospital room? [] 1   [] 2   [x] 3   [] 4  
6. Climbing 3-5 steps with a railing? [] 1   [x] 2   [] 3   [] 4  
© 2007, Trustees of [facility], under license to [email or web address]. All rights reserved Score:  Initial: 19 Most Recent: X (Date: -- ) Interpretation of Tool:  Represents activities that are increasingly more difficult (i.e. Bed mobility, Transfers, Gait). Score 24 23 22-20 19-15 14-10 9-7 6 Modifier CH CI CJ CK CL CM CN   
 
? Mobility - Walking and Moving Around:  
  - CURRENT STATUS: CK - 40%-59% impaired, limited or restricted  - GOAL STATUS: CI - 1%-19% impaired, limited or restricted  - D/C STATUS:  ---------------To be determined--------------- Payor: CARE IMPROVEMENT PLUS [place] CARE IMPR[place] / Product Type: Managed Care Medicare /   
 
Medical Necessity:    
· Patient is expected to demonstrate progress in strength, balance, coordination and functional technique to decrease assistance required with gait, transfers, and functional mobility. Sylvie Mcdonald Reason for Services/Other Comments: 
· Patient continues to require skilled intervention due to  decreased strength, decreased balance, decreased functional tolerance, decreased cardiopulmonary endurance affecting participation in basic ADLs and functional tasks. Use of outcome tool(s) and clinical judgement create a POC that gives a: Clear prediction of patient's progress: LOW COMPLEXITY  
  
 
 
 
TREATMENT:  
(In addition to Assessment/Re-Assessment sessions the following treatments were rendered) Pre-treatment Symptoms/Complaints:  none Pain: Initial:  
Pain Intensity 1: 0  Post Session:  0/10 In addition to evaluation: 
Therapeutic Activity: (    8 minutes): Therapeutic activities including Bed transfers, Ambulation on level ground and cues for ease and safety of transfers, walker management and safety with gait to improve mobility, strength, balance and coordination. Required minimal Safety awareness training; Tactile cues; Verbal cues to promote dynamic balance in standing. Braces/Orthotics/Lines/Etc:  
· O2 Device: Room air Treatment/Session Assessment:   
· Response to Treatment:  Ambulated 250 ft with RW and CGA · Interdisciplinary Collaboration:  
o Physical Therapist 
o Occupational Therapist 
o Registered Nurse 
o Certified Nursing Assistant/Patient Care Technician · After treatment position/precautions:  
o Bed/Chair-wheels locked 
o Bed in low position 
o Call light within reach 
o sitting EOB, PCT at bedside · Compliance with Program/Exercises: Will assess as treatment progresses. · Recommendations/Intent for next treatment session: \"Next visit will focus on advancements to more challenging activities and reduction in assistance provided\". Total Treatment Duration: PT Patient Time In/Time Out Time In: 2043 Time Out: 1455 Helena Barnett

## 2018-10-09 PROBLEM — J00 ACUTE RHINITIS: Status: ACTIVE | Noted: 2018-01-01

## 2018-10-09 NOTE — PROGRESS NOTES
Problem: Interdisciplinary Rounds Goal: Interdisciplinary Rounds Interdisciplinary team rounds were held 10/9/2018 with the following team members:Care Management, Physical Therapy, Physician and Clinical Coordinator and the patient. Plan of care discussed. See clinical pathway and/or care plan for interventions and desired outcomes.

## 2018-10-09 NOTE — PROGRESS NOTES
PT note: Attempted to provide PT treatment to Mr. Yrn Yepez however the patient declined treatment for a variety  of reasons. RN made aware.   Ankit Chery, PTA

## 2018-10-09 NOTE — PROGRESS NOTES
Problem: Pressure Injury - Risk of 
Goal: *Prevention of pressure injury Document Seun Scale and appropriate interventions in the flowsheet. Outcome: Progressing Towards Goal 
Pressure Injury Interventions: 
Sensory Interventions: Check visual cues for pain, Keep linens dry and wrinkle-free, Pressure redistribution bed/mattress (bed type) Moisture Interventions: Absorbent underpads Activity Interventions: PT/OT evaluation Mobility Interventions: PT/OT evaluation Nutrition Interventions: Document food/fluid/supplement intake Friction and Shear Interventions: Lift sheet

## 2018-10-09 NOTE — PROGRESS NOTES
Explained to patient the treatment plan for wound care of left foot, he refused [place] boot, allowed the betadine application and exposure to air. Has rested off and on, no further c/o discomfort. Will continue to monitor.

## 2018-10-09 NOTE — PROGRESS NOTES
Hospitalist Progress Note Admit Date:  10/5/2018  8:46 AM  
Name:  Ludy Morris Age:  72 y.o. 
:  1945 MRN:  [id number] PCP:  Dagmar Boyd MD 
Treatment Team: Attending Provider: Praneeth Robertson. Quan Benjamin MD; Utilization Review: Sandra Goss RN; Consulting Provider: Shayla Hu NP; Consulting Provider: Aftab Hernandez MD; Consulting Provider: Arnoldo Reilly MD; Care Manager: Liz Markham. Luke Freeman Subjective:  
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 oxygen saturation was in mid 80s. He had productive cough with concern for PNA so vanc/zosyn started. ulcer on lateral side just proximal to 5th MTP joint with surrounding cellulitis. He had an NSTEMI last hospitalization with troponin peaking at 28.4 on  (though was not checked after peak). He was recommended for L heart cath at the time, but he refused due to possibly needing dialysis afterward due to his CKD. Notably, this is his third hospitalization since 18. In the ED, CXR showed bilateral infiltrates/pulmonary edema, Cr of 4.84 with K of 5.5, O2 sats as low as 82% on room air, WBC count of 15.5K, and tachycardia. Troponin elevated to 1.77. . Was admitted with hypoxia due to pulm edema with possible HAP. Started on IV lasix and vanc/zosyn. Vascular surgery, nephrology, palliative care, and cardiology consulted. 10/9 - pt c/o runny nose, dry cough. Feeling better overall. No CP, SOB. No other complaints. Objective:  
 
Patient Vitals for the past 24 hrs: 
 Temp Pulse Resp BP SpO2  
10/09/18 0722 98.5 °F (36.9 °C) 78 18 161/81 96 % 10/09/18 0317 98.1 °F (36.7 °C) 82 18 109/63 97 % 10/08/18 2303 98.3 °F (36.8 °C) 80 18 137/78 97 % 10/08/18 1927 98.3 °F (36.8 °C) 78 18 131/76 98 % 10/08/18 1521 98.4 °F (36.9 °C) 98 18 135/75 96 % 10/08/18 1059 98.1 °F (36.7 °C) 86 18 121/67 94 % Oxygen Therapy O2 Sat (%): 96 % (10/09/18 0722) Pulse via Oximetry: 83 beats per minute (10/07/18 0051) O2 Device: Room air (10/07/18 1407) O2 Flow Rate (L/min): 3 l/min (10/05/18 1258) Intake/Output Summary (Last 24 hours) at 10/09/18 9736 Last data filed at 10/09/18 9815 Gross per 24 hour Intake             1200 ml Output              750 ml Net              450 ml General:    Well nourished. Alert. CV:   RRR. No murmur, rub, or gallop. Lungs:   CTAB Extremities: Warm and dry. No cyanosis or edema. R BKA. Ulcer lateral aspect L foot with some mild surrounding erythema Skin:     No rashes or jaundice. Neuro:  No gross focal deficits Data Review: 
I have reviewed all labs, meds, telemetry events, and studies from the last 24 hours: 
 
Recent Results (from the past 24 hour(s)) GLUCOSE, POC Collection Time: 10/08/18 10:54 AM  
Result Value Ref Range Glucose (POC) 197 (H) 65 - 100 mg/dL GLUCOSE, POC Collection Time: 10/08/18  3:49 PM  
Result Value Ref Range Glucose (POC) 156 (H) 65 - 100 mg/dL GLUCOSE, POC Collection Time: 10/08/18  8:34 PM  
Result Value Ref Range Glucose (POC) 206 (H) 65 - 100 mg/dL CBC W/O DIFF Collection Time: 10/09/18  5:29 AM  
Result Value Ref Range WBC 6.9 4.3 - 11.1 K/uL  
 RBC 2.76 (L) 4.23 - 5.6 M/uL HGB 7.8 (L) 13.6 - 17.2 g/dL HCT 25.6 (L) 41.1 - 50.3 % MCV 92.8 79.6 - 97.8 FL  
 MCH 28.3 26.1 - 32.9 PG  
 MCHC 30.5 (L) 31.4 - 35.0 g/dL  
 RDW 12.8 % PLATELET 224 876 - 802 K/uL MPV 11.1 9.4 - 12.3 FL ABSOLUTE NRBC 0.00 0.0 - 0.2 K/uL METABOLIC PANEL, BASIC Collection Time: 10/09/18  5:30 AM  
Result Value Ref Range Sodium 142 136 - 145 mmol/L  Potassium 3.6 3.5 - 5.1 mmol/L  
 Chloride 108 (H) 98 - 107 mmol/L  
 CO2 25 21 - 32 mmol/L Anion gap 9 7 - 16 mmol/L Glucose 78 65 - 100 mg/dL BUN 45 (H) 8 - 23 MG/DL Creatinine 5.45 (H) 0.8 - 1.5 MG/DL  
 GFR est AA 13 (L) >60 ml/min/1.73m2 GFR est non-AA 11 (L) >60 ml/min/1.73m2 Calcium 8.1 (L) 8.3 - 10.4 MG/DL Dom Batter Collection Time: 10/09/18  5:30 AM  
Result Value Ref Range Vancomycin, random 23.6 UG/ML  
FERRITIN Collection Time: 10/09/18  5:30 AM  
Result Value Ref Range Ferritin 313 8 - 388 NG/ML  
IRON Collection Time: 10/09/18  5:30 AM  
Result Value Ref Range Iron 40 35 - 150 ug/dL TRANSFERRIN SATURATION Collection Time: 10/09/18  5:30 AM  
Result Value Ref Range Iron 37 35 - 150 ug/dL TIBC 141 (L) 250 - 450 ug/dL Transferrin Saturation 26 >20 % PTH INTACT Collection Time: 10/09/18  5:30 AM  
Result Value Ref Range Calcium 7.8 (L) 8.3 - 10.4 MG/DL  
 PTH, Intact 265.2 (H) 18.5 - 88.0 pg/mL GLUCOSE, POC Collection Time: 10/09/18  5:34 AM  
Result Value Ref Range Glucose (POC) 102 (H) 65 - 100 mg/dL All Micro Results Procedure Component Value Units Date/Time CULTURE, BLOOD [[id number]] Collected:  10/05/18 1254 Order Status:  Completed Specimen:  Blood from Blood Updated:  10/09/18 9978 Special Requests: --     
  RIGHT 
HAND Culture result: NO GROWTH 4 DAYS     
 CULTURE, BLOOD [[id number]] Collected:  10/05/18 1309 Order Status:  Completed Specimen:  Blood from Blood Updated:  10/09/18 9315 Special Requests: --     
  RIGHT 
HAND Culture result: NO GROWTH 4 DAYS     
 CULTURE, RESPIRATORY/SPUTUM/BRONCH Vincent Lipschutz STAIN [[id number]] Order Status:  Canceled Specimen:  Sputum from Sputum No results found for this visit on 10/05/18. Current Meds: 
Current Facility-Administered Medications Medication Dose Route Frequency  epoetin denver (EPOGEN;PROCRIT) injection 10,000 Units  10,000 Units SubCUTAneous Q7D  
  bisacodyl (DULCOLAX) suppository 10 mg  10 mg Rectal DAILY PRN  
 alum-mag hydroxide-simeth (MYLANTA) oral suspension 30 mL  30 mL Oral Q4H PRN  
 bisacodyl (DULCOLAX) tablet 5 mg  5 mg Oral DAILY PRN  
 hydrALAZINE (APRESOLINE) 20 mg/mL injection 20 mg  20 mg IntraVENous Q4H PRN  
 zolpidem (AMBIEN) tablet 5 mg  5 mg Oral QHS PRN  polyethylene glycol (MIRALAX) packet 17 g  17 g Oral DAILY PRN  
 ondansetron (ZOFRAN) injection 4 mg  4 mg IntraVENous Q4H PRN  
 furosemide (LASIX) tablet 80 mg  80 mg Oral BID  piperacillin-tazobactam (ZOSYN) 4.5 g in 0.9% sodium chloride (MBP/ADV) 100 mL  4.5 g IntraVENous Q12H  aspirin chewable tablet 81 mg  81 mg Oral DAILY AFTER BREAKFAST  atorvastatin (LIPITOR) tablet 80 mg  80 mg Oral DAILY  isosorbide mononitrate ER (IMDUR) tablet 30 mg  30 mg Oral DAILY  metoprolol succinate (TOPROL-XL) tablet 100 mg  100 mg Oral DAILY  nitroglycerin (NITROSTAT) tablet 0.4 mg  0.4 mg SubLINGual Q5MIN PRN  
 sodium bicarbonate tablet 650 mg  650 mg Oral TID WITH MEALS  sodium chloride (NS) flush 5-10 mL  5-10 mL IntraVENous Q8H  
 sodium chloride (NS) flush 5-10 mL  5-10 mL IntraVENous PRN  
 acetaminophen (TYLENOL) tablet 650 mg  650 mg Oral Q4H PRN  
 heparin (porcine) injection 5,000 Units  5,000 Units SubCUTAneous Q12H  Vancomycin intermittent dosing placeholder   Other Rx Dosing/Monitoring  insulin lispro (HUMALOG) injection   SubCUTAneous AC&HS Other Studies (last 24 hours): Xr Chest Pa Lat Result Date: 10/8/2018 Chest 2 view CLINICAL INDICATION: Dyspnea, follow-up of pulmonary edema, evaluate for pneumonia. History also of hypertension, diabetes, CHF, sepsis and cellulitis. Chronic kidney disease. COMPARISON: 10/5/2018 TECHNIQUE: Upright AP and lateral views of the chest FINDINGS: Lung volumes are well inflated.    Cardiomediastinal silhouette and hilar contours are stable with borderline to mild cardiomegaly and scattered aortic calcification again noted. The lungs demonstrate no pneumothorax, consolidation, or worsening CHF. There is persistence of mild bilateral interstitial prominence, and there are small layering pleural effusions posteriorly in both bases. No acute osseous abnormalities are seen. IMPRESSION: 1. Improving pulmonary interstitial edema. 2. Small bilateral pleural effusions. Duplex Upper Ext Vein Map Bilat Result Date: 10/8/2018 HISTORY: End-stage kidney disease. EXAM: Bilateral upper extremity vein mapping. Right upper extremity: The innominate and subclavian veins are patent. The cephalic vein is patent throughout its course measuring between 1.9 mm at the wrist and 2.7 mm at the proximal humerus. The basilic vein is patent and normal in caliber and the median cubital basilic vein is patent. Brachial artery and radial artery have normal diameter and demonstrate normal triphasic arterial waveform tracings. Left upper extremity: The median cubital cephalic vein demonstrates nonocclusive thrombus. Otherwise, cephalic vein between the wrist and the humerus measures between 1.1 and 3.4 mm. The left basilic vein is patent with normal caliber in the median cubital basilic vein is patent. Left radial artery and brachial artery demonstrate normal caliber and good triphasic arterial waveform tracings. IMPRESSION: Cephalic veins are somewhat small, particularly on the right and there is a focal nonocclusive thrombus in the median cubital left cephalic vein. Good arterial waveform tracings at both brachial and radial arteries. Assessment and Plan:  
 
Hospital Problems as of 10/9/2018  Date Reviewed: 9/26/2018 Codes Class Noted - Resolved POA Stage 5 chronic kidney disease not on chronic dialysis [facility]) ICD-10-CM: N18.5 ICD-9-CM: 585.5  10/8/2018 - Present Yes NSTEMI (non-ST elevated myocardial infarction) ([facility] [address] ICD-10-CM: I21.4 ICD-9-CM: 410.70  10/5/2018 - Present Yes Acute pulmonary edema (HCC) ICD-10-CM: J81.0 ICD-9-CM: 518.4  10/5/2018 - Present Yes * (Principal)Acute respiratory failure with hypoxemia ([facility] ICD-10-CM: J96.01 
ICD-9-CM: 518.81  10/5/2018 - Present Yes Sepsis due to pneumonia [facility]) ICD-10-CM: J18.9, A41.9 ICD-9-CM: 419, 995.91  10/5/2018 - Present Yes Tachycardia ICD-10-CM: R00.0 ICD-9-CM: 785.0  10/5/2018 - Present Diabetic ulcer of left midfoot associated with type 2 diabetes mellitus ([facility] ICD-10-CM: E11.621, D63.701 ICD-9-CM: 250.80, 707.14  10/5/2018 - Present Yes Cellulitis of left foot ICD-10-CM: L03.116 ICD-9-CM: 682.7  10/5/2018 - Present Yes Sepsis due to cellulitis ([facility] ICD-10-CM: L03.90, A41.9 ICD-9-CM: 682.9, 995.91  10/5/2018 - Present Yes Anemia of chronic disease (Chronic) ICD-10-CM: D63.8 ICD-9-CM: 285.29  9/6/2018 - Present Yes Type 2 diabetes mellitus with complication, without long-term current use of insulin (HCC) (Chronic) ICD-10-CM: E11.8 ICD-9-CM: 250.90  4/7/2017 - Present Yes S/P BKA (below knee amputation) unilateral (HCC) (Chronic) ICD-10-CM: H06.007 ICD-9-CM: V49.75  2/13/2017 - Present Yes  
   
 CKD (chronic kidney disease) stage 4, GFR 15-29 ml/min (HCC) (Chronic) ICD-10-CM: N18.4 ICD-9-CM: 585.4  8/11/2016 - Present Yes Chronic systolic congestive heart failure (HCC) (Chronic) ICD-10-CM: W47.74 ICD-9-CM: 428.22, 428.0  12/30/2015 - Present Yes Overview Signed 1/6/2017  8:59 AM by Abbey Sylvester MD  
  EF 35-40% on 2015 Echo HTN (hypertension) (Chronic) ICD-10-CM: I10 
ICD-9-CM: 401.9  7/29/2012 - Present Yes RESOLVED: Acute renal failure superimposed on stage 4 chronic kidney disease ([facility] [address] ICD-10-CM: N17.9, N18.4 ICD-9-CM: 584.9, 585.4  10/6/2018 - 10/8/2018 Yes RESOLVED: Metabolic acidosis ZXP-43-EC: E87.2 ICD-9-CM: 276.2  10/5/2018 - 10/7/2018 Yes Plan: Hypoxic respiratory failure with pulmonary edema- 
· On RA now · on PO lasix · Not clear to me that he ever had PNA. Switching abx to clinda for nonpurulent cellulitis L foot Goals of Care - 
· PC following · Encouraged pt to fill out advance directives; per [facility] note he plans to leave decisions to his 12 children which is unlikely to be efficient or productive · Pt told me he does not want to be intubated. He is not sure about other ACLS. DNI ordered. L foot ulcer with surrounding cellulitis-  
· Check MRI foot for osteo. No contrast 
· vasc surg following; may need surgery per their notes · MAGGIE with microvascular disease, occlusion of L peroneal artery. Defer to Vasc Surg · Cont clinda · Wound care Chest pain/elevated trop/recent NSTEMI-  
· Medical management. cardiology signed off. Consider LHC in future if on HD · Continue aspirin, statin, BB, isosorbide MO on CKD 4-  
· Nephrology following · Acidosis resolved, on bicarb · Vein mapping done Rhinitis -  
· Likely viral.  Supportive care with PRNs Anemia -  
· CKD, chronic disease. Iron studies OK · Defer transfusion to specialists · Daily H/H 
 
DC planning/Dispo:  PPD ordered. CM consulted. Needs PT/OT orders Monday Diet:  DIET RENAL 
FOOD SVCS COMMENTS 
DVT ppx:  Heparin Signed: 
Kim Pinon MD

## 2018-10-09 NOTE — PROGRESS NOTES
[facility] Progress Note Follow-Up on: CKD Patient seen and examined. No new c/o. Denies sob, cp, n/v. Feeling ok. Good uop. No uremic symptoms. Azotemia stable. ROS: 
Gen - no fever, no chills, appetite unchanged CV - no chest pain, no palpitation Lung - shortness of breath better, no cough Abd - no tenderness, no nausea/vomiting, no diarrhea Ext - edema better Exam: 
Vitals:  
 10/08/18 2303 10/09/18 5379 10/09/18 0615 10/09/18 7661 BP: 137/78 109/63  161/81 Pulse: 80 82  78 Resp: 18 18 18 Temp: 98.3 °F (36.8 °C) 98.1 °F (36.7 °C)  98.5 °F (36.9 °C) SpO2: 97% 97%  96% Weight:   87.3 kg (192 lb 6.4 oz) Height:      
 
 
 
Intake/Output Summary (Last 24 hours) at 10/09/18 4729 Last data filed at 10/09/18 6657 Gross per 24 hour Intake             1200 ml Output              750 ml Net              450 ml Wt Readings from Last 3 Encounters:  
10/09/18 87.3 kg (192 lb 6.4 oz) 09/28/18 93.5 kg (206 lb 3.2 oz) 09/05/18 95.3 kg (210 lb) GEN - in no distress CV - regular, no murmur, no rub Lung - clear bilaterally Abd - soft, nontender Ext - trace edema, right BKA Recent Labs 10/09/18 
 6054  10/08/18 
 3922  10/07/18 
 7715 WBC  6.9   --   7.3 HGB  7.8*  7.5*  7.7* HCT  25.6*  24.5*  25.1*  
PLT  326   --   298 Recent Labs 10/09/18 
 0530  10/08/18 
 0524  10/07/18 
 8180 NA  142  142  143  
K  3.6  3.8  3.8 CL  108*  108*  110* CO2  25  23  22 BUN  45*  47*  48* CREA  5.45*  5.23*  5.27* CA  7.8*  8.1*  8.0*  7.8*  
GLU  78  86  97 Assessment / Plan: 
Principal Problem: 
  Acute respiratory failure with hypoxemia ([address] (10/5/2018) Active Problems: 
  HTN (hypertension) (7/29/2012) Chronic systolic congestive heart failure ([facility] (12/30/2015) Overview: EF 35-40% on 2015 Echo 
 
  CKD (chronic kidney disease) stage 4, GFR 15-29 ml/min ([facility]) (8/11/2016) NSTEMI (non-ST elevated myocardial infarction) ([facility] [address] (10/5/2018) S/P BKA (below knee amputation) unilateral ([address] (2/13/2017) Type 2 diabetes mellitus with complication, without long-term current use of insulin ([address] (4/7/2017) Anemia of chronic disease (9/6/2018) Acute pulmonary edema ([address] (10/5/2018) Diabetic ulcer of left midfoot associated with type 2 diabetes mellitus ([address] (10/5/2018) Cellulitis of left foot (10/5/2018) Sepsis due to cellulitis ([address] (10/5/2018) Stage 5 chronic kidney disease not on chronic dialysis ([address] (10/8/2018) Acute rhinitis (10/9/2018) Assessment and Plan:  
MO/CKD stage 4 - Versus progression of underlying CKD with recent NSTEMI 
- Previous baseline Cr presumed to be upper 3's - Admission Cr 4.8 
- Diuresing well, Cr remains low 5's, symptoms already much improved, off oxygen, transitioned to PO Lasix on 10/6, azotemia stable - May not need dialysis here but will need close F/U with us and with Vascular Surgery to establish AV access. Repeat vein mapping ordered as previous study sub-optimal 
 
SOB/volume overload - much better CAD/CHF  
- NSTEMI last admission 9/24-9/28/18 
- LVEF 30-35% - plan [facility] when he is on HD 
 
PVD 
- s/p right BKA - Left foot ulcer/cellulitis - Abx DM 
- per primary team 
 
Metabolic acidosis - po bicarbonate Anemia - check iron stores 
- start epogen I reviewed chart, examined Pt and I agree with Adenike Weathers NP' note

## 2018-10-09 NOTE — PROGRESS NOTES
Medicated with tylenol 650 mg po for generalized discomfort. Resting quietly, watching TV. Will continue to monitor.

## 2018-10-09 NOTE — PROGRESS NOTES
Problem: Falls - Risk of 
Goal: *Absence of Falls Document Ishaan Huddleston Fall Risk and appropriate interventions in the flowsheet. Outcome: Progressing Towards Goal 
Fall Risk Interventions: 
Mobility Interventions: Communicate number of staff needed for ambulation/transfer, Strengthening exercises (ROM-active/passive), Patient to call before getting OOB Medication Interventions: Patient to call before getting OOB, Teach patient to arise slowly Elimination Interventions: Urinal in reach, Call light in reach, Patient to call for help with toileting needs Problem: Pressure Injury - Risk of 
Goal: *Prevention of pressure injury Document Seun Scale and appropriate interventions in the flowsheet. Outcome: Progressing Towards Goal 
Pressure Injury Interventions: 
Sensory Interventions: Assess need for specialty bed, Check visual cues for pain, Maintain/enhance activity level, Minimize linen layers, Turn and reposition approx. every two hours (pillows and wedges if needed) Moisture Interventions: Minimize layers, Check for incontinence Q2 hours and as needed Activity Interventions: Increase time out of bed, Pressure redistribution bed/mattress(bed type) Mobility Interventions: Pressure redistribution bed/mattress (bed type), Turn and reposition approx. every two hours(pillow and wedges) Nutrition Interventions: Document food/fluid/supplement intake Friction and Shear Interventions: Lift sheet, HOB 30 degrees or less

## 2018-10-09 NOTE — PROGRESS NOTES
Problem: Falls - Risk of 
Goal: *Absence of Falls Document Elena Kuhn Fall Risk and appropriate interventions in the flowsheet. Outcome: Progressing Towards Goal 
Fall Risk Interventions: 
Mobility Interventions: Communicate number of staff needed for ambulation/transfer Medication Interventions: Patient to call before getting OOB Elimination Interventions: Urinal in reach, Call light in reach

## 2018-10-09 NOTE — PROGRESS NOTES
Patient c/o discomfort at right lower amputation site, entire area excoriated, rash, has multiple scratch sites. States he has difficulty in cleaning prosthesis adapter and get irritation, also, crack noted on device. States he is waiting for another adapter. Cleansed leg and applied wound gel, cleansed left foot and applied gel as well. Lotion applied to back for discomfort, he states he is sore from wipes but did not allow cleaning or treatment of sacrum. Wound consult already for tomorrow.

## 2018-10-09 NOTE — PROGRESS NOTES
Change of shift report received from Christiana Stock[place], [place] to Christopher Andrade RN and DEJA Hampton. Patient alert and oriented x4, has telemetry monitor at bedside that patient has been removing himself. Scattered wheezing bilaterally; pt complains of a dry cough. Clear drainage coming from nose.

## 2018-10-10 PROBLEM — J96.01 ACUTE RESPIRATORY FAILURE WITH HYPOXEMIA (HCC): Status: RESOLVED | Noted: 2018-01-01 | Resolved: 2018-01-01

## 2018-10-10 PROBLEM — M86.172 ACUTE OSTEOMYELITIS OF METATARSAL BONE, LEFT (HCC): Status: ACTIVE | Noted: 2018-01-01

## 2018-10-10 PROBLEM — J81.0 ACUTE PULMONARY EDEMA (HCC): Status: RESOLVED | Noted: 2018-01-01 | Resolved: 2018-01-01

## 2018-10-10 NOTE — PROGRESS NOTES
Hospitalist Progress Note Admit Date:  10/5/2018  8:46 AM  
Name:  Junior Khan Age:  72 y.o. 
:  1945 MRN:  [id number] PCP:  Marleny Carreno MD 
Treatment Team: Attending Provider: Emmy Gold. Zeke Brown MD; Utilization Review: Sharon Saeed RN; Consulting Provider: Mauricio Granados NP; Consulting Provider: Keyon Abbasi MD; Consulting Provider: Dilan Gil MD; Care Manager: Deric Baltazar RN; Consulting Provider: Demetri León MD; Consulting Provider: Brianna Xiao MD; Consulting Provider: Hayden Ford MD 
 
Subjective:  
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 oxygen saturation was in mid 80s. He had productive cough with concern for PNA so vanc/zosyn started. ulcer on lateral side just proximal to 5th MTP joint with surrounding cellulitis. He had an NSTEMI last hospitalization with troponin peaking at 28.4 on  (though was not checked after peak). He was recommended for L heart cath at the time, but he refused due to possibly needing dialysis afterward due to his CKD. Notably, this is his third hospitalization since 18. In the ED, CXR showed bilateral infiltrates/pulmonary edema, Cr of 4.84 with K of 5.5, O2 sats as low as 82% on room air, WBC count of 15.5K, and tachycardia. Troponin elevated to 1.77. . Was admitted with hypoxia due to pulm edema with possible HAP. Started on IV lasix and vanc/zosyn. Vascular surgery, nephrology, palliative care, and cardiology consulted. MRI with possible osteo so ID was consulted. 10/10 - pt not happy about news he may have osteo.  he sleeps a lot during the day; denies depression. No CP, SOB, fevers. Still has cough and runny nose from yesterday; likely viral URI Objective:  
 
Patient Vitals for the past 24 hrs: 
 Temp Pulse Resp BP SpO2  
10/10/18 0803 98.9 °F (37.2 °C) 75 19 103/55 92 % 10/10/18 0319 99 °F (37.2 °C) 78 18 153/85 96 % 10/09/18 1928 99 °F (37.2 °C) 80 18 146/75 97 % 10/09/18 1726 98.8 °F (37.1 °C) 69 18 130/73 97 % 10/09/18 1106 99 °F (37.2 °C) 84 18 129/72 96 % Oxygen Therapy O2 Sat (%): 92 % (10/10/18 0803) Pulse via Oximetry: 83 beats per minute (10/07/18 0051) O2 Device: Room air (10/09/18 1630) O2 Flow Rate (L/min): 3 l/min (10/05/18 1258) Intake/Output Summary (Last 24 hours) at 10/10/18 5599 Last data filed at 10/10/18 4998 Gross per 24 hour Intake             1200 ml Output             1300 ml Net             -100 ml General:    Well nourished. Alert. CV:   RRR. No murmur, rub, or gallop. Lungs:   CTAB Extremities: Warm and dry. No cyanosis or edema. R BKA. Ulcer lateral aspect L foot with some mild surrounding erythema, improved from admit Skin:     No rashes or jaundice. Neuro:  No gross focal deficits Data Review: 
I have reviewed all labs, meds, telemetry events, and studies from the last 24 hours: 
 
Recent Results (from the past 24 hour(s)) GLUCOSE, POC Collection Time: 10/09/18 11:29 AM  
Result Value Ref Range Glucose (POC) 149 (H) 65 - 100 mg/dL GLUCOSE, POC Collection Time: 10/09/18  4:14 PM  
Result Value Ref Range Glucose (POC) 332 (H) 65 - 100 mg/dL GLUCOSE, POC Collection Time: 10/09/18  8:48 PM  
Result Value Ref Range Glucose (POC) 124 (H) 65 - 100 mg/dL GLUCOSE, POC Collection Time: 10/10/18  5:01 AM  
Result Value Ref Range Glucose (POC) 140 (H) 65 - 100 mg/dL HGB & HCT Collection Time: 10/10/18  6:13 AM  
Result Value Ref Range HGB 7.6 (L) 13.6 - 17.2 g/dL HCT 24.8 (L) 41.1 - 50.3 % METABOLIC PANEL, BASIC  
 Collection Time: 10/10/18  6:13 AM  
Result Value Ref Range Sodium 140 136 - 145 mmol/L Potassium 3.4 (L) 3.5 - 5.1 mmol/L Chloride 107 98 - 107 mmol/L  
 CO2 26 21 - 32 mmol/L Anion gap 7 7 - 16 mmol/L Glucose 110 (H) 65 - 100 mg/dL BUN 43 (H) 8 - 23 MG/DL Creatinine 5.17 (H) 0.8 - 1.5 MG/DL  
 GFR est AA 14 (L) >60 ml/min/1.73m2 GFR est non-AA 12 (L) >60 ml/min/1.73m2 Calcium 7.8 (L) 8.3 - 10.4 MG/DL All Micro Results Procedure Component Value Units Date/Time CULTURE, BLOOD [[id number]] Collected:  10/05/18 1254 Order Status:  Completed Specimen:  Blood from Blood Updated:  10/10/18 0254 Special Requests: --     
  RIGHT 
HAND Culture result: NO GROWTH 5 DAYS     
 CULTURE, BLOOD [[id number]] Collected:  10/05/18 1309 Order Status:  Completed Specimen:  Blood from Blood Updated:  10/10/18 4610 Special Requests: --     
  RIGHT 
HAND Culture result: NO GROWTH 5 DAYS     
 CULTURE, RESPIRATORY/SPUTUM/BRONCH Adrienne Moseley STAIN [[id number]] Order Status:  Canceled Specimen:  Sputum from Sputum No results found for this visit on 10/05/18. Current Meds: 
Current Facility-Administered Medications Medication Dose Route Frequency  guaiFENesin-dextromethorphan (ROBITUSSIN DM) 100-10 mg/5 mL syrup 10 mL  10 mL Oral Q4H PRN  phenylephrine (NEOSYNEPHRINE) 0.25 % nasal spray 1 Spray  1 Spray Both Nostrils Q6H PRN  
 epoetin denver (EPOGEN;PROCRIT) injection 10,000 Units  10,000 Units SubCUTAneous Q7D  
 bisacodyl (DULCOLAX) suppository 10 mg  10 mg Rectal DAILY PRN  
 alum-mag hydroxide-simeth (MYLANTA) oral suspension 30 mL  30 mL Oral Q4H PRN  
 bisacodyl (DULCOLAX) tablet 5 mg  5 mg Oral DAILY PRN  
 hydrALAZINE (APRESOLINE) 20 mg/mL injection 20 mg  20 mg IntraVENous Q4H PRN  
 zolpidem (AMBIEN) tablet 5 mg  5 mg Oral QHS PRN  polyethylene glycol (MIRALAX) packet 17 g  17 g Oral DAILY PRN  
  ondansetron (ZOFRAN) injection 4 mg  4 mg IntraVENous Q4H PRN  
 furosemide (LASIX) tablet 80 mg  80 mg Oral BID  aspirin chewable tablet 81 mg  81 mg Oral DAILY AFTER BREAKFAST  atorvastatin (LIPITOR) tablet 80 mg  80 mg Oral DAILY  isosorbide mononitrate ER (IMDUR) tablet 30 mg  30 mg Oral DAILY  metoprolol succinate (TOPROL-XL) tablet 100 mg  100 mg Oral DAILY  nitroglycerin (NITROSTAT) tablet 0.4 mg  0.4 mg SubLINGual Q5MIN PRN  
 sodium bicarbonate tablet 650 mg  650 mg Oral TID WITH MEALS  sodium chloride (NS) flush 5-10 mL  5-10 mL IntraVENous Q8H  
 sodium chloride (NS) flush 5-10 mL  5-10 mL IntraVENous PRN  
 acetaminophen (TYLENOL) tablet 650 mg  650 mg Oral Q4H PRN  
 heparin (porcine) injection 5,000 Units  5,000 Units SubCUTAneous Q12H  
 insulin lispro (HUMALOG) injection   SubCUTAneous AC&HS Other Studies (last 24 hours): 
Mri Low Ext Lt Wo Cont Result Date: 10/9/2018 History: Ulceration involving the lateral base of the fifth digit. History of diabetes. EXAM: MRI left foot without contrast TECHNIQUE: Multiplanar multisequence imaging is performed No comparison FINDINGS: There is an ulceration present laterally at the level the fifth MTP articulation. There is edema-like marrow signal within the fifth metatarsal head. There is low T1 signal in this region as well. The marrow signal intensity is otherwise normal. Degenerative change of the first MTP articulation noted. There is mild edema in the plantar musculature of the left foot. No focal fluid collection demonstrated. IMPRESSION: Ulceration present laterally at the level the fifth MTP articulation. Subjacent to this, there is low T1 and high T2 signal within the fifth metatarsal head. Findings concerning for small area of osteomyelitis at this site. Assessment and Plan:  
 
Hospital Problems as of 10/10/2018  Date Reviewed: 9/26/2018 Codes Class Noted - Resolved POA Acute osteomyelitis of metatarsal bone, left ([place] [address] ICD-10-CM: C07.316 ICD-9-CM: 730.07  10/10/2018 - Present Yes Acute rhinitis ICD-10-CM: Radha Kelly ICD-9-CM: 981  10/9/2018 - Present Yes Stage 5 chronic kidney disease not on chronic dialysis [facility]) ICD-10-CM: N18.5 ICD-9-CM: 585.5  10/8/2018 - Present Yes NSTEMI (non-ST elevated myocardial infarction) ([place] [address] ICD-10-CM: I21.4 ICD-9-CM: 410.70  10/5/2018 - Present Yes Tachycardia ICD-10-CM: R00.0 ICD-9-CM: 785.0  10/5/2018 - Present Diabetic ulcer of left midfoot associated with type 2 diabetes mellitus ([place] [address] ICD-10-CM: E11.621, L43.781 ICD-9-CM: 250.80, 707.14  10/5/2018 - Present Yes Cellulitis of left foot ICD-10-CM: L03.116 ICD-9-CM: 682.7  10/5/2018 - Present Yes Sepsis due to cellulitis ([place] [address] ICD-10-CM: L03.90, A41.9 ICD-9-CM: 682.9, 995.91  10/5/2018 - Present Yes Anemia of chronic disease (Chronic) ICD-10-CM: D63.8 ICD-9-CM: 285.29  9/6/2018 - Present Yes Type 2 diabetes mellitus with complication, without long-term current use of insulin (HCC) (Chronic) ICD-10-CM: E11.8 ICD-9-CM: 250.90  4/7/2017 - Present Yes S/P BKA (below knee amputation) unilateral (HCC) (Chronic) ICD-10-CM: M19.192 ICD-9-CM: V49.75  2/13/2017 - Present Yes  
   
 CKD (chronic kidney disease) stage 4, GFR 15-29 ml/min (HCC) (Chronic) ICD-10-CM: N18.4 ICD-9-CM: 585.4  8/11/2016 - Present Yes Chronic systolic congestive heart failure (HCC) (Chronic) ICD-10-CM: F46.25 ICD-9-CM: 428.22, 428.0  12/30/2015 - Present Yes Overview Signed 1/6/2017  8:59 AM by Maximo Gregory MD  
  EF 35-40% on 2015 Echo HTN (hypertension) (Chronic) ICD-10-CM: I10 
ICD-9-CM: 401.9  7/29/2012 - Present Yes RESOLVED: Acute renal failure superimposed on stage 4 chronic kidney disease ([facility] [address] ICD-10-CM: N17.9, N18.4 ICD-9-CM: 584.9, 585.4  10/6/2018 - 10/8/2018 Yes RESOLVED: Acute pulmonary edema (HCC) ICD-10-CM: J81.0 ICD-9-CM: 518.4  10/5/2018 - 10/10/2018 Yes * (Principal)RESOLVED: Acute respiratory failure with hypoxemia ([facility] [address] ICD-10-CM: J96.01 
ICD-9-CM: 518.81  10/5/2018 - 10/10/2018 Yes RESOLVED: Metabolic acidosis TDG-10-XM: E87.2 ICD-9-CM: 276.2  10/5/2018 - 10/7/2018 Yes Plan: Hypoxic respiratory failure with pulmonary edema from CHF and MO · On RA now · on PO lasix L foot ulcer with cellulitis, possible osteomyelitis-  
· ID consulted for recs on possible osteo on MRI. Cellulitis looks improved · Given osteomyelitis will switch clinda to vanc for now, pending ID recs · Blood cx from 10/5 neg. · Per vascular notes last week, may need surgery. Asked for callback to clarify their plans · MAGGIE with microvascular disease, occlusion of L peroneal artery. Defer to Vasc Surg · Wound care MO on CKD 4-  
· Nephrology following · Acidosis resolved, cont bicarb · Vein mapping done Goals of Care - 
· PC following · Encouraged pt to fill out advance directives; per [facility] note he plans to leave decisions to his 12 children which is unlikely to be efficient or productive · Pt told me he does not want to be intubated. He is not sure about ACLS at this time. DNI ordered. Chest pain/elevated trop/recent NSTEMI-  
· Medical management. cardiology signed off. Consider LHC in future, if goes on HD · Continue aspirin, statin, BB, isosorbide Viral URI-  
· Supportive care with PRNs Anemia -  
· CKD, chronic disease. Iron studies OK · Defer transfusion to specialists · Daily H/H 
 
DC planning/Dispo:  PPD ordered. CM consulted. Needs PT/OT orders Monday Diet:  DIET RENAL 
FOOD SVCS COMMENTS 
DVT ppx:  Heparin Signed: 
Eri Roldan MD

## 2018-10-10 NOTE — PROGRESS NOTES
Pharmacokinetic Consult to Pharmacist 
 
Sharie Romberg is a 72 y.o. male being treated with vancomycin. Height: 6' 1\" (185.4 cm)  Weight: 85.6 kg (188 lb 11.2 oz) Lab Results Component Value Date/Time BUN 43 (H) 10/10/2018 06:13 AM  
 Creatinine 5.17 (H) 10/10/2018 06:13 AM  
 WBC 6.9 10/09/2018 05:29 AM  
 Procalcitonin 0.6 10/05/2018 09:00 AM  
 Lactic acid 8.3 (H) 07/29/2012 03:15 PM  
 Lactic Acid (POC) 1.7 10/05/2018 09:05 AM  
  
Estimated Creatinine Clearance: 14.8 mL/min (based on Cr of 5.17). CULTURES: 
All Micro Results Procedure Component Value Units Date/Time CULTURE, BLOOD [[id number]] Collected:  10/05/18 1254 Order Status:  Completed Specimen:  Blood from Blood Updated:  10/10/18 2225 Special Requests: --     
  RIGHT 
HAND Culture result: NO GROWTH 5 DAYS     
 CULTURE, BLOOD [[id number]] Collected:  10/05/18 1309 Order Status:  Completed Specimen:  Blood from Blood Updated:  10/10/18 2299 Special Requests: --     
  RIGHT 
HAND Culture result: NO GROWTH 5 DAYS     
 CULTURE, RESPIRATORY/SPUTUM/BRONCH Iwona Flatten STAIN [[id number]] Order Status:  Canceled Specimen:  Sputum from Sputum Lab Results Component Value Date/Time Vancomycin, random 23.6 10/09/2018 05:30 AM  
 
 
Day 1 of vancomycin since re-consult. Mr. Zo Hyatt previously received 4 days of vancomycin and level was therapeutic on 10/9/18, so today can be considered day 6 of vancomycin. Goal trough is 15-20. I will dose intermittently based on levels due to MO. Vancomycin 1000mg X1 to be given now. I would plan a level in the next 48h based on previously calculated elimination half-life. Pharmacist will continue to follow patient. Please call with any questions. Thank you, Kinjal Batres, PharmD, [facility] Hospital[facility] Clinical Pharmacist 
[phone]

## 2018-10-10 NOTE — PROGRESS NOTES
CELINA recevied from [facility], RN. Patient remains in stable condition with respirations even/unlabored. No acute distress noted at this time. Call light remains within reach, patient encouraged to call nurse prn assist. Will continue to monitor per policy.

## 2018-10-10 NOTE — CONSULTS
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 
· CHF with recent NSTEMI  
· Medical noncompliance. Plan: · Switch to Minocycline 100mg BID and Keflex 500mg TID for 4-6 weeks · Refer to outpt wound care · ID appt on 11/9 at 240PM.  
· **Will sign off at this time. Please call with questions or concerns. Anti-infectives: · Vanc (10/5-10/9) · Zosyn (10/5-10/9) · Clindamycin (10/9-10/10) · LVQ (10/9) Subjective:  
Date of Consultation:  October 10, 2018 Referring Physician: Jos Simpson Patient is a 72 y.o. male with a significant medical hx for uncontrolled DMII s/p R BKA, CHF, CKD stage 4, NSTEMI, who presented to ED for SOB, chest pain and worsening L foot pain associated with chronic 5MT ulcer. Apparently, he has refused LHC and will likely need HD in near future. MRI + OM to 5th MTH adjacent to ulceration . He remains afebrile with resolved leukocytosis. BCx2 NG. Cr 5.17. He has been given Vanc/Zosyn with intermittent LVQ or clindamycin. Notes L foot ulcer present for at least over a month. Denies nausea, vomiting, diarrhea, fever, chills, or sweats Patient Active Problem List  
Diagnosis Code  
 HTN (hypertension) I10  
 Chest pain R07.9  Bilateral pleural effusion J90  
 Tobacco use Z72.0  Abnormal CT scan, chest R93.89  Chronic systolic congestive heart failure (HCC) I50.22  
 Cardiomyopathy (HCC) I42.9  CKD (chronic kidney disease) stage 4, GFR 15-29 ml/min ([facility]) N18.4  Type 2 diabetes mellitus with right diabetic foot infection (HCC) E11.628, L08.9  NSTEMI (non-ST elevated myocardial infarction) ([facility] [address] I21.4  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 ([facility] [address] I70.261  S/P BKA (below knee amputation) unilateral ([facility] Z89.519  
  Type 2 diabetes mellitus with complication, without long-term current use of insulin ([facility]) E11.8  Coronary artery disease involving native coronary artery of native heart without angina pectoris I25.10  Debility R53.81  
 Glaucoma syndrome H40.9  Onychomycosis B35.1  MO (acute kidney injury) ([facility] [address] N17.9  Anemia of chronic disease D63.8  Pneumonia due to infectious organism J18.9  EKG, abnormal R94.31  
 Elevated troponin R74.8  Bacteremia R78.81  
 Tachycardia R00.0  Diabetic ulcer of left midfoot associated with type 2 diabetes mellitus ([facility] [address] E11.621, L97.429  
 Cellulitis of left foot [address]03. [address]  Sepsis due to cellulitis ([facility]) L03.90, A41.9  Stage 5 chronic kidney disease not on chronic dialysis ([facility] [address] N18.5  Acute rhinitis J00  
 Acute osteomyelitis of metatarsal bone, left ([facility] [address] M86.172 Past Medical History:  
Diagnosis Date  Abnormal CT scan, chest 12/27/2015  Acute CHF ([address] 12/26/2015  Acute systolic congestive heart failure ([address] 12/30/2015  MO (acute kidney injury) ([facility] [address] 7/29/2012  Anemia of chronic disease 9/6/2018  Bilateral pleural effusion 12/27/2015  Chest pain 12/26/2015  Depression  DM (diabetes mellitus), type 2 ([address] 7/29/2012  Encephalopathy due to infection 1/7/2017  
 HTN (hypertension) 7/29/2012  Hypoglycemia 7/29/2012  
 NSTEMI (non-ST elevated myocardial infarction) ([facility] [address] 1/5/2017  Tobacco use 12/27/2015 Family History Problem Relation Age of Onset  Diabetes Mother  Kidney Disease Mother  Diabetes Father  Kidney Disease Father  Kidney Disease Sister Social History Substance Use Topics  Smoking status: Current Every Day Smoker Packs/day: 0.50 Years: 45.00 Types: Cigarettes  Smokeless tobacco: Never Used  Alcohol use No  
 
Past Surgical History:  
Procedure Laterality Date  HX ORTHOPAEDIC Prior to Admission medications Medication Sig Start Date End Date Taking? Authorizing Provider  
isosorbide mononitrate ER (IMDUR) 30 mg tablet Take 1 Tab by mouth daily. 18  Yes Rosanna Glasgow MD  
insulin lispro (HUMALOG) 100 unit/mL injection For Blood Sugar (mg/dL) of:    
Less than 150 =   0 units          
150 -199 =   2 units 200 -249 =   4 units 250 -299 =   6 units 300 -349 =   8 units 18  Yes Rosanna Glasgow MD  
Insulin Syringe-Needle U-100 (INSULIN SYRINGE) 1 mL 30 gauge x  syrg As directed 18  Yes Rosanna Glasgow MD  
Lancets misc As directed 18  Yes Rosanna Glasgow MD  
glucose blood VI test strips (ASCENSIA AUTODISC VI, ONE TOUCH ULTRA TEST VI) strip As directed 18  Yes Rosanna Glasgow MD  
amLODIPine (NORVASC) 5 mg tablet Take 2 Tabs by mouth daily. 9/10/18  Yes Katie Curran MD  
sodium bicarbonate 650 mg tablet Take 1 Tab by mouth three (3) times daily (with meals). 9/10/18  Yes Katie Curran MD  
metoprolol succinate (TOPROL-XL) 100 mg tablet Take 1 Tab by mouth daily. 17  Yes Allyn Cantu MD  
nitroglycerin (NITROSTAT) 0.4 mg SL tablet by SubLINGual route every five (5) minutes as needed for Chest Pain. Yes Historical Provider  
atorvastatin (LIPITOR) 80 mg tablet Take 1 Tab by mouth daily. 16  Yes Allyn Cantu MD  
aspirin 81 mg chewable tablet Take 1 Tab by mouth daily (after breakfast). 12/30/15  Yes Reshma Pérez NP Allergies Allergen Reactions  Lortab [Hydrocodone-Acetaminophen] Itching Review of Systems:  A comprehensive review of systems was negative except for that written in the History of Present Illness. Objective:  
 
Visit Vitals  /70  Pulse 72  Temp 98.4 °F (36.9 °C)  Resp 20  
 Ht 6' 1\" (1.854 m)  Wt 85.6 kg (188 lb 11.2 oz)  SpO2 97%  BMI 24.9 kg/m2 Temp (24hrs), Av.8 °F (37.1 °C), Min:98.4 °F (36.9 °C), Max:99 °F (37.2 °C) Lines:  Peripheral IV:   
   
 
 
Physical Exam: General:  Alert, cooperative, well noursished, well developed, appears stated age Eyes:  Sclera anicteric. Pupils equally round and reactive to light. Mouth/Throat: Mucous membranes normal, oral pharynx clear Neck: Supple Lungs:   Clear to auscultation bilaterally, good effort CV:  Regular rate and rhythm,no murmur, click, rub or gallop Abdomen:   Soft, non-tender. bowel sounds normal. non-distended Extremities: No cyanosis or edema. R BKA stump benign. L foot with lateral 5th MT calloused ulcer-no erythema or drainage. Skin: Skin color, texture, turgor normal. no acute rash or lesions Lymph nodes: Cervical and supraclavicular normal  
Musculoskeletal: No swelling or deformity Lines/Devices:  Intact, no erythema, drainage or tenderness Psych: Alert and oriented, normal mood affect given the setting Data Review: CBC: 
Recent Labs 10/10/18 
 7319  10/09/18 
 2004  10/08/18 
 7106 WBC   --   6.9   --   
HGB  7.6*  7.8*  7.5* HCT  24.8*  25.6*  24.5*  
PLT   --   326   --   
 
 
BMP: 
Recent Labs 10/10/18 
 1824  10/09/18 
 0530  10/08/18 
 4971 CREA  5.17*  5.45*  5.23* BUN  43*  45*  47* NA  140  142  142  
K  3.4*  3.6  3.8 CL  107  108*  108* CO2  26  25  23 AGAP  7  9  11 GLU  110*  78  86 LFTS: 
No results for input(s): TBILI, ALT, SGOT, AP, TP, ALB in the last 72 hours. Microbiology:  
 
All Micro Results Procedure Component Value Units Date/Time CULTURE, BLOOD [[id number]] Collected:  10/05/18 1254 Order Status:  Completed Specimen:  Blood from Blood Updated:  10/10/18 9702 Special Requests: --     
  RIGHT 
HAND Culture result: NO GROWTH 5 DAYS     
 CULTURE, BLOOD [[id number]] Collected:  10/05/18 1309 Order Status:  Completed Specimen:  Blood from Blood Updated:  10/10/18 6808 Special Requests: --     
  RIGHT 
HAND Culture result: NO GROWTH 5 DAYS CULTURE, RESPIRATORY/SPUTUM/BRONCH Maria C Dec STAIN [[id number]] Order Status:  Canceled Specimen:  Sputum from Sputum Imaging:  
See hPI Signed By: Jessica Rivas NP October 10, 2018

## 2018-10-10 NOTE — PROGRESS NOTES
Problem: Falls - Risk of 
Goal: *Absence of Falls Document Starr Day Fall Risk and appropriate interventions in the flowsheet. Outcome: Progressing Towards Goal 
Fall Risk Interventions: 
Mobility Interventions: Bed/chair exit alarm, Strengthening exercises (ROM-active/passive), PT Consult for mobility concerns Medication Interventions: Bed/chair exit alarm, Teach patient to arise slowly, Patient to call before getting OOB Elimination Interventions: Call light in reach, Toileting schedule/hourly rounds, Urinal in reach

## 2018-10-10 NOTE — PROGRESS NOTES
Palliative Care Progress Note Patient: Sridevi Baltazar MRN: [id number]  SSN: [id number] YOB: 1945  Age: 72 y.o. Sex: male Assessment/Plan: Chief Complaint/Interval History: Reports he wants to go home, denies pain Principal Diagnosis: · Debility, Unspecified  R53.81 Additional Diagnoses: · Fatigue, Lethargy  R53.83 
· Counseling, Encounter for Medical Advice  Z71.9 
· Encounter for Palliative Care  Z51.5 Palliative Performance Scale (PPS) PPS: 60 Medical Decision Making:  
Reviewed and summarized notes and events over previous 24 hours. Discussed case with appropriate providers. Reviewed laboratory and x-ray data- Hgb, Hct, BMP Pt resting in bed, no distress noted. No family at bedside. Pt states he is doing fair, and tired of being in the hospital.  He is glad to know his kidneys are stable, and dialysis is not needed immediately. He voiced understanding that it would be needed in the future, possibly sooner than later. He stated \"I'll do whatever I need to do. \"  He has not participated with PT, and states he is too tired to do so. Encouraged him to participate with therapy. No further PC needs- we will sign off. Thank you for allowing us to participate in Mr Mansi Encarnacion' care. Will discuss findings with members of the interdisciplinary team.   
 
  
More than 50% of this 15 minute visit was spent counseling and coordination of care as outlined above. Subjective:  
 
Review of Systems: A comprehensive review of systems was negative except for:  
Constitutional: Positive for fatigue. Objective:  
 
Visit Vitals  /55 (BP 1 Location: Left arm, BP Patient Position: At rest)  Pulse 75  Temp 98.9 °F (37.2 °C)  Resp 19  
 Ht 6' 1\" (1.854 m)  Wt 188 lb 11.2 oz (85.6 kg)  SpO2 92%  BMI 24.9 kg/m2 Physical Exam: 
 
General:  Cooperative. No acute distress. Eyes:  Conjunctivae/corneas clear Nose: Nares normal. Septum midline. Neck: Supple, symmetrical, trachea midline. Lungs:   Faint crackles bilaterally, unlabored. Heart:  Regular rate and rhythm. Abdomen:   Soft, non-tender, non-distended. Extremities: Normal, atraumatic, no cyanosis or edema. Right BKA, right thumb amputation. Skin: Skin color, texture, turgor normal. No rash. Neurologic: Nonfocal  
Psych: Alert and oriented Signed By: Artemio Mccarty NP October 10, 2018

## 2018-10-10 NOTE — PROGRESS NOTES
Problem: Pressure Injury - Risk of 
Goal: *Prevention of pressure injury Document Seun Scale and appropriate interventions in the flowsheet. Outcome: Progressing Towards Goal 
Pressure Injury Interventions: 
Sensory Interventions: Assess need for specialty bed, Check visual cues for pain, Maintain/enhance activity level, Minimize linen layers, Turn and reposition approx. every two hours (pillows and wedges if needed) Moisture Interventions: Minimize layers, Check for incontinence Q2 hours and as needed Activity Interventions: Increase time out of bed, Pressure redistribution bed/mattress(bed type) Mobility Interventions: Pressure redistribution bed/mattress (bed type), Turn and reposition approx. every two hours(pillow and wedges) Nutrition Interventions: Document food/fluid/supplement intake Friction and Shear Interventions: Lift sheet, HOB 30 degrees or less

## 2018-10-10 NOTE — PROGRESS NOTES
PT note:  Patient shows no interest in participating in therapy today. Will try to encourage patient to participate.   Gail Aguirre, PTA

## 2018-10-10 NOTE — PROGRESS NOTES
[facility] Nephrology Progress Note Follow-Up on: CKD Patient seen and examined. C/o cough and runny nose. Feels like he has a cold. Denies sob, cp, n/v. Feeling ok. Good uop. No uremic symptoms. Azotemia stable. ROS: 
Gen - no fever, no chills, appetite unchanged CV - no chest pain, no palpitation Lung - shortness of breath better, no cough Abd - no tenderness, no nausea/vomiting, no diarrhea Ext - edema better Exam: 
Vitals:  
 10/09/18 1928 10/10/18 0319 10/10/18 0530 10/10/18 9308 BP: 146/75 153/85  103/55 Pulse: 80 78  75 Resp: 18 18 19 Temp: 99 °F (37.2 °C) 99 °F (37.2 °C)  98.9 °F (37.2 °C) SpO2: 97% 96%  92% Weight:   85.6 kg (188 lb 11.2 oz) Height:      
 
 
 
Intake/Output Summary (Last 24 hours) at 10/10/18 [facility] Last data filed at 10/10/18 6802 Gross per 24 hour Intake             1200 ml Output             1300 ml Net             -100 ml Wt Readings from Last 3 Encounters:  
10/10/18 85.6 kg (188 lb 11.2 oz) 10/09/18 90.7 kg (200 lb)  
09/28/18 93.5 kg (206 lb 3.2 oz) GEN - in no distress CV - regular, no murmur, no rub Lung - clear bilaterally Abd - soft, nontender Ext - trace edema, right BKA Recent Labs 10/10/18 
 2405  10/09/18 
 7947  10/08/18 
 9312 WBC   --   6.9   --   
HGB  7.6*  7.8*  7.5* HCT  24.8*  25.6*  24.5*  
PLT   --   326   --   
  
 
Recent Labs 10/10/18 
 7072  10/09/18 
 0530  10/08/18 
 6031 NA  140  142  142  
K  3.4*  3.6  3.8 CL  107  108*  108* CO2  26  25  23 BUN  43*  45*  47* CREA  5.17*  5.45*  5.23* CA  7.8*  7.8*  8.1*  8.0*  
GLU  110*  78  86 Assessment / Plan: 
Principal Problem: 
  Acute respiratory failure with hypoxemia ([facility] (10/5/2018) Active Problems: 
  HTN (hypertension) (7/29/2012) Chronic systolic congestive heart failure ([facility] (12/30/2015) Overview: EF 35-40% on 2015 Echo 
 
  CKD (chronic kidney disease) stage 4, GFR 15-29 ml/min ([facility]) (8/11/2016) NSTEMI (non-ST elevated myocardial infarction) ([facility] (10/5/2018) S/P BKA (below knee amputation) unilateral ([address] (2/13/2017) Type 2 diabetes mellitus with complication, without long-term current use of insulin ([address] (4/7/2017) Anemia of chronic disease (9/6/2018) Acute pulmonary edema ([address] (10/5/2018) Diabetic ulcer of left midfoot associated with type 2 diabetes mellitus ([address] (10/5/2018) Cellulitis of left foot (10/5/2018) Sepsis due to cellulitis ([facility] (10/5/2018) Stage 5 chronic kidney disease not on chronic dialysis ([facility] (10/8/2018) Acute rhinitis (10/9/2018) Assessment and Plan:  
MO/CKD stage 4 - Versus progression of underlying CKD with recent NSTEMI 
- Previous baseline Cr presumed to be upper 3's - Admission Cr 4.8 
- Diuresing well, Cr remains low 5's, symptoms already much improved, off oxygen, transitioned to PO Lasix on 10/6, azotemia stable - May not need dialysis here but will need close F/U with us and with Vascular Surgery to establish AV access. Repeat vein mapping done as previous study sub-optimal 
 
SOB/volume overload - improved CAD/CHF  
- NSTEMI last admission 9/24-9/28/18 
- LVEF 30-35% - plan [facility] when he is on HD 
 
PVD 
- s/p right BKA - Left foot ulcer/cellulitis - Abx DM 
- per primary team 
 
Metabolic acidosis - po bicarbonate Anemia 
- iron stores ok 
- started epogen

## 2018-10-10 NOTE — PROGRESS NOTES
Problem: Interdisciplinary Rounds Goal: Interdisciplinary Rounds Outcome: Progressing Towards Goal 
Interdisciplinary team rounds were held 10/10/2018 with the following team members:Care Management, Nursing, Physical Therapy, Physician and Clinical Coordinator and the patient. Plan of care discussed. See clinical pathway and/or care plan for interventions and desired outcomes.

## 2018-10-11 PROBLEM — A41.9 SEPSIS DUE TO CELLULITIS (HCC): Status: RESOLVED | Noted: 2018-01-01 | Resolved: 2018-01-01

## 2018-10-11 PROBLEM — L03.90 SEPSIS DUE TO CELLULITIS (HCC): Status: RESOLVED | Noted: 2018-01-01 | Resolved: 2018-01-01

## 2018-10-11 PROBLEM — M86.9 OSTEOMYELITIS OF LEFT FOOT (HCC): Status: ACTIVE | Noted: 2018-01-01

## 2018-10-11 NOTE — PROGRESS NOTES
Patient refused his heparin sc injection this morning. Was awake and cheerful when lab came to draw. Anticipates going home soon.

## 2018-10-11 NOTE — PROGRESS NOTES
Discharge instructions, follow up information, medication list, and prescription information provided and explained to the pt. IV removed from right wrist,  No remote telemetry on. Opportunity for questions provided. Patient is calling to arrange a ride home. Instructed to call once ready to leave.

## 2018-10-11 NOTE — PROGRESS NOTES
Patient resting quietly in bed, no c/o pain or discomfort. Respirations easy and unlabored. Will continue to monitor.

## 2018-10-11 NOTE — PROGRESS NOTES
[place] [facility] Progress Note Follow-Up on: CKD Patient seen and examined. Denies sob, cp, n/v. Feeling ok. Good uop. No uremic symptoms. Azotemia stable. Creatinine down a bit to 4.7. 
 
ROS: 
Gen - no fever, no chills, appetite unchanged CV - no chest pain, no palpitation Lung - shortness of breath better, no cough Abd - no tenderness, no nausea/vomiting, no diarrhea Ext - edema better Exam: 
Vitals:  
 10/10/18 2302 10/11/18 1276 10/11/18 0140 10/11/18 0757 BP: 134/69 136/64  133/60 Pulse: 73 76  73 Resp: 18 18 19 Temp: 98.5 °F (36.9 °C) 98.8 °F (37.1 °C)  97.9 °F (36.6 °C) SpO2: 95% 93%  95% Weight:   84.4 kg (186 lb) Height:      
 
 
 
Intake/Output Summary (Last 24 hours) at 10/11/18 [facility] Last data filed at 10/11/18 1314 Gross per 24 hour Intake             1150 ml Output             1400 ml Net             -250 ml Wt Readings from Last 3 Encounters:  
10/11/18 84.4 kg (186 lb) 10/09/18 90.7 kg (200 lb)  
09/28/18 93.5 kg (206 lb 3.2 oz) GEN - in no distress CV - regular, no murmur, no rub Lung - clear bilaterally Abd - soft, nontender Ext - trace edema, right BKA Recent Labs 10/11/18 
 0559  10/10/18 
 3592  10/09/18 
 4106 WBC   --    --   6.9 HGB  8.6*  7.6*  7.8* HCT  28.2*  24.8*  25.6* PLT   --    --   326 Recent Labs 10/11/18 
 0559  10/10/18 
 5111  10/09/18 
 0530 NA  142  140  142  
K  3.4*  3.4*  3.6 CL  107  107  108* CO2  25  26  25 BUN  41*  43*  45* CREA  4.70*  5.17*  5.45* CA  8.1*  7.8*  7.8*  8.1*  
GLU  81  110*  78 Assessment / Plan: Active Problems: 
  HTN (hypertension) (7/29/2012) Chronic systolic congestive heart failure ([facility] (12/30/2015) Overview: EF 35-40% on 2015 Echo 
 
  CKD (chronic kidney disease) stage 4, GFR 15-29 ml/min ([facility]) (8/11/2016) NSTEMI (non-ST elevated myocardial infarction) ([facility] (10/5/2018) S/P BKA (below knee amputation) unilateral ([facility] (2/13/2017) Type 2 diabetes mellitus with complication, without long-term current use of insulin ([address] (4/7/2017) Anemia of chronic disease (9/6/2018) Diabetic ulcer of left midfoot associated with type 2 diabetes mellitus ([address] (10/5/2018) Cellulitis of left foot (10/5/2018) Sepsis due to cellulitis ([facility] (10/5/2018) Stage 5 chronic kidney disease not on chronic dialysis ([facility] (10/8/2018) Acute rhinitis (10/9/2018) Acute osteomyelitis of metatarsal bone, left ([facility] (10/10/2018) Assessment and Plan:  
MO/CKD stage 4 - Versus progression of underlying CKD with recent NSTEMI 
- Previous baseline Cr presumed to be upper 3's - Admission Cr 4.8 
- Diuresing well, Cr up to low 5's, symptoms already much improved, off oxygen, transitioned to PO Lasix on 10/6, azotemia stable and creatinine slowly improving now-->4.7 today - May not need dialysis here but will need close F/U with us and with Vascular Surgery to establish AV access. Repeat vein mapping done as previous study sub-optimal 
- refusing many of his treatments, medications. ...difficulties with commitment to plans. Will continue to follow. Unfortunately he will need a catheter when he becomes uremic SOB/volume overload - improved CAD/CHF  
- NSTEMI last admission 9/24-9/28/18 
- LVEF 30-35% - plan [facility] when he is on HD 
 
PVD 
- s/p right BKA - Left foot ulcer/cellulitis/possible osetomyelitis - Abx per ID 
- vascular surgery following DM 
- per primary team 
 
Metabolic acidosis - po bicarbonate Anemia 
- iron stores ok 
- started epogen

## 2018-10-11 NOTE — PROGRESS NOTES
Progress Note Patient: Irene Stephen MRN: [id number]  SSN: [id number] YOB: 1945  Age: 72 y.o. Sex: male Admission Date: 10/5/2018 LOS: 6 days Subjective:  
 
Patient reports left lateral foot is \"sore,\" otherwise has no complaints. Bilateral lower extremity arterial duplex without ABIs due to non-compressible vessels; bilateral microvascular disease; occluded left peroneal artery with patent PAMELA, PTA; right LE without significant disease in major arteries. MRI with chronic L 5th MT head OM, ID recommending PO ABX and close follow-up. Objective:  
 
Vitals:  
 10/10/18 2302 10/11/18 1547 10/11/18 9194 10/11/18 0757 BP: 134/69 136/64  133/60 Pulse: 73 76  73 Resp: 18 18 19 Temp: 98.5 °F (36.9 °C) 98.8 °F (37.1 °C)  97.9 °F (36.6 °C) SpO2: 95% 93%  95% Weight:   186 lb (84.4 kg) Height:      
  
 
Intake and Output: 
Current Shift: 10/11 0701 - 10/11 1900 In: 240 [P.O.:240] Out: - Last three shifts: 10/09 1901 - 10/11 0700 In: 1870 [P.O.:1620; I.V.:250] Out: 2700 [Urine:2700] Physical Exam:  
GENERAL: alert, cooperative, no distress LUNG: normal respiratory effort HEART: regular rate and rhythm EXTREMITIES: s/p right BKA remote; left foot with mild dorsal erythema unchanged from last week, dried oval lesion at lateral foot at 5th MT head appears stable or even slightly improved with Betadine painting PULSES: radial and brachial palpable 2+ and symmetric bilaterally; left dorsalis pedis palpable Lab/Data Review: 
BMP:  
Lab Results Component Value Date/Time  10/11/2018 05:59 AM  
 K 3.4 (L) 10/11/2018 05:59 AM  
  10/11/2018 05:59 AM  
 CO2 25 10/11/2018 05:59 AM  
 AGAP 10 10/11/2018 05:59 AM  
 GLU 81 10/11/2018 05:59 AM  
 BUN 41 (H) 10/11/2018 05:59 AM  
 CREA 4.70 (H) 10/11/2018 05:59 AM  
 GFRAA 16 (L) 10/11/2018 05:59 AM  
 GFRNA 13 (L) 10/11/2018 05:59 AM  
 
CBC:  
Lab Results Component Value Date/Time HGB 8.6 (L) 10/11/2018 05:59 AM  
 HCT 28.2 (L) 10/11/2018 05:59 AM  
  
 
Imaging: EXAM: MRI left foot without contrast, 10/9/2018 History: Ulceration involving the lateral base of the fifth digit. History of 
diabetes.  IMPRESSION: Ulceration present laterally at the level the fifth MTP 
articulation. Subjacent to this, there is low T1 and high T2 signal within the 
fifth metatarsal head. Findings concerning for small area of osteomyelitis at 
this site. EXAM: Lower extremity arterial duplex and ankle brachial indices, 10/5/2018 INDICATION: Diabetic foot ulcer. COMPARISON: None. FINDINGS: The ankle-brachial indices could not be obtained because of 
noncompressible vessels. The left the digital waveform shows a dampened 
waveform. The right was not performed. 
  
The duplex waveforms of the right lower extremity show triphasic waveform at the 
common femoral artery. The profundus femoris artery is patent. The SFA is 
patent. Popliteal artery shows biphasic waveform. The peroneal artery posterior 
tibial artery and PAMELA are all patent. 
  
The left lower extremity shows a occluded peroneal artery. The PTA and PAMELA are 
patent. IMPRESSION: 
1. Right lower extremity: No significant major vessel arterial disease. Microvascular disease. 2. Left lower extremity: Occlusion of peroneal artery. No other significant major 
artery disease. Significant microvascular disease. Assessment / Plan: Active Problems: 
  HTN (hypertension) (7/29/2012) Chronic systolic congestive heart failure ([address] (12/30/2015) Overview: EF 35-40% on 2015 Echo 
 
  CKD (chronic kidney disease) stage 4, GFR 15-29 ml/min ([facility]) (8/11/2016) NSTEMI (non-ST elevated myocardial infarction) ([address] (10/5/2018) S/P BKA (below knee amputation) unilateral ([address] (2/13/2017) Type 2 diabetes mellitus with complication, without long-term current use of insulin ([address] (4/7/2017) Anemia of chronic disease (9/6/2018) Diabetic ulcer of left midfoot associated with type 2 diabetes mellitus ([facility] (10/5/2018) Cellulitis of left foot (10/5/2018) Sepsis due to cellulitis ([address] (10/5/2018) Stage 5 chronic kidney disease not on chronic dialysis ([address] (10/8/2018) Acute rhinitis (10/9/2018) Acute osteomyelitis of metatarsal bone, left ([address] (10/10/2018) Continue care per primary Agree with ID recs Patient may follow up with Dr. Blossom Darling in the office in 1 month Signed By: Rashad Gonsales PA-C October 11, 2018 Physician Assistant with [facility].  Blossom Darling MD / Priscilla Badillo MD

## 2018-10-11 NOTE — DISCHARGE INSTRUCTIONS
DISCHARGE SUMMARY from Nurse    PATIENT INSTRUCTIONS:    After general anesthesia or intravenous sedation, for 24 hours or while taking prescription Narcotics:  · Limit your activities  · Do not drive and operate hazardous machinery  · Do not make important personal or business decisions  · Do  not drink alcoholic beverages  · If you have not urinated within 8 hours after discharge, please contact your surgeon on call. What to do at Home:  Recommended activity: Activity as tolerated. If you experience any of the following symptoms temp > 100.5, unrelieved pain, nausea or vomiting, shortness of breath or fatigue not relieved with rest, please follow up with MD.    *  Please give a list of your current medications to your Primary Care Provider. *  Please update this list whenever your medications are discontinued, doses are      changed, or new medications (including over-the-counter products) are added. *  Please carry medication information at all times in case of emergency situations. These are general instructions for a healthy lifestyle:    No smoking/ No tobacco products/ Avoid exposure to second hand smoke  Surgeon General's Warning:  Quitting smoking now greatly reduces serious risk to your health. Obesity, smoking, and sedentary lifestyle greatly increases your risk for illness    A healthy diet, regular physical exercise & weight monitoring are important for maintaining a healthy lifestyle    You may be retaining fluid if you have a history of heart failure or if you experience any of the following symptoms:  Weight gain of 3 pounds or more overnight or 5 pounds in a week, increased swelling in our hands or feet or shortness of breath while lying flat in bed. Please call your doctor as soon as you notice any of these symptoms; do not wait until your next office visit.     Recognize signs and symptoms of STROKE:    F-face looks uneven    A-arms unable to move or move unevenly    S-speech slurred or non-existent    T-time-call 911 as soon as signs and symptoms begin-DO NOT go       Back to bed or wait to see if you get better-TIME IS BRAIN. Warning Signs of HEART ATTACK     Call 911 if you have these symptoms:   Chest discomfort. Most heart attacks involve discomfort in the center of the chest that lasts more than a few minutes, or that goes away and comes back. It can feel like uncomfortable pressure, squeezing, fullness, or pain.  Discomfort in other areas of the upper body. Symptoms can include pain or discomfort in one or both arms, the back, neck, jaw, or stomach.  Shortness of breath with or without chest discomfort.  Other signs may include breaking out in a cold sweat, nausea, or lightheadedness. Don't wait more than five minutes to call 911 - MINUTES MATTER! Fast action can save your life. Calling 911 is almost always the fastest way to get lifesaving treatment. Emergency Medical Services staff can begin treatment when they arrive -- up to an hour sooner than if someone gets to the hospital by car. The discharge information has been reviewed with the patient. The patient verbalized understanding. Discharge medications reviewed with the patient and appropriate educational materials and side effects teaching were provided. Osteomyelitis: Care Instructions  Your Care Instructions  Osteomyelitis (say \"li-wbnt-dg-kx-an-YY-tus\") is a bone infection. It is caused by bacteria. The bacteria can infect the bone where it has been injured, or they can be carried through the blood from another area in the body. Osteomyelitis can be a short- or long-term problem. It is treated with antibiotics. You may get the antibiotics as pills or through a needle in a vein (IV). You will probably get treatment in the hospital at first. The type of treatment depends on the type of bacteria causing the infection, the bones affected, and how bad the infection is.  Sometimes people need surgery to drain pus from bone or to fix damaged bone. Short-term osteomyelitis that is treated right away usually can be cured. But the long-term form sometimes comes back after treatment. You can help your chances of stopping the infection by taking your medicines as directed. Follow-up care is a key part of your treatment and safety. Be sure to make and go to all appointments, and call your doctor if you are having problems. It's also a good idea to know your test results and keep a list of the medicines you take. How can you care for yourself at home? · Take your antibiotics as directed. Do not stop taking them just because you feel better. You need to take the full course of antibiotics. · Take pain medicines exactly as directed. ¨ If the doctor gave you a prescription medicine for pain, take it as prescribed. ¨ If you are not taking a prescription pain medicine, ask your doctor if you can take an over-the-counter medicine. · Do mild exercise and stretching if your doctor says it is okay. This can help keep your bones and muscles healthy. Avoid strenuous work or exercise until your doctor says you can do it. · Consider physical therapy if your doctor suggests it. Physical therapy may help you have a normal range of movement. · Do not smoke. Smoking can slow healing of the infection. If you need help quitting, talk to your doctor about stop-smoking programs and medicines. These can increase your chances of quitting for good. When should you call for help? Call 911 anytime you think you may need emergency care. For example, call if:    · You have severe bone pain.    Call your doctor now or seek immediate medical care if:    · You continue to have bone pain.     · You have signs of infection, such as:  ¨ Increased pain, swelling, warmth, or redness. ¨ Red streaks leading from a wound. ¨ Pus draining from a wound.   ¨ A fever.    Watch closely for changes in your health, and be sure to contact your doctor if:    · You do not get better as expected. Where can you learn more? Go to http://ulises-jared.info/. Enter A526 in the search box to learn more about \"Osteomyelitis: Care Instructions. \"  Current as of: November 21, 2017  Content Version: 11.8  © 9121-8990 [employer]. Care instructions adapted under license by [employer] (which disclaims liability or warranty for this information). If you have questions about a medical condition or this instruction, always ask your healthcare professional. [address] any warranty or liability for your use of this information. Sepsis: Care Instructions  Your Care Instructions    Sepsis is an intense reaction to an infection. It can cause deadly damage to the body and lead to a dangerously low blood pressure. You may have inflammation across large areas of your body. It can damage tissue and even go deep into your organs. Infections that can lead to sepsis include:  · A skin infection such as from a cut. · A lung infection like pneumonia. · A kidney infection. · A gut infection such as E. coli. It's important to care for yourself and try to avoid infections so that you don't get sepsis again. Follow-up care is a key part of your treatment and safety. Be sure to make and go to all appointments, and call your doctor if you are having problems. It's also a good idea to know your test results and keep a list of the medicines you take. How can you care for yourself at home? · If your doctor prescribed antibiotics, take them as directed. Do not stop taking them just because you feel better. You need to take the full course of antibiotics. · Help prevent infections that could lead to sepsis:  ¨ Try to avoid colds and flu. If you must be around people who have a cold or the flu, wash your hands often. And get a flu vaccine every year.   ¨ Get a pneumococcal vaccine shot (to prevent pneumonia, meningitis, and other infections). If you have had one before, ask your doctor if you need another dose. ¨ Clean any wounds or scrapes. · Do not smoke or use other tobacco products. When you quit smoking, you are less likely to get a cold, the flu, bronchitis, and pneumonia. If you need help quitting, talk to your doctor about stop-smoking programs and medicines. These can increase your chances of quitting for good. · To prevent dehydration, drink plenty of fluids. Choose water and other caffeine-free clear liquids until you feel better. If you have kidney, heart, or liver disease and have to limit fluids, talk with your doctor before you increase the amount of fluids you drink. · Eat a healthy diet. Include fruits, vegetables, and whole grains in your diet every day. · If your doctor recommends it, try doing some physical activity. Walking is a good choice. Bit by bit, increase the amount you walk every day. When should you call for help? [id number] anytime you think you may need emergency care. For example, call if:    · You passed out (lost consciousness).    Call your doctor now or seek immediate medical care if:    · You have symptoms of sepsis. These may include:  ¨ Shortness of breath. ¨ A fast heart rate. ¨ Cool, pale, or clammy skin. ¨ Feeling confused.     · You are dizzy or lightheaded, or you feel like you may faint.     · You have a fever or chills.    Watch closely for changes in your health, and be sure to contact your doctor if:    · You do not get better as expected. Where can you learn more? Go to http://ulises-jared.info/. Enter K431 in the search box to learn more about \"Sepsis: Care Instructions. \"  Current as of: November 20, 2017  Content Version: 11.8  © 5078-5967 [employer] Care instructions adapted under license by [employer] (which disclaims liability or warranty for this information).  If you have questions about a medical condition or this instruction, always ask your healthcare professional. [address] any warranty or liability for your use of this information.     ___________________________________________________________________________________________________________________________________

## 2018-10-11 NOTE — WOUND CARE
Patient seen for left lateral foot wound/eschar. It looks stable. Betadine to be continued, completed at present. Patient still reports painful to touch. Does not want dressing touching it at this time. Foot warm with dorsal pedal pulse palpable.

## 2018-10-11 NOTE — PROGRESS NOTES
Problem: Mobility Impaired (Adult and Pediatric) Goal: *Acute Goals and Plan of Care (Insert Text) Discharge Goals: 
(1.)Mr. Suraj Jamil will move from supine to sit and sit to supine  with INDEPENDENT within 7 treatment day(s). Goal met 10/11/18 
(2.)Mr. Suraj Jamil will transfer from bed to chair and chair to bed with MODIFIED INDEPENDENT using the least restrictive device within 7 treatment day(s). (3.)Mr. Suraj Jamil will ambulate with MODIFIED INDEPENDENCE for 500+ feet with the least restrictive device within 7 treatment day(s). PHYSICAL THERAPY: Daily Note, Treatment Day: 1st, PM 10/11/2018 INPATIENT: Hospital Day: 7 Payor: CARE IMPROVEMENT PLUS / [place]ARE [place]PR[place] / Product Type: Managed Care Medicare /  
  
NAME/AGE/GENDER: Doni Jeffery is a 72 y.o. male PRIMARY DIAGNOSIS: Acute respiratory failure with hypoxemia ([address] Pulmonary edema Acute respiratory failure with hypoxemia (HCC) Acute respiratory failure with hypoxemia (HCC) ICD-10: Treatment Diagnosis:  
 · Generalized Muscle Weakness (M62.81) · Difficulty in walking, Not elsewhere classified (R26.2) · History of falling (Z91.81) Precaution/Allergies: 
Lortab [hydrocodone-acetaminophen] ASSESSMENT:  
 
Mr. Suraj Jamil is  Supine in bed and agreeable to therapy after a lot of coaxing. Bed mobility is independent. Sitting balance good and unsupported. Patient tends to lean to the right but can self correct. Patient sat edge of bed for a while. Interdisciplinary team visits during the treatment session. Patient finally put his leg and shoe on and got up to go to the sink. Alix Booze offered to the patient however he refused and got to the sink with the use of furniture, which was not very safe. Alix Booze made available  to the patient however he refused to use it. Patient while standing at the sink did shave himself before returning to the edge of the bed.   Left edge of bed with the RN present to administer medication. Much better cooperation from the patient today. Slow progress towards goals. Nicko Rothman will benefit from skilled PT (medically necessary) to address decreased strength, decreased balance, decreased functional tolerance, decreased cardiopulmonary endurance affecting participation in basic ADLs and functional tasks. Possible discharge today. Will continue PT efforts. Colten Earing This section established at most recent assessment PROBLEM LIST (Impairments causing functional limitations): 1. Decreased Strength 2. Decreased ADL/Functional Activities 3. Decreased Transfer Abilities 4. Decreased Ambulation Ability/Technique 5. Decreased Balance 6. Decreased Activity Tolerance 7. Decreased Pacing Skills 8. Increased Fatigue 9. Increased Shortness of Breath 10. Decreased [place] with Home Exercise Program 
 INTERVENTIONS PLANNED: (Benefits and precautions of physical therapy have been discussed with the patient.) 1. Balance Exercise 2. Bed Mobility 3. Family Education 4. Gait Training 5. Home Exercise Program (HEP) 6. Neuromuscular Re-education/Strengthening 7. Therapeutic Activites 8. Therapeutic Exercise/Strengthening 9. Transfer Training TREATMENT PLAN: Frequency/Duration: 3 times a week for duration of hospital stay Rehabilitation Potential For Stated Goals: Good RECOMMENDED REHABILITATION/EQUIPMENT: (at time of discharge pending progress): Due to the probability of continued deficits (see above) this patient will likely need continued skilled physical therapy after discharge. Equipment:  
? None at this time HISTORY:  
History of Present Injury/Illness (Reason for Referral): 
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 He seemsvery limited in understanding his underlying conditions and seriousness of them. Upon call from Dr. Bryce Calderon, ED physician, Mr. Val Mondragon was refusing nasal cannula oxygen despite being short of breath and oxygen saturation of mid 80s. He was refusing due to the oxygen 'dries out my nose.' 
  He reports his breathing worsens with minimal exertion (walking 10-25 feet) and lying flat. L chest pain aggravated by same things. He had an NSTEMI last hospitalization with troponin peaking at 28.4 on 9/24 (though was not checked after peak). He was recommended for L heart cath at the time, but he refused due to possibly needing dialysis afterward due to his CKD. 
  
He started coughing a day or so ago. Currently productive of yellow sputum that is blood tinged. 
  
He states his L foot started hurting during the end of last hospitalization and has worsened. Difficulty bearing weight due to the pain. Has ulcer on lateral side just proximal to 5th MTP joint. Notably, this is his third hospitalization since 9/5/18.  
  
In the ED, CXR shows bilateral infiltrates/pulmonary edema, Cr of 4.84 with K of 5.5, O2 sats as low as 82% on room air, WBC count of 15.5K, and tachycardia. 
  
Hospitalist service asked to admit for further evaluation of hypoxic respiratory failure, worsening renal function, and chest pain. Past Medical History/Comorbidities:  
Mr. Val Mondragon  has a past medical history of Abnormal CT scan, chest (12/27/2015); Acute CHF ([address] (12/26/2015); Acute systolic congestive heart failure ([address] (12/30/2015); MO (acute kidney injury) ([address] (7/29/2012); Anemia of chronic disease (9/6/2018); Bilateral pleural effusion (12/27/2015); Chest pain (12/26/2015); Depression; DM (diabetes mellitus), type 2 ([address] (7/29/2012);  Encephalopathy due to infection (1/7/2017); HTN (hypertension) (7/29/2012); Hypoglycemia (7/29/2012); NSTEMI (non-ST elevated myocardial infarction) ([facility] (1/5/2017); and Tobacco use (12/27/2015). He also has no past medical history of Arrhythmia; Arthritis; Asthma; Autoimmune disease ([facility] Calculus of kidney; Cancer ([facility] Chronic pain; COPD; DEMENTIA; Gastrointestinal disorder; GERD (gastroesophageal reflux disease); Headache; Hypercholesterolemia; Liver disease; Other ill-defined conditions(799.89); Psychiatric disorder; Psychotic disorder ([facility] PUD (peptic ulcer disease); Seizures ([facility] Sleep disorder; Stroke [facility]); Thromboembolus ([facility] Thyroid disease; or Trauma. Mr. Suraj Jamil  has a past surgical history that includes hx orthopaedic. Social History/Living Environment:  
Home Environment: Private residence Wheelchair Ramp: Yes One/Two Story Residence: One story Living Alone: Yes Support Systems: Child(elpidio) Patient Expects to be Discharged to[de-identified] Private residence Current DME Used/Available at Home: [employer] Rhonda Booze Prior Level of Function/Work/Activity: 
Lives alone, use of SPC/walker for gait, indep with ADLs, children support system Number of Personal Factors/Comorbidities that affect the Plan of Care: 3+: HIGH COMPLEXITY EXAMINATION:  
Most Recent Physical Functioning:  
Gross Assessment: 
  
         
  
Posture: 
  
Balance: 
Sitting: Intact Standing: Impaired Standing - Static: Good Standing - Dynamic : Fair Bed Mobility: 
Rolling: Independent Supine to Sit: Independent Scooting: Independent Wheelchair Mobility: 
  
Transfers: 
Sit to Stand: Contact guard assistance;Minimum assistance Stand to Sit: Contact guard assistance;Minimum assistance Gait: 
  
Distance (ft): 10 Feet (ft) Assistive Device: Other (comment) (furniture) Ambulation - Level of Assistance: Minimal assistance Body Structures Involved: 1. Heart 2. Lungs 3. Bones 4. Muscles Body Functions Affected: 1. Cardio 2. Respiratory 3. Neuromusculoskeletal 
4. Movement Related Activities and Participation Affected: 1. General Tasks and Demands 2. Mobility 3. Self Care 4. Domestic Life 5. Interpersonal Interactions and Relationships 6. Community, Social and [place] Number of elements that affect the Plan of Care: 4+: HIGH COMPLEXITY CLINICAL PRESENTATION:  
Presentation: Evolving clinical presentation with changing clinical characteristics: MODERATE COMPLEXITY CLINICAL DECISION MAKIN[address] [place] [address] AM-PAC 6 Clicks Basic Mobility Inpatient Short Form How much difficulty does the patient currently have. .. Unable A Lot A Little None 1. Turning over in bed (including adjusting bedclothes, sheets and blankets)? [] 1   [] 2   [] 3   [x] 4  
2. Sitting down on and standing up from a chair with arms ( e.g., wheelchair, bedside commode, etc.)   [] 1   [] 2   [x] 3   [] 4  
3. Moving from lying on back to sitting on the side of the bed? [] 1   [] 2   [] 3   [x] 4 How much help from another person does the patient currently need. .. Total A Lot A Little None 4. Moving to and from a bed to a chair (including a wheelchair)? [] 1   [] 2   [x] 3   [] 4  
5. Need to walk in hospital room? [] 1   [] 2   [x] 3   [] 4  
6. Climbing 3-5 steps with a railing? [] 1   [x] 2   [] 3   [] 4  
© , Trustees of [address] [place] [address], under license to [employer]. All rights reserved Score:  Initial: 19 Most Recent: X (Date: -- ) Interpretation of Tool:  Represents activities that are increasingly more difficult (i.e. Bed mobility, Transfers, Gait). Score 24 23 22-20 19-15 14-10 9-7 6 Modifier CH CI CJ CK CL CM CN   
 
? Mobility - Walking and Moving Around:  
  - CURRENT STATUS: CK - 40%-59% impaired, limited or restricted  - GOAL STATUS: CI - 1%-19% impaired, limited or restricted   - D/C STATUS:  ---------------To be determined--------------- 
 Payor: CARE IMPROVEMENT PLUS / [place] CARE IMPROVEMENT [place] / Product Type: Managed Care Medicare /   
 
Medical Necessity:    
· Patient is expected to demonstrate progress in strength, balance, coordination and functional technique to decrease assistance required with gait, transfers, and functional mobility. Landon Salgado Reason for Services/Other Comments: 
· Patient continues to require skilled intervention due to  decreased strength, decreased balance, decreased functional tolerance, decreased cardiopulmonary endurance affecting participation in basic ADLs and functional tasks. Use of outcome tool(s) and clinical judgement create a POC that gives a: Clear prediction of patient's progress: LOW COMPLEXITY  
  
 
 
 
TREATMENT:  
(In addition to Assessment/Re-Assessment sessions the following treatments were rendered) Pre-treatment Symptoms/Complaints: \"OK\" Pain: Initial:  
Pain Intensity 1: 0  Post Session:  0/10 In addition to evaluation: 
Therapeutic Activity: (    40  minutes): Therapeutic activities including Bed transfers, Ambulation on level ground and cues for ease and safety of transfers, walker management and safety with gait to improve mobility, strength, balance and coordination. Required contact guard to    to promote dynamic balance in standing. Braces/Orthotics/Lines/Etc:  
· O2 Device: Room air Treatment/Session Assessment:   
· Response to Treatment:  Tolerated well · Interdisciplinary Collaboration:  
o Physical Therapy Assistant 
o Registered Nurse · After treatment position/precautions:  
o Bed/Chair-wheels locked 
o Bed in low position 
o Call light within reach 
o Nurse at bedside 
o sitting edge of bed · Compliance with Program/Exercises: sometimes non compliant · Recommendations/Intent for next treatment session: \"Next visit will focus on advancements to more challenging activities and reduction in assistance provided\". Total Treatment Duration: PT Patient Time In/Time Out 
 Time In: 1400 Time Out: 1440 Wilfredo Carey, PTA

## 2018-10-11 NOTE — PROGRESS NOTES
LATE NOTE: On 10/10/18, Care manager attempted to deliver Medicare Outpatient Observation Notice to the patient but patient was unavailable for signature. Unisigned form left at patient's bedside. Unsigned copy placed in patient's chart. Care managers notified.

## 2018-10-11 NOTE — PROGRESS NOTES
Patient resting quietly in bed. No c/o discomfort at this time. Betadine applied to left outer foot wound, left open to air. Will continue to monitor.

## 2018-10-11 NOTE — PROGRESS NOTES
[facility] Notification of admission Patient has had 3 admissions plus an additional ED visit since 9/5/2018 Hospitalized 9/24 - 9/28 with NSTEMI; declined the recommended heart cath at that time related to possibility of requiring dialysis following. Medical history includes sCHF w/ LVEF 30-35%m R BKA, Stage 4 CKD Plan - likely to DC home with [place] Link patient with [facility] - goals of coordinating with patient and HH towards avoiding further readmission. This note will not be viewable in [address]

## 2018-10-11 NOTE — PROGRESS NOTES
Patient discharged to private auto via wheelchair. Respirations even/unlabored. Patient in stable condition. No further needs noted.

## 2018-10-11 NOTE — PROGRESS NOTES
Patient is discharging home today. He has a R BKA with a prosthesis. We discussed short-term rehab placement, but patient refused. When asked about home health follow-up, patient stated, \"You can order it. But I won't promise I'll come to the door when they come. \" Discussed with [facility] [place], and if patient does not verbally contract to participate, there is no need to place the order. No discharge planning needs identified. Case Management will remain available to assist as needed. Care Management Interventions PCP Verified by CM: Yes Transition of Care Consult (CM Consult): Home Health [address]: Yes Physical Therapy Consult: Yes Occupational Therapy Consult: Yes Current Support Network: Lives Alone Confirm Follow Up Transport: Family Plan discussed with Pt/Family/Caregiver: Yes Freedom of Choice Offered: Yes Discharge Location Discharge Placement: Home

## 2018-10-11 NOTE — DISCHARGE SUMMARY
Hospitalist Discharge Summary Patient ID: Dana Lemon [id number] 
82 y.o. 
1945 Admit date: 10/5/2018  8:46 AM 
Discharge date and time: 10/11/2018 Attending: Sergio Witt. Marie Cantu MD 
PCP:  Tea Mcdaniel MD 
Treatment Team: Attending Provider: Sergio Witt. Marie Cantu MD; Utilization Review: Taryn Allen RN; Consulting Provider: Adali Rich MD; Consulting Provider: Jacquelyn Lujan MD; Care Manager: Genevieve Mast RN; Consulting Provider: Colletta Lemons, MD 
 
Principal Diagnosis Acute respiratory failure with hypoxemia [facility]) Hospital Problems as of 10/11/2018  Date Reviewed: 9/26/2018 Codes Class Noted - Resolved POA Osteomyelitis of left foot (HCC) ICD-10-CM: M86.9 ICD-9-CM: 730.27  10/11/2018 - Present Yes Acute osteomyelitis of metatarsal bone, left ([facility] ICD-10-CM: W72.398 ICD-9-CM: 730.07  10/10/2018 - Present Yes Acute rhinitis ICD-10-CM: Laroy Parents ICD-9-CM: 021  10/9/2018 - Present Yes Stage 5 chronic kidney disease not on chronic dialysis [facility]) ICD-10-CM: N18.5 ICD-9-CM: 585.5  10/8/2018 - Present Yes NSTEMI (non-ST elevated myocardial infarction) ([facility] ICD-10-CM: I21.4 ICD-9-CM: 410.70  10/5/2018 - Present Yes Tachycardia ICD-10-CM: R00.0 ICD-9-CM: 785.0  10/5/2018 - Present Diabetic ulcer of left midfoot associated with type 2 diabetes mellitus ([facility] ICD-10-CM: E11.621, P63.709 ICD-9-CM: 250.80, 707.14  10/5/2018 - Present Yes Cellulitis of left foot ICD-10-CM: L03.116 ICD-9-CM: 682.7  10/5/2018 - Present Yes Anemia of chronic disease (Chronic) ICD-10-CM: D63.8 ICD-9-CM: 285.29  9/6/2018 - Present Yes Type 2 diabetes mellitus with complication, without long-term current use of insulin (HCC) (Chronic) ICD-10-CM: E11.8 ICD-9-CM: 250.90  4/7/2017 - Present Yes S/P BKA (below knee amputation) unilateral (HCC) (Chronic) ICD-10-CM: N46.578 ICD-9-CM: V49.75  2/13/2017 - Present Yes CKD (chronic kidney disease) stage 4, GFR 15-29 ml/min (HCC) (Chronic) ICD-10-CM: N18.4 ICD-9-CM: 585.4  8/11/2016 - Present Yes Chronic systolic congestive heart failure (HCC) (Chronic) ICD-10-CM: E16.14 ICD-9-CM: 428.22, 428.0  12/30/2015 - Present Yes Overview Signed 1/6/2017  8:59 AM by Keren Guzman MD  
  EF 35-40% on 2015 Echo HTN (hypertension) (Chronic) ICD-10-CM: I10 
ICD-9-CM: 401.9  7/29/2012 - Present Yes RESOLVED: Acute renal failure superimposed on stage 4 chronic kidney disease ([facility] ICD-10-CM: N17.9, N18.4 ICD-9-CM: 584.9, 585.4  10/6/2018 - 10/8/2018 Yes RESOLVED: Acute pulmonary edema (HCC) ICD-10-CM: J81.0 ICD-9-CM: 518.4  10/5/2018 - 10/10/2018 Yes * (Principal)RESOLVED: Acute respiratory failure with hypoxemia ([facility] ICD-10-CM: J96.01 
ICD-9-CM: 518.81  10/5/2018 - 10/10/2018 Yes RESOLVED: Sepsis due to cellulitis ([facility] ICD-10-CM: L03.90, A41.9 ICD-9-CM: 682.9, 995.91  10/5/2018 - 10/11/2018 Yes RESOLVED: Metabolic acidosis NEB-79-QK: E87.2 ICD-9-CM: 276.2  10/5/2018 - 10/7/2018 Yes HPI:  'Richard Toscano is a 73 yo M with history of systolic CHF, stage 4 CKD, history of R BKA due to foot infection, chronic LV systolic CHF with LVEF of 30-35%, and recent NSTEMI last hospitalization (9/24-/9/28/18), who presnts to the ED with worsening shortness of breath over the last 2 days, intermittent L chest pain, and worsening L foot pain. He seemsvery limited in understanding his underlying conditions and seriousness of them. Upon call from Dr. Viviane Aguilar, ED physician, Mr. Mohsen Zelaya was refusing nasal cannula oxygen despite being short of breath and oxygen saturation of mid 80s. He was refusing due to the oxygen 'dries out my nose.' 
 He reports his breathing worsens with minimal exertion (walking 10-25 feet) and lying flat. L chest pain aggravated by same things.   He had an NSTEMI last hospitalization with troponin peaking at 28.4 on 9/24 (though was not checked after peak). He was recommended for L heart cath at the time, but he refused due to possibly needing dialysis afterward due to his CKD. 
 He started coughing a day or so ago. Currently productive of yellow sputum that is blood tinged. 
 He states his L foot started hurting during the end of last hospitalization and has worsened. Difficulty bearing weight due to the pain. Has ulcer on lateral side just proximal to 5th MTP joint. Notably, this is his third hospitalization since 9/5/18.  
 In the ED, CXR shows bilateral infiltrates/pulmonary edema, Cr of 4.84 with K of 5.5, O2 sats as low as 82% on room air, WBC count of 15.5K, and tachycardia. 
 Hospitalist service asked to admit for further evaluation of hypoxic respiratory failure, worsening renal function, and chest pain.' 
  
Hospital Course: 1. Acute respiratory failure secondary to acute pulmonary edema- likely combination of worsening CKD and systolic heart failure. He was diuresed with IV lasix and pulmonary edema improved. He was weaned to room air. He diuresed 3.2 liters over the hospitalization. Lasix was changed to 80 mg PO BID, on which he will be discharged. He had vein mapping while here. Vascular surgery evaluated his L foot and agreed with PO antibiotics that ID recommended for foot ulcer and osteomyelitis. He will be continued on additional 4 weeks of oral keflex and minocycline with ID follow up on 11/9/18. 2. MO on CKD- his Cr was 4.84 on admission and peaked at 5.45. Down to 4.7 on day of discharge. Nephrology evaluated and no need for urgent dialysis and plans to follow him in 2 weeks as outpatient. Diuretic dosing discussed with nephrology team and they recommend lasix 80 mg BID to prevent recurrent pulmonary edema.   Palliative Care evaluated him during hospitalization and he stated he would do 'whatever is necessary to get better' but then would state he was not sure if he would be able to wake up to participate with home health/PT. 3. Anemia- started on Epogen (dose given here) and this will be left up for nephrology to decide if this needs to be continued as outpatient. 4. NSTEMI- medically managed by cardiology recommendations. Plan for possible LHC down the road if he goes on dialysis or for emergent situation. 5. L foot ulcer with osteomyelitis- met sepsis criteria initially due to tachycardia and leukocytosis. Sepsis improved with antibiotic therapy and leukocytosis normalized. MRI L foot confirmed small area of osteomyelitis and ID consulted with recommendation of 4-6 weeks of keflex/minocycline therapy. ID has arranged follow up on 11/9/18 at 2:40 PM.  
 
Significant Diagnostic Studies:  
 
 
Labs: Results:  
   
Chemistry Recent Labs 10/11/18 
 0559  10/10/18 
 2758  10/09/18 
 0530 GLU  81  110*  78 NA  142  140  142  
K  3.4*  3.4*  3.6 CL  107  107  108* CO2  25  26  25 BUN  41*  43*  45* CREA  4.70*  5.17*  5.45* CA  8.1*  7.8*  7.8*  8.1* AGAP  10  7  9 CBC w/Diff Recent Labs 10/11/18 
 0559  10/10/18 
 3091  10/09/18 
 1901 WBC   --    --   6.9  
RBC   --    --   2.76* HGB  8.6*  7.6*  7.8* HCT  28.2*  24.8*  25.6* PLT   --    --   326 Cardiac Enzymes No results for input(s): CPK, CKND1, FIDEL in the last 72 hours. No lab exists for component: Yanira Goldberg Coagulation No results for input(s): PTP, INR, APTT in the last 72 hours. No lab exists for component: INREXT, INREXT Lipid Panel Lab Results Component Value Date/Time Cholesterol, total 222 (H) 10/05/2017 02:46 PM  
 HDL Cholesterol 61 10/05/2017 02:46 PM  
 LDL, calculated 145 (H) 10/05/2017 02:46 PM  
 VLDL, calculated 16 10/05/2017 02:46 PM  
 Triglyceride 80 10/05/2017 02:46 PM  
  
BNP No results for input(s): BNPP in the last 72 hours. Liver Enzymes No results for input(s): TP, ALB, TBIL, AP, SGOT, GPT in the last 72 hours. No lab exists for component: DBIL Thyroid Studies Lab Results Component Value Date/Time TSH 2.030 10/05/2017 02:46 PM  
    
 
Imaging: MRI LOW EXT LT WO CONT Final Result IMPRESSION: Ulceration present laterally at the level the fifth MTP  
articulation. Subjacent to this, there is low T1 and high T2 signal within the  
fifth metatarsal head. Findings concerning for small area of osteomyelitis at  
this site. XR CHEST PA LAT Final Result IMPRESSION:   
1. Improving pulmonary interstitial edema. 2. Small bilateral pleural effusions. DUPLEX UPPER EXT VEIN MAP BILAT Final Result IMPRESSION: Cephalic veins are somewhat small, particularly on the right and  
there is a focal nonocclusive thrombus in the median cubital left cephalic vein. Good arterial waveform tracings at both brachial and radial arteries. DUPLEX LOW EXT ARTERIES WITH MAGGIE Final Result IMPRESSION:  
1. Right lower extremity: No significant major vessel arterial disease. Microvascular disease. Left lower extremity: Occlusion of peroneal artery. No other significant major  
artery disease. Significant microvascular disease. XR CHEST PORT Final Result IMPRESSION: Bilateral airspace disease in a pattern suggestive of pulmonary  
edema, though underlying infection not excluded. [facility] Discharge Exam: 
Visit Vitals  /72  Pulse 74  Temp 97.6 °F (36.4 °C)  Resp 20  
 Ht 6' 1\" (1.854 m)  Wt 84.4 kg (186 lb)  SpO2 95%  BMI 24.54 kg/m2 General appearance: alert, cooperative, no distress, appears stated age Lungs: clear to auscultation bilaterally Heart: regular rate and rhythm, S1, S2 normal, no murmur, click, rub or gallop Abdomen: soft, non-tender. Bowel sounds normal. No masses,  no organomegaly Extremities: L foot with ulcer on lateral aspect of 5th MTP area, R BKA noted Neurologic: Grossly normal 
 
Disposition: home Discharge Condition: stable Patient Instructions:  
Current Discharge Medication List  
  
START taking these medications Details  
cephALEXin (KEFLEX) 500 mg capsule Take 1 Cap by mouth every eight (8) hours for 28 days. Indications: Bone Infections, Skin and Skin Structure Infection 
Qty: 84 Cap, Refills: 0  
  
furosemide (LASIX) 80 mg tablet Take 1 Tab by mouth two (2) times a day. Indications: Pulmonary Edema due to Chronic Heart Failure Qty: 60 Tab, Refills: 1  
  
minocycline (MINOCIN, DYNACIN) 100 mg capsule Take 1 Cap by mouth every twelve (12) hours for 28 days. Indications: Skin and Skin Structure Infection, Osteomyelitis of foot Qty: 56 Cap, Refills: 0 CONTINUE these medications which have NOT CHANGED Details  
isosorbide mononitrate ER (IMDUR) 30 mg tablet Take 1 Tab by mouth daily. Qty: 30 Tab, Refills: 0  
  
insulin lispro (HUMALOG) 100 unit/mL injection For Blood Sugar (mg/dL) of:    
Less than 150 =   0 units          
150 -199 =   2 units 200 -249 =   4 units 250 -299 =   6 units 300 -349 =   8 units Qty: 1 Vial, Refills: 1 Insulin Syringe-Needle U-100 (INSULIN SYRINGE) 1 mL 30 gauge x 5/16 syrg As directed Qty: 200 Syringe, Refills: 0 Lancets misc As directed Qty: 1 Each, Refills: 11  
  
glucose blood VI test strips (ASCENSIA AUTODISC VI, ONE TOUCH ULTRA TEST VI) strip As directed Qty: 200 Strip, Refills: 11 amLODIPine (NORVASC) 5 mg tablet Take 2 Tabs by mouth daily. Qty: 30 Tab, Refills: 0 Associated Diagnoses: Cardiomyopathy, unspecified type (HCC)  
  
sodium bicarbonate 650 mg tablet Take 1 Tab by mouth three (3) times daily (with meals). Qty: 90 Tab, Refills: 0  
  
metoprolol succinate (TOPROL-XL) 100 mg tablet Take 1 Tab by mouth daily. Qty: 90 Tab, Refills: 3 Associated Diagnoses: Cardiomyopathy, unspecified type ([facility]  
  
nitroglycerin (NITROSTAT) 0.4 mg SL tablet by SubLINGual route every five (5) minutes as needed for Chest Pain. atorvastatin (LIPITOR) 80 mg tablet Take 1 Tab by mouth daily. Qty: 30 Tab, Refills: 6  
  
aspirin 81 mg chewable tablet Take 1 Tab by mouth daily (after breakfast). Qty: 30 Tab, Refills: 11 Activity: PT/OT per Home Health Diet: Renal diabetic diet Follow-up Follow-up Appointments Procedures  FOLLOW UP VISIT Appointment in: Other (Specify) 1. Dr. Diomedes Reynolds (PCP) within 1 week for hospital follow up. [address] Nephrology in 2 weeks for hospital follow up. 3. [facility] on 11/9/18 at 2:40 PM. 4. Dr. Reva Raphael [facility]... 1. Dr. Diomedes Reynolds (PCP) within 1 week for hospital follow up. [address] Nephrology in 2 weeks for hospital follow up. 3. [facility] on 11/9/18 at 2:40 PM. 4. Dr. Reva Raphael (Vascular Surgery) in 1 month for hospital follow up. Standing Status:   Standing Number of Occurrences:   1 Order Specific Question:   Appointment in Answer: Other (Specify) ·  
· Time spent to discharge patient 35 minutes Signed: 
Abbey Richard.  Donnie Lesch, MD 
10/11/2018 
2:37 PM

## 2018-10-12 NOTE — PROGRESS NOTES
RNCM attempts to call for JENNI X 1, message left. Requested return call. Will attempt again on Monday 10/15/18. 
10/15 RNCM call to pt. He agrees to [employer]. Reviewed new RXs, pt able to describe schedule for antibiotics, reminded of upcoming PCP appmt. Plan to attend PCP appmt and Review all meds. Pt denies any SOB, edema, blood sugar 120 this am. 
 
 
Transition of Care Discharge Follow-up Questionnaire Date/Time of Call: 
 10/15/18 1013 What was the patient hospitalized for? Acute Respiratory Failure Does the patient understand his/her diagnosis and/or treatment and what happened during the hospitalization? Partial understanding Did the patient receive discharge instructions? Yes, but insulin listed and pt states \"I don't take insulin, I take a pill\". CM Assessed Risk for Readmission:  
 
 
Patient stated Risk for Readmission:  
 
 High due to multiple comorbidities, refusal of medical procedures (Cath) Review any discharge instructions (see discharge instructions/AVS in [facility]). Ask patient if they understand these. Do they have any questions? Reviewed and pt not on insulin, states taking Glipizide Were home services ordered (nursing, PT, OT, ST, etc.)? No  
If so, has the first visit occurred? If not, why? (Assist with coordination of services if necessary. ) 
 NA Was any DME ordered? No 
  
If so, has it been received? If not, why?  (Assist patient in obtaining DME orders &/or equipment if necessary. )  
 
NA Complete a review of all medications (new, continued and discontinued meds per the D/C instructions and medication tab in [facility]). Reviewed new RXs, will review remaining meds at PCP appmt Thurs, 10/18. Instructed to bring all meds to appmt. Were all new prescriptions filled? If not, why?  (Assist patient in obtaining medications if necessary  escalate for CCM &/or SW if ongoing issues are verbalized by pt or anticipated) Yes Does the patient understand the purpose and dosing instructions for all medications? (If patient has questions, provide explanation and education.) Yes Does the patient have any problems in performing ADLs? (If patient is unable to perform ADLs  what is the limiting factor(s)? Do they have a support system that can assist? If no support system is present, discuss possible assistance that they may be able to obtain. Escalate for CCM/SW if ongoing issues are verbalized by pt or anticipated) No 
 
 
  
Does the patient have all follow-up appointments scheduled? 7 day f/up with PCP?  
(f/up with PCP may be w/in 14 days if patient has a f/up with their specialist w/in 7 days) 7-14 day f/up with specialist?  
(or per discharge instructions) If f/up has not been made  what actions has the care coordinator made to accomplish this? Has transportation been arranged? PCP F/U 10/18 Vascular F/U 11/6 
ID F/U 11/9 Nephrology F/U 2 weeks Discharge summary references outpt wound care which is not scheduled. Plan to attend PCP appmt w/ pt and inquire then. Pt states daughter will transport Any other questions or concerns expressed by the patient? None Schedule next appointment with KIERAN Ryan or refer to RN Case Manager/ per the workflow guidelines. When is care coordinators next follow-up call scheduled? If referred for CCM  what RN care manager was the referral assigned? CCM will f/u Thursday at PCP appmt Rose Marie Harris RN  
JENNI Call Completed By:  
Rose Marie Harris RN This note will not be viewable in [address]

## 2018-10-18 PROBLEM — E11.21 TYPE 2 DIABETES WITH NEPHROPATHY (HCC): Status: ACTIVE | Noted: 2018-01-01

## 2018-10-18 NOTE — PROGRESS NOTES
Complex Case Management Initial Assessment Questionnaire Date/Time of Call: 
 10/18/18 1000 Office visit Referral source? Referral reason? (if known) HRRP Frequent hospitalizations Recent hospitalization/ED visit? 
(if so multiple ED or hospitalizations in past 12 months?) 
 
 10/5-10/11/18 SFD w/ acute respiratory failure w/ hypoxemia 
 
2 other recent hospitalizations and 1 ED visit Fall Risk Screening - Falls in past 12 months? If yes  were injuries incurred? (Complete falls risk screening tool in [facility]) No risk per tool ACP: 
 
Does the patient have ACP in place? If so, are these one file? Is the patient interested in ACP information if they do not have this in place? Will assess Ambulatory? Independent? Current use of DME for ambulation? Does the patient still drive? Yes Yes None No  
Medication Reconciliation Issues/side effects? Able to obtain all prescribed meds? Understand all medication dosing instructions? Understand what meds are for and why they are taking them? Date Completed:  10/18/18 Meds reconciled w/ assist of NP at MD visit Pt will get new RXs, will need review Meds have been changed, but able to understand dosing instructions and what they are for Diabetic patient? If yes  most recent HgA1C result? Is DM well controlled? Education on medications needed? Nutrition consult warranted? Annual foot and eye exam scheduled? Yes 
 
A1C = < 3.5, 9/10/18 Yes No, glipizide discontinued Will assess Diagnosis of hypertension? If yes  medication prescribed? BP well controlled? Education on medication needed? Nutrition consult warranted? Daily BPs ordered? If yes  does patient have a BP cuff at home and keeping a log of BPs? Yes Toprol, vasotac, altace and Imdur Yes Yes, needs review Are home services ordered (nursing, PT, OT, ST, etc.)? Assist with coordination if necessary. [facility] DME needs present? 
(Assisted with coordination if necessary) None known Does the patient have any problems in performing ADLs? (If patient is unable to perform ADLs  what is the limiting factor(s)? Do they have a support system that can assist? If no support system is present, discuss possible assistance that they may be able to obtain.) Pt is independant Social needs present? Support system? Transportation issues? Financial issues in obtaining meds &/or healthcare? Nutritional needs met? None Family active in care Family transports to [facility] Affords meds States appetite is good Health Maintenance Due? 
( to check [facility] for health maintenance due items and assist in coordination and scheduling as appropriate) a. Colorectal cancer screening status? 
b. Flu vaccine? 
c. Pneumonia vaccine? 
d. Mammogram (if applicable) Will assess Declines any immunizations Depression Screening Completed (complete the depression screening tool in [facility]  RN to complete PHQ2- refer to SW if PHQ2 is positive  SW to complete PHQ9 and notify PCP) Scored 4 at PCP office, NP aware Does the patient have all follow-up appointments scheduled? Has transportation been arranged? 
(for [facility] Pulmonary follow-up should be within 7 days of discharge; all others should have PCP follow-up within 7 days of discharge; follow-ups with other specialists as appropriate or ordered.) Upcoming appointments w/ Nephrology, ID. Referrals made by NP to Cardiology and [address] [facility] for f/u. All appmts written down and reviewed w/ pt and family Family will transport Any other questions or concerns expressed by the patient?  
 
 C/O soreness at R knee amputation, instructed to contact [facility] for f/u appmt/ assessment (States he hasn't been there since his initial surgery last year) Schedule next appointment with RN Case Manager/  as appropriate. F/U next week CCM outreach Completed By: 
 
 Marychuy Coleman RN  
 
RNCM met pt at PCP appmt. He is agreeable to continued outreach. Meds reviewed and pt only taking some of meds recently ordered at discharge and some from before. Reconciled and new med list printed. Pt will need review and reinforcement. Plan to f/u next week or before. This note will not be viewable in [address]

## 2018-10-22 PROBLEM — G93.40 ACUTE ENCEPHALOPATHY: Status: ACTIVE | Noted: 2018-01-01

## 2018-10-22 NOTE — PROGRESS NOTES
Care coordination with inpatient case management. Patient has readmitted. RRAT - 39:  Admitted now w/ [new] acute encephalopathy. Of note patient declined STR & HH support following discharge at last admission (10/5 - 10/11) Patient has consented to ongoing Community Care Management - Team RN met with patient at [facility] hospital follow up appointment on 10/18. Plan - coordinate with Inpatient Case Management re: discharge plan.

## 2018-10-22 NOTE — PROGRESS NOTES
BSR received form Paola Seo RN shift assessment completed pt alert with periodic confusion in bed eating call light within reach posey in place and functioning instructed pt to call for assistance no distress noted will continue to monitor

## 2018-10-22 NOTE — ED PROVIDER NOTES
Patient is a 74-year-old diabetic with heart failure he is presenting with increased confusion. Family reports that neighbor went to check on the patient about 4 hours ago noticed he was more confused and forgetful. He has a leftfoot diabetic wound and was recently placed on antibiotics. He states he has not been taking antibiotics at all. Patient has history of medical noncompliance. He is a smoker. Previous hospitalization for pneumonia. Denies any cough or shortness of breath. Denies any fever. Patient is a poor historian with poor insight into his health conditions. Previous right  BKA. The history is provided by the patient. No  was used. Past Medical History:  
Diagnosis Date  Abnormal CT scan, chest 12/27/2015  Acute CHF ([facility] 12/26/2015  Acute systolic congestive heart failure ([address] 12/30/2015  MO (acute kidney injury) ([facility] 7/29/2012  Anemia of chronic disease 9/6/2018  Bilateral pleural effusion 12/27/2015  Chest pain 12/26/2015  Depression  DM (diabetes mellitus), type 2 ([address] 7/29/2012  Encephalopathy due to infection 1/7/2017  
 HTN (hypertension) 7/29/2012  Hypoglycemia 7/29/2012  
 NSTEMI (non-ST elevated myocardial infarction) ([facility] 1/5/2017  Tobacco use 12/27/2015 Past Surgical History:  
Procedure Laterality Date  HX ORTHOPAEDIC Family History:  
Problem Relation Age of Onset  Diabetes Mother  Kidney Disease Mother  Diabetes Father  Kidney Disease Father  Kidney Disease Sister Social History Socioeconomic History  Marital status: SINGLE Spouse name: Not on file  Number of children: Not on file  Years of education: Not on file  Highest education level: Not on file Social Needs  Financial resource strain: Not on file  Food insecurity - worry: Not on file  Food insecurity - inability: Not on file  Transportation needs - medical: Not on file  Transportation needs - non-medical: Not on file Occupational History  Occupation: retired  Tobacco Use  Smoking status: Current Every Day Smoker Packs/day: 0.50 Years: 45.00 Pack years: 22.50 Types: Cigarettes  Smokeless tobacco: Never Used Substance and Sexual Activity  Alcohol use: No  
 Drug use: Yes Types: Prescription  Sexual activity: No  
Other Topics Concern  Not on file Social History Narrative  Not on file ALLERGIES: Lortab [hydrocodone-acetaminophen] Review of Systems Constitutional: Negative for fever. HENT: Negative for sore throat. Respiratory: Negative for cough and shortness of breath. Cardiovascular: Negative for leg swelling. Gastrointestinal: Negative for abdominal pain. Genitourinary: Negative for dysuria. Musculoskeletal: Negative for back pain. Neurological: Negative for headaches. Psychiatric/Behavioral: Positive for confusion. Vitals:  
 10/21/18 2349 10/22/18 0006 BP: 165/75 Pulse: 92 Resp: 16 Temp: 99 °F (37.2 °C) SpO2: 100% 99% Weight: 87.8 kg (193 lb 9.6 oz) Height: 6' 1\" (1.854 m) Physical Exam  
Constitutional: He appears well-developed and well-nourished. No distress. HENT:  
Head: Normocephalic and atraumatic. Eyes: Conjunctivae and EOM are normal. Pupils are equal, round, and reactive to light. Neck: Normal range of motion. Neck supple. Cardiovascular: Normal rate, regular rhythm and normal heart sounds. Pulmonary/Chest: Effort normal and breath sounds normal. No respiratory distress. He has no wheezes. He has no rales. Abdominal: Soft. He exhibits no distension. There is no tenderness. There is no rebound. Musculoskeletal: Normal range of motion. He exhibits no edema or tenderness. Neurological: He is alert. He has normal strength. Coordination normal.  
Symmetric smile. Clear speech. Patient seems moderately confused Skin: Skin is warm and dry. No rash noted. He is not diaphoretic. Psychiatric: He has a normal mood and affect. His behavior is normal.  
Vitals reviewed. MDM Number of Diagnoses or Management Options Anemia, unspecified type: new and requires workup Encephalopathy acute: new and requires workup End stage renal disease [facility]): new and requires workup Diagnosis management comments: Patient presents with increased confusion and anemia. He also has osteomyelitis and is apparently not taking any antibiotics. He states he is not taking any medications and lives alone. No signs of sepsis at this time. We'll admit for confusion and anemia. We'll also address patient's left foot osteomyelitis. Admitted in stable condition. Ara Lopez MD; 10/22/2018 @4:11 AM Voice dictation software was used during the making of this note. This software is not perfect and grammatical and other typographical errors may be present. This note has not been proofread for errors. 
=================================================================== Amount and/or Complexity of Data Reviewed Clinical lab tests: ordered and reviewed (Results for orders placed or performed during the hospital encounter of 10/21/18 
-CBC WITH AUTOMATED DIFF Result                      Value             Ref Range WBC                         6.9               4.3 - 11.1 K* 
     RBC                         2.58 (L)          4.23 - 5.6 M* HGB                         7.4 (L)           13.6 - 17.2 * HCT                         23.8 (L)          41.1 - 50.3 % MCV                         92.2              79.6 - 97.8 * MCH                         28.7              26.1 - 32.9 * MCHC                        31.1 (L)          31.4 - 35.0 * RDW                         13.2              % PLATELET                    232               150 - 450 K/* MPV                         11.4              9.4 - 12.3 FL ABSOLUTE NRBC               0.00              0.0 - 0.2 K/* DF                          AUTOMATED NEUTROPHILS                 76                43 - 78 % LYMPHOCYTES                 11 (L)            13 - 44 % MONOCYTES                   10                4.0 - 12.0 % EOSINOPHILS                 3                 0.5 - 7.8 % BASOPHILS                   1                 0.0 - 2.0 % IMMATURE GRANULOCYTES       1                 0.0 - 5.0 %   
     ABS. NEUTROPHILS            5.2               1.7 - 8.2 K/* ABS. LYMPHOCYTES            0.7               0.5 - 4.6 K/* ABS. MONOCYTES              0.7               0.1 - 1.3 K/* ABS. EOSINOPHILS            0.2               0.0 - 0.8 K/* ABS. BASOPHILS              0.0               0.0 - 0.2 K/* ABS. IMM. GRANS.            0.0               0.0 - 0.5 K/* 
-METABOLIC PANEL, COMPREHENSIVE Result                      Value             Ref Range Sodium                      141               136 - 145 mm* Potassium                   4.2               3.5 - 5.1 mm* Chloride                    109 (H)           98 - 107 mmo* CO2                         23                21 - 32 mmol* Anion gap                   9                 7 - 16 mmol/L Glucose                     219 (H)           65 - 100 mg/* BUN                         62 (H)            8 - 23 MG/DL Creatinine                  4.50 (H)          0.8 - 1.5 MG* 
     GFR est AA                  17 (L)            >60 ml/min/1* GFR est non-AA              14 (L)            >60 ml/min/1* Calcium                     8.0 (L)           8.3 - 10.4 M* Bilirubin, total            0.3               0.2 - 1.1 MG*      ALT (SGPT)                  19                12 - 65 U/L   
 AST (SGOT)                  18                15 - 37 U/L Alk. phosphatase            80                50 - 136 U/L Protein, total              7.8               6.3 - 8.2 g/* Albumin                     2.6 (L)           3.2 - 4.6 g/* Globulin                    5.2 (H)           2.3 - 3.5 g/* A-G Ratio                   0.5 (L)           1.2 - 3.5     
-C REACTIVE PROTEIN, QT Result                      Value             Ref Range C-Reactive protein          4.6 (H)           0.0 - 0.9 mg* 
-ETHYL ALCOHOL Result                      Value             Ref Range ALCOHOL(ETHYL),SERUM        <3                MG/DL         
-URINE MICROSCOPIC Result                      Value             Ref Range WBC                         3-5               0 /hpf        
     RBC                         0-3               0 /hpf Epithelial cells            0-3               0 /hpf Bacteria                    0                 0 /hpf Casts                       3-5               0 /lpf        
-POC LACTIC ACID Result                      Value             Ref Range Lactic Acid (POC)           0.8               0.5 - 1.9 mm* 
-TYPE & SCREEN Result                      Value             Ref Range Crossmatch Expiration       10/25/2018 ABO/Rh(D)                   B POSITIVE Antibody screen             PENDING                         
) 
Tests in the radiology section of CPT®: ordered and reviewed (Xr Chest Pa Lat Result Date: 10/22/2018 EXAM:  Chest x-ray. DATE:  October 22, 2018. INDICATION:  Chest pain. COMPARISON:  October 8, 2018. TECHNIQUE:  Frontal and lateral views of the chest were obtained. FINDINGS:  The lungs are clear. The heart, mediastinal contour and pulmonary vasculature are within normal limits.  No pneumothorax or pleural effusion is seen. The bony structures are unremarkable. IMPRESSION:  No acute process. Xr Foot Lt Min 3 V Result Date: 10/22/2018 EXAM:  Left foot x-rays. DATE:  October 22, 2018. INDICATION:  Pain, infection. COMPARISON:  Prior MRI of the left foot on October 9, 2018. TECHNIQUE:  3 views of the left foot were obtained. FINDINGS:  There is subtle lucency in the fifth metatarsal head, in the region of suspected osteomyelitis on the prior MRI. No other areas of ostial myelitis are identified radiographically. There is no acute fracture or dislocation. Atherosclerotic calcifications are noted in the vasculature. IMPRESSION:  Findings suggest osteomyelitis in the fifth metatarsal head, as noted on a recent MRI scan. Ct Head Wo Cont Result Date: 10/22/2018 EXAM:  Noncontrast CT head. DATE:  October 22, 2018. INDICATION:  Mental status changes. COMPARISON: July 29, 2012. TECHNIQUE: Axial noncontrast CT images of the head were obtained. Radiation dose reduction techniques were used for this study. Our CT scanners use one or all of the following: Automated exposure control, adjustment of the mA and/or kV according to patient size, iterative reconstruction. FINDINGS:  There is moderate volume loss with chronic small vessel ischemic changes in the white matter, progressed from the prior study. No acute infarct, hemorrhage or mass is identified. There is no mass effect or midline shift. No fracture is seen in the skull or skull base. The visualized paranasal sinuses and mastoid air cells are clear. IMPRESSION:  No acute intracranial process. ) Review and summarize past medical records: yes Discuss the patient with other providers: yes Independent visualization of images, tracings, or specimens: yes Risk of Complications, Morbidity, and/or Mortality Presenting problems: high Diagnostic procedures: moderate Management options: moderate Patient Progress Patient progress: stable Procedures

## 2018-10-22 NOTE — PROGRESS NOTES
Patient admitted overnight after recent discharge home on 10/11/2018. He is well-known to Case Management from previous visits. Patient went as far as threatening to leave AMA at last visit. He is poorly compliant with medical treatment. He lives alone in an apartment. He states he goes nowhere and has a girlfriend Titi Franco sees about me. \" He continues to refuse short-term rehab or other services. He is also followed by our outpatient Case Management staff. See note below from previous discharge: He has a R BKA with a prosthesis. We discussed short-term rehab placement, but patient refused. When asked about home health follow-up, patient stated, \"You can order it. But I won't promise I'll come to the door when they come. \" Discussed with [facility], and if patient does not verbally contract to participate, there is no need to place the order. No discharge planning needs identified. Patient would certainly benefit from short-term rehab versus home with home health. Unfortunately, he is alert and oriented and able to make his own decisions. Case Management will continue to follow for discharge planning. Care Management Interventions PCP Verified by CM: Yes Transition of Care Consult (CM Consult): Discharge Planning Discharge Durable Medical Equipment: No 
Physical Therapy Consult: Yes Occupational Therapy Consult: Yes Speech Therapy Consult: No 
Current Support Network: Lives Alone, Own Home Confirm Follow Up Transport: Friends Plan discussed with Pt/Family/Caregiver: Yes Freedom of Choice Offered: Yes Discharge Location Discharge Placement: Unable to determine at this time

## 2018-10-22 NOTE — PROGRESS NOTES
10/22/2018 RE: Ludy Morris To Whom it May Concern: This is to certify that Rolly Puente was here with dad today 10/22/18 Please feel free to contact my office if you have any questions or concerns. Thank you for your assistance in this matter. Sincerely, Lv Jefferson RN

## 2018-10-22 NOTE — ED NOTES
TRANSFER - OUT REPORT: 
 
Verbal report given to Helio(name) on Sharie Romberg  being transferred to 1(unit) for routine progression of care Report consisted of patients Situation, Background, Assessment and  
Recommendations(SBAR). Information from the following report(s) ED Summary was reviewed with the receiving nurse. Lines:  
Peripheral IV 10/21/18 Right Hand (Active) Site Assessment Clean, dry, & intact 10/21/2018 11:55 PM  
Phlebitis Assessment 0 10/21/2018 11:55 PM  
Infiltration Assessment 0 10/21/2018 11:55 PM  
Dressing Status Clean, dry, & intact 10/21/2018 11:55 PM  
Dressing Type Transparent 10/21/2018 11:55 PM  
  
 
Opportunity for questions and clarification was provided. Patient transported with: 
 [employer]

## 2018-10-22 NOTE — ED TRIAGE NOTES
Patient arrives via [facility] from home. Neighbor called patient's daughter for \"patient not acting right. \"  Daughter denies patient having hx of dementia. Patient forgetful for EMS but able to answer orientation questions. bgl [id number].  150ml NS given in route. Left foot infection. Patient given abx on Thursday, keflex. Daughter reports patient has not taken any abx since it was prescribed.

## 2018-10-22 NOTE — CONSULTS
Infectious Disease Consult Today's Date: 10/22/2018 Admit Date: 10/21/2018 Impression: · L foot 5th MT osteomyelitis on MRI/Xray · DM, with R BKA · Recent NSTEMI · CKD, creatinine 4.5 · Confusion Plan: · I am not clear that he has the capacity to make any medical decisions at this time. · At a minimum he will need good wound care and antibiotics for the osteomyelitis. He is now on minocycline/zosyn. Anti-infectives: · Minocycline (10/22 -  
· Zosyn (10/22 - Subjective:  
Date of Consultation:  October 22, 2018 Referring Physician: Charu Cerda, Hospitalist 
 
Patient is a 72 y.o. male with an underlying medical history that includes CHF, recent NSTEMI (hospitalized 9/24-9/28/18), DM and R BKA, CKD, with likely need for dialysis soon, and ongoing tobacco abuse. He has a chronic L foot ulcer on the 5th MT. After the NSTEMI a L heart cath was recommended and he refused. He had blood cultures 9/24/18 that grew CoNS epi in three of four bottles drawn from two sites. ID was not involved in his care at the time. He was again hospitalized 10/5/18-10/11/18 with chest pain and shortness of breath. Blood cultures were negative 10/5/18. He had an MRI of the L foot 10/9 that showed osteomyelitis of the L 5th MTH, and was seen by ID with recommendation for six weeks of minocycline and cephalexin with ID outpatient follow up. He was again admitted through the ED 10/22/18 with increasing confusion. Head CT and CXR negative. Apparently he had not been taking his antibiotics, and his foot wound was malodorous with black eschar. He has had Tmax 99.2. WBCs 6.9K, hemoglobin 7.4. Foot Xray 10/22 is suggestive of osteomyelitis. ID has been asked for further recommendations. He cannot tell me why he is here, he does not remember who called the ambulance, and cannot tell me any of his symptoms.  He did not remember that he had a prosthesis on his R leg, and asked me, \"what's wrong with my foot ?\" He was reoriented, and asked me to remove the prosthesis. The liner was very moist and had a strong foul odor. The base of his stump has a dusky area. His L foot is very tender to touch, and he did not know that he had been prescribed antibiotics. He did not seem to know he had any wounds. Patient Active Problem List  
Diagnosis Code  
 HTN (hypertension) I10  
 Chest pain R07.9  Bilateral pleural effusion J90  
 Tobacco use Z72.0  Abnormal CT scan, chest R93.89  Chronic systolic congestive heart failure ([facility]) I50.22  
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  
 Glaucoma syndrome H40.9  Onychomycosis B35.1  MO (acute kidney injury) ([facility] [address] N17.9  Anemia of chronic disease D63.8  Pneumonia due to infectious organism J18.9  EKG, abnormal R94.31  
 Elevated troponin R74.8  Bacteremia R78.81  
 Tachycardia R00.0  Diabetic ulcer of left midfoot associated with type 2 diabetes mellitus ([facility] E11.621, L97.429  
 Cellulitis of left foot L03. [address]  Stage 5 chronic kidney disease not on chronic dialysis ([address] N18.5  Acute rhinitis J00  
 Acute osteomyelitis of metatarsal bone, left ([address] M86.172  Osteomyelitis of left foot ([facility] M86.9  Type 2 diabetes with nephropathy ([facility]) E11.21  
 Acute encephalopathy G93.40 Past Medical History:  
Diagnosis Date  Abnormal CT scan, chest 12/27/2015  [facility] 12/26/2015  Acute systolic congestive heart failure ([facility] 12/30/2015  MO (acute kidney injury) ([facility] 7/29/2012  Anemia of chronic disease 9/6/2018  Bilateral pleural effusion 12/27/2015  Chest pain 12/26/2015  Depression  DM (diabetes mellitus), type 2 ([facility] 7/29/2012  Encephalopathy due to infection 1/7/2017  
 HTN (hypertension) 7/29/2012  Hypoglycemia 7/29/2012  
 NSTEMI (non-ST elevated myocardial infarction) ([facility] 1/5/2017  Tobacco use 12/27/2015 Family History Problem Relation Age of Onset  Diabetes Mother  Kidney Disease Mother  Diabetes Father  Kidney Disease Father  Kidney Disease Sister Social History Tobacco Use  Smoking status: Current Every Day Smoker Packs/day: 0.50 Years: 45.00 Pack years: 22.50 Types: Cigarettes  Smokeless tobacco: Never Used Substance Use Topics  Alcohol use: No  
 
Past Surgical History:  
Procedure Laterality Date  HX ORTHOPAEDIC Prior to Admission medications Medication Sig Start Date End Date Taking? Authorizing Provider  
glipiZIDE SR (GLUCOTROL XL) 2.5 mg CR tablet Take 2.5 mg by mouth daily. 9/27/18   Provider, Historical  
ramipril (ALTACE) 10 mg capsule Take 1 Cap by mouth daily. 10/18/18   Suly Phelps NP  
amLODIPine (NORVASC) 5 mg tablet Take 2 Tabs by mouth daily. 10/18/18   Suly Phelps NP  
metoprolol succinate (TOPROL-XL) 100 mg tablet Take 1 Tab by mouth daily. 10/18/18   Suly Phelps NP  
atorvastatin (LIPITOR) 80 mg tablet Take 1 Tab by mouth daily. 10/18/18   Suly Phelps NP  
isosorbide mononitrate ER (IMDUR) 30 mg tablet Take 1 Tab by mouth daily. 10/18/18   Suly Phelps NP  
cephALEXin (KEFLEX) 500 mg capsule Take 1 Cap by mouth every eight (8) hours for 28 days.  Indications: Bone Infections, Skin and Skin Structure Infection 10/11/18 11/8/18  Omid Deshpande MD  
furosemide (LASIX) 80 mg tablet Take 1 Tab by mouth two (2) times a day. Indications: Pulmonary Edema due to Chronic Heart Failure 10/11/18   Omid Deshpande MD  
minocycline (MINOCIN, DYNACIN) 100 mg capsule Take 1 Cap by mouth every twelve (12) hours for 28 days. Indications: Skin and Skin Structure Infection, Osteomyelitis of foot 10/11/18 11/8/18  Omid Deshpande MD  
Lancets misc As directed 18   Jeffy Zapata MD  
glucose blood VI test strips (ASCENSIA AUTODISC VI, ONE TOUCH ULTRA TEST VI) strip As directed 18   Jeffy Zapata MD  
sodium bicarbonate 650 mg tablet Take 1 Tab by mouth three (3) times daily (with meals). 9/10/18   Tee Mathew MD  
aspirin 81 mg chewable tablet Take 1 Tab by mouth daily (after breakfast). 12/30/15   Ulysses Liriano NP Allergies Allergen Reactions  Lortab [Hydrocodone-Acetaminophen] Itching Review of Systems:  Review of systems not obtained due to patient factors. Unable to participate. Objective:  
 
Visit Vitals /80 (BP 1 Location: Right arm, BP Patient Position: At rest) Pulse 100 Temp 99.2 °F (37.3 °C) Resp 17 Ht 6' 1\" (1.854 m) Wt 87.8 kg (193 lb 9.6 oz) SpO2 98% BMI 25.54 kg/m² Temp (24hrs), Av.9 °F (37.2 °C), Min:98.6 °F (37 °C), Max:99.2 °F (37.3 °C) Lines:  Peripheral IV:    
 
Physical Exam:   
General:  Alert, cooperative, well nourished, well developed, appears stated age Eyes:  Sclera anicteric. Pupils equally round and reactive to light. Mouth/Throat: Mucous membranes normal, oral pharynx clear Neck: Supple Lungs:   Clear to auscultation bilaterally, good effort CV:  RRR without audible murmur, tachycardic Abdomen:   Soft, non tender, active bowel sounds Extremities: R BKA, L foot with brisk cap refill and warm toes; second toe is dusky, L lateral 5th MT head has wound with eschar; L heel and ankle are swollen and very tender. Skin: No rash, wound as above; R LE moist and dusky area at tip of the stump Lymph nodes: Cervical and supraclavicular normal  
Musculoskeletal: As above Lines/Devices:  Intact, no erythema, drainage or tenderness Psych: Alert and cooperative, oriented to self and to place, appropriate affect Data Review: CBC: 
Recent Labs 10/22/18 
0009 WBC 6.9 GRANS 76 MONOS 10 EOS 3 ANEU 5.2 ABL 0.7 HGB 7.4* HCT 23.8*  
 BMP: 
Recent Labs 10/22/18 
0009 CREA 4.50* BUN 62*   
K 4.2 * CO2 23 AGAP 9  
* LFTS: 
Recent Labs 10/22/18 
0009 TBILI 0.3 ALT 19 SGOT 18  
AP 80  
TP 7.8 ALB 2.6* Microbiology:  
 
All Micro Results None Imaging:  
10/22/18 XR Foot IMPRESSION:  Findings suggest osteomyelitis in the fifth metatarsal head, as  
noted on a recent MRI scan. Signed By: Lena Crowley NP October 22, 2018

## 2018-10-22 NOTE — PROGRESS NOTES
TRANSFER - IN REPORT: 
 
Verbal report received from [facility]  being received from ER for routine progression of care Report consisted of patients Situation, Background, Assessment and  
Recommendations(SBAR). Information from the following report(s) Kardex was reviewed with the receiving nurse. Opportunity for questions and clarification was provided. Assessment completed upon patients arrival to unit and care assumed.

## 2018-10-22 NOTE — H&P
Hospitalist H&P/Consult Note Admit Date:  10/21/2018 11:47 PM  
Name:  Nell Barriga Age:  72 y.o. 
:  1945 MRN:  [id number] PCP:  Denis Stewart MD 
Treatment Team: Attending Provider: Sixto Robert MD; Primary Nurse: Clara Adler RN 
 
HPI:  
Patient is a 71 y/o male with extensive medical history including CAD, NSTEMI, HTN, DM, chronic systolic CHF, CKD and left foot osteomyelitis who was hospitalized 10/5-10/11 for acute resp failure, acute MI, and foot infection. He was discharged home on PO keflex and minocycline. Blood cultures at that time grew coag negative staph. He lives alone and reportedly a neighbor called pt's daughter stating he was not acting right. Apparently he has not been taking his antibiotics at home and he is very confused. A head CT is negative. Lactic acid is 0.8. His CKD about the same as at discharge. Bun 62/Cr 4.50. Hgb is lower at 7.4. No reported GI bleed. He had been started on procrit last admit. He has a foul odor coming from his left foot with black eschar which I suspect is worsening infection, possibly gangrene. He will be admitted for further workup and treatment of new encephalopathy and worsening left foot infection. 10 systems reviewed and negative except as noted in HPI. Past Medical History:  
Diagnosis Date  Abnormal CT scan, chest 2015  Acute CHF ([address] 2015  Acute systolic congestive heart failure ([address] 2015  MO (acute kidney injury) ([address] 2012  Anemia of chronic disease 2018  Bilateral pleural effusion 2015  Chest pain 2015  Depression  DM (diabetes mellitus), type 2 ([address] 2012  Encephalopathy due to infection 2017  
 HTN (hypertension) 2012  Hypoglycemia 2012  
 NSTEMI (non-ST elevated myocardial infarction) ([address] 2017  Tobacco use 2015 Past Surgical History:  
Procedure Laterality Date  HX ORTHOPAEDIC    
  
 Prior to Admission Medications Prescriptions Last Dose Informant Patient Reported? Taking? Lancets misc   No No  
Sig: As directed  
amLODIPine (NORVASC) 5 mg tablet   No No  
Sig: Take 2 Tabs by mouth daily. aspirin 81 mg chewable tablet   No No  
Sig: Take 1 Tab by mouth daily (after breakfast). atorvastatin (LIPITOR) 80 mg tablet   No No  
Sig: Take 1 Tab by mouth daily. cephALEXin (KEFLEX) 500 mg capsule   No No  
Sig: Take 1 Cap by mouth every eight (8) hours for 28 days. Indications: Bone Infections, Skin and Skin Structure Infection  
furosemide (LASIX) 80 mg tablet   No No  
Sig: Take 1 Tab by mouth two (2) times a day. Indications: Pulmonary Edema due to Chronic Heart Failure  
glipiZIDE SR (GLUCOTROL XL) 2.5 mg CR tablet   Yes No  
Sig: Take 2.5 mg by mouth daily. glucose blood VI test strips (ASCENSIA AUTODISC VI, ONE TOUCH ULTRA TEST VI) strip   No No  
Sig: As directed  
isosorbide mononitrate ER (IMDUR) 30 mg tablet   No No  
Sig: Take 1 Tab by mouth daily. metoprolol succinate (TOPROL-XL) 100 mg tablet   No No  
Sig: Take 1 Tab by mouth daily. minocycline (MINOCIN, DYNACIN) 100 mg capsule   No No  
Sig: Take 1 Cap by mouth every twelve (12) hours for 28 days. Indications: Skin and Skin Structure Infection, Osteomyelitis of foot  
ramipril (ALTACE) 10 mg capsule   No No  
Sig: Take 1 Cap by mouth daily. sodium bicarbonate 650 mg tablet   No No  
Sig: Take 1 Tab by mouth three (3) times daily (with meals). Facility-Administered Medications: None Allergies Allergen Reactions  Lortab [Hydrocodone-Acetaminophen] Itching Social History Tobacco Use  Smoking status: Current Every Day Smoker Packs/day: 0.50 Years: 45.00 Pack years: 22.50 Types: Cigarettes  Smokeless tobacco: Never Used Substance Use Topics  Alcohol use: No  
  
Family History Problem Relation Age of Onset  Diabetes Mother  Kidney Disease Mother  Diabetes Father  Kidney Disease Father  Kidney Disease Sister Immunization History Administered Date(s) Administered  DTAP Vaccine 06/03/2010  TB Skin Test (PPD) Intradermal 01/08/2017, 09/06/2018, 10/05/2018 Objective:  
 
Patient Vitals for the past 24 hrs: 
 Temp Pulse Resp BP SpO2  
10/22/18 0404  86 16 178/81 98 % 10/22/18 0027  93  157/86 98 % 10/22/18 0006     99 % 10/21/18 2349 99 °F (37.2 °C) 92 16 165/75 100 % Oxygen Therapy O2 Sat (%): 98 % (10/22/18 0404) Pulse via Oximetry: 86 beats per minute (10/22/18 0404) O2 Device: Room air (10/22/18 0404) No intake or output data in the 24 hours ending 10/22/18 0452 Physical Exam: 
General:    Well nourished. Alert. Poor historian Foul gangrenous odor emanating from left foot Eyes:   Normal sclera. Extraocular movements intact. ENT:  Normocephalic, atraumatic. Moist mucous membranes CV:   RRR. No murmur, rub, or gallop. Lungs:  CTAB. No wheezing, rhonchi, or rales. Abdomen: Soft, nontender, nondistended. Bowel sounds normal.  
Extremities:     R BKA. L lateral foot ulceration with black eschar Neurologic: CN II-XII grossly intact. Sensation intact. Skin:     No rashes or jaundice. No wounds. Psych:  Normal mood, flat affect, poor insight I reviewed the labs, imaging, EKGs, telemetry, and other studies done this admission. Data Review:  
Recent Results (from the past 24 hour(s)) CBC WITH AUTOMATED DIFF Collection Time: 10/22/18 12:09 AM  
Result Value Ref Range WBC 6.9 4.3 - 11.1 K/uL  
 RBC 2.58 (L) 4.23 - 5.6 M/uL HGB 7.4 (L) 13.6 - 17.2 g/dL HCT 23.8 (L) 41.1 - 50.3 % MCV 92.2 79.6 - 97.8 FL  
 MCH 28.7 26.1 - 32.9 PG  
 MCHC 31.1 (L) 31.4 - 35.0 g/dL  
 RDW 13.2 % PLATELET 992 205 - 056 K/uL MPV 11.4 9.4 - 12.3 FL ABSOLUTE NRBC 0.00 0.0 - 0.2 K/uL  
 DF AUTOMATED NEUTROPHILS 76 43 - 78 % LYMPHOCYTES 11 (L) 13 - 44 %  MONOCYTES 10 4.0 - 12.0 %  
 EOSINOPHILS 3 0.5 - 7.8 % BASOPHILS 1 0.0 - 2.0 % IMMATURE GRANULOCYTES 1 0.0 - 5.0 %  
 ABS. NEUTROPHILS 5.2 1.7 - 8.2 K/UL  
 ABS. LYMPHOCYTES 0.7 0.5 - 4.6 K/UL  
 ABS. MONOCYTES 0.7 0.1 - 1.3 K/UL  
 ABS. EOSINOPHILS 0.2 0.0 - 0.8 K/UL  
 ABS. BASOPHILS 0.0 0.0 - 0.2 K/UL  
 ABS. IMM. GRANS. 0.0 0.0 - 0.5 K/UL METABOLIC PANEL, COMPREHENSIVE Collection Time: 10/22/18 12:09 AM  
Result Value Ref Range Sodium 141 136 - 145 mmol/L Potassium 4.2 3.5 - 5.1 mmol/L Chloride 109 (H) 98 - 107 mmol/L  
 CO2 23 21 - 32 mmol/L Anion gap 9 7 - 16 mmol/L Glucose 219 (H) 65 - 100 mg/dL BUN 62 (H) 8 - 23 MG/DL Creatinine 4.50 (H) 0.8 - 1.5 MG/DL  
 GFR est AA 17 (L) >60 ml/min/1.73m2 GFR est non-AA 14 (L) >60 ml/min/1.73m2 Calcium 8.0 (L) 8.3 - 10.4 MG/DL Bilirubin, total 0.3 0.2 - 1.1 MG/DL  
 ALT (SGPT) 19 12 - 65 U/L  
 AST (SGOT) 18 15 - 37 U/L Alk. phosphatase 80 50 - 136 U/L Protein, total 7.8 6.3 - 8.2 g/dL Albumin 2.6 (L) 3.2 - 4.6 g/dL Globulin 5.2 (H) 2.3 - 3.5 g/dL A-G Ratio 0.5 (L) 1.2 - 3.5 C REACTIVE PROTEIN, QT Collection Time: 10/22/18 12:09 AM  
Result Value Ref Range C-Reactive protein 4.6 (H) 0.0 - 0.9 mg/dL ETHYL ALCOHOL Collection Time: 10/22/18 12:09 AM  
Result Value Ref Range ALCOHOL(ETHYL),SERUM <3 MG/DL POC LACTIC ACID Collection Time: 10/22/18 12:25 AM  
Result Value Ref Range Lactic Acid (POC) 0.8 0.5 - 1.9 mmol/L  
URINE MICROSCOPIC Collection Time: 10/22/18  1:55 AM  
Result Value Ref Range WBC 3-5 0 /hpf  
 RBC 0-3 0 /hpf Epithelial cells 0-3 0 /hpf Bacteria 0 0 /hpf Casts 3-5 0 /lpf  
TYPE & SCREEN Collection Time: 10/22/18  3:18 AM  
Result Value Ref Range Crossmatch Expiration 10/25/2018 ABO/Rh(D) B POSITIVE Antibody screen NEG Imaging Studies: CXR Results  (Last 48 hours) 10/22/18 0047  XR CHEST PA LAT Final result Impression:  IMPRESSION:  No acute process. Narrative:  EXAM:  Chest x-ray. DATE:  October 22, 2018. INDICATION:  Chest pain. COMPARISON:  October 8, 2018. TECHNIQUE:  Frontal and lateral views of the chest were obtained. FINDINGS:  The lungs are clear. The heart, mediastinal contour and pulmonary  
vasculature are within normal limits. No pneumothorax or pleural effusion is  
seen. The bony structures are unremarkable. CT Results  (Last 48 hours) 10/22/18 0053  CT HEAD WO CONT Final result Impression:  IMPRESSION:  No acute intracranial process. Narrative:  EXAM:  Noncontrast CT head. DATE:  October 22, 2018. INDICATION:  Mental status changes. COMPARISON: July 29, 2012. TECHNIQUE: Axial noncontrast CT images of the head were obtained. Radiation dose reduction techniques were used for this study. Our CT scanners  
use one or all of the following: Automated exposure control, adjustment of the  
mA and/or kV according to patient size, iterative reconstruction. FINDINGS:  There is moderate volume loss with chronic small vessel ischemic  
changes in the white matter, progressed from the prior study. No acute infarct,  
hemorrhage or mass is identified. There is no mass effect or midline shift. No  
fracture is seen in the skull or skull base. The visualized paranasal sinuses  
and mastoid air cells are clear. Assessment and Plan:  
 
Hospital Problems as of 10/22/2018 Date Reviewed: 9/26/2018 Codes Class Noted - Resolved POA * (Principal) Acute encephalopathy ICD-10-CM: G93.40 ICD-9-CM: 348.30  10/22/2018 - Present Yes Osteomyelitis of left foot (HCC) ICD-10-CM: M86.9 ICD-9-CM: 730.27  10/11/2018 - Present Yes  Diabetic ulcer of left midfoot associated with type 2 diabetes mellitus ([facility] ICD-10-CM: E11.621, V95.065 
 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  
   
 CKD (chronic kidney disease) stage 4, GFR 15-29 ml/min (HCC) (Chronic) ICD-10-CM: N18.4 ICD-9-CM: 585.4  8/11/2016 - Present Yes Chronic systolic congestive heart failure (HCC) (Chronic) ICD-10-CM: W87.37 ICD-9-CM: 428.22, 428.0  12/30/2015 - Present Yes Overview Signed 1/6/2017  8:59 AM by Telma Ellis MD  
  EF 35-40% on 2015 Echo HTN (hypertension) (Chronic) ICD-10-CM: I10 
ICD-9-CM: 401.9  7/29/2012 - Present Yes PLAN: 
· Admit inpatient to medical bed · Acute encephalopathy may be secondary to worsening renal failure, worsening infection of left foot, ? Other etiology · He has apparently not been taking care of foot or taking his antibiotics. Foot appears and smells gangrenous. Will start IV zosyn and d/c the oral keflex. reconsult ID. May need surgical re-evaluation for debridement. · Monitor renal function, Mg, Phos · Has worsening anemia of chronic disease. With hx CAD would likely benefit from blood transfusion. Will give 1 unit PRBCs to start · Check other labs for causes of encephalopathy · Needs improved BP control · Place on SQ heparin for DVT prophylaxis · Place PPD and consult case management · Do not feel patient can return home on his own at this time · He has poor insight into his medical care and treatment Estimated LOS:  Greater than 2 midnights Signed: 
Umu Owen MD

## 2018-10-22 NOTE — PROGRESS NOTES
Patient resting in bed alert with periods of confusion, cooperative with care, no visual S/S of pain or distress, dual skin assessment  done with Ward Khan RN Patient has BKA on right leg, left foot has open wound on left side of his foot, big toe and second and third toes wound areas noted,safety measures in place,call light within reach.

## 2018-10-22 NOTE — PROGRESS NOTES
Reassessment completed, no changes noted pt in bed resting call light within reach instructed pt to call for assistance no distress noted will continue to monitor

## 2018-10-22 NOTE — PROGRESS NOTES
10/22/2018 RE: Alma Delia Smoker To Whom it May Concern: This is to certify that Alma Delia Smoker was here today with his father Please feel free to contact my office if you have any questions or concerns. Thank you for your assistance in this matter. Sincerely, Sanjay Fisher RN

## 2018-10-23 NOTE — PROGRESS NOTES
Patient resting in bed, eyes closed. No acute distress noted. Call light within reach. Safety measures in place. Will continue to monitor.

## 2018-10-23 NOTE — PROGRESS NOTES
Infectious Disease Note Today's Date: 10/23/2018 Admit Date: 10/21/2018 Impression: · L foot 5th MT osteomyelitis on MRI/Xray. No cx · DM, with R BKA · Recent NSTEMI · CKD, creatinine 4.5 · Confusion Plan: · Freely admits that he will continue to be noncompliant. Will not go to facility or rehab. For now, continue minocycline/zosyn. IF he leaves AMA, change to Minocycline 100mg po BID and Levaquin 750mg po q48h. · ID appt on  at 240PM  
 
Anti-infectives: · Minocycline (10/22 -  
· Zosyn (10/22 - Subjective:  
Working with PT. Denies nausea, vomiting, diarrhea, fever, chills, or sweats Allergies Allergen Reactions  Lortab [Hydrocodone-Acetaminophen] Itching Review of Systems:  A comprehensive review of systems was negative except for that written in the History of Present Illness. Objective:  
 
Visit Vitals /77 Pulse 85 Temp 98.6 °F (37 °C) Resp 18 Ht 6' 1\" (1.854 m) Wt 80.6 kg (177 lb 12.8 oz) SpO2 95% BMI 23.46 kg/m² Temp (24hrs), Av.8 °F (37.1 °C), Min:98.4 °F (36.9 °C), Max:99.3 °F (37.4 °C) Lines:  Peripheral IV:    
 
Physical Exam:   
General:  Alert, cooperative, well nourished, well developed, appears stated age Eyes:  Sclera anicteric. Pupils equally round and reactive to light. Mouth/Throat: Mucous membranes normal, oral pharynx clear Neck: Supple Lungs:   Clear to auscultation bilaterally, good effort CV:  RRR without audible murmur, tachycardic Abdomen:   Soft, non tender, active bowel sounds Extremities: R BKA, L foot with brisk cap refill and warm toes; second toe is dusky, L lateral 5th MT head has wound with eschar. Skin: No rash, wound as above; R LE moist and dusky area at tip of the stump Lymph nodes: Cervical and supraclavicular normal  
Musculoskeletal: As above Lines/Devices:  Intact, no erythema, drainage or tenderness Psych: Alert and cooperative, oriented to self and to place, appropriate affect Data Review: CBC: 
Recent Labs 10/22/18 
0009 WBC 6.9 GRANS 76 MONOS 10 EOS 3 ANEU 5.2 ABL 0.7 HGB 7.4* HCT 23.8*  
 BMP: 
Recent Labs 10/22/18 
0009 CREA 4.50* BUN 62*   
K 4.2 * CO2 23 AGAP 9  
* LFTS: 
Recent Labs 10/22/18 
0009 TBILI 0.3 ALT 19 SGOT 18  
AP 80  
TP 7.8 ALB 2.6* Microbiology:  
 
All Micro Results None Imaging:  
10/22/18 XR Foot IMPRESSION:  Findings suggest osteomyelitis in the fifth metatarsal head, as  
noted on a recent MRI scan. Signed By: Jeffrey West NP October 23, 2018

## 2018-10-23 NOTE — PROGRESS NOTES
Patient verbally aggressive today  Refuses to have labs drawn and refuses medications  Respirations even and unlabored  No signs of distress  No needs expressed at this time

## 2018-10-23 NOTE — WOUND CARE
Left lateral 5th MTP joint with 2.5x2cm eschar, surrounding skin with erythema, significant concern for deep infection, noted xray results from 10/22/18 possible with osteomyelitis, and vascular studies from 10/5/18 with significant microvascular disease and peroneal artery occlusion. Concern for health of this limb. Patient expresses he wants to go home and does not want surgery. If patient agreeable recommend vascular consult/ debridement if warranted. Will use betadine paint daily. Will monitor.

## 2018-10-23 NOTE — PROGRESS NOTES
Bedside report from Michelle Wilson RN. Patient resting in bed, alert with some confusion. Respirations even and unlabored on room air. No acute distress noted. Denies any pain or discomfort at this time. Call light within reach. Safety measures in place. Will continue to monitor.

## 2018-10-23 NOTE — PROGRESS NOTES
Hospitalist Progress Note   
10/23/2018 Admit Date: 10/21/2018 11:47 PM  
NAME: Buster Lomas :  1945 DOS:              10/23/18 MRN:  [id number] Attending: German De La O MD 
PCP:  Yemi Campbell MD 
Treatment Team: Attending Provider: Sabi Coy MD; Consulting Provider: Scot Mcbride MD; Utilization Review: Doyle Bautista RN; Care Manager: Traci Machuca RN; Utilization Review: Dre Jenkins RN Full Code SUBJECTIVE: As previously documented: 73 y/o male with extensive medical history including CAD, NSTEMI, HTN, DM, chronic systolic CHF, CKD and left foot osteomyelitis who was hospitalized 10/5-10/11 for acute resp failure, acute MI, and foot infection. He was discharged home on PO keflex and minocycline. Blood cultures at that time grew coag negative staph. He lives alone and reportedly a neighbor called pt's daughter stating he was not acting right. In the ED A head CT is negative. Lactic acid is 0.8. His CKD about the same as at discharge. Bun 62/Cr 4.50. He will be admitted for further workup and treatment of new encephalopathy and worsening left foot infection. ID evaluated patient recommended to cont IV abxs. 10/23/18    Buster Lomas stated still having L foot pain. Patient denies any other complaints. 10+ ROS reviewed and negative except for positive in HPI. Allergies Allergen Reactions  Lortab [Hydrocodone-Acetaminophen] Itching Current Facility-Administered Medications Medication Dose Route Frequency  amLODIPine (NORVASC) tablet 10 mg  10 mg Oral DAILY  aspirin chewable tablet 81 mg  81 mg Oral DAILY AFTER BREAKFAST  atorvastatin (LIPITOR) tablet 80 mg  80 mg Oral DAILY  furosemide (LASIX) tablet 80 mg  80 mg Oral BID  
 glipiZIDE SR (GLUCOTROL XL) tablet 2.5 mg  2.5 mg Oral DAILY  isosorbide mononitrate ER (IMDUR) tablet 30 mg  30 mg Oral DAILY  metoprolol succinate (TOPROL-XL) XL tablet 100 mg  100 mg Oral DAILY  minocycline (MINOCIN, DYNACIN) capsule 100 mg  100 mg Oral Q12H  
 ramipril (ALTACE) capsule 10 mg  10 mg Oral DAILY  sodium bicarbonate tablet 650 mg  650 mg Oral TID WITH MEALS  dextrose 40% (GLUTOSE) oral gel 1 Tube  15 g Oral PRN  
 glucagon (GLUCAGEN) injection 1 mg  1 mg IntraMUSCular PRN  
 dextrose (D50W) injection syrg 12.5-25 g  25-50 mL IntraVENous PRN  
 insulin lispro (HUMALOG) injection   SubCUTAneous AC&HS  sodium chloride (NS) flush 5-10 mL  5-10 mL IntraVENous Q8H  
 sodium chloride (NS) flush 5-10 mL  5-10 mL IntraVENous PRN  
 acetaminophen (TYLENOL) tablet 650 mg  650 mg Oral Q4H PRN  
 ondansetron (ZOFRAN) injection 4 mg  4 mg IntraVENous Q4H PRN  
 heparin (porcine) injection 5,000 Units  5,000 Units SubCUTAneous Q12H  
 0.9% sodium chloride infusion 250 mL  250 mL IntraVENous PRN  
 hydrALAZINE (APRESOLINE) 20 mg/mL injection 10 mg  10 mg IntraVENous Q6H PRN  
 alcohol 62% (NOZIN) nasal  1 Ampule  1 Ampule Topical Q12H  piperacillin-tazobactam (ZOSYN) 3.375 g in 0.9% sodium chloride (MBP/ADV) 100 mL  3.375 g IntraVENous Q12H Immunization History Administered Date(s) Administered  DTAP Vaccine 2010  TB Skin Test (PPD) Intradermal 2017, 2018, 10/05/2018, 10/22/2018 Objective:  
 
Patient Vitals for the past 24 hrs: 
 Temp Pulse Resp BP SpO2  
10/23/18 1115 98.5 °F (36.9 °C) 72 18 121/53 95 % 10/23/18 0742 98.6 °F (37 °C) 85 18 133/77 95 % 10/23/18 0305 98.7 °F (37.1 °C) 82 16 118/74 97 % 10/22/18 2300 98.9 °F (37.2 °C) 75 16 124/65 98 % 10/22/18 1938     97 % 10/22/18 1937 99 °F (37.2 °C) 80 16 145/75 97 % Temp (24hrs), Av.7 °F (37.1 °C), Min:98.5 °F (36.9 °C), Max:99 °F (37.2 °C) Oxygen Therapy O2 Sat (%): 95 % (10/23/18 1115) Pulse via Oximetry: 76 beats per minute (10/22/18 1938) O2 Device: Room air (10/22/18 1938) Oxygen Therapy O2 Sat (%): 95 % (10/23/18 1115) Pulse via Oximetry: 76 beats per minute (10/22/18 1938) O2 Device: Room air (10/22/18 1938) Physical Exam: 
General:         Alert, cooperative, no distress HEENT:               NCAT. No obvious deformity. Lungs: No wheezing/rhonchi/rales Cardiovascular:   RRR. No m/r/g. No pedal edema b/l. Abdomen:       S/nt/nd. Bowel sounds normal. .  
Skin:         No rashes or lesions. Not Jaundiced Neurologic:   No gross focal deficit. Psychiatric:         Good mood. Normal affect. Extremities:         L foot with tenderness palpation. Wound with eschar with no drainage. DIAGNOSTIC STUDIES Data Review:  
Recent Results (from the past 24 hour(s)) GLUCOSE, POC Collection Time: 10/22/18  8:35 PM  
Result Value Ref Range Glucose (POC) 102 (H) 65 - 100 mg/dL GLUCOSE, POC Collection Time: 10/23/18  5:30 AM  
Result Value Ref Range Glucose (POC) 85 65 - 100 mg/dL PLEASE READ & DOCUMENT PPD TEST IN 24 HRS Collection Time: 10/23/18  5:54 AM  
Result Value Ref Range PPD negative Negative  
 mm Induration 0 mm GLUCOSE, POC Collection Time: 10/23/18 11:46 AM  
Result Value Ref Range Glucose (POC) 122 (H) 65 - 100 mg/dL CBC WITH AUTOMATED DIFF Collection Time: 10/23/18  1:59 PM  
Result Value Ref Range WBC 6.7 4.3 - 11.1 K/uL  
 RBC 2.81 (L) 4.23 - 5.6 M/uL HGB 7.9 (L) 13.6 - 17.2 g/dL HCT 25.6 (L) 41.1 - 50.3 % MCV 91.1 79.6 - 97.8 FL  
 MCH 28.1 26.1 - 32.9 PG  
 MCHC 30.9 (L) 31.4 - 35.0 g/dL  
 RDW 14.3 % PLATELET 848 059 - 823 K/uL MPV 11.0 9.4 - 12.3 FL ABSOLUTE NRBC 0.00 0.0 - 0.2 K/uL  
 DF AUTOMATED NEUTROPHILS 69 43 - 78 % LYMPHOCYTES 12 (L) 13 - 44 % MONOCYTES 8 4.0 - 12.0 % EOSINOPHILS 10 (H) 0.5 - 7.8 % BASOPHILS 1 0.0 - 2.0 % IMMATURE GRANULOCYTES 1 0.0 - 5.0 %  
 ABS. NEUTROPHILS 4.7 1.7 - 8.2 K/UL  
 ABS. LYMPHOCYTES 0.8 0.5 - 4.6 K/UL ABS. MONOCYTES 0.5 0.1 - 1.3 K/UL  
 ABS. EOSINOPHILS 0.7 0.0 - 0.8 K/UL  
 ABS. BASOPHILS 0.0 0.0 - 0.2 K/UL  
 ABS. IMM. GRANS. 0.0 0.0 - 0.5 K/UL METABOLIC PANEL, BASIC Collection Time: 10/23/18  1:59 PM  
Result Value Ref Range Sodium 142 136 - 145 mmol/L Potassium 4.3 3.5 - 5.1 mmol/L Chloride 109 (H) 98 - 107 mmol/L  
 CO2 24 21 - 32 mmol/L Anion gap 9 7 - 16 mmol/L Glucose 196 (H) 65 - 100 mg/dL BUN 52 (H) 8 - 23 MG/DL Creatinine 4.44 (H) 0.8 - 1.5 MG/DL  
 GFR est AA 17 (L) >60 ml/min/1.73m2 GFR est non-AA 14 (L) >60 ml/min/1.73m2 Calcium 7.9 (L) 8.3 - 10.4 MG/DL  
GLUCOSE, POC Collection Time: 10/23/18  3:35 PM  
Result Value Ref Range Glucose (POC) 237 (H) 65 - 100 mg/dL All Micro Results None Imaging Adejeremiahe Piper /Studies: CXR Results  (Last 48 hours) 10/22/18 0047  XR CHEST PA LAT Final result Impression:  IMPRESSION:  No acute process. Narrative:  EXAM:  Chest x-ray. DATE:  October 22, 2018. INDICATION:  Chest pain. COMPARISON:  October 8, 2018. TECHNIQUE:  Frontal and lateral views of the chest were obtained. FINDINGS:  The lungs are clear. The heart, mediastinal contour and pulmonary  
vasculature are within normal limits. No pneumothorax or pleural effusion is  
seen. The bony structures are unremarkable. CT Results  (Last 48 hours) 10/22/18 0053  CT HEAD WO CONT Final result Impression:  IMPRESSION:  No acute intracranial process. Narrative:  EXAM:  Noncontrast CT head. DATE:  October 22, 2018. INDICATION:  Mental status changes. COMPARISON: July 29, 2012. TECHNIQUE: Axial noncontrast CT images of the head were obtained. Radiation dose reduction techniques were used for this study.  Our CT scanners  
use one or all of the following: Automated exposure control, adjustment of the  
 mA and/or kV according to patient size, iterative reconstruction. FINDINGS:  There is moderate volume loss with chronic small vessel ischemic  
changes in the white matter, progressed from the prior study. No acute infarct,  
hemorrhage or mass is identified. There is no mass effect or midline shift. No  
fracture is seen in the skull or skull base. The visualized paranasal sinuses  
and mastoid air cells are clear. No results found. No results found for this visit on 10/21/18. Labs and Studies from previous 24 hours have been personally reviewed by myself   
ASSESSMENT Active Hospital Problems Diagnosis Date Noted  Acute encephalopathy 10/22/2018  Osteomyelitis of left foot ([facility] 10/11/2018  Diabetic ulcer of left midfoot associated with type 2 diabetes mellitus ([facility] 10/05/2018  Anemia of chronic disease 09/06/2018  Glaucoma syndrome 04/21/2017  Type 2 diabetes mellitus with complication, without long-term current use of insulin ([facility] 04/07/2017  Debility 04/07/2017  S/P BKA (below knee amputation) unilateral ([facility] 02/13/2017  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 
  
 HTN (hypertension) 07/29/2012 Hospital Problems as of 10/23/2018 Date Reviewed: 9/26/2018 Codes Class Noted - Resolved POA * (Principal) Acute encephalopathy ICD-10-CM: G93.40 ICD-9-CM: 348.30  10/22/2018 - Present Yes Osteomyelitis of left foot (HCC) ICD-10-CM: M86.9 ICD-9-CM: 730.27  10/11/2018 - Present Yes Diabetic ulcer of left midfoot associated with type 2 diabetes mellitus ([facility] ICD-10-CM: E11.621, B45.979 ICD-9-CM: 250.80, 707.14  10/5/2018 - Present Yes Anemia of chronic disease (Chronic) ICD-10-CM: D63.8 ICD-9-CM: 285.29  9/6/2018 - Present Yes Glaucoma syndrome ICD-10-CM: H40.9 ICD-9-CM: 365.9  4/21/2017 - Present Yes Type 2 diabetes mellitus with complication, without long-term current use of insulin (HCC) (Chronic) ICD-10-CM: E11.8 ICD-9-CM: 250.90  4/7/2017 - Present Yes Debility ICD-10-CM: R53.81 ICD-9-CM: 799.3  4/7/2017 - Present Yes S/P BKA (below knee amputation) unilateral (HCC) (Chronic) ICD-10-CM: X66.915 ICD-9-CM: V49.75  2/13/2017 - Present Yes 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 (HCC) (Chronic) ICD-10-CM: R39.840 ICD-9-CM: 440.24  1/17/2017 - Present Yes  
   
 CKD (chronic kidney disease) stage 4, GFR 15-29 ml/min (HCC) (Chronic) ICD-10-CM: N18.4 ICD-9-CM: 585.4  8/11/2016 - Present Yes Chronic systolic congestive heart failure (HCC) (Chronic) ICD-10-CM: O79.69 ICD-9-CM: 428.22, 428.0  12/30/2015 - Present Yes Overview Signed 1/6/2017  8:59 AM by Tanisha Novak MD  
  EF 35-40% on 2015 Echo HTN (hypertension) (Chronic) ICD-10-CM: I10 
ICD-9-CM: 401.9  7/29/2012 - Present Yes A/P: 
 
-Acute metabolic encephalopathy Likely secondary to infection. Resolved 
 
-OM L foot Medication non compliance ID eval appreciated Cont zosyn and minocycline 
 
-AOCD Hb: 7.9 Will monitor hb q8h 
 
-type 2 DM Controlled Cont glipizide and ISS 
 
-CKD Cr stable around 4 DVT Prophylaxis: heparin CODE Status: Full Plan of Care Discussed with: patient. Care team. 
 
Taye Choe MD 
10/23/18

## 2018-10-23 NOTE — PROGRESS NOTES
Problem: Falls - Risk of 
Goal: *Absence of Falls Document Carly Wright Fall Risk and appropriate interventions in the flowsheet. Outcome: Progressing Towards Goal 
Fall Risk Interventions: 
Mobility Interventions: Bed/chair exit alarm Mentation Interventions: Bed/chair exit alarm Medication Interventions: Bed/chair exit alarm

## 2018-10-23 NOTE — PROGRESS NOTES
Patient lying in bed  respirations even and unlabored  No signs of distress  No needs expressed at this time  Denies pain  Will continue to monitor

## 2018-10-23 NOTE — PROGRESS NOTES
Problem: Mobility Impaired (Adult and Pediatric) Goal: *Acute Goals and Plan of Care (Insert Text) N/A 
 
PHYSICAL THERAPY: Initial Assessment, Discharge, Treatment Day: Day of Assessment, AM 10/23/2018INPATIENT: Hospital Day: 3 Payor: CARE IMPROVEMENT PLUS [place] / Product Type: Managed Care Medicare /  
  
NAME/AGE/GENDER: Lisandro Miller is a 72 y.o. male PRIMARY DIAGNOSIS: Acute encephalopathy Acute encephalopathy Acute encephalopathy ICD-10: Treatment Diagnosis: · Difficulty in walking, Not elsewhere classified (R26.2) Precaution/Allergies: 
Lortab [hydrocodone-acetaminophen] ASSESSMENT:  
Mr. Yamilex Fisher is a 72 y.o. Male with primary diagnosis of acute encephalopathy with history of R BKA. Pt is supine in bed upon contact, and alert and oriented to person, place, and situation. Pt states he lives alone, ambulates household distances with SPC/RW \"depending on what is closest\", no falls, has friends and family that check on him daily, and has the following DME at home: R prosthesis, RW, SPC, and WC. Pt also has wound on L lateral forefoot, with impaired sensation in foot and some swelling. Pt transferred from supine to sitting EOB with Stephanie, demonstrating good static sitting balance without support. Pt donned/doffed R prosthetic, requiring cuing to put  on first (pt attempts to put prosthetic on without it initially), but does not require physical assistance. Educated pt to WB through L heel to avoid wound on L forefoot. Pt performed STS with SBA-CGA and RW, demonstrating good static standing balance with support. Pt ambulated 20ft around room, demonstrating wide DEE DEE, R knee extension thrust, and slow, shuffling gait with RW too far out in front and poor environmental awareness with RW (bumps into things often) before returning to sitting EOB.  Pt states he is fine and can walk further than that, but wants to don his pants first. Pt required CGA while donning pants for functional sitting balance, having to lie backwards on bed in order to button his pants which took multiple attempts due to numbness in hands per pt. Pt performed additional STS with RW and SBA-CGa with cuing for safety awareness with transfer. Pt ambulated 250ft in hallway with SBA-CGA and RW, with cuing for RW management, environmental awareness, and posture before returning to sitting in bedside chair. B LE strength and AROM WFL. Pt states he is fine and does not want any more therapy at this time. Educated pt on importance of mobility for overall health and recovery and safety with mobility, but pt insisted. Pt left sitting in bedside chair with all needs met and within reach with nursing notified. Discussed DC needs with pt and SW, pt does not want HHPT at this time. Please re-consult PT upon pt request. 
 
This section established at most recent assessment PROBLEM LIST (Impairments causing functional limitations): 1. Decreased Strength 2. Decreased Transfer Abilities 3. Decreased Ambulation Ability/Technique 4. Decreased Balance 5. Increased Pain 6. Decreased Activity Tolerance 7. Decreased Pacing Skills 8. Increased Fatigue 9. Decreased Skin Integrity/Hygeine 10. Decreased [place] with Home Exercise Program 
 INTERVENTIONS PLANNED: (Benefits and precautions of physical therapy have been discussed with the patient.) 1. N/A  
TREATMENT PLAN: Frequency/Duration: one time visit RECOMMENDED REHABILITATION/EQUIPMENT: (at time of discharge pending progress): Due to the probability of continued deficits (see above) this patient will likely need continued skilled physical therapy after discharge, though patient declines interest in HHPT at this time. Equipment:  
? None at this time HISTORY:  
History of Present Injury/Illness (Reason for Referral): 
Patient is a 71 y/o male with extensive medical history including CAD, NSTEMI, HTN, DM, chronic systolic CHF, CKD and left foot osteomyelitis who was hospitalized 10/5-10/11 for acute resp failure, acute MI, and foot infection. He was discharged home on PO keflex and minocycline. Blood cultures at that time grew coag negative staph. He lives alone and reportedly a neighbor called pt's daughter stating he was not acting right. Apparently he has not been taking his antibiotics at home and he is very confused. A head CT is negative. Lactic acid is 0.8. His CKD about the same as at discharge. Bun 62/Cr 4.50. Hgb is lower at 7.4. No reported GI bleed. He had been started on procrit last admit. He has a foul odor coming from his left foot with black eschar which I suspect is worsening infection, possibly gangrene. He will be admitted for further workup and treatment of new encephalopathy and worsening left foot infection. Past Medical History/Comorbidities:  
Mr. Reshma Collier  has a past medical history of Abnormal CT scan, chest, Acute CHF ([address] Acute systolic congestive heart failure ([address] MO (acute kidney injury) ([address] Anemia of chronic disease, Bilateral pleural effusion, Chest pain, Depression, DM (diabetes mellitus), type 2 ([address] Encephalopathy due to infection, HTN (hypertension), Hypoglycemia, NSTEMI (non-ST elevated myocardial infarction) ([address] and Tobacco use. Mr. Reshma Collier  has a past surgical history that includes hx orthopaedic; RIGHT AMPUTATION KNEE(BKA) (Right, 1/30/2017); RIGHT FOOT DEBRIDEMENT  ,PREP FOR GRAFTING AND SKIN SUBSTITUTE GRAFT (Right, 1/20/2017); IR ANESTHESIA- RIGHT LEG ARTERIOGRAM/ 604 (N/A, 1/18/2017); AMPUTATION RIGHT 5TH TOE/ ROOM 604 (Right, 1/11/2017); ULTRASOUND (Bilateral, 12/28/2015); and THORACENTESIS (Left, 12/28/2015). Social History/Living Environment:  
Home Environment: Private residence # Steps to Enter: 0 Wheelchair Ramp: Yes One/Two Story Residence: One story Living Alone:  Yes 
 Support Systems: Child(elpidio), Family member(s), Friends \ neighbors Patient Expects to be Discharged to[de-identified] Private residence Current DME Used/Available at Home: Cane, straight, Walker, rolling Prior Level of Function/Work/Activity: 
Lives alone, family/friends check on every day, ambulates with R prosthesis and SPC/RW, has WC at home as well, independent Number of Personal Factors/Comorbidities that affect the Plan of Care: 3+: HIGH COMPLEXITY EXAMINATION:  
Most Recent Physical Functioning:  
Gross Assessment: 
AROM: Within functional limits Strength: Within functional limits Coordination: Within functional limits Sensation: Impaired(L foot) Posture: 
  
Balance: 
Sitting: Intact Standing: Impaired Standing - Static: Good;Constant support Standing - Dynamic : Fair Bed Mobility: 
Rolling: Modified independent Supine to Sit: Modified independent Scooting: Modified independent Wheelchair Mobility: 
  
Transfers: 
Sit to Stand: Stand-by assistance Stand to Sit: Stand-by assistance Gait: 
  
Base of Support: Widened Speed/Radha: Slow Step Length: Right shortened;Left shortened Gait Abnormalities: Decreased step clearance;Trunk sway increased; Other(R knee thrust) Distance (ft): 250 Feet (ft) Assistive Device: Walker, rolling;Prosthetic device Ambulation - Level of Assistance: Stand-by assistance Interventions: Verbal cues; Visual/Demos; Safety awareness training Body Structures Involved: 1. Heart 2. Lungs 3. Bones 4. Joints 5. Muscles 6. Ligaments Body Functions Affected: 1. Sensory/Pain 2. Cardio 3. Respiratory 4. Neuromusculoskeletal 
5. Movement Related Activities and Participation Affected: 1. Mobility 2. Self Care 3. Domestic Life 4. Interpersonal Interactions and Relationships Number of elements that affect the Plan of Care: 4+: HIGH COMPLEXITY CLINICAL PRESENTATION:  
Presentation: Evolving clinical presentation with changing clinical characteristics: MODERATE COMPLEXITY CLINICAL DECISION MAKIN[address] [place] [place] [id number] AM-PAC 6 Clicks Basic Mobility Inpatient Short Form How much difficulty does the patient currently have. .. Unable A Lot A Little None 1. Turning over in bed (including adjusting bedclothes, sheets and blankets)? [] 1   [] 2   [] 3   [x] 4  
2. Sitting down on and standing up from a chair with arms ( e.g., wheelchair, bedside commode, etc.)   [] 1   [] 2   [x] 3   [] 4  
3. Moving from lying on back to sitting on the side of the bed? [] 1   [] 2   [] 3   [x] 4 How much help from another person does the patient currently need. .. Total A Lot A Little None 4. Moving to and from a bed to a chair (including a wheelchair)? [] 1   [] 2   [] 3   [x] 4  
5. Need to walk in hospital room? [] 1   [] 2   [x] 3   [] 4  
6. Climbing 3-5 steps with a railing? [] 1   [x] 2   [] 3   [] 4  
© 2007, Trustees of [address] [place] [id number], under license to [employer]. All rights reserved Score:  Initial: 20 Most Recent: X (Date: -- ) Interpretation of Tool:  Represents activities that are increasingly more difficult (i.e. Bed mobility, Transfers, Gait). Score 24 23 22-20 19-15 14-10 9-7 6 Modifier CH CI CJ CK CL CM CN   
 
? Mobility - Walking and Moving Around:  
  - CURRENT STATUS: CJ - 20%-39% impaired, limited or restricted  - GOAL STATUS: CJ - 20%-39% impaired, limited or restricted  - D/C STATUS:  CJ - 20%-39% impaired, limited or restricted Payor: CARE IMPROVEMENT PLUS [place]PR[place] / Product Type: [employer] Medicare /   
  
Use of outcome tool(s) and clinical judgement create a POC that gives a: Clear prediction of patient's progress: LOW COMPLEXITY  
  
 
 
 
TREATMENT:  
(In addition to Assessment/Re-Assessment sessions the following treatments were rendered) Pre-treatment Symptoms/Complaints:  \"I want to get out of here\" Pain: Initial: Pain Intensity 1: 0  Post Session:  Minimal discomfort in L foot wound, no number given In addition to PT Evaluation: 
Therapeutic Activity: (    23 minutes): Therapeutic activities including Bed transfers, Chair transfers, Ambulation on level ground, functional sitting balance and education/cuing for transfer techniques, donning/doffing prosthesis, RW management, safety awareness, and fall rpevention to improve mobility, strength, balance and coordination. Required moderate Verbal cues; Visual/Demos; Safety awareness training to promote dynamic balance in standing. Braces/Orthotics/Lines/Etc:  
· O2 Device: Room air Treatment/Session Assessment:   
· Response to Treatment:  See above · Interdisciplinary Collaboration:  
o Physical Therapist 
o Registered Nurse · After treatment position/precautions:  
o Up in chair 
o Bed/Chair-wheels locked 
o Bed in low position 
o Call light within reach 
o RN notified Total Treatment Duration: PT Patient Time In/Time Out Time In: 0970 Time Out: 4096 Akila Velez, PT

## 2018-10-23 NOTE — PROGRESS NOTES
Problem: Interdisciplinary Rounds Goal: Interdisciplinary Rounds Interdisciplinary team rounds were held 10/23/2018 with the following team members:Care Management, Physical Therapy, Physician and Clinical Coordinator and the patient. Plan of care discussed. See clinical pathway and/or care plan for interventions and desired outcomes.

## 2018-10-23 NOTE — PROGRESS NOTES
Order received, chart reviewed. Pt adamantly declines OT services, per chart has a history of medical non-compliance as well as non-compliance with rehab. Will d/c OT orders at this time, please re-consult if things change.   
 
Kapil Luu, OT

## 2018-10-24 NOTE — PROGRESS NOTES
Patient lying in bed respirations even and unlabored on room air  No signs of distress  PICC team with 3 unsuccessful attempts to gain IV access  Safety measures in place  Will continue to monitor

## 2018-10-24 NOTE — PROGRESS NOTES
In accordance with Medicare Guidelines, the Important Letter from Medicare was provided to the Medicare patient to sign. However, patient was unavailable to sign the Important Letter from Medicare. The unsigned Important Letter from Medicare document was left by patients bedside & a copy of the Important Letter from Medicare document was placed in the medical record under media tab. Care managers notified.

## 2018-10-24 NOTE — CONSULTS
Nephrology consult Admission Date: 
10/21/2018 Admission Diagnosis Acute encephalopathy History of Present Illness: This is a 71 yo AAM well known to us after numerous hospitalizations with shortness of breath and CKD/infection. He has baseline GFR in the teens, stage 4-5 but has been reluctant to follow up or prepare for dialysis. He presents this time for encephalopathy and left foot infection. Today he cannot give me any history or tell me why he is here,. They are unable to get an IV and are wondering about a Midline or a PICC line. Past Medical History:  
Diagnosis Date  Abnormal CT scan, chest 12/27/2015  Acute CHF ([facility] 12/26/2015  Acute systolic congestive heart failure ([facility] 12/30/2015  MO (acute kidney injury) ([facility] 7/29/2012  Anemia of chronic disease 9/6/2018  Bilateral pleural effusion 12/27/2015  Chest pain 12/26/2015  Depression  DM (diabetes mellitus), type 2 ([facility] 7/29/2012  Encephalopathy due to infection 1/7/2017  
 HTN (hypertension) 7/29/2012  Hypoglycemia 7/29/2012  
 NSTEMI (non-ST elevated myocardial infarction) ([facility] 1/5/2017  Tobacco use 12/27/2015 Past Surgical History:  
Procedure Laterality Date  HX ORTHOPAEDIC Current Facility-Administered Medications Medication Dose Route Frequency  amLODIPine (NORVASC) tablet 10 mg  10 mg Oral DAILY  aspirin chewable tablet 81 mg  81 mg Oral DAILY AFTER BREAKFAST  atorvastatin (LIPITOR) tablet 80 mg  80 mg Oral DAILY  furosemide (LASIX) tablet 80 mg  80 mg Oral BID  
 glipiZIDE SR (GLUCOTROL XL) tablet 2.5 mg  2.5 mg Oral DAILY  isosorbide mononitrate ER (IMDUR) tablet 30 mg  30 mg Oral DAILY  metoprolol succinate (TOPROL-XL) XL tablet 100 mg  100 mg Oral DAILY  minocycline (MINOCIN, DYNACIN) capsule 100 mg  100 mg Oral Q12H  
 ramipril (ALTACE) capsule 10 mg  10 mg Oral DAILY  sodium bicarbonate tablet 650 mg  650 mg Oral TID WITH MEALS  
  dextrose 40% (GLUTOSE) oral gel 1 Tube  15 g Oral PRN  
 glucagon (GLUCAGEN) injection 1 mg  1 mg IntraMUSCular PRN  
 dextrose (D50W) injection syrg 12.5-25 g  25-50 mL IntraVENous PRN  
 insulin lispro (HUMALOG) injection   SubCUTAneous AC&HS  sodium chloride (NS) flush 5-10 mL  5-10 mL IntraVENous Q8H  
 sodium chloride (NS) flush 5-10 mL  5-10 mL IntraVENous PRN  
 acetaminophen (TYLENOL) tablet 650 mg  650 mg Oral Q4H PRN  
 ondansetron (ZOFRAN) injection 4 mg  4 mg IntraVENous Q4H PRN  
 heparin (porcine) injection 5,000 Units  5,000 Units SubCUTAneous Q12H  
 0.9% sodium chloride infusion 250 mL  250 mL IntraVENous PRN  
 hydrALAZINE (APRESOLINE) 20 mg/mL injection 10 mg  10 mg IntraVENous Q6H PRN  
 alcohol 62% (NOZIN) nasal  1 Ampule  1 Ampule Topical Q12H  piperacillin-tazobactam (ZOSYN) 3.375 g in 0.9% sodium chloride (MBP/ADV) 100 mL  3.375 g IntraVENous Q12H Allergies Allergen Reactions  Lortab [Hydrocodone-Acetaminophen] Itching Social History Tobacco Use  Smoking status: Current Every Day Smoker Packs/day: 0.50 Years: 45.00 Pack years: 22.50 Types: Cigarettes  Smokeless tobacco: Never Used Substance Use Topics  Alcohol use: No  
  
Family History Problem Relation Age of Onset  Diabetes Mother  Kidney Disease Mother  Diabetes Father  Kidney Disease Father  Kidney Disease Sister Review of Systems Gen - no fever, no chills, appetite okay HEENT - no sore throat, no decreased vision, no hearing loss Neck - no neck mass CV - no chest pain, no palpitation, no orthopnea Lung - no shortness of breath, no cough, no hemoptysis Abd - no tenderness, no nausea/vomiting, no bloody stool Ext - no edema, no clubbing, no cyanosis Musculoskeletal - no joint pain, no back pain Neurologic - no headaches, no dizziness, no seizures Psychiatric - no anxiety, no depression Skin - no rashes, no pupura Genitourinary - no decreased urine output, no hematuria, no foamy urine Objective:  
 
Vitals:  
 10/24/18 0411 10/24/18 0729 10/24/18 1110 10/24/18 1535 BP: 138/76 146/79 138/73 117/69 Pulse: 70 65 74 68 Resp: 19 18 18 18 Temp: 98.8 °F (37.1 °C) 98.6 °F (37 °C) 98.5 °F (36.9 °C) 98.6 °F (37 °C) SpO2: 95% 96% 92% 96% Weight:      
Height:      
 
 
Intake/Output Summary (Last 24 hours) at 10/24/2018 1756 Last data filed at 10/24/2018 4154 Gross per 24 hour Intake 240 ml Output 1900 ml Net -1660 ml Physical Exam 
GEN :in no distress, alert and oriented, combative HEENT: anicteric sclerae, eomi. Oropharynx without lesions. Mucous membranes are moist. 
Neck - supple without JVD, no thyromegaly. No lymphadenopathy. CV - regular rate and rhythm, no murmur, no rub Lung - clear bilaterally, lungs expand symmetrically Chest wall - normal appearance Abd - soft, nontender, bowel sounds present, no hepatosplenomegaly Ext - no clubbing, no cyanosis, right BKA; left boot Neurologic - nonfocal 
Genitourinary - bladder nonpalpable Skin - no rashes, no purpura, no ecchymoses Psychiatric: Normal mood and affect. Data Review:  
Recent Labs 10/24/18 
1444 10/24/18 
0526 10/23/18 
1907 10/23/18 
1359 10/22/18 
0009 WBC  --  7.3  --  6.7 6.9 HGB 8.6* 8.4* 9.4* 7.9* 7.4* HCT 27.9* 27.1* 30.6* 25.6* 23.8* PLT  --  269  --  271 232 Recent Labs 10/24/18 
6791 10/23/18 
1359 10/22/18 
8645 10/22/18 
0009  142  --  141  
K 4.1 4.3  --  4.2 * 109*  --  109* CO2 23 24  --  23 BUN 52* 52*  --  62* CREA 4.37* 4.44*  --  4.50* GLU 93 196*  --  219* CA 7.9* 7.9*  --  8.0*  
MG  --   --  2.4  --   
PHOS  --   --  3.7  -- No results for input(s): PH, PCO2, PO2, PCO2 in the last 72 hours. Problem List:  
 
Patient Active Problem List  
 Diagnosis Date Noted  Acute encephalopathy 10/22/2018  Type 2 diabetes with nephropathy ([facility] 10/18/2018  Osteomyelitis of left foot ([facility] 10/11/2018  Acute osteomyelitis of metatarsal bone, left ([address] 10/10/2018  Acute rhinitis 10/09/2018  Stage 5 chronic kidney disease not on chronic dialysis ([address] 10/08/2018  
 NSTEMI (non-ST elevated myocardial infarction) ([address] 10/05/2018  Tachycardia 10/05/2018  Diabetic ulcer of left midfoot associated with type 2 diabetes mellitus ([address] 10/05/2018  Cellulitis of left foot 10/05/2018  Bacteremia 09/26/2018  Pneumonia due to infectious organism 09/24/2018  EKG, abnormal 09/24/2018  Elevated troponin 09/24/2018  Anemia of chronic disease 09/06/2018  MO (acute kidney injury) ([address] 09/05/2018  Onychomycosis 10/05/2017  Glaucoma syndrome 04/21/2017  Type 2 diabetes mellitus with complication, without long-term current use of insulin ([facility] 04/07/2017  Coronary artery disease involving native coronary artery of native heart without angina pectoris 04/07/2017  Debility 04/07/2017  S/P BKA (below knee amputation) unilateral ([address] 02/13/2017  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  
 HTN (hypertension) 07/29/2012 Impression: 
Mr. Alesia Espinoza has progressive CKD and is currently at his baseline. His volume and electrolytes are OK. He has difficulty comprehending his current status with CKD, though he does acknowledge that we have spoken about dialysis in the past. He is likely to need dialysis in the next year given his progressive disease and his family history (multiple members with ESRD). Plan:  
-Because of his likely progression to ESRD and need for AVG or AVF, I would avoid midline or PICC placement 
-IF IV access is needed urgently, a Midline can be placed and then removed in the AM with other central access obtained 
-Volume/electrolytes are otherwise OK 
-Continue current BP meds, sodium bicarbonate and lasix Thanks, will follow closely with you.

## 2018-10-24 NOTE — PROGRESS NOTES
Patient rested well during night, calm and cooperative this a.m, patient denies needs, call light within reach.

## 2018-10-24 NOTE — PROGRESS NOTES
Bedside report from Gilmer Gunn.DEJA. Patient resting in bed, alert with some confusion. Respirations even and unlabored on room air. No acute distress noted. Denies any pain or discomfort at this time. Call light within reach. Safety measures in place.  Will continue to monitor.

## 2018-10-24 NOTE — PROGRESS NOTES
Hospitalist Progress Note   
10/24/2018 Admit Date: 10/21/2018 11:47 PM  
NAME: Junior Khan :  1945 DOS:              10/24/18 MRN:  [id number] Attending: Rodo Merchant MD 
PCP:  Stefani Hedrick MD 
Treatment Team: Attending Provider: Jaylyn Buck MD; Consulting Provider: Jose Cosme MD; Utilization Review: Ryley Silveira RN; Care Manager: Hayley Wolf RN; Utilization Review: Fatoumata Buchanan RN Full Code SUBJECTIVE: As previously documented: 71 y/o male with extensive medical history including CAD, NSTEMI, HTN, DM, chronic systolic CHF, CKD and left foot osteomyelitis who was hospitalized 10/5-10/11 for acute resp failure, acute MI, and foot infection. He was discharged home on PO keflex and minocycline. Blood cultures at that time grew coag negative staph. He lives alone and reportedly a neighbor called pt's daughter stating he was not acting right. In the ED A head CT is negative. Lactic acid is 0.8. His CKD about the same as at discharge. Bun 62/Cr 4.50. He will be admitted for further workup and treatment of new encephalopathy and worsening left foot infection. ID evaluated patient recommended to cont IV abxs. 10/24/18    Junior Khan stated L foot tenderness is slightly better today. 10+ ROS reviewed and negative except for positive in HPI. Allergies Allergen Reactions  Lortab [Hydrocodone-Acetaminophen] Itching Current Facility-Administered Medications Medication Dose Route Frequency  amLODIPine (NORVASC) tablet 10 mg  10 mg Oral DAILY  aspirin chewable tablet 81 mg  81 mg Oral DAILY AFTER BREAKFAST  atorvastatin (LIPITOR) tablet 80 mg  80 mg Oral DAILY  furosemide (LASIX) tablet 80 mg  80 mg Oral BID  
 glipiZIDE SR (GLUCOTROL XL) tablet 2.5 mg  2.5 mg Oral DAILY  isosorbide mononitrate ER (IMDUR) tablet 30 mg  30 mg Oral DAILY  metoprolol succinate (TOPROL-XL) XL tablet 100 mg  100 mg Oral DAILY  minocycline (MINOCIN, DYNACIN) capsule 100 mg  100 mg Oral Q12H  
 ramipril (ALTACE) capsule 10 mg  10 mg Oral DAILY  sodium bicarbonate tablet 650 mg  650 mg Oral TID WITH MEALS  dextrose 40% (GLUTOSE) oral gel 1 Tube  15 g Oral PRN  
 glucagon (GLUCAGEN) injection 1 mg  1 mg IntraMUSCular PRN  
 dextrose (D50W) injection syrg 12.5-25 g  25-50 mL IntraVENous PRN  
 insulin lispro (HUMALOG) injection   SubCUTAneous AC&HS  sodium chloride (NS) flush 5-10 mL  5-10 mL IntraVENous Q8H  
 sodium chloride (NS) flush 5-10 mL  5-10 mL IntraVENous PRN  
 acetaminophen (TYLENOL) tablet 650 mg  650 mg Oral Q4H PRN  
 ondansetron (ZOFRAN) injection 4 mg  4 mg IntraVENous Q4H PRN  
 heparin (porcine) injection 5,000 Units  5,000 Units SubCUTAneous Q12H  
 0.9% sodium chloride infusion 250 mL  250 mL IntraVENous PRN  
 hydrALAZINE (APRESOLINE) 20 mg/mL injection 10 mg  10 mg IntraVENous Q6H PRN  
 alcohol 62% (NOZIN) nasal  1 Ampule  1 Ampule Topical Q12H  piperacillin-tazobactam (ZOSYN) 3.375 g in 0.9% sodium chloride (MBP/ADV) 100 mL  3.375 g IntraVENous Q12H Immunization History Administered Date(s) Administered  DTAP Vaccine 2010  TB Skin Test (PPD) Intradermal 2017, 2018, 10/05/2018, 10/22/2018 Objective:  
 
Patient Vitals for the past 24 hrs: 
 Temp Pulse Resp BP SpO2  
10/24/18 1535 98.6 °F (37 °C) 68 18 117/69 96 % 10/24/18 1110 98.5 °F (36.9 °C) 74 18 138/73 92 % 10/24/18 0729 98.6 °F (37 °C) 65 18 146/79 96 % 10/24/18 0411 98.8 °F (37.1 °C) 70 19 138/76 95 % 10/24/18 0011 98.9 °F (37.2 °C) 71 20 132/74 96 % 10/23/18 2031 98.9 °F (37.2 °C) 75 19 131/71 97 % Temp (24hrs), Av.7 °F (37.1 °C), Min:98.5 °F (36.9 °C), Max:98.9 °F (37.2 °C) Oxygen Therapy O2 Sat (%): 96 % (10/24/18 1535) Pulse via Oximetry: 76 beats per minute (10/22/18 1938) O2 Device: Room air (10/22/18 1938) Oxygen Therapy O2 Sat (%): 96 % (10/24/18 1535) Pulse via Oximetry: 76 beats per minute (10/22/18 1938) O2 Device: Room air (10/22/18 1938) Physical Exam: 
General:         Alert, cooperative, no distress HEENT:               NCAT. No obvious deformity. Lungs: No wheezing/rhonchi/rales Cardiovascular:   RRR. No m/r/g. No pedal edema b/l. Abdomen:       S/nt/nd. Bowel sounds normal. .  
Skin:         No rashes or lesions. Not Jaundiced Neurologic:   No gross focal deficit. Psychiatric:         Good mood. Normal affect. Extremities:         L foot with tenderness palpation. Wound with eschar with no drainage. DIAGNOSTIC STUDIES Data Review:  
Recent Results (from the past 24 hour(s)) HGB & HCT Collection Time: 10/23/18  7:07 PM  
Result Value Ref Range HGB 9.4 (L) 13.6 - 17.2 g/dL HCT 30.6 (L) 41.1 - 50.3 % GLUCOSE, POC Collection Time: 10/23/18  8:55 PM  
Result Value Ref Range Glucose (POC) 131 (H) 65 - 100 mg/dL PLEASE READ & DOCUMENT PPD TEST IN 48 HRS Collection Time: 10/24/18  5:15 AM  
Result Value Ref Range PPD negative Negative  
 mm Induration 0 mm GLUCOSE, POC Collection Time: 10/24/18  5:21 AM  
Result Value Ref Range Glucose (POC) 109 (H) 65 - 100 mg/dL CBC WITH AUTOMATED DIFF Collection Time: 10/24/18  5:26 AM  
Result Value Ref Range WBC 7.3 4.3 - 11.1 K/uL  
 RBC 2.96 (L) 4.23 - 5.6 M/uL HGB 8.4 (L) 13.6 - 17.2 g/dL HCT 27.1 (L) 41.1 - 50.3 % MCV 91.6 79.6 - 97.8 FL  
 MCH 28.4 26.1 - 32.9 PG  
 MCHC 31.0 (L) 31.4 - 35.0 g/dL  
 RDW 14.0 % PLATELET 989 629 - 752 K/uL MPV 11.1 9.4 - 12.3 FL ABSOLUTE NRBC 0.00 0.0 - 0.2 K/uL  
 DF AUTOMATED NEUTROPHILS 63 43 - 78 % LYMPHOCYTES 17 13 - 44 % MONOCYTES 9 4.0 - 12.0 % EOSINOPHILS 10 (H) 0.5 - 7.8 % BASOPHILS 1 0.0 - 2.0 % IMMATURE GRANULOCYTES 0 0.0 - 5.0 %  
 ABS. NEUTROPHILS 4.6 1.7 - 8.2 K/UL  
 ABS. LYMPHOCYTES 1.2 0.5 - 4.6 K/UL  
 ABS. MONOCYTES 0.6 0.1 - 1.3 K/UL ABS. EOSINOPHILS 0.8 0.0 - 0.8 K/UL  
 ABS. BASOPHILS 0.0 0.0 - 0.2 K/UL  
 ABS. IMM. GRANS. 0.0 0.0 - 0.5 K/UL METABOLIC PANEL, BASIC Collection Time: 10/24/18  5:26 AM  
Result Value Ref Range Sodium 139 136 - 145 mmol/L Potassium 4.1 3.5 - 5.1 mmol/L Chloride 108 (H) 98 - 107 mmol/L  
 CO2 23 21 - 32 mmol/L Anion gap 8 7 - 16 mmol/L Glucose 93 65 - 100 mg/dL BUN 52 (H) 8 - 23 MG/DL Creatinine 4.37 (H) 0.8 - 1.5 MG/DL  
 GFR est AA 17 (L) >60 ml/min/1.73m2 GFR est non-AA 14 (L) >60 ml/min/1.73m2 Calcium 7.9 (L) 8.3 - 10.4 MG/DL  
GLUCOSE, POC Collection Time: 10/24/18 11:38 AM  
Result Value Ref Range Glucose (POC) 112 (H) 65 - 100 mg/dL HGB & HCT Collection Time: 10/24/18  2:44 PM  
Result Value Ref Range HGB 8.6 (L) 13.6 - 17.2 g/dL HCT 27.9 (L) 41.1 - 50.3 % GLUCOSE, POC Collection Time: 10/24/18  3:08 PM  
Result Value Ref Range Glucose (POC) 146 (H) 65 - 100 mg/dL All Micro Results None Imaging Annamae Elberta /Studies: CXR Results  (Last 48 hours) None CT Results  (Last 48 hours) None No results found. No results found for this visit on 10/21/18. Labs and Studies from previous 24 hours have been personally reviewed by myself   
ASSESSMENT Active Hospital Problems Diagnosis Date Noted  Acute encephalopathy 10/22/2018  Osteomyelitis of left foot ([facility] 10/11/2018  Diabetic ulcer of left midfoot associated with type 2 diabetes mellitus ([facility] 10/05/2018  Anemia of chronic disease 09/06/2018  Glaucoma syndrome 04/21/2017  Type 2 diabetes mellitus with complication, without long-term current use of insulin ([facility] 04/07/2017  Debility 04/07/2017  S/P BKA (below knee amputation) unilateral ([facility] 02/13/2017  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 ([facility] 01/17/2017  CKD (chronic kidney disease) stage 4, GFR 15-29 ml/min ([facility] 08/11/2016  Chronic systolic congestive heart failure ([facility] 12/30/2015 EF 35-40% on 2015 Echo 
  
 HTN (hypertension) 07/29/2012 Hospital Problems as of 10/24/2018 Date Reviewed: 9/26/2018 Codes Class Noted - Resolved POA * (Principal) Acute encephalopathy ICD-10-CM: G93.40 ICD-9-CM: 348.30  10/22/2018 - Present Yes Osteomyelitis of left foot (HCC) ICD-10-CM: M86.9 ICD-9-CM: 730.27  10/11/2018 - Present Yes Diabetic ulcer of left midfoot associated with type 2 diabetes mellitus ([facility] ICD-10-CM: E11.621, E06.144 ICD-9-CM: 250.80, 707.14  10/5/2018 - Present Yes Anemia of chronic disease (Chronic) ICD-10-CM: D63.8 ICD-9-CM: 285.29  9/6/2018 - Present Yes Glaucoma syndrome ICD-10-CM: H40.9 ICD-9-CM: 365.9  4/21/2017 - Present Yes Type 2 diabetes mellitus with complication, without long-term current use of insulin (HCC) (Chronic) ICD-10-CM: E11.8 ICD-9-CM: 250.90  4/7/2017 - Present Yes Debility ICD-10-CM: R53.81 ICD-9-CM: 799.3  4/7/2017 - Present Yes S/P BKA (below knee amputation) unilateral (HCC) (Chronic) ICD-10-CM: B08.318 ICD-9-CM: V49.75  2/13/2017 - Present Yes 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 (HCC) (Chronic) ICD-10-CM: R87.781 ICD-9-CM: 440.24  1/17/2017 - Present Yes  
   
 CKD (chronic kidney disease) stage 4, GFR 15-29 ml/min (HCC) (Chronic) ICD-10-CM: N18.4 ICD-9-CM: 585.4  8/11/2016 - Present Yes Chronic systolic congestive heart failure (HCC) (Chronic) ICD-10-CM: X11.35 ICD-9-CM: 428.22, 428.0  12/30/2015 - Present Yes Overview Signed 1/6/2017  8:59 AM by Jesenia Avery MD  
  EF 35-40% on 2015 Echo HTN (hypertension) (Chronic) ICD-10-CM: I10 
ICD-9-CM: 401.9  7/29/2012 - Present Yes A/P: 
 
-Acute metabolic encephalopathy Likely secondary to infection. Resolved 
 
-OM L foot Medication non compliance ID eval appreciated Cont zosyn and minocycline Loss IV access need midline placement 
 
-AOCD Hb stable around 8 
 
-type 2 DM Controlled Cont glipizide and ISS 
 
-CKD Cr stable around 4 DVT Prophylaxis: heparin CODE Status: Full Plan of Care Discussed with: patient. Care team. 
 
Rodo Merchant MD 
10/24/18

## 2018-10-24 NOTE — PROGRESS NOTES
Problem: Falls - Risk of 
Goal: *Absence of Falls Document Genevive Camera Fall Risk and appropriate interventions in the flowsheet. Outcome: Progressing Towards Goal 
Fall Risk Interventions: 
Mobility Interventions: Bed/chair exit alarm Mentation Interventions: Bed/chair exit alarm Medication Interventions: Bed/chair exit alarm Elimination Interventions: Call light in reach, Bed/chair exit alarm

## 2018-10-24 NOTE — PROGRESS NOTES
Received bedside report from Luna Farmer RN, patient is resting quietly in bed, alert and oriented X4, withdrawn, does not communicate much, patient is on RA, RR even and unlabored, denies needs, call light within reach

## 2018-10-24 NOTE — PROGRESS NOTES
Patient in bed  Respirations even and unlabored on room air  No signs of distress  No needs expressed at this time  Will continue to monitor

## 2018-10-24 NOTE — PROGRESS NOTES
Patient with no IV access on IV abxs. PICC team wants nephrology evaluation regarding creatinine and IV access. Nephrology evaluation pending

## 2018-10-24 NOTE — PROGRESS NOTES
Infectious Disease Note Today's Date: 10/24/2018 Admit Date: 10/21/2018 Impression: · L foot 5th MT osteomyelitis on MRI/Xray. No cx · DM, with R BKA · Recent NSTEMI · CKD, creatinine 4.5 · Confusion Plan: · Freely admits that he will continue to be noncompliant. Will not go to facility or rehab. For now, continue minocycline/zosyn. IF he leaves AMA, change to Minocycline 100mg po BID and Levaquin 750mg po q48h. · ID appt on  at 240PM  
 
Anti-infectives: · Minocycline (10/22 -  
· Zosyn (10/22 - Subjective: In bed, reportedly refusing meds and labs this AM. Allergies Allergen Reactions  Lortab [Hydrocodone-Acetaminophen] Itching Review of Systems:  A comprehensive review of systems was negative except for that written in the History of Present Illness. Objective:  
 
Visit Vitals /79 Pulse 65 Temp 98.6 °F (37 °C) Resp 18 Ht 6' 1\" (1.854 m) Wt 80.6 kg (177 lb 12.8 oz) SpO2 96% BMI 23.46 kg/m² Temp (24hrs), Av.7 °F (37.1 °C), Min:98.5 °F (36.9 °C), Max:98.9 °F (37.2 °C) Lines:  Peripheral IV:    
 
Physical Exam:   
General:  Alert, cooperative, well nourished, well developed, appears stated age Eyes:  Sclera anicteric. Pupils equally round and reactive to light. Mouth/Throat: Mucous membranes normal, oral pharynx clear Neck: Supple Lungs:   Clear to auscultation bilaterally, good effort CV:  RRR without audible murmur, tachycardic Abdomen:   Soft, non tender, active bowel sounds Extremities: R BKA,Left lateral 5th MTP joint with 2.5x2cm eschar, surrounding skin with erythema Skin: No rash, wound as above; R LE moist and dusky area at tip of the stump Lymph nodes: Cervical and supraclavicular normal  
Musculoskeletal: As above Lines/Devices:  Intact, no erythema, drainage or tenderness Psych: Alert and cooperative, oriented to self and to place, appropriate affect Data Review: CBC: 
Recent Labs 10/24/18 
8082 10/23/18 
1907 10/23/18 
1359 10/22/18 
0009 WBC 7.3  --  6.7 6.9 GRANS 63  --  69 76 MONOS 9  --  8 10 EOS 10*  --  10* 3 ANEU 4.6  --  4.7 5.2 ABL 1.2  --  0.8 0.7 HGB 8.4* 9.4* 7.9* 7.4* HCT 27.1* 30.6* 25.6* 23.8*  
  --  271 232 BMP: 
Recent Labs 10/24/18 
0526 10/23/18 
1359 10/22/18 
0009 CREA 4.37* 4.44* 4.50* BUN 52* 52* 62*  142 141  
K 4.1 4.3 4.2 * 109* 109* CO2 23 24 23 AGAP 8 9 9 GLU 93 196* 219* LFTS: 
Recent Labs 10/22/18 
0009 TBILI 0.3 ALT 19 SGOT 18  
AP 80  
TP 7.8 ALB 2.6* Microbiology:  
 
All Micro Results None Imaging:  
10/22/18 XR Foot IMPRESSION:  Findings suggest osteomyelitis in the fifth metatarsal head, as  
noted on a recent MRI scan. Signed By: Micaela Baugh NP October 24, 2018

## 2018-10-25 NOTE — PROGRESS NOTES
[facility] PROGRESS NOTE Follow up for: CKD IV/V Subjective:  
Patient seen and examined. Chart, notes, labs, imaging, results all reviewed. Refusing midline catheter insertion this am. Denies c/o although some mild confusion. Renal function stable. Good uop on lasix. Objective:  
Exam: 
Vitals:  
 10/24/18 1932 10/24/18 2322 10/25/18 0309 10/25/18 4632 BP: 128/72 129/74 142/72 142/75 Pulse: 69 66 60 72 Resp: 18 18 18 18 Temp: 98.1 °F (36.7 °C) 98.2 °F (36.8 °C) 98.7 °F (37.1 °C) 98.6 °F (37 °C) SpO2: 97% 97% 97% 96% Weight:   78.8 kg (173 lb 12.8 oz) Height:      
 
 
 
Intake/Output Summary (Last 24 hours) at 10/25/2018 0944 Last data filed at 10/25/2018 1329 Gross per 24 hour Intake 118 ml Output 300 ml Net -182 ml Current Facility-Administered Medications Medication Dose Route Frequency  amLODIPine (NORVASC) tablet 10 mg  10 mg Oral DAILY  aspirin chewable tablet 81 mg  81 mg Oral DAILY AFTER BREAKFAST  atorvastatin (LIPITOR) tablet 80 mg  80 mg Oral DAILY  furosemide (LASIX) tablet 80 mg  80 mg Oral BID  
 glipiZIDE SR (GLUCOTROL XL) tablet 2.5 mg  2.5 mg Oral DAILY  isosorbide mononitrate ER (IMDUR) tablet 30 mg  30 mg Oral DAILY  metoprolol succinate (TOPROL-XL) XL tablet 100 mg  100 mg Oral DAILY  minocycline (MINOCIN, DYNACIN) capsule 100 mg  100 mg Oral Q12H  
 ramipril (ALTACE) capsule 10 mg  10 mg Oral DAILY  sodium bicarbonate tablet 650 mg  650 mg Oral TID WITH MEALS  dextrose 40% (GLUTOSE) oral gel 1 Tube  15 g Oral PRN  
 glucagon (GLUCAGEN) injection 1 mg  1 mg IntraMUSCular PRN  
 dextrose (D50W) injection syrg 12.5-25 g  25-50 mL IntraVENous PRN  
 insulin lispro (HUMALOG) injection   SubCUTAneous AC&HS  sodium chloride (NS) flush 5-10 mL  5-10 mL IntraVENous Q8H  
 sodium chloride (NS) flush 5-10 mL  5-10 mL IntraVENous PRN  
 acetaminophen (TYLENOL) tablet 650 mg  650 mg Oral Q4H PRN  
  ondansetron (ZOFRAN) injection 4 mg  4 mg IntraVENous Q4H PRN  
 heparin (porcine) injection 5,000 Units  5,000 Units SubCUTAneous Q12H  
 0.9% sodium chloride infusion 250 mL  250 mL IntraVENous PRN  
 hydrALAZINE (APRESOLINE) 20 mg/mL injection 10 mg  10 mg IntraVENous Q6H PRN  
 alcohol 62% (NOZIN) nasal  1 Ampule  1 Ampule Topical Q12H  piperacillin-tazobactam (ZOSYN) 3.375 g in 0.9% sodium chloride (MBP/ADV) 100 mL  3.375 g IntraVENous Q12H  
 
 
EXAM 
GEN - Alert, in no distress CV - S1, S2, RRR, no rub, murmur, or gallop Lung - clear to auscultation bilaterally Abd - soft, nontender, BS present Ext - no edema, right BKA, left boot Recent Labs 10/25/18 
9848 10/24/18 
1444 10/24/18 
0526  10/23/18 
1359 WBC 6.4  --  7.3  --  6.7 HGB 8.3* 8.6* 8.4*   < > 7.9*  
HCT 26.9* 27.9* 27.1*   < > 25.6*   --  269  --  271  
 < > = values in this interval not displayed. Recent Labs 10/25/18 
9116 10/24/18 
0526 10/23/18 
1359  139 142  
K 4.0 4.1 4.3 * 108* 109* CO2 23 23 24 BUN 56* 52* 52* CREA 4.36* 4.37* 4.44* CA 7.8* 7.9* 7.9*  
* 93 196* Assessment and Plan:  
Stage IV CKD 
- renal function at baseline - recent progression of CKD following NSTEMI - baseline Cr mid 4's 
- no indication for dialysis currently and doubt he will agree when the time comes - he has had difficulties with commitment to plans. Unfortunately he will need a catheter when he becomes uremic 
- vein mapping done 10/7/18 and will need follow up as outpatient with vascular surgery PVD 
- s/p right BKA - Left foot ulcer/osteomyelitis - Abx per ID 
- zosyn and minocycline - Loss IV access - needs midline placement. Avoid PICC given advanced CKD 
  
Acute metabolic encephalopathy - Likely secondary to infection - Resolved CAD/CHF  
- NSTEMI recent admission 9/24-9/28/18 
- LVEF 30-35% - plan [facility] when he is on HD 
 
DM 
- per primary team 
  
 Metabolic acidosis - po bicarbonate 
  
Anemia 
- iron stores ok last admission 
- started epogen Kennedy Miu, ACNP

## 2018-10-25 NOTE — PROGRESS NOTES
Patient sleeping  Respirations even and unlabored on room air  No signs of distress  No needs expressed at this time  Will continue to monitor

## 2018-10-25 NOTE — PROGRESS NOTES
Bedside report from Grettaelie aD Silva., RN. Patient resting in bed, laceration noted to top of head. Patient teary eyed when asking him what his desires are. Respirations even and unlabored on room air. No acute distress noted. Denies any pain or discomfort at this time.  Call light within reach. Safety measures in place. Will continue to monitor.

## 2018-10-25 NOTE — PROGRESS NOTES
Patient found sitting on bed  Blood on forehead and clothes  Patient states that he attempted to get up and go to the restroom and that he hit his head on the door  Patient states that he did not fall  Vital signs:  /62  Pulse 82  O2 92%   MD notified  Says to monitor

## 2018-10-25 NOTE — PROGRESS NOTES
Patient refusing to keep bed alarm on bed  Used cane to hit bed alarm off the wall  Verbally abusive to staff

## 2018-10-25 NOTE — PROGRESS NOTES
Hospitalist Progress Note   
10/25/2018 Admit Date: 10/21/2018 11:47 PM  
NAME: Anna Marie Thakur :  1945 DOS:              10/25/18 MRN:  [id number] Attending: Abbey Cruz MD 
PCP:  Harjeet Rios MD 
Treatment Team: Attending Provider: Lynette Reyes MD; Consulting Provider: Candis Willoughby MD; Utilization Review: Elsa Gilliam RN; Care Manager: Kash Green RN; Utilization Review: Allie Reynoso RN; Consulting Provider: Sun Urbano MD 
 
Full Code SUBJECTIVE: As previously documented: 71 y/o male with extensive medical history including CAD, NSTEMI, HTN, DM, chronic systolic CHF, CKD and left foot osteomyelitis who was hospitalized 10/5-10/11 for acute resp failure, acute MI, and foot infection. He was discharged home on PO keflex and minocycline. Blood cultures at that time grew coag negative staph. He lives alone and reportedly a neighbor called pt's daughter stating he was not acting right. In the ED A head CT is negative. Lactic acid is 0.8. His CKD about the same as at discharge. Bun 62/Cr 4.50. He will be admitted for further workup and treatment of new encephalopathy and worsening left foot infection. ID evaluated patient recommended to cont IV abxs. 10/25/18    Anna Marie Thakur stated is not interested in having IV access place by IR. I explained patient risks about refusing IV access for IV antibiotics including worsening infection but patient still refused to have IV access. 10+ ROS reviewed and negative except for positive in HPI. Allergies Allergen Reactions  Lortab [Hydrocodone-Acetaminophen] Itching Current Facility-Administered Medications Medication Dose Route Frequency  epoetin denver (EPOGEN;PROCRIT) injection 10,000 Units  10,000 Units SubCUTAneous Q7D  
 amLODIPine (NORVASC) tablet 10 mg  10 mg Oral DAILY  aspirin chewable tablet 81 mg  81 mg Oral DAILY AFTER BREAKFAST  atorvastatin (LIPITOR) tablet 80 mg  80 mg Oral DAILY  furosemide (LASIX) tablet 80 mg  80 mg Oral BID  
 glipiZIDE SR (GLUCOTROL XL) tablet 2.5 mg  2.5 mg Oral DAILY  isosorbide mononitrate ER (IMDUR) tablet 30 mg  30 mg Oral DAILY  metoprolol succinate (TOPROL-XL) XL tablet 100 mg  100 mg Oral DAILY  minocycline (MINOCIN, DYNACIN) capsule 100 mg  100 mg Oral Q12H  
 ramipril (ALTACE) capsule 10 mg  10 mg Oral DAILY  sodium bicarbonate tablet 650 mg  650 mg Oral TID WITH MEALS  dextrose 40% (GLUTOSE) oral gel 1 Tube  15 g Oral PRN  
 glucagon (GLUCAGEN) injection 1 mg  1 mg IntraMUSCular PRN  
 dextrose (D50W) injection syrg 12.5-25 g  25-50 mL IntraVENous PRN  
 insulin lispro (HUMALOG) injection   SubCUTAneous AC&HS  sodium chloride (NS) flush 5-10 mL  5-10 mL IntraVENous Q8H  
 sodium chloride (NS) flush 5-10 mL  5-10 mL IntraVENous PRN  
 acetaminophen (TYLENOL) tablet 650 mg  650 mg Oral Q4H PRN  
 ondansetron (ZOFRAN) injection 4 mg  4 mg IntraVENous Q4H PRN  
 heparin (porcine) injection 5,000 Units  5,000 Units SubCUTAneous Q12H  
 0.9% sodium chloride infusion 250 mL  250 mL IntraVENous PRN  
 hydrALAZINE (APRESOLINE) 20 mg/mL injection 10 mg  10 mg IntraVENous Q6H PRN  
 alcohol 62% (NOZIN) nasal  1 Ampule  1 Ampule Topical Q12H  piperacillin-tazobactam (ZOSYN) 3.375 g in 0.9% sodium chloride (MBP/ADV) 100 mL  3.375 g IntraVENous Q12H Immunization History Administered Date(s) Administered  DTAP Vaccine 06/03/2010  TB Skin Test (PPD) Intradermal 01/08/2017, 09/06/2018, 10/05/2018, 10/22/2018 Objective:  
 
Patient Vitals for the past 24 hrs: 
 Temp Pulse Resp BP SpO2  
10/25/18 1113 98.5 °F (36.9 °C) 72 19 147/72 96 % 10/25/18 0705 98.6 °F (37 °C) 72 18 142/75 96 % 10/25/18 0309 98.7 °F (37.1 °C) 60 18 142/72 97 % 10/24/18 2322 98.2 °F (36.8 °C) 66 18 129/74 97 % 10/24/18 1932 98.1 °F (36.7 °C) 69 18 128/72 97 % 10/24/18 1535 98.6 °F (37 °C) 68 18 117/69 96 % Temp (24hrs), Av.5 °F (36.9 °C), Min:98.1 °F (36.7 °C), Max:98.7 °F (37.1 °C) Oxygen Therapy O2 Sat (%): 96 % (10/25/18 1113) Pulse via Oximetry: 76 beats per minute (10/22/18 1938) O2 Device: Room air (10/22/18 1938) Oxygen Therapy O2 Sat (%): 96 % (10/25/18 1113) Pulse via Oximetry: 76 beats per minute (10/22/18 1938) O2 Device: Room air (10/22/18 1938) Physical Exam: 
General:         Alert, cooperative, no distress HEENT:               NCAT. No obvious deformity. Lungs: No wheezing/rhonchi/rales Cardiovascular:   RRR. No m/r/g. No pedal edema b/l. Abdomen:       S/nt/nd. Bowel sounds normal. .  
Skin:         No rashes or lesions. Not Jaundiced Neurologic:   No gross focal deficit. Psychiatric:         Good mood. Normal affect. Extremities:         L foot with tenderness palpation. Wound with eschar with no drainage. DIAGNOSTIC STUDIES Data Review:  
Recent Results (from the past 24 hour(s)) HGB & HCT Collection Time: 10/24/18  2:44 PM  
Result Value Ref Range HGB 8.6 (L) 13.6 - 17.2 g/dL HCT 27.9 (L) 41.1 - 50.3 % GLUCOSE, POC Collection Time: 10/24/18  3:08 PM  
Result Value Ref Range Glucose (POC) 146 (H) 65 - 100 mg/dL GLUCOSE, POC Collection Time: 10/24/18  8:14 PM  
Result Value Ref Range Glucose (POC) 157 (H) 65 - 100 mg/dL GLUCOSE, POC Collection Time: 10/25/18  5:12 AM  
Result Value Ref Range Glucose (POC) 162 (H) 65 - 100 mg/dL CBC WITH AUTOMATED DIFF Collection Time: 10/25/18  7:23 AM  
Result Value Ref Range WBC 6.4 4.3 - 11.1 K/uL  
 RBC 2.94 (L) 4.23 - 5.6 M/uL HGB 8.3 (L) 13.6 - 17.2 g/dL HCT 26.9 (L) 41.1 - 50.3 % MCV 91.5 79.6 - 97.8 FL  
 MCH 28.2 26.1 - 32.9 PG  
 MCHC 30.9 (L) 31.4 - 35.0 g/dL  
 RDW 13.4 % PLATELET 830 268 - 608 K/uL MPV 11.3 9.4 - 12.3 FL ABSOLUTE NRBC 0.00 0.0 - 0.2 K/uL  
 DF AUTOMATED NEUTROPHILS 63 43 - 78 % LYMPHOCYTES 21 13 - 44 % MONOCYTES 8 4.0 - 12.0 % EOSINOPHILS 8 (H) 0.5 - 7.8 % BASOPHILS 1 0.0 - 2.0 % IMMATURE GRANULOCYTES 0 0.0 - 5.0 %  
 ABS. NEUTROPHILS 4.0 1.7 - 8.2 K/UL  
 ABS. LYMPHOCYTES 1.3 0.5 - 4.6 K/UL  
 ABS. MONOCYTES 0.5 0.1 - 1.3 K/UL  
 ABS. EOSINOPHILS 0.5 0.0 - 0.8 K/UL  
 ABS. BASOPHILS 0.1 0.0 - 0.2 K/UL  
 ABS. IMM. GRANS. 0.0 0.0 - 0.5 K/UL METABOLIC PANEL, BASIC Collection Time: 10/25/18  7:23 AM  
Result Value Ref Range Sodium 140 136 - 145 mmol/L Potassium 4.0 3.5 - 5.1 mmol/L Chloride 108 (H) 98 - 107 mmol/L  
 CO2 23 21 - 32 mmol/L Anion gap 9 7 - 16 mmol/L Glucose 109 (H) 65 - 100 mg/dL BUN 56 (H) 8 - 23 MG/DL Creatinine 4.36 (H) 0.8 - 1.5 MG/DL  
 GFR est AA 17 (L) >60 ml/min/1.73m2 GFR est non-AA 14 (L) >60 ml/min/1.73m2 Calcium 7.8 (L) 8.3 - 10.4 MG/DL  
GLUCOSE, POC Collection Time: 10/25/18 11:40 AM  
Result Value Ref Range Glucose (POC) 132 (H) 65 - 100 mg/dL All Micro Results None Imaging Kennedy Saldaña /Studies: CXR Results  (Last 48 hours) None CT Results  (Last 48 hours) None No results found. No results found for this visit on 10/21/18. Labs and Studies from previous 24 hours have been personally reviewed by myself   
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 
  
 HTN (hypertension) 07/29/2012 Hospital Problems as of 10/25/2018 Date Reviewed: 9/26/2018 Codes Class Noted - Resolved POA * (Principal) Acute encephalopathy ICD-10-CM: G93.40 ICD-9-CM: 348.30  10/22/2018 - Present Yes Osteomyelitis of left foot (HCC) ICD-10-CM: M86.9 ICD-9-CM: 730.27  10/11/2018 - Present Yes Diabetic ulcer of left midfoot associated with type 2 diabetes mellitus ([facility] ICD-10-CM: E11.621, K61.671 ICD-9-CM: 250.80, 707.14  10/5/2018 - Present Yes Anemia of chronic disease (Chronic) ICD-10-CM: D63.8 ICD-9-CM: 285.29  9/6/2018 - Present Yes Glaucoma syndrome ICD-10-CM: H40.9 ICD-9-CM: 365.9  4/21/2017 - Present Yes Type 2 diabetes mellitus with complication, without long-term current use of insulin (HCC) (Chronic) ICD-10-CM: E11.8 ICD-9-CM: 250.90  4/7/2017 - Present Yes Debility ICD-10-CM: R53.81 ICD-9-CM: 799.3  4/7/2017 - Present Yes S/P BKA (below knee amputation) unilateral (HCC) (Chronic) ICD-10-CM: K35.929 ICD-9-CM: V49.75  2/13/2017 - Present Yes 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  
   
 CKD (chronic kidney disease) stage 4, GFR 15-29 ml/min (HCC) (Chronic) ICD-10-CM: N18.4 ICD-9-CM: 585.4  8/11/2016 - Present Yes Chronic systolic congestive heart failure (HCC) (Chronic) ICD-10-CM: F79.92 ICD-9-CM: 428.22, 428.0  12/30/2015 - Present Yes Overview Signed 1/6/2017  8:59 AM by Jesenia Avery MD  
  EF 35-40% on 2015 Echo HTN (hypertension) (Chronic) ICD-10-CM: I10 
ICD-9-CM: 401.9  7/29/2012 - Present Yes A/P: 
 
-Acute metabolic encephalopathy Likely secondary to infection. Resolved -OM L foot Medication non compliance ID eval appreciated Switch patient to PO abxs as no IV access and patient refusing to place IV line with IR Start levaquin and cont minocycline 
 
-AOCD Hb stable around 8 
 
-type 2 DM Controlled Cont glipizide and ISS 
 
-CKD 
nephology eval appreciated Cr stable around 4 
 
-CAD Recent admission for NSTEMI 09/2018 Refused cath due to history of CKD and posibility of ESRD Cont home meds DVT Prophylaxis: heparin CODE Status: Full Plan of Care Discussed with: patient. Care team. 
 
Baldemar Jones MD 
10/25/18

## 2018-10-25 NOTE — PROGRESS NOTES
Patient lying in bed  Alert and oriented to person and place  Respirations even and unlabored on room air  No signs of distress  No complaints of pain  Patient attempting to hit staff with cane  States that he is ready to get out of the hospital  Says \" I am not getting stuck with anymore needles\"  Will continue to monitor

## 2018-10-25 NOTE — PROGRESS NOTES
Patient stated when walking to the bathroom he hit his head with bathroom door. Nurse reported patient did not have LOC or a fall. On physical exam he is followings commands appropriately. Small laceation about 2 cm linear. Nurse advised to keep wound clean.

## 2018-10-25 NOTE — PROGRESS NOTES
Reassessment complete. Patient resting in bed, eating and watching TV. No acute distress noted. Call light within reach. Safety measures in place, will continue to monitor.

## 2018-10-26 NOTE — PROGRESS NOTES
Received bedside shift report from Erlin, [address] [place] [address]. Patient in bed, in its low and locked position with call bell within reach. Patient alert and oriented x 2 with respirations regular and non-labored. Patient destroyed his bed alarm in the night sometime by pulling the wire apart. I have explained why he needs to be on a bed alarm because he had an incident getting to the bathroom on his own yesterday but the patient is not compliant. I have put another bed alarm on him but he has not agreed to call before getting up.

## 2018-10-26 NOTE — PROGRESS NOTES
Problem: Interdisciplinary Rounds Goal: Interdisciplinary Rounds Interdisciplinary team rounds were held 10/26/2018 with the following team members:Care Management, Physical Therapy, Physician and Clinical Coordinator and the patient. Plan of care discussed. See clinical pathway and/or care plan for interventions and desired outcomes.

## 2018-10-26 NOTE — PROGRESS NOTES
Infectious Disease Note Today's Date: 10/26/2018 Admit Date: 10/21/2018 Impression: · L foot 5th MT osteomyelitis on MRI/Xray. No cx · DM, with R BKA · Recent NSTEMI · CKD, creatinine 4.5 · Confusion Plan: · Freely admits that he will continue to be noncompliant. Will not go to facility or rehab. Switched to oral abx due to IV access, Minocycline 100mg po BID and Levaquin 750mg po q48h. · ID appt on  at 240PM  
· **Will sign off at this time. Please call with questions or concerns. Anti-infectives: · Minocycline (10/22 -  
· Zosyn (10/22 -10/25)--LVQ (10/26- Subjective: In bed. RN states MS will wax and wane so CT head ordered, but he refused and threw food tray. Allergies Allergen Reactions  Lortab [Hydrocodone-Acetaminophen] Itching Review of Systems:  A comprehensive review of systems was negative except for that written in the History of Present Illness. Objective:  
 
Visit Vitals /62 Pulse 65 Temp 97.9 °F (36.6 °C) Resp 18 Ht 6' 1\" (1.854 m) Wt 78.8 kg (173 lb 12.8 oz) SpO2 98% BMI 22.93 kg/m² Temp (24hrs), Av.5 °F (36.9 °C), Min:97.9 °F (36.6 °C), Max:99.3 °F (37.4 °C) Lines:  Peripheral IV:    
 
Physical Exam:   
General:  Alert, cooperative, well nourished, well developed, appears stated age Eyes:  Sclera anicteric. Pupils equally round and reactive to light. Mouth/Throat: Mucous membranes normal, oral pharynx clear Neck: Supple Lungs:   Clear to auscultation bilaterally, good effort CV:  RRR without audible murmur, tachycardic Abdomen:   Soft, non tender, active bowel sounds Extremities: R BKA,Left lateral 5th MTP joint with 2.5x2cm eschar, surrounding skin with erythema Skin: No rash, wound as above; R LE moist and dusky area at tip of the stump Lymph nodes: Cervical and supraclavicular normal  
Musculoskeletal: As above Lines/Devices:  Intact, no erythema, drainage or tenderness Psych: Alert and cooperative, oriented to self and to place, appropriate affect Data Review: CBC: 
Recent Labs 10/26/18 
3809 10/25/18 
0859 10/24/18 
1444 10/24/18 
7772 WBC 6.4 6.4  --  7.3 GRANS 63 63  --  63 MONOS 6 8  --  9  
EOS 6 8*  --  10* ANEU 4.1 4.0  --  4.6 ABL 1.5 1.3  --  1.2 HGB 8.6* 8.3* 8.6* 8.4* HCT 27.6* 26.9* 27.9* 27.1*  
 261  --  269 BMP: 
Recent Labs 10/26/18 
2191 10/25/18 
4315 10/24/18 
7217 CREA 4.54* 4.36* 4.37* BUN 60* 56* 52*  140 139  
K 4.2 4.0 4.1  108* 108* CO2 23 23 23 AGAP 11 9 8 GLU 81 109* 93 LFTS: 
No results for input(s): TBILI, ALT, SGOT, AP, TP, ALB in the last 72 hours. Microbiology:  
 
All Micro Results None Imaging:  
10/22/18 XR Foot IMPRESSION:  Findings suggest osteomyelitis in the fifth metatarsal head, as  
noted on a recent MRI scan. Signed By: Quique Maria NP October 26, 2018

## 2018-10-26 NOTE — PROGRESS NOTES
[facility] Nephrology Subjective: CKD5  No complaints Review of Systems -  
General ROS: negative for - fever, chills Respiratory ROS: no SOB, cough, AN Cardiovascular ROS: no CP, palpitations Gastrointestinal ROS: no N&V, abdominal pain, diarrhea Genito-Urinary ROS: no difficulty voiding, dysuria Neurological ROS: no seizures, focal weekness Objective: 
 
Vitals:  
 10/25/18 2258 10/26/18 0327 10/26/18 9537 10/26/18 1107 BP: 144/83 129/68 135/70 124/62 Pulse: 69 68 70 65 Resp: 18 18 18 18 Temp: 99.3 °F (37.4 °C) 98.3 °F (36.8 °C) 98.4 °F (36.9 °C) 97.9 °F (36.6 °C) SpO2: 99% 96% 98% 98% Weight:      
Height:      
 
 
PE 
Gen: in no acute distress CV:reg rate Chest:clear Abd: soft Ext/Access: no edema [facility] Recent Labs 10/26/18 
6527 10/25/18 
8389 10/24/18 
1444 10/24/18 
8330 WBC 6.4 6.4  --  7.3 HGB 8.6* 8.3* 8.6* 8.4* HCT 27.6* 26.9* 27.9* 27.1*  
 261  --  269 Recent Labs 10/26/18 
3959 10/25/18 
6569 10/24/18 
4238  140 139  
K 4.2 4.0 4.1  108* 108* CO2 23 23 23 GLU 81 109* 93 BUN 60* 56* 52* CREA 4.54* 4.36* 4.37* CA 8.0* 7.8* 7.9* Radiology A/P:  
Patient Active Problem List  
Diagnosis Code  
 HTN (hypertension) I10  
 Chest pain R07.9  Bilateral pleural effusion J90  
 Tobacco use Z72.0  Abnormal CT scan, chest R93.89  Chronic systolic congestive heart failure (HCC) I50.22  
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  
 Glaucoma syndrome H40.9  Onychomycosis B35.1  MO (acute kidney injury) ([facility] N17.9  Anemia of chronic disease D63.8  Pneumonia due to infectious organism J18.9  EKG, abnormal R94.31  
 Elevated troponin R74.8  Bacteremia R78.81  
 Tachycardia R00.0  Diabetic ulcer of left midfoot associated with type 2 diabetes mellitus ([facility] E11.621, L97.429  
 Cellulitis of left foot L03. [address]  Stage 5 chronic kidney disease not on chronic dialysis ([facility] N18.5  Acute rhinitis J00  
 Acute osteomyelitis of metatarsal bone, left ([facility] M86.172  Osteomyelitis of left foot ([facility] M86.9  Type 2 diabetes with nephropathy (HCC) E11.21  
 Acute encephalopathy G93.40 CKD4-5-   Labs are stable Osteomyelitis - on PO antibiotics due to non-compliance. PVD AMS 
CHF 
SHIRA Sorensen MD

## 2018-10-26 NOTE — PROGRESS NOTES
Hospitalist Progress Note   
10/26/2018 Admit Date: 10/21/2018 11:47 PM  
NAME: John Oliver :  1945 DOS:              10/26/18 MRN:  [id number] Attending: Noel Pemberton MD 
PCP:  Prashant Howard MD 
Treatment Team: Attending Provider: Ashanti Sepulveda MD; Consulting Provider: Lizbeth Herman MD; Utilization Review: Justin Rosado RN; Care Manager: Abilio Henderson RN; Utilization Review: Saddie Cushing, RN; Consulting Provider: John Chacon MD 
 
Full Code SUBJECTIVE: As previously documented: 73 y/o male with extensive medical history including CAD, NSTEMI, HTN, DM, chronic systolic CHF, CKD and left foot osteomyelitis who was hospitalized 10/5-10/11 for acute resp failure, acute MI, and foot infection. He was discharged home on PO keflex and minocycline. Blood cultures at that time grew coag negative staph. He lives alone and reportedly a neighbor called pt's daughter stating he was not acting right. In the ED A head CT is negative. Lactic acid is 0.8. His CKD about the same as at discharge. Bun 62/Cr 4.50. He will be admitted for further workup and treatment of new encephalopathy and worsening left foot infection. ID evaluated patient recommended to cont IV abxs. 10/26/18    John Oliver stated \"'im not feeling good today\". Patient refused to have CT brain and threw his food tray. 10+ ROS reviewed and negative except for positive in HPI. Allergies Allergen Reactions  Lortab [Hydrocodone-Acetaminophen] Itching Current Facility-Administered Medications Medication Dose Route Frequency  epoetin denver (EPOGEN;PROCRIT) injection 10,000 Units  10,000 Units SubCUTAneous Q7D  
 [START ON 10/27/2018] levoFLOXacin (LEVAQUIN) tablet 500 mg  500 mg Oral Q48H  
 amLODIPine (NORVASC) tablet 10 mg  10 mg Oral DAILY  aspirin chewable tablet 81 mg  81 mg Oral DAILY AFTER BREAKFAST  atorvastatin (LIPITOR) tablet 80 mg  80 mg Oral DAILY  furosemide (LASIX) tablet 80 mg  80 mg Oral BID  
 glipiZIDE SR (GLUCOTROL XL) tablet 2.5 mg  2.5 mg Oral DAILY  isosorbide mononitrate ER (IMDUR) tablet 30 mg  30 mg Oral DAILY  metoprolol succinate (TOPROL-XL) XL tablet 100 mg  100 mg Oral DAILY  minocycline (MINOCIN, DYNACIN) capsule 100 mg  100 mg Oral Q12H  
 ramipril (ALTACE) capsule 10 mg  10 mg Oral DAILY  sodium bicarbonate tablet 650 mg  650 mg Oral TID WITH MEALS  dextrose 40% (GLUTOSE) oral gel 1 Tube  15 g Oral PRN  
 glucagon (GLUCAGEN) injection 1 mg  1 mg IntraMUSCular PRN  
 dextrose (D50W) injection syrg 12.5-25 g  25-50 mL IntraVENous PRN  
 insulin lispro (HUMALOG) injection   SubCUTAneous AC&HS  sodium chloride (NS) flush 5-10 mL  5-10 mL IntraVENous Q8H  
 sodium chloride (NS) flush 5-10 mL  5-10 mL IntraVENous PRN  
 acetaminophen (TYLENOL) tablet 650 mg  650 mg Oral Q4H PRN  
 ondansetron (ZOFRAN) injection 4 mg  4 mg IntraVENous Q4H PRN  
 heparin (porcine) injection 5,000 Units  5,000 Units SubCUTAneous Q12H  
 0.9% sodium chloride infusion 250 mL  250 mL IntraVENous PRN  
 hydrALAZINE (APRESOLINE) 20 mg/mL injection 10 mg  10 mg IntraVENous Q6H PRN  
 alcohol 62% (NOZIN) nasal  1 Ampule  1 Ampule Topical Q12H Immunization History Administered Date(s) Administered  DTAP Vaccine 2010  TB Skin Test (PPD) Intradermal 2017, 2018, 10/05/2018, 10/22/2018 Objective:  
 
Patient Vitals for the past 24 hrs: 
 Temp Pulse Resp BP SpO2  
10/26/18 1521 98.3 °F (36.8 °C) 95 18 119/63 93 % 10/26/18 1107 97.9 °F (36.6 °C) 65 18 124/62 98 % 10/26/18 0721 98.4 °F (36.9 °C) 70 18 135/70 98 % 10/26/18 0327 98.3 °F (36.8 °C) 68 18 129/68 96 % 10/25/18 2258 99.3 °F (37.4 °C) 69 18 144/83 99 % 10/25/18 1911 98.7 °F (37.1 °C) 76 18 127/68 98 % Temp (24hrs), Av.5 °F (36.9 °C), Min:97.9 °F (36.6 °C), Max:99.3 °F (37.4 °C) Oxygen Therapy O2 Sat (%): 93 % (10/26/18 1521) Pulse via Oximetry: 76 beats per minute (10/22/18 1938) O2 Device: Room air (10/22/18 1938) Oxygen Therapy O2 Sat (%): 93 % (10/26/18 1521) Pulse via Oximetry: 76 beats per minute (10/22/18 1938) O2 Device: Room air (10/22/18 1938) Physical Exam: 
General:         Alert, cooperative, no distress HEENT:               NCAT. No obvious deformity. Head wound no bleeding or tenderness Lungs: No wheezing/rhonchi/rales Cardiovascular:   RRR. No m/r/g. No pedal edema b/l. Abdomen:       S/nt/nd. Bowel sounds normal. .  
Skin:         No rashes or lesions. Not Jaundiced Neurologic:      Cranial nerves grossly intact. Strength 5/5 upper and lower extremities. Psychiatric:         Good mood. Normal affect. Extremities:         L foot with tenderness palpation. Wound with eschar with no drainage. DIAGNOSTIC STUDIES Data Review:  
Recent Results (from the past 24 hour(s)) GLUCOSE, POC Collection Time: 10/25/18  8:39 PM  
Result Value Ref Range Glucose (POC) 172 (H) 65 - 100 mg/dL GLUCOSE, POC Collection Time: 10/26/18  5:29 AM  
Result Value Ref Range Glucose (POC) 100 65 - 100 mg/dL CBC WITH AUTOMATED DIFF Collection Time: 10/26/18  6:34 AM  
Result Value Ref Range WBC 6.4 4.3 - 11.1 K/uL  
 RBC 3.04 (L) 4.23 - 5.6 M/uL HGB 8.6 (L) 13.6 - 17.2 g/dL HCT 27.6 (L) 41.1 - 50.3 % MCV 90.8 79.6 - 97.8 FL  
 MCH 28.3 26.1 - 32.9 PG  
 MCHC 31.2 (L) 31.4 - 35.0 g/dL  
 RDW 13.2 % PLATELET 899 350 - 811 K/uL MPV 11.2 9.4 - 12.3 FL ABSOLUTE NRBC 0.00 0.0 - 0.2 K/uL  
 DF AUTOMATED NEUTROPHILS 63 43 - 78 % LYMPHOCYTES 24 13 - 44 % MONOCYTES 6 4.0 - 12.0 % EOSINOPHILS 6 0.5 - 7.8 % BASOPHILS 1 0.0 - 2.0 % IMMATURE GRANULOCYTES 1 0.0 - 5.0 %  
 ABS. NEUTROPHILS 4.1 1.7 - 8.2 K/UL  
 ABS. LYMPHOCYTES 1.5 0.5 - 4.6 K/UL  
 ABS. MONOCYTES 0.4 0.1 - 1.3 K/UL ABS. EOSINOPHILS 0.4 0.0 - 0.8 K/UL  
 ABS. BASOPHILS 0.0 0.0 - 0.2 K/UL  
 ABS. IMM. GRANS. 0.0 0.0 - 0.5 K/UL METABOLIC PANEL, BASIC Collection Time: 10/26/18  6:34 AM  
Result Value Ref Range Sodium 140 136 - 145 mmol/L Potassium 4.2 3.5 - 5.1 mmol/L Chloride 106 98 - 107 mmol/L  
 CO2 23 21 - 32 mmol/L Anion gap 11 7 - 16 mmol/L Glucose 81 65 - 100 mg/dL BUN 60 (H) 8 - 23 MG/DL Creatinine 4.54 (H) 0.8 - 1.5 MG/DL  
 GFR est AA 17 (L) >60 ml/min/1.73m2 GFR est non-AA 14 (L) >60 ml/min/1.73m2 Calcium 8.0 (L) 8.3 - 10.4 MG/DL  
GLUCOSE, POC Collection Time: 10/26/18 11:11 AM  
Result Value Ref Range Glucose (POC) 140 (H) 65 - 100 mg/dL GLUCOSE, POC Collection Time: 10/26/18  3:44 PM  
Result Value Ref Range Glucose (POC) 73 65 - 100 mg/dL All Micro Results None Imaging [facility] /Studies: CXR Results  (Last 48 hours) None CT Results  (Last 48 hours) 10/26/18 1455  CT HEAD WO CONT Final result Impression:  Impression:  Normal CT of the brain for age done without contrast.   
   
   
  
 Narrative:  CT Brain Without Contrast Dated  October 26, 2018 Reference Exam: October 22, 2018 Indication: Hit head on bathroom door Technique: Radiation dose reduction techniques were used for this study using  
one or more of the following: automated exposure control; adjustment of mA  
and/or kV (according to patient size);  iterative reconstruction. Routine CT of  
the brain was performed using 5 mm axial images obtained. Images are presumed to  
have been obtained less than 24 hours from presentation. Findings:  The ventricular system, basilar cisterns, and cortical sulci all are  
normal in size and position for the patient's age. There are no abnormal  
intracranial masses, mass effect, nor extra-axial collections seen. There are no abnormal areas of attenuation that would indicate a recent intracranial  
hemorrhage or infarction. Bone windows show no fractures nor foreign bodies. Ct Head Wo Cont Result Date: 10/26/2018 Impression:  Normal CT of the brain for age done without contrast.  
 
No results found for this visit on 10/21/18. Labs and Studies from previous 24 hours have been personally reviewed by myself   
ASSESSMENT Active Hospital Problems Diagnosis Date Noted  Acute encephalopathy 10/22/2018  [facility] 10/11/2018  Diabetic ulcer of left midfoot associated with type 2 diabetes mellitus ([facility] 10/05/2018  Anemia of chronic disease 09/06/2018  Glaucoma syndrome 04/21/2017  Type 2 diabetes mellitus with complication, without long-term current use of insulin ([facility] 04/07/2017  Debility 04/07/2017  S/P BKA (below knee amputation) unilateral ([facility] 02/13/2017  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 
  
 HTN (hypertension) 07/29/2012 Hospital Problems as of 10/26/2018 Date Reviewed: 9/26/2018 Codes Class Noted - Resolved POA * (Principal) Acute encephalopathy ICD-10-CM: G93.40 ICD-9-CM: 348.30  10/22/2018 - Present Yes Osteomyelitis of left foot (HCC) ICD-10-CM: M86.9 ICD-9-CM: 730.27  10/11/2018 - Present Yes Diabetic ulcer of left midfoot associated with type 2 diabetes mellitus ([facility] ICD-10-CM: E11.621, S28.553 ICD-9-CM: 250.80, 707.14  10/5/2018 - Present Yes Anemia of chronic disease (Chronic) ICD-10-CM: D63.8 ICD-9-CM: 285.29  9/6/2018 - Present Yes Glaucoma syndrome ICD-10-CM: H40.9 ICD-9-CM: 365.9  4/21/2017 - Present Yes Type 2 diabetes mellitus with complication, without long-term current use of insulin (HCC) (Chronic) ICD-10-CM: E11.8 ICD-9-CM: 250.90  4/7/2017 - Present Yes Debility ICD-10-CM: R53.81 ICD-9-CM: 799.3  4/7/2017 - Present Yes S/P BKA (below knee amputation) unilateral (HCC) (Chronic) ICD-10-CM: F12.007 ICD-9-CM: V49.75  2/13/2017 - Present Yes 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  
   
 CKD (chronic kidney disease) stage 4, GFR 15-29 ml/min (HCC) (Chronic) ICD-10-CM: N18.4 ICD-9-CM: 585.4  8/11/2016 - Present Yes Chronic systolic congestive heart failure (HCC) (Chronic) ICD-10-CM: B74.83 ICD-9-CM: 428.22, 428.0  12/30/2015 - Present Yes Overview Signed 1/6/2017  8:59 AM by Rosa Isela Poon MD  
  EF 35-40% on 2015 Echo HTN (hypertension) (Chronic) ICD-10-CM: I10 
ICD-9-CM: 401.9  7/29/2012 - Present Yes A/P: 
 
-Acute metabolic encephalopathy Case discussed with care team in the floor Patient mental status stable compare to previous admission Patient not feeling well today but refused CT brain this morning. Non compliance patient, no following medical advise and aggressive with nursing staff. -OM L foot Medication non compliance ID eval appreciated, sign off today On PO abxs as patient refused to have IV access Cont levaquin and minocycline 
 
-AOCD Hb stable around 8 
 
-type 2 DM Controlled Cont glipizide and ISS 
 
-CKD 
nephology eval appreciated Cr stable around 4 
 
-CAD Recent admission for NSTEMI 09/2018 Refused cath due to history of CKD and posibility of ESRD Cont home meds DVT Prophylaxis: heparin CODE Status: Full Plan of Care Discussed with: patient. Care team. 
 
Cheli Islas MD 
10/26/18

## 2018-10-27 NOTE — PROGRESS NOTES
Bedside report received from night nurse Regla. Assessment done as noted  Respiration even and unlabored 20/min; denies pain or nausea at present. Encouraged to call with needs.

## 2018-10-27 NOTE — PROGRESS NOTES
Patient is lying in bed. Respirations are even and unlabored. Blood sugar 181. Patient refused insulin. No distress at this time. Posey alarm on.

## 2018-10-27 NOTE — PROGRESS NOTES
Hospitalist Progress Note   
10/27/2018 Admit Date: 10/21/2018 11:47 PM  
NAME: Nell Barriga :  1945 DOS:              10/27/18 MRN:  [id number] Attending: Ayesha Trevizo MD 
PCP:  Denis Stewart MD 
Treatment Team: Attending Provider: Sixto Robert MD; Utilization Review: Elliott Gautam RN; Care Manager: Jordi Martinez RN; Utilization Review: Florencio Johnson RN; Consulting Provider: Jordan Hamman, MD 
 
Full Code SUBJECTIVE: As previously documented: 71 y/o male with extensive medical history including CAD, NSTEMI, HTN, DM, chronic systolic CHF, CKD and left foot osteomyelitis who was hospitalized 10/5-10/11 for acute resp failure, acute MI, and foot infection. He was discharged home on PO keflex and minocycline. Blood cultures at that time grew coag negative staph. He lives alone and reportedly a neighbor called pt's daughter stating he was not acting right. In the ED A head CT is negative. Lactic acid is 0.8. His CKD about the same as at discharge. Bun 62/Cr 4.50. He will be admitted for further workup and treatment of new encephalopathy and worsening left foot infection. ID evaluated patient recommended to cont IV. abxs. Patient refusing IV therapy, non compliance with meds and aggressive towards nursing staff. 10/27/18    Nell Barriga stated feeling better today. Patient reported pain in L foot is improving. 10+ ROS reviewed and negative except for positive in HPI. Allergies Allergen Reactions  Lortab [Hydrocodone-Acetaminophen] Itching Current Facility-Administered Medications Medication Dose Route Frequency  epoetin denver (EPOGEN;PROCRIT) injection 10,000 Units  10,000 Units SubCUTAneous Q7D  
 levoFLOXacin (LEVAQUIN) tablet 500 mg  500 mg Oral Q48H  
 amLODIPine (NORVASC) tablet 10 mg  10 mg Oral DAILY  aspirin chewable tablet 81 mg  81 mg Oral DAILY AFTER BREAKFAST  atorvastatin (LIPITOR) tablet 80 mg  80 mg Oral DAILY  furosemide (LASIX) tablet 80 mg  80 mg Oral BID  
 glipiZIDE SR (GLUCOTROL XL) tablet 2.5 mg  2.5 mg Oral DAILY  isosorbide mononitrate ER (IMDUR) tablet 30 mg  30 mg Oral DAILY  metoprolol succinate (TOPROL-XL) XL tablet 100 mg  100 mg Oral DAILY  minocycline (MINOCIN, DYNACIN) capsule 100 mg  100 mg Oral Q12H  
 ramipril (ALTACE) capsule 10 mg  10 mg Oral DAILY  sodium bicarbonate tablet 650 mg  650 mg Oral TID WITH MEALS  dextrose 40% (GLUTOSE) oral gel 1 Tube  15 g Oral PRN  
 glucagon (GLUCAGEN) injection 1 mg  1 mg IntraMUSCular PRN  
 dextrose (D50W) injection syrg 12.5-25 g  25-50 mL IntraVENous PRN  
 insulin lispro (HUMALOG) injection   SubCUTAneous AC&HS  sodium chloride (NS) flush 5-10 mL  5-10 mL IntraVENous Q8H  
 sodium chloride (NS) flush 5-10 mL  5-10 mL IntraVENous PRN  
 acetaminophen (TYLENOL) tablet 650 mg  650 mg Oral Q4H PRN  
 ondansetron (ZOFRAN) injection 4 mg  4 mg IntraVENous Q4H PRN  
 heparin (porcine) injection 5,000 Units  5,000 Units SubCUTAneous Q12H  
 0.9% sodium chloride infusion 250 mL  250 mL IntraVENous PRN  
 hydrALAZINE (APRESOLINE) 20 mg/mL injection 10 mg  10 mg IntraVENous Q6H PRN  
 alcohol 62% (NOZIN) nasal  1 Ampule  1 Ampule Topical Q12H Immunization History Administered Date(s) Administered  DTAP Vaccine 2010  TB Skin Test (PPD) Intradermal 2017, 2018, 10/05/2018, 10/22/2018 Objective:  
 
Patient Vitals for the past 24 hrs: 
 Temp Pulse Resp BP SpO2  
10/27/18 1138 98.3 °F (36.8 °C) 82 17 107/62 98 % 10/27/18 0706 99 °F (37.2 °C) 69 17 155/75 96 % 10/27/18 0329 98.7 °F (37.1 °C) 68 17 151/77 95 % 10/26/18 1931 98.3 °F (36.8 °C) 69 18 131/75 97 % Temp (24hrs), Av.6 °F (37 °C), Min:98.3 °F (36.8 °C), Max:99 °F (37.2 °C) Oxygen Therapy O2 Sat (%): 98 % (10/27/18 4762) Pulse via Oximetry: 76 beats per minute (10/22/18 1938) O2 Device: Room air (10/22/18 1938) Oxygen Therapy O2 Sat (%): 98 % (10/27/18 1138) Pulse via Oximetry: 76 beats per minute (10/22/18 1938) O2 Device: Room air (10/22/18 1938) Physical Exam: 
General:         Alert, cooperative, no distress HEENT:               NCAT. No obvious deformity. Head wound no bleeding or tenderness Lungs: No wheezing/rhonchi/rales Cardiovascular:   RRR. No m/r/g. No pedal edema b/l. Abdomen:       S/nt/nd. Bowel sounds normal. .  
Skin:         No rashes or lesions. Not Jaundiced Neurologic:      No gross focal deficits Psychiatric:         Good mood. Normal affect. Extremities:         L foot with less tenderness palpation, less erythema. Wound with eschar with no drainage. DIAGNOSTIC STUDIES Data Review:  
Recent Results (from the past 24 hour(s)) GLUCOSE, POC Collection Time: 10/26/18  8:19 PM  
Result Value Ref Range Glucose (POC) 154 (H) 65 - 100 mg/dL GLUCOSE, POC Collection Time: 10/27/18  5:09 AM  
Result Value Ref Range Glucose (POC) 181 (H) 65 - 100 mg/dL GLUCOSE, POC Collection Time: 10/27/18 11:47 AM  
Result Value Ref Range Glucose (POC) 111 (H) 65 - 100 mg/dL All Micro Results None Imaging [facility] /Studies: CXR Results  (Last 48 hours) None CT Results  (Last 48 hours) 10/26/18 1455  CT HEAD WO CONT Final result Impression:  Impression:  Normal CT of the brain for age done without contrast.   
   
   
  
 Narrative:  CT Brain Without Contrast Dated  October 26, 2018 Reference Exam: October 22, 2018 Indication: Hit head on bathroom door Technique: Radiation dose reduction techniques were used for this study using  
one or more of the following: automated exposure control; adjustment of mA  
and/or kV (according to patient size);  iterative reconstruction.   Routine CT of  
 the brain was performed using 5 mm axial images obtained. Images are presumed to  
have been obtained less than 24 hours from presentation. Findings:  The ventricular system, basilar cisterns, and cortical sulci all are  
normal in size and position for the patient's age. There are no abnormal  
intracranial masses, mass effect, nor extra-axial collections seen. There are  
no abnormal areas of attenuation that would indicate a recent intracranial  
hemorrhage or infarction. Bone windows show no fractures nor foreign bodies. No results found. No results found for this visit on 10/21/18. Labs and Studies from previous 24 hours have been personally reviewed by myself   
ASSESSMENT Active Hospital Problems Diagnosis Date Noted  Acute encephalopathy 10/22/2018  Osteomyelitis of left foot (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 
  
 HTN (hypertension) 07/29/2012 Hospital Problems as of 10/27/2018 Date Reviewed: 9/26/2018 Codes Class Noted - Resolved POA * (Principal) Acute encephalopathy ICD-10-CM: G93.40 ICD-9-CM: 348.30  10/22/2018 - Present Yes Osteomyelitis of left foot (HCC) ICD-10-CM: M86.9 ICD-9-CM: 730.27  10/11/2018 - Present Yes  Diabetic ulcer of left midfoot associated with type 2 diabetes mellitus ([facility] ICD-10-CM: E11.621, C11.407 
 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  
   
 CKD (chronic kidney disease) stage 4, GFR 15-29 ml/min (HCC) (Chronic) ICD-10-CM: N18.4 ICD-9-CM: 585.4  8/11/2016 - Present Yes Chronic systolic congestive heart failure (HCC) (Chronic) ICD-10-CM: V41.91 ICD-9-CM: 428.22, 428.0  12/30/2015 - Present Yes Overview Signed 1/6/2017  8:59 AM by Ila Collins MD  
  EF 35-40% on 2015 Echo HTN (hypertension) (Chronic) ICD-10-CM: I10 
ICD-9-CM: 401.9  7/29/2012 - Present Yes A/P: 
 
-Acute metabolic encephalopathy Patient appears to be around baseline 
 
-OM L foot Medication non compliance ID eval appreciated, sign off today On PO abxs as patient refused to have IV access Cont levaquin and minocycline Will keep patient at least for 2-3 days in the hospital. Wound is improving. -AOCD Hb stable around 8 
 
-type 2 DM Controlled Cont glipizide and ISS 
 
-CKD 
nephology eval appreciated Cr stable around 4 
 
-CAD Recent admission for NSTEMI 09/2018 Refused cath due to history of CKD and posibility of ESRD Cont home meds Patient refusing labs. DVT Prophylaxis: heparin CODE Status: Full Plan of Care Discussed with: patient.  Care team. 
 
 Ariel Up MD 
10/27/18

## 2018-10-27 NOTE — PROGRESS NOTES
Refusing shots (insulin & Heparin) - \"it messes me up\". Refuses Nozin - \"my nose is fine. \". Risk vs benefit reviewed. Verbalized understanding. Continues to refuse. Will continue to encourage taking prescribed medication. Monitor closely.

## 2018-10-27 NOTE — PROGRESS NOTES
Patient is resting quietly in bed. Assessment completed. Respirations are even and unlabored. Patient denies any pain. Patient is encouraged to call for assistance. Posey alarm is on.

## 2018-10-27 NOTE — PROGRESS NOTES
[facility] Subjective: CKD5  No new complaints Review of Systems -  
General ROS: negative for - fever, chills Respiratory ROS: no SOB, cough, AN Cardiovascular ROS: no CP, palpitations Gastrointestinal ROS: no N&V, abdominal pain, diarrhea Genito-Urinary ROS: no difficulty voiding, dysuria Neurological ROS: no seizures, focal weekness Objective: 
 
Vitals:  
 10/26/18 1931 10/27/18 8199 10/27/18 4163 10/27/18 3342 BP: 131/75 151/77  155/75 Pulse: 69 68  69 Resp: 18 17  17 Temp: 98.3 °F (36.8 °C) 98.7 °F (37.1 °C)  99 °F (37.2 °C) SpO2: 97% 95%  96% Weight:   84.6 kg (186 lb 9.6 oz) Height:      
 
 
PE 
Gen: in no acute distress CV:reg rate Chest:clear Abd: soft Ext/Access: no edema [facility] Recent Labs 10/26/18 
8738 10/25/18 
0723 10/24/18 
1444 WBC 6.4 6.4  --   
HGB 8.6* 8.3* 8.6* HCT 27.6* 26.9* 27.9*  
 261  --   
 
Recent Labs 10/26/18 
8935 10/25/18 
0382  140  
K 4.2 4.0  
 108* CO2 23 23 GLU 81 109* BUN 60* 56* CREA 4.54* 4.36* CA 8.0* 7.8* Radiology A/P:  
Patient Active Problem List  
Diagnosis Code  
 HTN (hypertension) I10  
 Chest pain R07.9  Bilateral pleural effusion J90  
 Tobacco use Z72.0  Abnormal CT scan, chest R93.89  Chronic systolic congestive heart failure (HCC) I50.22  
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  
 Glaucoma syndrome H40.9  Onychomycosis B35.1  MO (acute kidney injury) ([facility] N17.9  Anemia of chronic disease D63.8  Pneumonia due to infectious organism J18.9  EKG, abnormal R94.31  
 Elevated troponin R74.8  Bacteremia R78.81  
 Tachycardia R00.0  Diabetic ulcer of left midfoot associated with type 2 diabetes mellitus ([facility] E11.621, L97.429  
 Cellulitis of left foot L03. 80  Stage 5 chronic kidney disease not on chronic dialysis ([facility] N18.5  Acute rhinitis J00  
 Acute osteomyelitis of metatarsal bone, left ([facility] M86.172  Osteomyelitis of left foot ([facility] M86.9  Type 2 diabetes with nephropathy (HCC) E11.21  
 Acute encephalopathy G93.40 CKD4-5-   Labs pending for today. Osteomyelitis - on PO antibiotics due to non-compliance. PVD AMS 
CHF 
SHIRA Velez MD

## 2018-10-28 NOTE — PROGRESS NOTES
Hospitalist Progress Note   
10/28/2018 Admit Date: 10/21/2018 11:47 PM  
NAME: Jacob Hodges :  1945 DOS:              10/28/18 MRN:  [id number] Attending: Sarah Doyle MD 
PCP:  Danna Bernstein MD 
Treatment Team: Attending Provider: Americo Kent MD; Utilization Review: Ant Lopez RN; Care Manager: Rowan Beck RN; Utilization Review: Mag Nix RN; Consulting Provider: Izzy Teixeira MD 
 
Full Code SUBJECTIVE: As previously documented: 73 y/o male with extensive medical history including CAD, NSTEMI, HTN, DM, chronic systolic CHF, CKD and left foot osteomyelitis who was hospitalized 10/5-10/11 for acute resp failure, acute MI, and foot infection. He was discharged home on PO keflex and minocycline. Blood cultures at that time grew coag negative staph. He lives alone and reportedly a neighbor called pt's daughter stating he was not acting right. In the ED A head CT is negative. Lactic acid is 0.8. His CKD about the same as at discharge. Bun 62/Cr 4.50. He will be admitted for further workup and treatment of new encephalopathy and worsening left foot infection. ID evaluated patient recommended to cont IV. abxs. Patient refusing IV therapy, non compliance with meds and aggressive towards nursing staff. 10/28/18    Jacob Hodges stated pain on LLE is controlled. Patient denies any complaints. 10+ ROS reviewed and negative except for positive in HPI. Allergies Allergen Reactions  Lortab [Hydrocodone-Acetaminophen] Itching Current Facility-Administered Medications Medication Dose Route Frequency  epoetin denver (EPOGEN;PROCRIT) injection 10,000 Units  10,000 Units SubCUTAneous Q7D  
 levoFLOXacin (LEVAQUIN) tablet 500 mg  500 mg Oral Q48H  
 amLODIPine (NORVASC) tablet 10 mg  10 mg Oral DAILY  aspirin chewable tablet 81 mg  81 mg Oral DAILY AFTER BREAKFAST  atorvastatin (LIPITOR) tablet 80 mg  80 mg Oral DAILY  furosemide (LASIX) tablet 80 mg  80 mg Oral BID  
 glipiZIDE SR (GLUCOTROL XL) tablet 2.5 mg  2.5 mg Oral DAILY  isosorbide mononitrate ER (IMDUR) tablet 30 mg  30 mg Oral DAILY  metoprolol succinate (TOPROL-XL) XL tablet 100 mg  100 mg Oral DAILY  minocycline (MINOCIN, DYNACIN) capsule 100 mg  100 mg Oral Q12H  
 ramipril (ALTACE) capsule 10 mg  10 mg Oral DAILY  sodium bicarbonate tablet 650 mg  650 mg Oral TID WITH MEALS  dextrose 40% (GLUTOSE) oral gel 1 Tube  15 g Oral PRN  
 glucagon (GLUCAGEN) injection 1 mg  1 mg IntraMUSCular PRN  
 dextrose (D50W) injection syrg 12.5-25 g  25-50 mL IntraVENous PRN  
 insulin lispro (HUMALOG) injection   SubCUTAneous AC&HS  sodium chloride (NS) flush 5-10 mL  5-10 mL IntraVENous Q8H  
 sodium chloride (NS) flush 5-10 mL  5-10 mL IntraVENous PRN  
 acetaminophen (TYLENOL) tablet 650 mg  650 mg Oral Q4H PRN  
 ondansetron (ZOFRAN) injection 4 mg  4 mg IntraVENous Q4H PRN  
 heparin (porcine) injection 5,000 Units  5,000 Units SubCUTAneous Q12H  
 0.9% sodium chloride infusion 250 mL  250 mL IntraVENous PRN  
 hydrALAZINE (APRESOLINE) 20 mg/mL injection 10 mg  10 mg IntraVENous Q6H PRN  
 alcohol 62% (NOZIN) nasal  1 Ampule  1 Ampule Topical Q12H Immunization History Administered Date(s) Administered  DTAP Vaccine 2010  TB Skin Test (PPD) Intradermal 2017, 2018, 10/05/2018, 10/22/2018 Objective:  
 
Patient Vitals for the past 24 hrs: 
 Temp Pulse Resp BP SpO2  
10/28/18 1151 98.5 °F (36.9 °C) 73 16 129/63 96 % 10/28/18 0722 98.1 °F (36.7 °C) 70 16 118/71 98 % 10/28/18 0409 97.8 °F (36.6 °C) 72 16 124/58 97 % 10/27/18 2312 97.9 °F (36.6 °C) 70 16 125/52 100 % 10/27/18 1940 98.5 °F (36.9 °C) 74 18 138/69 96 % 10/27/18 1534 98.6 °F (37 °C) 76 18 107/62 94 % Temp (24hrs), Av.2 °F (36.8 °C), Min:97.8 °F (36.6 °C), Max:98.6 °F (37 °C) Oxygen Therapy O2 Sat (%): 96 % (10/28/18 1151) Pulse via Oximetry: 76 beats per minute (10/22/18 1938) O2 Device: Room air (10/22/18 1938) Oxygen Therapy O2 Sat (%): 96 % (10/28/18 1151) Pulse via Oximetry: 76 beats per minute (10/22/18 1938) O2 Device: Room air (10/22/18 1938) Physical Exam: 
General:         Alert, cooperative, no distress HEENT:               NCAT. No obvious deformity. Head wound no bleeding or tenderness Lungs: No wheezing/rhonchi/rales Cardiovascular:   RRR. No m/r/g. No pedal edema b/l. Abdomen:       S/nt/nd. Bowel sounds normal. .  
Skin:         No rashes or lesions. Not Jaundiced Neurologic:      No gross focal deficits Psychiatric:         Good mood. Normal affect. Extremities:         L foot with less tenderness palpation, less erythema. Wound with eschar with no drainage. DIAGNOSTIC STUDIES Data Review:  
Recent Results (from the past 24 hour(s)) GLUCOSE, POC Collection Time: 10/27/18  4:38 PM  
Result Value Ref Range Glucose (POC) 133 (H) 65 - 100 mg/dL GLUCOSE, POC Collection Time: 10/27/18  8:15 PM  
Result Value Ref Range Glucose (POC) 243 (H) 65 - 100 mg/dL GLUCOSE, POC Collection Time: 10/28/18  4:58 AM  
Result Value Ref Range Glucose (POC) 89 65 - 100 mg/dL CBC WITH AUTOMATED DIFF Collection Time: 10/28/18  6:53 AM  
Result Value Ref Range WBC 7.4 4.3 - 11.1 K/uL  
 RBC 3.27 (L) 4.23 - 5.6 M/uL HGB 9.4 (L) 13.6 - 17.2 g/dL HCT 29.7 (L) 41.1 - 50.3 % MCV 90.8 79.6 - 97.8 FL  
 MCH 28.7 26.1 - 32.9 PG  
 MCHC 31.6 31.4 - 35.0 g/dL  
 RDW 13.1 % PLATELET 648 041 - 826 K/uL MPV 11.2 9.4 - 12.3 FL ABSOLUTE NRBC 0.00 0.0 - 0.2 K/uL  
 DF AUTOMATED NEUTROPHILS 68 43 - 78 % LYMPHOCYTES 21 13 - 44 % MONOCYTES 7 4.0 - 12.0 % EOSINOPHILS 3 0.5 - 7.8 % BASOPHILS 1 0.0 - 2.0 % IMMATURE GRANULOCYTES 0 0.0 - 5.0 %  
 ABS. NEUTROPHILS 5.0 1.7 - 8.2 K/UL  
 ABS. LYMPHOCYTES 1.6 0.5 - 4.6 K/UL ABS. MONOCYTES 0.5 0.1 - 1.3 K/UL  
 ABS. EOSINOPHILS 0.3 0.0 - 0.8 K/UL  
 ABS. BASOPHILS 0.0 0.0 - 0.2 K/UL  
 ABS. IMM. GRANS. 0.0 0.0 - 0.5 K/UL METABOLIC PANEL, BASIC Collection Time: 10/28/18  6:53 AM  
Result Value Ref Range Sodium 143 136 - 145 mmol/L Potassium 4.2 3.5 - 5.1 mmol/L Chloride 107 98 - 107 mmol/L  
 CO2 22 21 - 32 mmol/L Anion gap 14 7 - 16 mmol/L Glucose 83 65 - 100 mg/dL BUN 68 (H) 8 - 23 MG/DL Creatinine 4.81 (H) 0.8 - 1.5 MG/DL  
 GFR est AA 15 (L) >60 ml/min/1.73m2 GFR est non-AA 13 (L) >60 ml/min/1.73m2 Calcium 8.4 8.3 - 10.4 MG/DL  
GLUCOSE, POC Collection Time: 10/28/18 12:10 PM  
Result Value Ref Range Glucose (POC) 191 (H) 65 - 100 mg/dL All Micro Results None Imaging Lord Mckenzie /Studies: CXR Results  (Last 48 hours) None CT Results  (Last 48 hours) 10/26/18 1455  CT HEAD WO CONT Final result Impression:  Impression:  Normal CT of the brain for age done without contrast.   
   
   
  
 Narrative:  CT Brain Without Contrast Dated  October 26, 2018 Reference Exam: October 22, 2018 Indication: Hit head on bathroom door Technique: Radiation dose reduction techniques were used for this study using  
one or more of the following: automated exposure control; adjustment of mA  
and/or kV (according to patient size);  iterative reconstruction. Routine CT of  
the brain was performed using 5 mm axial images obtained. Images are presumed to  
have been obtained less than 24 hours from presentation. Findings:  The ventricular system, basilar cisterns, and cortical sulci all are  
normal in size and position for the patient's age. There are no abnormal  
intracranial masses, mass effect, nor extra-axial collections seen. There are  
no abnormal areas of attenuation that would indicate a recent intracranial  
hemorrhage or infarction.   Bone windows show no fractures nor foreign bodies. No results found. No results found for this visit on 10/21/18. Labs and Studies from previous 24 hours have been personally reviewed by myself   
ASSESSMENT Active Hospital Problems Diagnosis Date Noted  Acute encephalopathy 10/22/2018  Osteomyelitis of left foot ([facility] 10/11/2018  Diabetic ulcer of left midfoot associated with type 2 diabetes mellitus ([facility] 10/05/2018  Anemia of chronic disease 09/06/2018  Glaucoma syndrome 04/21/2017  Type 2 diabetes mellitus with complication, without long-term current use of insulin ([facility] 04/07/2017  Debility 04/07/2017  S/P BKA (below knee amputation) unilateral ([facility] 02/13/2017  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 ([facility] 01/17/2017  CKD (chronic kidney disease) stage 4, GFR 15-29 ml/min ([facility]) 08/11/2016  Chronic systolic congestive heart failure ([facility] 12/30/2015 EF 35-40% on 2015 Echo 
  
 HTN (hypertension) 07/29/2012 Hospital Problems as of 10/28/2018 Date Reviewed: 9/26/2018 Codes Class Noted - Resolved POA * (Principal) Acute encephalopathy ICD-10-CM: G93.40 ICD-9-CM: 348.30  10/22/2018 - Present Yes Osteomyelitis of left foot (HCC) ICD-10-CM: M86.9 ICD-9-CM: 730.27  10/11/2018 - Present Yes Diabetic ulcer of left midfoot associated with type 2 diabetes mellitus ([facility] ICD-10-CM: E11.621, H43.072 ICD-9-CM: 250.80, 707.14  10/5/2018 - Present Yes Anemia of chronic disease (Chronic) ICD-10-CM: D63.8 ICD-9-CM: 285.29  9/6/2018 - Present Yes Glaucoma syndrome ICD-10-CM: H40.9 ICD-9-CM: 365.9  4/21/2017 - Present Yes Type 2 diabetes mellitus with complication, without long-term current use of insulin (HCC) (Chronic) ICD-10-CM: E11.8 ICD-9-CM: 250.90  4/7/2017 - Present Yes Debility ICD-10-CM: R53.81 ICD-9-CM: 799.3  4/7/2017 - Present Yes S/P BKA (below knee amputation) unilateral (HCC) (Chronic) ICD-10-CM: P93.424 ICD-9-CM: V49.75  2/13/2017 - Present Yes 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 (HCC) (Chronic) ICD-10-CM: W26.729 ICD-9-CM: 440.24  1/17/2017 - Present Yes  
   
 CKD (chronic kidney disease) stage 4, GFR 15-29 ml/min (HCC) (Chronic) ICD-10-CM: N18.4 ICD-9-CM: 585.4  8/11/2016 - Present Yes Chronic systolic congestive heart failure (HCC) (Chronic) ICD-10-CM: S16.67 ICD-9-CM: 428.22, 428.0  12/30/2015 - Present Yes Overview Signed 1/6/2017  8:59 AM by Luis Eduardo Harris MD  
  EF 35-40% on 2015 Echo HTN (hypertension) (Chronic) ICD-10-CM: I10 
ICD-9-CM: 401.9  7/29/2012 - Present Yes A/P: 
 
-Acute metabolic encephalopathy Patient appears to be around baseline 
 
-OM L foot Medication non compliance ID eval appreciated, sign off On PO abxs as patient refused to have IV access Cont levaquin and minocycline Will keep patient at least for 2-3 days in the hospital as he will not take medications at home. High risk for readmission with severe complications. Cellulitis/Wound is improving 
 
-AOCD Hb stable  
 
-type 2 DM Controlled Cont glipizide and ISS 
 
-CKD 
nephology eval appreciated Cr stable around 4 
 
-CAD Recent admission for NSTEMI 09/2018 Refused cath due to history of CKD and posibility of ESRD Cont home meds DVT Prophylaxis: heparin CODE Status: Full Plan of Care Discussed with: patient. Care team. 
 
Mallorie Sommers MD 
10/28/18

## 2018-10-28 NOTE — PROGRESS NOTES
[facility] Subjective: CKD5  No new complaints Review of Systems -  
General ROS: negative for - fever, chills Respiratory ROS: no SOB, cough, AN Cardiovascular ROS: no CP, palpitations Gastrointestinal ROS: no N&V, abdominal pain, diarrhea Genito-Urinary ROS: no difficulty voiding, dysuria Neurological ROS: no seizures, focal weekness Objective: 
 
Vitals:  
 10/27/18 2312 10/28/18 5972 10/28/18 0502 10/28/18 2175 BP: 125/52 124/58  118/71 Pulse: 70 72  70 Resp: 16 16  16 Temp: 97.9 °F (36.6 °C) 97.8 °F (36.6 °C)  98.1 °F (36.7 °C) SpO2: 100% 97%  98% Weight:   84.5 kg (186 lb 4.8 oz) Height:      
 
 
PE 
Gen: in no acute distress CV:reg rate Chest:clear Abd: soft Ext/Access: no edema [facility] Recent Labs 10/28/18 
4233 10/26/18 
7311 WBC 7.4 6.4 HGB 9.4* 8.6* HCT 29.7* 27.6*  
 302 Recent Labs 10/28/18 
9997 10/26/18 
5532  140  
K 4.2 4.2  106 CO2 22 23 GLU 83 81 BUN 68* 60* CREA 4.81* 4.54* CA 8.4 8.0* Radiology A/P:  
Patient Active Problem List  
Diagnosis Code  
 HTN (hypertension) I10  
 Chest pain R07.9  Bilateral pleural effusion J90  
 Tobacco use Z72.0  Abnormal CT scan, chest R93.89  Chronic systolic congestive heart failure (HCC) I50.22  
 Cardiomyopathy (HCC) I42.9  CKD (chronic kidney disease) stage 4, GFR 15-29 ml/min ([facility]) N18.4  Type 2 diabetes mellitus with right diabetic foot infection (HCC) E11.628, L08.9  NSTEMI (non-ST elevated myocardial infarction) ([facility] I21.4  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 ([facility] I70.261  S/P BKA (below knee amputation) unilateral ([facility] Z89.519  Type 2 diabetes mellitus with complication, without long-term current use of insulin (HCC) E11.8  Coronary artery disease involving native coronary artery of native heart without angina pectoris I25.10  Debility R53.81  
  Glaucoma syndrome H40.9  Onychomycosis B35.1  MO (acute kidney injury) ([facility] N17.9  Anemia of chronic disease D63.8  Pneumonia due to infectious organism J18.9  EKG, abnormal R94.31  
 Elevated troponin R74.8  Bacteremia R78.81  
 Tachycardia R00.0  Diabetic ulcer of left midfoot associated with type 2 diabetes mellitus ([facility] E11.621, L97.429  
 Cellulitis of left foot L03. [address]  Stage 5 chronic kidney disease not on chronic dialysis ([facility] N18.5  Acute rhinitis J00  
 Acute osteomyelitis of metatarsal bone, left ([address] M86.172  Osteomyelitis of left foot ([address] M86.9  Type 2 diabetes with nephropathy (HCC) E11.21  
 Acute encephalopathy G93.40 CKD4-5-  Creatinine is up slightly. He says he would agree to dialysis if renal function continues to deteriorate. Osteomyelitis - on PO antibiotics due to non-compliance. PVD AMS 
CHF 
SHIRA Laughlin MD

## 2018-10-28 NOTE — PROGRESS NOTES
Patient is resting quietly in bed. Assessment completed. Respirations are even and unlabored. No distress at this time. [place] alarm on. Patient is encouraged to call for assistance.

## 2018-10-28 NOTE — PROGRESS NOTES
Problem: Falls - Risk of 
Goal: *Absence of Falls Document [facility] Fall Risk and appropriate interventions in the flowsheet. Outcome: Progressing Towards Goal 
Fall Risk Interventions: 
Mobility Interventions: Bed/chair exit alarm Mentation Interventions: Bed/chair exit alarm Medication Interventions: Bed/chair exit alarm Elimination Interventions: Bed/chair exit alarm, Call light in reach History of Falls Interventions: Bed/chair exit alarm

## 2018-10-28 NOTE — PROGRESS NOTES
Bedside report from [place], [address] [place] [address]. Patient alert and oriented, resting in bed. Respirations even and unlabored on room air. No acute distress noted. Call light within reach. Will continue to monitor.

## 2018-10-29 NOTE — PROGRESS NOTES
[facility] Subjective: CKD5 Patient seen and examined on rounds, daughter is at the bedside. Patient denies any new complaints. He states that his wife was on dialysis before she passed, discussed dialysis options with patient and daughter. Patient was reluctant to start HD when needed due to concerns for transportation to dialysis, otherwise he is willing to try HD for a short term when the time comes. He is without uremic symptoms. Review of Systems -  
General ROS: negative for - fever, chills, good appetite Respiratory ROS: no SOB, cough, AN Cardiovascular ROS: no CP, palpitations Gastrointestinal ROS: no N&V, abdominal pain, diarrhea Genito-Urinary ROS: no difficulty voiding, dysuria Neurological ROS: no seizures, focal weekness Objective: 
 
Vitals:  
 10/28/18 2325 10/29/18 5521 10/29/18 0405 10/29/18 6837 BP: 123/65 116/70  131/70 Pulse: 66 67  77 Resp: 18 18  19 Temp: 98.4 °F (36.9 °C) 98.4 °F (36.9 °C)  98.3 °F (36.8 °C) SpO2: 96% 99%  98% Weight:   85.1 kg (187 lb 9.6 oz) Height:      
 
 
PE 
Gen: Alert and oriented X3, in no acute distress CV:RRR, S1, S2, no Rub Chest CTA bilaterally, no wheezes Abd: soft, non tender, + bowel sounds Ext/Access: no edema,  R BKA [facility] Recent Labs 10/28/18 
1035 WBC 7.4 HGB 9.4* HCT 29.7*  
 Recent Labs 10/28/18 
5860   
K 4.2  CO2 22 GLU 83 BUN 68* CREA 4.81* CA 8.4 Radiology A/P:  
Patient Active Problem List  
Diagnosis Code  
 HTN (hypertension) I10  
 Chest pain R07.9  Bilateral pleural effusion J90  
 Tobacco use Z72.0  Abnormal CT scan, chest R93.89  Chronic systolic congestive heart failure (HCC) I50.22  
 Cardiomyopathy (HCC) I42.9  CKD (chronic kidney disease) stage 4, GFR 15-29 ml/min ([facility] N18.4  Type 2 diabetes mellitus with right diabetic foot infection (HCC) E11.628, L08.9  NSTEMI (non-ST elevated myocardial infarction) ([facility] I21.4  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 [facility] I70.261  S/P BKA (below knee amputation) unilateral ([facility] Z89.519  Type 2 diabetes mellitus with complication, without long-term current use of insulin ([facility]) E11.8  Coronary artery disease involving native coronary artery of native heart without angina pectoris I25.10  Debility R53.81  
 Glaucoma syndrome H40.9  Onychomycosis B35.1  MO (acute kidney injury) ([facility] N17.9  Anemia of chronic disease D63.8  Pneumonia due to infectious organism J18.9  EKG, abnormal R94.31  
 Elevated troponin R74.8  Bacteremia R78.81  
 Tachycardia R00.0  Diabetic ulcer of left midfoot associated with type 2 diabetes mellitus ([facility] E11.621, L97.429  
 Cellulitis of left foot L03. [address]  Stage 5 chronic kidney disease not on chronic dialysis ([facility] N18.5  Acute rhinitis J00  
 Acute osteomyelitis of metatarsal bone, left ([facility] M86.172  Osteomyelitis of left foot ([facility] M86.9  Type 2 diabetes with nephropathy ([facility]) E11.21  
 Acute encephalopathy G93.40 CKD4-5 Creatinine rising 4.81, GFR 13, non oliguric 
-would refer to vascular for vein mapping and vascular access placement in preparation for HD if he agrees. -continue to follow labs and UOP Osteomyelitis - on PO levaquin and minocycline  from IV due to no IV access, refuses IV access. Per ID 
 
CAD- recent NSTEMI 9/2018 PVD AMS- improved DM - SSI per hosp Anemia- on RAYSHAWN Boyd Marcos NP

## 2018-10-29 NOTE — PROGRESS NOTES
[facility] Face to Face Encounter Patients Name: Wally Crowell    YOB: 1945 Ordering Physician: Patience Street MD 
 
Primary Diagnosis: Acute encephalopathy Date of Face to Face:   10/29/2018 Face to Face Encounter findings are related to primary reason for home care:   yes. 1. I certify that the patient needs intermittent care as follows: skilled nursing care:  skilled observation/assessment, patient education, complex care plan management, wound care and rehabilitative nursing 2. I certify that this patient is homebound, that is: 1) patient requires the use of a walker and prosthesis device, special transportation, or assistance of another to leave the home; or 2) patient's condition makes leaving the home medically contraindicated; and 3) patient has a normal inability to leave the home and leaving the home requires considerable and taxing effort. Patient may leave the home for infrequent and short duration for medical reasons, and occasional absences for non-medical reasons. Homebound status is due to the following functional limitations: Patient with strength deficits limiting the performance of all ADL's without caregiver assistance or the use of an assistive device. Patient with poor safety awareness and is at risk for falls without assistance of another person and the use of an assistive device. Patient with poor ambulation endurance limiting their safe ability to ascend/descend the required number of steps to leave the home. 3. I certify that this patient is under my care and that I, or a nurse practitioner or  [id number], or clinical nurse specialist, or certified nurse midwife, working with me, had a Face-to-Face Encounter that meets the physician Face-to-Face Encounter requirements.   The following are the clinical findings from the [address] [place] [address] encounter that support the need for skilled services and is a summary of the encounter: Medical Record See discharge summary and summary of the patient's illness Gene Lott RN 
10/29/2018 THE FOLLOWING TO BE COMPLETED BY THE COMMUNITY PHYSICIAN: 
 
I concur with the findings described above from the F2F encounter that this patient is homebound and in need of a skilled service. Certifying Physician: _____________________________________ Printed Certifying Physician Name: _____________________________________ Date: _________________

## 2018-10-29 NOTE — DISCHARGE INSTRUCTIONS
Osteomyelitis: Care Instructions  Your Care Instructions  Osteomyelitis (say \"nl-[id number]-id-tt-ep-ZT-tus\") is a bone infection. It is caused by bacteria. The bacteria can infect the bone where it has been injured, or they can be carried through the blood from another area in the body. Osteomyelitis can be a short- or long-term problem. It is treated with antibiotics. You may get the antibiotics as pills or through a needle in a vein (IV). You will probably get treatment in the hospital at first. The type of treatment depends on the type of bacteria causing the infection, the bones affected, and how bad the infection is. Sometimes people need surgery to drain pus from bone or to fix damaged bone. Short-term osteomyelitis that is treated right away usually can be cured. But the long-term form sometimes comes back after treatment. You can help your chances of stopping the infection by taking your medicines as directed. Follow-up care is a key part of your treatment and safety. Be sure to make and go to all appointments, and call your doctor if you are having problems. It's also a good idea to know your test results and keep a list of the medicines you take. How can you care for yourself at home? · Take your antibiotics as directed. Do not stop taking them just because you feel better. You need to take the full course of antibiotics. · Take pain medicines exactly as directed. ? If the doctor gave you a prescription medicine for pain, take it as prescribed. ? If you are not taking a prescription pain medicine, ask your doctor if you can take an over-the-counter medicine. · Do mild exercise and stretching if your doctor says it is okay. This can help keep your bones and muscles healthy. Avoid strenuous work or exercise until your doctor says you can do it. · Consider physical therapy if your doctor suggests it. Physical therapy may help you have a normal range of movement. · Do not smoke.  Smoking can slow healing of the infection. If you need help quitting, talk to your doctor about stop-smoking programs and medicines. These can increase your chances of quitting for good. When should you call for help? Call 911 anytime you think you may need emergency care. For example, call if:    · You have severe bone pain.    Call your doctor now or seek immediate medical care if:    · You continue to have bone pain.     · You have signs of infection, such as:  ? Increased pain, swelling, warmth, or redness. ? Red streaks leading from a wound. ? Pus draining from a wound. ? A fever.    Watch closely for changes in your health, and be sure to contact your doctor if:    · You do not get better as expected. Where can you learn more? Go to http://ulisesMontage Talentjared.info/. Enter U492 in the search box to learn more about \"Osteomyelitis: Care Instructions. \"  Current as of: November 21, 2017  Content Version: 11.8  © 4467-9659 [employer]. Care instructions adapted under license by [employer] (which disclaims liability or warranty for this information). If you have questions about a medical condition or this instruction, always ask your healthcare professional. [place] [address] any warranty or liability for your use of this information. End-Stage Renal Disease: Care Instructions  Your Care Instructions    End-stage renal (or kidney) disease happens when your kidneys can no longer do their jobs. They can't remove waste from your blood. And they aren't able to balance your body's fluids and chemicals. This stage of the disease usually occurs after you have chronic kidney disease for years. Now the kidneys work so poorly that you need dialysis or a kidney transplant. Dialysis uses a machine to filter your waste. A transplant is surgery to give you a healthy kidney from another person. Follow-up care is a key part of your treatment and safety.  Be sure to make and go to all appointments, and call your doctor if you are having problems. It's also a good idea to know your test results and keep a list of the medicines you take. How can you care for yourself at home?    · Be safe with medicines. Take your medicines exactly as prescribed. Call your doctor if you have any problems with your medicine. You also may take medicine to control your blood pressure or to treat diabetes. Many people who have diabetes take blood pressure medicine.     · If you have diabetes, do your best to keep your blood sugar in your target range. You may do this by taking medicine, eating healthy food, and exercising.     · Follow your dialysis schedule.     · Do not take ibuprofen (Advil, Motrin), naproxen (Aleve), or similar medicines, unless your doctor tells you to. These may make chronic kidney disease worse.     · Make sure your doctor knows all of the medicines, vitamins, supplements, and herbal remedies you take.     · Do not smoke or use other tobacco products. Smoking can reduce blood flow to the kidneys. If you need help quitting, talk to your doctor about stop-smoking programs and medicines. These can increase your chances of quitting for good.     · Do not drink alcohol or use illegal drugs.     · If your doctor recommends it, get more exercise. Walking is a good choice.     · If you have an advance directive, let your doctor know. It may include a living will and a durable power of  for health care. If you don't have one, you may want to prepare one. It lets your doctor and loved ones know your health care wishes if you become unable to speak for yourself. Diet    · Talk to a registered dietitian. He or she can help you make a meal plan that is right for you. Most people with kidney disease need to limit salt (sodium), fluids, and protein. Some also have to limit potassium and phosphorus. When should you call for help? Call 911 anytime you think you may need emergency care.  For example, call if:    · You passed out (lost consciousness).    Call your doctor now or seek immediate medical care if:    · You have new or worse nausea and vomiting.     · You have much less urine than normal, or you have no urine.     · You are feeling confused or cannot think clearly.     · You have new or more blood in your urine.     · You have new swelling.     · You are dizzy or lightheaded, or feel like you may faint.    Watch closely for changes in your health, and be sure to contact your doctor if you have any problems. Where can you learn more? Go to http://ulises-jared.info/. Enter S438 in the search box to learn more about \"End-Stage Renal Disease: Care Instructions. \"  Current as of: March 15, 2018  Content Version: 11.8  © 6048-9330 [employer]. Care instructions adapted under license by [employer] (which disclaims liability or warranty for this information). If you have questions about a medical condition or this instruction, always ask your healthcare professional. [place] [address] any warranty or liability for your use of this information. DISCHARGE SUMMARY from Nurse    PATIENT INSTRUCTIONS:    After general anesthesia or intravenous sedation, for 24 hours or while taking prescription Narcotics:  · Limit your activities  · Do not drive and operate hazardous machinery  · Do not make important personal or business decisions  · Do  not drink alcoholic beverages  · If you have not urinated within 8 hours after discharge, please contact your surgeon on call.     Report the following to your surgeon:  · Excessive pain, swelling, redness or odor of or around the surgical area  · Temperature over 100.5  · Nausea and vomiting lasting longer than 4 hours or if unable to take medications  · Any signs of decreased circulation or nerve impairment to extremity: change in color, persistent  numbness, tingling, coldness or increase pain  · Any questions    What to do at Home:  *  Please give a list of your current medications to your Primary Care Provider. *  Please update this list whenever your medications are discontinued, doses are      changed, or new medications (including over-the-counter products) are added. *  Please carry medication information at all times in case of emergency situations. These are general instructions for a healthy lifestyle:    No smoking/ No tobacco products/ Avoid exposure to second hand smoke  Surgeon General's Warning:  Quitting smoking now greatly reduces serious risk to your health. Obesity, smoking, and sedentary lifestyle greatly increases your risk for illness    A healthy diet, regular physical exercise & weight monitoring are important for maintaining a healthy lifestyle    You may be retaining fluid if you have a history of heart failure or if you experience any of the following symptoms:  Weight gain of 3 pounds or more overnight or 5 pounds in a week, increased swelling in our hands or feet or shortness of breath while lying flat in bed. Please call your doctor as soon as you notice any of these symptoms; do not wait until your next office visit. Recognize signs and symptoms of STROKE:    F-face looks uneven    A-arms unable to move or move unevenly    S-speech slurred or non-existent    T-time-call 911 as soon as signs and symptoms begin-DO NOT go       Back to bed or wait to see if you get better-TIME IS BRAIN. Warning Signs of HEART ATTACK     Call 911 if you have these symptoms:   Chest discomfort. Most heart attacks involve discomfort in the center of the chest that lasts more than a few minutes, or that goes away and comes back. It can feel like uncomfortable pressure, squeezing, fullness, or pain.  Discomfort in other areas of the upper body. Symptoms can include pain or discomfort in one or both arms, the back, neck, jaw, or stomach.    Shortness of breath with or without chest discomfort.  Other signs may include breaking out in a cold sweat, nausea, or lightheadedness. Don't wait more than five minutes to call 911 - MINUTES MATTER! Fast action can save your life. Calling 911 is almost always the fastest way to get lifesaving treatment. Emergency Medical Services staff can begin treatment when they arrive -- up to an hour sooner than if someone gets to the hospital by car. The discharge information has been reviewed with the patient. The patient verbalized understanding. Discharge medications reviewed with the patient and appropriate educational materials and side effects teaching were provided.   ___________________________________________________________________________________________________________________________________

## 2018-10-29 NOTE — PROGRESS NOTES
Pt's daughter called back and stated the patient's son Junious New will be here in about a hour to pick patient up.

## 2018-10-29 NOTE — PROGRESS NOTES
Patient is discharging home today. Met with patient to discuss discharge plans. Patient is now agreeable to follow-up by [facility] for RN to check his wound. He states he will let the [facility] RN in as long as they call prior to coming to the door. He denies any other discharge planning needs. Case Management will remain available to assist as needed. Care Management Interventions PCP Verified by CM: Yes Transition of Care Consult (CM Consult): Discharge Planning, Home Health [facility] - INPATIENT: Yes Discharge Durable Medical Equipment: No 
Physical Therapy Consult: Yes Occupational Therapy Consult: Yes Speech Therapy Consult: No 
Current Support Network: Lives Alone, Own Home Confirm Follow Up Transport: Friends Plan discussed with Pt/Family/Caregiver: Yes Freedom of Choice Offered: Yes Discharge Location Discharge Placement: Home with home health

## 2018-10-29 NOTE — PROGRESS NOTES
In accordance with Medicare Guidelines, a copy of the Important Letter from Medicare was presented to the patient in anticipation of expected discharge within 48 hours. Patient declined signing Important Letter to Medicare which was put in patient's chart; patient was provided with unsigned copy of Important Letter to Medicare. Patient's Care Managers notified.

## 2018-10-29 NOTE — PROGRESS NOTES
Discharge instructions and prescriptions provided and explained to the pt. Med side effect sheet reviewed. Opportunity for questions provided. Spoke with daughter April over the phone and reviewed instructions with her. She is unable to come pick patient up and is calling her sister to come. She will call me back and let me know arrangements. Instructed to call once ready to leave.

## 2018-10-29 NOTE — DISCHARGE SUMMARY
Hospitalist Discharge Summary Admit Date:  10/21/2018 11:47 PM  
Name:  Dana Lemon Age:  72 y.o. 
:  1945 MRN:  [id number] PCP:  Siomara Mendenhall MD 
Treatment Team: Attending Provider: Zakiya Blum MD; Utilization Review: Clifton Dinh RN; Care Manager: Vik Oswald RN; Utilization Review: Mayte Santiago RN; Consulting Provider: Flory Bartlett MD 
 
Problem List for this Hospitalization: 
Hospital Problems as of 10/29/2018 Date Reviewed: 2018 Codes Class Noted - Resolved POA * (Principal) Acute encephalopathy ICD-10-CM: G93.40 ICD-9-CM: 348.30  10/22/2018 - Present Yes Osteomyelitis of left foot (HCC) ICD-10-CM: M86.9 ICD-9-CM: 730.27  10/11/2018 - Present Yes Diabetic ulcer of left midfoot associated with type 2 diabetes mellitus ([facility] ICD-10-CM: E11.621, R47.990 ICD-9-CM: 250.80, 707.14  10/5/2018 - Present Yes Anemia of chronic disease (Chronic) ICD-10-CM: D63.8 ICD-9-CM: 285.29  2018 - Present Yes Glaucoma syndrome ICD-10-CM: H40.9 ICD-9-CM: 365.9  2017 - Present Yes Type 2 diabetes mellitus with complication, without long-term current use of insulin (HCC) (Chronic) ICD-10-CM: E11.8 ICD-9-CM: 250.90  2017 - Present Yes Debility ICD-10-CM: R53.81 ICD-9-CM: 799.3  2017 - Present Yes S/P BKA (below knee amputation) unilateral (HCC) (Chronic) ICD-10-CM: N40.017 ICD-9-CM: V49.75  2017 - Present Yes 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  
   
 CKD (chronic kidney disease) stage 4, GFR 15-29 ml/min (HCC) (Chronic) ICD-10-CM: N18.4 ICD-9-CM: 585.4  2016 - Present Yes Chronic systolic congestive heart failure (HCC) (Chronic) ICD-10-CM: Y89.50 ICD-9-CM: 428.22, 428.0  2015 - Present Yes Overview Signed 1/6/2017  8:59 AM by Bg Juarez MD  
  EF 35-40% on 2015 Echo HTN (hypertension) (Chronic) ICD-10-CM: I10 
ICD-9-CM: 401.9  7/29/2012 - Present Yes Admission HPI from 10/21/2018:   
\" Please refer to HPI for more details \". Hospital Course: 
 
71 y/o male with extensive medical history including CAD, NSTEMI, HTN, DM, chronic systolic CHF, CKD and left foot osteomyelitis who was hospitalized 10/5-10/11 for acute resp failure, acute MI, and foot infection. He was discharged home on PO keflex and minocycline. Blood cultures at that time grew coag negative staph. He lives alone and reportedly a neighbor called pt's daughter stating he was not acting right. In the ED A head CT is negative. Lactic acid is 0.8. His CKD about the same as at discharge. Bun 62/Cr 4.50. Patient was admitted for  Acute metabolic encephalopathy likely from OM L foot due to medication non compliance. ID evaluated patient recommended to cont IV. abxs. Patient was refusing IV therapy, non compliance with meds and aggressive towards nursing staff. Antibiotics were switched to PO and ID recommended to follow up outpatient. Patient mental status was back to baseline. Due to medication non compliance patient stayed at hospital for extra days awaiting for improvement of pain and erythema on LLE. Patient agreed to be compliance with medications and to be evaluated by home health upon discharge. Patient to follow up appointment with ID on 11/9 at 2:40PM  
 
 
 
 
Follow up instructions below. Plan was discussed with patient/careteam.  All questions answered. Patient was stable at time of discharge and was instructed to call or return if there are any concerns or recurrence of symptoms. Diagnostic Imaging/Tests: All Micro Results None Labs: Results:  
   
BMP, Mg, Phos Recent Labs 10/28/18 
4416   
K 4.2  CO2 22 AGAP 14 BUN 68* CREA 4.81* CA 8.4 GLU 83 CBC Recent Labs 10/28/18 
5863 WBC 7.4  
RBC 3.27* HGB 9.4* HCT 29.7*  
 GRANS 68  
LYMPH 21 EOS 3 MONOS 7  
BASOS 1 IG 0 ANEU 5.0 ABL 1.6 LIZETTE 0.3 ABM 0.5 ABB 0.0 AIG 0.0  
  
LFT No results for input(s): SGOT, ALT, TBIL, AP, TP, ALB, GLOB, AGRAT, GPT in the last 72 hours. Cardiac Testing Lab Results Component Value Date/Time  10/05/2018 09:01 AM  
  09/24/2018 02:59 AM  
  09/05/2018 07:02 PM  
  01/06/2017 05:37 AM  
  02/11/2016 09:16 AM  
  12/28/2015 05:53 AM  
  10/22/2018 06:08 AM  
  09/09/2018 04:39 AM  
  (H) 01/06/2017 05:37 AM  
 Troponin-I, Qt. 0.06 (H) 10/22/2018 06:08 AM  
 Troponin-I, Qt. 1.77 (HH) 10/05/2018 07:23 PM  
 Troponin-I, Qt. 1.77 (HH) 10/05/2018 04:24 PM  
  
Coagulation Tests Lab Results Component Value Date/Time Prothrombin time 14.0 09/24/2018 02:59 AM  
 INR 1.1 09/24/2018 02:59 AM  
 aPTT 63.5 (H) 09/26/2018 12:58 PM  
 aPTT 124.5 (HH) 09/26/2018 05:47 AM  
 aPTT 105.0 (HH) 09/25/2018 04:16 AM  
  
A1c Lab Results Component Value Date/Time Hemoglobin A1c <3.5 (L) 09/10/2018 04:45 AM  
 Hemoglobin A1c 6.1 (H) 01/06/2017 10:11 AM  
 Hemoglobin A1c 6.9 (H) 07/30/2012 07:44 AM  
  
Lipid Panel Lab Results Component Value Date/Time Cholesterol, total 120 10/18/2018 10:58 AM  
 HDL Cholesterol 38 (L) 10/18/2018 10:58 AM  
 LDL, calculated 67 10/18/2018 10:58 AM  
 VLDL, calculated 15 10/18/2018 10:58 AM  
 Triglyceride 73 10/18/2018 10:58 AM  
  
Thyroid Panel Lab Results Component Value Date/Time TSH 1.160 10/22/2018 06:08 AM  
 TSH 2.030 10/05/2017 02:46 PM  
    
Most Recent UA Lab Results Component Value Date/Time  Color YELLOW 09/24/2018 04:41 AM  
 Appearance CLOUDY 09/24/2018 04:41 AM  
 Specific gravity 1.017 09/24/2018 04:41 AM  
 pH (UA) 5.5 09/24/2018 04:41 AM  
 Protein 300 (A) 09/24/2018 04:41 AM  
 Glucose 100 09/24/2018 04:41 AM  
 Ketone NEGATIVE  09/24/2018 04:41 AM  
 Bilirubin NEGATIVE  09/24/2018 04:41 AM  
 Blood TRACE (A) 09/24/2018 04:41 AM  
 Urobilinogen 1.0 09/24/2018 04:41 AM  
 Nitrites NEGATIVE  09/24/2018 04:41 AM  
 Leukocyte Esterase SMALL (A) 09/24/2018 04:41 AM  
  
 
Allergies Allergen Reactions  Lortab [Hydrocodone-Acetaminophen] Itching Immunization History Administered Date(s) Administered  DTAP Vaccine 06/03/2010  TB Skin Test (PPD) Intradermal 01/08/2017, 09/06/2018, 10/05/2018, 10/22/2018 All Labs from Last 24 Hrs: 
Recent Results (from the past 24 hour(s)) GLUCOSE, POC Collection Time: 10/28/18  3:56 PM  
Result Value Ref Range Glucose (POC) 125 (H) 65 - 100 mg/dL GLUCOSE, POC Collection Time: 10/28/18  8:18 PM  
Result Value Ref Range Glucose (POC) 213 (H) 65 - 100 mg/dL GLUCOSE, POC Collection Time: 10/29/18  5:15 AM  
Result Value Ref Range Glucose (POC) 89 65 - 100 mg/dL GLUCOSE, POC Collection Time: 10/29/18 11:55 AM  
Result Value Ref Range Glucose (POC) 88 65 - 100 mg/dL Discharge Exam: 
Patient Vitals for the past 24 hrs: 
 Temp Pulse Resp BP SpO2  
10/29/18 1131 98.2 °F (36.8 °C) 79 18 129/73 97 % 10/29/18 0724 98.3 °F (36.8 °C) 77 19 131/70 98 % 10/29/18 0318 98.4 °F (36.9 °C) 67 18 116/70 99 % 10/28/18 2325 98.4 °F (36.9 °C) 66 18 123/65 96 % 10/28/18 1925 98.3 °F (36.8 °C) 73 18 117/62 97 % 10/28/18 1455 98.5 °F (36.9 °C) 69 16  94 % Oxygen Therapy O2 Sat (%): 97 % (10/29/18 1131) Pulse via Oximetry: 76 beats per minute (10/22/18 1938) O2 Device: Room air (10/22/18 1938) Intake/Output Summary (Last 24 hours) at 10/29/2018 1343 Last data filed at 10/29/2018 6179 Gross per 24 hour Intake 240 ml Output 500 ml Net -260 ml Patient stated slept well last night. Patient stated still having some pain on LLE. General:               Alert, cooperative, no distress HEENT:               NCAT. No obvious deformity. Head wound no bleeding or tenderness Lungs: No wheezing/rhonchi/rales Cardiovascular:   RRR. No m/r/g. No pedal edema b/l. Abdomen:            S/nt/nd. Bowel sounds normal. .  
Skin:                    No rashes or lesions. Not Jaundiced Neurologic:           No gross focal deficits Psychiatric:         Good mood. Normal affect. Extremities:         L foot with less tenderness palpation, less erythema. Wound with eschar with no drainage. Discharge Info:  
Current Discharge Medication List  
  
START taking these medications Details  
levoFLOXacin (LEVAQUIN) 750 mg tablet Take 1 Tab by mouth every fourty-eight (48) hours for 16 days. Qty: 8 Tab, Refills: 0 CONTINUE these medications which have CHANGED Details  
amLODIPine (NORVASC) 10 mg tablet Take 1 Tab by mouth daily. Qty: 30 Tab, Refills: 1  
  
minocycline (MINOCIN, DYNACIN) 100 mg capsule Take 1 Cap by mouth every twelve (12) hours for 16 days. Qty: 32 Cap, Refills: 0 CONTINUE these medications which have NOT CHANGED Details  
glipiZIDE SR (GLUCOTROL XL) 2.5 mg CR tablet take 1 tablet by mouth once daily 
Qty: 30 Tab, Refills: 1  
  
ramipril (ALTACE) 10 mg capsule Take 1 Cap by mouth daily. Qty: 30 Cap, Refills: 1 Associated Diagnoses: Essential hypertension  
  
metoprolol succinate (TOPROL-XL) 100 mg tablet Take 1 Tab by mouth daily. Qty: 30 Tab, Refills: 2 Associated Diagnoses: Cardiomyopathy, unspecified type ([address]  
  
atorvastatin (LIPITOR) 80 mg tablet Take 1 Tab by mouth daily. Qty: 30 Tab, Refills: 2 Associated Diagnoses: Mixed hyperlipidemia  
  
isosorbide mononitrate ER (IMDUR) 30 mg tablet Take 1 Tab by mouth daily. Qty: 30 Tab, Refills: 1 Associated Diagnoses: Cardiomyopathy, unspecified type ([address] furosemide (LASIX) 80 mg tablet Take 1 Tab by mouth two (2) times a day. Indications: Pulmonary Edema due to Chronic Heart Failure Qty: 60 Tab, Refills: 1 Lancets misc As directed Qty: 1 Each, Refills: 11  
  
glucose blood VI test strips (ASCENSIA AUTODISC VI, ONE TOUCH ULTRA TEST VI) strip As directed Qty: 200 Strip, Refills: 11  
  
sodium bicarbonate 650 mg tablet Take 1 Tab by mouth three (3) times daily (with meals). Qty: 90 Tab, Refills: 0  
  
aspirin 81 mg chewable tablet Take 1 Tab by mouth daily (after breakfast). Qty: 30 Tab, Refills: 11 STOP taking these medications  
  
 cephALEXin (KEFLEX) 500 mg capsule Comments:  
Reason for Stopping:   
   
  
 
 
 
Disposition: home Activity: PT/OT per Home Health Diet: Diabetic Diet Follow-up Information Follow up With Specialties Details Why Contact Info Stefani Hedrick MD [facility] [address] [address] 
[phone] Signed: Rodo Merchant MD

## 2018-10-31 NOTE — PROGRESS NOTES
Transition of Care Discharge Follow-up Questionnaire Date/Time of Call: 
 10/31/18 5332 What was the patient hospitalized for? SFD 10/21-10/29/18 AMS, Osteomyelitiis Does the patient understand his/her diagnosis and/or treatment and what happened during the hospitalization? No, he does not Did the patient receive discharge instructions? States \"I got some papers but I can't read them\". CM Assessed Risk for Readmission:  
 
 
Patient stated Risk for Readmission:  
 
 Very high risk due to comorbidities, noncompliance, social issues None stated Review any discharge instructions (see discharge instructions/AVS in [facility]). Ask patient if they understand these. Do they have any questions? See below Were home services ordered (nursing, PT, OT, ST, etc.)? Yes, [facility] If so, has the first visit occurred? If not, why? (Assist with coordination of services if necessary.) No  
Was any DME ordered? Pt states has a w/c, cane and walker If so, has it been received? If not, why?  (Assist patient in obtaining DME orders &/or equipment if necessary. )  
NA Complete a review of all medications (new, continued and discontinued meds per the D/C instructions and medication tab in [address] [place] [address]). See below Were all new prescriptions filled? If not, why?  (Assist patient in obtaining medications if necessary  escalate for CCM &/or SW if ongoing issues are verbalized by pt or anticipated) Yes, per daughter Does the patient understand the purpose and dosing instructions for all medications? (If patient has questions, provide explanation and education.) See below Does the patient have any problems in performing ADLs? (If patient is unable to perform ADLs  what is the limiting factor(s)? Do they have a support system that can assist? If no support system is present, discuss possible assistance that they may be able to obtain.  Escalate for CCM/SW if ongoing issues are verbalized by pt or anticipated) Family, neighbors able to assist as needed Does the patient have all follow-up appointments scheduled? 7 day f/up with PCP?  
(f/up with PCP may be w/in 14 days if patient has a f/up with their specialist w/in 7 days) 7-14 day f/up with specialist?  
(or per discharge instructions) If f/up has not been made  what actions has the care coordinator made to accomplish this? Has transportation been arranged? Will assess Any other questions or concerns expressed by the patient? See below Schedule next appointment with KIERAN GARCIA Coordinator or refer to RN Case Manager/ per the workflow guidelines. When is care coordinators next follow-up call scheduled? If referred for CCM  what RN care manager was the referral assigned? Plan to call back tomorrow JENNI Call Completed By:  
Gold Sifuentes RN  
 
 
RNCM call to pt. No VM, unable to leave a message. Return call from pt. RNCM identified self, explained role. Pt agreeable to outreach. Pt somewhat confused. Stating he wants to get better but unable to discuss anything specific. States med compliance, unable to review meds with pt as he states \"I can't see the bottles to read them\" . When asked how do you know what/when to take, he states he takes one of each every day. When pressed, he states someone ( a neighbor or one of his children ) is assisting. Also unable to discuss discharge instructions/ paperwork as pt states \"I can't see those papers. \" Apparently HH has not started and pt doesn't know anything about their scheduling. Also doesn't remember he has a wound on his foot and whether he has had any wound care since discharge. Plan to f/u tomorrow with pt. RNCM call to daughter April.  She reports her brother is assisting with meds and this RN reminded one antibiotic (levaquin) is every other day and she indicated brother is aware of this. April also reports pt is confused at times and she has tried to get pt to return to hospital but he refuses. Encouraged to call 911 if necessary. EMTs can assess pt and hopefully transport to ER if he agrees. She does not think pt would agree to any help but will call 911 if necessary. April had to hang up as out for Halloween with her family. Plan to call her back tomorrow. Also plan to call [facility] in am as after hours now. This note will not be viewable in [address]

## 2018-11-01 NOTE — PROGRESS NOTES
RNCM call to pt's daughter, April, message left. Call to other daughter, Willa Pearson. Discussed need for f/u appmt, pt missed Nephrology appmt on Tues 10/30. (RNCM called to verify no show). High acuity clinic has openings this afternoon. Willa Pearson states someone can take pt at 2pm. RNCM also called [facility] to request a start of care asap due to history of noncompliance etc.  
Plan to meet pt at clinic appmt this afternoon. RNCM to [facility] to meet pt at MD appmt. He refused to come, daughter tried to talk to him. This RN called pt and spoke at length but he refuses to come to appmt. He refused an appmt tomorrow as well but agrees to  Monday 11/5. Plan to meet him there. This note will not be viewable in [address]

## 2018-11-04 PROBLEM — M86.9 OSTEOMYELITIS (HCC): Status: ACTIVE | Noted: 2018-01-01

## 2018-11-04 NOTE — H&P
Hospitalist H&P Note Admit Date:  2018  2:32 PM  
Name:  Sridevi Baltazar Age:  72 y.o. 
:  1945 MRN:  [id number] PCP:  Jory Carbajal MD 
Treatment Team: Primary Nurse: Kt Reyes RN 
 
HPI:  
Patient is a 71 y/o AA male with extensive PMH  including CAD, NSTEMI, HTN, DM, chronic systolic CHF, CKD and left foot osteomyelitis who has several recent admissions regarding left foot infection and noncompliance. On 10/5 patient was discharged home with PO keflex and minocycline. Blood cultures at that time grew coag negative staph. Patient readmitted 10/21 for Acute metabolic encephalopathy likely from OM L foot due to medication non compliance. ID evaluated patient recommended to cont IV. abxs. Patient was refusing IV therapy, so was switched to PO and ID recommended to follow up as outpatient. Today Patient returned to ED along with daughter  with similar  complaints of confusion and left foot pain. No leukocytosis. No fever. Patient alert and oriented, denies fever/chills, n/v/d. Cr 5.98. CXR negative. XR left foot findings suggestive of osteomyelitis of the proximal phalanx of the fifth toe. Patient started on zosyn and minocycline continued. 10 systems reviewed and negative except as noted in HPI. Past Medical History:  
Diagnosis Date  Abnormal CT scan, chest 2015  Acute CHF ([address] 2015  Acute systolic congestive heart failure ([address] 2015  MO (acute kidney injury) ([address] 2012  Anemia of chronic disease 2018  Bilateral pleural effusion 2015  Chest pain 2015  Depression  DM (diabetes mellitus), type 2 ([address] 2012  Encephalopathy due to infection 2017  
 HTN (hypertension) 2012  Hypoglycemia 2012  
 NSTEMI (non-ST elevated myocardial infarction) ([address] 2017  Tobacco use 2015 Past Surgical History:  
Procedure Laterality Date  HX ORTHOPAEDIC Allergies Allergen Reactions  Lortab [Hydrocodone-Acetaminophen] Itching Social History Tobacco Use  Smoking status: Current Every Day Smoker Packs/day: 0.50 Years: 45.00 Pack years: 22.50 Types: Cigarettes  Smokeless tobacco: Never Used Substance Use Topics  Alcohol use: No  
  
Family History Problem Relation Age of Onset  Diabetes Mother  Kidney Disease Mother  Diabetes Father  Kidney Disease Father  Kidney Disease Sister Immunization History Administered Date(s) Administered  DTAP Vaccine 06/03/2010  TB Skin Test (PPD) Intradermal 01/08/2017, 09/06/2018, 10/05/2018, 10/22/2018 PTA Medications: 
Prior to Admission Medications Prescriptions Last Dose Informant Patient Reported? Taking? Lancets misc   No No  
Sig: As directed  
amLODIPine (NORVASC) 10 mg tablet   No No  
Sig: Take 1 Tab by mouth daily. aspirin 81 mg chewable tablet   No No  
Sig: Take 1 Tab by mouth daily (after breakfast). atorvastatin (LIPITOR) 80 mg tablet   No No  
Sig: Take 1 Tab by mouth daily. furosemide (LASIX) 80 mg tablet   No No  
Sig: Take 1 Tab by mouth two (2) times a day. Indications: Pulmonary Edema due to Chronic Heart Failure  
glipiZIDE SR (GLUCOTROL XL) 2.5 mg CR tablet   No No  
Sig: take 1 tablet by mouth once daily  
glucose blood VI test strips (ASCENSIA AUTODISC VI, ONE TOUCH ULTRA TEST VI) strip   No No  
Sig: As directed  
isosorbide mononitrate ER (IMDUR) 30 mg tablet   No No  
Sig: Take 1 Tab by mouth daily. levoFLOXacin (LEVAQUIN) 750 mg tablet   No No  
Sig: Take 1 Tab by mouth every fourty-eight (48) hours for 16 days. metoprolol succinate (TOPROL-XL) 100 mg tablet   No No  
Sig: Take 1 Tab by mouth daily. minocycline (MINOCIN, DYNACIN) 100 mg capsule   No No  
Sig: Take 1 Cap by mouth every twelve (12) hours for 16 days. ramipril (ALTACE) 10 mg capsule   No No  
Sig: Take 1 Cap by mouth daily. sodium bicarbonate 650 mg tablet   No No  
Sig: Take 1 Tab by mouth three (3) times daily (with meals). Facility-Administered Medications: None Objective:  
 
Patient Vitals for the past 24 hrs: 
 Temp Pulse Resp BP SpO2  
11/04/18 1514 99.4 °F (37.4 °C)      
11/04/18 1333 98.8 °F (37.1 °C) 75 18 128/65 96 % Oxygen Therapy O2 Sat (%): 96 % (11/04/18 1333) O2 Device: Room air (11/04/18 1630) No intake or output data in the 24 hours ending 11/04/18 1726 Physical Exam: 
General:    Well nourished. Drowsy. elderly Eyes:   Normal sclera. Extraocular movements intact. ENT:  Normocephalic, atraumatic. Moist mucous membranes CV:   RRR. No m/r/g. Capillary refill <2s. Lungs:  CTAB. No wheezing, rhonchi, or rales. Room air Abdomen: Soft, nontender, nondistended. Bowel sounds normal.  
Extremities: Warm and dry. No cyanosis or edema. Right bka. Neurologic: CN II-XII grossly intact. Sensation intact. Skin:     No rashes or jaundice. Normal coloration. Left 2nd toe ulcer. Left lateral foot ulcer. Heel boggy. Psych:  Normal mood and affect. I reviewed the labs, imaging, EKGs, telemetry, and other studies done this admission. Data Review:  
Recent Results (from the past 24 hour(s)) CBC WITH AUTOMATED DIFF Collection Time: 11/04/18  1:36 PM  
Result Value Ref Range WBC 7.1 4.3 - 11.1 K/uL  
 RBC 3.29 (L) 4.23 - 5.6 M/uL HGB 9.2 (L) 13.6 - 17.2 g/dL HCT 29.4 (L) 41.1 - 50.3 % MCV 89.4 79.6 - 97.8 FL  
 MCH 28.0 26.1 - 32.9 PG  
 MCHC 31.3 (L) 31.4 - 35.0 g/dL  
 RDW 13.0 % PLATELET 235 727 - 228 K/uL MPV 11.0 9.4 - 12.3 FL ABSOLUTE NRBC 0.00 0.0 - 0.2 K/uL  
 DF AUTOMATED NEUTROPHILS 76 43 - 78 % LYMPHOCYTES 14 13 - 44 % MONOCYTES 6 4.0 - 12.0 % EOSINOPHILS 4 0.5 - 7.8 % BASOPHILS 1 0.0 - 2.0 % IMMATURE GRANULOCYTES 0 0.0 - 5.0 %  
 ABS. NEUTROPHILS 5.4 1.7 - 8.2 K/UL  
 ABS. LYMPHOCYTES 1.0 0.5 - 4.6 K/UL ABS. MONOCYTES 0.5 0.1 - 1.3 K/UL  
 ABS. EOSINOPHILS 0.3 0.0 - 0.8 K/UL  
 ABS. BASOPHILS 0.0 0.0 - 0.2 K/UL  
 ABS. IMM. GRANS. 0.0 0.0 - 0.5 K/UL METABOLIC PANEL, COMPREHENSIVE Collection Time: 11/04/18  1:36 PM  
Result Value Ref Range Sodium 137 136 - 145 mmol/L Potassium 4.3 3.5 - 5.1 mmol/L Chloride 106 98 - 107 mmol/L  
 CO2 22 21 - 32 mmol/L Anion gap 9 7 - 16 mmol/L Glucose 197 (H) 65 - 100 mg/dL BUN 87 (H) 8 - 23 MG/DL Creatinine 5.98 (H) 0.8 - 1.5 MG/DL  
 GFR est AA 12 (L) >60 ml/min/1.73m2 GFR est non-AA 10 (L) >60 ml/min/1.73m2 Calcium 8.0 (L) 8.3 - 10.4 MG/DL Bilirubin, total 0.4 0.2 - 1.1 MG/DL  
 ALT (SGPT) 22 12 - 65 U/L  
 AST (SGOT) 26 15 - 37 U/L Alk. phosphatase 85 50 - 136 U/L Protein, total 7.8 6.3 - 8.2 g/dL Albumin 2.5 (L) 3.2 - 4.6 g/dL Globulin 5.3 (H) 2.3 - 3.5 g/dL A-G Ratio 0.5 (L) 1.2 - 3.5    
TROPONIN I Collection Time: 11/04/18  1:36 PM  
Result Value Ref Range Troponin-I, Qt. 0.07 (H) 0.02 - 0.05 NG/ML  
C REACTIVE PROTEIN, QT Collection Time: 11/04/18  1:36 PM  
Result Value Ref Range C-Reactive protein 3.7 (H) 0.0 - 0.9 mg/dL EKG, 12 LEAD, INITIAL Collection Time: 11/04/18  3:40 PM  
Result Value Ref Range Ventricular Rate 83 BPM  
 Atrial Rate 83 BPM  
 P-R Interval 176 ms QRS Duration 76 ms  
 Q-T Interval 404 ms QTC Calculation (Bezet) 474 ms Calculated P Axis 74 degrees Calculated R Axis 66 degrees Calculated T Axis -83 degrees Diagnosis Sinus rhythm with Premature atrial complexes ST & T wave abnormality, consider inferior ischemia Prolonged QT Abnormal ECG When compared with ECG of 05-OCT-2018 08:56, 
Premature atrial complexes are now Present Criteria for Septal infarct are no longer Present ST elevation has replaced ST depression in Anterior leads Confirmed by Carlos Eduardo Garcia MD (), Aziza Bourne ([id number]) on 11/4/2018 5:10:54 PM 
  
 
 
All Micro Results None Other Studies: 
Xr Foot Lt Min 3 V Result Date: 11/4/2018 Exam:  Left foot radiographs History:  pain, pain, Osteomyelitis, 72 years Male open wounds Comparison: Left foot radiographs October 22, 2018 Findings:  No evidence of acute fracture or dislocation. Normal alignment, joint spaces preserved. Cortical erosions are seen of the lateral aspect of the proximal phalanx of the fifth toe, suggestive of osteomyelitis. Normal mineralization. No evidence of ankle joint effusion. Severe calcific atherosclerosis unchanged. Visualized soft tissues otherwise unremarkable. Impression: Findings suggestive of osteomyelitis of the proximal phalanx of the fifth toe. Xr Chest [facility] Result Date: 11/4/2018 AP chest radiograph History: Chest PAin, 73 years Male Comparison: Chest radiograph October 22, 2018 Findings:   Normal cardiomediastinal silhouette. Mild subsegmental atelectasis bilateral lung bases. No evidence of pneumothorax, pleural effusion, or air space consolidation. Visualized soft tissue and osseous structures otherwise unremarkable. Impression:  No significant interval change. Assessment and Plan:  
 
Hospital Problems as of 11/4/2018 Date Reviewed: 9/26/2018 Codes Class Noted - Resolved POA Osteomyelitis ([facility] ICD-10-CM: M86.9 ICD-9-CM: 730.20  11/4/2018 - Present Unknown PLAN: 
 
Osteomyelitis 
-start zosyn  
-Continue minocycline 
-ID consult -BC's 
 
T2DM 
-SSI MO on CKD 
-IVF 75cc hr 
-BMP in am 
-Hold lasix and ramipril CHF 
-Hold lasix due to creatinine  
-Monitor for fluid overload HTN 
-Hold ramipril d/t mo 
-continue home medications CAD 
-continue home medications Discharge planning:  Home vs STR 
DVT ppx: [facility] Code status:  Full Estimated LOS:  Greater than 2 midnights Risk:  high Plan of care discussed with Dr. Nae Alatorres, Signed: 
Mariana Siddiqui NP Addendum: Pt seen and examined. Agree with the mentioned above. Pt presented with left foot pain, confusion and generalized weakness. Xray with similar findings of OM. Non compliant with medications. Physical Exam: 
General:    Well nourished. Drowsy. elderly Eyes:   Normal sclera. Extraocular movements intact. ENT:  Normocephalic, atraumatic. Moist mucous membranes CV:   RRR. No m/r/g. Capillary refill <2s. Lungs:  CTAB. No wheezing, rhonchi, or rales. Room air Abdomen: Soft, nontender, nondistended. Bowel sounds normal.  
Extremities: Warm and dry. No cyanosis or edema. Right bka. Neurologic: CN II-XII grossly intact. Sensation intact. Skin:     No rashes or jaundice. Normal coloration. Left 2nd toe ulcer. Left lateral foot ulcer. Heel boggy. Psych:  Normal mood and affect. Plan: 
 
Admit and place on Zosyn and Minocycline for now. Follow up cultures. Was seen by ID service in the past. Will consult ID for assist. Will hold off surgical evaluation for now. Continue gentle hydration for MO and watch for signs of fluid overload given his history of CHF.   
 
Neida Mascorro MD 
6:12 PM

## 2018-11-04 NOTE — PROGRESS NOTES
TRANSFER - IN REPORT: 
 
Verbal report received from [place], RN(name) on John Cole  being received from ED(unit) for routine progression of care Report consisted of patients Situation, Background, Assessment and  
Recommendations(SBAR). Information from the following report(s) SBAR, Kardex, ED Summary, Intake/Output, MAR, Accordion and Recent Results was reviewed with the receiving nurse. Opportunity for questions and clarification was provided. Assessment completed upon patients arrival to unit and care assumed.

## 2018-11-04 NOTE — ED TRIAGE NOTES
Per patient family, patient sent to be evaluated by er Maryjane Salvage is too weak to stay home, we was discharge too early from hospital from a foot infection in r foot. \" patient states of increased weakness denies gi/gu complications.

## 2018-11-04 NOTE — ED PROVIDER NOTES
Cellulitis male brought in by family. Family is not at the bedside at this point. States he was discharged from the hospital about 6 days ago due to  Osteomyelitis of the left foot. Plan home on oral antibiotics. Has a history of hypertension diabetes. Also has a right AKA. He states he's had progressive weakness over the last 2 or 3 days  And is difficult to walk. Denies to me any fever or chills or vomiting or diarrhea. No chest pain or abdominal pain or shortness of breath. No focal weakness. He is generally weak all over. Patient also has a history of renal insufficiency. Not on dialysis. The history is provided by the patient. Fatigue This is a new problem. The current episode started 2 days ago. The problem has been gradually worsening. There was no focality noted. Pertinent negatives include no loss of sensation, no mental status change and no unresponsiveness. There has been no fever. Pertinent negatives include no shortness of breath, no chest pain, no vomiting, no altered mental status, no headaches, no nausea, no bowel incontinence and no bladder incontinence. Past Medical History:  
Diagnosis Date  Abnormal CT scan, chest 12/27/2015  Acute CHF ([address] 12/26/2015  Acute systolic congestive heart failure ([address] 12/30/2015  MO (acute kidney injury) ([address] 7/29/2012  Anemia of chronic disease 9/6/2018  Bilateral pleural effusion 12/27/2015  Chest pain 12/26/2015  Depression  DM (diabetes mellitus), type 2 ([address] 7/29/2012  Encephalopathy due to infection 1/7/2017  
 HTN (hypertension) 7/29/2012  Hypoglycemia 7/29/2012  
 NSTEMI (non-ST elevated myocardial infarction) ([address] 1/5/2017  Tobacco use 12/27/2015 Past Surgical History:  
Procedure Laterality Date  HX ORTHOPAEDIC Family History:  
Problem Relation Age of Onset  Diabetes Mother  Kidney Disease Mother  Diabetes Father  Kidney Disease Father  Kidney Disease Sister Social History Socioeconomic History  Marital status: SINGLE Spouse name: Not on file  Number of children: Not on file  Years of education: Not on file  Highest education level: Not on file Social Needs  Financial resource strain: Not on file  Food insecurity - worry: Not on file  Food insecurity - inability: Not on file  Transportation needs - medical: Not on file  Transportation needs - non-medical: Not on file Occupational History  Occupation: retired  Tobacco Use  Smoking status: Current Every Day Smoker Packs/day: 0.50 Years: 45.00 Pack years: 22.50 Types: Cigarettes  Smokeless tobacco: Never Used Substance and Sexual Activity  Alcohol use: No  
 Drug use: Yes Types: Prescription  Sexual activity: No  
Other Topics Concern  Not on file Social History Narrative  Not on file ALLERGIES: Lortab [hydrocodone-acetaminophen] Review of Systems Constitutional: Positive for fatigue. Negative for chills and fever. Respiratory: Negative for cough and shortness of breath. Cardiovascular: Negative for chest pain and palpitations. Gastrointestinal: Negative for abdominal pain, bowel incontinence, diarrhea, nausea and vomiting. Genitourinary: Negative for bladder incontinence, dysuria and flank pain. Musculoskeletal: Negative for back pain and neck pain. Skin: Negative for color change and rash. Neurological: Negative for syncope and headaches. All other systems reviewed and are negative. Vitals:  
 11/04/18 1333 11/04/18 1514 BP: 128/65 Pulse: 75 Resp: 18 Temp: 98.8 °F (37.1 °C) 99.4 °F (37.4 °C) SpO2: 96% Weight: 81.8 kg (180 lb 7 oz) Height: 6' 1\" (1.854 m) Physical Exam  
Constitutional: He is oriented to person, place, and time. He appears well-developed and well-nourished. No distress.   
HENT:  
 Head: Normocephalic and atraumatic. Right Ear: External ear normal.  
Left Ear: External ear normal.  
Mouth/Throat: Oropharynx is clear and moist. No oropharyngeal exudate. Eyes: Conjunctivae and EOM are normal. Pupils are equal, round, and reactive to light. Neck: Normal range of motion. Neck supple. Cardiovascular: Normal rate, regular rhythm and intact distal pulses. No murmur heard. Pulmonary/Chest: Breath sounds normal. No respiratory distress. Abdominal: Soft. Bowel sounds are normal. He exhibits no mass. There is no tenderness. There is no rebound and no guarding. No hernia. Musculoskeletal:  
Right AKA. Left foot was 2 toes with some slight drainage and erythema and some slight tenderness. Outer aspect of the left foot with some ulceration without active drainage. Some tenderness and mild soft tissue swelling. Neurological: He is alert and oriented to person, place, and time. Gait normal.  
Nl speech Skin: Skin is warm and dry. Psychiatric: He has a normal mood and affect. His speech is normal.  
Nursing note and vitals reviewed. MDM Number of Diagnoses or Management Options Diagnosis management comments: Assessment dehydration, anemia, worse blood sugar. Assess for infection or cardiac Amount and/or Complexity of Data Reviewed Clinical lab tests: ordered and reviewed Tests in the radiology section of CPT®: ordered and reviewed Tests in the medicine section of CPT®: ordered and reviewed Review and summarize past medical records: yes (Recent admission October 21 229 4 osteomyelitis. Patient was supposed to have IV antibiotics a bit longer but decided not to stay in the hospital.  Discharge, Levaquin and minocycline.) Independent visualization of images, tracings, or specimens: yes Risk of Complications, Morbidity, and/or Mortality Presenting problems: moderate Diagnostic procedures: low Management options: moderate Patient Progress Patient progress: stable Procedures Results Include: 
 
Recent Results (from the past 24 hour(s)) CBC WITH AUTOMATED DIFF Collection Time: 11/04/18  1:36 PM  
Result Value Ref Range WBC 7.1 4.3 - 11.1 K/uL  
 RBC 3.29 (L) 4.23 - 5.6 M/uL HGB 9.2 (L) 13.6 - 17.2 g/dL HCT 29.4 (L) 41.1 - 50.3 % MCV 89.4 79.6 - 97.8 FL  
 MCH 28.0 26.1 - 32.9 PG  
 MCHC 31.3 (L) 31.4 - 35.0 g/dL  
 RDW 13.0 % PLATELET 313 996 - 321 K/uL MPV 11.0 9.4 - 12.3 FL ABSOLUTE NRBC 0.00 0.0 - 0.2 K/uL  
 DF AUTOMATED NEUTROPHILS 76 43 - 78 % LYMPHOCYTES 14 13 - 44 % MONOCYTES 6 4.0 - 12.0 % EOSINOPHILS 4 0.5 - 7.8 % BASOPHILS 1 0.0 - 2.0 % IMMATURE GRANULOCYTES 0 0.0 - 5.0 %  
 ABS. NEUTROPHILS 5.4 1.7 - 8.2 K/UL  
 ABS. LYMPHOCYTES 1.0 0.5 - 4.6 K/UL  
 ABS. MONOCYTES 0.5 0.1 - 1.3 K/UL  
 ABS. EOSINOPHILS 0.3 0.0 - 0.8 K/UL  
 ABS. BASOPHILS 0.0 0.0 - 0.2 K/UL  
 ABS. IMM. GRANS. 0.0 0.0 - 0.5 K/UL METABOLIC PANEL, COMPREHENSIVE Collection Time: 11/04/18  1:36 PM  
Result Value Ref Range Sodium 137 136 - 145 mmol/L Potassium 4.3 3.5 - 5.1 mmol/L Chloride 106 98 - 107 mmol/L  
 CO2 22 21 - 32 mmol/L Anion gap 9 7 - 16 mmol/L Glucose 197 (H) 65 - 100 mg/dL BUN 87 (H) 8 - 23 MG/DL Creatinine 5.98 (H) 0.8 - 1.5 MG/DL  
 GFR est AA 12 (L) >60 ml/min/1.73m2 GFR est non-AA 10 (L) >60 ml/min/1.73m2 Calcium 8.0 (L) 8.3 - 10.4 MG/DL Bilirubin, total 0.4 0.2 - 1.1 MG/DL  
 ALT (SGPT) 22 12 - 65 U/L  
 AST (SGOT) 26 15 - 37 U/L Alk. phosphatase 85 50 - 136 U/L Protein, total 7.8 6.3 - 8.2 g/dL Albumin 2.5 (L) 3.2 - 4.6 g/dL Globulin 5.3 (H) 2.3 - 3.5 g/dL A-G Ratio 0.5 (L) 1.2 - 3.5    
TROPONIN I Collection Time: 11/04/18  1:36 PM  
Result Value Ref Range Troponin-I, Qt. 0.07 (H) 0.02 - 0.05 NG/ML  
C REACTIVE PROTEIN, QT Collection Time: 11/04/18  1:36 PM  
Result Value Ref Range C-Reactive protein 3.7 (H) 0.0 - 0.9 mg/dL EKG, 12 LEAD, INITIAL Collection Time: 11/04/18  3:40 PM  
Result Value Ref Range Ventricular Rate 83 BPM  
 Atrial Rate 83 BPM  
 P-R Interval 176 ms QRS Duration 76 ms  
 Q-T Interval 404 ms QTC Calculation (Bezet) 474 ms Calculated P Axis 74 degrees Calculated R Axis 66 degrees Calculated T Axis -83 degrees Diagnosis Sinus rhythm with Premature atrial complexes ST & T wave abnormality, consider inferior ischemia Prolonged QT Abnormal ECG When compared with ECG of 05-OCT-2018 08:56, 
Premature atrial complexes are now Present Criteria for Septal infarct are no longer Present ST elevation has replaced ST depression in Anterior leads Xr Foot Lt Min 3 V Result Date: 11/4/2018 Exam:  Left foot radiographs History:  pain, pain, Osteomyelitis, 72 years Male open wounds Comparison: Left foot radiographs October 22, 2018 Findings:  No evidence of acute fracture or dislocation. Normal alignment, joint spaces preserved. Cortical erosions are seen of the lateral aspect of the proximal phalanx of the fifth toe, suggestive of osteomyelitis. Normal mineralization. No evidence of ankle joint effusion. Severe calcific atherosclerosis unchanged. Visualized soft tissues otherwise unremarkable. Impression: Findings suggestive of osteomyelitis of the proximal phalanx of the fifth toe. Xr Chest [facility] Result Date: 11/4/2018 AP chest radiograph History: Chest PAin, 73 years Male Comparison: Chest radiograph October 22, 2018 Findings:   Normal cardiomediastinal silhouette. Mild subsegmental atelectasis bilateral lung bases. No evidence of pneumothorax, pleural effusion, or air space consolidation. Visualized soft tissue and osseous structures otherwise unremarkable. Impression:  No significant interval change.   
 
This patient with patient, it appears he would be agreeable to coming in the hospital having a PICC line placed for antibiotics of the for his osteomyelitis. He had refuses prior to discharge on his previous admission. Also creatinine is steadily climbed. Currently U and 87 and creatinine of 6. This is much R than previous. I believe this is a combination of dehydration given the due to the patient not being able to ambulate very well and stay hydrated. This is on top of the chronic renal insufficiency that is occurring. Discussed with the hospitalist potential acute admission for hydration and assessment of his kidneys along with IV antibiotics and potential either home health or rehabilitation  Placement. Patient can stand and take a step or 2 but requires assistance.

## 2018-11-04 NOTE — ROUTINE PROCESS
TRANSFER - OUT REPORT: 
 
Verbal report given to [facility] on Amberly Mills  being transferred to [id number] for routine progression of care Report consisted of patients Situation, Background, Assessment and  
Recommendations(SBAR). Information from the following report(s) ED Summary was reviewed with the receiving nurse. Lines:  
Peripheral IV Left Antecubital (Active) Opportunity for questions and clarification was provided. Patient transported with: 
 [place]

## 2018-11-05 NOTE — PROGRESS NOTES
Patient refused to allow nurse to administer SQ Heparin, and he said angrily, \"I don't want no shot, and I don't need no shot! \" Nurse explained to patient the purpose of the ordered heparin and he still refused. Will try to re-address with AM dose to see if he will allow.

## 2018-11-05 NOTE — PROGRESS NOTES
Had another nurse approach patient this morning to see if he would allow that nurse to give him the heparin injection but he still refused even after nurser explaining the purpose of the medication. He would not say why he didn't want it other than he just didn't want it.

## 2018-11-05 NOTE — PROGRESS NOTES
Problem: Self Care Deficits Care Plan (Adult) Goal: *Acute Goals and Plan of Care (Insert Text) 1. Patient will complete lower body bathing and dressing with minimal assistance and adaptive equipment as needed. 2. Patient will complete toileting with minimal assistance. 3. Patient will tolerate 30 minutes of OT treatment with 2-3 rest breaks to increase activity tolerance for ADLs. 4. Patient will complete UE strengthening exercises for 15 minutes to improve strength for ADL/functional transfers. 5. Patient will attempt transfers to chair/BSC/toilet within 1-2 visits to demonstrate advancement with functional transfers. Timeframe: 7 visits OCCUPATIONAL THERAPY: Initial Assessment and PM 11/5/2018INPATIENT: Hospital Day: 2 Payor: CARE IMPROVEMENT PLUS / [place] CARE [place]PR[place] / Product Type: Prot[employer] Care Medicare /  
  
NAME/AGE/GENDER: Ludy Morris is a 72 y.o. male PRIMARY DIAGNOSIS:  Osteomyelitis ([facility] [address] <principal problem not specified> <principal problem not specified> 
 
  
ICD-10: Treatment Diagnosis:  
 · Generalized Muscle Weakness (M62.81) · Other lack of cordination (R27.8) Precautions/Allergies: 
   Lortab [hydrocodone-acetaminophen] ASSESSMENT:  
Mr. Kristofer Desouza presents to the hospital with osteomyelitis in L foot (proximal phalanx of 5th toe). Pt was living alone prior to admission. Pt has hx of R BKA with prosthesis. Pt uses a rolling walker/cane for functional mobility. He reports he completes his ADLs with modified independent. Pt reports he has some memory deficits stating, \"I'm forgetful about everything! \" Pt reports he has 7-8 children but can't remember their phone numbers to get in touch with them at times. Pt was supine in bed and demonstrates a flat affect and a bit irritable at times. Pt unable to rate pain in L foot, but states it is too painful for him to stand this afternoon.  Pt was able to complete bed mobility with SBA including rolling side to side, supine to sit, and sit to supine. Pt demonstrates decreased but functional upper body strength/AROM. Pt sits with poor posture edge of bed. Pt has hx of partial R thumb amputation, but pt unable to recall when he had this done. Pt demonstrates a decline from his previous level of functioning. Pt will benefit from OT services to address stated goals and plan of care. This section established at most recent assessment PROBLEM LIST (Impairments causing functional limitations): 1. Decreased Strength 2. Decreased ADL/Functional Activities 3. Decreased Transfer Abilities 4. Decreased Ambulation Ability/Technique 5. Decreased Balance 6. Increased Pain 7. Decreased Activity Tolerance 8. Decreased Flexibility/Joint Mobility 9. Edema/Girth 10. Decreased Skin Integrity/Hygeine 11. Decreased [place] with Home Exercise Program 
12. Decreased Cognition INTERVENTIONS PLANNED: (Benefits and precautions of occupational therapy have been discussed with the patient.) 1. Activities of daily living training 2. Adaptive equipment training 3. Balance training 4. Clothing management 5. Donning&doffing training 6. Group therapy 7. Neuromuscular re-eduation 8. Therapeutic activity 9. Therapeutic exercise 10. Wheelchair management TREATMENT PLAN: Frequency/Duration: Follow patient 3 times per week to address above goals. Rehabilitation Potential For Stated Goals: Good RECOMMENDED REHABILITATION/EQUIPMENT: (at time of discharge pending progress): Due to the probability of continued deficits (see above) this patient will likely need continued skilled occupational therapy after discharge. Equipment: ? TBD  
    
 
 
 
OCCUPATIONAL PROFILE AND HISTORY:  
History of Present Injury/Illness (Reason for Referral): 
See H&P Past Medical History/Comorbidities:  
Mr. Robin Valderrama  has a past medical history of Abnormal CT scan, chest, Acute CHF ([facility] [address] Acute systolic congestive heart failure ([address] MO (acute kidney injury) ([facility] [address] Anemia of chronic disease, Bilateral pleural effusion, Chest pain, Depression, DM (diabetes mellitus), type 2 ([facility] [address] Encephalopathy due to infection, HTN (hypertension), Hypoglycemia, NSTEMI (non-ST elevated myocardial infarction) ([facility] [address] and Tobacco use. Mr. Yeyo Prince  has a past surgical history that includes hx orthopaedic; RIGHT AMPUTATION KNEE(BKA) (Right, 1/30/2017); RIGHT FOOT DEBRIDEMENT  ,PREP FOR GRAFTING AND SKIN SUBSTITUTE GRAFT (Right, 1/20/2017); IR ANESTHESIA- RIGHT LEG ARTERIOGRAM/ 604 (N/A, 1/18/2017); AMPUTATION RIGHT 5TH TOE/ ROOM 604 (Right, 1/11/2017); ULTRASOUND (Bilateral, 12/28/2015); and THORACENTESIS (Left, 12/28/2015). Social History/Living Environment:  
Home Environment: Private residence # Steps to Enter: 4 Rails to Enter: Yes One/Two Story Residence: One story Living Alone: Yes Support Systems: Child(elpidio), Family member(s) Patient Expects to be Discharged to[de-identified] Private residence Current DME Used/Available at Home: Angela Simpler, straight, Shower chair, Walker, rolling, Wheelchair, power Tub or Shower Type: Tub/Shower combination Prior Level of Function/Work/Activity: Pt was living alone prior to admission. Pt has hx of R BKA with prosthesis. Pt uses a rolling walker/cane for functional mobility. He reports he completes his ADLs with modified independent. Personal Factors:   
      Social Background:  Lives alone Overall Behavior:  Flat affect; withdrawn; reports memory deficits Other factors that influence how disability is experienced by the patient:  Multiple co-morbidities Number of Personal Factors/Comorbidities that affect the Plan of Care: Extensive review of physical, cognitive, and psychosocial performance (3+):  HIGH COMPLEXITY ASSESSMENT OF OCCUPATIONAL PERFORMANCE[de-identified]  
Activities of Daily Living:  
Basic ADLs (From Assessment) Complex ADLs (From Assessment) Feeding: Supervision Oral Facial Hygiene/Grooming: Stand-by assistance Bathing: Moderate assistance Upper Body Dressing: Minimum assistance Lower Body Dressing: Moderate assistance Toileting: Moderate assistance Instrumental ADL Meal Preparation: Maximum assistance Homemaking: Total assistance Grooming/Bathing/Dressing Activities of Daily Living Cognitive Retraining Safety/Judgement: Fall prevention Bed/Mat Mobility Rolling: Stand-by assistance Supine to Sit: Stand-by assistance Sit to Supine: Stand-by assistance Sit to Stand: Contact guard assistance Scooting: Stand-by assistance Most Recent Physical Functioning:  
Gross Assessment: 
AROM: Generally decreased, functional 
Strength: Generally decreased, functional 
Coordination: Generally decreased, functional 
Sensation: Impaired Posture: 
Posture (WDL): Exceptions to [facility] Posture Assessment: Forward head, Trunk flexion Balance: 
Sitting: Intact Standing: Impaired Standing - Static: Fair Standing - Dynamic : Poor Bed Mobility: 
Rolling: Stand-by assistance Supine to Sit: Stand-by assistance Sit to Supine: Stand-by assistance Scooting: Stand-by assistance Wheelchair Mobility: 
  
Transfers: 
Sit to Stand: Contact guard assistance Stand to Sit: Contact guard assistance Patient Vitals for the past 6 hrs: 
 BP BP Patient Position SpO2 Pulse 11/05/18 1206 122/64 At rest 99 % 62  
11/05/18 1507 90/48 At rest 96 % 66 Mental Status Neurologic State: Alert Orientation Level: Oriented X4 Cognition: Decreased attention/concentration, Follows commands, Memory loss Perception: Appears intact Perseveration: No perseveration noted Safety/Judgement: Fall prevention Physical Skills Involved: 1. Range of Motion 2. Balance 3. Strength 4. Activity Tolerance 5. Sensation 6. Fine Motor Control 7. Gross Motor Control 8. Pain (acute) 9. Skin Integrity Cognitive Skills Affected (resulting in the inability to perform in a timely and safe manner): 1. Executive Function 2. Short Term Recall 3. Divided Attention Psychosocial Skills Affected: 1. Habits/Routines 2. Social Interaction 3. Self-Awareness Number of elements that affect the Plan of Care: 5+:  HIGH COMPLEXITY CLINICAL DECISION MAKIN[address] [place] [address] AM-PAC 6 Clicks Daily Activity Inpatient Short Form How much help from another person does the patient currently need. .. Total A Lot A Little None 1. Putting on and taking off regular lower body clothing? [] 1   [x] 2   [] 3   [] 4  
2. Bathing (including washing, rinsing, drying)? [] 1   [x] 2   [] 3   [] 4  
3. Toileting, which includes using toilet, bedpan or urinal?   [] 1   [x] 2   [] 3   [] 4  
4. Putting on and taking off regular upper body clothing? [] 1   [] 2   [x] 3   [] 4  
5. Taking care of personal grooming such as brushing teeth? [] 1   [] 2   [x] 3   [] 4  
6. Eating meals? [] 1   [] 2   [x] 3   [] 4  
© , Trustees of [address] [place], [place] [id number], under license to [employer]. All rights reserved Score:  Initial: 15 Most Recent: X (Date: -- ) Interpretation of Tool:  Represents activities that are increasingly more difficult (i.e. Bed mobility, Transfers, Gait). Score 24 23 22-20 19-15 14-10 9-7 6 Modifier CH CI CJ CK CL CM CN   
 
? Self Care:  
  - CURRENT STATUS: CK - 40%-59% impaired, limited or restricted  - GOAL STATUS: CJ - 20%-39% impaired, limited or restricted  - D/C STATUS:  ---------------To be determined--------------- Payor: CARE IMPROVEMENT PLUS [place] CARE IMPR[place]EMENT [place] / Product Type: [employer] Care Medicare /   
 
Medical Necessity:    
· Patient demonstrates good rehab potential due to higher previous functional level.  
Reason for Services/Other Comments: 
· Patient continues to require skilled intervention due to decreased independence with ADL/functional transfers. Use of outcome tool(s) and clinical judgement create a POC that gives a: LOW COMPLEXITY  
 
 
 
TREATMENT:  
(In addition to Assessment/Re-Assessment sessions the following treatments were rendered) Pre-treatment Symptoms/Complaints:   
Pain: Initial:  
Pain Intensity 1: 7(unable to rate pain) Pain Location 1: Foot Pain Orientation 1: Left Pain Intervention(s) 1: Repositioned  Post Session:  same Assessment/Reassessment only, no treatment provided today Braces/Orthotics/Lines/Etc:  
· IV 
· O2 Device: Room air Treatment/Session Assessment:   
· Response to Treatment:  Eval only · Interdisciplinary Collaboration:  
o Occupational Therapist 
o Registered Nurse · After treatment position/precautions:  
o Supine in bed 
o Bed/Chair-wheels locked 
o Call light within reach 
o RN notified · Compliance with Program/Exercises: Will assess as treatment progresses. · Recommendations/Intent for next treatment session: \"Next visit will focus on advancements to more challenging activities and reduction in assistance provided\". Total Treatment Duration: OT Patient Time In/Time Out Time In: 8964 Time Out: 1616 Candi Jennings OT

## 2018-11-05 NOTE — PROGRESS NOTES
Problem: Mobility Impaired (Adult and Pediatric) Goal: *Acute Goals and Plan of Care (Insert Text) STG: 
(1.)Mr. Shilo Govea will move from supine to sit and sit to supine , scoot up and down and roll side to side with SUPERVISION within 3 treatment day(s). (2.)Mr. Shilo Govea will transfer from bed to chair and chair to bed with CONTACT GUARD ASSIST using the least restrictive device within 3 treatment day(s). (3.)Mr. Shilo Govea will ambulate with CONTACT GUARD ASSIST for 40 feet with the least restrictive device within 3 treatment day(s). (4.)Mr. Shilo Govea will perform standing static and dynamic balance activities x 15 minutes with CONTACT GUARD ASSIST to improve safety within 3 day(s). LTG: 
(1.)Mr. Shilo Govea will move from supine to sit and sit to supine , scoot up and down and roll side to side in bed with MODIFIED INDEPENDENCE within 7 treatment day(s). (2.)Mr. Shilo Govea will transfer from bed to chair and chair to bed with STAND BY ASSIST using the least restrictive device within 7 treatment day(s). (3.)Mr. Shilo Govea will ambulate with STAND BY ASSIST for 150 feet with the least restrictive device within 7 treatment day(s). (4.)Mr. Shilo Govea will perform standing static and dynamic balance activities x 15 minutes with STAND BY ASSIST to improve safety within 7 day(s). ________________________________________________________________________________________________ PHYSICAL THERAPY: Initial Assessment, Daily Note, AM 11/5/2018INPATIENT: Hospital Day: 2 Payor: CARE IMPROVEMENT PLUS [place] CARE IMPR[place] / Product Type: [place] Care Medicare /  
  
NAME/AGE/GENDER: Candy Landrum is a 72 y.o. male PRIMARY DIAGNOSIS: Osteomyelitis ([facility] [address] <principal problem not specified> <principal problem not specified> 
 
  
ICD-10: Treatment Diagnosis: · Difficulty in walking, Not elsewhere classified (R26.2) Precaution/Allergies: 
Lortab [hydrocodone-acetaminophen] ASSESSMENT:  
 Mr. Camilla Gramajo is a 72 y.o. male admitted with osteomyelitis of L proximal phalanx of 5th toe. No weight bearing orders received, however attempted to maintain LLE heel weight bearing throughout session due to L foot wound. Patient unable to maintain. He has a history of R BKA and was recently hospitalized with acute encephalopathy. He is supine in bed on contact and alert and oriented to person, place and situation. Pt states he lives alone, ambulates household distances with SPC/RW \"depending on what is closest,\" no falls, has friends and family that check on him daily, and has the following DME at home: R prosthesis, RW, SPC, and WC. He reports he hasn't be ambulating much since d/c from last hospital visit. Pt also has wound on L lateral forefoot, with impaired sensation in foot and some swelling. Pt transferred from supine to sitting EOB with stand by assistance, demonstrating FAIR static sitting balance without support. He has RLE prosthetic on already. Educated pt to WB through L heel to avoid wound on L forefoot. Unable to perform this date, and reports, \"I just get around how I can. \" He required minimal assist to stand from low bed, minimal to moderate assist to ambulate to restroom. Roy too weak to attempt to void standing, so assisted to commode. He demonstrates wide base of support, R knee extension thrust and slow shuffled gait with increased trunk flexion during ambulation with poor environmental and IV line awareness. After voiding, treatment initiated to include sit to stand from low surface, ambulation 10' from bathroom to bed and bed mobility requiring MAX cues for safety this AM. He appears to be well below baseline and c/o general malaise this AM. He is agreeable to continued therapy. He returned to supine at the end of the session with all needs in reach and bed alarm activated.  Tho Mitchell is currently functioning below his baseline and would benefit from skilled PT during acute care stay to maximize safety and independence with functional mobility. This section established at most recent assessment PROBLEM LIST (Impairments causing functional limitations): 1. Decreased Strength 2. Decreased ADL/Functional Activities 3. Decreased Transfer Abilities 4. Decreased Ambulation Ability/Technique 5. Decreased Balance 6. Increased Pain 7. Decreased Activity Tolerance 8. Decreased Pacing Skills 9. Decreased Knowledge of Precautions 10. Decreased [place] with Home Exercise Program 
 INTERVENTIONS PLANNED: (Benefits and precautions of physical therapy have been discussed with the patient.) 1. Balance Exercise 2. Bed Mobility 3. Gait Training 4. Home Exercise Program (HEP) 5. Therapeutic Activites 6. Therapeutic Exercise/Strengthening 7. Transfer Training 8. Patient Education TREATMENT PLAN: Frequency/Duration: 3 times a week for duration of hospital stay Rehabilitation Potential For Stated Goals: Good RECOMMENDED REHABILITATION/EQUIPMENT: (at time of discharge pending progress): Due to the probability of continued deficits (see above) this patient will likely need continued skilled physical therapy after discharge. Equipment:  
? None at this time HISTORY:  
History of Present Injury/Illness (Reason for Referral): 
Patient is a 71 y/o AA male with extensive PMH  including CAD, NSTEMI, HTN, DM, chronic systolic CHF, CKD and left foot osteomyelitis who has several recent admissions regarding left foot infection and noncompliance. On 10/5 patient was discharged home with PO keflex and minocycline. Blood cultures at that time grew coag negative staph. Patient readmitted 10/21 for Acute metabolic encephalopathy likely from OM L foot due to medication non compliance. ID evaluated patient recommended to cont IV. abxs. Patient was refusing IV therapy, so was switched to PO and ID recommended to follow up as outpatient. Today Patient returned to ED along with daughter  with similar  complaints of confusion and left foot pain. No leukocytosis. No fever. Patient alert and oriented, denies fever/chills, n/v/d. Cr 5.98. CXR negative. XR left foot findings suggestive of osteomyelitis of the proximal phalanx of the fifth toe. Patient started on zosyn and minocycline continued. Past Medical History/Comorbidities:  
Mr. Mohsen Zelaya  has a past medical history of Abnormal CT scan, chest, Acute CHF ([address] Acute systolic congestive heart failure ([address] MO (acute kidney injury) ([address] Anemia of chronic disease, Bilateral pleural effusion, Chest pain, Depression, DM (diabetes mellitus), type 2 ([address] Encephalopathy due to infection, HTN (hypertension), Hypoglycemia, NSTEMI (non-ST elevated myocardial infarction) ([address] and Tobacco use. Mr. Mohsen Zelaya  has a past surgical history that includes hx orthopaedic; RIGHT AMPUTATION KNEE(BKA) (Right, 1/30/2017); RIGHT FOOT DEBRIDEMENT  ,PREP FOR GRAFTING AND SKIN SUBSTITUTE GRAFT (Right, 1/20/2017); IR ANESTHESIA- RIGHT LEG ARTERIOGRAM/ 604 (N/A, 1/18/2017); AMPUTATION RIGHT 5TH TOE/ ROOM 604 (Right, 1/11/2017); ULTRASOUND (Bilateral, 12/28/2015); and THORACENTESIS (Left, 12/28/2015). Social History/Living Environment:  
Home Environment: Private residence # Steps to Enter: 0 Rails to Enter: Yes One/Two Story Residence: One story Living Alone: Yes Support Systems: Child(elpidio), Family member(s) Patient Expects to be Discharged to[de-identified] Private residence Current DME Used/Available at Home: Walker, rolling, Cane, straight, Wheelchair(prosthesis) Prior Level of Function/Work/Activity: 
Pt states he lives alone, ambulates household distances with SPC/RW \"depending on what is closest,\" no falls, has friends and family that check on him daily, and has the following DME at home: R prosthesis, RW, SPC, and WC. He reports he hasn't be ambulating much since d/c from last hospital visit. Number of Personal Factors/Comorbidities that affect the Plan of Care: 3+: HIGH COMPLEXITY EXAMINATION:  
Most Recent Physical Functioning:  
Gross Assessment: 
AROM: Generally decreased, functional 
PROM: Generally decreased, functional 
Strength: Generally decreased, functional 
Coordination: Generally decreased, functional 
Sensation: Impaired Posture: 
Posture (WDL): Exceptions to [facility] Posture Assessment: Forward head, Trunk flexion Balance: 
Sitting: Intact Standing: Impaired Standing - Static: Fair Standing - Dynamic : Poor Bed Mobility: 
Rolling: Stand-by assistance Supine to Sit: Stand-by assistance Sit to Supine: Stand-by assistance Scooting: Stand-by assistance Wheelchair Mobility: 
  
Transfers: 
Sit to Stand: Contact guard assistance Stand to Sit: Contact guard assistance Gait: 
  
Base of Support: Center of gravity altered;Narrowed Speed/Radha: Slow;Shuffled;Pace decreased (<100 feet/min) Step Length: Left shortened;Right shortened Gait Abnormalities: Decreased step clearance; Path deviations;Trunk sway increased Distance (ft): 10 Feet (ft) Assistive Device: Walker, rolling;Prosthetic device Ambulation - Level of Assistance: Moderate assistance;Minimal assistance Interventions: Manual cues; Safety awareness training;Verbal cues; Visual/Demos Body Structures Involved: 1. Nerves 2. Bones 3. Joints 4. Muscles 5. Ligaments Body Functions Affected: 1. Sensory/Pain 2. Neuromusculoskeletal 
3. Movement Related Activities and Participation Affected: 1. Mobility 2. Self Care 3. Domestic Life 4. Interpersonal Interactions and Relationships 5. Community, Social and [place] Number of elements that affect the Plan of Care: 4+: HIGH COMPLEXITY CLINICAL PRESENTATION:  
Presentation: Evolving clinical presentation with changing clinical characteristics: MODERATE COMPLEXITY CLINICAL DECISION MAKING:  
[employer] AM-PAC 6 Clicks Basic Mobility Inpatient Short Form How much difficulty does the patient currently have. .. Unable A Lot A Little None 1. Turning over in bed (including adjusting bedclothes, sheets and blankets)? [] 1   [] 2   [x] 3   [] 4  
2. Sitting down on and standing up from a chair with arms ( e.g., wheelchair, bedside commode, etc.)   [] 1   [x] 2   [] 3   [] 4  
3. Moving from lying on back to sitting on the side of the bed? [] 1   [] 2   [x] 3   [] 4 How much help from another person does the patient currently need. .. Total A Lot A Little None 4. Moving to and from a bed to a chair (including a wheelchair)? [] 1   [x] 2   [] 3   [] 4  
5. Need to walk in hospital room? [] 1   [x] 2   [] 3   [] 4  
6. Climbing 3-5 steps with a railing? [x] 1   [] 2   [] 3   [] 4  
© 2007, Trustees of [address] [place] [address], under license to [employer]. All rights reserved Score:  Initial: 13 Most Recent: X (Date: -- ) Interpretation of Tool:  Represents activities that are increasingly more difficult (i.e. Bed mobility, Transfers, Gait). Score 24 23 22-20 19-15 14-10 9-7 6 Modifier CH CI CJ CK CL CM CN   
 
? Mobility - Walking and Moving Around:  
  - CURRENT STATUS: CL - 60%-79% impaired, limited or restricted  - GOAL STATUS: CK - 40%-59% impaired, limited or restricted  - D/C STATUS:  ---------------To be determined--------------- Payor: CARE IMPROVEMENT PLUS [place]ARE IMPR[place] / Product Type: Managed Care Medicare /   
 
Medical Necessity:    
· Patient demonstrates good rehab potential due to higher previous functional level. Reason for Services/Other Comments: 
· Patient continues to require modification of therapeutic interventions to increase complexity of exercises.   
Use of outcome tool(s) and clinical judgement create a POC that gives a: Questionable prediction of patient's progress: MODERATE COMPLEXITY  
  
 
 
 
TREATMENT:  
 (In addition to Assessment/Re-Assessment sessions the following treatments were rendered) Pre-treatment Symptoms/Complaints:  General fatigue and not feeling well. Pain: Initial:  
Pain Intensity 1: 0  Post Session:  0/10 Therapeutic Activity: (    8 minutes): Therapeutic activities including Ambulation on level ground and sit to stand from various surface heights to improve mobility, strength, balance and coordination. Required maximal Manual cues; Safety awareness training;Verbal cues; Visual/Demos to promote static and dynamic balance in standing. Braces/Orthotics/Lines/Etc:  
· IV 
· O2 Device: Room air Treatment/Session Assessment:   
· Response to Treatment:  Patient fatigued quickly with physical therapy treatment. Exhibits impaired balance and decreased safety awareness. · Interdisciplinary Collaboration:  
o Physical Therapist 
o Registered Nurse · After treatment position/precautions:  
o Supine in bed 
o Bed alarm/tab alert on 
o Bed/Chair-wheels locked 
o Bed in low position 
o Call light within reach 
o RN notified 
o Side rails x 3  
· Compliance with Program/Exercises: Will assess as treatment progresses · Recommendations/Intent for next treatment session: \"Next visit will focus on advancements to more challenging activities and reduction in assistance provided\". Total Treatment Duration: PT Patient Time In/Time Out Time In: 1954 Time Out: 9704 Halie Zavala DPT

## 2018-11-05 NOTE — PROGRESS NOTES
Met with pt at bedside, pt is alert and oriented x4, states lives alone in own 1 level home with 4 steps to enter, has rollator walker, wc in home for use, states is independent with ADLs, was driving. Pt states not sure what he is going to need upon DC, pt admitted on Sunday 11/4/18 will continue to follow for DC needs. Care Management Interventions PCP Verified by CM: Yes Transition of Care Consult (CM Consult): Discharge Planning Current Support Network: Own Home, Lives Alone Confirm Follow Up Transport: Family Plan discussed with Pt/Family/Caregiver: Yes Freedom of Choice Offered: Yes Discharge Location Discharge Placement: Unable to determine at this time'

## 2018-11-05 NOTE — PROGRESS NOTES
Patient has had multiple admissions recently  per notes Bety Whitney, staff , [address], [id number] [address]  /   [email or web address]

## 2018-11-05 NOTE — PROGRESS NOTES
Problem: Nutrition Deficit Goal: *Optimize nutritional status Nutrition Reason for assessment: Referral received from nursing admission Malnutrition Screening Tool for recently lost unsure amount of weight without trying and eating poorly due to decreased appetite. Assessment:  
Diet order(s): [facility]ood/Nutrition Patient History:  Pt kept his eyes closed throughout RD interview. Pt states that he cooks at home but his family members have to drive him to the store. He states that he has not eaten well as he has \"not had a good meal in awhile. \"  He says he sometimes has n/v, sometimes has difficulty chewing/swallowing. He is unaware of a UBW. He states that his clothes do not fit. I observed ~90% of lunch consumed. Anthropometrics:Height: 6' 1\" (185.4 cm),  Weight: 81.8 kg (180 lb 7 oz), Weight Source: Patient stated Adjusted IBW for R AKA: 72.8 kg. Unable to assess potential weight loss with a pt stated weight. Pt must be weighed. Macronutrient needs: EER:  0554-9227 kcal /day (20-25 kcal/kg adjusted I BW) 
EPR:  58-88 grams protein/day (0.8-1.2 grams/kg adjusted IBW)(GFR 12 as of 11/4)-CKD Intake/Comparative Standards: Per RD meal rounds: 90% of lunch. No recorded meal intakes. Nutrition Diagnosis: No nutrition diagnosis at this time. Intervention: 
Meals and snacks: Continue current diet. Add sodium restriction with h/o CHF. Nutrition Supplement Therapy: none at this time. Ordered a weight. Discharge nutrition plan: Too soon to determine. Olayinka Devries [facility], [facility], [address] [place] [address], [phone]

## 2018-11-05 NOTE — WOUND CARE
Dorsal surface left toe 2x1.5cm and lateral foot at 5th MTP joint area with 4x3cm eschar, recommend betadine paint. Left foot is warm, pulses are available per doppler. Patient has history of PAD and known osteomyelitis in this foot. May benefit from amputation or vascular surgery, however patient has not been agreeable to surgery in past and the eschar is currently stable. Recommend betadine paint and leave open to air.

## 2018-11-05 NOTE — CONSULTS
Infectious Disease Consult Today's Date: 11/5/2018 Admit Date: 11/4/2018 Impression: · L foot 5th toe osteomyelitis · DM II 
· Poor medical adherence · Questionable ability to make medical decisions Plan: · Will change antibiotics to minocycline 100 mg PO BID and cipro 500 mg PO (once daily) for ease of dosing. Antibiotics alone will be unlikely to resolve his wounds; he needs aggressive wound care as well. · ID follow up November 28, 2018 at 11 am. 
· ID will sign off today. Please call with questions or concerns. Anti-infectives: · Minocycline/zosyn Subjective:  
Date of Consultation:  November 5, 2018 Referring Physician: Shanti Aguilar, Angelicaist 
 
Patient is a 72 y.o. male who has had multiple recent admissions, and been seen by ID most recently 10/26/18 during his last admission. He has an underlying medical history that includes CHF, recent NSTEM (admission 9/24-9/28), DM and R BKA, CKD with likely need for dialysis, and ongoing tobacco abuse. He has chronic L foot ulcer on 5th MT, with XR yesterday confirming osteomyelitis. He had an MRI 10/9 that also showed osteo, and the ID plan 10/10 was for a six week course of treatment. He has been non adherent. During his latest admission, ID recommended minocycline/levofloxacin to complete his course. He denies complaints except for pain in his foot. He reports that he thinks he had been taking his antibiotics. He denies nausea, vomiting, diarrhea, fevers, chills or sweats. Denies headaches or dizziness. Patient Active Problem List  
Diagnosis Code  
 HTN (hypertension) I10  
 Chest pain R07.9  Bilateral pleural effusion J90  
 Tobacco use Z72.0  Abnormal CT scan, chest R93.89  Chronic systolic congestive heart failure (HCC) I50.22  
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  
 Glaucoma syndrome H40.9  Onychomycosis B35.1  MO (acute kidney injury) ([address] N17.9  Anemia of chronic disease D63.8  Pneumonia due to infectious organism J18.9  EKG, abnormal R94.31  
 Elevated troponin R74.8  Bacteremia R78.81  
 Tachycardia R00.0  Diabetic ulcer of left midfoot associated with type 2 diabetes mellitus ([address] E11.621, L97.429  
 Cellulitis of left foot L03. [address]  Stage 5 chronic kidney disease not on chronic dialysis ([address] N18.5  Acute rhinitis J00  
 Acute osteomyelitis of metatarsal bone, left ([address] M86.172  Osteomyelitis of left foot ([address] M86.9  Type 2 diabetes with nephropathy ([facility]) E11.21  
 Acute encephalopathy G93.40  Osteomyelitis ([address] M86.9 Past Medical History:  
Diagnosis Date  Abnormal CT scan, chest 12/27/2015  Acute CHF ([address] 12/26/2015  Acute systolic congestive heart failure ([address] 12/30/2015  MO (acute kidney injury) ([address] 7/29/2012  Anemia of chronic disease 9/6/2018  Bilateral pleural effusion 12/27/2015  Chest pain 12/26/2015  Depression  DM (diabetes mellitus), type 2 ([address] 7/29/2012  Encephalopathy due to infection 1/7/2017  
 HTN (hypertension) 7/29/2012  Hypoglycemia 7/29/2012  
 NSTEMI (non-ST elevated myocardial infarction) ([address] 1/5/2017  Tobacco use 12/27/2015 Family History Problem Relation Age of Onset  Diabetes Mother  Kidney Disease Mother  Diabetes Father  Kidney Disease Father  Kidney Disease Sister Social History Tobacco Use  Smoking status: Current Every Day Smoker Packs/day: 0.50 Years: 45.00 Pack years: 22.50 Types: Cigarettes  Smokeless tobacco: Never Used Substance Use Topics  Alcohol use: No  
 
Past Surgical History:  
Procedure Laterality Date  HX ORTHOPAEDIC Prior to Admission medications Medication Sig Start Date End Date Taking? Authorizing Provider  
amLODIPine (NORVASC) 10 mg tablet Take 1 Tab by mouth daily. 10/30/18   Iris Worrell MD  
minocycline (MINOCIN, DYNACIN) 100 mg capsule Take 1 Cap by mouth every twelve (12) hours for 16 days. 10/29/18 11/14/18  Iris Worrell MD  
levoFLOXacin (LEVAQUIN) 750 mg tablet Take 1 Tab by mouth every fourty-eight (48) hours for 16 days. 10/29/18 11/14/18  Iris Worrell MD  
glipiZIDE SR (GLUCOTROL XL) 2.5 mg CR tablet take 1 tablet by mouth once daily 10/23/18   Bo Michel MD  
ramipril (ALTACE) 10 mg capsule Take 1 Cap by mouth daily. 10/18/18   Suly Phelps NP  
metoprolol succinate (TOPROL-XL) 100 mg tablet Take 1 Tab by mouth daily. 10/18/18   Suly Phelps NP  
atorvastatin (LIPITOR) 80 mg tablet Take 1 Tab by mouth daily. 10/18/18   Suly Phelps NP  
isosorbide mononitrate ER (IMDUR) 30 mg tablet Take 1 Tab by mouth daily. 10/18/18   Suly Phelps NP  
furosemide (LASIX) 80 mg tablet Take 1 Tab by mouth two (2) times a day. Indications: Pulmonary Edema due to Chronic Heart Failure 10/11/18   Chris Parker MD  
Lancets misc As directed 9/28/18   Mio Garduno MD  
glucose blood VI test strips (ASCENSIA AUTODISC VI, ONE TOUCH ULTRA TEST VI) strip As directed 9/28/18   Mio Garduno MD  
sodium bicarbonate 650 mg tablet Take 1 Tab by mouth three (3) times daily (with meals). 9/10/18   Lucy Zuñiga MD  
aspirin 81 mg chewable tablet Take 1 Tab by mouth daily (after breakfast). 12/30/15   Avery Bell NP Allergies Allergen Reactions  Lortab [Hydrocodone-Acetaminophen] Itching Review of Systems:  A comprehensive review of systems was negative except for that written in the History of Present Illness. Objective:  
 
Visit Vitals /64 (BP 1 Location: Right arm, BP Patient Position: At rest) Pulse 62 Temp 98.5 °F (36.9 °C) Resp 18 Ht 6' 1\" (1.854 m) Wt 81.8 kg (180 lb 7 oz) SpO2 99% BMI 23.81 kg/m² Temp (24hrs), Av.7 °F (37.1 °C), Min:98.3 °F (36.8 °C), Max:99.4 °F (37.4 °C) Lines:  Peripheral IV:    
 
Physical Exam:   
General:  Alert, cooperative, well nourished, well developed, appears stated age Eyes:  Sclera anicteric. Pupils equally round and reactive to light. Mouth/Throat: Mucous membranes normal, oral pharynx clear Neck: Supple Lungs:   Clear to auscultation bilaterally, good effort CV:  Regular rate and rhythm,no murmur, click, rub or gallop Abdomen:   Soft, non-tender. bowel sounds normal. non-distended Extremities: No cyanosis or edema, R BKA Skin: Very dry skin, L foot with eschar over second toe and 5th MT head Lymph nodes: Cervical and supraclavicular normal  
Musculoskeletal: No swelling or deformity Lines/Devices:  Intact, no erythema, drainage or tenderness Psych: Alert and oriented, normal mood affect given the setting Data Review: CBC: 
Recent Labs 18 
1336 WBC 7.1 GRANS 76 MONOS 6  
EOS 4 ANEU 5.4 ABL 1.0 HGB 9.2* HCT 29.4*  
 BMP: 
Recent Labs 18 
1336 CREA 5.98* BUN 87*   
K 4.3  CO2 22 AGAP 9  
* LFTS: 
Recent Labs 18 
1336 TBILI 0.4 ALT 22 SGOT 26 AP 85  
TP 7.8 ALB 2.5* Microbiology:  
 
All Micro Results Procedure Component Value Units Date/Time CULTURE, BLOOD [[id number]] Collected:  18 8509 Order Status:  Completed Specimen:  Blood Updated:  18 3248 Special Requests: --     
  RIGHT 
HAND Culture result: NO GROWTH AFTER 14 HOURS     
 CULTURE, BLOOD [[id number]] Collected:  18 1859 Order Status:  Completed Specimen:  Blood Updated:  18 2405 Special Requests: --     
  LEFT 
HAND Culture result: NO GROWTH AFTER 14 HOURS     
 CULTURE, URINE [[id number]] Collected:  18 7302 Order Status:  Completed Specimen:  Urine from Clean catch Updated:  18 6281 Imagin/4/18 L foot XR Impression: Findings suggestive of osteomyelitis of the proximal phalanx of the 
fifth toe. Signed By: Bravo Benavides NP 2018

## 2018-11-05 NOTE — PROGRESS NOTES
Problem: Falls - Risk of 
Goal: *Absence of Falls Document Dannial Shadow Fall Risk and appropriate interventions in the flowsheet. Outcome: Progressing Towards Goal 
Fall Risk Interventions: 
Mobility Interventions: Bed/chair exit alarm, Patient to call before getting OOB Mentation Interventions: Bed/chair exit alarm, Adequate sleep, hydration, pain control Medication Interventions: Bed/chair exit alarm, Patient to call before getting OOB, Teach patient to arise slowly Elimination Interventions: Bed/chair exit alarm, Call light in reach, Patient to call for help with toileting needs Problem: Pressure Injury - Risk of 
Goal: *Prevention of pressure injury Document Seun Scale and appropriate interventions in the flowsheet. Outcome: Progressing Towards Goal 
Pressure Injury Interventions: 
Sensory Interventions: Assess changes in LOC Moisture Interventions: Absorbent underpads Activity Interventions: Pressure redistribution bed/mattress(bed type), PT/OT evaluation, Increase time out of bed Mobility Interventions: HOB 30 degrees or less, Pressure redistribution bed/mattress (bed type), PT/OT evaluation Nutrition Interventions: Document food/fluid/supplement intake Friction and Shear Interventions: HOB 30 degrees or less

## 2018-11-06 NOTE — PROGRESS NOTES
Pt refused heparin injection again tonight saying, \"I said I don't want no shot. I don't like shots. \" Re-educated on the importance of this medication and he continued to refuse saying, \"I don't care what it is for. I don't want it. \" Also, patient refused the Mitcheal Puff despite education.

## 2018-11-06 NOTE — PROGRESS NOTES
Problem: Interdisciplinary Rounds Goal: Interdisciplinary Rounds Outcome: Progressing Towards Goal 
Interdisciplinary team rounds were held 11/6/2018 with the following team members:Care Management, Nurse Practitioner, Physical Therapy and . Plan of care discussed. See clinical pathway and/or care plan for interventions and desired outcomes.

## 2018-11-06 NOTE — PROGRESS NOTES
Hospitalist Progress Note Subjective:  
Daily Progress Note: 11/6/2018 5:19 PM 
 
Patient in hospital 10/21-29/18 with Osteomyelitis of the left foot due to diabetic ulcer on po antibiotics presented to ER 11/4 with progressive weakness x 2-3 days, confusion per family, difficulty ambulating. Denies fever, chills, GI or  symptoms, no SOB. Left foot xray suggestive of osteomyelitis of the proximal phalanx of the fifth toe. Pancultured, admitted on  Zosyn and minocycline, suspect patient did not take po abx prescribed on discharge. Troponin 0.07, CRP: 3.7. Prior to recent stay, patient was admitted 10/5-11/18 for acute respiratory failure, acute MI and foot infection, discharged home on keflex and minocycline. Blood cultures at that time grew coag negative staph. On discharge BUN/Creat: 62/4.50. Today BUN? Creat: 87/5. 98. Patient refused IV antibiotics, major component of non compliance and aggression toward staff. Seen by ID at that time. On discharge, he agrees to be compliant, however, this is obviously not the case. Patient refusing heparin Subq, cursing at staff. 11/5:  Still refusing heparin. Also refuses to give reason. ID in, Abx changed:  Minocycline 100 mg bid and cipro 500 mg daily. Will also need aggressive wound care. ID will follow up 11/28/18 at 11 am.  Had MRI 10/9 with osteo and the ID plan was for a six week course of treatment, however patient was non compliant. States he \"thinks\" he has been taking antibiotics. Signing off. Wound care in, recommend betadine paint and leave open to air. Needs vascular surgery, but refuses to have surgery consistently in the past. OT in, needs ongoing care, forgetful. Refused labs this am.  
 
11/6:  Complains about multiple issues. Extended discussion with patient and daughter Marietta Molina regarding non compliance will cost patient his life if he does not begin to adhere to recommended treatments.   Currently he is in agreement with renal and Vascular consults. Will give him 24 hours to see how he does and if he will actually comply prior to calling in consults. Neither daughter has POA. [facility] questions patient's competency. Creatinine worsening. ADDITIONAL HISTORY:  Right AKA, CAD, NSTEMI, Hypertension, DM, chronic systolic HF, CKD with probable need for dialysis, left foot osteomyelitis, anemia, depression,  Long standing tobacco abuse, PAD. Current Facility-Administered Medications Medication Dose Route Frequency  oxyCODONE IR (ROXICODONE) tablet 5 mg  5 mg Oral Q4H PRN  
 morphine injection 2 mg  2 mg IntraVENous Q2H PRN  
 LORazepam (ATIVAN) injection 0.5 mg  0.5 mg IntraVENous Q4H PRN  
 alcohol 62% (NOZIN) nasal  1 Ampule  1 Ampule Topical Q12H  ciprofloxacin HCl (CIPRO) tablet 500 mg  500 mg Oral Q24H  povidone-iodine (BETADINE) 10 % topical solution   Topical DAILY  amLODIPine (NORVASC) tablet 10 mg  10 mg Oral DAILY  aspirin chewable tablet 81 mg  81 mg Oral DAILY AFTER BREAKFAST  atorvastatin (LIPITOR) tablet 80 mg  80 mg Oral DAILY  isosorbide mononitrate ER (IMDUR) tablet 30 mg  30 mg Oral DAILY  metoprolol succinate (TOPROL-XL) tablet 100 mg  100 mg Oral DAILY  minocycline (MINOCIN, DYNACIN) capsule 100 mg  100 mg Oral Q12H  
 sodium bicarbonate tablet 650 mg  650 mg Oral TID WITH MEALS  sodium chloride (NS) flush 5-10 mL  5-10 mL IntraVENous Q8H  
 sodium chloride (NS) flush 5-10 mL  5-10 mL IntraVENous PRN  
 naloxone (NARCAN) injection 0.4 mg  0.4 mg IntraVENous PRN  
 diphenhydrAMINE (BENADRYL) capsule 25 mg  25 mg Oral Q4H PRN  
 ondansetron (ZOFRAN) injection 4 mg  4 mg IntraVENous Q4H PRN  
 senna-docusate (PERICOLACE) 8.6-50 mg per tablet 2 Tab  2 Tab Oral DAILY PRN  
 heparin (porcine) injection 5,000 Units  5,000 Units SubCUTAneous Q8H  
 0.9% sodium chloride infusion  75 mL/hr IntraVENous CONTINUOUS  
  insulin lispro (HUMALOG) injection   SubCUTAneous AC&HS  influenza vaccine 2018-19 (6 mos+)(PF) (FLUARIX QUAD/FLULAVAL QUAD) injection 0.5 mL  0.5 mL IntraMUSCular PRIOR TO DISCHARGE Review of Systems A comprehensive review of systems was negative except for that written in the HPI. Objective:  
 
Visit Vitals /63 (BP 1 Location: Left arm, BP Patient Position: At rest) Pulse 68 Temp 98 °F (36.7 °C) Resp 20 Ht 6' 1\" (1.854 m) Wt 85.7 kg (189 lb) SpO2 97% BMI 24.94 kg/m² O2 Device: Room air Temp (24hrs), Av.2 °F (36.8 °C), Min:98 °F (36.7 °C), Max:98.5 °F (36.9 °C) 701 - 1900 In: 360 [P.O.:360] Out: -  
 190 -  0700 In: -  
Out: 925 [Urine:925] General appearance: Oriented but forgetful. Awake and alert, currently cooperative. Repeatedly states he does not feel good. Chronically ill. Head: Normocephalic, without obvious abnormality, atraumatic Throat: Lips, mucosa, and tongue normal. No Teeth Neck: supple, symmetrical, trachea midline, no JVD Lungs: clear to auscultation bilaterally Heart: regular rate and rhythm, S1, S2 normal, no murmur, click, rub or gallop Abdomen: soft, non-tender. Bowel sounds normal. No masses,  no organomegaly Extremities: Left second toe ulcer, 5th MT head ulcer, heel boggy. All other extremities normal, atraumatic, no cyanosis or edema Skin: Skin color, texture, turgor normal. No rashes or lesions Neurologic: Grossly normal 
 
Additional comments: Notes,orders, test results, vitals reviewed Data Review Recent Results (from the past 24 hour(s)) GLUCOSE, POC Collection Time: 18  9:33 PM  
Result Value Ref Range Glucose (POC) 147 (H) 65 - 100 mg/dL GLUCOSE, POC Collection Time: 18  6:45 AM  
Result Value Ref Range Glucose (POC) 86 65 - 100 mg/dL GLUCOSE, POC Collection Time: 18 11:26 AM  
Result Value Ref Range Glucose (POC) 130 (H) 65 - 100 mg/dL PLEASE READ & DOCUMENT PPD TEST IN 24 HRS Collection Time: 11/06/18  4:26 PM  
Result Value Ref Range PPD  Negative  
 mm Induration  mm GLUCOSE, POC Collection Time: 11/06/18  4:39 PM  
Result Value Ref Range Glucose (POC) 178 (H) 65 - 100 mg/dL BLOOD CULTURE  DONE 11/4:  No growth 11/4 2/2.  
 
11/4:  CXR:   Normal cardiomediastinal silhouette. Mild subsegmental atelectasis bilateral lung bases. No evidence of pneumothorax, pleural effusion, or air space consolidation. Visualized soft tissue and osseous structures otherwise unremarkable. Impression:  No significant interval change. 11/4:  Left foot xray:   No evidence of acute fracture or dislocation. Normal alignment, joint  spaces preserved. Cortical erosions are seen of the lateral aspect of the proximal phalanx of the fifth toe, suggestive of osteomyelitis. Normal mineralization. No evidence of ankle joint effusion. Severe calcific atherosclerosis unchanged. Visualized soft tissues otherwise unremarkable. Impression: Findings suggestive of osteomyelitis of the proximal phalanx of the fifth toe. Assessment/Plan:  
Left 5th toe chronic Osteomyelitis with chronic ischemic, neuropathic ulcers Previously refused amputation, now stating he will agree, Will give 24 hours, then consult vascular if still willing Continue cipro and minocyline as patient refusing IV abx ID, wound care on board DM II, poor control Continue SSI Long standing, ongoing non compliance Consult palliative care for treatment decisions Daughter questioning compliance. Acute on chronic renal failure Previously refused dialysis, now stating he \"will do what         it takes. \"  
       Erik Pastor give 24 hours, then consult Nephro if patient still              willing Extreme debility with poor prognosis       Prognosis largely depends on patient compliance, daughter understands this, patient verbalizes understanding, however question this. Chronic anemia Follow Hypertension Home meds Care Plan discussed with: Patient, Daughter and Nurse Signed By: Luz Cherry NP November 6, 2018

## 2018-11-06 NOTE — PROGRESS NOTES
Patient refused to allow  to obtain order labs this AM. Will see if staff later this AM will be able to obtain.

## 2018-11-07 NOTE — CONSULTS
Hoang Oakes 
MR#: [id number] : 1945 ACCOUNT #: [de-identified] DATE OF SERVICE: 2018 REASON FOR CONSULTATION:  Chronic kidney disease. HISTORY OF PRESENT ILLNESS:  This patient is a 80-year-old male with history of chronic kidney disease, type 2 diabetes mellitus, coronary artery disease, CHF, history of left foot osteomyelitis treated in the past, multiple admissions, admitted this time with altered mental status with left foot pain. Workup confirmed osteomyelitis with MRI. Patient was treated in the past with vancomycin and was a certain time on p.o. antibiotic. At this time, he has been on Cipro and Minocin. We are consulted today because of the creatinine high at 5.44 with a BUN 78. Electrolytes acceptable. Potassium 4.4, bicarbonate 21, but this patient has elevated creatinine for a while. When he was admitted this time on the 4th of this month, his creatinine was 4.54, peaked up yesterday at 5.98 and today at 5.44. Looking back through the history, his creatinine was in the 3-2 in  and in 2017. His creatinine was up to 4.34 on 2018 but was up and down at that time. I stated before it peaked up at 5.98 yesterday. This patient has CHF with ejection fraction about 30-35% and on that admission, his ACE inhibitor and Lasix were discontinued and the patient was given IV fluids with minimal improvement. He had ultrasound in the past.  It showed no hydronephrosis, and UPEP and SPEP with no abnormal protein detected. His urinalysis at this time showed a protein at 100. No WBC, no RBC, no activity in the urine. The rest of his labs showed evidence of anemia with hemoglobin at 8.6. REVIEW OF SYSTEMS:  The patient denied any shortness of breath, chest pain. Lying flat on the bed, sleepy. No skin rash. Some pain in the left foot. No dysuria or gross hematuria or difficulty passing urine. PAST MEDICAL HISTORY:  History of chronic kidney disease, history of type 2 diabetes mellitus, hypertension, coronary artery disease, CHF and abnormal CT scan in the past with upper lobe infiltrate. Anemia. Depression. Coronary artery disease, status post non-STEMI MI. Tobacco abuse. SOCIAL HISTORY:  The patient is a smoker, half pack a day for 45 years. Alcohol:  None. CURRENT MEDICATIONS:  Norvasc 10 mg a day, aspirin 81 mg a day, Lipitor 80 mg a day, Cipro 500 mg once a day,  heparin 5000 units subcutaneous q.8, insulin sliding scale. Imdur 30 mg a day. Toprol- mg once a day. Minocycline 100 mg every 12 hours. Sodium bicarbonate 650 mg 3 times a day. PHYSICAL EXAMINATION: 
VITAL SIGNS:  Temperature is 98.5, heart rate 93, blood pressure this morning 95/59, earlier was 122/84. GENERAL:  Patient is lethargic, but he responds to questions. Not in acute respiratory distress. NECK:  No thyromegaly. LUNGS:  There is no crackle, no wheezing. Some decrease of expansion. HEART:  Regular rhythm, systolic murmur II/VI at left sternal border. No gallop, no rub. ABDOMEN:  Soft, nontender, no organomegaly, no mass. EXTREMITIES:  There is no edema. IMPRESSION: 
1. Chronic kidney disease, which is stage V, with mild component of acute kidney injury. Workup in the past did not reveal a reversible cause. 2.  Anemia. 3.  Osteomyelitis. 4.  Congestive heart failure. 5.  Type 2 diabetes mellitus. RECOMMENDATION:  The CHF seems to be stable at this time. Recommend to continue holding the diuretics. ACE inhibitor was already stopped. Will continue to follow. Patient may end up on dialysis. If the creatinine does not improve further, patient will need to have workup for dialysis. Access in particular. MD JESSICA Myles /  
D: 11/07/2018 14:03    
T: 11/07/2018 14:41 JOB #: [id number]

## 2018-11-07 NOTE — PROGRESS NOTES
PT Daily Note: Attempted to see patient for physical therapy this morning but patient declined the opportunity to participate at this time. Will check back on patient at a later date/time if schedule permits.  
Thank you, 
Ayaan Acosta, PTA

## 2018-11-07 NOTE — CONSULTS
Full consult note dictated CKD V ( mild component of MO) W/u in the pass didn't reveal a reversible cause. Anemia Osteo. CHF 
IIDM Rec: CHF seems to be stable Continue holding diuretic. ACE-I was stopped. F/u Thanks Dr. Dary Loyola

## 2018-11-07 NOTE — PROGRESS NOTES
Spoke with patient and daughter April regarding discharge planning. Patient states he is still opposed to any surgical intervention of his left foot/toe. Currently on PO ABT. Patient states the current wound care seems to be helping and he has good pain management at this time. Pending PT recommendations for STR vs HH. Patient and daughter are agreeable to either option . CM will continue to follow.

## 2018-11-07 NOTE — CONSULTS
Full consult note dictated CKD V ( mild component of MO) W/u in the pass didn't reveal a reversible cause. Anemia Osteo. CHF 
IIDM Rec: CHF seems to be stable Continue holding diuretic. ACE-I was stopped. F/u Thanks Dr. Marcy Nicole

## 2018-11-07 NOTE — PROGRESS NOTES
Daughter in this morning and says her dad started to become more confused about 3-4 weeks ago. She is concerned about his confusion and what would be causing it.

## 2018-11-07 NOTE — PROGRESS NOTES
Problem: Mobility Impaired (Adult and Pediatric) Goal: *Acute Goals and Plan of Care (Insert Text) STG: 
(1.)Mr. Zo Hyatt will move from supine to sit and sit to supine , scoot up and down and roll side to side with SUPERVISION within 3 treatment day(s). (2.)Mr. Zo Hyatt will transfer from bed to chair and chair to bed with CONTACT GUARD ASSIST using the least restrictive device within 3 treatment day(s). (3.)Mr. Zo Hyatt will ambulate with CONTACT GUARD ASSIST for 40 feet with the least restrictive device within 3 treatment day(s). (4.)Mr. Zo Hyatt will perform standing static and dynamic balance activities x 15 minutes with CONTACT GUARD ASSIST to improve safety within 3 day(s). LTG: 
(1.)Mr. Zo Hyatt will move from supine to sit and sit to supine , scoot up and down and roll side to side in bed with MODIFIED INDEPENDENCE within 7 treatment day(s). (2.)Mr. Zo Hyatt will transfer from bed to chair and chair to bed with STAND BY ASSIST using the least restrictive device within 7 treatment day(s). (3.)Mr. Zo Hyatt will ambulate with STAND BY ASSIST for 150 feet with the least restrictive device within 7 treatment day(s). (4.)Mr. Zo Hyatt will perform standing static and dynamic balance activities x 15 minutes with STAND BY ASSIST to improve safety within 7 day(s). ________________________________________________________________________________________________ PHYSICAL THERAPY: Daily Note, Treatment Day: 1st, PM 11/7/2018INPATIENT: Hospital Day: 4 Payor: CARE IMPROVEMENT PLUS [place] / Product Type: Managed Care Medicare /  
  
NAME/AGE/GENDER: Sharie Romberg is a 72 y.o. male PRIMARY DIAGNOSIS: [facility]el[facility] ([facility] <principal problem not specified> <principal problem not specified> 
 
  
ICD-10: Treatment Diagnosis: · Difficulty in walking, Not elsewhere classified (R26.2) Precaution/Allergies: 
Patient has no known allergies.   
  
ASSESSMENT:  
 Mr. Suraj Jamil is a 72 y.o. male admitted with osteomyelitis of L proximal phalanx of 5th toe. Patient was stby for all bed mobility and CGA for sit<>stands from sitting EOB. He ambulated [de-identified]' RW CGA with verbal cues for walker management and posture. Patient somewhat impulsive throughout treatment with decreased safety awareness needing cues to improve. Patient returned to room and completed static sitting balance on EOB for 10 minutes. He returned to supine at the end of the session with all needs in reach and bed alarm activated. Doni Jeffery is currently functioning below his baseline and would benefit from skilled PT during acute care stay to maximize safety and independence with functional mobility. This section established at most recent assessment PROBLEM LIST (Impairments causing functional limitations): 1. Decreased Strength 2. Decreased ADL/Functional Activities 3. Decreased Transfer Abilities 4. Decreased Ambulation Ability/Technique 5. Decreased Balance 6. Increased Pain 7. Decreased Activity Tolerance 8. Decreased Pacing Skills 9. Decreased Knowledge of Precautions 10. Decreased Glades with Home Exercise Program 
 INTERVENTIONS PLANNED: (Benefits and precautions of physical therapy have been discussed with the patient.) 1. Balance Exercise 2. Bed Mobility 3. Gait Training 4. Home Exercise Program (HEP) 5. Therapeutic Activites 6. Therapeutic Exercise/Strengthening 7. Transfer Training 8. Patient Education TREATMENT PLAN: Frequency/Duration: 3 times a week for duration of hospital stay Rehabilitation Potential For Stated Goals: Good RECOMMENDED REHABILITATION/EQUIPMENT: (at time of discharge pending progress): Due to the probability of continued deficits (see above) this patient will likely need continued skilled physical therapy after discharge. Equipment:  
? None at this time HISTORY:  
History of Present Injury/Illness (Reason for Referral): 
 Patient is a 71 y/o AA male with extensive PMH  including CAD, NSTEMI, HTN, DM, chronic systolic CHF, CKD and left foot osteomyelitis who has several recent admissions regarding left foot infection and noncompliance. On 10/5 patient was discharged home with PO keflex and minocycline. Blood cultures at that time grew coag negative staph. Patient readmitted 10/21 for Acute metabolic encephalopathy likely from OM L foot due to medication non compliance. ID evaluated patient recommended to cont IV. abxs. Patient was refusing IV therapy, so was switched to PO and ID recommended to follow up as outpatient. Today Patient returned to ED along with daughter  with similar  complaints of confusion and left foot pain. No leukocytosis. No fever. Patient alert and oriented, denies fever/chills, n/v/d. Cr 5.98. CXR negative. XR left foot findings suggestive of osteomyelitis of the proximal phalanx of the fifth toe. Patient started on zosyn and minocycline continued. Past Medical History/Comorbidities:  
Mr. Val Mondragon  has a past medical history of Abnormal CT scan, chest, Acute CHF ([address] Acute systolic congestive heart failure ([address] MO (acute kidney injury) ([address] Anemia of chronic disease, Bilateral pleural effusion, Chest pain, Depression, DM (diabetes mellitus), type 2 ([address] Encephalopathy due to infection, HTN (hypertension), Hypoglycemia, NSTEMI (non-ST elevated myocardial infarction) ([address] and Tobacco use. Mr. Val Mondragon  has a past surgical history that includes hx orthopaedic; RIGHT AMPUTATION KNEE(BKA) (Right, 1/30/2017); RIGHT FOOT DEBRIDEMENT  ,PREP FOR GRAFTING AND SKIN SUBSTITUTE GRAFT (Right, 1/20/2017); IR ANESTHESIA- RIGHT LEG ARTERIOGRAM/ 604 (N/A, 1/18/2017); AMPUTATION RIGHT 5TH TOE/ ROOM 604 (Right, 1/11/2017); ULTRASOUND (Bilateral, 12/28/2015); and THORACENTESIS (Left, 12/28/2015). Social History/Living Environment:  
Home Environment: Private residence # Steps to Enter: 4 Rails to Enter:  Yes 
 One/Two Story Residence: One story Living Alone: Yes Support Systems: Child(elpidio), Family member(s) Patient Expects to be Discharged to[de-identified] Private residence Current DME Used/Available at Home: Dellar Kalia, straight, Shower chair, Walker, rolling, Wheelchair, power Tub or Shower Type: Tub/Shower combination Prior Level of Function/Work/Activity: 
Pt states he lives alone, ambulates household distances with SPC/RW \"depending on what is closest,\" no falls, has friends and family that check on him daily, and has the following DME at home: R prosthesis, RW, SPC, and WC. He reports he hasn't be ambulating much since d/c from last hospital visit. Number of Personal Factors/Comorbidities that affect the Plan of Care: 3+: HIGH COMPLEXITY EXAMINATION:  
Most Recent Physical Functioning:  
Gross Assessment: 
  
         
  
Posture: 
  
Balance: 
  Bed Mobility: 
Rolling: Stand-by assistance Supine to Sit: Stand-by assistance Sit to Supine: Stand-by assistance Scooting: Stand-by assistance Wheelchair Mobility: 
  
Transfers: 
Sit to Stand: Contact guard assistance Stand to Sit: Contact guard assistance Gait: 
  
Base of Support: Center of gravity altered;Narrowed Speed/Radha: Shuffled; Slow Step Length: Right shortened Gait Abnormalities: Decreased step clearance;Hip Hike;Trunk sway increased Distance (ft): 80 Feet (ft) Assistive Device: Gait belt;Walker, rolling Ambulation - Level of Assistance: Contact guard assistance Interventions: Verbal cues; Safety awareness training Body Structures Involved: 1. Nerves 2. Bones 3. Joints 4. Muscles 5. Ligaments Body Functions Affected: 1. Sensory/Pain 2. Neuromusculoskeletal 
3. Movement Related Activities and Participation Affected: 1. Mobility 2. Self Care 3. Domestic Life 4. Interpersonal Interactions and Relationships 5. Community, Social and [place] Number of elements that affect the Plan of Care: 4+: HIGH COMPLEXITY CLINICAL PRESENTATION:  
 Presentation: Evolving clinical presentation with changing clinical characteristics: MODERATE COMPLEXITY CLINICAL DECISION MAKING:  
[facility] AM-PAC 6 Clicks Basic Mobility Inpatient Short Form How much difficulty does the patient currently have. .. Unable A Lot A Little None 1. Turning over in bed (including adjusting bedclothes, sheets and blankets)? [] 1   [] 2   [x] 3   [] 4  
2. Sitting down on and standing up from a chair with arms ( e.g., wheelchair, bedside commode, etc.)   [] 1   [x] 2   [] 3   [] 4  
3. Moving from lying on back to sitting on the side of the bed? [] 1   [] 2   [x] 3   [] 4 How much help from another person does the patient currently need. .. Total A Lot A Little None 4. Moving to and from a bed to a chair (including a wheelchair)? [] 1   [x] 2   [] 3   [] 4  
5. Need to walk in hospital room? [] 1   [x] 2   [] 3   [] 4  
6. Climbing 3-5 steps with a railing? [x] 1   [] 2   [] 3   [] 4  
© 2007, Trustees of [facility], under license to [employer]. All rights reserved Score:  Initial: 13 Most Recent: X (Date: -- ) Interpretation of Tool:  Represents activities that are increasingly more difficult (i.e. Bed mobility, Transfers, Gait). Score 24 23 22-20 19-15 14-10 9-7 6 Modifier CH CI CJ CK CL CM CN   
 
? Mobility - Walking and Moving Around:  
  - CURRENT STATUS: CL - 60%-79% impaired, limited or restricted  - GOAL STATUS: CK - 40%-59% impaired, limited or restricted  - D/C STATUS:  ---------------To be determined--------------- Payor: CARE IMPROVEMENT PLUS [place] / Product Type: [employer] Care Medicare /   
 
Medical Necessity:    
· Patient demonstrates good rehab potential due to higher previous functional level. Reason for Services/Other Comments: 
· Patient continues to require modification of therapeutic interventions to increase complexity of exercises. Use of outcome tool(s) and clinical judgement create a POC that gives a: Questionable prediction of patient's progress: MODERATE COMPLEXITY  
  
 
 
 
TREATMENT:  
(In addition to Assessment/Re-Assessment sessions the following treatments were rendered) Pre-treatment Symptoms/Complaints:  General fatigue and not feeling well. Pain: Initial:  
Pain Intensity 1: 0  Post Session:  0/10 Therapeutic Activity: (    25 minutes): Therapeutic activities including Ambulation on level ground and sit to stand from various surface heights, static sitting balance activity, static/dynamic standing balance activity  to improve mobility, strength, balance and coordination. Required maximal Verbal cues; Safety awareness training to promote static and dynamic balance in standing. Braces/Orthotics/Lines/Etc:  
· O2 Device: Room air Treatment/Session Assessment:   
· Response to Treatment: see above · Interdisciplinary Collaboration:  
o Physical Therapy Assistant 
o SPTA · After treatment position/precautions:  
o Supine in bed 
o Bed alarm/tab alert on 
o Bed/Chair-wheels locked 
o Bed in low position 
o Call light within reach 
o RN notified 
o Side rails x 3  
· Compliance with Program/Exercises: Will assess as treatment progresses · Recommendations/Intent for next treatment session: \"Next visit will focus on advancements to more challenging activities and reduction in assistance provided\". Total Treatment Duration: PT Patient Time In/Time Out Time In: 1768 Time Out: 7920 Yasmeen Peres, JAZMINE

## 2018-11-07 NOTE — PROGRESS NOTES
Problem: Falls - Risk of 
Goal: *Absence of Falls Document [facility] Fall Risk and appropriate interventions in the flowsheet. Outcome: Progressing Towards Goal 
Fall Risk Interventions: 
Mobility Interventions: Bed/chair exit alarm, Patient to call before getting OOB Mentation Interventions: Bed/chair exit alarm, Adequate sleep, hydration, pain control Medication Interventions: Bed/chair exit alarm, Patient to call before getting OOB, Teach patient to arise slowly Elimination Interventions: Bed/chair exit alarm, Call light in reach, Patient to call for help with toileting needs Problem: Pressure Injury - Risk of 
Goal: *Prevention of pressure injury Document Seun Scale and appropriate interventions in the flowsheet. Outcome: Progressing Towards Goal 
Pressure Injury Interventions: 
Sensory Interventions: Assess changes in LOC Moisture Interventions: Absorbent underpads Activity Interventions: Increase time out of bed, Pressure redistribution bed/mattress(bed type), PT/OT evaluation Mobility Interventions: HOB 30 degrees or less, Pressure redistribution bed/mattress (bed type), PT/OT evaluation Nutrition Interventions: Document food/fluid/supplement intake Friction and Shear Interventions: HOB 30 degrees or less
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
## 2018-11-07 NOTE — PROGRESS NOTES
Patient pulled IV out sometime overnight. Spoke with Alejo Osuna NP and she is ok with patient not having an IV at this time.

## 2018-11-07 NOTE — CONSULTS
Palliative Care Patient: Alpa Stanley MRN: [id number]  SSN: [id number] YOB: 1945  Age: 72 y.o. Sex: male Date of Request: 11/7/2018 Date of Consult:  11/7/2018 Reason for Consult:  goals of care Requesting Physician: Naveed Prasad NP Assessment/Plan:  
 
Principal Diagnosis:   
Debility, Unspecified  R53.81 Additional Diagnoses: · Fatigue, Lethargy  R53.83 
· Counseling, Encounter for Medical Advice  Z71.9 
· Encounter for Palliative Care  Z51.5 Palliative Performance Scale (PPS): PPS: 50 Medical Decision Making:  
Reviewed and summarized notes from admission to present Discussed case with appropriate providers Reviewed laboratory and x-ray data from admission to present Pt resting in bed, no distress noted. He is oriented to person, place, year, and situation at present. Pt well known to PC from previous admissions, mostly due to the same 2 factors of renal failure and/or foot infection. We have established Mr Remy Magallon' goals multiple times in the past, and readdressed goals again today. He confirms again that he is not ready to die, and will do whatever it takes to extend his life, including dialysis and/or amputation of the left LE. He stated \"Of course I don't want to do it, but I'll do it if I have to. \"  When asked about taking his antibiotics, he stated \"I thought I was. \"  While he may not have good insight into his medical condition, he is oriented at present and able to make decisions for himself at present time. As far as competence to make decisions is concerned, this would need to be determined in a court of law. If compliance with antibiotics and/or wound care becomes a bigger issue, the only option may be to amputate (again, he states he will proceed with amputation if needed). We will not follow. Will discuss findings with members of the interdisciplinary team.   
 
Thank you for this referral.    
 
  
. 
 
Subjective: History obtained from:  Patient and Chart Chief Complaint: Osteomyelitis History of Present Illness:  Mr Val Mondragon is a 73 yo AA male with PMH of CHF, CKD, DM, chronic osteomyelitis, and other conditions listed below, who presented to the ER on 11/4/2018 with c/o confusion and left foot pain. Pt denied fevers, chills, n/v/d. He has had a complicated course with chronic osteomyelitis in his left foot, with questionable compliance with antibiotics. ID has evaluated him, and adjust his oral antibiotics. He denies pain at present. Advance Directive: No      
Code Status:  Full Code Health Care Power of : No - Patient does not have a [address] Past Medical History:  
Diagnosis Date  Abnormal CT scan, chest 12/27/2015  Acute CHF ([address] 12/26/2015  Acute systolic congestive heart failure ([address] 12/30/2015  MO (acute kidney injury) ([address] 7/29/2012  Anemia of chronic disease 9/6/2018  Bilateral pleural effusion 12/27/2015  Chest pain 12/26/2015  Depression  DM (diabetes mellitus), type 2 ([address] 7/29/2012  Encephalopathy due to infection 1/7/2017  
 HTN (hypertension) 7/29/2012  Hypoglycemia 7/29/2012  
 NSTEMI (non-ST elevated myocardial infarction) ([facility] 1/5/2017  Tobacco use 12/27/2015 Past Surgical History:  
Procedure Laterality Date  HX ORTHOPAEDIC Family History Problem Relation Age of Onset  Diabetes Mother  Kidney Disease Mother  Diabetes Father  Kidney Disease Father  Kidney Disease Sister Social History Tobacco Use  Smoking status: Current Every Day Smoker Packs/day: 0.50 Years: 45.00 Pack years: 22.50 Types: Cigarettes  Smokeless tobacco: Never Used Substance Use Topics  Alcohol use: No  
 
Prior to Admission medications Medication Sig Start Date End Date Taking? Authorizing Provider amLODIPine (NORVASC) 10 mg tablet Take 1 Tab by mouth daily. 10/30/18   Christopher Quan MD  
minocycline (MINOCIN, DYNACIN) 100 mg capsule Take 1 Cap by mouth every twelve (12) hours for 16 days. 10/29/18 11/14/18  Christopher Quan MD  
levoFLOXacin (LEVAQUIN) 750 mg tablet Take 1 Tab by mouth every fourty-eight (48) hours for 16 days. 10/29/18 11/14/18  Christopher Quan MD  
glipiZIDE SR (GLUCOTROL XL) 2.5 mg CR tablet take 1 tablet by mouth once daily 10/23/18   Azul Michel MD  
ramipril (ALTACE) 10 mg capsule Take 1 Cap by mouth daily. 10/18/18   Suly Phelps NP  
metoprolol succinate (TOPROL-XL) 100 mg tablet Take 1 Tab by mouth daily. 10/18/18   Suly Phelps NP  
atorvastatin (LIPITOR) 80 mg tablet Take 1 Tab by mouth daily. 10/18/18   Suly Phelps NP  
isosorbide mononitrate ER (IMDUR) 30 mg tablet Take 1 Tab by mouth daily. 10/18/18   Suly Phelps NP  
furosemide (LASIX) 80 mg tablet Take 1 Tab by mouth two (2) times a day. Indications: Pulmonary Edema due to Chronic Heart Failure 10/11/18   Dilip Parks MD  
Lancets misc As directed 9/28/18   Jeffy Zapata MD  
glucose blood VI test strips (ASCENSIA AUTODISC VI, ONE TOUCH ULTRA TEST VI) strip As directed 9/28/18   Jeffy Zapata MD  
sodium bicarbonate 650 mg tablet Take 1 Tab by mouth three (3) times daily (with meals). 9/10/18   Tee Mathew MD  
aspirin 81 mg chewable tablet Take 1 Tab by mouth daily (after breakfast). 12/30/15   Ulysses Liriano NP No Known Allergies Review of Systems: A comprehensive review of systems was negative except for: Constitutional: Positive for fatigue. Objective:  
 
Visit Vitals BP 95/59 (BP 1 Location: Right arm, BP Patient Position: At rest) Pulse 93 Temp 98.5 °F (36.9 °C) Resp 17 Ht 6' 1\" (1.854 m) Wt 189 lb (85.7 kg) SpO2 92% BMI 24.94 kg/m² Physical Exam: General:  Cooperative. Debilitated. No acute distress. Eyes:  Conjunctivae/corneas clear Nose: Nares normal. Septum midline. Neck: Supple, symmetrical, trachea midline Lungs:   Clear to auscultation bilaterally, unlabored Heart:  Regular rate and rhythm Abdomen:   Soft, non-tender, non-distended Extremities: Normal, atraumatic, no cyanosis. Right BKA Skin: Skin color, texture, turgor normal.  
Neurologic: Nonfocal  
Psych: Alert and oriented Assessment:  
 
Hospital Problems  Date Reviewed: 9/26/2018 Codes Class Noted POA [facility]el[facility] ICD-10-CM: M86.9 ICD-9-CM: 730.20  11/4/2018 Unknown Signed By: Deven Muir NP November 7, 2018

## 2018-11-08 NOTE — PROGRESS NOTES
Problem: Interdisciplinary Rounds Goal: Interdisciplinary Rounds Outcome: Progressing Towards Goal 
Interdisciplinary team rounds were held 11/8/2018 with the following team members:Care Management, Nurse Practitioner, Occupational Therapy and . Anticipate patient will need STR. Plan of care discussed. See clinical pathway and/or care plan for interventions and desired outcomes.

## 2018-11-08 NOTE — PROGRESS NOTES
PHYSICAL THERAPY NOTE: 
 
Patient declined physical therapy today due to complaints of L Foot pain and burning sensation. RN aware. Encouraged patient to participate with physical therapy discussing the benefits but he continued to refuse. Will try again as schedule allows. Thank you, 
JAZMINE Gresham

## 2018-11-08 NOTE — PROGRESS NOTES
Problem: Falls - Risk of 
Goal: *Absence of Falls Document Apollocalvin Wright Fall Risk and appropriate interventions in the flowsheet. Outcome: Progressing Towards Goal 
Fall Risk Interventions: 
Mobility Interventions: Bed/chair exit alarm, Patient to call before getting OOB Mentation Interventions: Bed/chair exit alarm, Adequate sleep, hydration, pain control Medication Interventions: Bed/chair exit alarm, Patient to call before getting OOB, Teach patient to arise slowly Elimination Interventions: Bed/chair exit alarm, Call light in reach, Patient to call for help with toileting needs Problem: Pressure Injury - Risk of 
Goal: *Prevention of pressure injury Document Seun Scale and appropriate interventions in the flowsheet. Outcome: Progressing Towards Goal 
Pressure Injury Interventions: 
Sensory Interventions: Assess changes in LOC Moisture Interventions: Absorbent underpads Activity Interventions: PT/OT evaluation, Pressure redistribution bed/mattress(bed type), Increase time out of bed Mobility Interventions: HOB 30 degrees or less, Pressure redistribution bed/mattress (bed type), PT/OT evaluation Nutrition Interventions: Document food/fluid/supplement intake Friction and Shear Interventions: HOB 30 degrees or less

## 2018-11-08 NOTE — PROGRESS NOTES
List of [facility] approved STR given to patient to review and choose. Patient not very accepting of going to a STR facility. Spoke with patient's daughter Hidalgo Innocent regarding need for STR per PT recommendations. She will speak with her father and encourage him to go to rehab and will help assist him in choosing a STR facility. Cm will continue to follow.

## 2018-11-08 NOTE — PROGRESS NOTES
Hospitalist Progress Note Subjective:  
Daily Progress Note: 11/7/18 1800 Patient in hospital 10/21-29/18 with Osteomyelitis of the left foot due to diabetic ulcer on po antibiotics presented to ER 11/4 with progressive weakness x 2-3 days, confusion per family, difficulty ambulating. Denies fever, chills, GI or  symptoms, no SOB. Left foot xray suggestive of osteomyelitis of the proximal phalanx of the fifth toe. Pancultured, admitted on  Zosyn and minocycline, suspect patient did not take po abx prescribed on discharge. Troponin 0.07, CRP: 3.7. Prior to recent stay, patient was admitted 10/5-11/18 for acute respiratory failure, acute MI and foot infection, discharged home on keflex and minocycline. Blood cultures at that time grew coag negative staph. On discharge BUN/Creat: 62/4.50. Today BUN? Creat: 87/5. 98. Patient refused IV antibiotics, major component of non compliance and aggression toward staff. Seen by ID at that time. On discharge, he agrees to be compliant, however, this is obviously not the case. Patient refusing heparin Subq, cursing at staff. 11/5:  Still refusing heparin. Also refuses to give reason. ID in, Abx changed:  Minocycline 100 mg bid and cipro 500 mg daily. Will also need aggressive wound care. ID will follow up 11/28/18 at 11 am.  Had MRI 10/9 with osteo and the ID plan was for a six week course of treatment, however patient was non compliant. States he \"thinks\" he has been taking antibiotics. Signing off. Wound care in, recommend betadine paint and leave open to air. Needs vascular surgery, but refuses to have surgery consistently in the past. OT in, needs ongoing care, forgetful. Refused labs this am.  
11/6:  Complains about multiple issues. Extended discussion with patient and daughter Arminda Melva regarding non compliance will cost patient his life if he does not begin to adhere to recommended treatments.   Currently he is in agreement with renal and Vascular consults. Will give him 24 hours to see how he does and if he will actually comply prior to calling in consults. Neither daughter has POA. Joana Neely questions patient's competency. Creatinine worsening. 11/7:  Participated in PT. Palliative care in, impressions appreciated. Nephro in, worsening renal failure with proteinuria and anemia, may need dialysis. Thus far, patient is agreeable. Continued weakness and lethargy. ADDITIONAL HISTORY:  Right AKA, CAD, NSTEMI, Hypertension, DM, chronic systolic HF, CKD with probable need for dialysis, left foot osteomyelitis, anemia, depression,  Long standing tobacco abuse, PAD.  
  
Current Facility-Administered Medications Medication Dose Route Frequency  white petrolatum-mineral oil (EUCERIN) cream   Topical PRN  
 oxyCODONE-acetaminophen (PERCOCET 7.5) 7.5-325 mg per tablet 1 Tab  1 Tab Oral Q4H PRN  
 oxyCODONE IR (ROXICODONE) tablet 5 mg  5 mg Oral Q4H PRN  
 LORazepam (ATIVAN) injection 0.5 mg  0.5 mg IntraVENous Q4H PRN  
 alcohol 62% (NOZIN) nasal  1 Ampule  1 Ampule Topical Q12H  ciprofloxacin HCl (CIPRO) tablet 500 mg  500 mg Oral Q24H  povidone-iodine (BETADINE) 10 % topical solution   Topical DAILY  amLODIPine (NORVASC) tablet 10 mg  10 mg Oral DAILY  aspirin chewable tablet 81 mg  81 mg Oral DAILY AFTER BREAKFAST  atorvastatin (LIPITOR) tablet 80 mg  80 mg Oral DAILY  isosorbide mononitrate ER (IMDUR) tablet 30 mg  30 mg Oral DAILY  metoprolol succinate (TOPROL-XL) tablet 100 mg  100 mg Oral DAILY  minocycline (MINOCIN, DYNACIN) capsule 100 mg  100 mg Oral Q12H  
 sodium bicarbonate tablet 650 mg  650 mg Oral TID WITH MEALS  naloxone (NARCAN) injection 0.4 mg  0.4 mg IntraVENous PRN  
 diphenhydrAMINE (BENADRYL) capsule 25 mg  25 mg Oral Q4H PRN  
 ondansetron (ZOFRAN) injection 4 mg  4 mg IntraVENous Q4H PRN  
  senna-docusate (PERICOLACE) 8.6-50 mg per tablet 2 Tab  2 Tab Oral DAILY PRN  
 heparin (porcine) injection 5,000 Units  5,000 Units SubCUTAneous Q8H  
 insulin lispro (HUMALOG) injection   SubCUTAneous AC&HS  influenza vaccine 2018-19 (6 mos+)(PF) (FLUARIX QUAD/FLULAVAL QUAD) injection 0.5 mL  0.5 mL IntraMUSCular PRIOR TO DISCHARGE Review of Systems A comprehensive review of systems was negative except for that written in the HPI. Objective:  
 
Visit Vitals /76 Pulse 63 Temp 98.4 °F (36.9 °C) Resp 18 Ht 6' 1\" (1.854 m) Wt 85.8 kg (189 lb 3.2 oz) SpO2 97% BMI 24.96 kg/m² O2 Device: Room air Temp (24hrs), Av.3 °F (36.8 °C), Min:97.8 °F (36.6 °C), Max:98.6 °F (37 °C) 1901 - 700 In: -  
Out: 700 [Urine:700] 701 - 1900 In: 360 [P.O.:360] Out: 700 [Urine:700] General appearance: Oriented but forgetful. Awake and alert, currently cooperative. Repeatedly states he does not feel good. Chronically ill. Thin and frail. Head: Normocephalic, without obvious abnormality, atraumatic Throat: Lips, mucosa, and tongue normal. No Teeth Neck: supple, symmetrical, trachea midline, no JVD Lungs: clear to auscultation bilaterally Heart: regular rate and rhythm, S1, S2 normal, no murmur, click, rub or gallop Abdomen: soft, non-tender. Bowel sounds normal. No masses,  no organomegaly Extremities: Left second toe ulcer, 5th MT head ulcer, heel boggy. Right BKA with prosthesis. Upper extremities normal, atraumatic, no cyanosis or edema Skin: Skin color, texture, turgor normal. No rashes or lesions Neurologic: Grossly normal 
  
Additional comments: Notes,orders, test results, vitals reviewed Data Review Recent Results (from the past 24 hour(s)) GLUCOSE, POC Collection Time: 18  7:18 AM  
Result Value Ref Range Glucose (POC) 114 (H) 65 - 100 mg/dL GLUCOSE, POC  Collection Time: 18 11:06 AM  
 Result Value Ref Range Glucose (POC) 123 (H) 65 - 100 mg/dL PLEASE READ & DOCUMENT PPD TEST IN 48 HRS Collection Time: 11/07/18  2:26 PM  
Result Value Ref Range PPD neg Negative  
 mm Induration 0 mm GLUCOSE, POC Collection Time: 11/07/18  4:50 PM  
Result Value Ref Range Glucose (POC) 186 (H) 65 - 100 mg/dL GLUCOSE, POC Collection Time: 11/07/18  9:11 PM  
Result Value Ref Range Glucose (POC) 164 (H) 65 - 100 mg/dL CBC WITH AUTOMATED DIFF Collection Time: 11/08/18  5:03 AM  
Result Value Ref Range WBC 5.4 4.3 - 11.1 K/uL  
 RBC 3.14 (L) 4.23 - 5.6 M/uL HGB 8.6 (L) 13.6 - 17.2 g/dL HCT 28.6 (L) 41.1 - 50.3 % MCV 91.1 79.6 - 97.8 FL  
 MCH 27.4 26.1 - 32.9 PG  
 MCHC 30.1 (L) 31.4 - 35.0 g/dL  
 RDW 12.9 % PLATELET 051 448 - 879 K/uL MPV 11.2 9.4 - 12.3 FL ABSOLUTE NRBC 0.00 0.0 - 0.2 K/uL  
 DF AUTOMATED NEUTROPHILS 59 43 - 78 % LYMPHOCYTES 24 13 - 44 % MONOCYTES 8 4.0 - 12.0 % EOSINOPHILS 8 (H) 0.5 - 7.8 % BASOPHILS 1 0.0 - 2.0 % IMMATURE GRANULOCYTES 0 0.0 - 5.0 %  
 ABS. NEUTROPHILS 3.2 1.7 - 8.2 K/UL  
 ABS. LYMPHOCYTES 1.3 0.5 - 4.6 K/UL  
 ABS. MONOCYTES 0.5 0.1 - 1.3 K/UL  
 ABS. EOSINOPHILS 0.4 0.0 - 0.8 K/UL  
 ABS. BASOPHILS 0.0 0.0 - 0.2 K/UL  
 ABS. IMM. GRANS. 0.0 0.0 - 0.5 K/UL METABOLIC PANEL, COMPREHENSIVE Collection Time: 11/08/18  5:03 AM  
Result Value Ref Range Sodium 141 136 - 145 mmol/L Potassium 4.6 3.5 - 5.1 mmol/L Chloride 112 (H) 98 - 107 mmol/L  
 CO2 21 21 - 32 mmol/L Anion gap 8 7 - 16 mmol/L Glucose 85 65 - 100 mg/dL BUN 72 (H) 8 - 23 MG/DL Creatinine 4.78 (H) 0.8 - 1.5 MG/DL  
 GFR est AA 16 (L) >60 ml/min/1.73m2 GFR est non-AA 13 (L) >60 ml/min/1.73m2 Calcium 7.7 (L) 8.3 - 10.4 MG/DL Bilirubin, total 0.3 0.2 - 1.1 MG/DL  
 ALT (SGPT) 23 12 - 65 U/L  
 AST (SGOT) 23 15 - 37 U/L Alk. phosphatase 76 50 - 136 U/L Protein, total 6.7 6.3 - 8.2 g/dL Albumin 1.9 (L) 3.2 - 4.6 g/dL Globulin 4.8 (H) 2.3 - 3.5 g/dL A-G Ratio 0.4 (L) 1.2 - 3.5 MAGNESIUM Collection Time: 11/08/18  5:03 AM  
Result Value Ref Range Magnesium 2.2 1.8 - 2.4 mg/dL  
 
11/4:  CXR:   Normal cardiomediastinal silhouette.   Mild subsegmental atelectasis bilateral lung bases.  No evidence of pneumothorax, pleural effusion, or air space consolidation.  Visualized soft tissue and osseous structures otherwise unremarkable.   
Impression:  No significant interval change. 
  
11/4:  Left foot xray:   No evidence of acute fracture or dislocation.  Normal alignment, joint  spaces preserved.  Cortical erosions are seen of the lateral aspect of the proximal phalanx of the fifth toe, suggestive of osteomyelitis.  Normal mineralization.  No evidence of ankle joint effusion.  Severe calcific atherosclerosis unchanged.  Visualized soft tissues otherwise unremarkable. Impression: Findings suggestive of osteomyelitis of the proximal phalanx of the fifth toe. Assessment/Plan:  
Left 5th toe chronic Osteomyelitis with chronic ischemic, neuropathic ulcers Previously refused amputation, now stating he will agree, Nephrology in. Will need vascular consult if compliance lasts Continue cipro and minocyline as patient refusing IV abx ID, wound care on board Reconsult ID if compliance lasts DM II, poor control Continue SSI Long standing, ongoing non compliance Palliative care in 
            Daughter questioning compliance. Acute on chronic renal failure: Worsening, now stage V Previously refused dialysis, now stating he \"will do what         it takes. \"  
        Nephrology in, may need dialysis, so far patient is           agreeable Extreme debility with poor prognosis Prognosis largely depends on patient compliance, daughter understands this, patient verbalizes understanding, however question this. Chronic anemia Follow Hypertension Home meds Chronic CHF with EF 30-35% Lethargy and fatigue:  Multifactorial 
        CM, OT, PT on board Appreciate all assist from consultants in this very difficult patient. NOTE: daughters:  April [phone]. Works nights Drea Cordon: [phone], works days. Remains a full code per patient Care Plan discussed with: Patient, daughter April and Nurse Signed By: [facility], NP November 7, 2018

## 2018-11-08 NOTE — PROGRESS NOTES
Hospitalist Progress Note Subjective:  
Patient is a 73 yo male with extensive PMH  including CAD, NSTEMI, HTN, DM, chronic systolic CHF, CKD and left foot osteomyelitis who was hospitalized 10/5-10/11 for acute resp failure, acute MI, and foot infection. Patient recently discharged home on PO keflex and minocycline after refusing IV therapy. Blood cultures at that time grew coag negative staph. Patient noncompliant with medications and returned to ER with complaints of confusion and LLE pain. ID consulting. ABx changed to minocycline and cipro po daily. Wound care following. Patient with MO. Nephrology consulting. Will monitor. Baseline in mid 4's. Today cr improving 4.78.n Today patient alert and oriented. Flat affect. Denies fever/chills, Cp, SOB. Denies pain. Compliant with medication today. ADDITIONAL HISTORY:  Right AKA, CAD, NSTEMI, Hypertension, DM, chronic systolic HF, CKD with probable need for dialysis, left foot osteomyelitis, anemia, depression,  Long standing tobacco abuse, PAD.  
  
Current Facility-Administered Medications Medication Dose Route Frequency  epoetin denver (EPOGEN;PROCRIT) injection 10,000 Units  10,000 Units SubCUTAneous Q7D  
 white petrolatum-mineral oil (EUCERIN) cream   Topical PRN  
 oxyCODONE-acetaminophen (PERCOCET 7.5) 7.5-325 mg per tablet 1 Tab  1 Tab Oral Q4H PRN  
 oxyCODONE IR (ROXICODONE) tablet 5 mg  5 mg Oral Q4H PRN  
 LORazepam (ATIVAN) injection 0.5 mg  0.5 mg IntraVENous Q4H PRN  
 alcohol 62% (NOZIN) nasal  1 Ampule  1 Ampule Topical Q12H  ciprofloxacin HCl (CIPRO) tablet 500 mg  500 mg Oral Q24H  povidone-iodine (BETADINE) 10 % topical solution   Topical DAILY  amLODIPine (NORVASC) tablet 10 mg  10 mg Oral DAILY  aspirin chewable tablet 81 mg  81 mg Oral DAILY AFTER BREAKFAST  atorvastatin (LIPITOR) tablet 80 mg  80 mg Oral DAILY  isosorbide mononitrate ER (IMDUR) tablet 30 mg  30 mg Oral DAILY  metoprolol succinate (TOPROL-XL) tablet 100 mg  100 mg Oral DAILY  minocycline (MINOCIN, DYNACIN) capsule 100 mg  100 mg Oral Q12H  
 sodium bicarbonate tablet 650 mg  650 mg Oral TID WITH MEALS  naloxone (NARCAN) injection 0.4 mg  0.4 mg IntraVENous PRN  
 diphenhydrAMINE (BENADRYL) capsule 25 mg  25 mg Oral Q4H PRN  
 ondansetron (ZOFRAN) injection 4 mg  4 mg IntraVENous Q4H PRN  
 senna-docusate (PERICOLACE) 8.6-50 mg per tablet 2 Tab  2 Tab Oral DAILY PRN  
 heparin (porcine) injection 5,000 Units  5,000 Units SubCUTAneous Q8H  
 insulin lispro (HUMALOG) injection   SubCUTAneous AC&HS  influenza vaccine 2018-19 (6 mos+)(PF) (FLUARIX QUAD/FLULAVAL QUAD) injection 0.5 mL  0.5 mL IntraMUSCular PRIOR TO DISCHARGE Review of Systems A comprehensive review of systems was negative except for that written in the HPI. Objective:  
 
Visit Vitals /69 (BP 1 Location: Right arm, BP Patient Position: Sitting) Pulse 65 Temp 98.6 °F (37 °C) Resp 16 Ht 6' 1\" (1.854 m) Wt 85.8 kg (189 lb 3.2 oz) SpO2 95% BMI 24.96 kg/m² O2 Device: Room air Temp (24hrs), Av.3 °F (36.8 °C), Min:98 °F (36.7 °C), Max:98.6 °F (37 °C) 701 - 1900 In: -  
Out: 798 [CHHGD:245] 1901 -  07 In: -  
Out: 1342 [GKLRO:2158] General appearance: Oriented. Awake and alert, currently cooperative. Flat affect. Head: Normocephalic, without obvious abnormality, atraumatic Throat: Lips, mucosa, and tongue normal. No Teeth Neck: supple, symmetrical, trachea midline, no JVD Lungs: clear to auscultation bilaterally. Room air Heart: regular rate and rhythm, S1, S2 normal, no murmur, click, rub or gallop Abdomen: soft, non-tender. Bowel sounds normal. No masses,  no organomegaly Extremities: Left second toe ulcer, 5th MT head ulcer, heel boggy. Right BKA with prosthesis. Upper extremities normal, atraumatic, no cyanosis or edema Skin: Skin color, texture, turgor normal. No rashes or lesions Neurologic: Grossly normal 
  
Additional comments: Notes,orders, test results, vitals reviewed Data Review Recent Results (from the past 24 hour(s)) GLUCOSE, POC Collection Time: 11/07/18  4:50 PM  
Result Value Ref Range Glucose (POC) 186 (H) 65 - 100 mg/dL GLUCOSE, POC Collection Time: 11/07/18  9:11 PM  
Result Value Ref Range Glucose (POC) 164 (H) 65 - 100 mg/dL CBC WITH AUTOMATED DIFF Collection Time: 11/08/18  5:03 AM  
Result Value Ref Range WBC 5.4 4.3 - 11.1 K/uL  
 RBC 3.14 (L) 4.23 - 5.6 M/uL HGB 8.6 (L) 13.6 - 17.2 g/dL HCT 28.6 (L) 41.1 - 50.3 % MCV 91.1 79.6 - 97.8 FL  
 MCH 27.4 26.1 - 32.9 PG  
 MCHC 30.1 (L) 31.4 - 35.0 g/dL  
 RDW 12.9 % PLATELET 283 319 - 955 K/uL MPV 11.2 9.4 - 12.3 FL ABSOLUTE NRBC 0.00 0.0 - 0.2 K/uL  
 DF AUTOMATED NEUTROPHILS 59 43 - 78 % LYMPHOCYTES 24 13 - 44 % MONOCYTES 8 4.0 - 12.0 % EOSINOPHILS 8 (H) 0.5 - 7.8 % BASOPHILS 1 0.0 - 2.0 % IMMATURE GRANULOCYTES 0 0.0 - 5.0 %  
 ABS. NEUTROPHILS 3.2 1.7 - 8.2 K/UL  
 ABS. LYMPHOCYTES 1.3 0.5 - 4.6 K/UL  
 ABS. MONOCYTES 0.5 0.1 - 1.3 K/UL  
 ABS. EOSINOPHILS 0.4 0.0 - 0.8 K/UL  
 ABS. BASOPHILS 0.0 0.0 - 0.2 K/UL  
 ABS. IMM. GRANS. 0.0 0.0 - 0.5 K/UL METABOLIC PANEL, COMPREHENSIVE Collection Time: 11/08/18  5:03 AM  
Result Value Ref Range Sodium 141 136 - 145 mmol/L Potassium 4.6 3.5 - 5.1 mmol/L Chloride 112 (H) 98 - 107 mmol/L  
 CO2 21 21 - 32 mmol/L Anion gap 8 7 - 16 mmol/L Glucose 85 65 - 100 mg/dL BUN 72 (H) 8 - 23 MG/DL Creatinine 4.78 (H) 0.8 - 1.5 MG/DL  
 GFR est AA 16 (L) >60 ml/min/1.73m2 GFR est non-AA 13 (L) >60 ml/min/1.73m2 Calcium 7.7 (L) 8.3 - 10.4 MG/DL Bilirubin, total 0.3 0.2 - 1.1 MG/DL  
 ALT (SGPT) 23 12 - 65 U/L  
 AST (SGOT) 23 15 - 37 U/L Alk. phosphatase 76 50 - 136 U/L Protein, total 6.7 6.3 - 8.2 g/dL Albumin 1.9 (L) 3.2 - 4.6 g/dL Globulin 4.8 (H) 2.3 - 3.5 g/dL A-G Ratio 0.4 (L) 1.2 - 3.5 MAGNESIUM Collection Time: 11/08/18  5:03 AM  
Result Value Ref Range Magnesium 2.2 1.8 - 2.4 mg/dL GLUCOSE, POC Collection Time: 11/08/18  7:03 AM  
Result Value Ref Range Glucose (POC) 89 65 - 100 mg/dL GLUCOSE, POC Collection Time: 11/08/18 11:13 AM  
Result Value Ref Range Glucose (POC) 143 (H) 65 - 100 mg/dL  
 
11/4:  CXR:   Normal cardiomediastinal silhouette.   Mild subsegmental atelectasis bilateral lung bases.  No evidence of pneumothorax, pleural effusion, or air space consolidation.  Visualized soft tissue and osseous structures otherwise unremarkable.   
Impression:  No significant interval change. 
  
11/4:  Left foot xray:   No evidence of acute fracture or dislocation.  Normal alignment, joint  spaces preserved.  Cortical erosions are seen of the lateral aspect of the proximal phalanx of the fifth toe, suggestive of osteomyelitis.  Normal mineralization.  No evidence of ankle joint effusion.  Severe calcific atherosclerosis unchanged.  Visualized soft tissues otherwise unremarkable. Impression: Findings suggestive of osteomyelitis of the proximal phalanx of the fifth toe. Assessment/Plan:  
Left 5th toe chronic Osteomyelitis with chronic ischemic, neuropathic ulcers 
      -Previously refused amputation, now stating he will agree, 
      - Will need vascular consult if compliance lasts 
      - Continue cipro and minocyline as patient refusing IV abx 
       -ID, wound care on board 
        -Reconsult ID if compliance lasts DM II, poor control Continue SSI Long standing, ongoing non compliance 
            -Palliative care in 
            -Daughter questioning compliance. Acute on chronic renal failure:   
     -Nephrology in, may need dialysis, so far patient is agreeable Chronic anemia            
      -CBC daily Hypertension -Continue ome meds Chronic CHF with EF 30-35% 
       -monitor for fluid overload Lethargy and fatigue:  Multifactorial 
        CM, OT, PT on board Discharge plan: home vs STR 
DVT ppx: SQH  
 
NOTE: daughters:  April [phone]. Works nights [employer] Child: [phone], works days. Remains a full code per patient Care Plan discussed with: Dr. Regina Rey,  
 
 
Signed By: Agustín Mckeon NP November 7, 2018

## 2018-11-08 NOTE — ADT AUTH CERT NOTES
Osteomyelitis - Care Day 5 (11/8/2018) by Morgan Eisenberg RN Review Status Review Entered Completed 11/8/2018 15:25 Criteria Review Care Day: 5 Care Date: 11/8/2018 Level of Care: Inpatient Floor Guideline Day 4 Clinical Status  
(X) * Hemodynamic stability 11/8/2018 3:25 PM EST by Puja Baires  
  98.2, 149/72, 62, 18, 92%  
  
(X) * Afebrile  
(X) * Most recent blood culture negative 11/8/2018 3:25 PM EST by Puja Baires  
  bc: no growth Urine: + skin yelena  
  
(X) * Usual diet tolerated 11/8/2018 3:25 PM EST by Puja Baires  
  diabetic  
  
(X) * Home antibiotic regimen arranged 11/8/2018 3:25 PM EST by Pierce Sheppard ( ) * Need for inpatient surgical management absent 11/8/2018 3:25 PM EST by Puja Baires  
  MO with CKD stage V - baseline in mid 4's. MO with infection/diuretics ( ) * Discharge plans and education understood Routes  
(X) * Oral hydration, medications,[E]and diet 11/8/2018 3:25 PM EST by Puja Baires  
  Norvasc 10mg pod, ASA 81mg pod, Lipitor 80mg pod, Cipro 500mg po q 24h, Epogen 10,000u s7d, Heparin 5000u sq8h, Imdur 30m pod, Toprol XL 100mg pod, Minocin 100mg po q 12h,  
  
(X) Usual diet Interventions (X) Possible physical therapy ( ) ESR, C-reactive protein 11/8/2018 3:25 PM EST by Puja Baires  
  Labs:  RBC 3.14, H/H8.6/28.6, chl 112, BUN 72, Cr 4.78, Ca 7.7, alb 1.9, glob 4.8, ag ratio 0.4  
  
  
11/8/2018 3:25 PM EST by Puja Baires Subject: Additional Clinical Information Assessment and Plan: MO with CKD stage V- baseline in mid 4's.  MO with infection/diuretics- renal function closer to baseline now- recent progression of CKD following NSTEMI - no indication for dialysis currently and doubt he will agree when the time comes as he has agreed in the past and then changed his mind - he has had difficulties with commitment to plans.  Unfortunately he will need a catheter when he becomes uremic- vein mapping done 10/7/18 and will need follow up as outpatient with vascular surgery  PVD/Left foot osteomyelitis- abx, wound care- vascular following  CAD/CHF - NSTEMI  recent  admission 9/24-9/28/18- LVEF 30-35%- plan [facility] when he is on HD- appears euvolemic on exam now - continue holding diuretics. ACE-I was stopped  Anemia- resume epogen- recheck iron stores  DM- per primary team [facility]- home meds  Metabolic acidosis- po bicarbonate * Milestone Osteomyelitis - Care Day 4 (11/7/2018) by Christofer Marquez RN Review Status Review Entered Completed 11/8/2018 15:17 Criteria Review Care Day: 4 Care Date: 11/7/2018 Level of Care: Inpatient Floor Guideline Day 4 Clinical Status  
(X) * Hemodynamic stability 11/8/2018 3:17 PM EST by Shabnam Dunlap  
  98.4, 135/76, 63, 18, 97%  
  
(X) * Afebrile  
(X) * Most recent blood culture negative 11/8/2018 3:17 PM EST by Shabnam Dunlap  
  BC: NGTD urine cx:normal skin yelena  
  
(X) * Usual diet tolerated 11/8/2018 3:17 PM EST by Shabnam Dunlap  
  diabetic  
  
(X) * Home antibiotic regimen arranged 11/8/2018 3:17 PM EST by Shabnam Dunlap  
  po Cipro ( ) * Need for inpatient surgical management absent 11/8/2018 3:17 PM EST by John Gonzláes in, worsening renal failure with proteinuria and anemia, may need dialysis.  Thus far, patient is agreeable.  Continued weakness and lethargy ( ) * Discharge plans and education understood Routes  
(X) * Oral hydration, medications,[E]and diet 11/8/2018 3:17 PM EST by Shabnam Dunlap  
  Nrovasc 10mg pod, ASA 81mg po d, Lipitor 80mg pod, Cipro 500mg po q 24h, Heparin 5000u sq q8h, SSI x 2, Imdur 30mg pod, Toprol XL 100mg pod, Minocin 100mg po q 12h, Roxicodone 5 prn x 1,  
  
(X) Usual diet Interventions (X) Possible physical therapy ( ) ESR, C-reactive protein 11/8/2018 3:17 PM EST by Jasiel Sanchez  
  Labs: WBC 5.1, RBC 3.04, H/H 8.6/27.6, chl 111, glu 125, BUN 78, C5.44ca 7.7, alb 1.9, glob4.6, agratio 0.4  
  
  
11/8/2018 3:17 PM EST by Jasiel Sanchez Subject: Additional Clinical Information Assessment/Plan:Left  5th toe  chronic  Osteomyelitis  with chronic ischemic, neuropathic ulcers            Previously refused amputation, now stating he will agree,              Nephrology in.                  Will need vascular consult if compliance lasts              Continue cipro and minocyline as patient refusing IV abx              ID, wound care on board                                    Reconsult ID if compliance lastsDM II, poor control              Continue SSILong standing, ongoing non compliance                        Palliative care in                        Daughter questioning compliance. Acute on chronic renal failure:   Worsening, now stage V                 Previously refused dialysis, now stating he \"will do what                 it takes. \"               Nephrology in, may need dialysis, so far patient is                     agreeable Extreme debility with poor prognosis            Prognosis largely depends on patient compliance, daughter understands this, patient verbalizes understanding, however question this. Chronic anemia                              FollowHypertension          Home medsChronic CHF with EF 30-35%Lethargy and fatigue:   Multifactorial                                    CM, OT, PT on boardAppreciate all assist from consultants in this very difficult patient. Nati Garcia: daughters:   April [phone]. Works nights11/7:   Participated in [place]. Palliative care in, impressions appreciated.   Nephro in, worsening renal failure with proteinuria and anemia, may need dialysis.   Thus far, patient is agreeable.   Continued weakness and lethargy. * Milestone Osteomyelitis - Care Day 3 (11/6/2018) by Nanda Taveras RN Review Status Review Entered Completed 11/8/2018 15:07 Criteria Review Care Day: 3 Care Date: 11/6/2018 Level of Care: Inpatient Floor Guideline Day 3 Level Of Care (X) Floor Clinical Status  
(X) * Hemodynamic stability 11/8/2018 3:07 PM EST by Sekou King  
  98, 127/63, 68, 20, 97%RA (X) * WBC, ESR, or C-reactive protein stable or improved 11/8/2018 3:07 PM EST by Sekou King  
  wbc 11/4 7.1, CRP 11/4 3.7 (X) * Usual diet tolerated 11/8/2018 3:07 PM EST by Sekou King  
  Diabetic diet Activity (X) As tolerated Routes  
(X) Oral hydration  
(X) Parenteral medications 11/8/2018 3:07 PM EST by Sekou King  
  Norvasc 10mg pod, ASA 91mg pod, Lipitor 80mg pod, Cipro 500mg poq 24h, Heparin 5000u sq q8h, SSI x 2, Imdur 30mg pod, Toprol XL 100mg od, Minocin 100mg poq12h Roxicodone 5mg prn x 2, MS04 2mg IV prn x 2,  
  
(X) Usual diet 11/8/2018 3:07 PM EST by Sekou King  
  Diabetic Interventions ( ) Possible physical therapy 11/8/2018 3:07 PM EST by Murphy Teresa OT in progress 11/8/2018 3:07 PM EST by Sekou King Subject: Additional Clinical Information Currently he is in agreement with renal and Vascular consults.   Will give him 24 hours to see how he does and if he will actually comply prior to calling in consults. Neither daughter has POA. Jhon Jones questions patient's competency.   Creatinine worsening. General appearance: Oriented but forgetful.   Awake and alert, currently cooperative.   Repeatedly states he does not feel good.   Chronically ill.  Head: Normocephalic, without obvious abnormality, atraumaticThroat: Lips, mucosa, and tongue normal. No TeethNeck: supple, symmetrical, trachea midline, no JVDLungs: clear to auscultation bilaterallyHeart: regular rate and rhythm, S1, S2 normal, no murmur, click, rub or gallopAbdomen: soft, non-tender. Bowel sounds normal. No masses,   no organomegalyExtremities: Left second toe ulcer, 5th MT head ulcer, heel boggy. All other extremities normal, atraumatic, no cyanosis or edemaSkin: Skin color, texture, turgor normal. No rashes or lesionsNeurologic: Grossly normalAssessment/Plan:Left 5th toe chronic Osteomyelitis with chronic ischemic, neuropathic ulcers           Previously refused amputation, now stating he will agree, Nannie Krabbe give 24 hours, then consult vascular if still willing             Continue cipro and minocyline as patient refusing IV abx             ID, wound care on boardDM II, poor control             Continue SSILong standing, ongoing non compliance                       Consult palliative care for treatment decisions                       Daughter questioning compliance. Acute on chronic renal failure               Previously refused dialysis, now stating he \"will do what                 it takes. \"              willl give 24 hours, then consult Nephro if patient still                           willingExtreme debility with poor prognosis           Prognosis largely depends on patient compliance, daughter understands this, patient verbalizes understanding, however question this. Chronic anemia                            FollowHypertension         Home meds * Milestone

## 2018-11-08 NOTE — PROGRESS NOTES
[facility] PROGRESS NOTE Follow up for: 
 
Subjective:  
Patient seen and examined. Chart, notes, labs, imaging, results all reviewed. Denies complaints. Lying flat. No sob, cp, n/v. Renal function stable. ROS: 
Gen - no fever, no chills, appetite okay CV - no chest pain, no orthopnea Lung - no shortness of breath, no cough Abd - no tenderness, no nausea, no vomiting Ext - no edema Objective:  
Exam: 
Vitals:  
 11/07/18 1933 11/07/18 2252 11/08/18 0441 11/08/18 0732 BP: 145/70 130/75 135/76 149/72 Pulse: 63 62 63 62 Resp: 18 18 18 18 Temp: 98.1 °F (36.7 °C) 98.6 °F (37 °C) 98.4 °F (36.9 °C) 98.2 °F (36.8 °C) SpO2: 97% 98% 97% 92% Weight:      
Height:      
 
 
 
Intake/Output Summary (Last 24 hours) at 11/8/2018 1024 Last data filed at 11/8/2018 3063 Gross per 24 hour Intake  Output 1200 ml Net -1200 ml Current Facility-Administered Medications Medication Dose Route Frequency  epoetin denver (EPOGEN;PROCRIT) injection 10,000 Units  10,000 Units SubCUTAneous Q7D  
 white petrolatum-mineral oil (EUCERIN) cream   Topical PRN  
 oxyCODONE-acetaminophen (PERCOCET 7.5) 7.5-325 mg per tablet 1 Tab  1 Tab Oral Q4H PRN  
 oxyCODONE IR (ROXICODONE) tablet 5 mg  5 mg Oral Q4H PRN  
 LORazepam (ATIVAN) injection 0.5 mg  0.5 mg IntraVENous Q4H PRN  
 alcohol 62% (NOZIN) nasal  1 Ampule  1 Ampule Topical Q12H  ciprofloxacin HCl (CIPRO) tablet 500 mg  500 mg Oral Q24H  povidone-iodine (BETADINE) 10 % topical solution   Topical DAILY  amLODIPine (NORVASC) tablet 10 mg  10 mg Oral DAILY  aspirin chewable tablet 81 mg  81 mg Oral DAILY AFTER BREAKFAST  atorvastatin (LIPITOR) tablet 80 mg  80 mg Oral DAILY  isosorbide mononitrate ER (IMDUR) tablet 30 mg  30 mg Oral DAILY  metoprolol succinate (TOPROL-XL) tablet 100 mg  100 mg Oral DAILY  minocycline (MINOCIN, DYNACIN) capsule 100 mg  100 mg Oral Q12H  sodium bicarbonate tablet 650 mg  650 mg Oral TID WITH MEALS  naloxone (NARCAN) injection 0.4 mg  0.4 mg IntraVENous PRN  
 diphenhydrAMINE (BENADRYL) capsule 25 mg  25 mg Oral Q4H PRN  
 ondansetron (ZOFRAN) injection 4 mg  4 mg IntraVENous Q4H PRN  
 senna-docusate (PERICOLACE) 8.6-50 mg per tablet 2 Tab  2 Tab Oral DAILY PRN  
 heparin (porcine) injection 5,000 Units  5,000 Units SubCUTAneous Q8H  
 insulin lispro (HUMALOG) injection   SubCUTAneous AC&HS  influenza vaccine 2018-19 (6 mos+)(PF) (FLUARIX QUAD/FLULAVAL QUAD) injection 0.5 mL  0.5 mL IntraMUSCular PRIOR TO DISCHARGE  
 
 
EXAM 
GEN - Alert, oriented, in no distress CV - S1, S2, RRR, no rub, murmur, or gallop Lung - clear to auscultation bilaterally, diminished in bases Abd - soft, nontender, BS present Ext - no edema Recent Labs 11/08/18 
0503 11/07/18 
0455 WBC 5.4 5.1 HGB 8.6* 8.6* HCT 28.6* 27.6*  
 210 Recent Labs 11/08/18 
0503 11/07/18 
0455  142  
K 4.6 4.4  
* 111* CO2 21 21 BUN 72* 78* CREA 4.78* 5.44* CA 7.7* 7.7*  
GLU 85 125* MG 2.2 2.3 Assessment and Plan: MO with CKD stage V 
- baseline in mid 4's. MO with infection/diuretics - renal function closer to baseline now 
- recent progression of CKD following NSTEMI  
- no indication for dialysis currently and doubt he will agree when the time comes as he has agreed in the past and then changed his mind - he has had difficulties with commitment to plans. Unfortunately he will need a catheter when he becomes uremic 
- vein mapping done 10/7/18 and will need follow up as outpatient with vascular surgery PVD/Left foot osteomyelitis - abx, wound care 
- vascular following CAD/CHF  
- NSTEMI recent admission 9/24-9/28/18 
- LVEF 30-35% - plan [facility] when he is on HD 
- appears euvolemic on exam now - continue holding diuretics. ACE-I was stopped Anemia 
- resume epogen 
- recheck iron stores DM 
 - per primary team 
 
HTN 
- home meds 
  Metabolic acidosis - po bicarbonate Francis Snare, ACNP

## 2018-11-09 NOTE — PROGRESS NOTES
[facility] PROGRESS NOTE Follow up for: 
 
Subjective:  
Patient seen and examined. ROS: 
Gen - no fever, no chills, appetite okay CV - no chest pain, no orthopnea Lung - no shortness of breath, no cough Abd - no tenderness, no nausea, no vomiting Ext - no edema Objective:  
Exam: 
Vitals:  
 11/08/18 2329 11/09/18 0308 11/09/18 1342 11/09/18 1103 BP: 145/68 148/74 154/79 148/76 Pulse: 67 66 66 66 Resp: 18 18 18 20 Temp: 98.1 °F (36.7 °C) 98.6 °F (37 °C) 98.6 °F (37 °C) 98.6 °F (37 °C) SpO2: 97% 94% 96% 96% Weight:      
Height:      
 
 
 
Intake/Output Summary (Last 24 hours) at 11/9/2018 1306 Last data filed at 11/9/2018 1148 Gross per 24 hour Intake 240 ml Output 1100 ml Net -860 ml  
 
 
Current Facility-Administered Medications Medication Dose Route Frequency  epoetin denver (EPOGEN;PROCRIT) injection 10,000 Units  10,000 Units SubCUTAneous Q7D  
 white petrolatum-mineral oil (EUCERIN) cream   Topical PRN  
 oxyCODONE-acetaminophen (PERCOCET 7.5) 7.5-325 mg per tablet 1 Tab  1 Tab Oral Q4H PRN  
 oxyCODONE IR (ROXICODONE) tablet 5 mg  5 mg Oral Q4H PRN  
 LORazepam (ATIVAN) injection 0.5 mg  0.5 mg IntraVENous Q4H PRN  
 alcohol 62% (NOZIN) nasal  1 Ampule  1 Ampule Topical Q12H  ciprofloxacin HCl (CIPRO) tablet 500 mg  500 mg Oral Q24H  povidone-iodine (BETADINE) 10 % topical solution   Topical DAILY  amLODIPine (NORVASC) tablet 10 mg  10 mg Oral DAILY  aspirin chewable tablet 81 mg  81 mg Oral DAILY AFTER BREAKFAST  atorvastatin (LIPITOR) tablet 80 mg  80 mg Oral DAILY  isosorbide mononitrate ER (IMDUR) tablet 30 mg  30 mg Oral DAILY  metoprolol succinate (TOPROL-XL) tablet 100 mg  100 mg Oral DAILY  minocycline (MINOCIN, DYNACIN) capsule 100 mg  100 mg Oral Q12H  
 sodium bicarbonate tablet 650 mg  650 mg Oral TID WITH MEALS  naloxone (NARCAN) injection 0.4 mg  0.4 mg IntraVENous PRN  
  diphenhydrAMINE (BENADRYL) capsule 25 mg  25 mg Oral Q4H PRN  
 ondansetron (ZOFRAN) injection 4 mg  4 mg IntraVENous Q4H PRN  
 senna-docusate (PERICOLACE) 8.6-50 mg per tablet 2 Tab  2 Tab Oral DAILY PRN  
 heparin (porcine) injection 5,000 Units  5,000 Units SubCUTAneous Q8H  
 insulin lispro (HUMALOG) injection   SubCUTAneous AC&HS  influenza vaccine 2018-19 (6 mos+)(PF) (FLUARIX QUAD/FLULAVAL QUAD) injection 0.5 mL  0.5 mL IntraMUSCular PRIOR TO DISCHARGE  
 
 
EXAM 
GEN - Alert, oriented, in no distress CV - S1, S2, RRR, no rub, murmur, or gallop Lung - clear to auscultation bilaterally, diminished in bases Abd - soft, nontender, BS present Ext - no edema Recent Labs 11/08/18 
0503 11/07/18 
0455 WBC 5.4 5.1 HGB 8.6* 8.6* HCT 28.6* 27.6*  
 210 Recent Labs 11/09/18 1013 11/08/18 
0503 11/07/18 
0455  141 142  
K 4.8 4.6 4.4  
* 112* 111* CO2 22 21 21 BUN 71* 72* 78* CREA 4.31* 4.78* 5.44* CA 7.6* 7.7* 7.7*  
* 85 125* MG  --  2.2 2.3 Assessment and Plan: MO with CKD stage V 
- baseline in mid 4's. MO with infection/diuretics - renal function closer to baseline now 
- recent progression of CKD following NSTEMI  
- no indication for dialysis currently and doubt he will agree when the time comes as he has agreed in the past and then changed his mind - he has had difficulties with commitment to plans. Unfortunately he will need a catheter when he becomes uremic 
- vein mapping done 10/7/18 and will need follow up as outpatient with vascular surgery PVD/Left foot osteomyelitis - abx, wound care 
- vascular following CAD/CHF  
- NSTEMI recent admission 9/24-9/28/18 
- LVEF 30-35% - plan [facility] when he is on HD 
- appears euvolemic on exam now - continue holding diuretics. ACE-I was stopped Anemia 
- resume epogen 
- recheck iron stores DM 
- per primary team 
 
HTN 
- home meds 
  Metabolic acidosis - po bicarbonate Pt refusing hospital care Nothing to offer, I will sign off.  
 
Mildred Schneider MD

## 2018-11-09 NOTE — PROGRESS NOTES
Hospitalist Progress Note Subjective:  
Patient is a 71 yo male with extensive PMH  including CAD, NSTEMI, HTN, DM, chronic systolic CHF, CKD and left foot osteomyelitis who was hospitalized 10/5-10/11 for acute resp failure, acute MI, and foot infection. Patient recently discharged home on PO keflex and minocycline after refusing IV therapy. Blood cultures at that time grew coag negative staph. Patient noncompliant with medications and returned to ER with complaints of confusion and LLE pain. ID consulting. ABx changed to minocycline and cipro po daily. Patient to follow up as OP on 11/28 at 11am. Wound care consulted. Recommend betadine paint and leave open to air. Needs follow up with vascular surgery, but patient refusing. Vein mapping was performed on 11/7. Patient with MO. Nephrology consulting. Will monitor. Baseline in mid 4's. Today cr improving 4.31. Nephrology ordered epogen. Patient refusing hospital care. Nephrology signed off. Today patient alert and oriented. Flat affect. Patient refusing medications, labs, and PT intermittently. Patient states he wants to go home.  talked to daughter today. Plans for [place] and return home tomorrow. ADDITIONAL HISTORY:  Right AKA, CAD, NSTEMI, Hypertension, DM, chronic systolic HF, CKD with probable need for dialysis, left foot osteomyelitis, anemia, depression,  Long standing tobacco abuse, PAD.  
  
Current Facility-Administered Medications Medication Dose Route Frequency  epoetin denver (EPOGEN;PROCRIT) injection 10,000 Units  10,000 Units SubCUTAneous Q7D  
 white petrolatum-mineral oil (EUCERIN) cream   Topical PRN  
 oxyCODONE-acetaminophen (PERCOCET 7.5) 7.5-325 mg per tablet 1 Tab  1 Tab Oral Q4H PRN  
 oxyCODONE IR (ROXICODONE) tablet 5 mg  5 mg Oral Q4H PRN  
 LORazepam (ATIVAN) injection 0.5 mg  0.5 mg IntraVENous Q4H PRN  
 alcohol 62% (NOZIN) nasal  1 Ampule  1 Ampule Topical Q12H  ciprofloxacin HCl (CIPRO) tablet 500 mg  500 mg Oral Q24H  povidone-iodine (BETADINE) 10 % topical solution   Topical DAILY  amLODIPine (NORVASC) tablet 10 mg  10 mg Oral DAILY  aspirin chewable tablet 81 mg  81 mg Oral DAILY AFTER BREAKFAST  atorvastatin (LIPITOR) tablet 80 mg  80 mg Oral DAILY  isosorbide mononitrate ER (IMDUR) tablet 30 mg  30 mg Oral DAILY  metoprolol succinate (TOPROL-XL) tablet 100 mg  100 mg Oral DAILY  minocycline (MINOCIN, DYNACIN) capsule 100 mg  100 mg Oral Q12H  
 sodium bicarbonate tablet 650 mg  650 mg Oral TID WITH MEALS  naloxone (NARCAN) injection 0.4 mg  0.4 mg IntraVENous PRN  
 diphenhydrAMINE (BENADRYL) capsule 25 mg  25 mg Oral Q4H PRN  
 ondansetron (ZOFRAN) injection 4 mg  4 mg IntraVENous Q4H PRN  
 senna-docusate (PERICOLACE) 8.6-50 mg per tablet 2 Tab  2 Tab Oral DAILY PRN  
 heparin (porcine) injection 5,000 Units  5,000 Units SubCUTAneous Q8H  
 insulin lispro (HUMALOG) injection   SubCUTAneous AC&HS  influenza vaccine 2018- (6 mos+)(PF) (FLUARIX QUAD/FLULAVAL QUAD) injection 0.5 mL  0.5 mL IntraMUSCular PRIOR TO DISCHARGE Review of Systems A comprehensive review of systems was negative except for that written in the HPI. Objective:  
 
Visit Vitals /76 (BP 1 Location: Right arm, BP Patient Position: At rest) Pulse 66 Temp 98.6 °F (37 °C) Resp 20 Ht 6' 1\" (1.854 m) Wt 85.8 kg (189 lb 3.2 oz) SpO2 96% BMI 24.96 kg/m² O2 Device: Room air Temp (24hrs), Av.5 °F (36.9 °C), Min:98.1 °F (36.7 °C), Max:98.7 °F (37.1 °C) 701 - 1900 In: 595 [P.O.:595] Out: 420 [Urine:420] 1901 - 700 In: -  
Out: 8734 [[id number]] General appearance: Oriented. Awake and alert, currently cooperative. Flat affect. Head: Normocephalic, without obvious abnormality, atraumatic Throat: Lips, mucosa, and tongue normal. No Teeth Neck: supple, symmetrical, trachea midline, no JVD Lungs: clear to auscultation bilaterally. Room air Heart: regular rate and rhythm, S1, S2 normal, no murmur, click, rub or gallop Abdomen: soft, non-tender. Bowel sounds normal. No masses,  no organomegaly Extremities: Left second toe ulcer, 5th MT head ulcer, heel boggy. Right BKA with prosthesis. Upper extremities normal, atraumatic, no cyanosis or edema Skin: Skin color, texture, turgor normal. No rashes or lesions Neurologic: Grossly normal 
  
Additional comments: Notes,orders, test results, vitals reviewed Data Review Recent Results (from the past 24 hour(s)) GLUCOSE, POC Collection Time: 11/08/18  4:36 PM  
Result Value Ref Range Glucose (POC) 143 (H) 65 - 100 mg/dL GLUCOSE, POC Collection Time: 11/08/18  9:37 PM  
Result Value Ref Range Glucose (POC) 134 (H) 65 - 100 mg/dL GLUCOSE, POC Collection Time: 11/09/18  7:12 AM  
Result Value Ref Range Glucose (POC) 86 65 - 100 mg/dL METABOLIC PANEL, BASIC Collection Time: 11/09/18 10:13 AM  
Result Value Ref Range Sodium 141 136 - 145 mmol/L Potassium 4.8 3.5 - 5.1 mmol/L Chloride 112 (H) 98 - 107 mmol/L  
 CO2 22 21 - 32 mmol/L Anion gap 7 7 - 16 mmol/L Glucose 108 (H) 65 - 100 mg/dL BUN 71 (H) 8 - 23 MG/DL Creatinine 4.31 (H) 0.8 - 1.5 MG/DL  
 GFR est AA 18 (L) >60 ml/min/1.73m2 GFR est non-AA 15 (L) >60 ml/min/1.73m2 Calcium 7.6 (L) 8.3 - 10.4 MG/DL  
GLUCOSE, POC Collection Time: 11/09/18 11:38 AM  
Result Value Ref Range Glucose (POC) 105 (H) 65 - 100 mg/dL  
 
11/4:  CXR:   Normal cardiomediastinal silhouette.   Mild subsegmental atelectasis bilateral lung bases.  No evidence of pneumothorax, pleural effusion, or air space consolidation.  Visualized soft tissue and osseous structures otherwise unremarkable.   
Impression:  No significant interval change. 
  
11/4:  Left foot xray:   No evidence of acute fracture or dislocation.  Normal alignment, joint  spaces preserved.  Cortical erosions are seen of the lateral aspect of the proximal phalanx of the fifth toe, suggestive of osteomyelitis.  Normal mineralization.  No evidence of ankle joint effusion.  Severe calcific atherosclerosis unchanged.  Visualized soft tissues otherwise unremarkable. Impression: Findings suggestive of osteomyelitis of the proximal phalanx of the fifth toe. Assessment/Plan:  
Left 5th toe chronic Osteomyelitis with chronic ischemic, neuropathic ulcers 
      -Previously refused amputation 
     - Continue cipro and minocyline as patient refusing IV abx. Patient to f/u as OP  
       -Wound care consulted. Betadine and open to air. DM II, poor control Continue SSI Long standing, ongoing non compliance 
            -Palliative care in 
            -Daughter questioning compliance. Acute on chronic renal failure:   
     -Nephrology consulted. Signed off due to noncompliance with IP care. Chronic anemia            
      -CBC daily Hypertension 
     -Continue home meds Chronic CHF with EF 30-35% 
       -monitor for fluid overload Lethargy and fatigue:  Multifactorial 
        CM, OT, PT on board Discharge plan: home vs STR 
DVT ppx: SQH  
 
NOTE: daughters:  April [phone]. Works nights [employer]: [phone], works days. Remains a full code per patient Care Plan discussed with: Dr. Raudel Patterson,  
 
 
Signed By: Mariana Siddiqui NP November 7, 2018

## 2018-11-09 NOTE — PROGRESS NOTES
Problem: Falls - Risk of 
Goal: *Absence of Falls Document Feli Deal Fall Risk and appropriate interventions in the flowsheet. Outcome: Progressing Towards Goal 
Fall Risk Interventions: 
Mobility Interventions: Bed/chair exit alarm, Patient to call before getting OOB, OT consult for ADLs, PT Consult for mobility concerns Mentation Interventions: Bed/chair exit alarm, Door open when patient unattended, Adequate sleep, hydration, pain control, Reorient patient Medication Interventions: Patient to call before getting OOB, Bed/chair exit alarm Elimination Interventions: Call light in reach, Bed/chair exit alarm, Patient to call for help with toileting needs Problem: Pressure Injury - Risk of 
Goal: *Prevention of pressure injury Document Seun Scale and appropriate interventions in the flowsheet. Outcome: Progressing Towards Goal 
Pressure Injury Interventions: 
Sensory Interventions: Assess changes in LOC Moisture Interventions: Absorbent underpads Activity Interventions: Increase time out of bed, Pressure redistribution bed/mattress(bed type), PT/OT evaluation Mobility Interventions: HOB 30 degrees or less, Pressure redistribution bed/mattress (bed type), PT/OT evaluation Nutrition Interventions: Document food/fluid/supplement intake, Offer support with meals,snacks and hydration Friction and Shear Interventions: HOB 30 degrees or less

## 2018-11-09 NOTE — PROGRESS NOTES
Patient is declining STR stating \"I just want to go home and get out of here. \"  Discussed [place] services instead and patient is agreeable. Spoke with daughter Mag Viera regarding patients wishes and informed her he will be discharging home with [place] services per his request.  Order, referral and face to face completed for [facility] PT/OT disciplines upon discharge home. Care Management Interventions PCP Verified by CM: Yes Transition of Care Consult (CM Consult): Discharge Planning, Home Health [address]: Yes Current Support Network: Own Home, Lives Alone Confirm Follow Up Transport: Family Plan discussed with Pt/Family/Caregiver: Yes Freedom of Choice Offered: Yes Discharge Location Discharge Placement: Home with home health

## 2018-11-09 NOTE — PROGRESS NOTES
Problem: Self Care Deficits Care Plan (Adult) Goal: *Acute Goals and Plan of Care (Insert Text) 1. Patient will complete lower body bathing and dressing with minimal assistance and adaptive equipment as needed. 2. Patient will complete toileting with minimal assistance. 3. Patient will tolerate 30 minutes of OT treatment with 2-3 rest breaks to increase activity tolerance for ADLs. 4. Patient will complete UE strengthening exercises for 15 minutes to improve strength for ADL/functional transfers. 5. Patient will attempt transfers to chair/BSC/toilet within 1-2 visits to demonstrate advancement with functional transfers. Timeframe: 7 visits OCCUPATIONAL THERAPY: Daily Note, Treatment Day: 1st and AM  
 11/9/2018INPATIENT: Hospital Day: 6 Payor: CARE IMPROVEMENT PLUS [place] / Product Type: [employer] Care Medicare /  
  
NAME/AGE/GENDER: Mel Francisco is a 72 y.o. male PRIMARY DIAGNOSIS:  Osteomyelitis ([facility] [address] <principal problem not specified> <principal problem not specified> 
 
  
ICD-10: Treatment Diagnosis:  
 · Generalized Muscle Weakness (M62.81) · Other lack of cordination (R27.8) Precautions/Allergies: 
   Patient has no known allergies. ASSESSMENT:  
Mr. Yeyo Prince presents to the hospital with osteomyelitis in L foot (proximal phalanx of 5th toe). Pt was living alone prior to admission. Pt has hx of R BKA with prosthesis. Pt uses a rolling walker/cane for functional mobility. He reports he completes his ADLs with modified independent. 11/9/2018 Pt presents in supine upon arrival. Pt initially agreeable to treatment and transferred to sitting with SBA. Pt adjusted prosthesis and donned shoes with min a. Pt completed sit to stand with mod a and a rolling walker and then sat back down stating \"I can't!\" Therapist could not convince pt to further participate. Pt left edge of bed with the PCT at his side. No progress. Continue POC. This section established at most recent assessment PROBLEM LIST (Impairments causing functional limitations): 1. Decreased Strength 2. Decreased ADL/Functional Activities 3. Decreased Transfer Abilities 4. Decreased Ambulation Ability/Technique 5. Decreased Balance 6. Increased Pain 7. Decreased Activity Tolerance 8. Decreased Flexibility/Joint Mobility 9. Edema/Girth 10. Decreased Skin Integrity/Hygeine 11. Decreased [place] with Home Exercise Program 
12. Decreased Cognition INTERVENTIONS PLANNED: (Benefits and precautions of occupational therapy have been discussed with the patient.) 1. Activities of daily living training 2. Adaptive equipment training 3. Balance training 4. Clothing management 5. Donning&doffing training 6. Group therapy 7. Neuromuscular re-eduation 8. Therapeutic activity 9. Therapeutic exercise 10. Wheelchair management TREATMENT PLAN: Frequency/Duration: Follow patient 3 times per week to address above goals. Rehabilitation Potential For Stated Goals: Good RECOMMENDED REHABILITATION/EQUIPMENT: (at time of discharge pending progress): Due to the probability of continued deficits (see above) this patient will likely need continued skilled occupational therapy after discharge. Equipment: ? TBD  
    
 
 
 
OCCUPATIONAL PROFILE AND HISTORY:  
History of Present Injury/Illness (Reason for Referral): 
See H&P Past Medical History/Comorbidities:  
Mr. Marta Bertrand  has a past medical history of Abnormal CT scan, chest, Acute CHF ([address] Acute systolic congestive heart failure ([address] MO (acute kidney injury) ([address] Anemia of chronic disease, Bilateral pleural effusion, Chest pain, Depression, DM (diabetes mellitus), type 2 ([address] Encephalopathy due to infection, HTN (hypertension), Hypoglycemia, NSTEMI (non-ST elevated myocardial infarction) ([address] and Tobacco use.   Mr. Marta Bertrand  has a past surgical history that includes hx orthopaedic; RIGHT AMPUTATION KNEE(BKA) (Right, 1/30/2017); RIGHT FOOT DEBRIDEMENT  ,PREP FOR GRAFTING AND SKIN SUBSTITUTE GRAFT (Right, 1/20/2017); IR ANESTHESIA- RIGHT LEG ARTERIOGRAM/ 604 (N/A, 1/18/2017); AMPUTATION RIGHT 5TH TOE/ ROOM 604 (Right, 1/11/2017); ULTRASOUND (Bilateral, 12/28/2015); and THORACENTESIS (Left, 12/28/2015). Social History/Living Environment:  
Home Environment: Private residence # Steps to Enter: 4 Rails to Enter: Yes One/Two Story Residence: One story Living Alone: Yes Support Systems: Child(elpidio), Family member(s) Patient Expects to be Discharged to[de-identified] Private residence Current DME Used/Available at Home: [employer], straight, Shower chair, Walker, rolling, Wheelchair, power Tub or Shower Type: Tub/Shower combination Prior Level of Function/Work/Activity: Pt was living alone prior to admission. Pt has hx of R BKA with prosthesis. Pt uses a rolling walker/cane for functional mobility. He reports he completes his ADLs with modified independent. Personal Factors:   
      Social Background:  Lives alone Overall Behavior:  Flat affect; withdrawn; reports memory deficits Other factors that influence how disability is experienced by the patient:  Multiple co-morbidities Number of Personal Factors/Comorbidities that affect the Plan of Care: Extensive review of physical, cognitive, and psychosocial performance (3+):  HIGH COMPLEXITY ASSESSMENT OF OCCUPATIONAL PERFORMANCE[de-identified]  
Activities of Daily Living:  
Basic ADLs (From Assessment) Complex ADLs (From Assessment) Feeding: Supervision Oral Facial Hygiene/Grooming: Stand-by assistance Bathing: Moderate assistance Upper Body Dressing: Minimum assistance Lower Body Dressing: Moderate assistance Toileting: Moderate assistance Instrumental ADL Meal Preparation: Maximum assistance Homemaking: Total assistance Grooming/Bathing/Dressing Activities of Daily Living Lower Body Dressing Assistance Socks: Stand-by assistance Shoes with Cloth Laces: Minimum assistance Bed/Mat Mobility Rolling: Stand-by assistance Supine to Sit: Stand-by assistance Sit to Supine: Stand-by assistance Sit to Stand: Minimum assistance Scooting: Stand-by assistance Most Recent Physical Functioning:  
Gross Assessment: 
  
         
  
Posture: 
Posture (WDL): Exceptions to [facility] Posture Assessment: Forward head, Trunk flexion Balance: 
  Bed Mobility: 
Rolling: Stand-by assistance Supine to Sit: Stand-by assistance Sit to Supine: Stand-by assistance Scooting: Stand-by assistance Wheelchair Mobility: 
  
Transfers: 
Sit to Stand: Minimum assistance Stand to Sit: Minimum assistance Patient Vitals for the past 6 hrs: 
 BP BP Patient Position SpO2 Pulse 11/09/18 1103 148/76 At rest 96 % 66 Mental Status Neurologic State: Alert Orientation Level: Oriented X4 Cognition: Follows commands Perception: Appears intact Perseveration: No perseveration noted Safety/Judgement: Fall prevention Physical Skills Involved: 1. Range of Motion 2. Balance 3. Strength 4. Activity Tolerance 5. Sensation 6. Fine Motor Control 7. Gross Motor Control 8. Pain (acute) 9. Skin Integrity Cognitive Skills Affected (resulting in the inability to perform in a timely and safe manner): 1. Executive Function 2. Short Term Recall 3. Divided Attention Psychosocial Skills Affected: 1. Habits/Routines 2. Social Interaction 3. Self-Awareness Number of elements that affect the Plan of Care: 5+:  HIGH COMPLEXITY CLINICAL DECISION MAKING:  
[employer] AM-PAC 6 Clicks Daily Activity Inpatient Short Form How much help from another person does the patient currently need. .. Total A Lot A Little None 1. Putting on and taking off regular lower body clothing? [] 1   [x] 2   [] 3   [] 4  
2. Bathing (including washing, rinsing, drying)?    [] 1   [x] 2   [] 3   [] 4  
 3.  Toileting, which includes using toilet, bedpan or urinal?   [] 1   [x] 2   [] 3   [] 4  
4. Putting on and taking off regular upper body clothing? [] 1   [] 2   [x] 3   [] 4  
5. Taking care of personal grooming such as brushing teeth? [] 1   [] 2   [x] 3   [] 4  
6. Eating meals? [] 1   [] 2   [x] 3   [] 4  
© 2007, Trustees of [facility] [employer], under license to [employer]. All rights reserved Score:  Initial: 15 Most Recent: X (Date: -- ) Interpretation of Tool:  Represents activities that are increasingly more difficult (i.e. Bed mobility, Transfers, Gait). Score 24 23 22-20 19-15 14-10 9-7 6 Modifier CH CI CJ CK CL CM CN   
 
? Self Care:  
  - CURRENT STATUS: CK - 40%-59% impaired, limited or restricted  - GOAL STATUS: CJ - 20%-39% impaired, limited or restricted  - D/C STATUS:  ---------------To be determined--------------- Payor: CARE IMPROVEMENT PLUS [place] / Product Type: [employer] Care Medicare /   
 
Medical Necessity:    
· Patient demonstrates good rehab potential due to higher previous functional level. Reason for Services/Other Comments: 
· Patient continues to require skilled intervention due to decreased independence with ADL/functional transfers. Use of outcome tool(s) and clinical judgement create a POC that gives a: LOW COMPLEXITY  
 
 
 
TREATMENT:  
(In addition to Assessment/Re-Assessment sessions the following treatments were rendered) Pre-treatment Symptoms/Complaints:   
Pain: Initial:  
Pain Intensity 1: 0  Post Session:  same Therapeutic Activity: (17 minutes): Therapeutic activities including Bed transfers, LB dsg, and static/dynamic standing  to improve mobility, strength and balance. Required moderate assist to promote static and dynamic balance in standing. Braces/Orthotics/Lines/Etc:  
· IV 
· O2 Device: Room air Treatment/Session Assessment:   
· Response to Treatment:  Poor · Interdisciplinary Collaboration:  
o Certified Occupational Therapy Assistant 
o Registered Nurse · After treatment position/precautions:  
o edge of bed with PCT at his side · Compliance with Program/Exercises: Will assess as treatment progresses. · Recommendations/Intent for next treatment session: \"Next visit will focus on advancements to more challenging activities and reduction in assistance provided\". Total Treatment Duration: OT Patient Time In/Time Out Time In: 5837 Time Out: 1140 Antoinette Garnett

## 2018-11-10 NOTE — DISCHARGE INSTRUCTIONS
Keep follow up appointments as scheduled and discuss the events of this admission. Take home medications as prescribed. Continue to take antibiotics, and follow up with infectious disease on November 28th at 11 am.    Drink plenty of fluids. Apply Betadine to wounds on left foot and leave open to air. DISCHARGE SUMMARY from Nurse    PATIENT INSTRUCTIONS:    After general anesthesia or intravenous sedation, for 24 hours or while taking prescription Narcotics:  · Limit your activities  · Do not drive and operate hazardous machinery  · Do not make important personal or business decisions  · Do  not drink alcoholic beverages  · If you have not urinated within 8 hours after discharge, please contact your surgeon on call. Report the following to your surgeon:  · Excessive pain, swelling, redness or odor of or around the surgical area  · Temperature over 100.5  · Nausea and vomiting lasting longer than 4 hours or if unable to take medications  · Any signs of decreased circulation or nerve impairment to extremity: change in color, persistent  numbness, tingling, coldness or increase pain  · Any questions    What to do at Home:      *  Please give a list of your current medications to your Primary Care Provider. *  Please update this list whenever your medications are discontinued, doses are      changed, or new medications (including over-the-counter products) are added. *  Please carry medication information at all times in case of emergency situations. These are general instructions for a healthy lifestyle:    No smoking/ No tobacco products/ Avoid exposure to second hand smoke  Surgeon General's Warning:  Quitting smoking now greatly reduces serious risk to your health.     Obesity, smoking, and sedentary lifestyle greatly increases your risk for illness    A healthy diet, regular physical exercise & weight monitoring are important for maintaining a healthy lifestyle    You may be retaining fluid if you have a history of heart failure or if you experience any of the following symptoms:  Weight gain of 3 pounds or more overnight or 5 pounds in a week, increased swelling in our hands or feet or shortness of breath while lying flat in bed. Please call your doctor as soon as you notice any of these symptoms; do not wait until your next office visit. Recognize signs and symptoms of STROKE:    F-face looks uneven    A-arms unable to move or move unevenly    S-speech slurred or non-existent    T-time-call 911 as soon as signs and symptoms begin-DO NOT go       Back to bed or wait to see if you get better-TIME IS BRAIN. Warning Signs of HEART ATTACK     Call 911 if you have these symptoms:   Chest discomfort. Most heart attacks involve discomfort in the center of the chest that lasts more than a few minutes, or that goes away and comes back. It can feel like uncomfortable pressure, squeezing, fullness, or pain.  Discomfort in other areas of the upper body. Symptoms can include pain or discomfort in one or both arms, the back, neck, jaw, or stomach.  Shortness of breath with or without chest discomfort.  Other signs may include breaking out in a cold sweat, nausea, or lightheadedness. Don't wait more than five minutes to call 911 - MINUTES MATTER! Fast action can save your life. Calling 911 is almost always the fastest way to get lifesaving treatment. Emergency Medical Services staff can begin treatment when they arrive -- up to an hour sooner than if someone gets to the hospital by car. The discharge information has been reviewed with the patient. The  verbalized understanding. Discharge medications reviewed with the  and appropriate educational materials and side effects teaching were provided.   ___________________________________________________________________________________________________________________________________

## 2018-11-10 NOTE — PROGRESS NOTES
Pt's son Kareem Link contacted to  pt for discharge. Son stated that he would be up here soon to get pt.

## 2018-11-10 NOTE — DISCHARGE SUMMARY
Hospitalist Discharge Summary Admit Date:  2018  2:32 PM  
Name:  Zayra Pierre Age:  72 y.o. 
:  1945 MRN:  [id number] PCP:  Frank Crockett MD 
Treatment Team: Attending Provider: Deondre Saab MD; Utilization Review: Sera Barber, RN; Care Manager: Alexandra Baxter, RN; Care Manager: Skylar Ibrahim, RN; Consulting Provider: Smita Hathaway MD 
 
Problem List for this Hospitalization: 
Hospital Problems as of 11/10/2018 Date Reviewed: 2018 Codes Class Noted - Resolved POA Osteomyelitis ([facility] [place] [address] ICD-10-CM: M86.9 ICD-9-CM: 730.20  2018 - Present Unknown Admission HPI from 2018: \"Review H&P for details of admission  \" Hospital Course: 
Patient is a 73 yo male with extensive PMH  including CAD, NSTEMI, HTN, DM, chronic systolic CHF, CKD and left foot osteomyelitis who was hospitalized 10/5-10/11 for acute resp failure, acute MI, and foot infection. Patient was discharged home on PO keflex and minocycline after refusing IV therapy. Blood cultures at that time grew coag negative staph. Patient noncompliant with medications and returned to ER with complaints of confusion and LLE pain. Patient admitted and ID consulted. Antibiotics changed to minocycline and cipro. Patient to continue antibiotics at discharge and follow up as OP on  at 11am. Wound care consulted. Recommend betadine paint and leave open to air. Patient to follow up with wound clinic as OP. Patient needs follow up with vascular surgery, but patient refusing. Vein mapping was performed on . Patient with MO. Nephrology consulted. Lasix and Altace held. Patient received one dose of  epogen. Patient refused  hospital care. Nephrology signed off. Patient to have BMP and follow up with PCP after discharge to evaluate current medication regimen.  Physical and occupational therapy ordered for patient, but patient refused. Patient to be discharged home with [facility] nursing/PT/OT.  
  
 
Follow up instructions and discharge meds at bottom of this note. Plan was discussed with patient and daughter. All questions answered. Patient was stable at time of discharge. Diagnostic Imaging/Tests:  
Xr Foot Lt Min 3 V Result Date: 11/4/2018 Exam:  Left foot radiographs History:  pain, pain, Osteomyelitis, 72 years Male open wounds Comparison: Left foot radiographs October 22, 2018 Findings:  No evidence of acute fracture or dislocation. Normal alignment, joint spaces preserved. Cortical erosions are seen of the lateral aspect of the proximal phalanx of the fifth toe, suggestive of osteomyelitis. Normal mineralization. No evidence of ankle joint effusion. Severe calcific atherosclerosis unchanged. Visualized soft tissues otherwise unremarkable. Impression: Findings suggestive of osteomyelitis of the proximal phalanx of the fifth toe. Xr Chest [facility] Result Date: 11/4/2018 AP chest radiograph History: Chest PAin, 73 years Male Comparison: Chest radiograph October 22, 2018 Findings:   Normal cardiomediastinal silhouette. Mild subsegmental atelectasis bilateral lung bases. No evidence of pneumothorax, pleural effusion, or air space consolidation. Visualized soft tissue and osseous structures otherwise unremarkable. Impression:  No significant interval change. Echocardiogram results: No results found for this visit on 11/04/18. All Micro Results Procedure Component Value Units Date/Time CULTURE, BLOOD [[id number]] Collected:  11/04/18 1851 Order Status:  Completed Specimen:  Blood Updated:  11/09/18 1121 Special Requests: --     
  RIGHT 
HAND Culture result: NO GROWTH 5 DAYS     
 CULTURE, BLOOD [[id number]] Collected:  11/04/18 1859 Order Status:  Completed Specimen:  Blood Updated:  11/09/18 1121 Special Requests: --     
  LEFT 
HAND Culture result: NO GROWTH 5 DAYS     
 CULTURE, URINE [[id number]] Collected:  11/05/18 6929 Order Status:  Completed Specimen:  Urine from Clean catch Updated:  11/07/18 8514 Special Requests: NO SPECIAL REQUESTS Culture result:    
  <10,000 COLONIES/mL NORMAL SKIN SHABANA ISOLATED Labs: Results:  
   
BMP, Mg, Phos Recent Labs 11/10/18 
0449 11/09/18 1013 11/08/18 
0503  141 141  
K 4.4 4.8 4.6 * 112* 112* CO2 20* 22 21 AGAP 10 7 8 BUN 73* 71* 72* CREA 4.28* 4.31* 4.78* CA 7.9* 7.6* 7.7*  
* 108* 85  
MG  --   --  2.2 CBC Recent Labs 11/08/18 
0503 WBC 5.4  
RBC 3.14* HGB 8.6* HCT 28.6*  
 GRANS 59 LYMPH 24 EOS 8* MONOS 8  
BASOS 1 IG 0 ANEU 3.2 ABL 1.3 LIZETTE 0.4 ABM 0.5 ABB 0.0 AIG 0.0  
  
LFT Recent Labs 11/08/18 
0503 SGOT 23 ALT 23 AP 76  
TP 6.7 ALB 1.9*  
GLOB 4.8* AGRAT 0.4* Cardiac Testing Lab Results Component Value Date/Time  10/05/2018 09:01 AM  
  09/24/2018 02:59 AM  
  09/05/2018 07:02 PM  
  01/06/2017 05:37 AM  
  02/11/2016 09:16 AM  
  12/28/2015 05:53 AM  
  10/22/2018 06:08 AM  
  09/09/2018 04:39 AM  
  (H) 01/06/2017 05:37 AM  
 Troponin-I, Qt. 0.07 (H) 11/04/2018 01:36 PM  
 Troponin-I, Qt. 0.06 (H) 10/22/2018 06:08 AM  
 Troponin-I, Qt. 1.77 (HH) 10/05/2018 07:23 PM  
  
Coagulation Tests Lab Results Component Value Date/Time Prothrombin time 14.0 09/24/2018 02:59 AM  
 INR 1.1 09/24/2018 02:59 AM  
 aPTT 63.5 (H) 09/26/2018 12:58 PM  
 aPTT 124.5 (HH) 09/26/2018 05:47 AM  
 aPTT 105.0 (HH) 09/25/2018 04:16 AM  
  
A1c Lab Results Component Value Date/Time Hemoglobin A1c <3.5 (L) 09/10/2018 04:45 AM  
 Hemoglobin A1c 6.1 (H) 01/06/2017 10:11 AM  
 Hemoglobin A1c 6.9 (H) 07/30/2012 07:44 AM  
  
Lipid Panel Lab Results Component Value Date/Time  Cholesterol, total 120 10/18/2018 10:58 AM  
 HDL Cholesterol 38 (L) 10/18/2018 10:58 AM  
 LDL, calculated 67 10/18/2018 10:58 AM  
 VLDL, calculated 15 10/18/2018 10:58 AM  
 Triglyceride 73 10/18/2018 10:58 AM  
  
Thyroid Panel Lab Results Component Value Date/Time TSH 1.160 10/22/2018 06:08 AM  
 TSH 2.030 10/05/2017 02:46 PM  
 T4, Free 1.2 10/22/2018 06:08 AM  
    
Most Recent UA Lab Results Component Value Date/Time Color YELLOW 11/05/2018 08:20 AM  
 Appearance CLEAR 11/05/2018 08:20 AM  
 Specific gravity 1.015 11/05/2018 08:20 AM  
 pH (UA) 5.5 11/05/2018 08:20 AM  
 Protein 100 (A) 11/05/2018 08:20 AM  
 Glucose NEGATIVE  11/05/2018 08:20 AM  
 Ketone NEGATIVE  11/05/2018 08:20 AM  
 Bilirubin NEGATIVE  11/05/2018 08:20 AM  
 Blood NEGATIVE  11/05/2018 08:20 AM  
 Urobilinogen 0.2 11/05/2018 08:20 AM  
 Nitrites NEGATIVE  11/05/2018 08:20 AM  
 Leukocyte Esterase NEGATIVE  11/05/2018 08:20 AM  
  
 
No Known Allergies Immunization History Administered Date(s) Administered  DTAP Vaccine 06/03/2010  TB Skin Test (PPD) Intradermal 01/08/2017, 09/06/2018, 10/05/2018, 10/22/2018, 11/05/2018 All Labs from Last 24 Hrs: 
Recent Results (from the past 24 hour(s)) METABOLIC PANEL, BASIC Collection Time: 11/09/18 10:13 AM  
Result Value Ref Range Sodium 141 136 - 145 mmol/L Potassium 4.8 3.5 - 5.1 mmol/L Chloride 112 (H) 98 - 107 mmol/L  
 CO2 22 21 - 32 mmol/L Anion gap 7 7 - 16 mmol/L Glucose 108 (H) 65 - 100 mg/dL BUN 71 (H) 8 - 23 MG/DL Creatinine 4.31 (H) 0.8 - 1.5 MG/DL  
 GFR est AA 18 (L) >60 ml/min/1.73m2 GFR est non-AA 15 (L) >60 ml/min/1.73m2 Calcium 7.6 (L) 8.3 - 10.4 MG/DL  
GLUCOSE, POC Collection Time: 11/09/18 11:38 AM  
Result Value Ref Range Glucose (POC) 105 (H) 65 - 100 mg/dL GLUCOSE, POC Collection Time: 11/09/18  4:21 PM  
Result Value Ref Range Glucose (POC) 129 (H) 65 - 100 mg/dL GLUCOSE, POC Collection Time: 11/09/18  7:46 PM  
Result Value Ref Range Glucose (POC) 243 (H) 65 - 100 mg/dL METABOLIC PANEL, BASIC Collection Time: 11/10/18  4:49 AM  
Result Value Ref Range Sodium 142 136 - 145 mmol/L Potassium 4.4 3.5 - 5.1 mmol/L Chloride 112 (H) 98 - 107 mmol/L  
 CO2 20 (L) 21 - 32 mmol/L Anion gap 10 7 - 16 mmol/L Glucose 108 (H) 65 - 100 mg/dL BUN 73 (H) 8 - 23 MG/DL Creatinine 4.28 (H) 0.8 - 1.5 MG/DL  
 GFR est AA 18 (L) >60 ml/min/1.73m2 GFR est non-AA 15 (L) >60 ml/min/1.73m2 Calcium 7.9 (L) 8.3 - 10.4 MG/DL  
GLUCOSE, POC Collection Time: 11/10/18  7:24 AM  
Result Value Ref Range Glucose (POC) 90 65 - 100 mg/dL Discharge Exam: 
Patient Vitals for the past 24 hrs: 
 Temp Pulse Resp BP SpO2  
11/10/18 0756 97.8 °F (36.6 °C) (!) 55 18 166/74 97 % 11/10/18 0332 97.5 °F (36.4 °C) 61 20 150/76 100 % 11/09/18 2320 98.1 °F (36.7 °C) 61 18 163/73 96 % 11/09/18 1903 98.9 °F (37.2 °C) 63 18 142/68 94 % 11/09/18 1457 98.6 °F (37 °C) 64 19 131/61 96 % 11/09/18 1103 98.6 °F (37 °C) 66 20 148/76 96 % Oxygen Therapy O2 Sat (%): 97 % (11/10/18 0756) O2 Device: Room air (11/04/18 1630) Intake/Output Summary (Last 24 hours) at 11/10/2018 7995 Last data filed at 11/10/2018 8504 Gross per 24 hour Intake 595 ml Output 720 ml Net -125 ml General:    Well nourished. Alert. No distress. Eyes:   Normal sclera. Extraocular movements intact. ENT:  Normocephalic, atraumatic. Moist mucous membranes CV:   Regular rate and rhythm. No murmur, rub, or gallop. Lungs:  Clear to auscultation bilaterally. No wheezing, rhonchi, or rales. Abdomen: Soft, nontender, nondistended. Bowel sounds normal.  
Extremities: Warm and dry. No cyanosis or edema. Neurologic: CN II-XII grossly intact. Sensation intact. Skin:     No rashes or jaundice. Psych:  Normal mood and affect. Discharge Info:  
Current Discharge Medication List  
  
START taking these medications Details ciprofloxacin HCl (CIPRO) 500 mg tablet Take 1 Tab by mouth every twenty-four (24) hours for 18 days. Qty: 18 Tab, Refills: 0 CONTINUE these medications which have CHANGED Details  
!! minocycline (MINOCIN, DYNACIN) 100 mg capsule Take 1 Cap by mouth every twelve (12) hours for 18 days. Qty: 36 Cap, Refills: 0  
  
 !! - Potential duplicate medications found. Please discuss with provider. CONTINUE these medications which have NOT CHANGED Details  
amLODIPine (NORVASC) 10 mg tablet Take 1 Tab by mouth daily. Qty: 30 Tab, Refills: 1  
  
!! minocycline (MINOCIN, DYNACIN) 100 mg capsule Take 1 Cap by mouth every twelve (12) hours for 16 days. Qty: 32 Cap, Refills: 0  
  
levoFLOXacin (LEVAQUIN) 750 mg tablet Take 1 Tab by mouth every fourty-eight (48) hours for 16 days. Qty: 8 Tab, Refills: 0  
  
glipiZIDE SR (GLUCOTROL XL) 2.5 mg CR tablet take 1 tablet by mouth once daily 
Qty: 30 Tab, Refills: 1  
  
metoprolol succinate (TOPROL-XL) 100 mg tablet Take 1 Tab by mouth daily. Qty: 30 Tab, Refills: 2 Associated Diagnoses: Cardiomyopathy, unspecified type ([address]  
  
atorvastatin (LIPITOR) 80 mg tablet Take 1 Tab by mouth daily. Qty: 30 Tab, Refills: 2 Associated Diagnoses: Mixed hyperlipidemia  
  
isosorbide mononitrate ER (IMDUR) 30 mg tablet Take 1 Tab by mouth daily. Qty: 30 Tab, Refills: 1 Associated Diagnoses: Cardiomyopathy, unspecified type ([address] Lancets misc As directed Qty: 1 Each, Refills: 11  
  
glucose blood VI test strips (ASCENSIA AUTODISC VI, ONE TOUCH ULTRA TEST VI) strip As directed Qty: 200 Strip, Refills: 11  
  
sodium bicarbonate 650 mg tablet Take 1 Tab by mouth three (3) times daily (with meals). Qty: 90 Tab, Refills: 0  
  
aspirin 81 mg chewable tablet Take 1 Tab by mouth daily (after breakfast). Qty: 30 Tab, Refills: 11  
  
 !! - Potential duplicate medications found. Please discuss with provider. STOP taking these medications ramipril (ALTACE) 10 mg capsule Comments:  
Reason for Stopping:   
   
 furosemide (LASIX) 80 mg tablet Comments:  
Reason for Stopping:   
   
  
 
 
 
Disposition: home with [facility] Activity: PT/OT per Home Health Diet: DIET DIABETIC CONSISTENT CARB Regular; 2 GM NA (House Low NA) Follow-up Appointments Procedures  FOLLOW UP VISIT Appointment in: 3 - 5 Days Standing Status:   Standing Number of Occurrences:   1 Order Specific Question:   Appointment in Answer:   3 - 5 Days Follow-up Information Follow up With Specialties Details Why Contact Info Dalton Vigil NP Infectious Diseases, Nurse Practitioner On 11/28/2018 follow up appointment at 6 AM [address] [facility] [address] [place] [id number] 
[phone] Peace Chavez MD Family Practice In 3 days follow up post hospitalization with  [facility] [address] [facility] [address] 
[phone] Collin Osgood, MD Nephrology  follow up post hospitalization [address] [facility] [place] [facility] [address]  
[phone] 
  
 wound clinic  In 1 week follow up with wound care clinic Time spent in patient discharge planning and coordination 35 minutes.  
Plan of care discussed with Dr. Delicia Alejo,  
Signed: 
Gideon Lang NP

## 2018-11-10 NOTE — PROGRESS NOTES
Discharge instructions and prescriptions provided and explained to the pt. Med side effect sheet reviewed. Opportunity for questions provided. Reinforced with patient the importance of taking care of his foot and taking medications as directed. Also importance of follow up appointments. Spoke with patient's daughter, Evelia James and reviewed information. I am sending a separate copy of AVS for her. Informed that patient's son will be picking him up. Patient's primary nurse will attempt to contact him.

## 2018-11-10 NOTE — PROGRESS NOTES
Pt's . Pt refused insulin. Educated pt on importance, but pt became agitated. Will continue to monitor.

## 2018-11-10 NOTE — PROGRESS NOTES
Problem: Falls - Risk of 
Goal: *Absence of Falls Document Angie Mustafa Fall Risk and appropriate interventions in the flowsheet. Outcome: Progressing Towards Goal 
Fall Risk Interventions: 
Mobility Interventions: Bed/chair exit alarm, Patient to call before getting OOB, OT consult for ADLs, PT Consult for mobility concerns Mentation Interventions: Bed/chair exit alarm, Door open when patient unattended, Adequate sleep, hydration, pain control, Reorient patient Medication Interventions: Evaluate medications/consider consulting pharmacy, Bed/chair exit alarm, Patient to call before getting OOB Elimination Interventions: Call light in reach, Bed/chair exit alarm, Patient to call for help with toileting needs Problem: Pressure Injury - Risk of 
Goal: *Prevention of pressure injury Document Seun Scale and appropriate interventions in the flowsheet. Outcome: Progressing Towards Goal 
Pressure Injury Interventions: 
Sensory Interventions: Assess changes in LOC Moisture Interventions: Absorbent underpads Activity Interventions: Increase time out of bed, Pressure redistribution bed/mattress(bed type), PT/OT evaluation Mobility Interventions: HOB 30 degrees or less, Pressure redistribution bed/mattress (bed type), PT/OT evaluation Nutrition Interventions: Document food/fluid/supplement intake, Offer support with meals,snacks and hydration Friction and Shear Interventions: HOB 30 degrees or less

## 2018-11-12 NOTE — PROGRESS NOTES
Transition of Care Discharge Follow-up Questionnaire Date/Time of Call: 
 11/12/18 9853 What was the patient hospitalized for? Confusion, Left foot pain Does the patient understand his/her diagnosis and/or treatment and what happened during the hospitalization? Daughter states understanding Did the patient receive discharge instructions? Daughter states brother has copy CM Assessed Risk for Readmission:  
 
 
Patient stated Risk for Readmission:  
 
 High due to comorbidities and hx non compliance None stated Review any discharge instructions (see discharge instructions/AVS in [facility]). Ask patient if they understand these. Do they have any questions? Daughter aware pt needs a f/u appmt/ labs Were home services ordered (nursing, PT, OT, ST, etc.)? Yes, [facility] PT/OT If so, has the first visit occurred? If not, why? (Assist with coordination of services if necessary.) Yesterday. 11/11 Was any DME ordered? No 
  
If so, has it been received? If not, why?  (Assist patient in obtaining DME orders &/or equipment if necessary. )  
 
NA Complete a review of all medications (new, continued and discontinued meds per the D/C instructions and medication tab in [address] [place] [address]). Plan to review meds w/ daughter tomorrow Were all new prescriptions filled? If not, why?  (Assist patient in obtaining medications if necessary  escalate for CCM &/or SW if ongoing issues are verbalized by pt or anticipated) Will assess Does the patient understand the purpose and dosing instructions for all medications? (If patient has questions, provide explanation and education.) Will assess Does the patient have any problems in performing ADLs? (If patient is unable to perform ADLs  what is the limiting factor(s)? Do they have a support system that can assist? If no support system is present, discuss possible assistance that they may be able to obtain.  Escalate for CCM/SW if ongoing issues are verbalized by pt or anticipated) Family can assist as needed Does the patient have all follow-up appointments scheduled? 7 day f/up with PCP?  
(f/up with PCP may be w/in 14 days if patient has a f/up with their specialist w/in 7 days) 7-14 day f/up with specialist?  
(or per discharge instructions) If f/up has not been made  what actions has the care coordinator made to accomplish this? Has transportation been arranged? Daughter will schedule f/u w/ Dr Nickerson Sic ID f/u 11/28 Ihsan also will need to schedule a nephrology f/u and wound care f/u Family can transport Any other questions or concerns expressed by the patient? Not at this time Schedule next appointment with KIERAN GARCIA Coordinator or refer to RN Case Manager/ per the workflow guidelines. When is care coordinators next follow-up call scheduled? If referred for CCM  what RN care manager was the referral assigned? Plan to f/u w/ daughter tomorrow. Email sent to [phone] [facility] Call Completed By: Hank Galarza RN 
  
 
 
RNCM call to daughterDrea, message left. RNCM call to pt for JENNI call, he is confused, states \"the police have been here this am\". Unable to recall any medicines etc. 
RNCM call to daughter, April. She reports she cannot help her father any more as she is having health problems (has just left the doctor) and he hasn't accepted her help in the last 2 months, he is a [de-identified] man etc. She has to [facility] care of herself\". Ended call abruptly, this RN unable to ask about help for pt. Call returned by daughter Drea Cordon. She reports she was out of town all weekend, states her brother has checked on pt and  PT was out there yesterday. She will speak with brother today about d/c papers, pt's f/u appmts etc. And call this RN back tomorrow. This note will not be viewable in [address]

## 2018-11-13 NOTE — PROGRESS NOTES
RNCM call to daughter, Drea Cordon. She reports she spoke with pt briefly yesterday, he did not acknowledge well visit by police, told her he was taking his meds. She did not speak or see her brother last pm as expected to get the discharge paperwork so will go to pt's home after work today to check on him. (She states possible pt will have \"destroyed\" the paperwork.) Reminded there are multiple appmts that must be made for pt, important he has filled RXs after discharge. This RN offered to make home visit with herself and Ana Buck to discuss goals, compliance, etc. She was very pessimistic about success of said meeting as Gala Still will agree to whatever we say and then not do it\". Again offered to meet nonetheless. RNCM asked daughter to call back tomorrow with update on pt situation and we can plan a way forward. Agreeable. This note will not be viewable in [address]

## 2018-11-14 NOTE — PROGRESS NOTES
RNCM call to daughter, Kael Alfonso. She went to pt's home last pm, reviewed his current medications, had lengthy discussion about compliance, followup etc. No Cipro or RX for Cipro in  the home. Daughter called [facility] and no RX on file there either. This RN called ID and left a message to have another RX for Cipro called into [facility]. Plan to make a home visit later today w/ daughter. Case conference with Lavelle LAND. Community Care Management  Follow up Outreach Note Outreach type: Home visit: Yes  
Date/Time of Outreach: 11/14/18, 1700 Reason for follow-up: 
 Med review, discuss compliance, acceptance of help Disease specific complaints/issues:  
Pain in Left foot Patient progress towards goals set from last contact: 
 None Has patient attended any PCP or specialist follow-up appointments since last contact? What was outcome of appointment? When is next follow-up scheduled? No 
Has f/u at [facility] clinic tomorrow, 11/15 Review medications. Any medication changes since last outreach? Does patient have any questions or issues related to their medications? All meds reviewed, daughter Kael Alfonso is managing, using a pillbox Home health active? If yes  any issue? Progress? No, pt refused Referrals needed? 
(SW, Diabetes education, HH, etc. ) SW involved, refuses MOW Other issues/Miscellaneous? (Transportation, access to meals, ability to perform ADLs, adequate caregiver support, etc.) See below Next Outreach Scheduled: Later in week Next Steps/Goals: 
 Continue outreach, assist family as able Community Care Manager: Ant Leblanc RN  
 
RNCM to pt's home, daughter Kael Alfonso there also. Reviewed pt meds, she is managing w/ a pillbox. This RN has not heard back from ID re CIPRO RX. Discussed compliance w/ pt, he states he will do what he can. Reminded of MD f/u in am, daughter states Wing Ross, another family member, will take him. Refuses MOW, family bringing meals in. Encouraged daughter to make f/u appmts at [facility]phro and Wound center, she agrees. 11/15 Case conference with Alisia Simpson NP at [facility] clinic per anya to alert of pt's appmt today. Plan to follow closely and f/u later in week. This note will not be viewable in [address]

## 2018-11-14 NOTE — PROGRESS NOTES
11/13 RNCM call to daughter, Jhon Jones. She went to pt's home last pm, reviewed his current medications, had lengthy discussion about compliance, followup etc. No Cipro or RX for Cipro in  the home. Daughter called [facility] and no RX on file there either. This RN called ID and left a message to have another RX for Cipro called into [facility]. Plan to make a home visit later today w/ daughter. Case conference with Lynda LAND. This note will not be viewable in [address]

## 2018-11-15 NOTE — PROGRESS NOTES
RNCM case conference with Mg Tuttle NP. Call to [facility], confirmed Cipro ready. Call to daughter, updated on Cipro. She confirms pt has f/u w/ Nephro on 11/21 and wound center 11/28. Also discussed pt feeling \"down\". NP states pt cried some during appmt, but refuses any behavioral health intervention. Daughter also agrees pt is \"bull headed\" and will not consent to therapy. Plan to f/u next week, daughter agreeable. This note will not be viewable in [address]

## 2018-11-20 NOTE — PROGRESS NOTES
Community Care Management  Follow up Outreach Note Outreach type: Phone call: Yes  
Date/Time of Outreach: 11/20/18, 0900 Reason for follow-up: 
 Continued outreach, monitor compliance Disease specific complaints/issues: \"Don't feel well\" Patient progress towards goals set from last contact: 
 Stable Has patient attended any PCP or specialist follow-up appointments since last contact? What was outcome of appointment? When is next follow-up scheduled? Nephrology 11/21 at 10am, daughter Anabela Sanchez to take him Wound care and ID next week 11/30 Review medications. Any medication changes since last outreach? Does patient have any questions or issues related to their medications? Daughter managing meds, states pt taking every day Home health active? If yes  any issue? Progress? No  
Referrals needed? 
(SW, Diabetes education, HH, etc. ) No  
Other issues/Miscellaneous? (Transportation, access to meals, ability to perform ADLs, adequate caregiver support, etc.) None Next Outreach Scheduled: Next week Next Steps/Goals: 
 Continue outreach Community Care Manager: Lien Hanna RN  
 
 
RNCM call to daughter, Rachelle Castaneda. She states pt doing ok, speaks with him by phone daily, he is taking meds, eating food brought in by various family/ friends. He continues to refuse MOW. Another daughter, Anabela Sanchez, will take him to nephrology appmt tomorrow. She has this RNs contact info and will call as necessary. RNCM call to pt. He reports not feeling too good today. States med compliance, denies SOB or chest pain. Also states he is voiding. Encouragement, support given. Goal revised/ updated. Plan to f/u next week or before. This note will not be viewable in [address]

## 2018-11-21 NOTE — PROGRESS NOTES
RNCM received call from daughter, Satnam Falk. Pt is refusing to go to Nephrology appmt today. RNCM call to pt, he states his foot and ankle swollen, he cannot get his shoe on, his back hurts, he didn't sleep well last night and he is not going to [place] new doctor\". Explained MDs saw pt while inpatient so not a \"new MD\". Attempted to explain to pt that he will not get better without intervention, pt continues to refuse. RNCM call back to daughter and recommended she call EMS to transport to ED if pt continues to refuse MD appmts. Plan to continue outreach as able. Call from daughter to report pt made it to his Nephrology appmt. Plan to f/u next week if not before. This note will not be viewable in [address]

## 2018-11-27 NOTE — PROGRESS NOTES
Community Care Management  Follow up Outreach Note Outreach type: Phone call: Yes 
  
Date/Time of Outreach: 11/27/18, 5380 Reason for follow-up: 
 Continued outreach Disease specific complaints/issues: Pain in foot Patient progress towards goals set from last contact: 
 Stable Has patient attended any PCP or specialist follow-up appointments since last contact? What was outcome of appointment? When is next follow-up scheduled? Nephro 11/21 - No changes 11/28 ID and Wound Center Review medications. Any medication changes since last outreach? Does patient have any questions or issues related to their medications? Daughter manages meds, states pt is being compliant Home health active? If yes  any issue? Progress? No  
Referrals needed? 
(SW, Diabetes education, HH, etc. ) None Other issues/Miscellaneous? (Transportation, access to meals, ability to perform ADLs, adequate caregiver support, etc.) None Next Outreach Scheduled: Next week Next Steps/Goals: 
 Continue outreach, monitor compliance Community Care Manager: Carlos Moreno RN  
 
RNCM call to daughter, Sheron Plummer. Discussed nephro appmt last week and she states pt will not require HD after all. She reports pt is being compliant with meds, various family stops in to monitor, make sure he has enough to eat. She reports arrangements in place to transport pt to appmts tomorrow. RNCM call to pt, he reports feeling okay. Encouragement, support given. Plan to f/u next week, pt agreeable. This note will not be viewable in [address]

## 2018-11-28 NOTE — WOUND CARE
11/28/18 1400   Wound Toe (specify in comments) Left;Lateral   Date First Assessed/Time First Assessed: 11/28/18 1359   POA: Yes  Wound Type: Diabetic  Location: Toe (specify in comments)  Wound Description (Optional): Base of 5th Toe  Orientation: Left;Lateral   DRESSING STATUS Old drainage   DRESSING TYPE (betadine and rolled gauze)   Non-Pressure Injury Full thickness (subcut/muscle)   Wound Length (cm) 2.7 cm   Wound Width (cm) 2.5 cm   Wound Depth (cm) 0.4   Wound Surface area (cm^2) 6.75 cm^2   Condition of Base Eschar;Slough   Tissue Type Black; Yellow   Tissue Type Percent Black 75 %   Tissue Type Percent Yellow 25   Drainage Amount  Moderate   Drainage Color Purulent;Serosanguinous   Wound Odor None   Periwound Skin Condition Edematous; Increased warmth   Cleansing and Cleansing Agents  Soap and water   Wound Toe (specify in comments) Left;Dorsal   Date First Assessed/Time First Assessed: 11/28/18 1402   POA: Yes  Wound Type: Diabetic  Location: Toe (specify in comments)  Wound Description (Optional): 2nd Toe  Orientation: Left;Dorsal   DRESSING STATUS Clean, dry, and intact   DRESSING TYPE (betadine, rolled gauze)   Non-Pressure Injury Full thickness (subcut/muscle)   Wound Length (cm) 1.7 cm   Wound Width (cm) 1.5 cm   Wound Depth (cm) 0.2   Wound Surface area (cm^2) 2.55 cm^2   Condition of Base Eschar   Tissue Type Black   Tissue Type Percent Black 100 %   Drainage Amount  None   Wound Odor None   Periwound Skin Condition Calloused   Cleansing and Cleansing Agents  Soap and water

## 2018-11-28 NOTE — DISCHARGE INSTRUCTIONS
-Left 2nd Toe & Left Lateral 5th Toe Base:   -Cleanse wounds with normal saline. -Apply Betadine to both wounds, paint daily and cover with rolled gauze. Home Health to change 3x week and patient do the other days.  -Follow up with vascular for follow up for MAGGIE's and wounds.

## 2018-11-28 NOTE — WOUND CARE
[address]  [address], [address]  Phone: [phone]  Fax: [phone]    Patient: Ashwini Snell MRN: [id number]  SSN: [id number]    YOB: 1945  Age: 72 y.o. Sex: male       Return Appointment: 2 weeks with Geryl Boots, DPM    Instructions:   -Left 2nd Toe & Left Lateral 5th Toe Base:   -Cleanse wounds with normal saline. -Apply Betadine to both wounds, paint daily and cover with rolled gauze. Home Health to change 3x week and patient do the other days.  -Follow up with vascular for follow up for MAGGIE's and wounds. Should you experience increased redness, swelling, pain, foul odor, size of wound(s), or have a temperature over 101 degrees please contact the [address] [place] [address] Road at [phone] or if after hours contact your primary care physician or go to the hospital emergency department.     Signed By: Alondra Bowser RN     November 28, 2018

## 2018-11-28 NOTE — PROGRESS NOTES
Wound Center Progress Note    Patient: Manuel Thibodeaux MRN: [id number]  SSN: [id number]    YOB: 1945  Age: 72 y.o. Sex: male      Subjective:     Chief Complaint:  Manuel Thibodeaux is a 72 y.o. WHITE OR   BLACK OR  male who presents with a wound to the left lower extremity at the lateral 5th metatarsal head and dorsal 2nd digit. 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 I spoke personally with Keith Cage, a NP with ID, about his care to date. He has known vascular disease and has refused intervention in the past though agreed to a consult this morning at this ID appointment. He is on oral cipro and minocycline and has refused IV antibiotic. He lives alone with family nearby to check on him. 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      History of Present Illness:     Nature: Painless   Location: as above  Duration: September 2018  Onset: Patient states it started as callusing  Course: Stable  Aggravating/Alleviating: Worsened with pressure and dependency, no improvement  Treatment: betadine     Past Medical History:   Diagnosis Date    Abnormal CT scan, chest 12/27/2015    Acute CHF ([address] 12/18/0392    Acute systolic congestive heart failure ([address] 12/30/2015    MO (acute kidney injury) ([address] 7/29/2012    Anemia of chronic disease 9/6/2018    Bilateral pleural effusion 12/27/2015    Chest pain 12/26/2015    Depression     DM (diabetes mellitus), type 2 ([address] 7/29/2012    Encephalopathy due to infection 1/7/2017    HTN (hypertension) 7/29/2012    Hypoglycemia 7/29/2012    NSTEMI (non-ST elevated myocardial infarction) ([address] 1/5/2017    Tobacco use 12/27/2015      Past Surgical History:   Procedure Laterality Date    HX ORTHOPAEDIC       Family History   Problem Relation Age of Onset    Diabetes Mother     Kidney Disease Mother     Diabetes Father  Kidney Disease Father     Kidney Disease Sister       Social History     Tobacco Use    Smoking status: Current Every Day Smoker     Packs/day: 0.50     Years: 45.00     Pack years: 22.50     Types: Cigarettes    Smokeless tobacco: Never Used   Substance Use Topics    Alcohol use: No       Prior to Admission medications    Medication Sig Start Date End Date Taking? Authorizing Provider   amLODIPine (NORVASC) 5 mg tablet Take 5 mg by mouth daily. 11/14/18   Provider, Historical   ramipril (ALTACE) 10 mg capsule Take 10 mg by mouth daily. 11/14/18   Provider, Historical   minocycline (MINOCIN, DYNACIN) 100 mg capsule Take 1 Cap by mouth every twelve (12) hours for 18 days. 11/10/18 11/28/18  Perlita Pryor NP   ciprofloxacin HCl (CIPRO) 500 mg tablet Take 1 Tab by mouth every twenty-four (24) hours for 18 days. 11/10/18 11/28/18  Kerwin Sevilla NP   amLODIPine (NORVASC) 10 mg tablet Take 1 Tab by mouth daily. 10/30/18   Joni Sanchez MD   glipiZIDE SR (GLUCOTROL XL) 2.5 mg CR tablet take 1 tablet by mouth once daily 10/23/18   Arthur Michel MD   metoprolol succinate (TOPROL-XL) 100 mg tablet Take 1 Tab by mouth daily. 10/18/18   Suly Phelps NP   atorvastatin (LIPITOR) 80 mg tablet Take 1 Tab by mouth daily. 10/18/18   Suly Phelps NP   isosorbide mononitrate ER (IMDUR) 30 mg tablet Take 1 Tab by mouth daily. 10/18/18   Priscila Phelps NP   Lancets misc As directed 9/28/18   Karla Andrade MD   glucose blood VI test strips (ASCENSIA AUTODISC VI, ONE TOUCH ULTRA TEST VI) strip As directed 9/28/18   Karla Andrade MD   sodium bicarbonate 650 mg tablet Take 1 Tab by mouth three (3) times daily (with meals). 9/10/18   Pj Blackburn MD   aspirin 81 mg chewable tablet Take 1 Tab by mouth daily (after breakfast). 12/30/15   Lashay Dodson NP     No Known Allergies     Review of Systems:  The patient has no difficulty with chest pain or shortness of breath.  No fever or chills. No Nausea, vomiting or diarrhea. Comprehensive review of systems was otherwise unremarkable except as noted above. Lab Results   Component Value Date/Time    Hemoglobin A1c <3.5 (L) 09/10/2018 04:45 AM    Hemoglobin A1c (POC) 5.3 10/05/2017 02:53 PM        Immunization History   Administered Date(s) Administered    DTAP Vaccine 06/03/2010    TB Skin Test (PPD) Intradermal 01/08/2017, 09/06/2018, 10/05/2018, 10/22/2018, 11/05/2018       Body mass index is 25.96 kg/m². Counseling regarding nutrition done: Yes. Recommend Prem nutritional supplement for wound healing. Current medications:  Current Outpatient Medications   Medication Sig Dispense Refill    amLODIPine (NORVASC) 5 mg tablet Take 5 mg by mouth daily. 0    ramipril (ALTACE) 10 mg capsule Take 10 mg by mouth daily. 0    minocycline (MINOCIN, DYNACIN) 100 mg capsule Take 1 Cap by mouth every twelve (12) hours for 18 days. 36 Cap 0    ciprofloxacin HCl (CIPRO) 500 mg tablet Take 1 Tab by mouth every twenty-four (24) hours for 18 days. 18 Tab 0    amLODIPine (NORVASC) 10 mg tablet Take 1 Tab by mouth daily. 30 Tab 1    glipiZIDE SR (GLUCOTROL XL) 2.5 mg CR tablet take 1 tablet by mouth once daily 30 Tab 1    metoprolol succinate (TOPROL-XL) 100 mg tablet Take 1 Tab by mouth daily. 30 Tab 2    atorvastatin (LIPITOR) 80 mg tablet Take 1 Tab by mouth daily. 30 Tab 2    isosorbide mononitrate ER (IMDUR) 30 mg tablet Take 1 Tab by mouth daily. 30 Tab 1    Lancets misc As directed 1 Each 11    glucose blood VI test strips (ASCENSIA AUTODISC VI, ONE TOUCH ULTRA TEST VI) strip As directed 200 Strip 11    sodium bicarbonate 650 mg tablet Take 1 Tab by mouth three (3) times daily (with meals). 90 Tab 0    aspirin 81 mg chewable tablet Take 1 Tab by mouth daily (after breakfast).  30 Tab 11         Objective:     Physical Exam:     Visit Vitals  /82 (BP 1 Location: Right arm)   Pulse 75   Temp 98.4 °F (36.9 °C)   Resp 20   Ht 6' 1\" (1.854 m)   Wt 89.3 kg (196 lb 12.8 oz)   SpO2 100%   BMI 25.96 kg/m²       General: well developed, well nourished, pleasant , NAD. Hygiene good  Psych: cooperative. Pleasant. No anxiety or depression. Normal mood and affect. HEENT: Normocephalic, atraumatic. EOMI. Conjunctiva clear. No scleral icterus. Neck: Normal range of motion. No masses. Chest: Good air entry bilaterally. Respirations nonlabored  Cardio: Normal heart sounds,no rubs, murmurs or gallops  Abdomen: Soft, nontender, nondistended, normoactive bowel sounds    Vascular: DP and PT pulses are nonpalpable at 0/4 left. Skin temperature is uniform from proximal to distal left. Hair growth is absent left. No erythema or heat is appreciated left. Derm: Nails 1-5 left are thickened, discolored and with subungual debris. No paronychial infections are noted. Skin is atrophic and xerotic. No subcutaneous masses or hyperpigmented lesions are present. Neuro: Epicritic sensation is absent left. Protective sensation is absent with 5.07 SWMF testing to all 10 sites left. Msk: Muscle strength is 5/5 for all prime movers of the foot left. ROM of all joints is full and fluid without pain. No effusions are palpable. Right BKA with prosthesis in place. Ulceration to the lateral 5th metatarsal head with surrounding hyperemia and hyperkeratosis. Probes to bone. No bone exposed. There is no odor or purulence. 2nd digit with dry, stable gangrene from tip to PIPJ. No purulence. Skin dry, flaky and xerotic. Global edema without erythema.      Diabetic Foot Ulcer/Neuropathic   Is Wound Greater than 1.0 sq cm ? : Yes  Re-Current Wound with Skin Substitue within Last Year : No  X-Ray in Last 3 Months: Yes(11/4/18)  MAGGIE In Last 6 Months : Yes(10/5/18)  Smoking Status: Smoker  Wound Free from Infection : No Culture Done  Is Wound Free of Eschar, Slough , and / or Bio-Marcus: No  Malignant Process in Wound : No Biopsy Done  Hemoglobin A1Cin Last 3 Months: No  Type of off Loading: Patient is not off loading  Compression Therapy of 20mm or greater ?: No     Ulcer Description:   Wound Foot Left;Lateral;Distal (Active)   Number of days: 51       Wound Toe (specify in comments) Left;Lateral (Active)   DRESSING STATUS Old drainage 11/28/2018  2:00 PM   Non-Pressure Injury Full thickness (subcut/muscle) 11/28/2018  2:00 PM   Wound Length (cm) 2.7 cm 11/28/2018  2:00 PM   Wound Width (cm) 2.5 cm 11/28/2018  2:00 PM   Wound Depth (cm) 0.4 11/28/2018  2:00 PM   Wound Surface area (cm^2) 6.75 cm^2 11/28/2018  2:00 PM   Condition of Base Eschar;Slough 11/28/2018  2:00 PM   Tissue Type Black; Yellow 11/28/2018  2:00 PM   Tissue Type Percent Black 75 % 11/28/2018  2:00 PM   Tissue Type Percent Yellow 25 11/28/2018  2:00 PM   Drainage Amount  Moderate 11/28/2018  2:00 PM   Drainage Color Purulent;Serosanguinous 11/28/2018  2:00 PM   Wound Odor None 11/28/2018  2:00 PM   Periwound Skin Condition Edematous; Increased warmth 11/28/2018  2:00 PM   Cleansing and Cleansing Agents  Soap and water 11/28/2018  2:00 PM   Number of days: 0       Wound Toe (specify in comments) Left;Dorsal (Active)   DRESSING STATUS Clean, dry, and intact 11/28/2018  2:00 PM   Non-Pressure Injury Full thickness (subcut/muscle) 11/28/2018  2:00 PM   Wound Length (cm) 1.7 cm 11/28/2018  2:00 PM   Wound Width (cm) 1.5 cm 11/28/2018  2:00 PM   Wound Depth (cm) 0.2 11/28/2018  2:00 PM   Wound Surface area (cm^2) 2.55 cm^2 11/28/2018  2:00 PM   Condition of Base Eschar 11/28/2018  2:00 PM   Tissue Type Black 11/28/2018  2:00 PM   Tissue Type Percent Black 100 % 11/28/2018  2:00 PM   Drainage Amount  None 11/28/2018  2:00 PM   Wound Odor None 11/28/2018  2:00 PM   Periwound Skin Condition Calloused 11/28/2018  2:00 PM   Cleansing and Cleansing Agents  Soap and water 11/28/2018  2:00 PM   Number of days: 0       [REMOVED] Wound Foot Right;Lateral (Removed)   Number of days: 094       [REMOVED] Wound Foot Right (Removed)   Number of days: 598       [REMOVED] Wound Leg Right (Removed)   Number of days: 854                 Data Review:   No results found for this or any previous visit (from the past 336 hour(s)). I independently reviewed recent labs, pathology reports, microbiology reports and radiology studies as noted above. Assessment:     72 y.o. male with diabetic ulceration to the lateral 5th metatarsal with underlying OM and to the 2nd digit. Plan:     Patient was examined and evaluated today. They were educated on the barriers to healing. With ischemia present wound care takes a palliative approach to keeping the infection focal, painless and with minimal care required that he can perform on his own. He will make the vascular appointment across the [facility] before leaving today. Betadine to both wounds. Continue ID recommendations. Return in 1 week for plan of care based on vascular consultation. Any procedures done today in the wound center are documented in a seperate note in connect care made part of this record by reference. Patient instructed on the following: Follow all wound dressing instructions. Do not get dressing or wound wet. May shower if wound can be effectively kept dry. Cast covers may be purchased at [facility] [employer] [facility]. Should you experience increased redness, swelling, pain, foul odor, size of wound(s), or have a temperature over 101 degrees please contact the [address] [place] [address] Road at [phone] or if after hours contact your primary care physician or go to the hospital emergency department. Orders Placed This Encounter    REFERRAL TO HOME HEALTH     Referral Type:   Home Health Evaluation     Referral Reason:   Continuity of Care     Number of Visits Requested:   1    WOUND CARE, DRESSING CHANGE     -Left 2nd Toe & Left Lateral 5th Toe Base:   -Cleanse wounds with normal saline. -Apply Betadine to both wounds, paint daily and cover with rolled gauze.  Home Health to change 3x week and patient do the other days.  -Follow up with vascular for follow up for MAGGIE's and wounds.      Standing Status:   Standing     Number of Occurrences:   1       Follow-up Information     Follow up With Specialties Details Why Contact Info    [address] In 2 weeks  [address] [address]  [phone]             Signed By: Gretchen Spencer DPM     November 28, 2018

## 2018-11-29 NOTE — TELEPHONE ENCOUNTER
Incoming call received from Olga Cagle at [facility] who reports patient's wound care (betadine and rolled gauze) is not a skilled need. Pt will not be admitted to [facility]. This RN will make patient aware.

## 2018-12-04 NOTE — PROGRESS NOTES
RNCM call to daughter. Message left to return call. Daughter called back. Discussed Vascular appmt for this am, and she thought it was next week. (She called and was able to reschedule for Friday.) Also discussed [facility] referral, she is not sure if they have contacted pt but will find out. States med compliance, she is filling pillbox weekly, keeping track of refills etc. 
Plan to f/u later in week with pt once HH begins. This note will not be viewable in [address]

## 2018-12-11 NOTE — PROGRESS NOTES
Community Care Management  Follow up Outreach Note Outreach type: Phone call: Yes 
  
Date/Time of Outreach: 12/11/18, 2971 Reason for follow-up: 
 Continued outreach, check on upcoming surgery Disease specific complaints/issues: Foot is \"sore\" Patient progress towards goals set from last contact: 
 Stable, family goal added Has patient attended any PCP or specialist follow-up appointments since last contact? What was outcome of appointment? When is next follow-up scheduled? Vascular 12/7, Dr Flory Lester Surgery has been scheduled for 12/17 Review medications. Any medication changes since last outreach? Does patient have any questions or issues related to their medications? No med changes Home health active? If yes  any issue? Progress? [facility] RN/PT Referrals needed? 
(SW, Diabetes education, HH, etc. ) None Other issues/Miscellaneous? (Transportation, access to meals, ability to perform ADLs, adequate caregiver support, etc.) None Next Outreach Scheduled: Next week or before Next Steps/Goals: 
 Continue outreach/ support Community Care Manager: Leeroy Tillman RN  
 
RNCM call to daughter, Marietta Molina. She reports pt is now refusing surgery next week. Pt thought Dr Flory Lester was \"pushy\" last week and does not wish to proceed. Apparently is thinking of a January date instead. Marietta Molina reports med compliance, pt eating well also. Refuses to wear \"boot\" and keeps on a leather tennis shoe even though his foot is \"sore\". Email to [facility] RN to case conference/ update. RNCM spoke with [facility] RN. This note will not be viewable in [address]

## 2018-12-12 NOTE — PROCEDURES
Wound Center Debridement    Patient: David Pappas MRN: [id number]  SSN: [id number]    YOB: 1945  Age: 72 y.o.   Sex: male      December 12, 2018     Procedure Performed By: Enrique Harrison DPM    Wound: 1 Left  Non Pressure  Fore-foot Breakdown of Skin     Type of Debridement:  Selective      Time Out Taken: Yes    Pain Control: Insensate      Type of Tissue Removed: Necrotic/Eschar    Frequency of Debridements: PRN    Consent in chart     Instrument: Blade     Bleeding: None     Hemostasis: n/a      Procedural Pain: Insensate    Post-Procedural Pain: Insensate    Pre-Debridement Measurements: 2.8 x 2.5 x 0.4 cm    Post-Debridement Measurements: 2.8 x 2.5 x 0.3 cm    Surface Area Debrided: 7 sq. cm    Response to Procedure: tolerated the procedure well with no complications

## 2018-12-12 NOTE — PROGRESS NOTES
RNCM received call from daughter, Mago Hutchinson. She is unsure whether to cancel pre-assessment and surgery scheduled for next week. Pt stated he wants to \"keep my leg\". Appears he told the [facility] RN the same yesterday at visit. Pt has wound care appmt today at 1300, so she will wait until after . Call to [facility] RN Carmelo Thornton, case conference. Call to [facility], spoke w/ Loi Aguillon. Updated on pt's appmt this afternoon, [facility] RN notes no antibiotics found in home (should be on Cipro and Minocycline from last ID visit), he is refusing surgery next week and refuses to wear the boot because \"my foot is sore\". (Instead wearing a black leather tennis shoe). She will update MD.  Plan to f/u next week or before. This note will not be viewable in [address]

## 2018-12-12 NOTE — WOUND CARE
[address]  [address], [address]   Phone: [phone]  Fax: [phone]    Patient: Wally Crowell MRN: [id number]  SSN: [id number]    YOB: 1945  Age: 72 y.o. Sex: male       Return Appointment: 3 weeks with Lenord Peabody, DPM    Instructions: Left 2nd Toe:  Cleanse wounds with normal saline. Apply Betadine to both wounds, paint daily and cover with rolled gauze. Left 5th lateral toe:  Clean wound with saline. Hydrofera Blue: Cut to wound size, moisten with saline, and apply to wound bed. Cover with ABD and wrap with gauze. Change 3 times/week. Home Health to change 3x week and patient do the other days. Should you experience increased redness, swelling, pain, foul odor, size of wound(s), or have a temperature over 101 degrees please contact the [address] [place] [address] Road at [phone] or if after hours contact your primary care physician or go to the hospital emergency department.     Signed By: Andrez Santacruz RN     December 12, 2018

## 2018-12-12 NOTE — PROGRESS NOTES
Wound Center Progress Note    Patient: Radha Richardson MRN: [id number]  SSN: [id number]    YOB: 1945  Age: 72 y.o. Sex: male      Subjective:     Chief Complaint:  Radha Richardson is a 72 y.o. WHITE OR   BLACK OR  male who presents with a wound to the left lower extremity at the lateral 5th metatarsal head and dorsal 2nd digit. 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 He lives alone with family nearby to check on him. 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 His [address] called the wound center this morning with concerns over not finding his oral antibiotics in the house, him removing his dressing on his own and him not wearing the offloading shoe. She also states that he is planning to cancel surgery planned for next week to amputate the 5th ray.      History of Present Illness:     Nature: Painless   Location: as above  Duration: September 2018  Onset: Patient states it started as callusing  Course: Stable  Aggravating/Alleviating: Worsened with pressure and dependency, no improvement  Treatment: betadine     Past Medical History:   Diagnosis Date    Abnormal CT scan, chest 12/27/2015    Acute CHF ([address] 50/55/5192    Acute systolic congestive heart failure ([address] 12/30/2015    MO (acute kidney injury) ([address] 7/29/2012    Anemia of chronic disease 9/6/2018    Bilateral pleural effusion 12/27/2015    Chest pain 12/26/2015    Depression     DM (diabetes mellitus), type 2 ([address] 7/29/2012    Encephalopathy due to infection 1/7/2017    HTN (hypertension) 7/29/2012    Hypoglycemia 7/29/2012    NSTEMI (non-ST elevated myocardial infarction) ([address] 1/5/2017    Tobacco use 12/27/2015      Past Surgical History:   Procedure Laterality Date    HX ORTHOPAEDIC       Family History   Problem Relation Age of Onset    Diabetes Mother     Kidney Disease Mother  Diabetes Father     Kidney Disease Father     Kidney Disease Sister       Social History     Tobacco Use    Smoking status: Current Every Day Smoker     Packs/day: 0.50     Years: 45.00     Pack years: 22.50     Types: Cigarettes    Smokeless tobacco: Never Used   Substance Use Topics    Alcohol use: No       Prior to Admission medications    Medication Sig Start Date End Date Taking? Authorizing Provider   atorvastatin (LIPITOR) 80 mg tablet Take 1 Tab by mouth daily. 12/4/18   Zoraida Michel MD   amLODIPine (NORVASC) 5 mg tablet Take 5 mg by mouth daily. 11/14/18   Provider, Historical   ramipril (ALTACE) 10 mg capsule Take 10 mg by mouth daily. 11/14/18   Provider, Historical   amLODIPine (NORVASC) 10 mg tablet Take 1 Tab by mouth daily. 10/30/18   Dylan Roberts MD   glipiZIDE SR (GLUCOTROL XL) 2.5 mg CR tablet take 1 tablet by mouth once daily 10/23/18   Zoraida Michel MD   metoprolol succinate (TOPROL-XL) 100 mg tablet Take 1 Tab by mouth daily. 10/18/18   Suly Phelps NP   isosorbide mononitrate ER (IMDUR) 30 mg tablet Take 1 Tab by mouth daily. 10/18/18   Louis Phelps NP   Lancets misc As directed 9/28/18   Fabrizio Velez MD   glucose blood VI test strips (ASCENSIA AUTODISC VI, ONE TOUCH ULTRA TEST VI) strip As directed 9/28/18   Fabrizio Velez MD   sodium bicarbonate 650 mg tablet Take 1 Tab by mouth three (3) times daily (with meals). 9/10/18   Alfredo White MD   aspirin 81 mg chewable tablet Take 1 Tab by mouth daily (after breakfast). 12/30/15   Cristiano Mckeon NP     No Known Allergies     Review of Systems:  The patient has no difficulty with chest pain or shortness of breath. No fever or chills. No Nausea, vomiting or diarrhea. Comprehensive review of systems was otherwise unremarkable except as noted above.      Lab Results   Component Value Date/Time    Hemoglobin A1c <3.5 (L) 09/10/2018 04:45 AM    Hemoglobin A1c (POC) 5.3 10/05/2017 02:53 PM Immunization History   Administered Date(s) Administered    DTAP Vaccine 06/03/2010    TB Skin Test (PPD) Intradermal 01/08/2017, 09/06/2018, 10/05/2018, 10/22/2018, 11/05/2018       Body mass index is 25.86 kg/m². Counseling regarding nutrition done: Yes. Recommend Prem nutritional supplement for wound healing. Current medications:  Current Outpatient Medications   Medication Sig Dispense Refill    atorvastatin (LIPITOR) 80 mg tablet Take 1 Tab by mouth daily. 30 Tab 11    amLODIPine (NORVASC) 5 mg tablet Take 5 mg by mouth daily. 0    ramipril (ALTACE) 10 mg capsule Take 10 mg by mouth daily. 0    amLODIPine (NORVASC) 10 mg tablet Take 1 Tab by mouth daily. 30 Tab 1    glipiZIDE SR (GLUCOTROL XL) 2.5 mg CR tablet take 1 tablet by mouth once daily 30 Tab 1    metoprolol succinate (TOPROL-XL) 100 mg tablet Take 1 Tab by mouth daily. 30 Tab 2    isosorbide mononitrate ER (IMDUR) 30 mg tablet Take 1 Tab by mouth daily. 30 Tab 1    Lancets misc As directed 1 Each 11    glucose blood VI test strips (ASCENSIA AUTODISC VI, ONE TOUCH ULTRA TEST VI) strip As directed 200 Strip 11    sodium bicarbonate 650 mg tablet Take 1 Tab by mouth three (3) times daily (with meals). 90 Tab 0    aspirin 81 mg chewable tablet Take 1 Tab by mouth daily (after breakfast). 30 Tab 11         Objective:     Physical Exam:     Visit Vitals  BP (!) 122/93 (BP 1 Location: Left arm)   Pulse 85   Temp 98.7 °F (37.1 °C)   Resp 20   Ht 6' 1\" (1.854 m)   Wt 88.9 kg (196 lb)   SpO2 98%   BMI 25.86 kg/m²       General: well developed, well nourished, pleasant , NAD. Hygiene good  Psych: cooperative. Pleasant. No anxiety or depression. Normal mood and affect. HEENT: Normocephalic, atraumatic. EOMI. Conjunctiva clear. No scleral icterus. Neck: Normal range of motion. No masses. Chest: Good air entry bilaterally.   Respirations nonlabored  Cardio: Normal heart sounds,no rubs, murmurs or gallops  Abdomen: Soft, nontender, nondistended, normoactive bowel sounds    Vascular: DP and PT pulses are nonpalpable at 0/4 left. Skin temperature is uniform from proximal to distal left. Hair growth is absent left. No erythema or heat is appreciated left. Derm: Nails 1-5 left are thickened, discolored and with subungual debris. No paronychial infections are noted. Skin is atrophic and xerotic. No subcutaneous masses or hyperpigmented lesions are present. Neuro: Epicritic sensation is absent left. Protective sensation is absent with 5.07 SWMF testing to all 10 sites left. Msk: Muscle strength is 5/5 for all prime movers of the foot left. ROM of all joints is full and fluid without pain. No effusions are palpable. Right BKA with prosthesis in place. Ulceration to the lateral 5th metatarsal head with surrounding hyperemia and hyperkeratosis. Probes to bone Bone is now exposed. There is no odor or purulence. Wet fibrosis to the base. 2nd digit with dry, stable gangrene from tip to PIPJ. No purulence. Skin dry, flaky and xerotic. Global edema without erythema. Ulcer Description:   Wound Foot Left;Lateral;Distal (Active)   Number of days: 65       Wound Toe (specify in comments) Left;Lateral (Active)   DRESSING STATUS Old drainage 12/12/2018  1:19 PM   Non-Pressure Injury Full thickness (subcut/muscle) 11/28/2018  2:00 PM   Wound Length (cm) 2.8 cm 12/12/2018  1:19 PM   Wound Width (cm) 2.5 cm 12/12/2018  1:19 PM   Wound Depth (cm) 0.4 12/12/2018  1:19 PM   Wound Surface area (cm^2) 7 cm^2 12/12/2018  1:19 PM   Change in Wound Size % -3.7 12/12/2018  1:19 PM   Condition of Base Eschar;Slough 12/12/2018  1:19 PM   Tissue Type Black; Yellow 11/28/2018  2:00 PM   Tissue Type Percent Black 80 % 12/12/2018  1:19 PM   Tissue Type Percent Yellow 20 12/12/2018  1:19 PM   Drainage Amount  Moderate 12/12/2018  1:19 PM   Drainage Color Tan 12/12/2018  1:19 PM   Wound Odor None 12/12/2018  1:19 PM   Periwound Skin Condition Edematous; Increased warmth 11/28/2018  2:00 PM   Cleansing and Cleansing Agents  Normal saline 12/12/2018  1:19 PM   Number of days: 14       Wound Toe (specify in comments) Left;Dorsal (Active)   DRESSING STATUS Clean, dry, and intact 12/12/2018  1:19 PM   Non-Pressure Injury Full thickness (subcut/muscle) 11/28/2018  2:00 PM   Wound Length (cm) 1.6 cm 12/12/2018  1:19 PM   Wound Width (cm) 1.5 cm 12/12/2018  1:19 PM   Wound Depth (cm) 0 12/12/2018  1:19 PM   Wound Surface area (cm^2) 2.4 cm^2 12/12/2018  1:19 PM   Change in Wound Size % 5.88 12/12/2018  1:19 PM   Condition of Base Eschar 12/12/2018  1:19 PM   Tissue Type Black 11/28/2018  2:00 PM   Tissue Type Percent Black 100 % 12/12/2018  1:19 PM   Drainage Amount  None 12/12/2018  1:19 PM   Wound Odor None 12/12/2018  1:19 PM   Periwound Skin Condition Calloused 11/28/2018  2:00 PM   Cleansing and Cleansing Agents  Normal saline 12/12/2018  1:19 PM   Number of days: 14       [REMOVED] Wound Foot Right;Lateral (Removed)   Number of days: 607       [REMOVED] Wound Foot Right (Removed)   Number of days: 598       [REMOVED] Wound Leg Right (Removed)   Number of days: 588                 Data Review:   No results found for this or any previous visit (from the past 336 hour(s)). I independently reviewed recent labs, pathology reports, microbiology reports and radiology studies as noted above. Assessment:     72 y.o. male with diabetic ulceration to the lateral 5th metatarsal with underlying OM and to the 2nd digit. Plan:     Patient was examined and evaluated today. They were educated on the barriers to healing. Patient's daughter's boyfriend is present today. In depth discussion occurred with the patient about the surgery that is planned to amputate the 5th toe. He is fearful because his mother underwent multiple toe amputations that led to bilateral BKA and eventual death.  He underwent right 5th toe amputation that led to BKA 2 months later and he is convinced that this surgery will result in the same. Discussed the status of his wound and the underlying bone which puts him at current risk for sepsis, hospitalization and loss of limb and/or life. The wound will not heal with the infection and lack of compliance. His decision needs to be based on living with a wound at high risk for sepsis versus the risk of failed amputation. Highly recommend he moves forward with the amputation in an attempt to salvage as much of the foot/leg as possible. At this point he states that he would kill himself with a 0.45 to the mouth or jump off a bridge before he lost his leg. With this statement, I asked if he needed to speak to someone regarding depression and suicidal ideation. I told him to report directly to the ER and asked the man with him to take him. He refused. Our clinical director then got involved and spoke with the patient again recommending we transfer him to the ER for his safety and he refused. We encouraged the man who brought him to check on him frequently and call 911 should he worry about his safety at home. He states Gala Bookerjose manuel talks like this all the time\" and again refused the ER or any help offered in this regard. Return in 1 week for plan of care based on vascular consultation. Any procedures done today in the wound center are documented in a seperate note in connect care made part of this record by reference. Patient instructed on the following: Follow all wound dressing instructions. Do not get dressing or wound wet. May shower if wound can be effectively kept dry. Cast covers may be purchased at [facility] and [facility]. Should you experience increased redness, swelling, pain, foul odor, size of wound(s), or have a temperature over 101 degrees please contact the [address] [place] [address] Road at [phone] or if after hours contact your primary care physician or go to the hospital emergency department.     Orders Placed This Encounter    WOUND CARE, DRESSING CHANGE     Left 2nd Toe:  Cleanse wounds with normal saline. Apply Betadine to both wounds, paint daily and cover with rolled gauze. Left 5th lateral toe:  Clean wound with saline. Hydrofera Blue: Cut to wound size, moisten with saline, and apply to wound bed. Cover with ABD and wrap with gauze. Home Health to change 3x week and patient do the other days.      Standing Status:   Standing     Number of Occurrences:   1       Follow-up Information     Follow up With Specialties Details Why Contact Info    [address] In 3 weeks  [place] [facility] [address] [place] [address]  [phone]             Signed By: Geneva Boykin DPM     December 12, 2018

## 2018-12-12 NOTE — PROGRESS NOTES
Noted Wound Center notes in [facility]. RNCM call to daughter, Evelia James. Discussed wound appmt and pt's refusal of surgery, she will cancel. Apparently pt became angry at appmt. Confirms she refilled antibiotics yesterday and filled pillbox. Case conference with [facility] RN Marychuy. She will see pt on Friday. Plan to f/u next week or before. This note will not be viewable in [address]

## 2018-12-12 NOTE — WOUND CARE
12/12/18 1319   Wound Toe (specify in comments) Left;Lateral   Date First Assessed/Time First Assessed: 11/28/18 1359   POA: Yes  Wound Type: Diabetic  Location: Toe (specify in comments)  Wound Description (Optional):  Base of 5th Toe  Orientation: Left;Lateral   DRESSING STATUS Old drainage   DRESSING TYPE (betadine, gauze)   Wound Length (cm) 2.8 cm   Wound Width (cm) 2.5 cm   Wound Depth (cm) 0.4   Wound Surface area (cm^2) 7 cm^2   Change in Wound Size % -3.7   Condition of Base Eschar;Slough   Tissue Type Percent Black 80 %   Tissue Type Percent Yellow 20   Drainage Amount  Moderate   Drainage Color Tan   Wound Odor None   Cleansing and Cleansing Agents  Normal saline   Wound Toe (specify in comments) Left;Dorsal   Date First Assessed/Time First Assessed: 11/28/18 1402   POA: Yes  Wound Type: Diabetic  Location: Toe (specify in comments)  Wound Description (Optional): 2nd Toe  Orientation: Left;Dorsal   DRESSING STATUS Clean, dry, and intact   DRESSING TYPE (betadine, gauze)   Wound Length (cm) 1.6 cm   Wound Width (cm) 1.5 cm   Wound Depth (cm) 0   Wound Surface area (cm^2) 2.4 cm^2   Change in Wound Size % 5.88   Condition of Base Eschar   Tissue Type Percent Black 100 %   Drainage Amount  None   Wound Odor None   Cleansing and Cleansing Agents  Normal saline

## 2018-12-18 NOTE — PROGRESS NOTES
Community Care Management  Follow up Outreach Note   Outreach type: Phone call: Yes     Date/Time of Outreach: 12/18/18, 3482     Reason for follow-up:   Continued outreach   Disease specific complaints/issues: \"Feeling okay\"     Patient progress towards goals set from last contact:   Stable   Has patient attended any PCP or specialist follow-up appointments since last contact? What was outcome of appointment? When is next follow-up scheduled? Wound Care appmt 12/12  Refused Vascular surgery 12/17   Review medications. Any medication changes since last outreach? Does patient have any questions or issues related to their medications? No med changes, states compliance. Home health active? If yes  any issue? Progress? [facility] [place] RN - 3X/week for wound care   Referrals needed?  (SW, Diabetes education, HH, etc. ) None   Other issues/Miscellaneous? (Transportation, access to meals, ability to perform ADLs, adequate caregiver support, etc.)     Refuses MOW, states he is \"cooking for myself, tv dinners, etc\". Next Outreach Scheduled: 1-2 weeks     Next Steps/Goals:   F/U   Community Care Manager: Carlos Moreno RN     RNCM call to pt, he reports feeling ok. Not wearing boot from Wound Care, states its too small, too tight. Continues to wear tennis shoe. Main CG, daughter, out of town this week, other daughter checking in. Discussed diet, encouraged good protein, nutrition for healing. Plan to f/u in 1-2 weeks, pt agreeable. This note will not be viewable in [address]

## 2018-12-20 PROBLEM — M86.172 OSTEOMYELITIS OF ANKLE OR FOOT, ACUTE, LEFT (HCC): Status: ACTIVE | Noted: 2018-01-01

## 2018-12-20 PROBLEM — I73.9 PERIPHERAL VASCULAR DISEASE (HCC): Status: ACTIVE | Noted: 2018-01-01

## 2018-12-20 NOTE — PROGRESS NOTES
Patient is well known to [facility] Case management team.  Please see previous notes. CM will continue to follow and will address any needs that arise after all consults are completed.

## 2018-12-20 NOTE — H&P
Hospitalist H&P Note     Admit Date:  2018  9:51 AM   Name:  Raymond Sigala   Age:  72 y.o.  :  1945   MRN:  [id number]   PCP:  Ag Mcgovern MD  Treatment Team: Attending Provider: Karlene Ibrahim MD    HPI:   Mr. Berny Yip is a 71 y/o AAM with a h/o DM, HTN, CKD, PVD s/p right BKA and a non-healing left foot wound presenting today with c/o LLE pain to the point where he was unable to ambulate at home. He has seen vascular surgery as an outpatient and was planned for a left fifth digit amputation, and possibly the left 2nd digit as well, but has not yet had his surgery. He denies N/V/D, fevers/chills, dizziness, redness of leg, but does have swelling of LLE. He denies any trauma to the leg/foot. Labs in ED are notable for CRP 2.4 and ESR 90. Plain films of left foot concerning for acute osteomyelitis of the left 5th MTP joint. He has an MRI from 2018 that shows the same. Arterial duplex ordered today is unchanged from 10/2018, which showed peroneal artery occlusion and significant microvascular disease. Per EMR he was given Cipro at some point but does not recall when. Unsure what medications he is on or whether his diabetes is well controlled. Continues to smoke 1ppd. Overall is a poor historian. Hospitalists asked to admit for left foot osteomyelitis. ROS otherwise negative. 10 systems reviewed and negative except as noted in HPI.   Past Medical History:   Diagnosis Date    Abnormal CT scan, chest 2015    Acute CHF ([address]     Acute systolic congestive heart failure ([address] 2015    MO (acute kidney injury) ([address] 2012    Anemia of chronic disease 2018    Bilateral pleural effusion 2015    Chest pain 2015    Depression     DM (diabetes mellitus), type 2 ([address] 2012    Encephalopathy due to infection 2017    HTN (hypertension) 2012    Hypoglycemia 2012    NSTEMI (non-ST elevated myocardial infarction) ([address] 1/5/2017    Tobacco use 12/27/2015      Past Surgical History:   Procedure Laterality Date    HX ORTHOPAEDIC        No Known Allergies   Social History     Tobacco Use    Smoking status: Current Every Day Smoker     Packs/day: 1.00     Years: 45.00     Pack years: 45.00     Types: Cigarettes    Smokeless tobacco: Never Used   Substance Use Topics    Alcohol use: No      Family History   Problem Relation Age of Onset    Diabetes Mother     Kidney Disease Mother     Diabetes Father     Kidney Disease Father     Kidney Disease Sister       Immunization History   Administered Date(s) Administered    DTAP Vaccine 06/03/2010    TB Skin Test (PPD) Intradermal 01/08/2017, 09/06/2018, 10/05/2018, 10/22/2018, 11/05/2018     PTA Medications:  Prior to Admission Medications   Prescriptions Last Dose Informant Patient Reported? Taking? Lancets misc   No No   Sig: As directed   amLODIPine (NORVASC) 10 mg tablet   No No   Sig: Take 1 Tab by mouth daily. aspirin 81 mg chewable tablet   No No   Sig: Take 1 Tab by mouth daily (after breakfast). atorvastatin (LIPITOR) 80 mg tablet   No No   Sig: Take 1 Tab by mouth daily. ciprofloxacin HCl (CIPRO) 500 mg tablet   Yes No   Sig: Take 500 mg by mouth daily. glipiZIDE SR (GLUCOTROL XL) 2.5 mg CR tablet   No No   Sig: take 1 tablet by mouth once daily   glucose blood VI test strips (ASCENSIA AUTODISC VI, ONE TOUCH ULTRA TEST VI) strip   No No   Sig: As directed   isosorbide mononitrate ER (IMDUR) 30 mg tablet   No No   Sig: Take 1 Tab by mouth daily. metoprolol succinate (TOPROL-XL) 100 mg tablet   No No   Sig: Take 1 Tab by mouth daily. minocycline (MINOCIN, DYNACIN) 100 mg capsule   Yes No   Sig: Take 100 mg by mouth two (2) times a day. ramipril (ALTACE) 10 mg capsule   Yes No   Sig: Take 10 mg by mouth daily. sodium bicarbonate 650 mg tablet   No No   Sig: Take 1 Tab by mouth three (3) times daily (with meals).       Facility-Administered Medications: None Objective:     Patient Vitals for the past 24 hrs:   Temp Pulse Resp BP SpO2   12/20/18 1301    162/77 100 %   12/20/18 0956    132/73 100 %   12/20/18 0955 98.2 °F (36.8 °C) 84 16 132/73 100 %     Oxygen Therapy  O2 Sat (%): 100 % (12/20/18 1301)  Pulse via Oximetry: 84 beats per minute (12/20/18 1301)  O2 Device: Room air (12/20/18 0955)  No intake or output data in the 24 hours ending 12/20/18 1402    *Note that automatically entered I/Os may not be accurate; dependent on patient compliance with collection and accurate  by assistants. Physical Exam:  General:    Well nourished. Alert. Eyes:   Normal sclera. Extraocular movements intact. ENT:  Normocephalic, atraumatic. Moist mucous membranes  CV:   RRR. No m/r/g. Peripheral pulses 2+. Capillary refill <2s. Lungs:  CTAB. No wheezing, rhonchi, or rales. Abdomen: Soft, nontender, nondistended. Extremities: Warm and dry. Right BKA. Left lateral 5th digit/MTP joint ulceration with creamy discharge. Left 2nd digit has ulceration and dark discoloration. 2+ pitting edema to LLE. No obvious erythema or fluctuance of LLE. Neurologic: CN II-XII grossly intact. Sensation intact. Skin:     No rashes or jaundice. Normal coloration  Psych:  Normal mood and affect. I reviewed the labs, imaging, EKGs, telemetry, and other studies done this admission.   Data Review:   Recent Results (from the past 24 hour(s))   CBC WITH AUTOMATED DIFF    Collection Time: 12/20/18 10:06 AM   Result Value Ref Range    WBC 6.0 4.3 - 11.1 K/uL    RBC 3.37 (L) 4.23 - 5.6 M/uL    HGB 9.2 (L) 13.6 - 17.2 g/dL    HCT 29.2 (L) 41.1 - 50.3 %    MCV 86.6 79.6 - 97.8 FL    MCH 27.3 26.1 - 32.9 PG    MCHC 31.5 31.4 - 35.0 g/dL    RDW 15.1 (H) 11.9 - 14.6 %    PLATELET 673 481 - 156 K/uL    MPV 9.9 9.4 - 12.3 FL    ABSOLUTE NRBC 0.00 0.0 - 0.2 K/uL    DF AUTOMATED      NEUTROPHILS 82 (H) 43 - 78 %    LYMPHOCYTES 10 (L) 13 - 44 %    MONOCYTES 5 4.0 - 12.0 % EOSINOPHILS 3 0.5 - 7.8 %    BASOPHILS 1 0.0 - 2.0 %    IMMATURE GRANULOCYTES 1 0.0 - 5.0 %    ABS. NEUTROPHILS 4.9 1.7 - 8.2 K/UL    ABS. LYMPHOCYTES 0.6 0.5 - 4.6 K/UL    ABS. MONOCYTES 0.3 0.1 - 1.3 K/UL    ABS. EOSINOPHILS 0.2 0.0 - 0.8 K/UL    ABS. BASOPHILS 0.0 0.0 - 0.2 K/UL    ABS. IMM. GRANS. 0.0 0.0 - 0.5 K/UL   METABOLIC PANEL, COMPREHENSIVE    Collection Time: 12/20/18 10:06 AM   Result Value Ref Range    Sodium 139 136 - 145 mmol/L    Potassium 3.7 3.5 - 5.1 mmol/L    Chloride 106 98 - 107 mmol/L    CO2 22 21 - 32 mmol/L    Anion gap 11 7 - 16 mmol/L    Glucose 94 65 - 100 mg/dL    BUN 28 (H) 8 - 23 MG/DL    Creatinine 3.72 (H) 0.8 - 1.5 MG/DL    GFR est AA 21 (L) >60 ml/min/1.73m2    GFR est non-AA 17 (L) >60 ml/min/1.73m2    Calcium 7.7 (L) 8.3 - 10.4 MG/DL    Bilirubin, total 0.5 0.2 - 1.1 MG/DL    ALT (SGPT) 20 12 - 65 U/L    AST (SGOT) 27 15 - 37 U/L    Alk.  phosphatase 108 50 - 136 U/L    Protein, total 7.5 6.3 - 8.2 g/dL    Albumin 2.6 (L) 3.2 - 4.6 g/dL    Globulin 4.9 (H) 2.3 - 3.5 g/dL    A-G Ratio 0.5 (L) 1.2 - 3.5     SED RATE, AUTOMATED    Collection Time: 12/20/18 10:06 AM   Result Value Ref Range    Sed rate, automated 90 (H) 0 - 20 mm/hr   C REACTIVE PROTEIN, QT    Collection Time: 12/20/18 10:06 AM   Result Value Ref Range    C-Reactive protein 2.4 (H) 0.0 - 0.9 mg/dL   POC LACTIC ACID    Collection Time: 12/20/18 10:10 AM   Result Value Ref Range    Lactic Acid (POC) 1.12 0.5 - 1.9 mmol/L       All Micro Results     None          Current Facility-Administered Medications   Medication Dose Route Frequency    vancomycin (VANCOCIN) 2500 mg in  mL infusion  2,500 mg IntraVENous ONCE    piperacillin-tazobactam (ZOSYN) 4.5 g in 0.9% sodium chloride (MBP/ADV) 100 mL  4.5 g IntraVENous NOW       Other Studies:  Xr Foot Lt Min 3 V    Result Date: 12/20/2018  Exam:  Left foot radiographs History:  pain, Pain, 72 years Male Left foot pain Pt states he is going to have a toe amputation in January. ? Having sharp shooting pain up leg. ? Left leg swollen but not draining fluid Hx of diabetes Comparison: Left foot radiographs October 22, 2018 Findings: There appear to be new cortical erosions of the base of the proximal phalanx of the fifth toe and the distal head of the fifth metatarsal, most likely representing acute osteomyelitis. No evidence of acute fracture or dislocation. Normal alignment. Normal mineralization. No evidence of ankle joint effusion. Persistent severe calcific atherosclerosis. Visualized soft tissues otherwise unremarkable. Impression: Findings suspicious for acute osteomyelitis involving the fifth metatarsophalangeal joint. Duplex Lower Ext Art Left Renae Puentes    Result Date: 12/20/2018  Lower extremity arterial duplex and ankle brachial indices, 12/20/2015. INDICATION: Nonhealing ulcer left lower extremity. COMPARISON: 10/5/2018. Since the previous exam the patient is undergone the right below knee amputation. The ankle-brachial index on the left is 0.75. The right common femoral artery waveform is triphasic. The left common femoral artery waveform is triphasic. The profunda femoris artery is patent. The SFA is patent throughout without significantly elevated velocities. The popliteal artery is patent with more of a biphasic waveform. The peroneal artery, PTA and PAMELA are patent with more monophasic waveforms. Flow is seen in the peroneal artery on the previous study. IMPRESSION: Status post the right BKA. Left lower extremity: No focal significant stenosis seen. Not significantly changed since previous study.       Assessment and Plan:     Hospital Problems as of 12/20/2018 Date Reviewed: 9/26/2018          Codes Class Noted - Resolved POA    Osteomyelitis of ankle or foot, acute, left ([facility] ICD-10-CM: M86.172  ICD-9-CM: 730.07  12/20/2018 - Present Unknown        Peripheral vascular disease ([facility] ICD-10-CM: I73.9  ICD-9-CM: 443.9  12/20/2018 - Present Unknown Type 2 diabetes with nephropathy ([facility] ICD-10-CM: E11.21  ICD-9-CM: 250.40, 583.81  10/18/2018 - Present Yes        Anemia of chronic disease (Chronic) ICD-10-CM: D63.8  ICD-9-CM: 285.29  9/6/2018 - Present Yes        S/P BKA (below knee amputation) unilateral (HCC) (Chronic) ICD-10-CM: I32.505  ICD-9-CM: V49.75  2/13/2017 - Present Yes        CKD (chronic kidney disease) stage 4, GFR 15-29 ml/min (HCC) (Chronic) ICD-10-CM: N18.4  ICD-9-CM: 585.4  8/11/2016 - Present Yes        Tobacco use (Chronic) ICD-10-CM: Z72.0  ICD-9-CM: 305.1  12/27/2015 - Present Yes        HTN (hypertension) (Chronic) ICD-10-CM: I10  ICD-9-CM: 401.9  7/29/2012 - Present Yes              Plan:  # Osteomyelitis of left 5th MTP joint   - Given vanc/zosyn in ED   - CRP and ESR elevated   - Given clinical stability I will hold on further abx pending the decision for surgical biopsy/culture. Since he has an MRI from October that shows osteo at the same location I will also hold off on any further imaging of the foot for now.    - Consult ID and vascular surgery    # PVD   - with known peroneal artery occlusion   - Arterial duplex 12/20 unchanged from October   - ASA, statin    # CKD   - stable; monitor    # Anemia of chronic inflammation   - 2/2 CKD   - Hb at baseline    # HTN   - home meds    # DM   - Hold orals; add SSI   - Check A1c tomorrow morning    # Tobacco abuse   - cessation stressed    Discharge planning: PT/OT/CM/PPD   DVT ppx: heparin  Code status:  Full  Estimated LOS:  Greater than 2 midnights  Risk:  high    Signed:  Yolanda Gutierrez MD

## 2018-12-20 NOTE — PROGRESS NOTES
TRANSFER - IN REPORT:    Verbal report received from Lesa Downs RN on [place]  being received from ED for routine progression of care      Report consisted of patients Situation, Background, Assessment and   Recommendations(SBAR). Information from the following report(s) SBAR, Kardex, ED Summary, Procedure Summary, Intake/Output, MAR and Recent Results was reviewed with the receiving nurse. Opportunity for questions and clarification was provided. Assessment completed upon patients arrival to unit and care assumed.

## 2018-12-20 NOTE — PROGRESS NOTES
12/20/18 1400   Dual Skin Pressure Injury Assessment   Dual Skin Pressure Injury Assessment X   Second Care Provider (Based on [address] [place] [address]) DEJA Frausto   Foot Left  (side near pinky toe)   Toes Left Toes  (necrotic 2nd toe)

## 2018-12-21 NOTE — PROGRESS NOTES
Problem: Interdisciplinary Rounds  Goal: Interdisciplinary Rounds  Outcome: Progressing Towards Goal  Interdisciplinary team rounds were held 12/21/2018 with the following team members:Care Management, Physical Therapy, Physician and . Plan of care discussed. See clinical pathway and/or care plan for interventions and desired outcomes.

## 2018-12-21 NOTE — PROGRESS NOTES
Pt refused heparin SQ, stated that \"he gets even more ill  after all these medications and shots\". MD notified.

## 2018-12-21 NOTE — CONSULTS
Infectious Disease Consult    Today's Date: 12/21/2018   Admit Date: 12/20/2018    Impression:   · Chronic left foot osteomyelitis involving 5th MT, no cultures to guide therapy-amputation has been recommended, but has declined  · DM  · PVD, s/p R BKA  · CKD  · CHF/NSTEMI  · Medical non-compliance    Plan:   · Resume Minocycline 100mg PO BID  · Discontinue Cipro  · Start Keflex 500mg PO BID (better side effect profile)  · Plan for another 4-8 weeks, this will be about 3 months of PO therapy  · As previously noted, without surgery this is unlikely to be cured  · ID followup 1/18/19 @ 10:00am  · ID will sign off, please call with questions or concerns      Anti-infectives:   · Zosyn x1 12/20  · Outpatient Cipro/Minocycline    Subjective:   Date of Consultation:  December 21, 2018  Referring Physician: Dr Arnoldo Murdock    Patient is a 72 y.o. male who has had multiple recent admissions, and been seen by ID most recently 11/05/18 during his last admission. He has an underlying medical history that includes CHF, NSTEM 9/2018, DM, PVD and R BKA, CKD, and ongoing tobacco abuse. He has chronic L foot ulcer on 5th MT, with XR yesterday confirming osteomyelitis. He had an MRI 10/9 that also showed osteo. ID plan 10/10 was for a six week course of treatment with IVs, however he admitted to non-adherence, declined placement and was therefore started on oral antbx. Most recently, ID recommended PO Minocycline and Cipro which he reports he has been taking at home. He has been followed by vascular surgery and recommendations have been made for left foot 5th toe amputation, and possibly 2nd toe amputation as well, however he has declined this on more than one occasion, including today. Seen by ID most recently in the office 11/28, sx encouraged. He was admitted yesterday with c/o left leg pain, edema and some drainage from his left lateral foot wound. Denies nausea, vomiting, diarrhea, denies redness to his foot/leg.  He has no documented fever or leukocytosis coming in. He feels the wound has improved, but admits has not been closed for a while now. APRs on admission CRP 2.4, ESR 90, Cr 3.7. ID consulted for further recommendations. 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 ([facility]) I70.261    S/P BKA (below knee amputation) unilateral ([facility]) Z89.519    Type 2 diabetes mellitus with complication, without long-term current use of insulin ([facility]) E11.8    Coronary artery disease involving native coronary artery of native heart without angina pectoris I25.10    Debility R53.81    Glaucoma syndrome H40.9    Onychomycosis B35.1    Anemia of chronic disease D63.8    Pneumonia due to infectious organism J18.9    EKG, abnormal R94.31    Elevated troponin R74.8    Bacteremia R78.81    Tachycardia R00.0    Diabetic ulcer of left midfoot associated with type 2 diabetes mellitus ([facility]) E11.621, L97.429    Cellulitis of left foot L03. 116    Stage 5 chronic kidney disease not on chronic dialysis ([facility]) N18.5    Acute rhinitis J00    Acute osteomyelitis of metatarsal bone, left ([address] M86.172    Osteomyelitis of left foot ([address] M86.9    Type 2 diabetes with nephropathy ([address] E11.21    Acute encephalopathy G93.40    Osteomyelitis ([address] M86.9    Osteomyelitis of ankle or foot, acute, left ([address] M86.172    Peripheral vascular disease ([address] I73.9     Past Medical History:   Diagnosis Date    Abnormal CT scan, chest 12/27/2015    Acute CHF ([facility] 47/64/2385    Acute systolic congestive heart failure ([facility] 12/30/2015    MO (acute kidney injury) ([facility] 7/29/2012    Anemia of chronic disease 9/6/2018    Bilateral pleural effusion 12/27/2015    Chest pain 12/26/2015    Depression     DM (diabetes mellitus), type 2 ([facility] 7/29/2012    Encephalopathy due to infection 1/7/2017    HTN (hypertension) 7/29/2012    Hypoglycemia 7/29/2012    NSTEMI (non-ST elevated myocardial infarction) ([facility] 1/5/2017    Tobacco use 12/27/2015      Family History   Problem Relation Age of Onset    Diabetes Mother     Kidney Disease Mother     Diabetes Father     Kidney Disease Father     Kidney Disease Sister       Social History     Tobacco Use    Smoking status: Current Every Day Smoker     Packs/day: 1.00     Years: 45.00     Pack years: 45.00     Types: Cigarettes    Smokeless tobacco: Never Used   Substance Use Topics    Alcohol use: No     Past Surgical History:   Procedure Laterality Date    HX ORTHOPAEDIC        Prior to Admission medications    Medication Sig Start Date End Date Taking? Authorizing Provider   amLODIPine (NORVASC) 10 mg tablet Take 1 Tab by mouth daily. 12/14/18  Yes Namita Goodman MD   isosorbide mononitrate ER (IMDUR) 30 mg tablet Take 1 Tab by mouth daily. 12/14/18  Yes Namita Goodman MD   sodium bicarbonate 650 mg tablet Take 1 Tab by mouth three (3) times daily (with meals). 12/14/18  Yes Namita Goodman MD   ciprofloxacin HCl (CIPRO) 500 mg tablet Take 500 mg by mouth daily. 11/28/18  Yes Namita Goodman MD   minocycline (MINOCIN, DYNACIN) 100 mg capsule Take 100 mg by mouth two (2) times a day. 11/28/18  Yes Namita Goodman MD   atorvastatin (LIPITOR) 80 mg tablet Take 1 Tab by mouth daily. 12/4/18  Yes Namita Goodman MD   ramipril (ALTACE) 10 mg capsule Take 10 mg by mouth daily.  11/14/18  Yes Provider, Historical   glipiZIDE SR (GLUCOTROL XL) 2.5 mg CR tablet take 1 tablet by mouth once daily 10/23/18  Yes Estelle Michel MD   metoprolol succinate (TOPROL-XL) 100 mg tablet Take 1 Tab by mouth daily. 10/18/18  Yes Justen Phelps NP   Lancets misc As directed 18  Yes Tanya Crowder MD   glucose blood VI test strips (ASCENSIA AUTODISC VI, ONE TOUCH ULTRA TEST VI) strip As directed 18  Yes Tanya Crowder MD   aspirin 81 mg chewable tablet Take 1 Tab by mouth daily (after breakfast). 12/30/15  Yes Eileen Abraham NP       No Known Allergies     Review of Systems:  A comprehensive review of systems was negative except for that written in the History of Present Illness. Objective:     Visit Vitals  /79 (BP 1 Location: Right arm, BP Patient Position: At rest)   Pulse 75   Temp 97.4 °F (36.3 °C)   Resp 20   Ht 6' 1\" (1.854 m)   Wt 90.7 kg (200 lb)   SpO2 97%   BMI 26.39 kg/m²     Temp (24hrs), Av °F (36.7 °C), Min:97.4 °F (36.3 °C), Max:98.3 °F (36.8 °C)       Lines:  Peripheral IV:       Physical Exam:    General:  Alert, cooperative, chronically ill, appears stated age   Eyes:  Sclera anicteric. Pupils equally round and reactive to light. Mouth/Throat: Mucous membranes normal, oral pharynx clear   Neck: Supple   Lungs:   Clear to auscultation bilaterally, good effort   CV:  Regular rate and rhythm,no murmur, click, rub or gallop   Abdomen:   Soft, non-tender. bowel sounds normal. non-distended   Extremities: No cyanosis    Skin: Skin color, texture, turgor normal. no acute rash or lesions   Lymph nodes: Cervical and supraclavicular normal   Musculoskeletal: R BKA site intact, Left LE edematous, left foot without redness, lateral foot wound: ulceration that is mostly dried with dried tendon like tissue that is visible. Left foot 2nd toe: dark, with dry necrosis.  2+ R pedal pulses   Lines/Devices:  Intact, no erythema, drainage or tenderness   Psych: Alert and oriented, somewhat disgruntles, but cooperative       Data Review:     CBC:  Recent Labs     18  0511 18  1006   WBC 4.3 6.0   GRANS 62 82*   MONOS 9 5   EOS 9* 3   ANEU 2.7 4.9   ABL 0.8 0.6   HGB 8.1* 9.2*   HCT 26.4* 29.2*    293       BMP:  Recent Labs     18  0511 18  1006   CREA 3.47* 3.72*   BUN 27* 28*    139   K 3.6 3.7   * 106   CO2 22 22   AGAP 9 11   GLU 65 94       LFTS:  Recent Labs     18  1006   TBILI 0.5   ALT 20   SGOT 27      TP 7.5   ALB 2.6*       Microbiology:     All Micro Results     None          Imagin18 Left foot Xray  IMPRESSION  Impression: Findings suspicious for acute osteomyelitis involving the fifth  metatarsophalangeal joint. 18 LLE arterial duplex  IMPRESSION: Status post the right BKA.     Left lower extremity: No focal significant stenosis seen. Not significantly  changed since previous study.   Signed By: Chrissy Lui NP     2018

## 2018-12-21 NOTE — ANESTHESIA PREPROCEDURE EVALUATION
Anesthetic History               Review of Systems / Medical History  Patient summary reviewed and pertinent labs reviewed    Pulmonary          Smoker         Neuro/Psych              Cardiovascular    Hypertension: well controlled      CHF    Past MI and CAD    Exercise tolerance: >4 METS  Comments: Moderate MR, EF 35%   GI/Hepatic/Renal                Endo/Other    Diabetes: well controlled, type 2    Anemia     Other Findings   Comments: Depression  Cardiomyopathy  Glaucoma  gangrene         Physical Exam    Airway  Mallampati: II  TM Distance: 4 - 6 cm  Neck ROM: normal range of motion   Mouth opening: Normal     Cardiovascular  Regular rate and rhythm,  S1 and S2 normal,  no murmur, click, rub, or gallop  Rhythm: regular  Rate: normal         Dental    Dentition: Poor dentition     Pulmonary  Breath sounds clear to auscultation               Abdominal  GI exam deferred       Other Findings            Anesthetic Plan    ASA: 3  Anesthesia type: total IV anesthesia      Post-op pain plan if not by surgeon: peripheral nerve block single    Induction: Intravenous  Anesthetic plan and risks discussed with: Patient

## 2018-12-21 NOTE — PROGRESS NOTES
Physical Therapy Note:    Orders received, chart reviewed and initial evaluation attempted. Patient reports he, \"doesn't feel like walking, it will make my feet sore. \" Educated on benefits of mobility, however patient continued to decline. Bed alarm set as patient reports he wants to walk to the bathroom (however refuses to try it at time of therapist attempt). Will attempt later as schedule allows.     Thank you,  Radha Key, DPT

## 2018-12-21 NOTE — PROGRESS NOTES
Reviewing notes for spiritual concerns  Will continue to follow during this stay.     [place], staff

## 2018-12-21 NOTE — PROGRESS NOTES
Discussed pt with PT who stated pt not cooperative with therapy today. Will try again later as  Time allows. Thank you .   Florence manjarrez OTR/L

## 2018-12-21 NOTE — PROGRESS NOTES
Problem: Falls - Risk of  Goal: *Absence of Falls  Document Christiana Fall Risk and appropriate interventions in the flowsheet.   Outcome: Progressing Towards Goal  Fall Risk Interventions:  Mobility Interventions: Bed/chair exit alarm, OT consult for ADLs, Patient to call before getting OOB, PT Consult for mobility concerns, PT Consult for assist device competence, Utilize walker, cane, or other assistive device         Medication Interventions: Bed/chair exit alarm, Evaluate medications/consider consulting pharmacy, Patient to call before getting OOB    Elimination Interventions: Bed/chair exit alarm, Call light in reach, Patient to call for help with toileting needs, Toileting schedule/hourly rounds

## 2018-12-21 NOTE — PROGRESS NOTES
Patient seen and examined. Patient was seen in office 2 weeks ago for necrotic left fifth toe. At that time a recommend a left fifth toe amputation. Patient has history of peripheral vascular disease diabetes status post right below-knee amputation. Patient did not show up to the appointment per documentation patient did not want an operation. Patient was admitted to the hospital today from his wound care center. Patient has had some necrotic drainage from his left fifth toe. I again recommend a left fifth toe amputation however patient is refusing surgery. I attempted to call both daughters on the phone this morning with no answer. I will cancel surgery today patient can continue to do wound care, from vascular standpoint can be discharged home. Patient understands the risks and benefits of not having the operation.     Clarita Bee

## 2018-12-22 NOTE — PROGRESS NOTES
Problem: Mobility Impaired (Adult and Pediatric)  Goal: *Acute Goals and Plan of Care (Insert Text)  LTG:  (1.)Mr. Glo Birmingham will move from supine to sit and sit to supine, scoot up and down and roll side to side INDEPENDENTLY within 7 treatment day(s). (2.)Mr. Glo Birmingham will transfer from bed to chair and chair to bed with MODIFIED INDEPENDENCE using the least restrictive device within 7 treatment day(s). (3.)Mr. Glo Birmingham will ambulate with MODIFIED INDEPENDENCE for 50 feet with the least restrictive device within 7 treatment day(s). ________________________________________________________________________________________________    PHYSICAL THERAPY: Initial Assessment, AM 12/22/2018  INPATIENT: Hospital Day: 3  Payor: CARE [employer]OV[employer]US [place] / Product Type: Managed Care Medicare /      NAME/AGE/GENDER: Felix Marion is a 72 y.o. male   PRIMARY DIAGNOSIS: Osteomyelitis of ankle or foot, acute, left (HCC) <principal problem not specified> <principal problem not specified>  Procedure(s) (LRB):  PROCEDURE CANCELLED - NOT PERFORMED (N/A)  1 Day Post-Op  ICD-10: Treatment Diagnosis:    · Generalized Muscle Weakness (M62.81)  · Difficulty in walking, Not elsewhere classified (R26.2)  · Other abnormalities of gait and mobility (R26.89)   Precaution/Allergies:  Patient has no known allergies. ASSESSMENT:     Mr. Glo Birmingham is a 72year old male admitted from home for acute left foot osteomyelitis. He lives alone and is modified independent at baseline with walker, cane, or furniture walking. Right BKA with prosthesis. Pt transfers to sitting independent and dons prosthesis without assistance. Supervision for sit-stand transfer to walker. Ambulates 10 ft in room with SBA-supervision and min verbal cues for walker management and positioning for safety. Demonstrates slow gait pace with wide DEE DEE and forward flexed posture (likely baseline). Declines further activity or chair transfer today. Removed prosthesis independent and returned to supine without assistance. Esther Sylvester appears to be functioning close to baseline physically; most limited by left foot pain. He will benefit from continued therapy during hospital stay to address deficits/maximzie independence with mobility. Possible dc home today or tomorrow; no needs identified. This section established at most recent assessment   PROBLEM LIST (Impairments causing functional limitations):  1. Decreased Strength  2. Decreased ADL/Functional Activities  3. Decreased Transfer Abilities  4. Decreased Ambulation Ability/Technique  5. Decreased Balance  6. Decreased Activity Tolerance   INTERVENTIONS PLANNED: (Benefits and precautions of physical therapy have been discussed with the patient.)  1. Balance Exercise  2. Bed Mobility  3. Gait Training  4. Home Exercise Program (HEP)  5. Therapeutic Activites  6. Therapeutic Exercise/Strengthening  7. Transfer Training     TREATMENT PLAN: Frequency/Duration: 3 times a week for duration of hospital stay  Rehabilitation Potential For Stated Goals: Good     RECOMMENDED REHABILITATION/EQUIPMENT: (at time of discharge pending progress): Due to the probability of continued deficits (see above) this patient will likely need continued skilled physical therapy after discharge. Equipment:    None at this time              HISTORY:   History of Present Injury/Illness (Reason for Referral):  Per H&P, \"Mr. Caitlin Boswell is a 71 y/o AAM with a h/o DM, HTN, CKD, PVD s/p right BKA and a non-healing left foot wound presenting today with c/o LLE pain to the point where he was unable to ambulate at home. He has seen vascular surgery as an outpatient and was planned for a left fifth digit amputation, and possibly the left 2nd digit as well, but has not yet had his surgery. He denies N/V/D, fevers/chills, dizziness, redness of leg, but does have swelling of LLE. He denies any trauma to the leg/foot.  Labs in ED are notable for CRP 2.4 and ESR 90. Plain films of left foot concerning for acute osteomyelitis of the left 5th MTP joint. He has an MRI from October 2018 that shows the same. Arterial duplex ordered today is unchanged from 10/2018, which showed peroneal artery occlusion and significant microvascular disease. Per EMR he was given Cipro at some point but does not recall when. Unsure what medications he is on or whether his diabetes is well controlled. Continues to smoke 1ppd. Overall is a poor historian. Hospitalists asked to admit for left foot osteomyelitis. ROS otherwise negative. \"    Past Medical History/Comorbidities:   Mr. Karissa Ramon  has a past medical history of Abnormal CT scan, chest, Acute CHF ([address] Acute systolic congestive heart failure ([address] MO (acute kidney injury) ([address] Anemia of chronic disease, Bilateral pleural effusion, Chest pain, Depression, DM (diabetes mellitus), type 2 ([address] Encephalopathy due to infection, HTN (hypertension), Hypoglycemia, NSTEMI (non-ST elevated myocardial infarction) ([address] and Tobacco use. Mr. Karissa Ramon  has a past surgical history that includes hx orthopaedic; RIGHT AMPUTATION KNEE(BKA) (Right, 1/30/2017); RIGHT FOOT DEBRIDEMENT  ,PREP FOR GRAFTING AND SKIN SUBSTITUTE GRAFT (Right, 1/20/2017); IR ANESTHESIA- RIGHT LEG ARTERIOGRAM/ 604 (N/A, 1/18/2017); AMPUTATION RIGHT 5TH TOE/ ROOM 604 (Right, 1/11/2017); ULTRASOUND (Bilateral, 12/28/2015); and THORACENTESIS (Left, 12/28/2015). Social History/Living Environment:   Home Environment: Private residence  Wheelchair Ramp: Yes  One/Two Story Residence: One story  Living Alone: Yes  Support Systems: Family member(s)  Patient Expects to be Discharged to[de-identified] Private residence  Current DME Used/Available at Home: jeremy Unger, [employer], straight, Shower chair  Tub or Shower Type: Tub/Shower combination  Prior Level of Function/Work/Activity:  Lives alone, single story home w/ramp. Independent with mobility, ambulation, and ADLs.     Number of Personal Factors/Comorbidities that affect the Plan of Care: 1-2: MODERATE COMPLEXITY   EXAMINATION:   Most Recent Physical Functioning:   Gross Assessment:  AROM: Within functional limits  Strength: Generally decreased, functional  Coordination: Within functional limits  Sensation: Intact               Posture:  Posture (WDL): Exceptions to WDL  Posture Assessment: Forward head, Rounded shoulders  Balance:  Sitting: Intact  Standing: Impaired  Standing - Static: Good  Standing - Dynamic : Fair Bed Mobility:  Rolling: Independent  Supine to Sit: Independent  Sit to Supine: Independent  Scooting: Independent  Wheelchair Mobility:     Transfers:  Sit to Stand: Supervision  Stand to Sit: Supervision  Bed to Chair: Supervision  Gait:     Base of Support: Center of gravity altered; Widened  Speed/Radha: Pace decreased (<100 feet/min); Slow  Step Length: Left shortened;Right shortened  Gait Abnormalities: Trunk sway increased;Decreased step clearance  Distance (ft): 10 Feet (ft)  Assistive Device: Walker, rolling  Ambulation - Level of Assistance: Stand-by assistance;Supervision  Interventions: Verbal cues; Safety awareness training      Body Structures Involved:  1. Joints  2. Muscles Body Functions Affected:  1. Sensory/Pain  2. Movement Related  3. Skin Related Activities and Participation Affected:  1. General Tasks and Demands  2. Mobility  3. Domestic Life  4. Community, Social and [place]   Number of elements that affect the Plan of Care: 4+: HIGH COMPLEXITY   CLINICAL PRESENTATION:   Presentation: Stable and uncomplicated: LOW COMPLEXITY   CLINICAL DECISION MAKIN [facility] Inpatient Short Form  How much difficulty does the patient currently have. .. Unable A Lot A Little None   1. Turning over in bed (including adjusting bedclothes, sheets and blankets)? [] 1   [] 2   [] 3   [x] 4   2.   Sitting down on and standing up from a chair with arms ( e.g., wheelchair, bedside commode, etc.)   [] 1   [] 2   [] 3   [x] 4   3. Moving from lying on back to sitting on the side of the bed? [] 1   [] 2   [] 3   [x] 4   How much help from another person does the patient currently need. .. Total A Lot A Little None   4. Moving to and from a bed to a chair (including a wheelchair)? [] 1   [] 2   [] 3   [x] 4   5. Need to walk in hospital room? [] 1   [] 2   [x] 3   [] 4   6. Climbing 3-5 steps with a railing? [] 1   [] 2   [x] 3   [] 4   © 2007, Trustees of [address] [place] [address] [address] [id number], under license to [employer]. All rights reserved      Score:  Initial: 22 Most Recent: X (Date: -- )    Interpretation of Tool:  Represents activities that are increasingly more difficult (i.e. Bed mobility, Transfers, Gait). Score 24 23 22-20 19-15 14-10 9-7 6     Modifier CH CI CJ CK CL CM CN      ? Mobility - Walking and Moving Around:     - CURRENT STATUS: CJ - 20%-39% impaired, limited or restricted    - GOAL STATUS: CI - 1%-19% impaired, limited or restricted    - D/C STATUS:  ---------------To be determined---------------  Payor: CARE IMPROVEMENT PLUS [place] / Product Type: Managed Care Medicare /      Medical Necessity:     · Patient demonstrates good rehab potential due to higher previous functional level. Reason for Services/Other Comments:  · Patient continues to demonstrate capacity to improve strength, balance, activity tolerance which will increase independence and increase safety.    Use of outcome tool(s) and clinical judgement create a POC that gives a: Clear prediction of patient's progress: LOW COMPLEXITY            TREATMENT:   (In addition to Assessment/Re-Assessment sessions the following treatments were rendered)   Pre-treatment Symptoms/Complaints:  \"thank you\"  Pain: Initial:   Pain Intensity 1: 0  Post Session:  0/10     Assessment/Reassessment only, no treatment provided today    Braces/Orthotics/Lines/Etc:   · O2 Device: Room air  Treatment/Session Assessment:    · Response to Treatment:  Pt performs mobility with supervision  · Interdisciplinary Collaboration:   o Physical Therapist  o Registered Nurse  · After treatment position/precautions:   o Supine in bed  o Bed/Chair-wheels locked  o Bed in low position  o Call light within reach   · Compliance with Program/Exercises: Compliant all of the time  · Recommendations/Intent for next treatment session: \"Next visit will focus on advancements to more challenging activities and reduction in assistance provided\".   Total Treatment Duration:  PT Patient Time In/Time Out  Time In: 1025  Time Out: [facility], [facility]

## 2018-12-22 NOTE — PROGRESS NOTES
Progress Note    Patient: Glen Mclain MRN: [id number]  SSN: [id number]    YOB: 1945  Age: 72 y.o. Sex: male      Admit Date: 12/20/2018    LOS: 1 day     Subjective:     Patient was seen at bedside. Stable. Refused amputation. Seen by ID. Oral antibiotics recommended. Objective:     Vitals:    12/21/18 0822 12/21/18 1215 12/21/18 1630 12/21/18 1939   BP: 160/79 119/64 109/52 135/71   Pulse: 75 75 69 79   Resp: 20 20 20 18   Temp: 97.4 °F (36.3 °C) 98 °F (36.7 °C) 99.3 °F (37.4 °C) 98.2 °F (36.8 °C)   SpO2: 97% 98% 96% 98%   Weight:       Height:            Intake and Output:  Current Shift: No intake/output data recorded. Last three shifts: 12/20 0701 - 12/21 1900  In: -   Out: 600 [Urine:600]    Physical Exam:   GENERAL: alert, appears stated age  EYE: conjunctivae/corneas clear. PERRL, EOM's intact. Fundi benign  LYMPHATIC: Cervical, supraclavicular, and axillary nodes normal.   THROAT & NECK: normal and no erythema or exudates noted. LUNG: clear to auscultation bilaterally  HEART: regular rate and rhythm, S1, S2 normal, no murmur, click, rub or gallop  ABDOMEN: soft, non-tender. Bowel sounds normal. No masses,  no organomegaly  EXTREMITIES:  R BKA. Lateral foot ulceration, 2nd toe is dark with dry necrosis. NEUROLOGIC: AOx3.no focal deficits. PSYCHIATRIC: non focal    Lab/Data Review:  Lab results reviewed. For significant abnormal values and values requiring intervention, see assessment and plan.          Assessment:     Active Problems:    HTN (hypertension) (7/29/2012)      Tobacco use (12/27/2015)      CKD (chronic kidney disease) stage 4, GFR 15-29 ml/min ([facility]) (8/11/2016)      S/P BKA (below knee amputation) unilateral ([address] (2/13/2017)      Anemia of chronic disease (9/6/2018)      Type 2 diabetes with nephropathy ([address] (10/18/2018)      Osteomyelitis of ankle or foot, acute, left ([facility] (12/20/2018)      Peripheral vascular disease ([facility] (12/20/2018)        Plan:     -on oral antibiotics   -patient refused surgery  -home tomorrow if stable     Signed By: Barber Rowell MD     December 21, 2018

## 2018-12-22 NOTE — DISCHARGE SUMMARY
Discharge Summary     Patient: Samy He MRN: [id number]  SSN: [id number]    YOB: 1945  Age: 72 y.o.   Sex: male       Admit Date: 12/20/2018    Discharge Date: 12/22/2018      Admission Diagnoses: Osteomyelitis of ankle or foot, acute, left [facility])    Discharge Diagnoses:   Problem List as of 12/22/2018 Date Reviewed: 9/26/2018          Codes Class Noted - Resolved    Osteomyelitis of ankle or foot, acute, left ([facility] ICD-10-CM: M86.172  ICD-9-CM: 730.07  12/20/2018 - Present        Peripheral vascular disease ([facility] ICD-10-CM: I73.9  ICD-9-CM: 443.9  12/20/2018 - Present        Osteomyelitis ([facility] ICD-10-CM: M86.9  ICD-9-CM: 730.20  11/4/2018 - Present        Acute encephalopathy ICD-10-CM: G93.40  ICD-9-CM: 348.30  10/22/2018 - Present        Type 2 diabetes with nephropathy ([facility] ICD-10-CM: E11.21  ICD-9-CM: 250.40, 583.81  10/18/2018 - Present        Osteomyelitis of left foot ([facility] ICD-10-CM: M86.9  ICD-9-CM: 730.27  10/11/2018 - Present        Acute osteomyelitis of metatarsal bone, left ([facility] ICD-10-CM: M86.172  ICD-9-CM: 730.07  10/10/2018 - Present        Acute rhinitis ICD-10-CM: Bridgette Levo  ICD-9-CM: 460  10/9/2018 - Present        Stage 5 chronic kidney disease not on chronic dialysis ([facility] ICD-10-CM: N18.5  ICD-9-CM: 585.5  10/8/2018 - Present        NSTEMI (non-ST elevated myocardial infarction) ([facility] ICD-10-CM: I21.4  ICD-9-CM: 410.70  10/5/2018 - Present        Tachycardia ICD-10-CM: R00.0  ICD-9-CM: 785.0  10/5/2018 - Present        Diabetic ulcer of left midfoot associated with type 2 diabetes mellitus ([facility] ICD-10-CM: E11.621, L97.429  ICD-9-CM: 250.80, 707.14  10/5/2018 - Present        Cellulitis of left foot ICD-10-CM: L03.116  ICD-9-CM: 682.7  10/5/2018 - Present        Bacteremia ICD-10-CM: R78.81  ICD-9-CM: 790.7  9/26/2018 - Present        Pneumonia due to infectious organism ICD-10-CM: J18.9  ICD-9-CM: 136.9, 484.8  9/24/2018 - Present        EKG, abnormal ICD-10-CM: R94.31  ICD-9-CM: 794.31  9/24/2018 - Present        Elevated troponin ICD-10-CM: R74.8  ICD-9-CM: 790.6  9/24/2018 - Present        Anemia of chronic disease (Chronic) ICD-10-CM: D63.8  ICD-9-CM: 285.29  9/6/2018 - Present        Onychomycosis ICD-10-CM: B35.1  ICD-9-CM: 110.1  10/5/2017 - Present        Glaucoma syndrome ICD-10-CM: H40.9  ICD-9-CM: 365.9  4/21/2017 - Present        Type 2 diabetes mellitus with complication, without long-term current use of insulin (HCC) (Chronic) ICD-10-CM: E11.8  ICD-9-CM: 250.90  4/7/2017 - Present        Coronary artery disease involving native coronary artery of native heart without angina pectoris ICD-10-CM: I25.10  ICD-9-CM: 414.01  4/7/2017 - Present        Debility ICD-10-CM: R53.81  ICD-9-CM: 799.3  4/7/2017 - Present        S/P BKA (below knee amputation) unilateral (HCC) (Chronic) ICD-10-CM: K42.465  ICD-9-CM: V49.75  2/13/2017 - Present        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 (HCC) (Chronic) ICD-10-CM: V99.292  ICD-9-CM: 440.24  1/17/2017 - Present        Type 2 diabetes mellitus with right diabetic foot infection ([facility] ICD-10-CM: E11.628, L08.9  ICD-9-CM: 250.80, 686.9  1/5/2017 - Present        CKD (chronic kidney disease) stage 4, GFR 15-29 ml/min (HCC) (Chronic) ICD-10-CM: N18.4  ICD-9-CM: 585.4  8/11/2016 - Present        Cardiomyopathy ([facility] ICD-10-CM: I42.9  ICD-9-CM: 425.4  5/5/2016 - Present        Chronic systolic congestive heart failure (HCC) (Chronic) ICD-10-CM: I50.22  ICD-9-CM: 428.22, 428.0  12/30/2015 - Present    Overview Signed 1/6/2017  8:59 AM by Kaylah Bar MD     EF 35-40% on 2015 Echo             Bilateral pleural effusion ICD-10-CM: J90  ICD-9-CM: 511.9  12/27/2015 - Present        Tobacco use (Chronic) ICD-10-CM: Z72.0  ICD-9-CM: 305.1  12/27/2015 - Present        Abnormal CT scan, chest ICD-10-CM: R93.89  ICD-9-CM: 793.2  12/27/2015 - Present        Chest pain ICD-10-CM: R07.9  ICD-9-CM: 786.50  12/26/2015 - Present        HTN (hypertension) (Chronic) ICD-10-CM: I10  ICD-9-CM: 401.9  7/29/2012 - Present        RESOLVED: Acute renal failure superimposed on stage 4 chronic kidney disease ([facility] ICD-10-CM: N17.9, N18.4  ICD-9-CM: 584.9, 585.4  10/6/2018 - 10/8/2018        RESOLVED: Acute pulmonary edema ([facility].) ICD-10-CM: J81.0  ICD-9-CM: 518.4  10/5/2018 - 10/10/2018        RESOLVED: Acute respiratory failure with hypoxemia ([facility] ICD-10-CM: J96.01  ICD-9-CM: 518.81  10/5/2018 - 10/10/2018        RESOLVED: Sepsis due to cellulitis [facility]) ICD-10-CM: L03.90, A41.9  ICD-9-CM: 682.9, 995.91  10/5/2018 - 10/11/2018        RESOLVED: Metabolic acidosis [facility]-06-VZ: E87.2  ICD-9-CM: 276.2  10/5/2018 - 10/7/2018        RESOLVED: Acute hypoxemic respiratory failure ([facility] ICD-10-CM: J96.01  ICD-9-CM: 518.81  9/24/2018 - 9/26/2018        RESOLVED: Sepsis due to cellulitis [facility]) ICD-10-CM: L03.90, A41.9  ICD-9-CM: 682.9, 995.91  1/9/2017 - 4/7/2017        RESOLVED: Encephalopathy due to infection ICD-10-CM: G93.49, B99.9  ICD-9-CM: 348.39, 136.9  1/7/2017 - 1/9/2017        RESOLVED: Acute renal failure with tubular necrosis ([facility] ICD-10-CM: N17.0  ICD-9-CM: 584.5  1/6/2017 - 4/7/2017        RESOLVED: Hypomagnesemia ICD-10-CM: E83.42  ICD-9-CM: 275.2  7/30/2012 - 12/27/2015        RESOLVED: Lactic acidosis ICD-10-CM: S34.4  ICD-9-CM: 276.2  7/29/2012 - 12/27/2015        RESOLVED: Type 2 diabetes mellitus with hyperglycemia ([facility] ICD-10-CM: E11.65  ICD-9-CM: 250.00  7/29/2012 - 4/7/2017        RESOLVED: Hypoglycemia ICD-10-CM: E16.2  ICD-9-CM: 251.2  7/29/2012 - 12/27/2015               Discharge Condition: [facility] Course: This is a 73 YO male patient with a PMH of CHF, CKD, DM type II, PVD with previous R BKA. He has had recent admissions to the hospital due to a wound on the lateral aspect of the left foot + dry necrosis of the 2nd left foot.  He has showed poor adherence to medical recommendations and most likely he was receiving oral doxy + cipro as per ID recommendations. He was admitted on 12/20 with a CC of pain and drainage from his lateral foot wound. He was seen by ID and vascular surgery who highly recommended surgical debridement + amputation. He declined. ID and vascular recommended discharging him home on oral doxy+ keflex with plan to continue 1-2 more months of therapy and to follow up with ID in January. Pt was discharged home today. He has a high risk of readmission as his health is becoming more deteriorated ( anemia, CKD, weakness). PE:    GENERAL: alert, appears stated age  EYE: conjunctivae/corneas clear. LYMPHATIC: Cervical, supraclavicular, and axillary nodes normal.   THROAT & NECK: normal and no erythema or exudates noted. LUNG: clear to auscultation bilaterally  HEART: regular rate and rhythm, S1, S2 normal, no murmur, click, rub or gallop  ABDOMEN: soft, non-tender. Bowel sounds normal. No masses,  no organomegaly  EXTREMITIES:  R BKA. Lateral foot ulceration, 2nd toe is dark with dry necrosis. NEUROLOGIC: AOx3.no focal deficits. PSYCHIATRIC: non focal      Consults: ID, vascular surgery     Significant Diagnostic Studies: see hospital course     Disposition: home    Discharge Medications:   No current facility-administered medications for this encounter. Current Outpatient Medications:     minocycline (MINOCIN, DYNACIN) 100 mg capsule, Take 1 Cap by mouth two (2) times a day. Take this medication with an entire cup of water  Patient has chronic OM. 1-2 months of therapy planned, Disp: 60 Cap, Rfl: 1    cephALEXin (KEFLEX) 500 mg capsule, Take 1 Cap by mouth two (2) times a day. Patient has chronic OM. 1-2 months of therapy planned, Disp: 60 Cap, Rfl: 1    pantoprazole (PROTONIX) 40 mg tablet, Take 1 Tab by mouth daily. , Disp: 30 Tab, Rfl: 1    amLODIPine (NORVASC) 10 mg tablet, Take 1 Tab by mouth daily. , Disp: 30 Tab, Rfl: 11    isosorbide mononitrate ER (IMDUR) 30 mg tablet, Take 1 Tab by mouth daily. , Disp: 30 Tab, Rfl: 11    sodium bicarbonate 650 mg tablet, Take 1 Tab by mouth three (3) times daily (with meals). , Disp: 90 Tab, Rfl: 11    atorvastatin (LIPITOR) 80 mg tablet, Take 1 Tab by mouth daily. , Disp: 30 Tab, Rfl: 11    ramipril (ALTACE) 10 mg capsule, Take 10 mg by mouth daily. , Disp: , Rfl: 0    glipiZIDE SR (GLUCOTROL XL) 2.5 mg CR tablet, take 1 tablet by mouth once daily, Disp: 30 Tab, Rfl: 1    metoprolol succinate (TOPROL-XL) 100 mg tablet, Take 1 Tab by mouth daily. , Disp: 30 Tab, Rfl: 2    Lancets misc, As directed, Disp: 1 Each, Rfl: 11    glucose blood VI test strips (ASCENSIA AUTODISC VI, ONE TOUCH ULTRA TEST VI) strip, As directed, Disp: 200 Strip, Rfl: 11    aspirin 81 mg chewable tablet, Take 1 Tab by mouth daily (after breakfast). , Disp: 30 Tab, Rfl: 11      Activity: Activity as tolerated  Diet: Cardiac Diet and Diabetic Diet  Wound Care: referred to the wound care clinic     Follow-up Appointments   Procedures    FOLLOW UP VISIT Appointment in: Other (Specify) ID. Appointment on 1/18/19 at 10 am     ID. Appointment on 1/18/19 at 10 am     Standing Status:   Standing     Number of Occurrences:   1     Order Specific Question:   Appointment in     Answer: Other (Specify)    FOLLOW UP VISIT Appointment in: One Week pcp     pcp     Standing Status:   Standing     Number of Occurrences:   1     Standing Expiration Date:   12/23/2018     Order Specific Question:   Appointment in     Answer:    One Week    FOLLOW UP VISIT Appointment in: 3 - 5 Days Wound care clinic     Wound care clinic     Standing Status:   Standing     Number of Occurrences:   1     Standing Expiration Date:   12/23/2018     Order Specific Question:   Appointment in     Answer:   3 - 5 Days       Signed By: Justina Bryant MD     December 22, 2018

## 2018-12-22 NOTE — PROGRESS NOTES
Discharge instructions and prescriptions given and reviewed with pt, verbalizes understanding, pt discharged home with family. Patient daughter April called and went over discharge instructions with her and she will be here to pick patient up in about an hour.

## 2018-12-22 NOTE — DISCHARGE INSTRUCTIONS
ID. Appointment on 1/18/19 at 10 am    3-5 day wound clinic      Wound Care: After Your Visit  Your Care Instructions  Taking good care of your wound at home will help it heal quickly and reduce your chance of infection. The doctor has checked you carefully, but problems can develop later. If you notice any problems or new symptoms, get medical treatment right away. Follow-up care is a key part of your treatment and safety. Be sure to make and go to all appointments, and call your doctor if you are having problems. It's also a good idea to know your test results and keep a list of the medicines you take. How can you care for yourself at home? · Clean the area with soap and water 2 times a day unless your doctor gives you different instructions. Don't use hydrogen peroxide or alcohol, which can slow healing. ¨ You may cover the wound with a thin layer of antibiotic ointment, such as bacitracin, and a nonstick bandage. ¨ Apply more ointment and replace the bandage as needed. · Take pain medicines exactly as directed. Some pain is normal with a wound, but do not ignore pain that is getting worse instead of better. You could have an infection. ¨ If the doctor gave you a prescription medicine for pain, take it as prescribed. ¨ If you are not taking a prescription pain medicine, ask your doctor if you can take an over-the-counter medicine. · Your doctor may have closed your wound with stitches (sutures), staples, or skin glue. ¨ If you have stitches, your doctor may remove them after several days to 2 weeks. Or you may have stitches that dissolve on their own. ¨ If you have staples, your doctor may remove them after 7 to 10 days. ¨ If your wound was closed with skin glue, the glue will wear off in a few days to 2 weeks. When should you call for help?   Call your doctor now or seek immediate medical care if:  · You have signs of infection, such as:  ¨ Increased pain, swelling, warmth, or redness near the wound.  ¨ Red streaks leading from the wound. ¨ Pus draining from the wound. ¨ A fever. · You bleed so much from your incision that you soak one or more bandages over 2 to 4 hours. Watch closely for changes in your health, and be sure to contact your doctor if:  · The wound is not getting better each day. Where can you learn more? Go to [employer]  Enter M973 in the search box to learn more about \"Wound Care: After Your Visit. \"   © 5392-0754 [employer], [employer]orporated. Care instructions adapted under license by [employer] (which disclaims liability or warranty for this information). This care instruction is for use with your licensed healthcare professional. If you have questions about a medical condition or this instruction, always ask your healthcare professional. [place] [address] any warranty or liability for your use of this information. Content Version: 44.2.578096; Last Revised: April 23, 2012      DISCHARGE SUMMARY from Nurse    PATIENT INSTRUCTIONS:    After general anesthesia or intravenous sedation, for 24 hours or while taking prescription Narcotics:  · Limit your activities  · Do not drive and operate hazardous machinery  · Do not make important personal or business decisions  · Do  not drink alcoholic beverages  · If you have not urinated within 8 hours after discharge, please contact your surgeon on call.     Report the following to your surgeon:  · Excessive pain, swelling, redness or odor of or around the surgical area  · Temperature over 100.5  · Nausea and vomiting lasting longer than 4 hours or if unable to take medications  · Any signs of decreased circulation or nerve impairment to extremity: change in color, persistent  numbness, tingling, coldness or increase pain  · Any questions    What to do at Home:  Recommended activity: Activity as tolerated, per MD instructions    If you experience any of the following symptoms fever > 100.5, nausea, vomiting, pain, chest pain and/or shortness of breath to ER please follow up with MD.    *  Please give a list of your current medications to your Primary Care Provider. *  Please update this list whenever your medications are discontinued, doses are      changed, or new medications (including over-the-counter products) are added. *  Please carry medication information at all times in case of emergency situations. These are general instructions for a healthy lifestyle:    No smoking/ No tobacco products/ Avoid exposure to second hand smoke  Surgeon General's Warning:  Quitting smoking now greatly reduces serious risk to your health. Obesity, smoking, and sedentary lifestyle greatly increases your risk for illness    A healthy diet, regular physical exercise & weight monitoring are important for maintaining a healthy lifestyle    You may be retaining fluid if you have a history of heart failure or if you experience any of the following symptoms:  Weight gain of 3 pounds or more overnight or 5 pounds in a week, increased swelling in our hands or feet or shortness of breath while lying flat in bed. Please call your doctor as soon as you notice any of these symptoms; do not wait until your next office visit. Recognize signs and symptoms of STROKE:    F-face looks uneven    A-arms unable to move or move unevenly    S-speech slurred or non-existent    T-time-call 911 as soon as signs and symptoms begin-DO NOT go       Back to bed or wait to see if you get better-TIME IS BRAIN. Warning Signs of HEART ATTACK     Call 911 if you have these symptoms:   Chest discomfort. Most heart attacks involve discomfort in the center of the chest that lasts more than a few minutes, or that goes away and comes back. It can feel like uncomfortable pressure, squeezing, fullness, or pain.  Discomfort in other areas of the upper body.  Symptoms can include pain or discomfort in one or both arms, the back, neck, jaw, or stomach.  Shortness of breath with or without chest discomfort.  Other signs may include breaking out in a cold sweat, nausea, or lightheadedness. Don't wait more than five minutes to call 911 - MINUTES MATTER! Fast action can save your life. Calling 911 is almost always the fastest way to get lifesaving treatment. Emergency Medical Services staff can begin treatment when they arrive -- up to an hour sooner than if someone gets to the hospital by car. The discharge information has been reviewed with the patient. The patient verbalized understanding. Discharge medications reviewed with the patient and appropriate educational materials and side effects teaching were provided.   ___________________________________________________________________________________________________________________________________

## 2018-12-22 NOTE — PROGRESS NOTES
Problem: Falls - Risk of  Goal: *Absence of Falls  Document Christiana Fall Risk and appropriate interventions in the flowsheet. Outcome: Progressing Towards Goal  Fall Risk Interventions:  Mobility Interventions: Bed/chair exit alarm, Communicate number of staff needed for ambulation/transfer, OT consult for ADLs, Mechanical lift, PT Consult for mobility concerns         Medication Interventions: Evaluate medications/consider consulting pharmacy, Patient to call before getting OOB, Teach patient to arise slowly    Elimination Interventions:  Toilet paper/wipes in reach, Patient to call for help with toileting needs, Elevated toilet seat, Call light in reach

## 2018-12-26 NOTE — PROGRESS NOTES
Phone call made r/t recent hospital discharge. Son's name is also Jazlyn Deluca and he states that he has the patient's telephone. Son will give patient my phone number.

## 2018-12-28 NOTE — PROGRESS NOTES
Date/Time of Call: 
 12/27/18 @ [phone] What was the patient hospitalized for? Osteomyelitis Does the patient understand his/her diagnosis and/or treatment and what happened during the hospitalization? Patient does not seem to understand his diagnosis/treatment. Did the patient receive discharge instructions? Yes  
CM Assessed Risk for Readmission:  
 
 
Patient stated Risk for Readmission:  
 
 Yes He did not state risk of readmission Review any discharge instructions (see discharge instructions/AVS in [facility]). Ask patient if they understand these. Do they have any questions? Voices understanding of discharge instructions Were home services ordered (nursing, PT, OT, ST, etc.)? Yes If so, has the first visit occurred? If not, why? (Assist with coordination of services if necessary.) Furman Sever Was any DME ordered? No DME ordered If so, has it been received? If not, why?  (Assist patient in obtaining DME orders &/or equipment if necessary.)  
 
N/A Complete a review of all medications (new, continued and discontinued meds per the D/C instructions and medication tab in [facility]). Medications reviewed. Were all new prescriptions filled? If not, why?  (Assist patient in obtaining medications if necessary  escalate for CCM &/or SW if ongoing issues are verbalized by pt or anticipated) He does not report any new medications Does the patient understand the purpose and dosing instructions for all medications? (If patient has questions, provide explanation and education.) Yes Does the patient have any problems in performing ADLs? (If patient is unable to perform ADLs  what is the limiting factor(s)? Do they have a support system that can assist? If no support system is present, discuss possible assistance that they may be able to obtain. Escalate for CCM/SW if ongoing issues are verbalized by pt or anticipated) Needs some assist with ADLs. Does the patient have all follow-up appointments scheduled? 7 day f/up with PCP?  
(f/up with PCP may be w/in 14 days if patient has a f/up with their specialist w/in 7 days) 7-14 day f/up with specialist?  
(or per discharge instructions) If f/up has not been made  what actions has the care coordinator made to accomplish this? Has transportation been arranged? Wound clinic on 1/2/19 GILMER Ram on 1/18/19 Any other questions or concerns expressed by the patient? Schedule next appointment with KIERAN GARCIA Coordinator or refer to RN Case Manager/ per the workflow guidelines. When is care coordinators next follow-up call scheduled? If referred for CCM  what RN care manager was the referral assigned? TBD Plan: coordinate with [facility] 
  
JENNI Call Completed By:  
Juan Diego Roque RN

## 2018-12-31 NOTE — PROGRESS NOTES
Community Care Management  Follow up Outreach Note Outreach type: Phone call: Yes  
Date/Time of Outreach: 12/31/18, 1010 Reason for follow-up: 
 Continued outreach Disease specific complaints/issues:  
Spoke w/ daughter, Jenny  Patient progress towards goals set from last contact: 
 Stable Has patient attended any PCP or specialist follow-up appointments since last contact? What was outcome of appointment? When is next follow-up scheduled? Wound care f/u 1/2/19 ID 1/18/19 Review medications. Any medication changes since last outreach? Does patient have any questions or issues related to their medications? Daughter verbalizes understanding of d/c Cipro, new antibiotic added, Keflex. Continuing [facility] active? If yes  any issue? Progress? [facility], RN Referrals needed? 
(SW, Diabetes education, HH, etc. ) None Other issues/Miscellaneous? (Transportation, access to meals, ability to perform ADLs, adequate caregiver support, etc.) None Next Outreach Scheduled: Next week or before Next Steps/Goals: 
 Continue outreach, support Community Care Manager: Christian Mckeon RN  
 
RNCM call to pt, spoke to daughter Jenny . She verbalizes understanding of meds, states pt compliance. Reviewed homebound status w/ her and she states pt is absolutely homebound, was not truthful with PT last week about going out \"visiting\". Email to PT re same. Reviewed all f/u appmts w/ her, she will check on Nephrology and PCP f/u as they need scheduling. Plan to f/u next week. This note will not be viewable in [address]

## 2018-12-31 NOTE — PROGRESS NOTES
Alerted by daughter Esther Rodriguez that pt has passed away. Apparently the [facility] nurse and Family friend found him this am. Attempts to call Dr David Agustin office per daughter's request unsuccessful. Notified daughter and she states EMS says the  will attempt to reach MD on Wed after holiday. Attempted to call Wound clinic per daughter's request to alert them as pt had appmt there on Wed. Office is closed, will try again on Wed. No further outreach planned. This note will not be viewable in [address]

## 2019-06-07 NOTE — PROGRESS NOTES
CELINA recevied from Friday [facility]. Patient remains in stable condition with respirations even/unlabored. No acute distress noted at this time. Call light remains within reach, patient encouraged to call nurse prn assist. Will continue to monitor per policy. dr hickey states "pt ok for procedure"

## 2019-07-02 NOTE — PROGRESS NOTES
UT plan  Insurance has denied [facility]. Surgery planning to place wound vac tomorrow (Wed). Ideally wants patient to go to wound clinic Monday thru Friday for hyperbaric wound treatment. IV antibx X 6 weeks per ID. Complex plan. [facility] complexity and [facility].   Mahnaz Guardado.  Adrianne Woodruff cardiovascular/diabetes/musculoskeletal

## 2022-03-21 NOTE — PROGRESS NOTES
Ongoing SW/CM Assessment/Plan of Care Note     See SW/CM flowsheets for goals and other objective data.    Patient/Family discharge goal (s):  Goal #1: Psychosocial needs assessed    Progress note:   [facility] in place for dc. Will continue to collaborate with SW for safe dc needs.          Problem: Nutrition Deficit  Goal: *Optimize nutritional status  Nutrition F/U   Assessment:   · Diet order(s): 1-12:CCHO, 1-18: NPO then CCHO, 1-19: Glucerna shakes tid,1-20: NPO, then CCHO  · Pt still with limited engagement with RD. However he is a little more positive about trying to eat. This is after a long discussion with surgery NP concerning importance of nutrition and wound healing and goal to try to save his foot. He claims he ate most of the tuna salad yesterday,\"until it got sour\". However  observed very little ot none consumed. Macronutrient needs:   EER: 4476-6336 kcal /day (18-22 kcal/kg BW using 96.6kg)   EPR:  grams protein/day (1-1.2 grams/kg IBW; GFR 30 ml/min-MO on CKD)   Intake/Comparative Standards:  Calorie count result for 3 meals for 1-25: 365 kcal (20% of kcal needs) and 19 grams protein (22% of protein needs. Intake include 1 can of Glucerna shake. However intake has improved today with 2 meal intake: 681 kcal (40% of kcal needs) and 40 grams protein (48% of protein needs. Intake include 1 bottle of Ensure high protien. Intervention:   Meals and snacks: Continue to allow liberalized menu in an attempt to improve nutritional intake. Reviewed with keith craven. Calorie count in process with results to follow. Nutrition supplement therapy: Continue Glucerna shake tid. Provided pt with one to start drinking at 1420 and encouraged him to consume in entirety as afternoon snack  Education: Reviewed with pt importance of nutrition in wound healing and need for to request foods he likes and eat those ordered foods.   Coordination of nutrition care: Discussed with Lizzeth Hillman NP and Christine Osullivan, RN     Tyrone Pritchett, [address], [address] [place] [address], [address] [place] [address]

## 2022-09-26 NOTE — PROGRESS NOTES
Bedside, verbal, and written report received from \[facility]\"". Patient resting quietly in bed, alert & oriented. Heparin gtt dual verified. No

## 2022-10-03 NOTE — ANESTHESIA PREPROCEDURE EVALUATION
Anesthetic History   No history of anesthetic complications            Review of Systems / Medical History  Patient summary reviewed and pertinent labs reviewed    Pulmonary          Smoker (45 pk years)         Neuro/Psych   Within defined limits           Cardiovascular    Hypertension: well controlled      CHF: NYHA Classification II    CAD    Exercise tolerance: >4 METS  Comments: EF 35%   [facility] 12/2015: Nml LM, LAD with \"irregularities\", LCx mod dz  NSTEMI in hx but not mentioned in notes   GI/Hepatic/Renal         Renal disease: CRI and ARF       Endo/Other    Diabetes: well controlled, type 2    Anemia     Other Findings   Comments: Encephalopathy but answered questions appropiately            Physical Exam    Airway  Mallampati: II  TM Distance: 4 - 6 cm  Neck ROM: normal range of motion   Mouth opening: Normal     Cardiovascular  Regular rate and rhythm,  S1 and S2 normal,  no murmur, click, rub, or gallop  Rhythm: regular  Rate: normal         Dental    Dentition: Full upper dentures     Pulmonary      Decreased breath sounds: bilateral           Abdominal  GI exam deferred       Other Findings            Anesthetic Plan    ASA: 3  Anesthesia type: spinal          Induction: Intravenous  Anesthetic plan and risks discussed with: Patient Monitor cataract for progression.

## 2023-01-01 NOTE — ROUTINE PROCESS
TRANSFER - IN REPORT:    Verbal report received from [facility] on Marchia Hams  being received from 6th floor for routine progression of care      Report consisted of patients Situation, Background, Assessment and   Recommendations(SBAR). Information from the following report(s) SBAR, MAR and Recent Results was reviewed with the receiving nurse. Opportunity for questions and clarification was provided. Assessment completed upon patients arrival to unit and care assumed. 13.195

## 2023-08-08 NOTE — PROGRESS NOTES
Bed resting in bed, no concerns noted at this time will continue to monitor Discharge medications reviewed with the patient and her daughter. Current medication schedule was discussed in detail including: medication name, indication, dose, administration times, treatment duration, side effects, and special instructions. Questions and concerns were answered and addressed. Patient demonstrated understanding.     New medications: vancomycin PO    Noemi Rosales, PharmD, [facility]  Clinical Pharmacy Specialist  [id number]

## 2023-08-11 NOTE — PROGRESS NOTES
Problem: Self Care Deficits Care Plan (Adult) Goal: *Acute Goals and Plan of Care (Insert Text) No goals set as pt appears to be functioning near baseline with ADLs. Comments: OCCUPATIONAL THERAPY: Initial Assessment, Discharge and PM 10/8/2018 INPATIENT: Hospital Day: 4 Payor: CARE IMPROVEMENT PLUS / [place] / Product Type: Managed Care Medicare /  
  
NAME/AGE/GENDER: Eliza Steinberg is a 72 y.o. male PRIMARY DIAGNOSIS:  Acute respiratory failure with hypoxemia ([address] Pulmonary edema Acute respiratory failure with hypoxemia (HCC) Acute respiratory failure with hypoxemia (HCC) ICD-10: Treatment Diagnosis:  
 · Generalized Muscle Weakness (M62.81) Precautions/Allergies: 
   Lortab [hydrocodone-acetaminophen] ASSESSMENT:  
 
Mr. Judy Oscar presents to hospital for above. Pt lives alone in a one-level home, where he is typically mod I with ADLs/IADLs. Pt uses a cane and walker at baseline for functional mobility; pt endorses 0 falls in the last 6 months. Today, pt is found supine in bed upon arrival, AOx4, and agreeable to OT evaluation. Per BUE screen, AROM, strength, and coordination are [place]. Pt able to move supine to sit with independence, demonstrating intact sitting balance at EOB. Pt completes STS with supervision and RW and completes functional transfer with supervision and RW, demonstrating good to fair balance in standing. Pt completes LB dressing, including donning prothesis, with mod I. He appears to be near baseline with ADLs. Will D/C OT and defer to PT for activity tolerance. Thanks for this consult. This section established at most recent assessment PROBLEM LIST (Impairments causing functional limitations): 
1. n/a INTERVENTIONS PLANNED: (Benefits and precautions of occupational therapy have been discussed with the patient.) 1. n/a  
 
TREATMENT PLAN: Frequency/Duration: Follow patient n/a to address above goals. Pt called stating she started taking Fenofibrate and Atorvastatin around August 2nd, shortly after she started having pain in her kidneys. Pt has also noticed that when she goes to the bathroom first thing in the morning she has blood in her urine. Pt would like to know is she should stop taking medication. Please call back and advise.     Rehabilitation Potential For Stated Goals: n/a  
 
RECOMMENDED REHABILITATION/EQUIPMENT: (at time of discharge pending progress): Due to the probability of continued deficits (see above) this patient will not likely need continued skilled occupational therapy after discharge. Equipment:  
? None at this time OCCUPATIONAL PROFILE AND HISTORY:  
History of Present Injury/Illness (Reason for Referral): 
See H&P Past Medical History/Comorbidities:  
Mr. Miky Delgado  has a past medical history of Abnormal CT scan, chest (12/27/2015); Acute CHF ([address] (12/26/2015); Acute systolic congestive heart failure ([address] (12/30/2015); MO (acute kidney injury) ([address] (7/29/2012); Anemia of chronic disease (9/6/2018); Bilateral pleural effusion (12/27/2015); Chest pain (12/26/2015); Depression; DM (diabetes mellitus), type 2 ([address] (7/29/2012); Encephalopathy due to infection (1/7/2017); HTN (hypertension) (7/29/2012); Hypoglycemia (7/29/2012); NSTEMI (non-ST elevated myocardial infarction) ([address] (1/5/2017); and Tobacco use (12/27/2015). He also has no past medical history of Arrhythmia; Arthritis; Asthma; Autoimmune disease ([address] Calculus of kidney; Cancer ([address] Chronic pain; COPD; DEMENTIA; Gastrointestinal disorder; GERD (gastroesophageal reflux disease); Headache; Hypercholesterolemia; Liver disease; Other ill-defined conditions(799.89); Psychiatric disorder; Psychotic disorder ([address] PUD (peptic ulcer disease); Seizures ([address] Sleep disorder; Stroke [facility]); Thromboembolus ([address] Thyroid disease; or Trauma. Mr. Miky Delgado  has a past surgical history that includes hx orthopaedic. Social History/Living Environment:  
Home Environment: Private residence Wheelchair Ramp: Yes One/Two Story Residence: One story Living Alone: Yes Support Systems: Child(elpidio) Patient Expects to be Discharged to[de-identified] Private residence Current DME Used/Available at Home: joy [employer] [place] Prior Level of Function/Work/Activity: [place] to mod I with ADLs Personal Factors:   
      Sex:  male Age:  72 y.o. Number of Personal Factors/Comorbidities that affect the Plan of Care: Expanded review of therapy/medical records (1-2):  MODERATE COMPLEXITY ASSESSMENT OF OCCUPATIONAL PERFORMANCE[de-identified]  
Activities of Daily Living:  
Basic ADLs (From Assessment) Complex ADLs (From Assessment) Feeding: Independent Oral Facial Hygiene/Grooming: Independent Bathing: Modified independent Upper Body Dressing: Independent Lower Body Dressing: Modified independent Toileting: Independent Grooming/Bathing/Dressing Activities of Daily Living Cognitive Retraining Safety/Judgement: Awareness of environment; Fall prevention; Insight into deficits Bed/Mat Mobility Supine to Sit: Independent Sit to Stand: Contact guard assistance Scooting: Independent Most Recent Physical Functioning:  
Gross Assessment: 
AROM: Within functional limits Strength: Within functional limits Posture: 
  
Balance: 
Sitting: Intact; Without support Standing: Impaired;Pull to stand; With support Standing - Static: Good Standing - Dynamic : Fair Bed Mobility: 
Supine to Sit: Independent Scooting: Independent Wheelchair Mobility: 
  
Transfers: 
Sit to Stand: Contact guard assistance Stand to Sit: Contact guard assistance Patient Vitals for the past 6 hrs: 
 BP BP Patient Position SpO2 Pulse 10/08/18 1059 121/67 At rest 94 % 86  
10/08/18 1521 135/75 Sitting 96 % 98 Mental Status Neurologic State: Alert Orientation Level: Oriented X4 Cognition: Appropriate decision making, Appropriate for age attention/concentration, Appropriate safety awareness, Follows commands Perception: Appears intact Perseveration: No perseveration noted Safety/Judgement: Awareness of environment, Fall prevention, Insight into deficits Physical Skills Involved: 1. n/a Cognitive Skills Affected (resulting in the inability to perform in a timely and safe manner): 
1. n/a Psychosocial Skills Affected: 
1. n/a Number of elements that affect the Plan of Care: 1-3:  LOW COMPLEXITY CLINICAL DECISION MAKIN[address] [place] [address] AM-PAC 6 Clicks Daily Activity Inpatient Short Form How much help from another person does the patient currently need. .. Total A Lot A Little None 1. Putting on and taking off regular lower body clothing? [] 1   [] 2   [] 3   [x] 4  
2. Bathing (including washing, rinsing, drying)? [] 1   [] 2   [x] 3   [] 4  
3. Toileting, which includes using toilet, bedpan or urinal?   [] 1   [] 2   [] 3   [x] 4  
4. Putting on and taking off regular upper body clothing? [] 1   [] 2   [] 3   [x] 4  
5. Taking care of personal grooming such as brushing teeth? [] 1   [] 2   [] 3   [x] 4  
6. Eating meals? [] 1   [] 2   [] 3   [x] 4  
© , Trustees of [address] [place] [address], under license to [employer]. All rights reserved Score:  Initial: 23 Most Recent: X (Date: -- ) Interpretation of Tool:  Represents activities that are increasingly more difficult (i.e. Bed mobility, Transfers, Gait). Score 24 23 22-20 19-15 14-10 9-7 6 Modifier CH CI CJ CK CL CM CN   
 
? Self Care:  
  - CURRENT STATUS: CI - 1%-19% impaired, limited or restricted  - GOAL STATUS: CI - 1%-19% impaired, limited or restricted  - D/C STATUS:  CI - 1%-19% impaired, limited or restricted Payor: CARE IMPROVEMENT PLUS [place]PR[place] / Product Type: [employer] Medicare /   
 
Medical Necessity:    
· n/a. Reason for Services/Other Comments: 
· n/a. Use of outcome tool(s) and clinical judgement create a POC that gives a: LOW COMPLEXITY  
 
 
 
TREATMENT:  
(In addition to Assessment/Re-Assessment sessions the following treatments were rendered) Pre-treatment Symptoms/Complaints:   
Pain: Initial: Pain Intensity 1: 0  Post Session:  same Assessment/Reassessment only, no treatment provided today Braces/Orthotics/Lines/Etc:  
· O2 Device: Room air Treatment/Session Assessment:   
· Response to Treatment:  eval only · Interdisciplinary Collaboration:  
o Physical Therapist 
o Occupational Therapist 
o Registered Nurse 
o Certified Nursing Assistant/Patient Care Technician · After treatment position/precautions:  
o with PT  
· Compliance with Program/Exercises: compliant all of the time. · Recommendations/Intent for next treatment session: \"Next visit will focus on n/a\". Total Treatment Duration: OT Patient Time In/Time Out Time In: 1430 Time Out: 1438 [facility]

## 2023-09-12 NOTE — PROGRESS NOTES
Pt is in room alert and oriented. rr are even and unlabored at current time. Lung sounds are course with wheezing noted. abd is soft bowel sounds are active. No distress noted. No increased bleeding or bruising noted. Rt thumb and rt lower leg amputation. Left foot has a sore on the side of his great toe and heel. Pt states he wants to go home. He is stable at current time. Will continue to monitor. There are no Wet Read(s) to document.

## 2023-11-22 NOTE — PROGRESS NOTES
Hydralazine 10 mg IV given for high blood pressure SEE (MAR). Quality 226: Preventive Care And Screening: Tobacco Use: Screening And Cessation Intervention: Patient screened for tobacco use and is an ex/non-smoker Quality 130: Documentation Of Current Medications In The Medical Record: Current Medications Documented Detail Level: Detailed Quality 431: Preventive Care And Screening: Unhealthy Alcohol Use - Screening: Patient not identified as an unhealthy alcohol user when screened for unhealthy alcohol use using a systematic screening method

## 2024-11-17 NOTE — PROGRESS NOTES
TRANSFER - IN REPORT:    Verbal report received from rn(name) on Dalia Gray  being received from pacu(unit) for routine post - op      Report consisted of patients Situation, Background, Assessment and   Recommendations(SBAR). Information from the following report(s) SBAR was reviewed with the receiving nurse. Opportunity for questions and clarification was provided. Assessment completed upon patients arrival to unit and care assumed. yesterday

## 2025-02-28 NOTE — PROGRESS NOTES
[facility] Progress Note Follow-Up on: MO/CKD Patient seen and examined on rounds he reports improved appetite and SOB improved with no new complaints. ROS: 
Gen - no fever, no chills, appetite CV - no chest pain, no palpitation Lung - shortness of breath better, no cough Abd - no tenderness, no nausea/vomiting, no diarrhea Ext - no edema Exam: 
Vitals:  
 09/26/18 2023 09/26/18 2355 09/27/18 9606 09/27/18 2780 BP: 165/83 160/80 156/77 (!) 157/92 Pulse: 89 88 81 84 Resp: 20 20 20 17 Temp: 98.2 °F (36.8 °C) 98.6 °F (37 °C) 98.6 °F (37 °C) 98.4 °F (36.9 °C) SpO2: 92% 92% 91% 94% Weight:   93.5 kg (206 lb 3.2 oz) Height:      
 
 
 
Intake/Output Summary (Last 24 hours) at 09/27/18 0830 Last data filed at 09/27/18 3376 Gross per 24 hour Intake           917.56 ml Output             1775 ml Net          -857.44 ml Wt Readings from Last 3 Encounters:  
09/27/18 93.5 kg (206 lb 3.2 oz) 09/05/18 95.3 kg (210 lb) 10/05/17 94.8 kg (209 lb) GEN - in no distress CV - regular, no murmur, no rub Lung - clear bilaterally, no wheezes Abd - soft, nontender, + BS Ext - no edema Recent Labs  
   09/26/18 
 0547  09/25/18 
 1801  09/25/18 
 6943 WBC  6.8   --    --   
HGB  8.7*  9.1*  10.0* HCT  27.0*  28.5*  31.5* PLT  223   --    --   
  
 
Recent Labs  
   09/27/18 
 0654  09/26/18 
 0547  09/25/18 
 0416 NA  145  144  142  
K  4.0  4.0  4.2 CL  112*  109*  112* CO2  23  24  19* BUN  39*  47*  47* CREA  3.74*  4.34*  4.14* CA  7.8*  7.5*  7.2*  
GLU  111*  104*  149* Assessment / Plan: Active Problems: 
  HTN (hypertension) (7/29/2012) Tobacco use (12/27/2015) Chronic systolic congestive [facility] failure [facility] (12/30/2015) Overview: EF 35-40% on 2015 Echo 
 
  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 MO/CKD - Based on his last hospitalization in early 9/18, his baseline Cr probably upper 3's with progression of CKD - Current Cr better down from 4.34-->3.74, UOP up garibay draining yellow urine - Pt very reluctant about HD, therefore C refused 2. SOB - improved 3. Elevated troponin 4. GPC bacteremia - source?? 
5. PNA Home

## 2025-06-12 NOTE — PROGRESS NOTES
[facility] Progress Note Follow-Up on: MO/CKD Patient seen and examined on rounds he reports improved appetite, increase in UOP with no new complaints. ROS: 
Gen - no fever, no chills CV - no chest pain, no palpitation Lung - shortness of breath better, no cough Abd - no tenderness, no nausea/vomiting, no diarrhea Ext - no edema Exam: 
Vitals:  
 09/28/18 0004 09/28/18 6903 09/28/18 4718 09/28/18 4658 BP: 163/88 174/88  (!) 148/91 Pulse: 83 81  82 Resp: 19 19 18 Temp: 99 °F (37.2 °C) 97.9 °F (36.6 °C)  97.5 °F (36.4 °C) SpO2: 91% 92%  94% Weight:   93.5 kg (206 lb 3.2 oz) Height:      
 
 
 
Intake/Output Summary (Last 24 hours) at 09/28/18 0061 Last data filed at 09/28/18 9327 Gross per 24 hour Intake              408 ml Output             1675 ml Net            -1267 ml Wt Readings from Last 3 Encounters:  
09/28/18 93.5 kg (206 lb 3.2 oz) 09/05/18 95.3 kg (210 lb) 10/05/17 94.8 kg (209 lb) GEN - in no distress CV - regular, no murmur, no rub Lung - clear bilaterally, no wheezes Abd - soft, nontender, + BS Ext - no edema, right BKA Recent Labs  
   09/26/18 
 0547  09/25/18 
 1801 WBC  6.8   --   
HGB  8.7*  9.1*  
HCT  27.0*  28.5* PLT  223   --   
  
 
Recent Labs  
   09/28/18 
 0728  09/27/18 
 0654  09/26/18 
 0547 NA  145  145  144  
K  4.0  4.0  4.0  
CL  114*  112*  109* CO2  23  23  24 BUN  34*  39*  47* CREA  3.44*  3.74*  4.34* CA  7.9*  7.8*  7.5*  
GLU  112*  111*  104* Assessment / Plan: Active Problems: 
  HTN (hypertension) (7/29/2012) Tobacco use (12/27/2015) Chronic systolic congestive heart failure ([address] (12/30/2015) Overview: EF 35-40% on 2015 Echo 
 
  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 MO/CKD - Based on his last hospitalization in early 9/18, his baseline Cr probably upper 3's with progression of CKD - Current Cr better down from 4.34-->3.74-->3.44 today, UOP up garibay draining yellow urine 
-He remains reluctant to HD with progression of CKD 
-Outpatient follow up in 4 weeks with outpatient office 2. SOB - improved 3. Elevated troponin 4. GPC bacteremia - source?? 
 
5. PNA [FreeTextEntry1] : Patient comes in to establish care and annual exam.  [de-identified] : ROSALINA BECERRA is a 52 year M who comes in to establish care. Pt used to see [facility], [facility], Dr.Habib Lyons (found in HIE/labs). Had labs done in 12/2023.  He notes a hx of HLD, prediabetes. Patient states he has never had a colonoscopy before.  He has not been diagnosed with high blood pressure in the past as well. Patient denies any cp, sob, abdominal pain, nausea, vomiting, palpitations, fever, chills, constipation, diarrhea.

## (undated) DEVICE — KENDALL SCD EXPRESS SLEEVES, KNEE LENGTH, MEDIUM: Brand: KENDALL SCD

## (undated) DEVICE — T-DRAPE,EXTREMITY,STERILE: Brand: MEDLINE

## (undated) DEVICE — SUTURE PDS II SZ 0 L27IN ABSRB VLT L36MM CT-1 1/2 CIR Z340H

## (undated) DEVICE — GOWN,REINF,POLY,ECL,PP SLV,XL: Brand: MEDLINE

## (undated) DEVICE — SUTURE VCRL SZ 2-0 L18IN ABSRB UD POLYGLACTIN 910 BRAID TIE J111T

## (undated) DEVICE — SLIM BODY SKIN STAPLER: Brand: APPOSE ULC

## (undated) DEVICE — BUTTON SWITCH PENCIL BLADE ELECTRODE, HOLSTER: Brand: EDGE

## (undated) DEVICE — DRESSING,GAUZE,XEROFORM,CURAD,5"X9",ST: Brand: CURAD

## (undated) DEVICE — AMD ANTIMICROBIAL SUPER SPONGES,MEDIUM: Brand: KERLIX

## (undated) DEVICE — AMD ANTIMICROBIAL GAUZE SPONGES,12 PLY USP TYPE VII, 0.2% POLYHEXAMETHYLENE BIGUANIDE HCI (PHMB): Brand: CURITY

## (undated) DEVICE — Device

## (undated) DEVICE — 3M™ COBAN™ NL STERILE NON-LATEX SELF-ADHERENT WRAP, 2084S, 4 IN X 5 YD (10 CM X 4,5 M), 18 ROLLS/CASE: Brand: 3M™ COBAN™

## (undated) DEVICE — AMD ANTIMICROBIAL BANDAGE ROLL,6 PLY: Brand: KERLIX

## (undated) DEVICE — 2000CC GUARDIAN II: Brand: GUARDIAN

## (undated) DEVICE — DRAPE,U/SHT,SPLIT,FILM,60X84,STERILE: Brand: MEDLINE

## (undated) DEVICE — TRAY PREP DRY W/ PREM GLV 2 APPL 6 SPNG 2 UNDPD 1 OVERWRAP

## (undated) DEVICE — BNDG CMPR ELAS KNT VEL STD 6IN -- MEDICHOICE

## (undated) DEVICE — X-LARGE COTTON GLOVE: Brand: DEROYAL

## (undated) DEVICE — GOWN,REINFORCED,POLY,AURORA,XXLARGE,STR: Brand: MEDLINE

## (undated) DEVICE — SUTURE VCRL SZ 2-0 L18IN ABSRB VLT L26MM SH 1/2 CIR J775D

## (undated) DEVICE — SOLUTION IV 1000ML 0.9% SOD CHL

## (undated) DEVICE — INTENDED FOR TISSUE SEPARATION, AND OTHER PROCEDURES THAT REQUIRE A SHARP SURGICAL BLADE TO PUNCTURE OR CUT.: Brand: BARD-PARKER SAFETY BLADES SIZE 10, STERILE

## (undated) DEVICE — INTENDED FOR TISSUE SEPARATION, AND OTHER PROCEDURES THAT REQUIRE A SHARP SURGICAL BLADE TO PUNCTURE OR CUT.: Brand: BARD-PARKER SAFETY BLADES SIZE 15, STERILE

## (undated) DEVICE — BLADE RMFG OSC

## (undated) DEVICE — DRSG FOAM MEPILEX TRNSF 6X8IN --

## (undated) DEVICE — CARDINAL HEALTH FLEXIBLE LIGHT HANDLE COVER: Brand: CARDINAL HEALTH

## (undated) DEVICE — (D)SYR 10ML 1/5ML GRAD NSAF -- PKGING CHANGE USE ITEM 338027

## (undated) DEVICE — PAD,ABDOMINAL,5"X9",STERILE,LF,1/PK: Brand: MEDLINE INDUSTRIES, INC.

## (undated) DEVICE — 2108 SERIES SAGITTAL BLADE, NO OFFSET  (24.8 X 1.27 X 82.1MM)

## (undated) DEVICE — REM POLYHESIVE ADULT PATIENT RETURN ELECTRODE: Brand: VALLEYLAB

## (undated) DEVICE — SURGICAL PROCEDURE PACK BASIC ST FRANCIS

## (undated) DEVICE — CONTAINER SPEC FRMLN 120ML --

## (undated) DEVICE — STOCKINETTE TUBE 9X48 -- MEDICHOICE

## (undated) DEVICE — TOWEL SURG W17XL27IN STD BLU COT NONFENESTRATED PREWASHED

## (undated) DEVICE — NEEDLE HYPO 25GA L1.5IN BLU POLYPR HUB S STL REG BVL STR

## (undated) DEVICE — STERILE HOOK LOCK LATEX FREE ELASTIC BANDAGE 6INX5YD: Brand: HOOK LOCK™

## (undated) DEVICE — STERILE HOOK LOCK LATEX FREE ELASTIC BANDAGE 4INX5YD: Brand: HOOK LOCK™

## (undated) DEVICE — SUTURE VCRL SZ 2-0 L36IN ABSRB UD L36MM CT-1 1/2 CIR J945H

## (undated) DEVICE — DISPOSABLE TOURNIQUET CUFF SINGLE BLADDER, DUAL PORT AND QUICK CONNECT CONNECTOR: Brand: COLOR CUFF

## (undated) DEVICE — BASIC SINGLE BASIN-LF: Brand: MEDLINE INDUSTRIES, INC.

## (undated) DEVICE — SUTURE ETHLN SZ 2-0 L18IN NONABSORBABLE BLK L26MM PS 3/8 585H

## (undated) DEVICE — SUTURE PERMAHAND SZ 2-0 L18IN NONABSORBABLE BLK L26MM SH C012D

## (undated) DEVICE — DRESSING,GAUZE,XEROFORM,CURAD,1"X8",ST: Brand: CURAD

## (undated) DEVICE — DRAPE TWL SURG 16X26IN BLU ORB04] ALLCARE INC]